# Patient Record
Sex: MALE | Race: WHITE | NOT HISPANIC OR LATINO | URBAN - METROPOLITAN AREA
[De-identification: names, ages, dates, MRNs, and addresses within clinical notes are randomized per-mention and may not be internally consistent; named-entity substitution may affect disease eponyms.]

---

## 2016-01-28 RX ORDER — AMLODIPINE BESYLATE 2.5 MG/1
1 TABLET ORAL
Qty: 0 | Refills: 0 | COMMUNITY
Start: 2016-01-28

## 2016-01-28 RX ORDER — METOPROLOL TARTRATE 50 MG
1 TABLET ORAL
Qty: 0 | Refills: 0 | COMMUNITY
Start: 2016-01-28

## 2016-01-28 RX ORDER — TAMSULOSIN HYDROCHLORIDE 0.4 MG/1
1 CAPSULE ORAL
Qty: 0 | Refills: 0 | COMMUNITY
Start: 2016-01-28

## 2017-03-01 ENCOUNTER — INPATIENT (INPATIENT)
Facility: HOSPITAL | Age: 82
LOS: 6 days | Discharge: HOME | DRG: 698 | End: 2017-03-08
Attending: HOSPITALIST | Admitting: HOSPITALIST
Payer: MEDICARE

## 2017-03-01 VITALS
TEMPERATURE: 97 F | DIASTOLIC BLOOD PRESSURE: 54 MMHG | SYSTOLIC BLOOD PRESSURE: 96 MMHG | OXYGEN SATURATION: 99 % | RESPIRATION RATE: 16 BRPM | WEIGHT: 214.95 LBS | HEART RATE: 88 BPM

## 2017-03-01 DIAGNOSIS — N47.1 PHIMOSIS: ICD-10-CM

## 2017-03-01 DIAGNOSIS — I21.4 NON-ST ELEVATION (NSTEMI) MYOCARDIAL INFARCTION: ICD-10-CM

## 2017-03-01 DIAGNOSIS — J34.2 DEVIATED NASAL SEPTUM: Chronic | ICD-10-CM

## 2017-03-01 DIAGNOSIS — L89.90 PRESSURE ULCER OF UNSPECIFIED SITE, UNSPECIFIED STAGE: ICD-10-CM

## 2017-03-01 DIAGNOSIS — K46.9 UNSPECIFIED ABDOMINAL HERNIA WITHOUT OBSTRUCTION OR GANGRENE: Chronic | ICD-10-CM

## 2017-03-01 DIAGNOSIS — Z93.3 COLOSTOMY STATUS: Chronic | ICD-10-CM

## 2017-03-01 DIAGNOSIS — Z98.89 OTHER SPECIFIED POSTPROCEDURAL STATES: Chronic | ICD-10-CM

## 2017-03-01 DIAGNOSIS — Z93.1 GASTROSTOMY STATUS: Chronic | ICD-10-CM

## 2017-03-01 DIAGNOSIS — I24.8 OTHER FORMS OF ACUTE ISCHEMIC HEART DISEASE: ICD-10-CM

## 2017-03-01 DIAGNOSIS — I95.9 HYPOTENSION, UNSPECIFIED: ICD-10-CM

## 2017-03-01 DIAGNOSIS — R41.82 ALTERED MENTAL STATUS, UNSPECIFIED: ICD-10-CM

## 2017-03-01 DIAGNOSIS — Z41.8 ENCOUNTER FOR OTHER PROCEDURES FOR PURPOSES OTHER THAN REMEDYING HEALTH STATE: ICD-10-CM

## 2017-03-01 DIAGNOSIS — E86.0 DEHYDRATION: ICD-10-CM

## 2017-03-01 DIAGNOSIS — N39.0 URINARY TRACT INFECTION, SITE NOT SPECIFIED: ICD-10-CM

## 2017-03-01 DIAGNOSIS — R60.1 GENERALIZED EDEMA: ICD-10-CM

## 2017-03-01 DIAGNOSIS — G93.40 ENCEPHALOPATHY, UNSPECIFIED: ICD-10-CM

## 2017-03-01 DIAGNOSIS — A41.9 SEPSIS, UNSPECIFIED ORGANISM: ICD-10-CM

## 2017-03-01 LAB
ANISOCYTOSIS BLD QL: SLIGHT — SIGNIFICANT CHANGE UP
APPEARANCE UR: CLEAR — SIGNIFICANT CHANGE UP
APTT BLD: 26.2 SEC — LOW (ref 27.5–37.4)
APTT BLD: 28.1 SEC — SIGNIFICANT CHANGE UP (ref 27.5–37.4)
BACTERIA # UR AUTO: ABNORMAL
BASOPHILS # BLD AUTO: 0 K/UL — SIGNIFICANT CHANGE UP (ref 0–0.2)
BASOPHILS NFR BLD AUTO: 0.1 % — SIGNIFICANT CHANGE UP (ref 0–2)
BILIRUB UR-MCNC: NEGATIVE — SIGNIFICANT CHANGE UP
CK MB CFR SERPL CALC: 2 NG/ML — SIGNIFICANT CHANGE UP (ref 0–6.7)
CK SERPL-CCNC: 256 U/L — HIGH (ref 30–200)
COLOR SPEC: YELLOW — SIGNIFICANT CHANGE UP
DIFF PNL FLD: ABNORMAL
EOSINOPHIL # BLD AUTO: 0.1 K/UL — SIGNIFICANT CHANGE UP (ref 0–0.5)
EOSINOPHIL # BLD AUTO: 0.2 K/UL — SIGNIFICANT CHANGE UP (ref 0–0.5)
EOSINOPHIL NFR BLD AUTO: 0.8 % — SIGNIFICANT CHANGE UP (ref 0–5)
EOSINOPHIL NFR BLD AUTO: 1 % — SIGNIFICANT CHANGE UP (ref 0–6)
EPI CELLS # UR: SIGNIFICANT CHANGE UP
GLUCOSE UR QL: NEGATIVE MG/DL — SIGNIFICANT CHANGE UP
HCT VFR BLD CALC: 26 % — LOW (ref 42–52)
HCT VFR BLD CALC: 30.5 % — LOW (ref 42–52)
HGB BLD-MCNC: 8.1 G/DL — LOW (ref 14–18)
HGB BLD-MCNC: 9.3 G/DL — LOW (ref 14–18)
HYPOCHROMIA BLD QL: SLIGHT — SIGNIFICANT CHANGE UP
INR BLD: 1.34 RATIO — HIGH (ref 0.88–1.16)
INR BLD: 1.38 RATIO — HIGH (ref 0.88–1.16)
KETONES UR-MCNC: NEGATIVE — SIGNIFICANT CHANGE UP
LACTATE BLDV-MCNC: 1.2 MMOL/L — SIGNIFICANT CHANGE UP (ref 0.5–1.6)
LACTATE BLDV-MCNC: 2.2 MMOL/L — HIGH (ref 0.7–2)
LEUKOCYTE ESTERASE UR-ACNC: ABNORMAL
LYMPHOCYTES # BLD AUTO: 1.1 K/UL — SIGNIFICANT CHANGE UP (ref 1–4.8)
LYMPHOCYTES # BLD AUTO: 11 % — LOW (ref 20–55)
LYMPHOCYTES # BLD AUTO: 2 K/UL — SIGNIFICANT CHANGE UP (ref 1–4.8)
LYMPHOCYTES # BLD AUTO: 8 % — LOW (ref 20–55)
MCHC RBC-ENTMCNC: 24.6 PG — LOW (ref 27–31)
MCHC RBC-ENTMCNC: 25.2 PG — LOW (ref 27–31)
MCHC RBC-ENTMCNC: 30.5 G/DL — LOW (ref 32–36)
MCHC RBC-ENTMCNC: 31.2 G/DL — LOW (ref 32–36)
MCV RBC AUTO: 80.7 FL — SIGNIFICANT CHANGE UP (ref 80–94)
MCV RBC AUTO: 81 FL — SIGNIFICANT CHANGE UP (ref 80–94)
MONOCYTES # BLD AUTO: 0.9 K/UL — HIGH (ref 0–0.8)
MONOCYTES # BLD AUTO: 1.6 K/UL — HIGH (ref 0–0.8)
MONOCYTES NFR BLD AUTO: 6.6 % — SIGNIFICANT CHANGE UP (ref 3–10)
MONOCYTES NFR BLD AUTO: 9 % — SIGNIFICANT CHANGE UP (ref 3–10)
MYELOCYTES NFR BLD: 1 % — HIGH (ref 0–0)
NEUTROPHILS # BLD AUTO: 11.8 K/UL — HIGH (ref 1.8–8)
NEUTROPHILS # BLD AUTO: 13.8 K/UL — HIGH (ref 1.8–8)
NEUTROPHILS NFR BLD AUTO: 73 % — SIGNIFICANT CHANGE UP (ref 37–73)
NEUTROPHILS NFR BLD AUTO: 83.4 % — HIGH (ref 37–73)
NEUTS BAND # BLD: 5 % — SIGNIFICANT CHANGE UP (ref 0–8)
NITRITE UR-MCNC: NEGATIVE — SIGNIFICANT CHANGE UP
NT-PROBNP SERPL-SCNC: 349 PG/ML — HIGH (ref 0–300)
PH UR: 5 — SIGNIFICANT CHANGE UP (ref 4.8–8)
PLAT MORPH BLD: NORMAL — SIGNIFICANT CHANGE UP
PLATELET # BLD AUTO: 358 K/UL — SIGNIFICANT CHANGE UP (ref 150–400)
PLATELET # BLD AUTO: 364 K/UL — SIGNIFICANT CHANGE UP (ref 150–400)
PROT UR-MCNC: 30 MG/DL
PROTHROM AB SERPL-ACNC: 14.8 SEC — HIGH (ref 10–13.1)
PROTHROM AB SERPL-ACNC: 15.2 SEC — HIGH (ref 10–13.1)
RBC # BLD: 3.21 M/UL — LOW (ref 4.6–6.2)
RBC # BLD: 3.78 M/UL — LOW (ref 4.6–6.2)
RBC # FLD: 16.9 % — HIGH (ref 11–15.6)
RBC # FLD: 17.2 % — HIGH (ref 11–15.6)
RBC BLD AUTO: ABNORMAL
RBC CASTS # UR COMP ASSIST: ABNORMAL /HPF (ref 0–4)
SP GR SPEC: 1.01 — SIGNIFICANT CHANGE UP (ref 1.01–1.02)
TROPONIN T SERPL-MCNC: 0.14 NG/ML — CRITICAL HIGH (ref 0–0.06)
UROBILINOGEN FLD QL: NEGATIVE MG/DL — SIGNIFICANT CHANGE UP
WBC # BLD: 14.1 K/UL — HIGH (ref 4.8–10.8)
WBC # BLD: 17.7 K/UL — HIGH (ref 4.8–10.8)
WBC # FLD AUTO: 14.1 K/UL — HIGH (ref 4.8–10.8)
WBC # FLD AUTO: 17.7 K/UL — HIGH (ref 4.8–10.8)
WBC UR QL: ABNORMAL

## 2017-03-01 PROCEDURE — 93010 ELECTROCARDIOGRAM REPORT: CPT

## 2017-03-01 PROCEDURE — 99284 EMERGENCY DEPT VISIT MOD MDM: CPT

## 2017-03-01 PROCEDURE — 99285 EMERGENCY DEPT VISIT HI MDM: CPT

## 2017-03-01 PROCEDURE — 71250 CT THORAX DX C-: CPT | Mod: 26

## 2017-03-01 PROCEDURE — 74176 CT ABD & PELVIS W/O CONTRAST: CPT | Mod: 26

## 2017-03-01 PROCEDURE — 71010: CPT | Mod: 26

## 2017-03-01 RX ORDER — ENOXAPARIN SODIUM 100 MG/ML
40 INJECTION SUBCUTANEOUS DAILY
Qty: 0 | Refills: 0 | Status: DISCONTINUED | OUTPATIENT
Start: 2017-03-01 | End: 2017-03-08

## 2017-03-01 RX ORDER — VANCOMYCIN HCL 1 G
1500 VIAL (EA) INTRAVENOUS ONCE
Qty: 0 | Refills: 0 | Status: COMPLETED | OUTPATIENT
Start: 2017-03-01 | End: 2017-03-01

## 2017-03-01 RX ORDER — SODIUM CHLORIDE 9 MG/ML
1000 INJECTION INTRAMUSCULAR; INTRAVENOUS; SUBCUTANEOUS ONCE
Qty: 0 | Refills: 0 | Status: COMPLETED | OUTPATIENT
Start: 2017-03-01 | End: 2017-03-01

## 2017-03-01 RX ORDER — ACETAMINOPHEN 500 MG
650 TABLET ORAL EVERY 6 HOURS
Qty: 0 | Refills: 0 | Status: DISCONTINUED | OUTPATIENT
Start: 2017-03-01 | End: 2017-03-08

## 2017-03-01 RX ORDER — ALBUTEROL 90 UG/1
3 AEROSOL, METERED ORAL
Qty: 0 | Refills: 0 | COMMUNITY

## 2017-03-01 RX ORDER — SACCHAROMYCES BOULARDII 250 MG
250 POWDER IN PACKET (EA) ORAL
Qty: 0 | Refills: 0 | Status: DISCONTINUED | OUTPATIENT
Start: 2017-03-01 | End: 2017-03-08

## 2017-03-01 RX ORDER — LACOSAMIDE 50 MG/1
150 TABLET ORAL
Qty: 0 | Refills: 0 | Status: COMPLETED | OUTPATIENT
Start: 2017-03-01 | End: 2017-03-08

## 2017-03-01 RX ORDER — VANCOMYCIN HCL 1 G
VIAL (EA) INTRAVENOUS
Qty: 0 | Refills: 0 | Status: DISCONTINUED | OUTPATIENT
Start: 2017-03-01 | End: 2017-03-01

## 2017-03-01 RX ORDER — PIPERACILLIN AND TAZOBACTAM 4; .5 G/20ML; G/20ML
3.38 INJECTION, POWDER, LYOPHILIZED, FOR SOLUTION INTRAVENOUS ONCE
Qty: 0 | Refills: 0 | Status: DISCONTINUED | OUTPATIENT
Start: 2017-03-01 | End: 2017-03-01

## 2017-03-01 RX ORDER — SODIUM CHLORIDE 0.65 %
1 AEROSOL, SPRAY (ML) NASAL DAILY
Qty: 0 | Refills: 0 | Status: DISCONTINUED | OUTPATIENT
Start: 2017-03-01 | End: 2017-03-08

## 2017-03-01 RX ORDER — ASPIRIN/CALCIUM CARB/MAGNESIUM 325 MG
81 TABLET ORAL
Qty: 0 | Refills: 0 | COMMUNITY

## 2017-03-01 RX ORDER — AMLODIPINE BESYLATE 2.5 MG/1
10 TABLET ORAL DAILY
Qty: 0 | Refills: 0 | Status: DISCONTINUED | OUTPATIENT
Start: 2017-03-01 | End: 2017-03-01

## 2017-03-01 RX ORDER — MEROPENEM 1 G/30ML
1000 INJECTION INTRAVENOUS ONCE
Qty: 0 | Refills: 0 | Status: COMPLETED | OUTPATIENT
Start: 2017-03-01 | End: 2017-03-01

## 2017-03-01 RX ORDER — FERROUS SULFATE 325(65) MG
300 TABLET ORAL DAILY
Qty: 0 | Refills: 0 | Status: DISCONTINUED | OUTPATIENT
Start: 2017-03-01 | End: 2017-03-08

## 2017-03-01 RX ORDER — ASPIRIN/CALCIUM CARB/MAGNESIUM 324 MG
81 TABLET ORAL DAILY
Qty: 0 | Refills: 0 | Status: DISCONTINUED | OUTPATIENT
Start: 2017-03-01 | End: 2017-03-02

## 2017-03-01 RX ORDER — FINASTERIDE 5 MG/1
5 TABLET, FILM COATED ORAL DAILY
Qty: 0 | Refills: 0 | Status: DISCONTINUED | OUTPATIENT
Start: 2017-03-01 | End: 2017-03-08

## 2017-03-01 RX ORDER — ASPIRIN/CALCIUM CARB/MAGNESIUM 325 MG
81 TABLET ORAL DAILY
Qty: 0 | Refills: 0 | Status: DISCONTINUED | OUTPATIENT
Start: 2017-03-01 | End: 2017-03-01

## 2017-03-01 RX ORDER — MEROPENEM 1 G/30ML
1000 INJECTION INTRAVENOUS EVERY 8 HOURS
Qty: 0 | Refills: 0 | Status: DISCONTINUED | OUTPATIENT
Start: 2017-03-02 | End: 2017-03-05

## 2017-03-01 RX ORDER — METOPROLOL TARTRATE 50 MG
25 TABLET ORAL
Qty: 0 | Refills: 0 | Status: DISCONTINUED | OUTPATIENT
Start: 2017-03-01 | End: 2017-03-01

## 2017-03-01 RX ORDER — VANCOMYCIN HCL 1 G
1000 VIAL (EA) INTRAVENOUS EVERY 12 HOURS
Qty: 0 | Refills: 0 | Status: DISCONTINUED | OUTPATIENT
Start: 2017-03-01 | End: 2017-03-03

## 2017-03-01 RX ORDER — SODIUM CHLORIDE 9 MG/ML
800 INJECTION INTRAMUSCULAR; INTRAVENOUS; SUBCUTANEOUS ONCE
Qty: 0 | Refills: 0 | Status: COMPLETED | OUTPATIENT
Start: 2017-03-01 | End: 2017-03-01

## 2017-03-01 RX ORDER — PIPERACILLIN AND TAZOBACTAM 4; .5 G/20ML; G/20ML
3.38 INJECTION, POWDER, LYOPHILIZED, FOR SOLUTION INTRAVENOUS EVERY 8 HOURS
Qty: 0 | Refills: 0 | Status: DISCONTINUED | OUTPATIENT
Start: 2017-03-01 | End: 2017-03-01

## 2017-03-01 RX ORDER — CEFTRIAXONE 500 MG/1
1 INJECTION, POWDER, FOR SOLUTION INTRAMUSCULAR; INTRAVENOUS ONCE
Qty: 0 | Refills: 0 | Status: COMPLETED | OUTPATIENT
Start: 2017-03-01 | End: 2017-03-01

## 2017-03-01 RX ORDER — MEROPENEM 1 G/30ML
INJECTION INTRAVENOUS
Qty: 0 | Refills: 0 | Status: DISCONTINUED | OUTPATIENT
Start: 2017-03-01 | End: 2017-03-05

## 2017-03-01 RX ORDER — SODIUM CHLORIDE 9 MG/ML
1000 INJECTION INTRAMUSCULAR; INTRAVENOUS; SUBCUTANEOUS
Qty: 0 | Refills: 0 | Status: DISCONTINUED | OUTPATIENT
Start: 2017-03-01 | End: 2017-03-03

## 2017-03-01 RX ADMIN — SODIUM CHLORIDE 3000 MILLILITER(S): 9 INJECTION INTRAMUSCULAR; INTRAVENOUS; SUBCUTANEOUS at 13:36

## 2017-03-01 RX ADMIN — SODIUM CHLORIDE 4800 MILLILITER(S): 9 INJECTION INTRAMUSCULAR; INTRAVENOUS; SUBCUTANEOUS at 17:31

## 2017-03-01 RX ADMIN — MEROPENEM 200 MILLIGRAM(S): 1 INJECTION INTRAVENOUS at 21:26

## 2017-03-01 RX ADMIN — CEFTRIAXONE 100 GRAM(S): 500 INJECTION, POWDER, FOR SOLUTION INTRAMUSCULAR; INTRAVENOUS at 13:39

## 2017-03-01 RX ADMIN — LACOSAMIDE 150 MILLIGRAM(S): 50 TABLET ORAL at 18:58

## 2017-03-01 RX ADMIN — Medication 300 MILLIGRAM(S): at 18:31

## 2017-03-01 RX ADMIN — SODIUM CHLORIDE 3000 MILLILITER(S): 9 INJECTION INTRAMUSCULAR; INTRAVENOUS; SUBCUTANEOUS at 15:55

## 2017-03-01 RX ADMIN — SODIUM CHLORIDE 100 MILLILITER(S): 9 INJECTION INTRAMUSCULAR; INTRAVENOUS; SUBCUTANEOUS at 17:31

## 2017-03-01 RX ADMIN — Medication 81 MILLIGRAM(S): at 18:57

## 2017-03-01 RX ADMIN — Medication 250 MILLIGRAM(S): at 18:57

## 2017-03-01 NOTE — H&P ADULT - PROBLEM SELECTOR PLAN 4
likely 2/2 to protein malnutrition  Nutrition consult IV NS @ 100 ml/hr   Monitor renal function s/p 2 bolus in ED   IV NS @ 100 ml/hr   Monitor renal function s/p 2 boluses in ED now on IV NS @ 100 ml/hr   BP meds held for now  Monitor renal function

## 2017-03-01 NOTE — ED ADULT NURSE NOTE - PSH
Abdominal hernia    Deviated septum    H/O hemorrhoidectomy    Status post cardiac surgery  stents x 2

## 2017-03-01 NOTE — ED ADULT NURSE REASSESSMENT NOTE - NS ED NURSE REASSESS COMMENT FT1
pt repositioned, assessment remains unchanged, baseline neuro  status, respirations even unlabored, will continue to monitor./

## 2017-03-01 NOTE — H&P ADULT - ASSESSMENT
85 year old male w/ PMH dementia of BIBA from [facility] for fever of 102.1 likely 2/2 to sepsis. 85 year old male w/ PMH dementia of BIBA from [facility] for fever of 102.1 likely 2/2 to severe sepsis.

## 2017-03-01 NOTE — H&P ADULT - PMH
BPH (benign prostatic hypertrophy)    CAD (coronary artery disease)    CHF (congestive heart failure)    Clostridium difficile diarrhea  in 2009  Dementia    Fall  Multiple Mechanical falls  HLD (hyperlipidemia)    HTN (hypertension)    Prostate cancer  Treated w/ radiation  Seizure  (last event >1yr ago)  Stroke  (~5yrs ago) BPH (benign prostatic hypertrophy)    CAD (coronary artery disease)    CHF (congestive heart failure)    Clostridium difficile diarrhea  in 2009  Dementia    Dysphagia    Fall  Multiple Mechanical falls  HLD (hyperlipidemia)    HTN (hypertension)    Prostate cancer  Treated w/ radiation  Seizure  (last event >1yr ago)  Stroke  (~5yrs ago)

## 2017-03-01 NOTE — ED PROVIDER NOTE - OBJECTIVE STATEMENT
85 year old male w/ PMH dementia, seizure, stroke, dysphagia s/p PEG, phimosis w/  jones & colostomy for BIBA from [facility] for fever of 102.1. Patient was sent to [facility] to rule out sepsis. Vitals at NH were 102.1 F, HR 98, RR 20, /70 & O2 sat 95% on RA. History obtained from EMS & nursing home records as patient is a poor historian and non-verbal.

## 2017-03-01 NOTE — CONSULT NOTE ADULT - ASSESSMENT
Problem/Plan - 1:  ·  Problem: Sepsis- unknown source , surveillance cx pending , abx as per primary team.  CT of chest & abdomen pending    Problem/Plan - 2:  ·  Problem: Elevated Tni ; ECG unremarkable normal LVEF 1/2016 : Likely demand ischemia >> NSTEMI (non-ST elevated myocardial infarction).    f/u repeat troponin consider repeat TTE if significant elevation. Hold UFH for now continue [id number] daily/statin    Problem/Plan - 3:  ·  Problem: UTI (urinary tract infection).  Plan: Meropenem 1g IV Q8H & Vancomycin 1 g IV Q12H  f/u urine culture.     Problem/Plan - 4:  ·  Problem: Dehydration.  Plan: s/p 2 boluses in ED now on IV NS @ 100 ml/hr   BP meds held for now  Monitor renal function.     DVT prophylaxis: Plan; VCD boots

## 2017-03-01 NOTE — ED ADULT NURSE NOTE - PMH
BPH (benign prostatic hypertrophy)    CAD (coronary artery disease)    CHF (congestive heart failure)    Clostridium difficile diarrhea  in 2009  Dementia    Fall  Multiple Mechanical falls  HLD (hyperlipidemia)    HTN (hypertension)    Prostate cancer  Treated w/ radiation  Seizure  (last event >1yr ago)  Stroke  (~5yrs ago)

## 2017-03-01 NOTE — H&P ADULT - PROBLEM SELECTOR PLAN 6
VCD boots Elevated troponin likely 2/2 demand ischema  Cardiology consult   f/u repeat troponin patient has baseline dementia   will contact NH in the AM for more history

## 2017-03-01 NOTE — H&P ADULT - HISTORY OF PRESENT ILLNESS
85 year old male w/ PMH of BIBA from [facility] for fever of 102.1. 85 year old male w/ PMH of BIBA from [facility] for fever of 102.1. P 85 year old male w/ PMH dementia of BIBA from [facility] for fever of 102.1.  Patient is a poor historian and non-verbal. History obtained from EMS & nursing home records. 85 year old male w/ PMH dementia, seizure & stroke BIBA from [facility] for fever of 102.1. Patient was sent to [facility] to rule out sepsis. History obtained from EMS & nursing home records as patient is a poor historian and non-verbal. 85 year old male w/ PMH dementia, seizure & stroke BIBA from [facility] for fever of 102.1. Patient was sent to [facility] to rule out sepsis. Vitals at NH were 102.1 F, HR 98, RR 20, /70 & O2 sat 95% on RA. History obtained from EMS & nursing home records as patient is a poor historian and non-verbal. 85 year old male w/ PMH dementia, seizure, stroke, dysphagia s/p PEG BIBA from [facility] for fever of 102.1. Patient was sent to [facility] to rule out sepsis. Vitals at NH were 102.1 F, HR 98, RR 20, /70 & O2 sat 95% on RA. History obtained from EMS & nursing home records as patient is a poor historian and non-verbal. 85 year old male w/ PMH dementia, seizure, stroke, dysphagia s/p PEG & phimosis s/p jones BIBA from [facility] for fever of 102.1. Patient was sent to [facility] to rule out sepsis. Vitals at NH were 102.1 F, HR 98, RR 20, /70 & O2 sat 95% on RA. History obtained from EMS & nursing home records as patient is a poor historian and non-verbal. 85 year old male w/ PMH dementia, seizure, stroke, dysphagia s/p PEG & phimosis w/  jones BIBA from [facility] for fever of 102.1. Patient was sent to [facility] to rule out sepsis. Vitals at NH were 102.1 F, HR 98, RR 20, /70 & O2 sat 95% on RA. History obtained from EMS & nursing home records as patient is a poor historian and non-verbal. 85 year old male w/ PMH dementia, seizure, stroke, dysphagia s/p PEG, phimosis w/  jones & colostomy for BIBA from [facility] for fever of 102.1. Patient was sent to [facility] to rule out sepsis. Vitals at NH were 102.1 F, HR 98, RR 20, /70 & O2 sat 95% on RA. History obtained from EMS & nursing home records as patient is a poor historian and non-verbal.

## 2017-03-01 NOTE — ED PROVIDER NOTE - CARE PLAN
Principal Discharge DX:	Altered mental status  Secondary Diagnosis:	Urinary tract infection Principal Discharge DX:	Altered mental status  Secondary Diagnosis:	Urinary tract infection  Secondary Diagnosis:	NSTEMI (non-ST elevated myocardial infarction)

## 2017-03-01 NOTE — H&P ADULT - PSH
Abdominal hernia    Deviated septum    H/O hemorrhoidectomy    Status post cardiac surgery  stents x 2 Abdominal hernia    Colostomy in place    Deviated septum    H/O hemorrhoidectomy    S/P percutaneous endoscopic gastrostomy (PEG) tube placement    Status post cardiac surgery  stents x 2

## 2017-03-01 NOTE — CONSULT NOTE ADULT - SUBJECTIVE AND OBJECTIVE BOX
Patient is a 85y old  Male who presents with a chief complaint of Fever   patient is poor historian detail of history abstracted from available chart records       HPI:  85 year old male w/ PMH dementia, seizure, stroke, dysphagia s/p PEG, phimosis w/  jones & colostomy for BIBA from [facility] for fever of 102.1. Patient was sent to [facility] to rule out sepsis. Vitals at NH were 102.1 F, HR 98, RR 20, /70 & O2 sat 95% on RA. History obtained from EMS & nursing home records as patient is a poor historian and non-verbal. IN ED patient denies cp initial TNI 0.14 12-lead ECG ST with ns st twa.      PAST MEDICAL & SURGICAL HISTORY:  Dysphagia  Fall: Multiple Mechanical falls  CAD (coronary artery disease)  Clostridium difficile diarrhea: in   BPH (benign prostatic hypertrophy)  Dementia  HLD (hyperlipidemia)  CHF (congestive heart failure)  Prostate cancer: Treated w/ radiation  Stroke: (~5yrs ago)  Seizure: (last event &gt;1yr ago)  HTN (hypertension)  Colostomy in place  S/P percutaneous endoscopic gastrostomy (PEG) tube placement  H/O hemorrhoidectomy  Deviated septum  Abdominal hernia  Status post cardiac surgery: stents x 2      PREVIOUS DIAGNOSTIC TESTING:      ECHO 2016  FINDINGS: Conclusions:  Technically limited study. The study was terminated  prematurely by patient request.  1. Aortic valve not well visualized; appears calcified with  decreased opening. Aortic gradients were not obtained  because the examination was stopped prematurely. Mild  aortic regurgitation.  2. Normal aortic root size. (Ao: 3.2 cm at the sinuses of  Valsalva). The ascending aorta measures4.0 cm.  3. From very limited views; grossly left ventricular  systolic function appears preserved.  4. Reversal of the E-A  waves of the mitral inflow pattern  is consistent with stage I diastolic LV dysfunction.  5. The right heart was not adequately visualized.  6. Trace pericardial effusion.      Allergies    clindamycin (Unknown)  Nuts (Hives; Rash)  PC Pen VK (Unknown)  strawberry (Short breath; Rash; Hives)  Xanax (Rash)    Intolerances    Ativan (Unknown)  Haldol (Dystonic RXN)  hydrALAZINE (Unknown)  Zyprexa (Unknown)      MEDICATIONS  (STANDING):  sodium chloride 0.9%. 1000milliLiter(s) IV Continuous <Continuous>  finasteride 5milliGRAM(s) Oral daily  lacosamide 150milliGRAM(s) Oral two times a day  ferrous    sulfate 60 mG/mL Liquid 300milliGRAM(s) Enteral Tube daily  sodium chloride 0.65% Nasal 1Spray(s) Both Nostrils daily  aspirin  chewable 81milliGRAM(s) Oral daily  vancomycin  IVPB  IV Intermittent   saccharomyces boulardii 250milliGRAM(s) Oral two times a day  meropenem IVPB  IV Intermittent   meropenem IVPB 1000milliGRAM(s) IV Intermittent once    MEDICATIONS  (PRN):  acetaminophen  Suppository 650milliGRAM(s) Rectal every 6 hours PRN For Temp greater than 38 C (100.4 F)      FAMILY HISTORY:  No pertinent family history in first degree relatives      SOCIAL HISTORY: Assisted Living  resident    CIGARETTES:N/A    ALCOHOL: N/A    Physical Exam:  Physical Exam: Physical Exam:  GENERAL: NAD, malnourished, non-verbal   HEAD:  Atraumatic, Normocephalic EYES: EOMI, PERRLA, conjunctiva and sclera clear, cataracts B/L ENMT: dry mucous membranes, poor dentition  NECK: Supple, No JVD, Normal thyroid NERVOUS SYSTEM:  Awake, Alert & Oriented x0, contracted upper & lower extremities CHEST/LUNG: Clear to percussion bilaterally; No rales, rhonchi, wheezing, or rubs HEART: Regular rate and rhythm; No murmurs, rubs, or gallops ABDOMEN: Soft, Nontender, Nondistended; Bowel sounds present, PEG tube & colostomy bag in place GENITOURINARY: phimosis w/ jones in place EXTREMITIES: 2+ pitting edema, contracture of upper & lower extremities, No clubbing or cyanosis LYMPH: No lymphadenopathy noted SKIN: 1x2cm ulcer on left knee, left plantar healing ulcer, 2x2 cm stage 2 decubitus ulcer	      Vital Signs Last 24 Hrs  T(C): 37.3, Max: 37.8 ( @ 13:21)  T(F): 99.1, Max: 100 ( @ 13:21)  HR: 88 (87 - 90)  BP: 124/66 (96/54 - 124/66)  BP(mean): --  RR: 16 (16 - 16)  SpO2: 97% (96% - 99%)    Daily     Daily     I&O's Detail            INTERPRETATION OF TELEMETRY:    ECG:    LABS:                        8.1    14.1  )-----------( 358      ( 01 Mar 2017 20:08 )             26.0     01 Mar 2017 15:40    144    |  107    |  35.0   ----------------------------<  110    3.6     |  23.0   |  0.38     Ca    8.9        01 Mar 2017 15:40  Phos  3.0       01 Mar 2017 15:40  Mg     2.2       01 Mar 2017 15:40    TPro  6.5    /  Alb  2.6    /  TBili  0.3    /  DBili  x      /  AST  26     /  ALT  21     /  AlkPhos  78     01 Mar 2017 15:40    CARDIAC MARKERS ( 01 Mar 2017 15:40 )  x     / 0.14 ng/mL / 256 U/L / x     / 2.0 ng/mL      PT/INR - ( 01 Mar 2017 20:08 )   PT: 15.2 sec;   INR: 1.38 ratio         PTT - ( 01 Mar 2017 20:08 )  PTT:28.1 sec  Urinalysis Basic - ( 01 Mar 2017 13:25 )    Color: Yellow / Appearance: Clear / S.015 / pH: x  Gluc: x / Ketone: Negative  / Bili: Negative / Urobili: Negative mg/dL   Blood: x / Protein: 30 mg/dL / Nitrite: Negative   Leuk Esterase: Moderate / RBC: 25-50 /HPF / WBC 11-25   Sq Epi: x / Non Sq Epi: Occasional / Bacteria: Occasional      I&O's Summary    BNPSerum Pro-Brain Natriuretic Peptide: 349 pg/mL ( @ 15:41)    RADIOLOGY & ADDITIONAL STUDIES:

## 2017-03-01 NOTE — H&P ADULT - NSHPREVIEWOFSYSTEMS_GEN_ALL_CORE
Unable to obtain as patient is non-verbal. Unable to obtain as patient is a poor historian and non-verbal.

## 2017-03-01 NOTE — H&P ADULT - PROBLEM SELECTOR PLAN 3
2/2 to immobility & contracts  Wound care consult  PT consult 2/2 to immobility & contractures   Wound care consult  PT consult Meropenem 1g IV Q8H & Vancomycin 1 g IV Q12H  f/u urine culture

## 2017-03-01 NOTE — ED ADULT NURSE NOTE - CHIEF COMPLAINT QUOTE
pt came to ed from [facility] for eval of fever. has jones. peg tube and colostomy. temp 102.1 prior to arrival; got tylenol at facility; afebrile here.

## 2017-03-01 NOTE — H&P ADULT - PROBLEM SELECTOR PLAN 7
IV NS @ 100 ml/hr   Monitor renal function 2/2 to immobility & contractures   Wound care consult  PT consult

## 2017-03-01 NOTE — ED ADULT TRIAGE NOTE - CHIEF COMPLAINT QUOTE
pt came to ed from [facility] for eval of fever. has jones. peg tube and colostomy. temp 102.1 prior to arrival; got tylenol at facility; afebrile here.

## 2017-03-01 NOTE — ED ADULT NURSE REASSESSMENT NOTE - NS ED NURSE REASSESS COMMENT FT1
pt baseline mental status remains unchanged. respirations even unlabored, b/l breathe sounds noted, pt repositioned and adjusted in bed. will continue to monitor.

## 2017-03-01 NOTE — H&P ADULT - PROBLEM SELECTOR PLAN 2
appears to be baseline   will contact NH in the AM patient has baseline dementia   will contact NH in the AM for more history Normal EKG w/ elevated troponin   Cardiology consult ([place])    f/u repeat troponin

## 2017-03-01 NOTE — H&P ADULT - PROBLEM SELECTOR PLAN 9
VCD boots Baugh is a potential source of infection vs colonization   Straight catheter PRN Baugh is a potential source of infection vs colonization Jones is a potential source of infection vs colonization. Will keep jones and consult urology and reconsider finesteride and tamulosin

## 2017-03-01 NOTE — H&P ADULT - NSHPPHYSICALEXAM_GEN_ALL_CORE
Physical Exam:   GENERAL: NAD, malnourished, non-verbal    HEAD:  Atraumatic, Normocephalic  EYES: EOMI, PERRLA, conjunctiva and sclera clear, cataracts B/L  ENMT: dry mucous membranes, poor dentition   NECK: Supple, No JVD, Normal thyroid  NERVOUS SYSTEM:  Awake, Alert & Oriented x0, contracted upper & lower extremities  CHEST/LUNG: Clear to percussion bilaterally; No rales, rhonchi, wheezing, or rubs  HEART: Regular rate and rhythm; No murmurs, rubs, or gallops  ABDOMEN: Soft, Nontender, Nondistended; Bowel sounds present, PEG tube & colostomy bag in place  EXTREMITIES: 2+ pitting edema, contracture of upper & lower extremities, No clubbing or cyanosis  LYMPH: No lymphadenopathy noted  SKIN: 1x2cm ulcer on left knee, left plantar healing ulcer, 2x2 cm stage 2 decubitus ulcer Physical Exam:   GENERAL: NAD, malnourished, non-verbal    HEAD:  Atraumatic, Normocephalic  EYES: EOMI, PERRLA, conjunctiva and sclera clear, cataracts B/L  ENMT: dry mucous membranes, poor dentition   NECK: Supple, No JVD, Normal thyroid  NERVOUS SYSTEM:  Awake, Alert & Oriented x0, contracted upper & lower extremities  CHEST/LUNG: Clear to percussion bilaterally; No rales, rhonchi, wheezing, or rubs  HEART: Regular rate and rhythm; No murmurs, rubs, or gallops  ABDOMEN: Soft, Nontender, Nondistended; Bowel sounds present, PEG tube & colostomy bag in place  GENITOURINARY: phimosis w/ jones in place  EXTREMITIES: 2+ pitting edema, contracture of upper & lower extremities, No clubbing or cyanosis  LYMPH: No lymphadenopathy noted  SKIN: 1x2cm ulcer on left knee, left plantar healing ulcer, 2x2 cm stage 2 decubitus ulcer

## 2017-03-01 NOTE — ED ADULT NURSE NOTE - OBJECTIVE STATEMENT
received pt in AMB triage, code sepsis called. Pt sent from [facility] for increased temp, pt nonverbal upon assessment, received tylenol PTA, afebrile here. Pt has 3x stg 3 sacrum pressure ulcers, 1x stage 3 left knee ulcer. Pt respirations even unlabored, pt has 4+ b/l  pitting edema to all 4 extremities.  Dr. Silva at bedside upon assessment and made aware. will continue to monitor respiratory status upon fluid resuscitation. received pt in AMB triage, code sepsis called. Pt sent from [facility] for 102. temp, pt nonverbal upon assessment, received tylenol PTA, afebrile here.pt has jones. peg tube and colostomy PTA. Pt has 3x stg 3 sacrum pressure ulcers, 1x stage 3 left knee ulcer. Pt respirations even unlabored, pt has 4+ b/l  pitting edema to all 4 extremities.  Dr. Silva at bedside upon assessment and made aware. will continue to monitor respiratory status upon fluid resuscitation.

## 2017-03-01 NOTE — H&P ADULT - PROBLEM SELECTOR PLAN 1
Admitted to Medicine-Resident service  Ambulate as tolerated  DASH/TLC diet  Multiple sources (PEG, Baugh, decubitus ulcers)   IV fluids Admitted to Medicine-Resident service  Ambulate as tolerated  DASH/TLC diet  Multiple sources (PEG, Baugh, decubitus ulcers)   IV antibiotics   IV fluids  CBC, CMP, PT/INR, Mg, Phos, Venous lactate, Admitted to Medicine-Resident service  Ambulate as tolerated  DASH/TLC diet  Multiple sources (PEG, Baugh, decubitus ulcers)   Meropenem 1g IV Q8H & Vancomycin 1 g IV Q12H  IV NS @ 100 ml/hr   CBC, CMP, PT/INR, Mg, Phos, Venous lactate, Blood cultures, Urine cultures Admitted to Medicine-Resident service under Dr. Fermin  Ambulate as tolerated  Jevity feeds via PEG  Severe sepsis w/ multiple sources (PEG, colostomy bag, Baugh, decubitus ulcers)   Meropenem 1g IV Q8H & Vancomycin 1 g IV Q12H  IV NS @ 100 ml/hr   CBC, CMP, PT/INR, Mg, Phos, Venous lactate, Blood cultures, Urine cultures Admitted to Medicine-Resident service under Dr. Fermin  Ambulate as tolerated  Jevity feeds via PEG  Severe sepsis w/ multiple sources (PEG, colostomy bag, Baugh, decubitus ulcers)   Meropenem 1g IV Q8H & Vancomycin 1 g IV Q12H  IV NS @ 100 ml/hr   CBC, CMP, PT/INR, Mg, Phos, Venous lactate, Blood cultures, Urine cultures  CT of chest & abdomen to look for potential source

## 2017-03-02 DIAGNOSIS — T83.511D INFECTION AND INFLAMMATORY REACTION DUE TO INDWELLING URETHRAL CATHETER, SUBSEQUENT ENCOUNTER: ICD-10-CM

## 2017-03-02 DIAGNOSIS — L89.150 PRESSURE ULCER OF SACRAL REGION, UNSTAGEABLE: ICD-10-CM

## 2017-03-02 LAB
ALBUMIN SERPL ELPH-MCNC: 2.4 G/DL — LOW (ref 3.3–5.2)
ALP SERPL-CCNC: 71 U/L — SIGNIFICANT CHANGE UP (ref 40–120)
ALT FLD-CCNC: 17 U/L — SIGNIFICANT CHANGE UP
ANION GAP SERPL CALC-SCNC: 11 MMOL/L — SIGNIFICANT CHANGE UP (ref 5–17)
AST SERPL-CCNC: 19 U/L — SIGNIFICANT CHANGE UP
BILIRUB SERPL-MCNC: 0.3 MG/DL — LOW (ref 0.4–2)
BUN SERPL-MCNC: 26 MG/DL — HIGH (ref 8–20)
CALCIUM SERPL-MCNC: 8.6 MG/DL — SIGNIFICANT CHANGE UP (ref 8.6–10.2)
CHLORIDE SERPL-SCNC: 107 MMOL/L — SIGNIFICANT CHANGE UP (ref 98–107)
CO2 SERPL-SCNC: 24 MMOL/L — SIGNIFICANT CHANGE UP (ref 22–29)
CREAT SERPL-MCNC: 0.31 MG/DL — LOW (ref 0.5–1.3)
GLUCOSE SERPL-MCNC: 88 MG/DL — SIGNIFICANT CHANGE UP (ref 70–115)
MAGNESIUM SERPL-MCNC: 2.2 MG/DL — SIGNIFICANT CHANGE UP (ref 1.8–2.5)
POTASSIUM SERPL-MCNC: 3.4 MMOL/L — LOW (ref 3.5–5.3)
POTASSIUM SERPL-SCNC: 3.4 MMOL/L — LOW (ref 3.5–5.3)
PROT SERPL-MCNC: 5.7 G/DL — LOW (ref 6.6–8.7)
SODIUM SERPL-SCNC: 142 MMOL/L — SIGNIFICANT CHANGE UP (ref 135–145)
TROPONIN T SERPL-MCNC: 0.13 NG/ML — CRITICAL HIGH (ref 0–0.06)
TROPONIN T SERPL-MCNC: 0.14 NG/ML — CRITICAL HIGH (ref 0–0.06)
TROPONIN T SERPL-MCNC: 0.14 NG/ML — CRITICAL HIGH (ref 0–0.06)

## 2017-03-02 PROCEDURE — 99233 SBSQ HOSP IP/OBS HIGH 50: CPT | Mod: GC

## 2017-03-02 PROCEDURE — 93010 ELECTROCARDIOGRAM REPORT: CPT

## 2017-03-02 RX ORDER — POTASSIUM CHLORIDE 20 MEQ
40 PACKET (EA) ORAL ONCE
Qty: 0 | Refills: 0 | Status: DISCONTINUED | OUTPATIENT
Start: 2017-03-02 | End: 2017-03-02

## 2017-03-02 RX ORDER — ASPIRIN/CALCIUM CARB/MAGNESIUM 324 MG
325 TABLET ORAL DAILY
Qty: 0 | Refills: 0 | Status: DISCONTINUED | OUTPATIENT
Start: 2017-03-02 | End: 2017-03-03

## 2017-03-02 RX ORDER — POTASSIUM CHLORIDE 20 MEQ
40 PACKET (EA) ORAL ONCE
Qty: 0 | Refills: 0 | Status: COMPLETED | OUTPATIENT
Start: 2017-03-02 | End: 2017-03-02

## 2017-03-02 RX ORDER — SIMVASTATIN 20 MG/1
20 TABLET, FILM COATED ORAL AT BEDTIME
Qty: 0 | Refills: 0 | Status: DISCONTINUED | OUTPATIENT
Start: 2017-03-02 | End: 2017-03-08

## 2017-03-02 RX ADMIN — Medication 250 MILLIGRAM(S): at 17:51

## 2017-03-02 RX ADMIN — LACOSAMIDE 150 MILLIGRAM(S): 50 TABLET ORAL at 05:52

## 2017-03-02 RX ADMIN — ENOXAPARIN SODIUM 40 MILLIGRAM(S): 100 INJECTION SUBCUTANEOUS at 22:20

## 2017-03-02 RX ADMIN — SIMVASTATIN 20 MILLIGRAM(S): 20 TABLET, FILM COATED ORAL at 22:20

## 2017-03-02 RX ADMIN — MEROPENEM 200 MILLIGRAM(S): 1 INJECTION INTRAVENOUS at 22:19

## 2017-03-02 RX ADMIN — Medication 40 MILLIEQUIVALENT(S): at 13:04

## 2017-03-02 RX ADMIN — FINASTERIDE 5 MILLIGRAM(S): 5 TABLET, FILM COATED ORAL at 13:11

## 2017-03-02 RX ADMIN — Medication 1 SPRAY(S): at 13:04

## 2017-03-02 RX ADMIN — Medication 300 MILLIGRAM(S): at 13:04

## 2017-03-02 RX ADMIN — Medication 250 MILLIGRAM(S): at 05:54

## 2017-03-02 RX ADMIN — Medication 325 MILLIGRAM(S): at 13:11

## 2017-03-02 RX ADMIN — LACOSAMIDE 150 MILLIGRAM(S): 50 TABLET ORAL at 17:00

## 2017-03-02 RX ADMIN — Medication 250 MILLIGRAM(S): at 17:00

## 2017-03-02 RX ADMIN — MEROPENEM 200 MILLIGRAM(S): 1 INJECTION INTRAVENOUS at 08:18

## 2017-03-02 RX ADMIN — MEROPENEM 200 MILLIGRAM(S): 1 INJECTION INTRAVENOUS at 16:58

## 2017-03-02 NOTE — CONSULT NOTE ADULT - SUBJECTIVE AND OBJECTIVE BOX
Events noted no acute changes overnight      TELE: SR , pvcs    CT scan of Chest    Impression:  There is subsegmental atelectasis at the lung bases.    There are foci of gas and edema within the soft tissues overlying the   sacrum. Additionally there is presacral fluid with associated foci of   gas. These findings may be post procedural, however infection of this   region is not excluded.    Complex cyst at the interpolar region of the right kidney. Nonemergent   ultrasound can be performed for further evaluation.      MEDICATIONS  (STANDING):  sodium chloride 0.9%. 1000milliLiter(s) IV Continuous <Continuous>  finasteride 5milliGRAM(s) Oral daily  lacosamide 150milliGRAM(s) Oral two times a day  ferrous    sulfate 60 mG/mL Liquid 300milliGRAM(s) Enteral Tube daily  sodium chloride 0.65% Nasal 1Spray(s) Both Nostrils daily  saccharomyces boulardii 250milliGRAM(s) Oral two times a day  meropenem IVPB  IV Intermittent   meropenem IVPB 1000milliGRAM(s) IV Intermittent every 8 hours  enoxaparin Injectable 40milliGRAM(s) SubCutaneous daily  vancomycin  IVPB 1000milliGRAM(s) IV Intermittent every 12 hours  potassium chloride   Powder 40milliEquivalent(s) Oral once  aspirin 325milliGRAM(s) Oral daily  simvastatin 20milliGRAM(s) Oral at bedtime    MEDICATIONS  (PRN):  acetaminophen  Suppository 650milliGRAM(s) Rectal every 6 hours PRN For Temp greater than 38 C (100.4 F)      Allergies    clindamycin (Unknown)  Nuts (Hives; Rash)  PC Pen VK (Unknown)  strawberry (Short breath; Rash; Hives)  Xanax (Rash)    Intolerances    Ativan (Unknown)  Haldol (Dystonic RXN)  hydrALAZINE (Unknown)  Zyprexa (Unknown)    PAST MEDICAL & SURGICAL HISTORY:  Dysphagia  Fall: Multiple Mechanical falls  CAD (coronary artery disease)  Clostridium difficile diarrhea: in   BPH (benign prostatic hypertrophy)  Dementia  HLD (hyperlipidemia)  CHF (congestive heart failure)  Prostate cancer: Treated w/ radiation  Stroke: (~5yrs ago)  Seizure: (last event &gt;1yr ago)  HTN (hypertension)  Colostomy in place  S/P percutaneous endoscopic gastrostomy (PEG) tube placement  H/O hemorrhoidectomy  Deviated septum  Abdominal hernia  Status post cardiac surgery: stents x 2      Vital Signs Last 24 Hrs  T(C): 37.1, Max: 37.8 ( @ 13:21)  T(F): 98.8, Max: 100 ( @ 13:21)  HR: 83 (82 - 90)  BP: 124/66 (96/54 - 156/62)  BP(mean): --  RR: 18 (16 - 18)  SpO2: 98% (96% - 100%)    Physical Exam:  Constitutional: NAD, AAOx3  Cardiovascular: +S1S2 RRR  Pulmonary: CTA b/l, unlabored  Abd: soft NTND +BS  Groins: C/D/I bilaterally; no bleeding, hematoma, edema  Extremities: no pedal edema, +distal pulses b/l  Neuro: non focal, MARION x4    LABS:                        8.1    14.1  )-----------( 358      ( 01 Mar 2017 20:08 )             26.0     02 Mar 2017 05:09    142    |  107    |  26.0   ----------------------------<  88     3.4     |  24.0   |  0.31     Ca    8.6        02 Mar 2017 05:09  Phos  3.0       01 Mar 2017 15:40  Mg     2.2       02 Mar 2017 05:09    TPro  5.7    /  Alb  2.4    /  TBili  0.3    /  DBili  x      /  AST  19     /  ALT  17     /  AlkPhos  71     02 Mar 2017 05:09    PT/INR - ( 01 Mar 2017 20:08 )   PT: 15.2 sec;   INR: 1.38 ratio         PTT - ( 01 Mar 2017 20:08 )  PTT:28.1 sec  Urinalysis Basic - ( 01 Mar 2017 13:25 )    Color: Yellow / Appearance: Clear / S.015 / pH: x  Gluc: x / Ketone: Negative  / Bili: Negative / Urobili: Negative mg/dL   Blood: x / Protein: 30 mg/dL / Nitrite: Negative   Leuk Esterase: Moderate / RBC: 25-50 /HPF / WBC 11-25   Sq Epi: x / Non Sq Epi: Occasional / Bacteria: Occasional

## 2017-03-02 NOTE — PROGRESS NOTE ADULT - PROBLEM SELECTOR PLAN 2
Normal EKG w/ elevated troponin   Cardiology consult ([place])    f/u repeat troponin Normal EKG w/ elevated troponin x3  Cardiology ([place]) suggest demand ischemia Normal EKG w/ elevated troponin x3  Cardiology ([place]) suggest demand ischemia - no further intervention at this time  will continue with tele for now  -increased aspirin and add statin

## 2017-03-02 NOTE — PROGRESS NOTE ADULT - PROBLEM SELECTOR PLAN 1
Admitted to Medicine-Resident service under Dr. Fermin  Ambulate as tolerated  Jevity feeds via PEG  Severe sepsis w/ multiple sources (PEG, colostomy bag, Baugh, decubitus ulcers)   Meropenem 1g IV Q8H & Vancomycin 1 g IV Q12H  IV NS @ 100 ml/hr   CBC, CMP, PT/INR, Mg, Phos, Venous lactate, Blood cultures, Urine cultures  CT of chest & abdomen to look for potential source Severe sepsis w/ multiple sources (PEG, colostomy bag, Baugh, decubitus ulcers)   Meropenem 1g IV Q8H & Vancomycin 1 g IV Q12H (day 2 of antibiotics)  IV NS @ 100 ml/hr   CBC, CMP, PT/INR, Mg, Phos, Venous lactate, Blood cultures, Urine cultures  CT of chest & abdomen to look for potential source Severe sepsis w/ multiple sources (Baugh, decubitus ulcers - more likely ) which is resolving slowly   Meropenem 1g IV Q8H & Vancomycin 1 g IV Q12H (day 2 of antibiotics), monitor vanco trough  IV NS @ 100 ml/hr   CBC, CMP, PT/INR, Mg, Phos, Venous lactate, Blood cultures, Urine cultures  CT of chest & abdomen to look for potential source noted   -more likely to be decubiti source  -surgery eval noted, wound care consult placed for now  -standing pain control

## 2017-03-02 NOTE — PROGRESS NOTE ADULT - PROBLEM SELECTOR PLAN 7
2/2 to immobility & contractures   Wound care consult  PT consult Jones is a potential source of infection vs colonization.   seen by urology - asked to consider changing jones   will touch base with urology again and change if allowed

## 2017-03-02 NOTE — CONSULT NOTE ADULT - SUBJECTIVE AND OBJECTIVE BOX
HPI:  85 year old male w/ PMH dementia, seizure, stroke, dysphagia s/p PEG, phimosis w/  jones & colostomy for BIBA from [facility] for fever of 102.1. Patient was sent to [facility] to rule out sepsis. Vitals at NH were 102.1 F, HR 98, RR 20, /70 & O2 sat 95% on RA. History obtained from EMS & nursing home records as patient is a poor historian and non-verbal. (01 Mar 2017 17:32)    Patient has chronic indwelling jones,  Jones draining well.  He appears comfortable.  Patient admitted with high fever and UTI.       PAST MEDICAL & SURGICAL HISTORY:  Dysphagia  Fall: Multiple Mechanical falls  CAD (coronary artery disease)  Clostridium difficile diarrhea: in   BPH (benign prostatic hypertrophy)  Dementia  HLD (hyperlipidemia)  CHF (congestive heart failure)  Prostate cancer: Treated w/ radiation  Stroke: (~5yrs ago)  Seizure: (last event &gt;1yr ago)  HTN (hypertension)  Colostomy in place  S/P percutaneous endoscopic gastrostomy (PEG) tube placement  H/O hemorrhoidectomy  Deviated septum  Abdominal hernia  Status post cardiac surgery: stents x 2      REVIEW OF SYSTEMS:    Reviewed H and P ROS       MEDICATIONS  (STANDING):  sodium chloride 0.9%. 1000milliLiter(s) IV Continuous <Continuous>  finasteride 5milliGRAM(s) Oral daily  lacosamide 150milliGRAM(s) Oral two times a day  ferrous    sulfate 60 mG/mL Liquid 300milliGRAM(s) Enteral Tube daily  sodium chloride 0.65% Nasal 1Spray(s) Both Nostrils daily  saccharomyces boulardii 250milliGRAM(s) Oral two times a day  meropenem IVPB  IV Intermittent   meropenem IVPB 1000milliGRAM(s) IV Intermittent every 8 hours  enoxaparin Injectable 40milliGRAM(s) SubCutaneous daily  vancomycin  IVPB 1000milliGRAM(s) IV Intermittent every 12 hours  aspirin 325milliGRAM(s) Oral daily  simvastatin 20milliGRAM(s) Oral at bedtime    MEDICATIONS  (PRN):  acetaminophen  Suppository 650milliGRAM(s) Rectal every 6 hours PRN For Temp greater than 38 C (100.4 F)      Allergies    clindamycin (Unknown)  Nuts (Hives; Rash)  PC Pen VK (Unknown)  strawberry (Short breath; Rash; Hives)  Xanax (Rash)    Intolerances    Ativan (Unknown)  Haldol (Dystonic RXN)  hydrALAZINE (Unknown)  Zyprexa (Unknown)      SOCIAL HISTORY:    FAMILY HISTORY:  No pertinent family history in first degree relatives      Vital Signs Last 24 Hrs  T(C): 36.8, Max: 37.8 ( @ 13:21)  T(F): 98.2, Max: 100 ( @ 13:21)  HR: 85 (82 - 90)  BP: 134/62 (120/58 - 156/62)  BP(mean): --  RR: 16 (16 - 18)  SpO2: 97% (96% - 100%)    PHYSICAL EXAM:        abd- soft, nt, nd, bs+, no masses    Clear yellow urine in the jones    Reduced paraphimosis      LABS:                        8.1    14.1  )-----------( 358      ( 01 Mar 2017 20:08 )             26.0     02 Mar 2017 05:09    142    |  107    |  26.0   ----------------------------<  88     3.4     |  24.0   |  0.31     Ca    8.6        02 Mar 2017 05:09  Phos  3.0       01 Mar 2017 15:40  Mg     2.2       02 Mar 2017 05:09    TPro  5.7    /  Alb  2.4    /  TBili  0.3    /  DBili  x      /  AST  19     /  ALT  17     /  AlkPhos  71     02 Mar 2017 05:09    PT/INR - ( 01 Mar 2017 20:08 )   PT: 15.2 sec;   INR: 1.38 ratio         PTT - ( 01 Mar 2017 20:08 )  PTT:28.1 sec  Urinalysis Basic - ( 01 Mar 2017 13:25 )    Color: Yellow / Appearance: Clear / S.015 / pH: x  Gluc: x / Ketone: Negative  / Bili: Negative / Urobili: Negative mg/dL   Blood: x / Protein: 30 mg/dL / Nitrite: Negative   Leuk Esterase: Moderate / RBC: 25-50 /HPF / WBC 11-25   Sq Epi: x / Non Sq Epi: Occasional / Bacteria: Occasional        RADIOLOGY & ADDITIONAL STUDIES:

## 2017-03-02 NOTE — CONSULT NOTE ADULT - PROBLEM SELECTOR RECOMMENDATION 9
-No acute surgical intervention  -Rec offloading, avoid pressure on 1 are for >2hrs  -Nutritional optimization  -DVT ppx  -Rec evaluation by wound care nurse  Seen and examined with Dr. Tavarez

## 2017-03-02 NOTE — PROGRESS NOTE ADULT - SUBJECTIVE AND OBJECTIVE BOX
INTERVAL HPI/OVERNIGHT EVENTS:  Patient seen and examined at bedside, No acute overnight events. Patient is non-verbal. Patient non-ambulating, feeds via PEG & voiding via jones  Cardiac monitor reviewed;    Allergies    clindamycin (Unknown)  Nuts (Hives; Rash)  PC Pen VK (Unknown)  strawberry (Short breath; Rash; Hives)  Xanax (Rash)    Intolerances    Ativan (Unknown)  Haldol (Dystonic RXN)  hydrALAZINE (Unknown)  Zyprexa (Unknown)      VS:   Vital Signs Last 24 Hrs  T(C): 37.1, Max: 37.8 (03-01 @ 13:21)  T(F): 98.8, Max: 100 (03-01 @ 13:21)  HR: 83 (82 - 90)  BP: 124/66 (96/54 - 156/62)  BP(mean): --  RR: 18 (16 - 18)  SpO2: 98% (96% - 100%)      Physical Exam:   GENERAL: NAD, malnourished, non-verbal    HEAD:  Atraumatic, Normocephalic  EYES: EOMI, PERRLA, conjunctiva and sclera clear, cataracts B/L  ENMT: dry mucous membranes, poor dentition   NECK: Supple, No JVD, Normal thyroid  NERVOUS SYSTEM:  Awake, Alert & Oriented x0, contracted upper & lower extremities  CHEST/LUNG: Clear to percussion bilaterally; No rales, rhonchi, wheezing, or rubs  HEART: Regular rate and rhythm; No murmurs, rubs, or gallops  ABDOMEN: Soft, Nontender, Nondistended; Bowel sounds present, PEG tube & colostomy bag in place  GENITOURINARY: phimosis w/ jones in place  EXTREMITIES: 2+ pitting edema, contracture of upper & lower extremities, No clubbing or cyanosis  LYMPH: No lymphadenopathy noted  SKIN: 1x2cm ulcer on left knee, left plantar healing ulcer, 2x2 cm stage 2 decubitus ulcer      Labs:                        8.1    14.1  )-----------( 358      ( 01 Mar 2017 20:08 )             26.0   02 Mar 2017 05:09    142    |  107    |  26.0   ----------------------------<  88     3.4     |  24.0   |  0.31     Ca    8.6        02 Mar 2017 05:09  Phos  3.0       01 Mar 2017 15:40  Mg     2.2       02 Mar 2017 05:09    TPro  5.7    /  Alb  2.4    /  TBili  0.3    /  DBili  x      /  AST  19     /  ALT  17     /  AlkPhos  71     02 Mar 2017 05:09      I & Os for current day (as of 03-02 @ 07:30)  =============================================  IN: 0 ml / OUT: 1800 ml / NET: -1800 ml      Radiology:      Medications:  MEDICATIONS  (STANDING):  sodium chloride 0.9%. 1000milliLiter(s) IV Continuous <Continuous>  finasteride 5milliGRAM(s) Oral daily  lacosamide 150milliGRAM(s) Oral two times a day  ferrous    sulfate 60 mG/mL Liquid 300milliGRAM(s) Enteral Tube daily  sodium chloride 0.65% Nasal 1Spray(s) Both Nostrils daily  aspirin  chewable 81milliGRAM(s) Oral daily  saccharomyces boulardii 250milliGRAM(s) Oral two times a day  meropenem IVPB  IV Intermittent   meropenem IVPB 1000milliGRAM(s) IV Intermittent every 8 hours  enoxaparin Injectable 40milliGRAM(s) SubCutaneous daily  vancomycin  IVPB 1000milliGRAM(s) IV Intermittent every 12 hours  potassium chloride   Powder 40milliEquivalent(s) Oral once    MEDICATIONS  (PRN):  acetaminophen  Suppository 650milliGRAM(s) Rectal every 6 hours PRN For Temp greater than 38 C (100.4 F) cc: sent from facility for fevers     INTERVAL HPI/OVERNIGHT EVENTS:  Patient seen and examined at bedside, No acute overnight events. Patient is non-verbal. Patient non-ambulating, feeds via PEG & voiding via jones  Cardiac monitor reviewed - no events tele     Allergies    clindamycin (Unknown)  Nuts (Hives; Rash)  PC Pen VK (Unknown)  strawberry (Short breath; Rash; Hives)  Xanax (Rash)    Intolerances    Ativan (Unknown)  Haldol (Dystonic RXN)  hydrALAZINE (Unknown)  Zyprexa (Unknown)      VS:   Vital Signs Last 24 Hrs  T(C): 37.1, Max: 37.8 (03-01 @ 13:21)  T(F): 98.8, Max: 100 (03-01 @ 13:21)  HR: 83 (82 - 90)  BP: 124/66 (96/54 - 156/62)  BP(mean): --  RR: 18 (16 - 18)  SpO2: 98% (96% - 100%)  c/s 445 - now 310      Physical Exam:   GENERAL: NAD, malnourished, non-verbal    HEAD:  Atraumatic, Normocephalic  EYES: EOMI, PERRLA, conjunctiva and sclera clear, cataracts B/L  ENMT: dry mucous membranes, poor dentition   NECK: Supple, No JVD, Normal thyroid  NERVOUS SYSTEM:  Awake, Alert & Oriented x0, contracted upper & lower extremities  CHEST/LUNG: Clear to percussion bilaterally; No rales, rhonchi, wheezing, or rubs  HEART: Regular rate and rhythm; No murmurs, rubs, or gallops  ABDOMEN: Soft, Nontender, Nondistended; Bowel sounds present, PEG tube & colostomy bag in place  GENITOURINARY: phimosis w/ jones in place  EXTREMITIES: 2+ pitting edema, contracture of upper & lower extremities, No clubbing or cyanosis  LYMPH: No lymphadenopathy noted  SKIN: 1x2cm ulcer on left knee, left plantar healing ulcer, 2x2 cm stage 2 decubitus ulcer (possible higher stage with skin flap over region)       Labs:                        8.1    14.1  )-----------( 358      ( 01 Mar 2017 20:08 )             26.0   02 Mar 2017 05:09    142    |  107    |  26.0   ----------------------------<  88     3.4     |  24.0   |  0.31     Ca    8.6        02 Mar 2017 05:09  Phos  3.0       01 Mar 2017 15:40  Mg     2.2       02 Mar 2017 05:09    TPro  5.7    /  Alb  2.4    /  TBili  0.3    /  DBili  x      /  AST  19     /  ALT  17     /  AlkPhos  71     02 Mar 2017 05:09      I & Os for current day (as of 03-02 @ 07:30)  =============================================  IN: 0 ml / OUT: 1800 ml / NET: -1800 ml      Radiology:      Medications:  MEDICATIONS  (STANDING):  sodium chloride 0.9%. 1000milliLiter(s) IV Continuous <Continuous>  finasteride 5milliGRAM(s) Oral daily  lacosamide 150milliGRAM(s) Oral two times a day  ferrous    sulfate 60 mG/mL Liquid 300milliGRAM(s) Enteral Tube daily  sodium chloride 0.65% Nasal 1Spray(s) Both Nostrils daily  aspirin  chewable 81milliGRAM(s) Oral daily  saccharomyces boulardii 250milliGRAM(s) Oral two times a day  meropenem IVPB  IV Intermittent   meropenem IVPB 1000milliGRAM(s) IV Intermittent every 8 hours  enoxaparin Injectable 40milliGRAM(s) SubCutaneous daily  vancomycin  IVPB 1000milliGRAM(s) IV Intermittent every 12 hours  potassium chloride   Powder 40milliEquivalent(s) Oral once    MEDICATIONS  (PRN):  acetaminophen  Suppository 650milliGRAM(s) Rectal every 6 hours PRN For Temp greater than 38 C (100.4 F)

## 2017-03-02 NOTE — CONSULT NOTE ADULT - ASSESSMENT
· Assessment		  Problem/Plan - 1:  ·  Problem: Sepsis- unknown source , surveillance cx pending , abx as per primary team.  CT of chest & abdomen -noted    Problem/Plan - 2:  ·  Problem: Elevated Tni x3  ; ECG unremarkable normal LVEF 1/2016 : Likely demand ischemia  Hold UFH for now continue [id number] daily/statin; no need to repeat TTE at this time, continue current managment    Problem/Plan - 3:  ·  Problem: UTI (urinary tract infection).  Plan: Meropenem 1g IV Q8H & Vancomycin 1 g IV Q12H  f/u urine culture.       DVT prophylaxis: Plan; VCD boots

## 2017-03-02 NOTE — PROGRESS NOTE ADULT - PROBLEM SELECTOR PLAN 5
likely 2/2 to dehydration & sepsis  s/p 2 boluses in ED now on IV NS @ 100 ml/hr  BP meds held for now patient has baseline dementia and now likely some ms changes due to infection   will monitor

## 2017-03-02 NOTE — PROGRESS NOTE ADULT - PROBLEM SELECTOR PLAN 4
s/p 2 boluses in ED now on IV NS @ 100 ml/hr   BP meds held for now  Monitor renal function s/p 2 boluses in ED now on IV NS @ 100 ml/hr   BP meds held for now  Monitor renal function  likely 2/2 sepsis, c/w hydration via peg tube

## 2017-03-02 NOTE — CONSULT NOTE ADULT - SUBJECTIVE AND OBJECTIVE BOX
HPI:  85 year old male w/ PMH dementia, seizure, stroke, dysphagia s/p PEG, phimosis w/  jones & colostomy for BIBA from [facility] for fever of 102.1. Patient was sent to [facility] to rule out sepsis. Vitals at NH were 102.1 F sat 95% on RA. History obtained from EMS & nursing home records as patient is a poor historian and non-verbal. (01 Mar 2017 17:32)      PAST MEDICAL & SURGICAL HISTORY:  Dysphagia  Fall: Multiple Mechanical falls  CAD (coronary artery disease)  Clostridium difficile diarrhea: in   BPH (benign prostatic hypertrophy)  Dementia  HLD (hyperlipidemia)  CHF (congestive heart failure)  Prostate cancer: Treated w/ radiation  Stroke: (~5yrs ago)  Seizure: (last event &gt;1yr ago)  HTN (hypertension)  Colostomy in place  S/P percutaneous endoscopic gastrostomy (PEG) tube placement  H/O hemorrhoidectomy  Deviated septum  Abdominal hernia  Status post cardiac surgery: stents x 2      REVIEW OF SYSTEMS unabel to assess due to severe dementia      General: Alert, nonverbal  Pulm: CTAB  CV: RRR  Abd: ostomy pink, +stool/gas, NT, ND  Back: scral DTI, open wound 1cm x2, surgical scar, no purulent fluid or erythema    MEDICATIONS  (STANDING):  sodium chloride 0.9%. 1000milliLiter(s) IV Continuous <Continuous>  finasteride 5milliGRAM(s) Oral daily  lacosamide 150milliGRAM(s) Oral two times a day  ferrous    sulfate 60 mG/mL Liquid 300milliGRAM(s) Enteral Tube daily  sodium chloride 0.65% Nasal 1Spray(s) Both Nostrils daily  saccharomyces boulardii 250milliGRAM(s) Oral two times a day  meropenem IVPB  IV Intermittent   meropenem IVPB 1000milliGRAM(s) IV Intermittent every 8 hours  enoxaparin Injectable 40milliGRAM(s) SubCutaneous daily  vancomycin  IVPB 1000milliGRAM(s) IV Intermittent every 12 hours  potassium chloride   Powder 40milliEquivalent(s) Oral once  aspirin 325milliGRAM(s) Oral daily  simvastatin 20milliGRAM(s) Oral at bedtime    MEDICATIONS  (PRN):  acetaminophen  Suppository 650milliGRAM(s) Rectal every 6 hours PRN For Temp greater than 38 C (100.4 F)      Allergies    clindamycin (Unknown)  Nuts (Hives; Rash)  PC Pen VK (Unknown)  strawberry (Short breath; Rash; Hives)  Xanax (Rash)    Intolerances    Ativan (Unknown)  Haldol (Dystonic RXN)  hydrALAZINE (Unknown)  Zyprexa (Unknown)      SOCIAL HISTORY:    FAMILY HISTORY:  No pertinent family history in first degree relatives      Vital Signs Last 24 Hrs  T(C): 37.1, Max: 37.8 ( @ 13:21)  T(F): 98.8, Max: 100 ( @ 13:21)  HR: 83 (82 - 90)  BP: 124/66 (96/54 - 156/62)  BP(mean): --  RR: 18 (16 - 18)  SpO2: 98% (96% - 100%)    ROS: negative except as stated above    LABS:                        8.1    14.1  )-----------( 358      ( 01 Mar 2017 20:08 )             26.0     02 Mar 2017 05:09    142    |  107    |  26.0   ----------------------------<  88     3.4     |  24.0   |  0.31     Ca    8.6        02 Mar 2017 05:09  Phos  3.0       01 Mar 2017 15:40  Mg     2.2       02 Mar 2017 05:09    TPro  5.7    /  Alb  2.4    /  TBili  0.3    /  DBili  x      /  AST  19     /  ALT  17     /  AlkPhos  71     02 Mar 2017 05:09    PT/INR - ( 01 Mar 2017 20:08 )   PT: 15.2 sec;   INR: 1.38 ratio         PTT - ( 01 Mar 2017 20:08 )  PTT:28.1 sec  Urinalysis Basic - ( 01 Mar 2017 13:25 )    Color: Yellow / Appearance: Clear / S.015 / pH: x  Gluc: x / Ketone: Negative  / Bili: Negative / Urobili: Negative mg/dL   Blood: x / Protein: 30 mg/dL / Nitrite: Negative   Leuk Esterase: Moderate / RBC: 25-50 /HPF / WBC 11-25   Sq Epi: x / Non Sq Epi: Occasional / Bacteria: Occasional        RADIOLOGY & ADDITIONAL STUDIES:

## 2017-03-02 NOTE — PROGRESS NOTE ADULT - ASSESSMENT
85 year old male w/ PMH dementia of BIBA from [facility] for fever of 102.1 likely 2/2 to severe sepsis. 85 year old male w/ PMH dementia, seizures, cva with dysphagia/peg, phimosis with jones and colostomy who was BIBA from [facility] for fever of 102.1 likely 2/2 to severe sepsis.

## 2017-03-02 NOTE — PROGRESS NOTE ADULT - PROBLEM SELECTOR PLAN 6
patient has baseline dementia   will contact NH in the AM for more history likely 2/2 to protein malnutrition  Nutrition consult  Jevity feeds via PEG

## 2017-03-03 DIAGNOSIS — R56.9 UNSPECIFIED CONVULSIONS: ICD-10-CM

## 2017-03-03 DIAGNOSIS — F02.80 DEMENTIA IN OTHER DISEASES CLASSIFIED ELSEWHERE, UNSPECIFIED SEVERITY, WITHOUT BEHAVIORAL DISTURBANCE, PSYCHOTIC DISTURBANCE, MOOD DISTURBANCE, AND ANXIETY: ICD-10-CM

## 2017-03-03 DIAGNOSIS — N47.2 PARAPHIMOSIS: ICD-10-CM

## 2017-03-03 DIAGNOSIS — Z51.5 ENCOUNTER FOR PALLIATIVE CARE: ICD-10-CM

## 2017-03-03 DIAGNOSIS — R13.19 OTHER DYSPHAGIA: ICD-10-CM

## 2017-03-03 DIAGNOSIS — D63.8 ANEMIA IN OTHER CHRONIC DISEASES CLASSIFIED ELSEWHERE: ICD-10-CM

## 2017-03-03 DIAGNOSIS — N39.0 URINARY TRACT INFECTION, SITE NOT SPECIFIED: ICD-10-CM

## 2017-03-03 LAB
-  AMIKACIN: SIGNIFICANT CHANGE UP
-  AZTREONAM: SIGNIFICANT CHANGE UP
-  CEFEPIME: SIGNIFICANT CHANGE UP
-  CEFTAZIDIME: SIGNIFICANT CHANGE UP
-  CIPROFLOXACIN: SIGNIFICANT CHANGE UP
-  GENTAMICIN: SIGNIFICANT CHANGE UP
-  IMIPENEM: SIGNIFICANT CHANGE UP
-  LEVOFLOXACIN: SIGNIFICANT CHANGE UP
-  MEROPENEM: SIGNIFICANT CHANGE UP
-  PIPERACILLIN/TAZOBACTAM: SIGNIFICANT CHANGE UP
-  TOBRAMYCIN: SIGNIFICANT CHANGE UP
ALBUMIN SERPL ELPH-MCNC: 2.4 G/DL — LOW (ref 3.3–5.2)
ALP SERPL-CCNC: 71 U/L — SIGNIFICANT CHANGE UP (ref 40–120)
ALT FLD-CCNC: 19 U/L — SIGNIFICANT CHANGE UP
ANION GAP SERPL CALC-SCNC: 11 MMOL/L — SIGNIFICANT CHANGE UP (ref 5–17)
AST SERPL-CCNC: 21 U/L — SIGNIFICANT CHANGE UP
BASOPHILS # BLD AUTO: 0 K/UL — SIGNIFICANT CHANGE UP (ref 0–0.2)
BASOPHILS NFR BLD AUTO: 0.3 % — SIGNIFICANT CHANGE UP (ref 0–2)
BILIRUB SERPL-MCNC: 0.2 MG/DL — LOW (ref 0.4–2)
BUN SERPL-MCNC: 23 MG/DL — HIGH (ref 8–20)
CALCIUM SERPL-MCNC: 8.4 MG/DL — LOW (ref 8.6–10.2)
CHLORIDE SERPL-SCNC: 103 MMOL/L — SIGNIFICANT CHANGE UP (ref 98–107)
CO2 SERPL-SCNC: 25 MMOL/L — SIGNIFICANT CHANGE UP (ref 22–29)
CREAT SERPL-MCNC: 0.3 MG/DL — LOW (ref 0.5–1.3)
EOSINOPHIL # BLD AUTO: 0.3 K/UL — SIGNIFICANT CHANGE UP (ref 0–0.5)
EOSINOPHIL NFR BLD AUTO: 4.4 % — SIGNIFICANT CHANGE UP (ref 0–5)
GLUCOSE SERPL-MCNC: 92 MG/DL — SIGNIFICANT CHANGE UP (ref 70–115)
HCT VFR BLD CALC: 26.1 % — LOW (ref 42–52)
HGB BLD-MCNC: 7.8 G/DL — LOW (ref 14–18)
LYMPHOCYTES # BLD AUTO: 0.8 K/UL — LOW (ref 1–4.8)
LYMPHOCYTES # BLD AUTO: 11.5 % — LOW (ref 20–55)
MAGNESIUM SERPL-MCNC: 2 MG/DL — SIGNIFICANT CHANGE UP (ref 1.8–2.5)
MCHC RBC-ENTMCNC: 24.5 PG — LOW (ref 27–31)
MCHC RBC-ENTMCNC: 29.9 G/DL — LOW (ref 32–36)
MCV RBC AUTO: 81.8 FL — SIGNIFICANT CHANGE UP (ref 80–94)
METHOD TYPE: SIGNIFICANT CHANGE UP
MONOCYTES # BLD AUTO: 0.6 K/UL — SIGNIFICANT CHANGE UP (ref 0–0.8)
MONOCYTES NFR BLD AUTO: 9.2 % — SIGNIFICANT CHANGE UP (ref 3–10)
NEUTROPHILS # BLD AUTO: 5.2 K/UL — SIGNIFICANT CHANGE UP (ref 1.8–8)
NEUTROPHILS NFR BLD AUTO: 73 % — SIGNIFICANT CHANGE UP (ref 37–73)
PHOSPHATE SERPL-MCNC: 2 MG/DL — LOW (ref 2.4–4.7)
PLATELET # BLD AUTO: 354 K/UL — SIGNIFICANT CHANGE UP (ref 150–400)
POTASSIUM SERPL-MCNC: 4 MMOL/L — SIGNIFICANT CHANGE UP (ref 3.5–5.3)
POTASSIUM SERPL-SCNC: 4 MMOL/L — SIGNIFICANT CHANGE UP (ref 3.5–5.3)
PROT SERPL-MCNC: 5.6 G/DL — LOW (ref 6.6–8.7)
RBC # BLD: 3.19 M/UL — LOW (ref 4.6–6.2)
RBC # FLD: 16.7 % — HIGH (ref 11–15.6)
SODIUM SERPL-SCNC: 139 MMOL/L — SIGNIFICANT CHANGE UP (ref 135–145)
VANCOMYCIN TROUGH SERPL-MCNC: 14.2 UG/ML — SIGNIFICANT CHANGE UP (ref 10–20)
WBC # BLD: 7 K/UL — SIGNIFICANT CHANGE UP (ref 4.8–10.8)
WBC # FLD AUTO: 7 K/UL — SIGNIFICANT CHANGE UP (ref 4.8–10.8)

## 2017-03-03 PROCEDURE — 99222 1ST HOSP IP/OBS MODERATE 55: CPT

## 2017-03-03 PROCEDURE — 99233 SBSQ HOSP IP/OBS HIGH 50: CPT | Mod: GC

## 2017-03-03 RX ORDER — ASPIRIN/CALCIUM CARB/MAGNESIUM 324 MG
81 TABLET ORAL DAILY
Qty: 0 | Refills: 0 | Status: DISCONTINUED | OUTPATIENT
Start: 2017-03-03 | End: 2017-03-08

## 2017-03-03 RX ORDER — ASPIRIN/CALCIUM CARB/MAGNESIUM 324 MG
81 TABLET ORAL DAILY
Qty: 0 | Refills: 0 | Status: DISCONTINUED | OUTPATIENT
Start: 2017-03-03 | End: 2017-03-03

## 2017-03-03 RX ORDER — TAMSULOSIN HYDROCHLORIDE 0.4 MG/1
0.4 CAPSULE ORAL AT BEDTIME
Qty: 0 | Refills: 0 | Status: DISCONTINUED | OUTPATIENT
Start: 2017-03-03 | End: 2017-03-08

## 2017-03-03 RX ADMIN — Medication 250 MILLIGRAM(S): at 17:19

## 2017-03-03 RX ADMIN — ENOXAPARIN SODIUM 40 MILLIGRAM(S): 100 INJECTION SUBCUTANEOUS at 22:31

## 2017-03-03 RX ADMIN — Medication 81 MILLIGRAM(S): at 14:34

## 2017-03-03 RX ADMIN — LACOSAMIDE 150 MILLIGRAM(S): 50 TABLET ORAL at 05:20

## 2017-03-03 RX ADMIN — FINASTERIDE 5 MILLIGRAM(S): 5 TABLET, FILM COATED ORAL at 12:37

## 2017-03-03 RX ADMIN — Medication 250 MILLIGRAM(S): at 05:20

## 2017-03-03 RX ADMIN — Medication 1 SPRAY(S): at 12:38

## 2017-03-03 RX ADMIN — LACOSAMIDE 150 MILLIGRAM(S): 50 TABLET ORAL at 17:19

## 2017-03-03 RX ADMIN — MEROPENEM 200 MILLIGRAM(S): 1 INJECTION INTRAVENOUS at 05:19

## 2017-03-03 RX ADMIN — MEROPENEM 200 MILLIGRAM(S): 1 INJECTION INTRAVENOUS at 14:34

## 2017-03-03 RX ADMIN — SIMVASTATIN 20 MILLIGRAM(S): 20 TABLET, FILM COATED ORAL at 22:31

## 2017-03-03 RX ADMIN — MEROPENEM 200 MILLIGRAM(S): 1 INJECTION INTRAVENOUS at 22:30

## 2017-03-03 RX ADMIN — TAMSULOSIN HYDROCHLORIDE 0.4 MILLIGRAM(S): 0.4 CAPSULE ORAL at 22:44

## 2017-03-03 RX ADMIN — Medication 300 MILLIGRAM(S): at 12:37

## 2017-03-03 RX ADMIN — Medication 250 MILLIGRAM(S): at 08:46

## 2017-03-03 NOTE — DIETITIAN INITIAL EVALUATION ADULT. - OTHER INFO
Pt is sleeping, per progress notes A&O x 0. Pt from NH, has PEG tube, feeding running same as NH order (Jevity 1.2 at NH). Noted with anasarca. Coccyx Stage IV, Lt Buttock Stage II wound. Previous admission RD note: January 2016 weight 167#.

## 2017-03-03 NOTE — PROGRESS NOTE ADULT - SUBJECTIVE AND OBJECTIVE BOX
cc: sent from facility for fevers     INTERVAL HPI/OVERNIGHT EVENTS:  Patient seen and examined at bedside, No acute overnight events. Patient is non-verbal. Patient non-ambulating, hydration & feeds via PEG & voiding via jones  Cardiac monitor reviewed - no events tele     Allergies    clindamycin (Unknown)  Nuts (Hives; Rash)  PC Pen VK (Unknown)  strawberry (Short breath; Rash; Hives)  Xanax (Rash)    Intolerances    Ativan (Unknown)  Haldol (Dystonic RXN)  hydrALAZINE (Unknown)  Zyprexa (Unknown)      Vital Signs Last 24 Hrs  T(C): 37, Max: 37.1 (03-02 @ 05:49)  T(F): 98.6, Max: 98.8 (03-02 @ 05:49)  HR: 74 (74 - 87)  BP: 125/68 (112/58 - 134/62)  BP(mean): --  ABP: --  ABP(mean): --  RR: 18 (16 - 18)  SpO2: 98% (97% - 98%)      Physical Exam:   GENERAL: NAD, malnourished, non-verbal    HEAD:  Atraumatic, Normocephalic  EYES: EOMI, PERRLA, conjunctiva and sclera clear, cataracts B/L  ENMT: dry mucous membranes, poor dentition   NECK: Supple, No JVD, Normal thyroid  NERVOUS SYSTEM:  Awake, Alert & Oriented x0, contracted upper & lower extremities  CHEST/LUNG: Clear to percussion bilaterally; No rales, rhonchi, wheezing, or rubs  HEART: Regular rate and rhythm; No murmurs, rubs, or gallops  ABDOMEN: Soft, Nontender, Nondistended; Bowel sounds present, PEG tube & colostomy bag in place  GENITOURINARY: phimosis w/ jones in place  EXTREMITIES: 2+ pitting edema, contracture of upper & lower extremities, No clubbing or cyanosis  LYMPH: No lymphadenopathy noted  SKIN: 1x2cm ulcer on left knee, left plantar healing ulcer, 2x2 cm stage 2 decubitus ulcer (possible higher stage with skin flap over region)       I&O's Summary  I & Os for 24h ending 02 Mar 2017 07:00  =============================================  IN: 0 ml / OUT: 1800 ml / NET: -1800 ml    I & Os for current day (as of 03 Mar 2017 05:44)  =============================================  IN: 2910 ml / OUT: 1800 ml / NET: 1110 ml    Labs: AM labs pending                          8.1    14.1  )-----------( 358      ( 01 Mar 2017 20:08 )             26.0     02 Mar 2017 05:09    142    |  107    |  26.0   ----------------------------<  88     3.4     |  24.0   |  0.31     Ca    8.6        02 Mar 2017 05:09  Phos  3.0       01 Mar 2017 15:40  Mg     2.2       02 Mar 2017 05:09    TPro  5.7    /  Alb  2.4    /  TBili  0.3    /  DBili  x      /  AST  19     /  ALT  17     /  AlkPhos  71     02 Mar 2017 05:09    Troponin: 0.14, 0.14, 0.13, 0.14  Venous lactate: 2.2 => 1.2 (3/1/17)  Urine culture: > 100,000 GNRs      Radiology:  Abdominal/Pelvis CT: There is subsegmental atelectasis at the lung bases.There are foci of gas and edema within the soft tissues overlying the   sacrum. Additionally there is presacral fluid with associated foci of gas. These findings may be post procedural, however infection of this region is not excluded.    MEDICATIONS  (STANDING):  sodium chloride 0.9%. 1000milliLiter(s) IV Continuous <Continuous>  finasteride 5milliGRAM(s) Oral daily  lacosamide 150milliGRAM(s) Oral two times a day  ferrous    sulfate 60 mG/mL Liquid 300milliGRAM(s) Enteral Tube daily  sodium chloride 0.65% Nasal 1Spray(s) Both Nostrils daily  saccharomyces boulardii 250milliGRAM(s) Oral two times a day  meropenem IVPB  IV Intermittent   meropenem IVPB 1000milliGRAM(s) IV Intermittent every 8 hours  enoxaparin Injectable 40milliGRAM(s) SubCutaneous daily  vancomycin  IVPB 1000milliGRAM(s) IV Intermittent every 12 hours  aspirin 325milliGRAM(s) Oral daily  simvastatin 20milliGRAM(s) Oral at bedtime    MEDICATIONS  (PRN):  acetaminophen  Suppository 650milliGRAM(s) Rectal every 6 hours PRN For Temp greater than 38 C (100.4 F) cc: sent from facility for fevers     INTERVAL HPI/OVERNIGHT EVENTS:  Patient seen and examined at bedside, No acute overnight events. Patient is verbal but AAOx0. Patient non-ambulating, hydration & feeds via PEG & voiding via jones  Cardiac monitor reviewed - no events tele     Allergies    clindamycin (Unknown)  Nuts (Hives; Rash)  PC Pen VK (Unknown)  strawberry (Short breath; Rash; Hives)  Xanax (Rash)    Intolerances    Ativan (Unknown)  Haldol (Dystonic RXN)  hydrALAZINE (Unknown)  Zyprexa (Unknown)      Vital Signs Last 24 Hrs  T(C): 37, Max: 37.1 (03-02 @ 05:49)  T(F): 98.6, Max: 98.8 (03-02 @ 05:49)  HR: 74 (74 - 87)  BP: 125/68 (112/58 - 134/62)  BP(mean): --  ABP: --  ABP(mean): --  RR: 18 (16 - 18)  SpO2: 98% (97% - 98%)      Physical Exam:   GENERAL: NAD, malnourished, verbal but AAOx0    HEAD:  Atraumatic, Normocephalic  EYES: EOMI, PERRLA, conjunctiva and sclera clear, cataracts B/L  ENMT: dry mucous membranes, poor dentition   NECK: Supple, No JVD, Normal thyroid  NERVOUS SYSTEM:  Awake, Alert & Oriented x0, contracted upper & lower extremities  CHEST/LUNG: Clear to percussion bilaterally; No rales, rhonchi, wheezing, or rubs  HEART: Regular rate and rhythm; No murmurs, rubs, or gallops  ABDOMEN: Soft, Nontender, Nondistended; Bowel sounds present, PEG tube & colostomy bag in place  GENITOURINARY: phimosis w/ jones in place  EXTREMITIES: 2+ pitting edema, contracture of upper & lower extremities, No clubbing or cyanosis  LYMPH: No lymphadenopathy noted  SKIN: 1x2cm ulcer on left knee, left plantar healing ulcer, 2x2 cm stage 2 decubitus ulcer (possible higher stage with skin flap over region)       I&O's Summary  I & Os for 24h ending 02 Mar 2017 07:00  =============================================  IN: 0 ml / OUT: 1800 ml / NET: -1800 ml    I & Os for current day (as of 03 Mar 2017 05:44)  =============================================  IN: 2910 ml / OUT: 1800 ml / NET: 1110 ml    Labs: AM labs pending                          8.1    14.1  )-----------( 358      ( 01 Mar 2017 20:08 )             26.0     02 Mar 2017 05:09    142    |  107    |  26.0   ----------------------------<  88     3.4     |  24.0   |  0.31     Ca    8.6        02 Mar 2017 05:09  Phos  3.0       01 Mar 2017 15:40  Mg     2.2       02 Mar 2017 05:09    TPro  5.7    /  Alb  2.4    /  TBili  0.3    /  DBili  x      /  AST  19     /  ALT  17     /  AlkPhos  71     02 Mar 2017 05:09    Troponin: 0.14, 0.14, 0.13, 0.14  Venous lactate: 2.2 => 1.2 (3/1/17)  Urine culture: > 100,000 GNRs      Radiology:  Abdominal/Pelvis CT: There is subsegmental atelectasis at the lung bases.There are foci of gas and edema within the soft tissues overlying the   sacrum. Additionally there is presacral fluid with associated foci of gas. These findings may be post procedural, however infection of this region is not excluded.    MEDICATIONS  (STANDING):  sodium chloride 0.9%. 1000milliLiter(s) IV Continuous <Continuous>  finasteride 5milliGRAM(s) Oral daily  lacosamide 150milliGRAM(s) Oral two times a day  ferrous    sulfate 60 mG/mL Liquid 300milliGRAM(s) Enteral Tube daily  sodium chloride 0.65% Nasal 1Spray(s) Both Nostrils daily  saccharomyces boulardii 250milliGRAM(s) Oral two times a day  meropenem IVPB  IV Intermittent   meropenem IVPB 1000milliGRAM(s) IV Intermittent every 8 hours  enoxaparin Injectable 40milliGRAM(s) SubCutaneous daily  vancomycin  IVPB 1000milliGRAM(s) IV Intermittent every 12 hours  aspirin 325milliGRAM(s) Oral daily  simvastatin 20milliGRAM(s) Oral at bedtime    MEDICATIONS  (PRN):  acetaminophen  Suppository 650milliGRAM(s) Rectal every 6 hours PRN For Temp greater than 38 C (100.4 F) cc: sent from facility for fevers     INTERVAL HPI/OVERNIGHT EVENTS:  Patient seen and examined at bedside, No acute overnight events. Patient is verbal but AAOx0. Patient non-ambulating, hydration & feeds via PEG & voiding via jones. Clinically improved since yesterday   Cardiac monitor reviewed - no events tele     Allergies    clindamycin (Unknown)  Nuts (Hives; Rash)  PC Pen VK (Unknown)  strawberry (Short breath; Rash; Hives)  Xanax (Rash)    Intolerances    Ativan (Unknown)  Haldol (Dystonic RXN)  hydrALAZINE (Unknown)  Zyprexa (Unknown)      Vital Signs Last 24 Hrs  T(C): 37, Max: 37.1 (03-02 @ 05:49)  T(F): 98.6, Max: 98.8 (03-02 @ 05:49)  HR: 74 (74 - 87)  BP: 125/68 (112/58 - 134/62)  BP(mean): --  ABP: --  ABP(mean): --  RR: 18 (16 - 18)  SpO2: 98% (97% - 98%)      Physical Exam:   GENERAL: NAD, malnourished, verbal but AAOx0    HEAD:  Atraumatic, Normocephalic  EYES: EOMI, PERRLA, conjunctiva and sclera clear, cataracts B/L  ENMT: dry mucous membranes, poor dentition   NERVOUS SYSTEM:  Awake, Alert & Oriented x0, contracted upper & lower extremities  CHEST/LUNG: Clear to percussion bilaterally; No rales, rhonchi, wheezing, or rubs  HEART: Regular rate and rhythm; No murmurs, rubs, or gallops  ABDOMEN: Soft, Nontender, Nondistended; Bowel sounds present, PEG tube & colostomy bag in place  GENITOURINARY: phimosis w/ jones in place (glans penis exposed, foreskin retracted)  EXTREMITIES: 2+ pitting edema, contracture of upper & lower extremities, No clubbing or cyanosis  LYMPH: No lymphadenopathy noted  SKIN: 1x2cm ulcer on left knee, left plantar healing ulcer, 2x2 cm stage 2 decubitus ulcer (possible higher stage with skin flap over region)       I&O's Summary  I & Os for 24h ending 02 Mar 2017 07:00  =============================================  IN: 0 ml / OUT: 1800 ml / NET: -1800 ml    I & Os for current day (as of 03 Mar 2017 05:44)  =============================================  IN: 2910 ml / OUT: 1800 ml / NET: 1110 ml    Labs: AM labs pending                          8.1    14.1  )-----------( 358      ( 01 Mar 2017 20:08 )             26.0     02 Mar 2017 05:09    142    |  107    |  26.0   ----------------------------<  88     3.4     |  24.0   |  0.31     Ca    8.6        02 Mar 2017 05:09  Phos  3.0       01 Mar 2017 15:40  Mg     2.2       02 Mar 2017 05:09    TPro  5.7    /  Alb  2.4    /  TBili  0.3    /  DBili  x      /  AST  19     /  ALT  17     /  AlkPhos  71     02 Mar 2017 05:09    Troponin: 0.14, 0.14, 0.13, 0.14  Venous lactate: 2.2 => 1.2 (3/1/17)  Urine culture: > 100,000 GNRs      Radiology:  Abdominal/Pelvis CT: There is subsegmental atelectasis at the lung bases.There are foci of gas and edema within the soft tissues overlying the   sacrum. Additionally there is presacral fluid with associated foci of gas. These findings may be post procedural, however infection of this region is not excluded.    MEDICATIONS  (STANDING):  sodium chloride 0.9%. 1000milliLiter(s) IV Continuous <Continuous>  finasteride 5milliGRAM(s) Oral daily  lacosamide 150milliGRAM(s) Oral two times a day  ferrous    sulfate 60 mG/mL Liquid 300milliGRAM(s) Enteral Tube daily  sodium chloride 0.65% Nasal 1Spray(s) Both Nostrils daily  saccharomyces boulardii 250milliGRAM(s) Oral two times a day  meropenem IVPB  IV Intermittent   meropenem IVPB 1000milliGRAM(s) IV Intermittent every 8 hours  enoxaparin Injectable 40milliGRAM(s) SubCutaneous daily  vancomycin  IVPB 1000milliGRAM(s) IV Intermittent every 12 hours  aspirin 325milliGRAM(s) Oral daily  simvastatin 20milliGRAM(s) Oral at bedtime    MEDICATIONS  (PRN):  acetaminophen  Suppository 650milliGRAM(s) Rectal every 6 hours PRN For Temp greater than 38 C (100.4 F)

## 2017-03-03 NOTE — PROGRESS NOTE ADULT - ASSESSMENT
85 year old male w/ PMH dementia, seizures, CVA w/ dysphagia/peg, phimosis with jones and colostomy who was BIBA from [facility] for fever of 102.1 admitted for severe sepsis. Patient is on Meropenem 1g IV Q8H & Vancomycin 1 g IV Q12H (day 3 of antibiotics)

## 2017-03-03 NOTE — CHART NOTE - NSCHARTNOTEFT_GEN_A_CORE
Midline Insertion     18GMidline cathether Midline Insertion    Nurse called medicine to inform us that patient has poor IVF access. 2 IVF access placed on knuckles and wrist have infiltrated.    A 18G Midline catheter was inserted in the right medial forearm under ultrasound guidance.  Aseptic measures were followed per protocol.  Insertion was successful on 1st attempt.  Good blood return and flush were noted. Patient tolerated procedure without adverse effect.   It is  now ok  use line for medications administration.    Line placement was done under supervision of certified physician ( Dr Corbett) Midline Insertion    Nurse called medicine to inform us that patient has poor IVF access. 2 IVF access placed on knuckles and wrist have infiltrated.    A 18G Midline catheter was inserted in the right medial forearm under ultrasound guidance.  Aseptic measures were followed per protocol.  Insertion was successful on 1st attempt.  Good blood return and flush were noted. Patient tolerated procedure without adverse effect.   It is  now ok  to use line for medications administration.    Line placement was done under supervision of  midline certified resident ( Dr Corbett)

## 2017-03-03 NOTE — PROGRESS NOTE ADULT - PROBLEM SELECTOR PLAN 5
patient has baseline dementia and now likely some mental status changes due to infection   will monitor likely 2/2 to protein malnutrition  Jevity feeds via PEG

## 2017-03-03 NOTE — PROGRESS NOTE ADULT - SUBJECTIVE AND OBJECTIVE BOX
INTERVAL HPI/OVERNIGHT EVENTS:  family at bedside, distressed over guardianship issues.  +colostomy function    MEDICATIONS  (STANDING):  finasteride 5milliGRAM(s) Oral daily  lacosamide 150milliGRAM(s) Oral two times a day  ferrous    sulfate 60 mG/mL Liquid 300milliGRAM(s) Enteral Tube daily  sodium chloride 0.65% Nasal 1Spray(s) Both Nostrils daily  saccharomyces boulardii 250milliGRAM(s) Oral two times a day  meropenem IVPB  IV Intermittent   meropenem IVPB 1000milliGRAM(s) IV Intermittent every 8 hours  enoxaparin Injectable 40milliGRAM(s) SubCutaneous daily  simvastatin 20milliGRAM(s) Oral at bedtime  tamsulosin 0.4milliGRAM(s) Oral at bedtime  aspirin  chewable 81milliGRAM(s) Oral daily    MEDICATIONS  (PRN):  acetaminophen  Suppository 650milliGRAM(s) Rectal every 6 hours PRN For Temp greater than 38 C (100.4 F)      Vital Signs Last 24 Hrs  T(C): 36.7, Max: 37 (03-03 @ 05:14)  T(F): 98, Max: 98.6 (03-03 @ 05:14)  HR: 78 (73 - 80)  BP: 136/61 (112/58 - 136/61)  BP(mean): --  RR: 20 (18 - 22)  SpO2: 96% (94% - 98%)    PHYSICAL EXAM:    Gen: sleeping comfortably  Abdomen: soft  Colostomy with + stool  urine clear in jones    : paraphimosis, reduced      I&O's Detail  I & Os for 24h ending 03 Mar 2017 07:00  =============================================  IN:    Jevity: 1630 ml    Free Water: 1000 ml    Solution: 500 ml    Solution: 300 ml    Total IN: 3430 ml  ---------------------------------------------  OUT:    Intermittent Catheterization - Urethral: 1500 ml    Colostomy: 300 ml    Total OUT: 1800 ml  ---------------------------------------------  Total NET: 1630 ml    I & Os for current day (as of 03 Mar 2017 19:37)  =============================================  IN:    Jevity: 1080 ml    Free Water: 500 ml    Solution: 250 ml    Solution: 100 ml    Total IN: 1930 ml  ---------------------------------------------  OUT:    Intermittent Catheterization - Urethral: 300 ml    Total OUT: 300 ml  ---------------------------------------------  Total NET: 1630 ml      LABS:                        7.8    7.0   )-----------( 354      ( 03 Mar 2017 06:34 )             26.1     03 Mar 2017 06:34    139    |  103    |  23.0   ----------------------------<  92     4.0     |  25.0   |  0.30     Ca    8.4        03 Mar 2017 06:34  Phos  2.0       03 Mar 2017 06:34  Mg     2.0       03 Mar 2017 06:34    TPro  5.6    /  Alb  2.4    /  TBili  0.2    /  DBili  x      /  AST  21     /  ALT  19     /  AlkPhos  71     03 Mar 2017 06:34    PT/INR - ( 01 Mar 2017 20:08 )   PT: 15.2 sec;   INR: 1.38 ratio         PTT - ( 01 Mar 2017 20:08 )  PTT:28.1 sec      RADIOLOGY & ADDITIONAL STUDIES:

## 2017-03-03 NOTE — CONSULT NOTE ADULT - PROBLEM SELECTOR RECOMMENDATION 4
-Met with legal guardian Van. Explained that all the medical interventions we can do are unlikely to provide any kind of therapeutic value. Also discussed that it may be appropriate to place a DNR/I order as those types of interventions again are unlikely to be of therapeutic value. He needs to assess the family situation and speak to the  regarding next steps. I did make him aware that the options are  1.To continue as we are, where likely he will be burdened with rehospitalization after rehospitalization, and each time he will be worse and worse, and he will not be able to get back to a point of functional recovery.   2. Decide that continuing to perform these interventions and bringing him back and forth to the hospital is providing more of a burden and no benefit, and we focus on his comfort only and he stays in his "home" [place] and has a natural death.     ongoing support. He did not make any definitive decisions today.

## 2017-03-03 NOTE — PROGRESS NOTE ADULT - PROBLEM SELECTOR PLAN 8
Lovenox 40 mg SQ daily normocytic. likely 2/2 aocd. drop in hgb noted, likely dilutional, will monitor h/h

## 2017-03-03 NOTE — CONSULT NOTE ADULT - PROBLEM SELECTOR RECOMMENDATION 9
-likely due to prior stroke, very poor overall prognosis, with PEG tube, and has not had any meaningful recovery despite being hospitalized at [facility] for 8 months.

## 2017-03-03 NOTE — CONSULT NOTE ADULT - ASSESSMENT
85M with advanced dementia as effect of CVA, PEG, multiple admissions, here with sepsis due to UTI (pseudomonas), bedbound, with unlikely ability for meaningful recovery.

## 2017-03-03 NOTE — PROGRESS NOTE ADULT - PROBLEM SELECTOR PLAN 3
Meropenem 1g IV Q8H & Vancomycin 1 g IV Q12H  f/u urine culture  -seen by urology and to c/w jones, will attempt for a change in jones   -florastor added s/p 2 boluses in ED now on IV NS @ 100 ml/hr   likely 2/2 sepsis, c/w hydration via peg tube  -hold bp meds Normal EKG w/ elevated troponin x4, likely more demand ischemia   Cardiology ([place]) suggest demand ischemia - no further intervention at this time  -c/w asa 81 mg and simvastatin 20 mg QHS.

## 2017-03-03 NOTE — PROGRESS NOTE ADULT - PROBLEM SELECTOR PLAN 2
Normal EKG w/ elevated troponin x4  Cardiology ([place]) suggest demand ischemia - no further intervention at this time  will continue with tele for now  -increased aspirin and add statin Normal EKG w/ elevated troponin x4, likely more deman ischemia   Cardiology ([place]) suggest demand ischemia - no further intervention at this time  -d/c tele monitor - no events on monitor   -c/w asa and statin - once tolerates, add a low dose beta blocker Local wound care and packing as per Surgery and would care

## 2017-03-03 NOTE — PROGRESS NOTE ADULT - PROBLEM SELECTOR PLAN 4
s/p 2 boluses in ED now on IV NS @ 100 ml/hr   BP meds held for now  Monitor renal function  likely 2/2 sepsis, c/w hydration via peg tube patient has baseline dementia and now likely some mental status changes due to infection - improving

## 2017-03-03 NOTE — CONSULT NOTE ADULT - SUBJECTIVE AND OBJECTIVE BOX
HPI: 85M with PMH as listed admitted 3/1 from [facility] with fever, sepsis,     PERTINENT PMH REVIEWED: Yes    PAST MEDICAL & SURGICAL HISTORY:  Dysphagia  Fall: Multiple Mechanical falls  CAD (coronary artery disease)  Clostridium difficile diarrhea: in 2009  BPH (benign prostatic hypertrophy)  Dementia  HLD (hyperlipidemia)  CHF (congestive heart failure)  Prostate cancer: Treated w/ radiation  Stroke: (~5yrs ago)  Seizure: (last event &gt;1yr ago)  HTN (hypertension)  Colostomy in place  S/P percutaneous endoscopic gastrostomy (PEG) tube placement  H/O hemorrhoidectomy  Deviated septum  Abdominal hernia  Status post cardiac surgery: stents x 2    SOCIAL HISTORY:                                     Admitted from:  home  SNF  [facility]     Surrogate/HCP/Guardian: Phone#:    FAMILY HISTORY:  No pertinent family history in first degree relatives      Baseline ADLs (prior to admission):  Independent/ Dependent      Allergies    clindamycin (Unknown)  Nuts (Hives; Rash)  PC Pen VK (Unknown)  strawberry (Short breath; Rash; Hives)  Xanax (Rash)    Intolerances    Ativan (Unknown)  Haldol (Dystonic RXN)  hydrALAZINE (Unknown)  Zyprexa (Unknown)      Present Symptoms:     Dyspnea: 0 1 2 3   Nausea/Vomiting: Yes No  Anxiety:  Yes No  Depression: Yes No  Fatigue: Yes No  Loss of appetite: Yes No    Pain:             Character-            Duration-            Effect-            Factors-            Frequency-            Location-            Severity-    Review of Systems: Reviewed                     Negative:                     Positive:  Unable to obtain due to poor mentation   All others negative    MEDICATIONS  (STANDING):  finasteride 5milliGRAM(s) Oral daily  lacosamide 150milliGRAM(s) Oral two times a day  ferrous    sulfate 60 mG/mL Liquid 300milliGRAM(s) Enteral Tube daily  sodium chloride 0.65% Nasal 1Spray(s) Both Nostrils daily  saccharomyces boulardii 250milliGRAM(s) Oral two times a day  meropenem IVPB  IV Intermittent   meropenem IVPB 1000milliGRAM(s) IV Intermittent every 8 hours  enoxaparin Injectable 40milliGRAM(s) SubCutaneous daily  simvastatin 20milliGRAM(s) Oral at bedtime  tamsulosin 0.4milliGRAM(s) Oral at bedtime  aspirin  chewable 81milliGRAM(s) Oral daily    MEDICATIONS  (PRN):  acetaminophen  Suppository 650milliGRAM(s) Rectal every 6 hours PRN For Temp greater than 38 C (100.4 F)    PHYSICAL EXAM:    Vital Signs Last 24 Hrs  T(C): 37, Max: 37 (03-03 @ 05:14)  T(F): 98.6, Max: 98.6 (03-03 @ 05:14)  HR: 73 (73 - 87)  BP: 128/70 (112/58 - 130/64)  BP(mean): --  RR: 21 (18 - 22)  SpO2: 98% (94% - 98%)    General: alert  oriented x ____ lethargic agitated                  cachexia  nonverbal  coma    Karnofsky:  %    HEENT: normal  dry mouth  ET tube/trach    Lungs: comfortable tachypnea/labored breathing  excessive secretions    CV: normal  tachycardia    GI: normal  distended  tender  no BS               PEG/NG/OG tube  constipation  last BM:     : normal  incontinent  oliguria/anuria  jones    MSK: normal  weakness  edema             ambulatory  bedbound/wheelchair bound    Skin: normal  pressure ulcers- Stage_____  no rash    LABS:                        7.8    7.0   )-----------( 354      ( 03 Mar 2017 06:34 )             26.1     03 Mar 2017 06:34    139    |  103    |  23.0   ----------------------------<  92     4.0     |  25.0   |  0.30     Ca    8.4        03 Mar 2017 06:34  Phos  2.0       03 Mar 2017 06:34  Mg     2.0       03 Mar 2017 06:34    TPro  5.6    /  Alb  2.4    /  TBili  0.2    /  DBili  x      /  AST  21     /  ALT  19     /  AlkPhos  71     03 Mar 2017 06:34    PT/INR - ( 01 Mar 2017 20:08 )   PT: 15.2 sec;   INR: 1.38 ratio         PTT - ( 01 Mar 2017 20:08 )  PTT:28.1 sec    I&O's Summary    I & Os for current day (as of 03 Mar 2017 14:35)  =============================================  IN: 3430 ml / OUT: 1800 ml / NET: 1630 ml      RADIOLOGY & ADDITIONAL STUDIES:    ADVANCE DIRECTIVES:   DNR YES NO  Completed on:                     MOLST  YES NO   Completed on:  Living Will  YES NO   Completed on: HPI: 85M with PMH as listed admitted 3/1 from [facility] with fever, sepsis. He had recent admission for 8 months at [facility]. While there, there was an issue, and the hospital applied for guardianship and patient now has a legal guardian Van Monroy. Patient has had numerous complications, mostly related to sepsis, decubitis, UTI...with extremely poor overall prognosis for any kind of meaningful outcome.     PERTINENT PMH REVIEWED: Yes    PAST MEDICAL & SURGICAL HISTORY:  Dysphagia  Fall: Multiple Mechanical falls  CAD (coronary artery disease)  Clostridium difficile diarrhea: in 2009  BPH (benign prostatic hypertrophy)  Dementia  HLD (hyperlipidemia)  CHF (congestive heart failure)  Prostate cancer: Treated w/ radiation  Stroke: (~5yrs ago)  Seizure: (last event &gt;1yr ago)  HTN (hypertension)  Colostomy in place  S/P percutaneous endoscopic gastrostomy (PEG) tube placement  H/O hemorrhoidectomy  Deviated septum  Abdominal hernia  Status post cardiac surgery: stents x 2    SOCIAL HISTORY:  nonsmoker                                   Admitted from:    [facility]    Surrogate - Legal Guardian - Van Monroy    FAMILY HISTORY:  No pertinent family history in first degree relatives    Allergies    clindamycin (Unknown)  Nuts (Hives; Rash)  PC Pen VK (Unknown)  strawberry (Short breath; Rash; Hives)  Xanax (Rash)    Intolerances    Ativan (Unknown)  Haldol (Dystonic RXN)  hydrALAZINE (Unknown)  Zyprexa (Unknown)    Present Symptoms:     Dyspnea: 0   Nausea/Vomiting: No  Anxiety:  No  Depression: No  Fatigue: No  Loss of appetite: No    Pain: none    Review of Systems: Reviewed                     Unable to obtain due to poor mentation     MEDICATIONS  (STANDING):  finasteride 5milliGRAM(s) Oral daily  lacosamide 150milliGRAM(s) Oral two times a day  ferrous    sulfate 60 mG/mL Liquid 300milliGRAM(s) Enteral Tube daily  sodium chloride 0.65% Nasal 1Spray(s) Both Nostrils daily  saccharomyces boulardii 250milliGRAM(s) Oral two times a day  meropenem IVPB  IV Intermittent   meropenem IVPB 1000milliGRAM(s) IV Intermittent every 8 hours  enoxaparin Injectable 40milliGRAM(s) SubCutaneous daily  simvastatin 20milliGRAM(s) Oral at bedtime  tamsulosin 0.4milliGRAM(s) Oral at bedtime  aspirin  chewable 81milliGRAM(s) Oral daily    MEDICATIONS  (PRN):  acetaminophen  Suppository 650milliGRAM(s) Rectal every 6 hours PRN For Temp greater than 38 C (100.4 F)    PHYSICAL EXAM:    Vital Signs Last 24 Hrs  T(C): 37, Max: 37 (03-03 @ 05:14)  T(F): 98.6, Max: 98.6 (03-03 @ 05:14)  HR: 73 (73 - 87)  BP: 128/70 (112/58 - 130/64)  BP(mean): --  RR: 21 (18 - 22)  SpO2: 98% (94% - 98%)    General: alert     Karnofsky:  10-20%    HEENT: normal     Lungs: comfortable     CV: normal      GI: normal PEG    : normal  incontinent  oliguria/anuria  jones    MSK: bedbound/wheelchair bound    Skin: no rash    LABS:                         7.8    7.0   )-----------( 354      ( 03 Mar 2017 06:34 )             26.1     03 Mar 2017 06:34    139    |  103    |  23.0   ----------------------------<  92     4.0     |  25.0   |  0.30     Ca    8.4        03 Mar 2017 06:34  Phos  2.0       03 Mar 2017 06:34  Mg     2.0       03 Mar 2017 06:34    TPro  5.6    /  Alb  2.4    /  TBili  0.2    /  DBili  x      /  AST  21     /  ALT  19     /  AlkPhos  71     03 Mar 2017 06:34    PT/INR - ( 01 Mar 2017 20:08 )   PT: 15.2 sec;   INR: 1.38 ratio       PTT - ( 01 Mar 2017 20:08 )  PTT:28.1 sec    I&O's Summary    I & Os for current day (as of 03 Mar 2017 14:35)  =============================================  IN: 3430 ml / OUT: 1800 ml / NET: 1630 ml    RADIOLOGY & ADDITIONAL STUDIES:    3/1: CT C/A/P: There is subsegmental atelectasis at the lung bases. There are foci of gas and edema within the soft tissues overlying the sacrum. Additionally there is presacral fluid with associated foci of gas. These findings may be post procedural, however infection of this region is not excluded. Complex cyst at the interpolar region of the right kidney. Nonemergent ultrasound can be performed for further evaluation.    ADVANCE DIRECTIVES:     Full Code

## 2017-03-03 NOTE — PROGRESS NOTE ADULT - PROBLEM SELECTOR PLAN 1
Severe sepsis w/ multiple sources (Baugh, decubitus ulcers - more likely ) which is resolving slowly   Meropenem 1g IV Q8H & Vancomycin 1 g IV Q12H (day 3 of antibiotics); monitor vanco trough  IV NS @ 100 ml/hr   Urine culture + for GNRs and blood cultures pending  CT of chest & abdomen to look for potential source noted   -more likely to be decubiti source  -surgery eval noted, wound care consult placed for now  -standing pain control Severe sepsis w/ multiple sources (Jones, decubitus ulcers - more likely ) which is resolving. BP improved, clinically appears better. Urine culture with Pseudomonas aeurginosa for now as source, await for blood cultures pending in lab   -d/c vanco, c/w meropenem (day 3) with florastor   -change jones per urology since acute infection - penile exam today does not demonstrate phimosis, however, the foreskin was retracted back slightly in the ER to expose the entire glans penis   -c/w jones for now, d/c fluids, c/w fluids via peg tube  -surgery eval noted, wound care consult placed for now - attempt for specialty bed and offloading

## 2017-03-03 NOTE — PROGRESS NOTE ADULT - PROBLEM SELECTOR PLAN 7
Jones is a potential source of infection vs colonization.   seen by urology - asked to consider changing jones   will touch base with urology again and change if allowed Lovenox 40 mg SQ daily Jones is a potential source of infection vs colonization.   Urology on board, will plan to change jones today  added flomax

## 2017-03-04 DIAGNOSIS — R33.9 RETENTION OF URINE, UNSPECIFIED: ICD-10-CM

## 2017-03-04 DIAGNOSIS — I20.9 ANGINA PECTORIS, UNSPECIFIED: ICD-10-CM

## 2017-03-04 LAB
-  AMIKACIN: SIGNIFICANT CHANGE UP
-  AMPICILLIN/SULBACTAM: SIGNIFICANT CHANGE UP
-  AMPICILLIN: SIGNIFICANT CHANGE UP
-  AZTREONAM: SIGNIFICANT CHANGE UP
-  CEFEPIME: SIGNIFICANT CHANGE UP
-  CEFOTAXIME: SIGNIFICANT CHANGE UP
-  CEFTAZIDIME: SIGNIFICANT CHANGE UP
-  CEFTRIAXONE: SIGNIFICANT CHANGE UP
-  CIPROFLOXACIN: SIGNIFICANT CHANGE UP
-  ERTAPENEM: SIGNIFICANT CHANGE UP
-  GENTAMICIN: SIGNIFICANT CHANGE UP
-  IMIPENEM: SIGNIFICANT CHANGE UP
-  LEVOFLOXACIN: SIGNIFICANT CHANGE UP
-  MEROPENEM: SIGNIFICANT CHANGE UP
-  PIPERACILLIN/TAZOBACTAM: SIGNIFICANT CHANGE UP
-  TOBRAMYCIN: SIGNIFICANT CHANGE UP
-  TRIMETHOPRIM/SULFAMETHOXAZOLE: SIGNIFICANT CHANGE UP
ALBUMIN SERPL ELPH-MCNC: 2.8 G/DL — LOW (ref 3.3–5.2)
ALP SERPL-CCNC: 78 U/L — SIGNIFICANT CHANGE UP (ref 40–120)
ALT FLD-CCNC: 20 U/L — SIGNIFICANT CHANGE UP
ANION GAP SERPL CALC-SCNC: 10 MMOL/L — SIGNIFICANT CHANGE UP (ref 5–17)
AST SERPL-CCNC: 21 U/L — SIGNIFICANT CHANGE UP
BASOPHILS # BLD AUTO: 0 K/UL — SIGNIFICANT CHANGE UP (ref 0–0.2)
BASOPHILS NFR BLD AUTO: 0.3 % — SIGNIFICANT CHANGE UP (ref 0–2)
BILIRUB SERPL-MCNC: 0.2 MG/DL — LOW (ref 0.4–2)
BUN SERPL-MCNC: 18 MG/DL — SIGNIFICANT CHANGE UP (ref 8–20)
CALCIUM SERPL-MCNC: 9.1 MG/DL — SIGNIFICANT CHANGE UP (ref 8.6–10.2)
CHLORIDE SERPL-SCNC: 106 MMOL/L — SIGNIFICANT CHANGE UP (ref 98–107)
CK SERPL-CCNC: 41 U/L — SIGNIFICANT CHANGE UP (ref 30–200)
CO2 SERPL-SCNC: 26 MMOL/L — SIGNIFICANT CHANGE UP (ref 22–29)
CREAT SERPL-MCNC: 0.32 MG/DL — LOW (ref 0.5–1.3)
CULTURE RESULTS: SIGNIFICANT CHANGE UP
EOSINOPHIL # BLD AUTO: 0.2 K/UL — SIGNIFICANT CHANGE UP (ref 0–0.5)
EOSINOPHIL NFR BLD AUTO: 3.8 % — SIGNIFICANT CHANGE UP (ref 0–5)
GLUCOSE SERPL-MCNC: 91 MG/DL — SIGNIFICANT CHANGE UP (ref 70–115)
HCT VFR BLD CALC: 28.4 % — LOW (ref 42–52)
HGB BLD-MCNC: 8.6 G/DL — LOW (ref 14–18)
LYMPHOCYTES # BLD AUTO: 1.1 K/UL — SIGNIFICANT CHANGE UP (ref 1–4.8)
LYMPHOCYTES # BLD AUTO: 19.1 % — LOW (ref 20–55)
MAGNESIUM SERPL-MCNC: 2.1 MG/DL — SIGNIFICANT CHANGE UP (ref 1.8–2.5)
MCHC RBC-ENTMCNC: 24.8 PG — LOW (ref 27–31)
MCHC RBC-ENTMCNC: 30.3 G/DL — LOW (ref 32–36)
MCV RBC AUTO: 81.8 FL — SIGNIFICANT CHANGE UP (ref 80–94)
METHOD TYPE: SIGNIFICANT CHANGE UP
MONOCYTES # BLD AUTO: 0.7 K/UL — SIGNIFICANT CHANGE UP (ref 0–0.8)
MONOCYTES NFR BLD AUTO: 11.4 % — HIGH (ref 3–10)
NEUTROPHILS # BLD AUTO: 3.7 K/UL — SIGNIFICANT CHANGE UP (ref 1.8–8)
NEUTROPHILS NFR BLD AUTO: 63.7 % — SIGNIFICANT CHANGE UP (ref 37–73)
ORGANISM # SPEC MICROSCOPIC CNT: SIGNIFICANT CHANGE UP
PHOSPHATE SERPL-MCNC: 2.6 MG/DL — SIGNIFICANT CHANGE UP (ref 2.4–4.7)
PLATELET # BLD AUTO: 378 K/UL — SIGNIFICANT CHANGE UP (ref 150–400)
POTASSIUM SERPL-MCNC: 4.2 MMOL/L — SIGNIFICANT CHANGE UP (ref 3.5–5.3)
POTASSIUM SERPL-SCNC: 4.2 MMOL/L — SIGNIFICANT CHANGE UP (ref 3.5–5.3)
PROT SERPL-MCNC: 6.2 G/DL — LOW (ref 6.6–8.7)
RBC # BLD: 3.47 M/UL — LOW (ref 4.6–6.2)
RBC # FLD: 16.8 % — HIGH (ref 11–15.6)
SODIUM SERPL-SCNC: 142 MMOL/L — SIGNIFICANT CHANGE UP (ref 135–145)
SPECIMEN SOURCE: SIGNIFICANT CHANGE UP
TROPONIN T SERPL-MCNC: 0.08 NG/ML — HIGH (ref 0–0.06)
WBC # BLD: 5.9 K/UL — SIGNIFICANT CHANGE UP (ref 4.8–10.8)
WBC # FLD AUTO: 5.9 K/UL — SIGNIFICANT CHANGE UP (ref 4.8–10.8)

## 2017-03-04 PROCEDURE — 99233 SBSQ HOSP IP/OBS HIGH 50: CPT | Mod: GC

## 2017-03-04 RX ADMIN — SIMVASTATIN 20 MILLIGRAM(S): 20 TABLET, FILM COATED ORAL at 22:29

## 2017-03-04 RX ADMIN — Medication 250 MILLIGRAM(S): at 17:27

## 2017-03-04 RX ADMIN — ENOXAPARIN SODIUM 40 MILLIGRAM(S): 100 INJECTION SUBCUTANEOUS at 13:06

## 2017-03-04 RX ADMIN — Medication 300 MILLIGRAM(S): at 13:07

## 2017-03-04 RX ADMIN — MEROPENEM 200 MILLIGRAM(S): 1 INJECTION INTRAVENOUS at 16:04

## 2017-03-04 RX ADMIN — LACOSAMIDE 150 MILLIGRAM(S): 50 TABLET ORAL at 06:24

## 2017-03-04 RX ADMIN — Medication 250 MILLIGRAM(S): at 06:10

## 2017-03-04 RX ADMIN — MEROPENEM 200 MILLIGRAM(S): 1 INJECTION INTRAVENOUS at 22:28

## 2017-03-04 RX ADMIN — LACOSAMIDE 150 MILLIGRAM(S): 50 TABLET ORAL at 18:44

## 2017-03-04 RX ADMIN — FINASTERIDE 5 MILLIGRAM(S): 5 TABLET, FILM COATED ORAL at 13:07

## 2017-03-04 RX ADMIN — TAMSULOSIN HYDROCHLORIDE 0.4 MILLIGRAM(S): 0.4 CAPSULE ORAL at 22:29

## 2017-03-04 RX ADMIN — MEROPENEM 200 MILLIGRAM(S): 1 INJECTION INTRAVENOUS at 06:10

## 2017-03-04 RX ADMIN — Medication 81 MILLIGRAM(S): at 13:06

## 2017-03-04 RX ADMIN — Medication 1 SPRAY(S): at 13:07

## 2017-03-04 NOTE — PROGRESS NOTE ADULT - PROBLEM SELECTOR PLAN 7
Jones is a potential source of infection vs colonization.   Urology on board,   s/p jones change jones today  c/w flomax Jones is a potential source of infection vs colonization.   Urology on board,   s/p jones change   c/w flomax

## 2017-03-04 NOTE — PROGRESS NOTE ADULT - SUBJECTIVE AND OBJECTIVE BOX
INTERVAL HPI/OVERNIGHT EVENTS:  doing well.  alert and talking today.  no pain, but not comfortable.  pseudomanas 2 strains, 1 resistant in the urine.    MEDICATIONS  (STANDING):  finasteride 5milliGRAM(s) Oral daily  lacosamide 150milliGRAM(s) Oral two times a day  ferrous    sulfate 60 mG/mL Liquid 300milliGRAM(s) Enteral Tube daily  sodium chloride 0.65% Nasal 1Spray(s) Both Nostrils daily  saccharomyces boulardii 250milliGRAM(s) Oral two times a day  meropenem IVPB  IV Intermittent   meropenem IVPB 1000milliGRAM(s) IV Intermittent every 8 hours  enoxaparin Injectable 40milliGRAM(s) SubCutaneous daily  simvastatin 20milliGRAM(s) Oral at bedtime  tamsulosin 0.4milliGRAM(s) Oral at bedtime  aspirin  chewable 81milliGRAM(s) Oral daily    MEDICATIONS  (PRN):  acetaminophen  Suppository 650milliGRAM(s) Rectal every 6 hours PRN For Temp greater than 38 C (100.4 F)      Vital Signs Last 24 Hrs  T(C): 36.7, Max: 36.9 (03-04 @ 08:00)  T(F): 98, Max: 98.4 (03-04 @ 08:00)  HR: 84 (72 - 84)  BP: 143/77 (118/60 - 143/77)  BP(mean): --  RR: 17 (16 - 19)  SpO2: 98% (95% - 98%)    PHYSICAL EXAM:    Gen: NAD  left lateral decub position.  right hand contracted.  answering questions  Baugh yellow urine  Penis with foreskin in place, 6 oclock position with small scab.        I&O's Detail  I & Os for 24h ending 04 Mar 2017 07:00  =============================================  IN:    Jevity: 1440 ml    Free Water: 1000 ml    Solution: 250 ml    Solution: 100 ml    Total IN: 2790 ml  ---------------------------------------------  OUT:    Intermittent Catheterization - Urethral: 1275 ml    Colostomy: 125 ml    Total OUT: 1400 ml  ---------------------------------------------  Total NET: 1390 ml    I & Os for current day (as of 04 Mar 2017 19:31)  =============================================  IN:    Total IN: 0 ml  ---------------------------------------------  OUT:    Indwelling Catheter - Urethral: 1200 ml    Colostomy: 150 ml    Total OUT: 1350 ml  ---------------------------------------------  Total NET: -1350 ml      LABS:                        8.6    5.9   )-----------( 378      ( 04 Mar 2017 06:54 )             28.4     04 Mar 2017 06:54    142    |  106    |  18.0   ----------------------------<  91     4.2     |  26.0   |  0.32     Ca    9.1        04 Mar 2017 06:54  Phos  2.6       04 Mar 2017 06:54  Mg     2.1       04 Mar 2017 06:54    TPro  6.2    /  Alb  2.8    /  TBili  0.2    /  DBili  x      /  AST  21     /  ALT  20     /  AlkPhos  78     04 Mar 2017 06:54          RADIOLOGY & ADDITIONAL STUDIES:

## 2017-03-04 NOTE — PROGRESS NOTE ADULT - PROBLEM SELECTOR PLAN 4
patient has baseline dementia and now likely some mental status changes due to infection - improving Normal EKG w/ elevated troponin x4, likely more demand ischemia   Cardiology ([place]) suggest demand ischemia - no further intervention at this time  -c/w asa 81 mg and simvastatin 20 mg QHS.

## 2017-03-04 NOTE — PROGRESS NOTE ADULT - SUBJECTIVE AND OBJECTIVE BOX
cc: sent from facility for fevers     INTERVAL HPI/OVERNIGHT EVENTS:  Patient seen and examined at bedside, No acute overnight events. Patient is verbal but AAOx0. Patient non-ambulating, hydration & feeds via PEG & voiding via jones. Clinically improved since yesterday.      Allergies    clindamycin (Unknown)  Nuts (Hives; Rash)  PC Pen VK (Unknown)  strawberry (Short breath; Rash; Hives)  Xanax (Rash)    Intolerances    Ativan (Unknown)  Haldol (Dystonic RXN)  hydrALAZINE (Unknown)  Zyprexa (Unknown)    Vital Signs Last 24 Hrs  T(C): 36.7, Max: 37 ( @ 12:04)  T(F): 98.1, Max: 98.6 ( @ 12:04)  BP: 118/60 (118/60 - 136/61)  RR: 18 (18 - 22)  SpO2: 96% (94% - 98%)     Physical Exam:   GENERAL: NAD, malnourished, verbal but AAOx0    HEAD:  Atraumatic, Normocephalic  EYES: EOMI, PERRLA, conjunctiva and sclera clear, cataracts B/L  ENMT: dry mucous membranes, poor dentition   NERVOUS SYSTEM:  Awake, Alert & Oriented x0, contracted upper & lower extremities  CHEST/LUNG: Clear to percussion bilaterally; No rales, rhonchi, wheezing, or rubs  HEART: Regular rate and rhythm; No murmurs, rubs, or gallops  ABDOMEN: Soft, Nontender, Nondistended; Bowel sounds present, PEG tube & colostomy bag in place  GENITOURINARY: phimosis w/ jones in place (glans penis exposed, foreskin retracted)  EXTREMITIES: 2+ pitting edema, contracture of upper & lower extremities, No clubbing or cyanosis  LYMPH: No lymphadenopathy noted  SKIN: 1x2cm ulcer on left knee, left plantar healing ulcer, 2x2 cm stage 2 decubitus ulcer (possible higher stage with skin flap over region)     I&O's Summary    I & Os for current day (as of 04 Mar 2017 07:09)  =============================================  IN: 2790 ml / OUT: 1400 ml / NET: 1390 ml    Daily Weight in k.1 (04 Mar 2017 06:51)    Labs:                                     7.8    7.0   )-----------( 354      ( 03 Mar 2017 06:34 )             26.1       03 Mar 2017 06:34    139    |  103    |  23.0   ----------------------------<  92     4.0     |  25.0   |  0.30     Ca    8.4        03 Mar 2017 06:34  Phos  2.0       03 Mar 2017 06:34  Mg     2.0       03 Mar 2017 06:34    TPro  5.6    /  Alb  2.4    /  TBili  0.2    /  DBili  x      /  AST  21     /  ALT  19     /  AlkPhos  71     03 Mar 2017 06:34    Troponin: 0.14, 0.14, 0.13, 0.14  Venous lactate: 2.2 => 1.2 (3/1/17)  Urine culture: > 100,000 Pseudomonas  Blood culture: negative x2    Radiology:  Abdominal/Pelvis CT: There is subsegmental atelectasis at the lung bases.There are foci of gas and edema within the soft tissues overlying the   sacrum. Additionally there is presacral fluid with associated foci of gas. These findings may be post procedural, however infection of this region is not excluded.    MEDICATIONS  (STANDING):  finasteride 5milliGRAM(s) Oral daily  lacosamide 150milliGRAM(s) Oral two times a day  ferrous    sulfate 60 mG/mL Liquid 300milliGRAM(s) Enteral Tube daily  sodium chloride 0.65% Nasal 1Spray(s) Both Nostrils daily  saccharomyces boulardii 250milliGRAM(s) Oral two times a day  meropenem IVPB  IV Intermittent   meropenem IVPB 1000milliGRAM(s) IV Intermittent every 8 hours  enoxaparin Injectable 40milliGRAM(s) SubCutaneous daily  simvastatin 20milliGRAM(s) Oral at bedtime  tamsulosin 0.4milliGRAM(s) Oral at bedtime  aspirin  chewable 81milliGRAM(s) Oral daily    MEDICATIONS  (PRN):  acetaminophen  Suppository 650milliGRAM(s) Rectal every 6 hours PRN For Temp greater than 38 C (100.4 F) cc: sent from facility for fevers     INTERVAL HPI/OVERNIGHT EVENTS:  Patient seen and examined at bedside, No acute overnight events. Patient is verbal but AAOx0. Patient non-ambulating, hydration & feeds via PEG & voiding via jones. Clinically improved since yesterday.      Allergies    clindamycin (Unknown)  Nuts (Hives; Rash)  PC Pen VK (Unknown)  strawberry (Short breath; Rash; Hives)  Xanax (Rash)    Intolerances    Ativan (Unknown)  Haldol (Dystonic RXN)  hydrALAZINE (Unknown)  Zyprexa (Unknown)    Vital Signs Last 24 Hrs  T(C): 36.7, Max: 37 ( @ 12:04)  T(F): 98.1, Max: 98.6 ( @ 12:04)  BP: 118/60 (118/60 - 136/61)  RR: 18 (18 - 22)  SpO2: 96% (94% - 98%)     Physical Exam:   GENERAL: NAD, malnourished, verbal but AAOx0    HEAD:  Atraumatic, Normocephalic  EYES: EOMI, PERRLA, conjunctiva and sclera clear, cataracts B/L  ENMT: dry mucous membranes, poor dentition   NERVOUS SYSTEM:  Awake, Alert & Oriented x0, contracted upper & lower extremities  CHEST/LUNG: Clear to percussion bilaterally; No rales, rhonchi, wheezing, or rubs  HEART: Regular rate and rhythm; No murmurs, rubs, or gallops  ABDOMEN: Soft, Nontender, Nondistended; Bowel sounds present, PEG tube & colostomy bag in place  GENITOURINARY: phimosis w/ jones in place (glans penis exposed, foreskin retracted)  EXTREMITIES: 2+ pitting edema, contracture of upper & lower extremities, No clubbing or cyanosis  LYMPH: No lymphadenopathy noted  SKIN: 1x2cm ulcer on left knee, unstageable left plantar healing ulcer, 2x2 cm stage 2 decubitus ulcer (possible higher stage with skin flap over region)     I&O's Summary    I & Os for current day (as of 04 Mar 2017 07:09)  =============================================  IN: 2790 ml / OUT: 1400 ml / NET: 1390 ml    Daily Weight in k.1 (04 Mar 2017 06:51)    Labs:                                     7.8    7.0   )-----------( 354      ( 03 Mar 2017 06:34 )             26.1       03 Mar 2017 06:34    139    |  103    |  23.0   ----------------------------<  92     4.0     |  25.0   |  0.30     Ca    8.4        03 Mar 2017 06:34  Phos  2.0       03 Mar 2017 06:34  Mg     2.0       03 Mar 2017 06:34    TPro  5.6    /  Alb  2.4    /  TBili  0.2    /  DBili  x      /  AST  21     /  ALT  19     /  AlkPhos  71     03 Mar 2017 06:34    Troponin: 0.14, 0.14, 0.13, 0.14  Venous lactate: 2.2 => 1.2 (3/1/17)  Urine culture: > 100,000 Pseudomonas  Blood culture: negative x2    Radiology:  Abdominal/Pelvis CT: There is subsegmental atelectasis at the lung bases.There are foci of gas and edema within the soft tissues overlying the   sacrum. Additionally there is presacral fluid with associated foci of gas. These findings may be post procedural, however infection of this region is not excluded.    MEDICATIONS  (STANDING):  finasteride 5milliGRAM(s) Oral daily  lacosamide 150milliGRAM(s) Oral two times a day  ferrous    sulfate 60 mG/mL Liquid 300milliGRAM(s) Enteral Tube daily  sodium chloride 0.65% Nasal 1Spray(s) Both Nostrils daily  saccharomyces boulardii 250milliGRAM(s) Oral two times a day  meropenem IVPB  IV Intermittent   meropenem IVPB 1000milliGRAM(s) IV Intermittent every 8 hours  enoxaparin Injectable 40milliGRAM(s) SubCutaneous daily  simvastatin 20milliGRAM(s) Oral at bedtime  tamsulosin 0.4milliGRAM(s) Oral at bedtime  aspirin  chewable 81milliGRAM(s) Oral daily    MEDICATIONS  (PRN):  acetaminophen  Suppository 650milliGRAM(s) Rectal every 6 hours PRN For Temp greater than 38 C (100.4 F)

## 2017-03-04 NOTE — PROGRESS NOTE ADULT - ASSESSMENT
85 year old male w/ PMH dementia, seizures, CVA w/ dysphagia/peg, phimosis with jones and colostomy who was BIBA from [facility] for fever of 102.1 admitted for severe sepsis. Patient is s/p 3 days of Vancomycin now on on Meropenem 1g IV Q8H (day 4) 85 year old male w/ PMH dementia, seizures, CVA w/ dysphagia/peg, phimosis with jones and colostomy who was BIBA from [facility] for fever of 102.1 admitted for severe sepsis. Patient is s/p 3 days of Vancomycin now on on Meropenem 1g IV Q8H (day 4).    Family is not the healthcare proxy and can not make any medical decision on patient's behalf. Health care proxy is Van Monroy [phone]

## 2017-03-04 NOTE — PROGRESS NOTE ADULT - PROBLEM SELECTOR PLAN 8
normocytic. likely 2/2 aocd. drop in hgb noted, likely dilutional, will monitor h/h normocytic likely 2/2 AOCD  improved  will monitor h/h

## 2017-03-04 NOTE — PROVIDER CONTACT NOTE (CRITICAL VALUE NOTIFICATION) - ACTION/TREATMENT ORDERED:
None at this time
Troponin improved from previous level 0.14- Will continue to monitor,
Per Dr. Daugherty will place pt on contact precautions.

## 2017-03-04 NOTE — PROGRESS NOTE ADULT - PROBLEM SELECTOR PLAN 3
s/p 2 boluses in ED now on IV NS @ 100 ml/hr   likely 2/2 sepsis, c/w hydration via peg tube  -hold bp meds resolved Local wound care and packing as per Surgery and would care

## 2017-03-04 NOTE — PROGRESS NOTE ADULT - PROBLEM SELECTOR PLAN 2
Normal EKG w/ elevated troponin x4, likely more demand ischemia   Cardiology ([place]) suggest demand ischemia - no further intervention at this time  -d/c tele monitor   -c/w asa and statin - once tolerates, add a low dose beta blocker Normal EKG w/ elevated troponin x4, likely more demand ischemia   Cardiology ([place]) suggest demand ischemia - no further intervention at this time  -d/c tele monitor   -c/w asa and statin patient has baseline dementia and now likely some mental status changes due to infection - improving

## 2017-03-04 NOTE — PROGRESS NOTE ADULT - PROBLEM SELECTOR PLAN 1
Severe sepsis w/ multiple sources (Jones, decubitus ulcers - more likely ) which is resolving. BP improved, clinically appears better. Urine culture with Pseudomonas aeurginosa for now as source, blood cultures negative x2  -d/c vanco, c/w meropenem (day 4) with florastor   -change jones per urology since acute infection - penile exam today does not demonstrate phimosis, however, the foreskin was retracted back slightly in the ER to expose the entire glans penis   -c/w jones for now, d/c fluids, c/w fluids via peg tube  -surgery eval noted, wound care consult placed for now - attempt for specialty bed and offloading Severe sepsis w/ multiple sources (Jones, decubitus ulcers - more likely ) which is resolving. BP improved, clinically appears better. Urine culture with Pseudomonas aeurginosa for now as source, blood cultures negative x2  -d/c vanco, c/w meropenem (day 4) with florastor   -change jones per urology since acute infection - penile exam today does not demonstrate phimosis, however, the foreskin was retracted back slightly in the ER to expose the entire glans penis   -c/w jones for now, c/w fluids via peg tube  -c/w specialty bed and offloading Severe sepsis w/ multiple sources (Jones, decubitus ulcers - more likely ) which is resolving. BP improved, clinically appears better. Urine culture with Pseudomonas aeruginosa for now as source, blood cultures negative x2  -d/c vanco, c/w meropenem (day 4) with florastor   -change jones per urology since acute infection - penile exam today does not demonstrate phimosis, however, the foreskin was retracted back slightly in the ER to expose the entire glans penis   -c/w jones for now, c/w fluids via peg tube  -c/w specialty bed and offloading

## 2017-03-04 NOTE — PROGRESS NOTE ADULT - PROBLEM SELECTOR PLAN 1
pseudomonas  on meropenem, but 1 strain is resistant,.  consider treatment with cipro (both strains sensitive).

## 2017-03-05 LAB
ANION GAP SERPL CALC-SCNC: 12 MMOL/L — SIGNIFICANT CHANGE UP (ref 5–17)
BASOPHILS # BLD AUTO: 0 K/UL — SIGNIFICANT CHANGE UP (ref 0–0.2)
BASOPHILS NFR BLD AUTO: 0.3 % — SIGNIFICANT CHANGE UP (ref 0–2)
BUN SERPL-MCNC: 13 MG/DL — SIGNIFICANT CHANGE UP (ref 8–20)
CALCIUM SERPL-MCNC: 9.1 MG/DL — SIGNIFICANT CHANGE UP (ref 8.6–10.2)
CHLORIDE SERPL-SCNC: 101 MMOL/L — SIGNIFICANT CHANGE UP (ref 98–107)
CO2 SERPL-SCNC: 23 MMOL/L — SIGNIFICANT CHANGE UP (ref 22–29)
CREAT SERPL-MCNC: 0.29 MG/DL — LOW (ref 0.5–1.3)
EOSINOPHIL # BLD AUTO: 0.2 K/UL — SIGNIFICANT CHANGE UP (ref 0–0.5)
EOSINOPHIL NFR BLD AUTO: 3.5 % — SIGNIFICANT CHANGE UP (ref 0–5)
GLUCOSE SERPL-MCNC: 103 MG/DL — SIGNIFICANT CHANGE UP (ref 70–115)
HCT VFR BLD CALC: 31.1 % — LOW (ref 42–52)
HGB BLD-MCNC: 9.5 G/DL — LOW (ref 14–18)
LYMPHOCYTES # BLD AUTO: 0.9 K/UL — LOW (ref 1–4.8)
LYMPHOCYTES # BLD AUTO: 14.5 % — LOW (ref 20–55)
MCHC RBC-ENTMCNC: 24.9 PG — LOW (ref 27–31)
MCHC RBC-ENTMCNC: 30.5 G/DL — LOW (ref 32–36)
MCV RBC AUTO: 81.4 FL — SIGNIFICANT CHANGE UP (ref 80–94)
MONOCYTES # BLD AUTO: 0.6 K/UL — SIGNIFICANT CHANGE UP (ref 0–0.8)
MONOCYTES NFR BLD AUTO: 10.2 % — HIGH (ref 3–10)
NEUTROPHILS # BLD AUTO: 4.3 K/UL — SIGNIFICANT CHANGE UP (ref 1.8–8)
NEUTROPHILS NFR BLD AUTO: 68.6 % — SIGNIFICANT CHANGE UP (ref 37–73)
PLATELET # BLD AUTO: 383 K/UL — SIGNIFICANT CHANGE UP (ref 150–400)
POTASSIUM SERPL-MCNC: 3.9 MMOL/L — SIGNIFICANT CHANGE UP (ref 3.5–5.3)
POTASSIUM SERPL-SCNC: 3.9 MMOL/L — SIGNIFICANT CHANGE UP (ref 3.5–5.3)
RBC # BLD: 3.82 M/UL — LOW (ref 4.6–6.2)
RBC # FLD: 16.7 % — HIGH (ref 11–15.6)
SODIUM SERPL-SCNC: 136 MMOL/L — SIGNIFICANT CHANGE UP (ref 135–145)
WBC # BLD: 6.3 K/UL — SIGNIFICANT CHANGE UP (ref 4.8–10.8)
WBC # FLD AUTO: 6.3 K/UL — SIGNIFICANT CHANGE UP (ref 4.8–10.8)

## 2017-03-05 PROCEDURE — 99233 SBSQ HOSP IP/OBS HIGH 50: CPT | Mod: GC

## 2017-03-05 RX ORDER — CIPROFLOXACIN LACTATE 400MG/40ML
500 VIAL (ML) INTRAVENOUS EVERY 12 HOURS
Qty: 0 | Refills: 0 | Status: DISCONTINUED | OUTPATIENT
Start: 2017-03-05 | End: 2017-03-05

## 2017-03-05 RX ORDER — CIPROFLOXACIN LACTATE 400MG/40ML
500 VIAL (ML) INTRAVENOUS EVERY 12 HOURS
Qty: 0 | Refills: 0 | Status: DISCONTINUED | OUTPATIENT
Start: 2017-03-05 | End: 2017-03-08

## 2017-03-05 RX ADMIN — Medication 81 MILLIGRAM(S): at 12:10

## 2017-03-05 RX ADMIN — LACOSAMIDE 150 MILLIGRAM(S): 50 TABLET ORAL at 06:19

## 2017-03-05 RX ADMIN — ENOXAPARIN SODIUM 40 MILLIGRAM(S): 100 INJECTION SUBCUTANEOUS at 22:49

## 2017-03-05 RX ADMIN — MEROPENEM 200 MILLIGRAM(S): 1 INJECTION INTRAVENOUS at 06:20

## 2017-03-05 RX ADMIN — SIMVASTATIN 20 MILLIGRAM(S): 20 TABLET, FILM COATED ORAL at 22:50

## 2017-03-05 RX ADMIN — TAMSULOSIN HYDROCHLORIDE 0.4 MILLIGRAM(S): 0.4 CAPSULE ORAL at 22:50

## 2017-03-05 RX ADMIN — Medication 250 MILLIGRAM(S): at 18:45

## 2017-03-05 RX ADMIN — Medication 250 MILLIGRAM(S): at 06:20

## 2017-03-05 RX ADMIN — LACOSAMIDE 150 MILLIGRAM(S): 50 TABLET ORAL at 18:45

## 2017-03-05 RX ADMIN — Medication 300 MILLIGRAM(S): at 12:10

## 2017-03-05 RX ADMIN — Medication 500 MILLIGRAM(S): at 15:05

## 2017-03-05 RX ADMIN — FINASTERIDE 5 MILLIGRAM(S): 5 TABLET, FILM COATED ORAL at 12:09

## 2017-03-05 NOTE — PROGRESS NOTE ADULT - SUBJECTIVE AND OBJECTIVE BOX
cc: sent from facility for fevers     INTERVAL HPI/OVERNIGHT EVENTS:  Patient seen and examined at bedside, No acute overnight events. Patient is verbal but AAOx0. Patient non-ambulating, hydration & feeds via PEG & voiding via jones. Clinically improved since yesterday.    Allergies    clindamycin (Unknown)  Nuts (Hives; Rash)  PC Pen VK (Unknown)  strawberry (Short breath; Rash; Hives)  Xanax (Rash)    Intolerances    Ativan (Unknown)  Haldol (Dystonic RXN)  hydrALAZINE (Unknown)  Zyprexa (Unknown)    Vital Signs Last 24 Hrs  T(F): 97.7, Max: 98.4 (03-04 @ 08:00)  HR: 79 (72 - 84)  BP: 130/66 (118/60 - 143/77)  RR: 20 (16 - 20)  SpO2: 96% (95% - 98%)    Physical Exam:   GENERAL: NAD, malnourished, verbal but AAOx0    HEAD:  Atraumatic, Normocephalic  EYES: EOMI, PERRLA, conjunctiva and sclera clear, cataracts B/L  ENMT: dry mucous membranes, poor dentition   NERVOUS SYSTEM:  Awake, Alert & Oriented x0, contracted upper & lower extremities  CHEST/LUNG: Clear to percussion bilaterally; No rales, rhonchi, wheezing, or rubs  HEART: Regular rate and rhythm; No murmurs, rubs, or gallops  ABDOMEN: Soft, Nontender, Nondistended; Bowel sounds present, PEG tube & colostomy bag in place  GENITOURINARY: phimosis w/ jones in place, foreskin with small scab  EXTREMITIES: 2+ pitting edema, contracture of upper & lower extremities, No clubbing or cyanosis  LYMPH: No lymphadenopathy noted  SKIN: 1x2cm ulcer on left knee, unstageable left plantar healing ulcer, 2x2 cm stage 2 decubitus ulcer (possible higher stage with skin flap over region)     I&O's Summary  I & Os for 24h ending 04 Mar 2017 07:00  =============================================  IN: 2790 ml / OUT: 1400 ml / NET: 1390 ml    I & Os for current day (as of 05 Mar 2017 05:04)  =============================================  IN: 610 ml / OUT: 2750 ml / NET: -2140 ml      Labs:                                   8.6    5.9   )-----------( 378      ( 04 Mar 2017 06:54 )             28.4     04 Mar 2017 06:54    142    |  106    |  18.0   ----------------------------<  91     4.2     |  26.0   |  0.32     Ca    9.1        04 Mar 2017 06:54  Phos  2.6       04 Mar 2017 06:54  Mg     2.1       04 Mar 2017 06:54    TPro  6.2    /  Alb  2.8    /  TBili  0.2    /  DBili  x      /  AST  21     /  ALT  20     /  AlkPhos  78     04 Mar 2017 06:54    Troponin: 0.14, 0.14, 0.13, 0.14  Venous lactate: 2.2 => 1.2 (3/1/17)  Urine culture: > 100,000 Pseudomonas (1 strain is carbapenem resistant)   Blood culture: negative x2    Radiology:  Abdominal/Pelvis CT: There is subsegmental atelectasis at the lung bases.There are foci of gas and edema within the soft tissues overlying the   sacrum. Additionally there is presacral fluid with associated foci of gas. These findings may be post procedural, however infection of this region is not excluded.    MEDICATIONS  (STANDING):  finasteride 5milliGRAM(s) Oral daily  lacosamide 150milliGRAM(s) Oral two times a day  ferrous    sulfate 60 mG/mL Liquid 300milliGRAM(s) Enteral Tube daily  sodium chloride 0.65% Nasal 1Spray(s) Both Nostrils daily  saccharomyces boulardii 250milliGRAM(s) Oral two times a day  meropenem IVPB  IV Intermittent   meropenem IVPB 1000milliGRAM(s) IV Intermittent every 8 hours  enoxaparin Injectable 40milliGRAM(s) SubCutaneous daily  simvastatin 20milliGRAM(s) Oral at bedtime  tamsulosin 0.4milliGRAM(s) Oral at bedtime  aspirin  chewable 81milliGRAM(s) Oral daily    MEDICATIONS  (PRN):  acetaminophen  Suppository 650milliGRAM(s) Rectal every 6 hours PRN For Temp greater than 38 C (100.4 F) cc: sent from facility for fevers     INTERVAL HPI/OVERNIGHT EVENTS:  Patient seen and examined at bedside, No acute overnight events. Patient is verbal but AAOx0. Patient non-ambulating, hydration & feeds via PEG & voiding via jones. Clinically improved since yesterday.    ROS: denies fever, chest pain and SOB    Allergies    clindamycin (Unknown)  Nuts (Hives; Rash)  PC Pen VK (Unknown)  strawberry (Short breath; Rash; Hives)  Xanax (Rash)    Intolerances    Ativan (Unknown)  Haldol (Dystonic RXN)  hydrALAZINE (Unknown)  Zyprexa (Unknown)    Vital Signs Last 24 Hrs  T(F): 97.7, Max: 98.4 (03-04 @ 08:00)  HR: 79 (72 - 84)  BP: 130/66 (118/60 - 143/77)  RR: 20 (16 - 20)  SpO2: 96% (95% - 98%)    Physical Exam:   GENERAL: NAD, malnourished, verbal but AAOx0    HEAD:  Atraumatic, Normocephalic  EYES: EOMI, PERRLA, conjunctiva and sclera clear, cataracts B/L  ENMT: dry mucous membranes, poor dentition   NERVOUS SYSTEM:  Awake, Alert & Oriented x0, contracted upper & lower extremities  CHEST/LUNG: Clear to percussion bilaterally; No rales, rhonchi, wheezing, or rubs  HEART: Regular rate and rhythm; No murmurs, rubs, or gallops  ABDOMEN: Soft, Nontender, Nondistended; Bowel sounds present, PEG tube & colostomy bag in place  GENITOURINARY: phimosis w/ jones in place, foreskin with small scab  EXTREMITIES: 2+ pitting edema, contracture of upper & lower extremities, No clubbing or cyanosis  LYMPH: No lymphadenopathy noted  SKIN: 1x2cm ulcer on left knee, unstageable left plantar healing ulcer, 2x2 cm stage 2 decubitus ulcer (possible higher stage with skin flap over region)     I&O's Summary  I & Os for 24h ending 04 Mar 2017 07:00  =============================================  IN: 2790 ml / OUT: 1400 ml / NET: 1390 ml    I & Os for current day (as of 05 Mar 2017 05:04)  =============================================  IN: 610 ml / OUT: 2750 ml / NET: -2140 ml      Labs:                                   8.6    5.9   )-----------( 378      ( 04 Mar 2017 06:54 )             28.4     04 Mar 2017 06:54    142    |  106    |  18.0   ----------------------------<  91     4.2     |  26.0   |  0.32     Ca    9.1        04 Mar 2017 06:54  Phos  2.6       04 Mar 2017 06:54  Mg     2.1       04 Mar 2017 06:54    TPro  6.2    /  Alb  2.8    /  TBili  0.2    /  DBili  x      /  AST  21     /  ALT  20     /  AlkPhos  78     04 Mar 2017 06:54    Troponin: 0.14, 0.14, 0.13, 0.14  Venous lactate: 2.2 => 1.2 (3/1/17)  Urine culture: > 100,000 Pseudomonas (1 strain is carbapenem resistant)   Blood culture: negative x2    Radiology:  Abdominal/Pelvis CT: There is subsegmental atelectasis at the lung bases.There are foci of gas and edema within the soft tissues overlying the   sacrum. Additionally there is presacral fluid with associated foci of gas. These findings may be post procedural, however infection of this region is not excluded.    MEDICATIONS  (STANDING):  finasteride 5milliGRAM(s) Oral daily  lacosamide 150milliGRAM(s) Oral two times a day  ferrous    sulfate 60 mG/mL Liquid 300milliGRAM(s) Enteral Tube daily  sodium chloride 0.65% Nasal 1Spray(s) Both Nostrils daily  saccharomyces boulardii 250milliGRAM(s) Oral two times a day  meropenem IVPB  IV Intermittent   meropenem IVPB 1000milliGRAM(s) IV Intermittent every 8 hours  enoxaparin Injectable 40milliGRAM(s) SubCutaneous daily  simvastatin 20milliGRAM(s) Oral at bedtime  tamsulosin 0.4milliGRAM(s) Oral at bedtime  aspirin  chewable 81milliGRAM(s) Oral daily    MEDICATIONS  (PRN):  acetaminophen  Suppository 650milliGRAM(s) Rectal every 6 hours PRN For Temp greater than 38 C (100.4 F)

## 2017-03-05 NOTE — PROGRESS NOTE ADULT - PROBLEM SELECTOR PLAN 4
patient has baseline dementia and now likely some mental status changes due to infection - stable Normal EKG w/ elevated troponin x4, likely more demand ischemia   Cardiology ([place]) suggest demand ischemia - no further intervention at this time  -c/w asa 81 mg and simvastatin 20 mg QHS.

## 2017-03-05 NOTE — PROGRESS NOTE ADULT - PROBLEM SELECTOR PLAN 1
sepsis w/ multiple sources ([place] more likely ) which is resolving.  afebrile for 72 hours, BP improved, clinically appears better.   Urine culture with Pseudomonas aeruginosa (1 strain resistant to carbapenem)  Blood cultures negative x2  meropenem (day 5) with florastor; may switch to cipro as per urology sepsis w/ multiple sources ([place] more likely ) which is resolving.  afebrile for 72 hours, BP improved, clinically appears better.   Urine culture with Pseudomonas aeruginosa (1 strain resistant to carbapenem)  Blood cultures negative x2  meropenem (day 5) with florastor; urology recs regarding switching to cipro noted

## 2017-03-05 NOTE — PROGRESS NOTE ADULT - PROBLEM SELECTOR PLAN 2
Normal EKG w/ elevated troponin x4, likely more demand ischemia   Cardiology ([place]) suggest demand ischemia - no further intervention at this time  -c/w asa and statin patient has baseline dementia and now likely some mental status changes due to infection - stable

## 2017-03-05 NOTE — PROGRESS NOTE ADULT - PROBLEM SELECTOR PLAN 2
currently resolved.  careful attention should be paid by nursing staff and techs to ensure that the glans is not exposed, indicating that the foreskin is retracted.

## 2017-03-05 NOTE — PROGRESS NOTE ADULT - ASSESSMENT
85 year old male w/ PMH dementia, seizures, CVA w/ dysphagia/peg, phimosis with jones and colostomy who was BIBA from [facility] for fever of 102.1 admitted for severe sepsis. Patient is s/p 3 days of Vancomycin now on on Meropenem 1g IV Q8H (day 5). May change antibiotics to ciprofloxacin as urine culture showing carbapenem resistant Pseudomona.     Family is not the healthcare proxy and can not make any medical decision on patient's behalf. Awaiting decision regarding code status & plan of care from Health care proxy Van Monroy ([phone])

## 2017-03-05 NOTE — CHART NOTE - NSCHARTNOTEFT_GEN_A_CORE
Called Health care proxy Van Monroy ([phone]) and left for call back.    Shira POWER Called Health care proxy Van Monroy ([phone]) and he stated that the medical staff can discuss the patient's progress and care plan. Patient is full code as of right now but a decision will be made at the end of the week.     Shira POWER

## 2017-03-06 LAB
ALBUMIN SERPL ELPH-MCNC: 2.8 G/DL — LOW (ref 3.3–5.2)
ALP SERPL-CCNC: 106 U/L — SIGNIFICANT CHANGE UP (ref 40–120)
ALT FLD-CCNC: 23 U/L — SIGNIFICANT CHANGE UP
ANION GAP SERPL CALC-SCNC: 13 MMOL/L — SIGNIFICANT CHANGE UP (ref 5–17)
ANISOCYTOSIS BLD QL: SLIGHT — SIGNIFICANT CHANGE UP
AST SERPL-CCNC: 27 U/L — SIGNIFICANT CHANGE UP
BILIRUB SERPL-MCNC: 0.3 MG/DL — LOW (ref 0.4–2)
BUN SERPL-MCNC: 15 MG/DL — SIGNIFICANT CHANGE UP (ref 8–20)
CALCIUM SERPL-MCNC: 9.4 MG/DL — SIGNIFICANT CHANGE UP (ref 8.6–10.2)
CHLORIDE SERPL-SCNC: 98 MMOL/L — SIGNIFICANT CHANGE UP (ref 98–107)
CO2 SERPL-SCNC: 25 MMOL/L — SIGNIFICANT CHANGE UP (ref 22–29)
CREAT SERPL-MCNC: 0.32 MG/DL — LOW (ref 0.5–1.3)
CULTURE RESULTS: SIGNIFICANT CHANGE UP
CULTURE RESULTS: SIGNIFICANT CHANGE UP
EOSINOPHIL # BLD AUTO: 0.4 K/UL — SIGNIFICANT CHANGE UP (ref 0–0.5)
EOSINOPHIL NFR BLD AUTO: 4 % — SIGNIFICANT CHANGE UP (ref 0–6)
GLUCOSE SERPL-MCNC: 96 MG/DL — SIGNIFICANT CHANGE UP (ref 70–115)
HCT VFR BLD CALC: 37.2 % — LOW (ref 42–52)
HGB BLD-MCNC: 11.5 G/DL — LOW (ref 14–18)
HYPOCHROMIA BLD QL: SLIGHT — SIGNIFICANT CHANGE UP
LYMPHOCYTES # BLD AUTO: 1.1 K/UL — SIGNIFICANT CHANGE UP (ref 1–4.8)
LYMPHOCYTES # BLD AUTO: 10 % — LOW (ref 20–55)
MAGNESIUM SERPL-MCNC: 2.1 MG/DL — SIGNIFICANT CHANGE UP (ref 1.8–2.5)
MCHC RBC-ENTMCNC: 24.6 PG — LOW (ref 27–31)
MCHC RBC-ENTMCNC: 30.9 G/DL — LOW (ref 32–36)
MCV RBC AUTO: 79.7 FL — LOW (ref 80–94)
MONOCYTES # BLD AUTO: 0.9 K/UL — HIGH (ref 0–0.8)
MONOCYTES NFR BLD AUTO: 9 % — SIGNIFICANT CHANGE UP (ref 3–10)
MYELOCYTES NFR BLD: 1 % — HIGH (ref 0–0)
NEUTROPHILS # BLD AUTO: 7.9 K/UL — SIGNIFICANT CHANGE UP (ref 1.8–8)
NEUTROPHILS NFR BLD AUTO: 72 % — SIGNIFICANT CHANGE UP (ref 37–73)
NEUTS BAND # BLD: 4 % — SIGNIFICANT CHANGE UP (ref 0–8)
OVALOCYTES BLD QL SMEAR: SLIGHT — SIGNIFICANT CHANGE UP
PHOSPHATE SERPL-MCNC: 2.9 MG/DL — SIGNIFICANT CHANGE UP (ref 2.4–4.7)
PLAT MORPH BLD: NORMAL — SIGNIFICANT CHANGE UP
PLATELET # BLD AUTO: 350 K/UL — SIGNIFICANT CHANGE UP (ref 150–400)
POTASSIUM SERPL-MCNC: 4.7 MMOL/L — SIGNIFICANT CHANGE UP (ref 3.5–5.3)
POTASSIUM SERPL-SCNC: 4.7 MMOL/L — SIGNIFICANT CHANGE UP (ref 3.5–5.3)
PROT SERPL-MCNC: 6.6 G/DL — SIGNIFICANT CHANGE UP (ref 6.6–8.7)
RBC # BLD: 4.67 M/UL — SIGNIFICANT CHANGE UP (ref 4.6–6.2)
RBC # FLD: 17.4 % — HIGH (ref 11–15.6)
RBC BLD AUTO: ABNORMAL
SODIUM SERPL-SCNC: 136 MMOL/L — SIGNIFICANT CHANGE UP (ref 135–145)
SPECIMEN SOURCE: SIGNIFICANT CHANGE UP
SPECIMEN SOURCE: SIGNIFICANT CHANGE UP
WBC # BLD: 10.5 K/UL — SIGNIFICANT CHANGE UP (ref 4.8–10.8)
WBC # FLD AUTO: 10.5 K/UL — SIGNIFICANT CHANGE UP (ref 4.8–10.8)

## 2017-03-06 PROCEDURE — 99233 SBSQ HOSP IP/OBS HIGH 50: CPT | Mod: GC

## 2017-03-06 PROCEDURE — 71010: CPT | Mod: 26

## 2017-03-06 RX ORDER — PANTOPRAZOLE SODIUM 20 MG/1
40 TABLET, DELAYED RELEASE ORAL
Qty: 0 | Refills: 0 | Status: DISCONTINUED | OUTPATIENT
Start: 2017-03-06 | End: 2017-03-08

## 2017-03-06 RX ADMIN — Medication 250 MILLIGRAM(S): at 06:53

## 2017-03-06 RX ADMIN — PANTOPRAZOLE SODIUM 40 MILLIGRAM(S): 20 TABLET, DELAYED RELEASE ORAL at 13:34

## 2017-03-06 RX ADMIN — Medication 500 MILLIGRAM(S): at 13:34

## 2017-03-06 RX ADMIN — LACOSAMIDE 150 MILLIGRAM(S): 50 TABLET ORAL at 06:53

## 2017-03-06 RX ADMIN — Medication 500 MILLIGRAM(S): at 03:46

## 2017-03-06 RX ADMIN — Medication 1 SPRAY(S): at 13:34

## 2017-03-06 RX ADMIN — Medication 81 MILLIGRAM(S): at 13:35

## 2017-03-06 RX ADMIN — FINASTERIDE 5 MILLIGRAM(S): 5 TABLET, FILM COATED ORAL at 13:36

## 2017-03-06 RX ADMIN — LACOSAMIDE 150 MILLIGRAM(S): 50 TABLET ORAL at 18:50

## 2017-03-06 RX ADMIN — Medication 250 MILLIGRAM(S): at 18:50

## 2017-03-06 RX ADMIN — Medication 300 MILLIGRAM(S): at 15:35

## 2017-03-06 NOTE — CONSULT NOTE ADULT - PROBLEM SELECTOR RECOMMENDATION 9
packing done at bed side.  surgery will follow with wound care and further recommendation  continue care as per primary team

## 2017-03-06 NOTE — CONSULT NOTE ADULT - CONSULT REQUESTED DATE/TIME
01-Mar-2017 20:46
02-Mar-2017
02-Mar-2017 10:21
03-Mar-2017 14:35
06-Mar-2017 23:20
02-Mar-2017 10:37

## 2017-03-06 NOTE — CONSULT NOTE ADULT - ATTENDING COMMENTS
Thank you for the opportunity to assist with the care of this patient.   [place] [facility] Consult Service [phone].
Pt is a non toxic reconsult.  Original surgeon and operative note is necessary.  Will be evaluated by primary team in AM
84 yo male w/ dementia and multiple medical problems admitted to [facility] for possible sepsis.  Consulted for sacral decubitus ulcer.  Afebrile.  HD normal.  WBC: 14.1.  On exam patient has a DTI at the sacrum and there is evidence of a prior skin flap.  It appears there is some breakdown of the flap.  Would recommend offloading pressure, local wound care, air mattress and optimize nutrition.  Re-consult prn.

## 2017-03-06 NOTE — PROGRESS NOTE ADULT - PROBLEM SELECTOR PLAN 4
patient has baseline dementia and now likely some mental status changes due to infection - stable Normal EKG w/ elevated troponin x4, likely more demand ischemia   Cardiology ([place]) suggest demand ischemia - no further intervention at this time  -c/w asa 81 mg and simvastatin 20 mg QHS

## 2017-03-06 NOTE — ADVANCED PRACTICE NURSE CONSULT - ASSESSMENT
Pt is 86 y/o, non verbal, unable to follow verbal command, poor historian. Pt admitted with multiple wounds, unknown etiology of the wounds, possible trauma related vs.  possible pressure related. Pt had colostomy at abdominal left lower quadrant, positive pasty stool, jones bedside bag. Pt is placed on [place] bed for pressure injury/wound management. +3 edema noted at pt's bilateral foot.   1. Dry eschar noted at pt's left plantar surface of the foot, area non boggy to touch, intact skin, no erythema, non tender to touch. Eschar did not presented in bony prominence, may not be a pressure injury, possible vascular related skin change?   2. Pt has wounds noted at coccyx and sacrum area, pt's lower back, sacrum and coccyx with old surgical scars, coccyx wound measured [id number], sacrum wound measured [id number]. Coccyx wound was communicated with sacrum wound. 1cm to 10cm undermining noted from  9 o'clcok to 1 o'clock areas of the wounds, areas boggy to touch, weeping sanguinous exudate, wounds palpable to bones, possible wound deterioration from the wound surgical site, or possible mixed pressure related skin breakdown, wounds did not presented as typical pressure injury, since the wound openings are very small, unable to pack to wound due to the dimension of the wound openings. Possible osteomyelitis due to the depth and palpable of the bones at the wound base. Surgical team consulted for the wound, possible old surgical skin flap site.   3. Left medial knee cap with possible unstageable pressure injury vs possible trauma related wound, pt is very contracted, bilateral knees are bine to each other, RN staff was using the pillow to separate the bilateral knees.  90% wound bed with yellow moistured slough.   Please see all the detailed wound assessment on flow sheet.

## 2017-03-06 NOTE — PROGRESS NOTE ADULT - ASSESSMENT
85 year old male w/ PMH dementia, seizures, CVA w/ dysphagia/peg, phimosis with jones and colostomy who was BIBA from [facility] for fever of 102.1 admitted for severe sepsis. Patient is s/p 3 days of Vancomycin & 5 days of Meropenem Q8H now on ciprofloxin 500 mg Q12H (day 2) as urine culture positive for  Pseudomona aeruginosa (1 strain resistant to carbapenem). Blood cultures negative x2.  Sepsis resolved and patient responded well to antibiotics therapy.     Family is not the healthcare proxy and can not make any medical decision on patient's behalf. Awaiting decision regarding code status & plan of care from Health care proxy Van Monroy ([phone]) 85 year old male w/ PMH dementia, seizures, CVA w/ dysphagia/peg, phimosis with jones and colostomy who was BIBA from [facility] for fever of 102.1 admitted for severe sepsis. Patient is s/p 3 days of Vancomycin & 5 days of Meropenem Q8H now on ciprofloxin 500 mg Q12H (day 2) as urine culture positive for  Pseudomona aeruginosa (1 strain resistant to carbapenem). Blood cultures negative x2.  Sepsis resolved and patient responded well to antibiotics therapy. Patient was noted to have labored breathing this morning. CXR ordered.     Family is not the healthcare proxy and can not make any medical decision on patient's behalf. Awaiting decision regarding code status & plan of care from Health care proxy Van Monroy ([phone])

## 2017-03-06 NOTE — PROGRESS NOTE ADULT - PROBLEM SELECTOR PLAN 2
Normal EKG w/ elevated troponin x4, likely more demand ischemia   Cardiology ([place]) suggest demand ischemia - no further intervention at this time  -c/w asa 81 mg and simvastatin 20 mg QHS patient has baseline dementia and now likely some mental status changes due to infection - stable

## 2017-03-06 NOTE — ADVANCED PRACTICE NURSE CONSULT - REASON FOR CONSULT
Received wound care referral for multiple wounds over pt's body (left plantar surface of the foot, sacrum, coccyx, left medial knee).

## 2017-03-06 NOTE — CONSULT NOTE ADULT - SUBJECTIVE AND OBJECTIVE BOX
85 year old male w/ PMH dementia, seizure, stroke, dysphagia s/p PEG, phimosis w/  jones & colostomy for BIBA from [facility] for fever of 102.1. Patient was sent to [facility] to rule out sepsis.   Pt was consulted with surgery on 2 days ago.  Pt is non verbal so was not able to provide any history.  as per the nurse and family member at bed side, he had a sacral decubitus ulcer which was debribed and a wound VAC was placed. a Skin flap was placed over the ulcer around october.    OE:  pt has spontaneous eye opening but was not able to answer any questions   Chest: CTAB  CVS: s1s2  Abd: PEG tube in place  functioning colostomy bag.  Local examination  erythema over the sacral region with 2 small opeaning with active serosanguinous  discharge.   cavity present underneath the skin

## 2017-03-06 NOTE — PROGRESS NOTE ADULT - PROBLEM SELECTOR PLAN 7
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

## 2017-03-06 NOTE — PROGRESS NOTE ADULT - SUBJECTIVE AND OBJECTIVE BOX
cc: sent from facility for fevers     INTERVAL HPI/OVERNIGHT EVENTS:  Patient seen and examined at bedside, No acute overnight events. Patient is verbal but AAOx0. Patient non-ambulating, hydration & feeds via PEG & voiding via jones. Clinically improved since yesterday.    ROS: unable to obtain as patient is a poor historian    Allergies    clindamycin (Unknown)  Nuts (Hives; Rash)  PC Pen VK (Unknown)  strawberry (Short breath; Rash; Hives)  Xanax (Rash)    Intolerances    Ativan (Unknown)  Haldol (Dystonic RXN)  hydrALAZINE (Unknown)  Zyprexa (Unknown)    Vital Signs Last 24 Hrs  T(F): 98.2, Max: 98.4 (03-05 @ 12:33)  HR: 90 (79 - 92)  BP: 124/80 (124/80 - 150/78)  RR: 19 (16 - 19)  SpO2: 97% (96% - 97%)    Physical Exam:   GENERAL: NAD, malnourished, verbal but AAOx0    HEAD:  Atraumatic, Normocephalic  EYES: EOMI, PERRLA, conjunctiva and sclera clear, cataracts B/L  ENMT: dry mucous membranes, poor dentition   NERVOUS SYSTEM:  Awake, Alert & Oriented x0, contracted upper & lower extremities  CHEST/LUNG: Clear to percussion bilaterally; No rales, rhonchi, wheezing, or rubs  HEART: Regular rate and rhythm; No murmurs, rubs, or gallops  ABDOMEN: Soft, Nontender, Nondistended; Bowel sounds present, PEG tube & colostomy bag in place  GENITOURINARY: foreskin correctly covering glans penis with jones in place  EXTREMITIES: 2+ pitting edema, contracture of upper & lower extremities, No clubbing or cyanosis  LYMPH: No lymphadenopathy noted  SKIN: 1x2cm ulcer on left knee, unstageable left plantar healing ulcer, 2x2 cm stage 2 decubitus ulcer (possible higher stage with skin flap over region)     I&O's Summary  I & Os for 24h ending 05 Mar 2017 07:00  =============================================  IN: 1560 ml / OUT: 2750 ml / NET: -1190 ml    I & Os for current day (as of 06 Mar 2017 05:09)  =============================================  IN: 1660 ml / OUT: 2350 ml / NET: -690 ml    Labs:                                     9.5    6.3   )-----------( 383      ( 05 Mar 2017 07:26 )             31.1       05 Mar 2017 07:26    136    |  101    |  13.0   ----------------------------<  103    3.9     |  23.0   |  0.29     Ca    9.1        05 Mar 2017 07:26  Phos  2.6       04 Mar 2017 06:54  Mg     2.1       04 Mar 2017 06:54    TPro  6.2    /  Alb  2.8    /  TBili  0.2    /  DBili  x      /  AST  21     /  ALT  20     /  AlkPhos  78     04 Mar 2017 06:54    Troponin: 0.14, 0.14, 0.13, 0.14  Venous lactate: 2.2 => 1.2 (3/1/17)  Urine culture: > 100,000 Pseudomonas (1 strain is carbapenem resistant)   Blood culture: negative x2    Radiology:  Abdominal/Pelvis CT: There is subsegmental atelectasis at the lung bases.There are foci of gas and edema within the soft tissues overlying the   sacrum. Additionally there is presacral fluid with associated foci of gas. These findings may be post procedural, however infection of this region is not excluded.    MEDICATIONS  (STANDING):  finasteride 5milliGRAM(s) Oral daily  lacosamide 150milliGRAM(s) Oral two times a day  ferrous    sulfate 60 mG/mL Liquid 300milliGRAM(s) Enteral Tube daily  sodium chloride 0.65% Nasal 1Spray(s) Both Nostrils daily  saccharomyces boulardii 250milliGRAM(s) Oral two times a day  enoxaparin Injectable 40milliGRAM(s) SubCutaneous daily  simvastatin 20milliGRAM(s) Oral at bedtime  tamsulosin 0.4milliGRAM(s) Oral at bedtime  aspirin  chewable 81milliGRAM(s) Oral daily  ciprofloxacin     Tablet 500milliGRAM(s) Oral every 12 hours    MEDICATIONS  (PRN):  acetaminophen  Suppository 650milliGRAM(s) Rectal every 6 hours PRN For Temp greater than 38 C (100.4 F) cc: sent from facility for fevers     INTERVAL HPI/OVERNIGHT EVENTS:  Patient seen and examined at bedside, No acute overnight events. Patient is verbal but AAOx0. Patient non-ambulating, hydration & feeds via PEG & voiding via jones. Patient is noted to to be have labored breathing.      ROS: unable to obtain as patient is a poor historian    Allergies    clindamycin (Unknown)  Nuts (Hives; Rash)  PC Pen VK (Unknown)  strawberry (Short breath; Rash; Hives)  Xanax (Rash)    Intolerances    Ativan (Unknown)  Haldol (Dystonic RXN)  hydrALAZINE (Unknown)  Zyprexa (Unknown)    Vital Signs Last 24 Hrs  T(F): 98.2, Max: 98.4 (03-05 @ 12:33)  HR: 90 (79 - 92)  BP: 124/80 (124/80 - 150/78)  RR: 19 (16 - 19)  SpO2: 97% (96% - 97%)    Physical Exam:   GENERAL: mildly labored breathing, malnourished, verbal but AAOx0    HEAD:  Atraumatic, Normocephalic  EYES: EOMI, PERRLA, conjunctiva and sclera clear, cataracts B/L  ENMT: dry mucous membranes, poor dentition   NERVOUS SYSTEM:  Awake, Alert & Oriented x0, contracted upper & lower extremities  CHEST/LUNG: Clear to percussion bilaterally; No rales, rhonchi, wheezing, or rubs  HEART: Regular rate and rhythm; No murmurs, rubs, or gallops  ABDOMEN: Soft, Nontender, Nondistended; Bowel sounds present, PEG tube & colostomy bag in place  GENITOURINARY: foreskin correctly covering glans penis with jones in place  EXTREMITIES: 2+ pitting edema, contracture of upper & lower extremities, No clubbing or cyanosis  LYMPH: No lymphadenopathy noted  SKIN: 1x2cm ulcer on left knee, unstageable left plantar healing ulcer, 2x2 cm stage 2 decubitus ulcer (possible higher stage with skin flap over region)     I&O's Summary  I & Os for 24h ending 05 Mar 2017 07:00  =============================================  IN: 1560 ml / OUT: 2750 ml / NET: -1190 ml    I & Os for current day (as of 06 Mar 2017 05:09)  =============================================  IN: 1660 ml / OUT: 2350 ml / NET: -690 ml    Labs:                                     9.5    6.3   )-----------( 383      ( 05 Mar 2017 07:26 )             31.1       05 Mar 2017 07:26    136    |  101    |  13.0   ----------------------------<  103    3.9     |  23.0   |  0.29     Ca    9.1        05 Mar 2017 07:26  Phos  2.6       04 Mar 2017 06:54  Mg     2.1       04 Mar 2017 06:54    TPro  6.2    /  Alb  2.8    /  TBili  0.2    /  DBili  x      /  AST  21     /  ALT  20     /  AlkPhos  78     04 Mar 2017 06:54    Troponin: 0.14, 0.14, 0.13, 0.14  Venous lactate: 2.2 => 1.2 (3/1/17)  Urine culture: > 100,000 Pseudomonas (1 strain is carbapenem resistant)   Blood culture: negative x2    Radiology:  Abdominal/Pelvis CT: There is subsegmental atelectasis at the lung bases.There are foci of gas and edema within the soft tissues overlying the   sacrum. Additionally there is presacral fluid with associated foci of gas. These findings may be post procedural, however infection of this region is not excluded.    MEDICATIONS  (STANDING):  finasteride 5milliGRAM(s) Oral daily  lacosamide 150milliGRAM(s) Oral two times a day  ferrous    sulfate 60 mG/mL Liquid 300milliGRAM(s) Enteral Tube daily  sodium chloride 0.65% Nasal 1Spray(s) Both Nostrils daily  saccharomyces boulardii 250milliGRAM(s) Oral two times a day  enoxaparin Injectable 40milliGRAM(s) SubCutaneous daily  simvastatin 20milliGRAM(s) Oral at bedtime  tamsulosin 0.4milliGRAM(s) Oral at bedtime  aspirin  chewable 81milliGRAM(s) Oral daily  ciprofloxacin     Tablet 500milliGRAM(s) Oral every 12 hours    MEDICATIONS  (PRN):  acetaminophen  Suppository 650milliGRAM(s) Rectal every 6 hours PRN For Temp greater than 38 C (100.4 F)

## 2017-03-07 LAB
ANION GAP SERPL CALC-SCNC: 12 MMOL/L — SIGNIFICANT CHANGE UP (ref 5–17)
BASOPHILS # BLD AUTO: 0 K/UL — SIGNIFICANT CHANGE UP (ref 0–0.2)
BASOPHILS NFR BLD AUTO: 0.2 % — SIGNIFICANT CHANGE UP (ref 0–2)
BUN SERPL-MCNC: 15 MG/DL — SIGNIFICANT CHANGE UP (ref 8–20)
CALCIUM SERPL-MCNC: 9.3 MG/DL — SIGNIFICANT CHANGE UP (ref 8.6–10.2)
CHLORIDE SERPL-SCNC: 100 MMOL/L — SIGNIFICANT CHANGE UP (ref 98–107)
CO2 SERPL-SCNC: 24 MMOL/L — SIGNIFICANT CHANGE UP (ref 22–29)
CREAT SERPL-MCNC: 0.34 MG/DL — LOW (ref 0.5–1.3)
EOSINOPHIL # BLD AUTO: 0.2 K/UL — SIGNIFICANT CHANGE UP (ref 0–0.5)
EOSINOPHIL NFR BLD AUTO: 2.1 % — SIGNIFICANT CHANGE UP (ref 0–5)
GLUCOSE SERPL-MCNC: 92 MG/DL — SIGNIFICANT CHANGE UP (ref 70–115)
HCT VFR BLD CALC: 30.4 % — LOW (ref 42–52)
HGB BLD-MCNC: 9.4 G/DL — LOW (ref 14–18)
LYMPHOCYTES # BLD AUTO: 1.3 K/UL — SIGNIFICANT CHANGE UP (ref 1–4.8)
LYMPHOCYTES # BLD AUTO: 15.1 % — LOW (ref 20–55)
MCHC RBC-ENTMCNC: 24.7 PG — LOW (ref 27–31)
MCHC RBC-ENTMCNC: 30.9 G/DL — LOW (ref 32–36)
MCV RBC AUTO: 79.8 FL — LOW (ref 80–94)
MONOCYTES # BLD AUTO: 0.8 K/UL — SIGNIFICANT CHANGE UP (ref 0–0.8)
MONOCYTES NFR BLD AUTO: 9.6 % — SIGNIFICANT CHANGE UP (ref 3–10)
NEUTROPHILS # BLD AUTO: 6.2 K/UL — SIGNIFICANT CHANGE UP (ref 1.8–8)
NEUTROPHILS NFR BLD AUTO: 71.2 % — SIGNIFICANT CHANGE UP (ref 37–73)
PLATELET # BLD AUTO: 426 K/UL — HIGH (ref 150–400)
POTASSIUM SERPL-MCNC: 4.1 MMOL/L — SIGNIFICANT CHANGE UP (ref 3.5–5.3)
POTASSIUM SERPL-SCNC: 4.1 MMOL/L — SIGNIFICANT CHANGE UP (ref 3.5–5.3)
RBC # BLD: 3.81 M/UL — LOW (ref 4.6–6.2)
RBC # FLD: 17.2 % — HIGH (ref 11–15.6)
SODIUM SERPL-SCNC: 136 MMOL/L — SIGNIFICANT CHANGE UP (ref 135–145)
WBC # BLD: 8.8 K/UL — SIGNIFICANT CHANGE UP (ref 4.8–10.8)
WBC # FLD AUTO: 8.8 K/UL — SIGNIFICANT CHANGE UP (ref 4.8–10.8)

## 2017-03-07 PROCEDURE — 99233 SBSQ HOSP IP/OBS HIGH 50: CPT | Mod: GC

## 2017-03-07 RX ORDER — CIPROFLOXACIN LACTATE 400MG/40ML
1 VIAL (ML) INTRAVENOUS
Qty: 0 | Refills: 0 | COMMUNITY
Start: 2017-03-07 | End: 2017-03-18

## 2017-03-07 RX ORDER — SACCHAROMYCES BOULARDII 250 MG
1 POWDER IN PACKET (EA) ORAL
Qty: 0 | Refills: 0 | COMMUNITY
Start: 2017-03-07

## 2017-03-07 RX ORDER — ASPIRIN/CALCIUM CARB/MAGNESIUM 324 MG
1 TABLET ORAL
Qty: 0 | Refills: 0 | COMMUNITY
Start: 2017-03-07

## 2017-03-07 RX ORDER — SIMVASTATIN 20 MG/1
1 TABLET, FILM COATED ORAL
Qty: 0 | Refills: 0 | COMMUNITY
Start: 2017-03-07

## 2017-03-07 RX ORDER — ASPIRIN/CALCIUM CARB/MAGNESIUM 325 MG
81 TABLET ORAL
Qty: 0 | Refills: 0 | COMMUNITY

## 2017-03-07 RX ORDER — LACTULOSE 10 G/15ML
10 SOLUTION ORAL
Qty: 0 | Refills: 0 | COMMUNITY

## 2017-03-07 RX ADMIN — FINASTERIDE 5 MILLIGRAM(S): 5 TABLET, FILM COATED ORAL at 12:23

## 2017-03-07 RX ADMIN — Medication 300 MILLIGRAM(S): at 12:23

## 2017-03-07 RX ADMIN — SIMVASTATIN 20 MILLIGRAM(S): 20 TABLET, FILM COATED ORAL at 00:02

## 2017-03-07 RX ADMIN — LACOSAMIDE 150 MILLIGRAM(S): 50 TABLET ORAL at 17:54

## 2017-03-07 RX ADMIN — PANTOPRAZOLE SODIUM 40 MILLIGRAM(S): 20 TABLET, DELAYED RELEASE ORAL at 06:19

## 2017-03-07 RX ADMIN — TAMSULOSIN HYDROCHLORIDE 0.4 MILLIGRAM(S): 0.4 CAPSULE ORAL at 23:51

## 2017-03-07 RX ADMIN — ENOXAPARIN SODIUM 40 MILLIGRAM(S): 100 INJECTION SUBCUTANEOUS at 23:51

## 2017-03-07 RX ADMIN — Medication 250 MILLIGRAM(S): at 17:54

## 2017-03-07 RX ADMIN — Medication 250 MILLIGRAM(S): at 06:19

## 2017-03-07 RX ADMIN — Medication 81 MILLIGRAM(S): at 12:23

## 2017-03-07 RX ADMIN — Medication 500 MILLIGRAM(S): at 23:51

## 2017-03-07 RX ADMIN — TAMSULOSIN HYDROCHLORIDE 0.4 MILLIGRAM(S): 0.4 CAPSULE ORAL at 00:02

## 2017-03-07 RX ADMIN — Medication 500 MILLIGRAM(S): at 12:23

## 2017-03-07 RX ADMIN — Medication 1 SPRAY(S): at 12:23

## 2017-03-07 RX ADMIN — ENOXAPARIN SODIUM 40 MILLIGRAM(S): 100 INJECTION SUBCUTANEOUS at 00:02

## 2017-03-07 RX ADMIN — SIMVASTATIN 20 MILLIGRAM(S): 20 TABLET, FILM COATED ORAL at 23:51

## 2017-03-07 RX ADMIN — Medication 500 MILLIGRAM(S): at 00:02

## 2017-03-07 RX ADMIN — LACOSAMIDE 150 MILLIGRAM(S): 50 TABLET ORAL at 06:19

## 2017-03-07 NOTE — PROGRESS NOTE ADULT - PROBLEM SELECTOR PLAN 7
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

## 2017-03-07 NOTE — DISCHARGE NOTE ADULT - PATIENT PORTAL LINK FT
“You can access the [facility] Patient Portal, offered by [facility], by registering with the following website: [email or web address]”

## 2017-03-07 NOTE — DISCHARGE NOTE ADULT - CARE PROVIDER_API CALL
Dr. Yuriy Carlos   [phone]  Phone: (   )    -  Fax: (   )    - Yuriy Carlos Dr.   [phone]  Phone: (   )    -  Fax: (   )    -

## 2017-03-07 NOTE — PROGRESS NOTE ADULT - ASSESSMENT
85 year old male w/ PMH dementia, seizures, CVA w/ dysphagia/peg, phimosis with jones and colostomy who was BIBA from [facility] for fever of 102.1 admitted for severe sepsis. Patient is s/p 3 days of Vancomycin & 5 days of Meropenem Q8H now on ciprofloxin 500 mg Q12H (day 3) as urine culture positive for  Pseudomona aeruginosa (1 strain resistant to carbapenem). Blood cultures negative x2.  Sepsis resolved and patient responded well to antibiotics therapy.      Family is not the healthcare proxy and can not make any medical decision on patient's behalf. Awaiting decision regarding code status & plan of care from Health care proxy Van Monroy ([phone])

## 2017-03-07 NOTE — DISCHARGE NOTE ADULT - CARE PLAN
Principal Discharge DX:	Sepsis  Goal:	resolved  Instructions for follow-up, activity and diet:	follow up with primary care physician  take PO ciprofloxin 500 mg  Secondary Diagnosis:	Anginal pain  Goal:	resolved  Instructions for follow-up, activity and diet:	follow up with primary care physician  take aspirin 71 mg & simvastatin 20 mg QHS  Secondary Diagnosis:	Dehydration  Goal:	resolved  Instructions for follow-up, activity and diet:	Hydration with free water 250 ml Q6H via PEG  follow up with primary care physician  Secondary Diagnosis:	Encephalopathy  Goal:	Back at baseline  Instructions for follow-up, activity and diet:	follow up with primary care physician  Secondary Diagnosis:	Anasarca  Goal:	stable  Instructions for follow-up, activity and diet:	continue w/ Jevity feeds via PEG  follow up with primary care physician  Secondary Diagnosis:	Seizure  Goal:	stable  Instructions for follow-up, activity and diet:	continue w/ vimpat 150 mg BID  follow up with primary care physician  Secondary Diagnosis:	Phimosis  Goal:	stable  Instructions for follow-up, activity and diet:	careful attention should be paid by nursing staff and techs to ensure that the glans is not exposed, indicating that the foreskin is retracted. Baugh should be changed monthly. Principal Discharge DX:	Sepsis  Goal:	resolved  Instructions for follow-up, activity and diet:	take oral ciprofloxin 500 mg for 11 more day  follow up with primary care physician  Secondary Diagnosis:	Anginal pain  Goal:	resolved  Instructions for follow-up, activity and diet:	follow up with primary care physician  take aspirin 71 mg & simvastatin 20 mg QHS  Secondary Diagnosis:	Dehydration  Goal:	resolved  Instructions for follow-up, activity and diet:	Hydration with free water 250 ml Q6H via PEG  follow up with primary care physician  Secondary Diagnosis:	Encephalopathy  Goal:	Back at baseline  Instructions for follow-up, activity and diet:	follow up with primary care physician  Secondary Diagnosis:	Anasarca  Goal:	stable  Instructions for follow-up, activity and diet:	continue w/ Jevity feeds via PEG  follow up with primary care physician  Secondary Diagnosis:	Seizure  Goal:	stable  Instructions for follow-up, activity and diet:	continue w/ vimpat 150 mg BID  follow up with primary care physician  Secondary Diagnosis:	Phimosis  Goal:	stable  Instructions for follow-up, activity and diet:	careful attention should be paid by nursing staff and techs to ensure that the glans is not exposed, indicating that the foreskin is retracted. Baugh should be changed monthly. Principal Discharge DX:	Sepsis  Goal:	resolved  Instructions for follow-up, activity and diet:	take oral ciprofloxin 500 mg for 11 more days (end on 3/18/17)  follow up with primary care physician  Secondary Diagnosis:	Anginal pain  Goal:	resolved  Instructions for follow-up, activity and diet:	follow up with primary care physician  take aspirin 71 mg & simvastatin 20 mg QHS  Secondary Diagnosis:	Dehydration  Goal:	resolved  Instructions for follow-up, activity and diet:	Hydration with free water 250 ml Q6H via PEG  follow up with primary care physician  Secondary Diagnosis:	Encephalopathy  Goal:	Back at baseline  Instructions for follow-up, activity and diet:	follow up with primary care physician  Secondary Diagnosis:	Anasarca  Goal:	stable  Instructions for follow-up, activity and diet:	continue w/ Jevity feeds via PEG  follow up with primary care physician  Secondary Diagnosis:	Seizure  Goal:	stable  Instructions for follow-up, activity and diet:	continue w/ vimpat 150 mg BID  follow up with primary care physician  Secondary Diagnosis:	Phimosis  Goal:	stable  Instructions for follow-up, activity and diet:	careful attention should be paid by nursing staff and techs to ensure that the glans is not exposed, indicating that the foreskin is retracted. Baugh should be changed monthly. Principal Discharge DX:	Sepsis  Goal:	resolved  Instructions for follow-up, activity and diet:	take oral ciprofloxin 500 mg for 10 more days (end on 3/18/17)  follow up with primary care physician  Secondary Diagnosis:	Anginal pain  Goal:	resolved  Instructions for follow-up, activity and diet:	follow up with primary care physician  take aspirin 71 mg & simvastatin 20 mg QHS  Secondary Diagnosis:	Dehydration  Goal:	resolved  Instructions for follow-up, activity and diet:	Hydration with free water 250 ml Q6H via PEG  follow up with primary care physician  Secondary Diagnosis:	Encephalopathy  Goal:	Back at baseline  Instructions for follow-up, activity and diet:	follow up with primary care physician  Secondary Diagnosis:	Anasarca  Goal:	stable  Instructions for follow-up, activity and diet:	continue w/ Jevity feeds via PEG  follow up with primary care physician  Secondary Diagnosis:	Seizure  Goal:	stable  Instructions for follow-up, activity and diet:	continue w/ vimpat 150 mg BID  follow up with primary care physician  Secondary Diagnosis:	Phimosis  Goal:	stable  Instructions for follow-up, activity and diet:	careful attention should be paid by nursing staff and techs to ensure that the glans is not exposed, indicating that the foreskin is retracted. Baugh should be changed monthly. Principal Discharge DX:	Sepsis  Goal:	sepsis 2/2 to indwelling Baugh catheter; resolved  Instructions for follow-up, activity and diet:	take oral ciprofloxin 500 mg for 10 more days (end on 3/18/17)  follow up with primary care physician  Baugh should be changed monthly.  Secondary Diagnosis:	Anginal pain  Goal:	resolved  Instructions for follow-up, activity and diet:	follow up with primary care physician  take aspirin 71 mg & simvastatin 20 mg QHS  Secondary Diagnosis:	Dehydration  Goal:	resolved  Instructions for follow-up, activity and diet:	Hydration with free water 250 ml Q6H via PEG  follow up with primary care physician  Secondary Diagnosis:	Encephalopathy  Goal:	Back at baseline  Instructions for follow-up, activity and diet:	follow up with primary care physician  Secondary Diagnosis:	Anasarca  Goal:	stable  Instructions for follow-up, activity and diet:	continue w/ Jevity feeds via PEG  follow up with primary care physician  Secondary Diagnosis:	Seizure  Goal:	stable  Instructions for follow-up, activity and diet:	continue w/ vimpat 150 mg BID  follow up with primary care physician  Secondary Diagnosis:	Phimosis  Goal:	stable  Instructions for follow-up, activity and diet:	careful attention should be paid by nursing staff and techs to ensure that the glans is not exposed, indicating that the foreskin is retracted. Baugh should be changed monthly.

## 2017-03-07 NOTE — DISCHARGE NOTE ADULT - NS AS ACTIVITY OBS
No Heavy lifting/straining/Do not make important decisions/Do not drive or operate machinery/Bathing allowed

## 2017-03-07 NOTE — DISCHARGE NOTE ADULT - MEDICATION SUMMARY - MEDICATIONS TO STOP TAKING
I will STOP taking the medications listed below when I get home from the hospital:    tamsulosin 0.4 mg oral capsule  -- 1 cap(s) by mouth once a day (at bedtime)    gabapentin 100 mg oral capsule  -- 1 cap(s) by mouth every 6 hours, As needed, agitation    melatonin 3 mg oral tablet  -- 1 tab(s) by mouth once a day    amLODIPine 10 mg oral tablet  -- 1 tab(s) by mouth once a day    furosemide 40 mg oral tablet  -- 1 tab(s) by mouth once a day    Depakote 500 mg oral delayed release tablet  --  by mouth 2 times a day    lactulose 10 g/15 mL oral syrup  -- 10 gram(s) by gastrostomy tube 2 times a day    amLODIPine 10 mg oral tablet  -- 1 tab(s) by gastrostomy tube once a day  -- crushed I will STOP taking the medications listed below when I get home from the hospital:    enalapril 20 mg oral tablet  -- 1 tab(s) by mouth 2 times a day    tamsulosin 0.4 mg oral capsule  -- 1 cap(s) by mouth once a day (at bedtime)    gabapentin 100 mg oral capsule  -- 1 cap(s) by mouth every 6 hours, As needed, agitation    melatonin 3 mg oral tablet  -- 1 tab(s) by mouth once a day    metoprolol tartrate 25 mg oral tablet  -- 1 tab(s) by mouth 2 times a day    amLODIPine 10 mg oral tablet  -- 1 tab(s) by mouth once a day    furosemide 40 mg oral tablet  -- 1 tab(s) by mouth once a day    Depakote 500 mg oral delayed release tablet  --  by mouth 2 times a day    lactulose 10 g/15 mL oral syrup  -- 10 gram(s) by gastrostomy tube 2 times a day    amLODIPine 10 mg oral tablet  -- 1 tab(s) by gastrostomy tube once a day  -- crushed    enalapril 20 mg oral tablet  -- 1 tab(s) by gastrostomy tube 2 times a day  -- crushed    metoprolol tartrate 25 mg oral tablet  -- 1 tab(s) by gastrostomy tube 2 times a day  -- crushed

## 2017-03-07 NOTE — DISCHARGE NOTE ADULT - MEDICATION SUMMARY - MEDICATIONS TO CHANGE
I will SWITCH the dose or number of times a day I take the medications listed below when I get home from the hospital:    enalapril 20 mg oral tablet  -- 1 tab(s) by mouth 2 times a day    metoprolol tartrate 25 mg oral tablet  -- 1 tab(s) by mouth 2 times a day    enalapril 20 mg oral tablet  -- 1 tab(s) by gastrostomy tube 2 times a day  -- crushed    metoprolol tartrate 25 mg oral tablet  -- 1 tab(s) by gastrostomy tube 2 times a day  -- crushed I will SWITCH the dose or number of times a day I take the medications listed below when I get home from the hospital:  None

## 2017-03-07 NOTE — DISCHARGE NOTE ADULT - MEDICATION SUMMARY - MEDICATIONS TO TAKE
I will START or STAY ON the medications listed below when I get home from the hospital:    finasteride 5 mg oral tablet  -- 1 tab(s) by gastrostomy tube once a day  -- crushed  -- Indication: For BPH    aspirin 81 mg oral tablet, chewable  -- 1 tab(s) by mouth once a day  -- Indication: For Stroke    enalapril 5 mg oral tablet  -- 1 tab(s) by gastrostomy tube once a day  -- Indication: For Hypertension    tamsulosin 0.4 mg oral capsule  -- 1 cap(s) by gastrostomy tube once a day (at bedtime)  -- crushed  -- Indication: For BPH    Vimpat 150 mg oral tablet  -- 1 tab(s) by gastrostomy tube 2 times a day  -- crushed  -- Indication: For Seizure    simvastatin 20 mg oral tablet  -- 1 tab(s) by mouth once a day (at bedtime)  -- Indication: For Hyperlipemia     metoprolol tartrate  -- 12.5 milligram(s) by gastrostomy tube 2 times a day  -- Indication: For Hypertension    albuterol 2.5 mg/3 mL (0.083%) inhalation solution  -- 3 milliliter(s) inhaled every 6 hours, As Needed  -- Indication: For Dysphagia    ferrous sulfate 300 mg/5 mL (60 mg elemental iron) oral liquid  -- 5 milliliter(s) by gastrostomy tube once a day  -- Indication: For Anemia, chronic disease    Saline Nasal Mist  -- 1 dose(s) in each nostril once a day  -- Indication: For Congestion     saccharomyces boulardii lyo 250 mg oral capsule  -- 1 cap(s) by mouth 2 times a day  -- Indication: For Probiotics    pantoprazole 40 mg oral granule, enteric coated  -- 1 each by gastrostomy tube once a day  -- Indication: For GERD    ciprofloxacin 500 mg oral tablet  -- 1 tab(s) by mouth every 12 hours  -- Indication: For Urinary tract infection associated with catheterization of urinary tract, unspecified indwelling urinary catheter type, subsequent encounter I will START or STAY ON the medications listed below when I get home from the hospital:    finasteride 5 mg oral tablet  -- 1 tab(s) by gastrostomy tube once a day  -- crushed  -- Indication: For BPH    aspirin 81 mg oral tablet, chewable  -- 1 tab(s) by mouth once a day  -- Indication: For Stroke    enalapril 5 mg oral tablet  -- 1 tab(s) by gastrostomy tube once a day  -- Indication: For Hypertension    tamsulosin 0.4 mg oral capsule  -- 1 cap(s) by gastrostomy tube once a day (at bedtime)  -- crushed  -- Indication: For BPH    Vimpat 150 mg oral tablet  -- 1 tab(s) by gastrostomy tube 2 times a day  -- crushed  -- Indication: For Seizure    simvastatin 20 mg oral tablet  -- 1 tab(s) by mouth once a day (at bedtime)  -- Indication: For Hyperlipemia     metoprolol tartrate  -- 12.5 milligram(s) by gastrostomy tube 2 times a day  -- Indication: For Hypertension    albuterol 2.5 mg/3 mL (0.083%) inhalation solution  -- 3 milliliter(s) inhaled every 6 hours, As Needed  -- Indication: For Dysphagia    ferrous sulfate 300 mg/5 mL (60 mg elemental iron) oral liquid  -- 5 milliliter(s) by gastrostomy tube once a day  -- Indication: For Anemia, chronic disease    Saline Nasal Mist  -- 1 dose(s) in each nostril once a day  -- Indication: For Congestion     saccharomyces boulardii lyo 250 mg oral capsule  -- 1 cap(s) by mouth 2 times a day  -- Indication: For Probiotics    pantoprazole 40 mg oral granule, enteric coated  -- 1 each by gastrostomy tube once a day  -- Indication: For GERD    ciprofloxacin 500 mg oral tablet  -- 1 tab(s) by mouth every 12 hours  -- (end on 3/18/17)  -- Indication: For Urinary tract infection associated with catheterization of urinary tract, unspecified indwelling urinary catheter type, subsequent encounter I will START or STAY ON the medications listed below when I get home from the hospital:    finasteride 5 mg oral tablet  -- 1 tab(s) by gastrostomy tube once a day  -- crushed  -- Indication: For BPH    aspirin 81 mg oral tablet, chewable  -- 1 tab(s) by gastrostomy tube once a day  -- Indication: For Prophyslactic measure    enalapril 5 mg oral tablet  -- 1 tab(s) by gastrostomy tube once a day  -- Indication: For Hypertension    tamsulosin 0.4 mg oral capsule  -- 1 cap(s) by gastrostomy tube once a day (at bedtime)  -- crushed  -- Indication: For BPH    Vimpat 150 mg oral tablet  -- 1 tab(s) by gastrostomy tube 2 times a day  -- crushed  -- Indication: For Seizure    simvastatin 20 mg oral tablet  -- 1 tab(s) by mouth once a day (at bedtime)  -- Indication: For Hyperlipemia     metoprolol tartrate  -- 12.5 milligram(s) by gastrostomy tube 2 times a day  -- Indication: For Hypertension    albuterol 2.5 mg/3 mL (0.083%) inhalation solution  -- 3 milliliter(s) inhaled every 6 hours, As Needed  -- Indication: For Dysphagia    ferrous sulfate 300 mg/5 mL (60 mg elemental iron) oral liquid  -- 5 milliliter(s) by gastrostomy tube once a day  -- Indication: For Anemia, chronic disease    Saline Nasal Mist  -- 1 dose(s) in each nostril once a day  -- Indication: For Congestion     saccharomyces boulardii lyo 250 mg oral capsule  -- 1 cap(s) by mouth 2 times a day  -- Indication: For Probiotics    pantoprazole 40 mg oral granule, enteric coated  -- 1 each by gastrostomy tube once a day  -- Indication: For GERD    ciprofloxacin 500 mg oral tablet  -- 1 tab(s) by mouth every 12 hours  -- (end on 3/18/17)  -- Indication: For Urinary tract infection associated with catheterization of urinary tract, unspecified indwelling urinary catheter type, subsequent encounter I will START or STAY ON the medications listed below when I get home from the hospital:    finasteride 5 mg oral tablet  -- 1 tab(s) by gastrostomy tube once a day  -- crushed  -- Indication: For BPH    aspirin 81 mg oral tablet, chewable  -- 1 tab(s) by gastrostomy tube once a day  -- Indication: For Prophyslactic measure    tamsulosin 0.4 mg oral capsule  -- 1 cap(s) by gastrostomy tube once a day (at bedtime)  -- crushed  -- Indication: For BPH    Vimpat 150 mg oral tablet  -- 1 tab(s) by gastrostomy tube 2 times a day  -- crushed  -- Indication: For Seizure    simvastatin 20 mg oral tablet  -- 1 tab(s) by mouth once a day (at bedtime)  -- Indication: For Hyperlipemia     albuterol 2.5 mg/3 mL (0.083%) inhalation solution  -- 3 milliliter(s) inhaled every 6 hours, As Needed  -- Indication: For Dysphagia    ferrous sulfate 300 mg/5 mL (60 mg elemental iron) oral liquid  -- 5 milliliter(s) by gastrostomy tube once a day  -- Indication: For Anemia, chronic disease    Saline Nasal Mist  -- 1 dose(s) in each nostril once a day  -- Indication: For Congestion     saccharomyces boulardii lyo 250 mg oral capsule  -- 1 cap(s) by mouth 2 times a day  -- Indication: For Probiotics    pantoprazole 40 mg oral granule, enteric coated  -- 1 each by gastrostomy tube once a day  -- Indication: For GERD    ciprofloxacin 500 mg oral tablet  -- 1 tab(s) by mouth every 12 hours  -- (end on 3/18/17)  -- Indication: For Urinary tract infection associated with catheterization of urinary tract, unspecified indwelling urinary catheter type, subsequent encounter

## 2017-03-07 NOTE — DISCHARGE NOTE ADULT - PLAN OF CARE
resolved Back at baseline stable careful attention should be paid by nursing staff and techs to ensure that the glans is not exposed, indicating that the foreskin is retracted. Baugh should be changed monthly. continue w/ vimpat 150 mg BID  follow up with primary care physician continue w/ Jevity feeds via PEG  follow up with primary care physician Hydration with free water 250 ml Q6H via PEG  follow up with primary care physician follow up with primary care physician follow up with primary care physician  take aspirin 71 mg & simvastatin 20 mg QHS follow up with primary care physician  take PO ciprofloxin 500 mg take oral ciprofloxin 500 mg for 11 more day  follow up with primary care physician take oral ciprofloxin 500 mg for 11 more days (end on 3/18/17)  follow up with primary care physician take oral ciprofloxin 500 mg for 10 more days (end on 3/18/17)  follow up with primary care physician sepsis 2/2 to indwelling Baugh catheter; resolved take oral ciprofloxin 500 mg for 10 more days (end on 3/18/17)  follow up with primary care physician  Baugh should be changed monthly.

## 2017-03-07 NOTE — PROGRESS NOTE ADULT - PROBLEM SELECTOR PLAN 2
Normal EKG w/ elevated troponin x4, likely more demand ischemia   Cardiology ([place]) suggest demand ischemia - no further intervention at this time  -c/w asa 81 mg and simvastatin 20 mg QHS

## 2017-03-07 NOTE — PROGRESS NOTE ADULT - PROBLEM SELECTOR PLAN 1
sepsis w/ multiple sources (Baugh more likely) which is resolved  Patient is afebrile, BP stable and appear clinically better.   Urine culture with Pseudomonas aeruginosa (1 strain resistant to carbapenem)  Blood cultures negative x2   s/p 3 days of Vancomycin & 5 days of Meropenem Q8H now on ciprofloxin 500 mg PEG Q12H (day 3) as urine culture positive for  Pseudomona aeruginosa (1 strain resistant to carbapenem).

## 2017-03-07 NOTE — PROGRESS NOTE ADULT - SUBJECTIVE AND OBJECTIVE BOX
cc: sent from facility for fevers     INTERVAL HPI/OVERNIGHT EVENTS:  Patient seen and examined at bedside, No acute overnight events. Patient is verbal but AAOx0. Patient non-ambulating, hydration & feeds via PEG & voiding via jones.     ROS: unable to obtain as patient is a poor historian    Allergies    clindamycin (Unknown)  Nuts (Hives; Rash)  PC Pen VK (Unknown)  strawberry (Short breath; Rash; Hives)  Xanax (Rash)    Intolerances    Ativan (Unknown)  Haldol (Dystonic RXN)  hydrALAZINE (Unknown)  Zyprexa (Unknown)    Vital Signs Last 24 Hrs  T(F): 99.1, Max: 99.1 (03-06 @ 23:53)  HR: 90 (84 - 92)  BP: 100/70 (100/70 - 116/60)  RR: 17 (16 - 20)  SpO2: 99% (96% - 99%)    Physical Exam:   GENERAL: NAD, malnourished, verbal but AAOx0    HEAD:  Atraumatic, Normocephalic  EYES: EOMI, PERRLA, conjunctiva and sclera clear, cataracts B/L  ENMT: dry mucous membranes, poor dentition   NERVOUS SYSTEM:  Awake, Alert & Oriented x0, contracted upper & lower extremities  CHEST/LUNG: Clear to percussion bilaterally; No rales, rhonchi, wheezing, or rubs  HEART: Regular rate and rhythm; No murmurs, rubs, or gallops  ABDOMEN: Soft, Nontender, Nondistended; Bowel sounds present, PEG tube & colostomy bag in place  GENITOURINARY: foreskin correctly covering glans penis with jones in place  EXTREMITIES: 2+ pitting edema, contracture of upper & lower extremities, No clubbing or cyanosis  LYMPH: No lymphadenopathy noted  SKIN: 1x2cm ulcer on left knee, unstageable left plantar healing ulcer, 2x2 cm stage 2 decubitus ulcer (possible higher stage with skin flap over region)     I&O's Summary  I & Os for 24h ending 06 Mar 2017 07:00  =============================================  IN: 2610 ml / OUT: 3000 ml / NET: -390 ml    I & Os for current day (as of 07 Mar 2017 05:15)  =============================================  IN: 1605 ml / OUT: 580 ml / NET: 1025 ml      Labs:                                     11.5   10.5  )-----------( 350      ( 06 Mar 2017 07:00 )             37.2     06 Mar 2017 07:00    136    |  98     |  15.0   ----------------------------<  96     4.7     |  25.0   |  0.32     Ca    9.4        06 Mar 2017 07:00  Phos  2.9       06 Mar 2017 07:00  Mg     2.1       06 Mar 2017 07:00    TPro  6.6    /  Alb  2.8    /  TBili  0.3    /  DBili  x      /  AST  27     /  ALT  23     /  AlkPhos  106    06 Mar 2017 07:00      Troponin: 0.14, 0.14, 0.13, 0.14  Venous lactate: 2.2 => 1.2 (3/1/17)  Urine culture: > 100,000 Pseudomonas (1 strain is carbapenem resistant)   Blood culture: negative x2    Radiology:  Abdominal/Pelvis CT: There is subsegmental atelectasis at the lung bases.There are foci of gas and edema within the soft tissues overlying the   sacrum. Additionally there is presacral fluid with associated foci of gas. These findings may be post procedural, however infection of this region is not excluded.    MEDICATIONS  (STANDING):  finasteride 5milliGRAM(s) Oral daily  lacosamide 150milliGRAM(s) Oral two times a day  ferrous    sulfate 60 mG/mL Liquid 300milliGRAM(s) Enteral Tube daily  sodium chloride 0.65% Nasal 1Spray(s) Both Nostrils daily  saccharomyces boulardii 250milliGRAM(s) Oral two times a day  enoxaparin Injectable 40milliGRAM(s) SubCutaneous daily  simvastatin 20milliGRAM(s) Oral at bedtime  tamsulosin 0.4milliGRAM(s) Oral at bedtime  aspirin  chewable 81milliGRAM(s) Oral daily  ciprofloxacin     Tablet 500milliGRAM(s) Oral every 12 hours  pantoprazole   Suspension 40milliGRAM(s) Oral before breakfast    MEDICATIONS  (PRN):  acetaminophen  Suppository 650milliGRAM(s) Rectal every 6 hours PRN For Temp greater than 38 C (100.4 F) cc: sent from facility for fevers     INTERVAL HPI/OVERNIGHT EVENTS:  Patient seen and examined at bedside, No acute overnight events. Patient is verbal but AAOx0. Patient non-ambulating, hydration & feeds via PEG & voiding via jones.     ROS: unable to obtain as patient is a poor historian    Allergies    clindamycin (Unknown)  Nuts (Hives; Rash)  PC Pen VK (Unknown)  strawberry (Short breath; Rash; Hives)  Xanax (Rash)    Intolerances    Ativan (Unknown)  Haldol (Dystonic RXN)  hydrALAZINE (Unknown)  Zyprexa (Unknown)    Vital Signs Last 24 Hrs  T(F): 98.9, Max: 99.1 (03-06 @ 23:53)  HR: 95 (84 - 95)  BP: 122/60 (100/70 - 122/60)  RR: 18 (16 - 20)  SpO2: 96% (96% - 99%)    Physical Exam:   GENERAL: NAD, malnourished, verbal but AAOx0    HEAD:  Atraumatic, Normocephalic  EYES: EOMI, PERRLA, conjunctiva and sclera clear, cataracts B/L  ENMT: dry mucous membranes, poor dentition   NERVOUS SYSTEM:  Awake, Alert & Oriented x0, contracted upper & lower extremities  CHEST/LUNG: Clear to percussion bilaterally; No rales, rhonchi, wheezing, or rubs  HEART: Regular rate and rhythm; No murmurs, rubs, or gallops  ABDOMEN: Soft, Nontender, Nondistended; Bowel sounds present, PEG tube & colostomy bag in place  GENITOURINARY: foreskin correctly covering glans penis with jones in place draining yellow urine  EXTREMITIES: 2+ pitting edema, contracture of upper & lower extremities, No clubbing or cyanosis  SKIN: 1x2cm ulcer on left knee, unstageable left plantar healing ulcer, 2x2 cm stage 2 decubitus ulcer (possible higher stage with skin flap over region)     I&O's Summary  I & Os for current day (as of 07 Mar 2017 05:15)  =============================================  IN: 1605 ml / OUT: 580 ml / NET: 1025 ml      Labs:                                   9.4    8.8   )-----------( 426      ( 07 Mar 2017 06:35 )             30.4     07 Mar 2017 06:35    136    |  100    |  15.0   ----------------------------<  92     4.1     |  24.0   |  0.34     Ca    9.3        07 Mar 2017 06:35  Phos  2.9       06 Mar 2017 07:00  Mg     2.1       06 Mar 2017 07:00    TPro  6.6    /  Alb  2.8    /  TBili  0.3    /  DBili  x      /  AST  27     /  ALT  23     /  AlkPhos  106    06 Mar 2017 07:00      Troponin: 0.14, 0.14, 0.13, 0.14  Venous lactate: 2.2 => 1.2 (3/1/17)  Urine culture: > 100,000 Pseudomonas (1 strain is carbapenem resistant)   Blood culture: negative x2    Radiology:  Abdominal/Pelvis CT: There is subsegmental atelectasis at the lung bases.There are foci of gas and edema within the soft tissues overlying the   sacrum. Additionally there is presacral fluid with associated foci of gas. These findings may be post procedural, however infection of this region is not excluded.    MEDICATIONS  (STANDING):  finasteride 5milliGRAM(s) Oral daily  lacosamide 150milliGRAM(s) Oral two times a day  ferrous    sulfate 60 mG/mL Liquid 300milliGRAM(s) Enteral Tube daily  sodium chloride 0.65% Nasal 1Spray(s) Both Nostrils daily  saccharomyces boulardii 250milliGRAM(s) Oral two times a day  enoxaparin Injectable 40milliGRAM(s) SubCutaneous daily  simvastatin 20milliGRAM(s) Oral at bedtime  tamsulosin 0.4milliGRAM(s) Oral at bedtime  aspirin  chewable 81milliGRAM(s) Oral daily  ciprofloxacin     Tablet 500milliGRAM(s) Oral every 12 hours  pantoprazole   Suspension 40milliGRAM(s) Oral before breakfast    MEDICATIONS  (PRN):  acetaminophen  Suppository 650milliGRAM(s) Rectal every 6 hours PRN For Temp greater than 38 C (100.4 F)

## 2017-03-07 NOTE — DISCHARGE NOTE ADULT - HOSPITAL COURSE
85 year old male w/ PMH dementia, seizures, CVA w/ dysphagia/peg, phimosis with jones and colostomy who was BIBA from [facility] for fever of 102.1 admitted for severe sepsis. Patient is s/p 3 days of Vancomycin & 5 days of Meropenem Q8H now on ciprofloxin 500 mg Q12H (day 3) as urine culture positive for  Pseudomona aeruginosa (1 strain resistant to carbapenem). Blood cultures negative x2.  Sepsis resolved and patient responded well to antibiotics therapy. 85 year old male w/ PMH dementia, seizures, CVA w/ dysphagia/peg, phimosis with jones and colostomy who was BIBA from [facility] for fever of 102.1 admitted for severe sepsis. Patient is s/p 3 days of Vancomycin & 5 days of Meropenem Q8H now on ciprofloxin 500 mg Q12H (day 3) as urine culture positive for  Pseudomona aeruginosa (1 strain resistant to carbapenem). Blood cultures negative x2.  Sepsis resolved and patient responded well to antibiotics therapy.  Patient will remain on oral antibiotics for 11 more days (end on 3/18/17). Patient is medically optimized for discharge and will follow up with primary care physician. 85 year old male w/ PMH dementia, seizures, CVA w/ dysphagia/peg, phimosis with jones and colostomy who was BIBA from [facility] for fever of 102.1 admitted for severe sepsis. Patient is s/p 3 days of Vancomycin & 5 days of Meropenem Q8H now on ciprofloxin 500 mg Q12H (day 3) as urine culture positive for  Pseudomona aeruginosa (1 strain resistant to carbapenem). Blood cultures negative x2.  Sepsis resolved and patient responded well to antibiotics therapy. Surgery & wound care were consulted and they provided care for the decubitus ulcers. Patient will remain on oral antibiotics for 10 more days (end on 3/18/17). Patient is medically optimized for discharge and will follow up with primary care physician. 85 year old male w/ PMH dementia, seizures, CVA w/ dysphagia/peg, phimosis with Baugh and colostomy who was BIBA from [facility] for fever of 102.1 admitted for severe sepsis. Patient is s/p 3 days of Vancomycin & 5 days of Meropenem Q8H now on ciprofloxacin 500 mg Q12H (day 3) as urine culture positive for  Pseudomonas aeruginosa (1 strain resistant to carbapenem). Blood cultures negative x2.  Sepsis resolved and patient responded well to antibiotics therapy. Surgery & wound care were consulted and they provided care for the decubitus ulcers. Patient will remain on oral antibiotics for 10 more days (end on 3/18/17). Patient is medically optimized for discharge and will follow up with primary care physician.     Discharge time - 40 minutes 85 year old male w/ PMH dementia, seizures, CVA w/ dysphagia/peg, phimosis with Baugh and colostomy who was BIBA from [facility] for fever of 102.1 admitted for sepsis 2/2 to indwelling Baugh catheter. Troponins were elevated likely due to demand ischemia. Patient is s/p 3 days of Vancomycin & 5 days of Meropenem Q8H now on ciprofloxacin 500 mg Q12H (day 3) as urine culture positive for Pseudomonas aeruginosa (1 strain resistant to carbapenem). Blood cultures negative x2.  Sepsis resolved and patient responded well to antibiotics therapy. Surgery & wound care were consulted and they provided care for the decubitus ulcers. Patient will remain on oral antibiotics for 10 more days (end on 3/18/17). Patient is medically optimized for discharge and will follow up with primary care physician.     Discharge time - 40 minutes

## 2017-03-07 NOTE — DISCHARGE NOTE ADULT - PROVIDER TOKENS
FREE:[LAST:[Breanna],PHONE:[(   )    -],FAX:[(   )    -],ADDRESS:[Dr. Yuriy Carlos   [phone]]] FREE:[LAST:[Breanna],FIRST:[Yuriy],PHONE:[(   )    -],FAX:[(   )    -],ADDRESS:[Dr. Yuriy Carlos   [phone]]]

## 2017-03-08 VITALS
HEART RATE: 76 BPM | DIASTOLIC BLOOD PRESSURE: 58 MMHG | RESPIRATION RATE: 17 BRPM | SYSTOLIC BLOOD PRESSURE: 134 MMHG | OXYGEN SATURATION: 97 %

## 2017-03-08 DIAGNOSIS — L89.90 PRESSURE ULCER OF UNSPECIFIED SITE, UNSPECIFIED STAGE: ICD-10-CM

## 2017-03-08 DIAGNOSIS — R79.89 OTHER SPECIFIED ABNORMAL FINDINGS OF BLOOD CHEMISTRY: ICD-10-CM

## 2017-03-08 PROCEDURE — 99239 HOSP IP/OBS DSCHRG MGMT >30: CPT

## 2017-03-08 RX ORDER — ASPIRIN/CALCIUM CARB/MAGNESIUM 324 MG
81 TABLET ORAL DAILY
Qty: 0 | Refills: 0 | Status: CANCELLED | OUTPATIENT
Start: 2018-01-31 | End: 2017-03-08

## 2017-03-08 RX ORDER — SIMVASTATIN 20 MG/1
20 TABLET, FILM COATED ORAL AT BEDTIME
Qty: 0 | Refills: 0 | Status: DISCONTINUED | OUTPATIENT
Start: 2017-03-08 | End: 2017-03-08

## 2017-03-08 RX ORDER — FINASTERIDE 5 MG/1
5 TABLET, FILM COATED ORAL DAILY
Qty: 0 | Refills: 0 | Status: DISCONTINUED | OUTPATIENT
Start: 2017-03-08 | End: 2017-03-08

## 2017-03-08 RX ORDER — TAMSULOSIN HYDROCHLORIDE 0.4 MG/1
0.4 CAPSULE ORAL AT BEDTIME
Qty: 0 | Refills: 0 | Status: CANCELLED | OUTPATIENT
Start: 2018-01-31 | End: 2017-03-08

## 2017-03-08 RX ADMIN — FINASTERIDE 5 MILLIGRAM(S): 5 TABLET, FILM COATED ORAL at 13:57

## 2017-03-08 RX ADMIN — Medication 250 MILLIGRAM(S): at 06:47

## 2017-03-08 RX ADMIN — Medication 1 SPRAY(S): at 13:57

## 2017-03-08 RX ADMIN — Medication 300 MILLIGRAM(S): at 13:57

## 2017-03-08 RX ADMIN — Medication 500 MILLIGRAM(S): at 13:57

## 2017-03-08 RX ADMIN — LACOSAMIDE 150 MILLIGRAM(S): 50 TABLET ORAL at 06:46

## 2017-03-08 RX ADMIN — PANTOPRAZOLE SODIUM 40 MILLIGRAM(S): 20 TABLET, DELAYED RELEASE ORAL at 06:47

## 2017-03-08 NOTE — PROGRESS NOTE ADULT - PROBLEM SELECTOR PROBLEM 9
Phimosis
Need for prophylactic measure

## 2017-03-08 NOTE — PROGRESS NOTE ADULT - PROBLEM SELECTOR PLAN 1
sepsis w/ multiple sources (Baugh more likely) which is resolved  Patient is afebrile, BP stable and appear clinically better.   Urine culture with Pseudomonas aeruginosa (1 strain resistant to carbapenem)  Blood cultures negative x2   s/p 3 days of Vancomycin & 5 days of Meropenem Q8H now on ciprofloxin 500 mg PEG Q12H (day 4) as urine culture positive for  Pseudomona aeruginosa (1 strain resistant to carbapenem).

## 2017-03-08 NOTE — CHART NOTE - NSCHARTNOTEFT_GEN_A_CORE
Pt seen and examined at bedside with Dr Gamble.  Pt in NAD.  Packing removed from sacral wound.  Skin clean, without active signs of infection.  No further packing is necessary.  No surgical intervention at this time.  Will sign off.  Recommend mepilex to area daily to prevent further breakdown, as well as Clinitron bed for offloading.   Thank you

## 2017-03-08 NOTE — PROGRESS NOTE ADULT - SUBJECTIVE AND OBJECTIVE BOX
cc: sent from facility for fevers     INTERVAL HPI/OVERNIGHT EVENTS:  Patient seen and examined at bedside, No acute overnight events. Patient is verbal but AAOx0. Patient non-ambulating, hydration & feeds via PEG & voiding via jones.     ROS: unable to obtain as patient is a poor historian    Allergies    clindamycin (Unknown)  Nuts (Hives; Rash)  PC Pen VK (Unknown)  strawberry (Short breath; Rash; Hives)  Xanax (Rash)    Intolerances    Ativan (Unknown)  Haldol (Dystonic RXN)  hydrALAZINE (Unknown)  Zyprexa (Unknown)    Vital Signs Last 24 Hrs  T(F): 98.1, Max: 98.9 (03-07 @ 06:14)  HR: 77 (77 - 95)  BP: 100/60 (100/60 - 130/68)  RR: 17 (17 - 18)  SpO2: 98% (96% - 98%)    Physical Exam:   GENERAL: NAD, malnourished, verbal but AAOx0    HEAD:  Atraumatic, Normocephalic  EYES: EOMI, PERRLA, conjunctiva and sclera clear, cataracts B/L  ENMT: dry mucous membranes, poor dentition   NERVOUS SYSTEM:  Awake, Alert & Oriented x0, contracted upper & lower extremities  CHEST/LUNG: Clear to percussion bilaterally; No rales, rhonchi, wheezing, or rubs  HEART: Regular rate and rhythm; No murmurs, rubs, or gallops  ABDOMEN: Soft, Nontender, Nondistended; Bowel sounds present, PEG tube & colostomy bag in place  GENITOURINARY: foreskin correctly covering glans penis with jones in place draining yellow urine  EXTREMITIES: 2+ pitting edema, contracture of upper & lower extremities, No clubbing or cyanosis  SKIN: 1x2cm ulcer on left knee, unstageable left plantar healing ulcer, 2x2 cm stage 2 decubitus ulcer (possible higher stage with skin flap over region)     I & Os for current day (as of 08 Mar 2017 05:32)  =============================================  IN: 1580 ml / OUT: 850 ml / NET: 730 ml    Labs:                                   9.4    8.8   )-----------( 426      ( 07 Mar 2017 06:35 )             30.4     07 Mar 2017 06:35    136    |  100    |  15.0   ----------------------------<  92     4.1     |  24.0   |  0.34     Ca    9.3        07 Mar 2017 06:35  Phos  2.9       06 Mar 2017 07:00  Mg     2.1       06 Mar 2017 07:00    TPro  6.6    /  Alb  2.8    /  TBili  0.3    /  DBili  x      /  AST  27     /  ALT  23     /  AlkPhos  106    06 Mar 2017 07:00      Troponin: 0.14, 0.14, 0.13, 0.14  Venous lactate: 2.2 => 1.2 (3/1/17)  Urine culture: > 100,000 Pseudomonas (1 strain is carbapenem resistant)   Blood culture: negative x2    Radiology:  Abdominal/Pelvis CT: There is subsegmental atelectasis at the lung bases.There are foci of gas and edema within the soft tissues overlying the   sacrum. Additionally there is presacral fluid with associated foci of gas. These findings may be post procedural, however infection of this region is not excluded.    MEDICATIONS  (STANDING):  finasteride 5milliGRAM(s) Oral daily  lacosamide 150milliGRAM(s) Oral two times a day  ferrous    sulfate 60 mG/mL Liquid 300milliGRAM(s) Enteral Tube daily  sodium chloride 0.65% Nasal 1Spray(s) Both Nostrils daily  saccharomyces boulardii 250milliGRAM(s) Oral two times a day  enoxaparin Injectable 40milliGRAM(s) SubCutaneous daily  simvastatin 20milliGRAM(s) Oral at bedtime  tamsulosin 0.4milliGRAM(s) Oral at bedtime  aspirin  chewable 81milliGRAM(s) Oral daily  ciprofloxacin     Tablet 500milliGRAM(s) Oral every 12 hours  pantoprazole   Suspension 40milliGRAM(s) Oral before breakfast    MEDICATIONS  (PRN):  acetaminophen  Suppository 650milliGRAM(s) Rectal every 6 hours PRN For Temp greater than 38 C (100.4 F) cc: sent from facility for fevers     INTERVAL HPI/OVERNIGHT EVENTS:  Patient seen and examined at bedside, No acute overnight events. Patient is verbal (minimal) and AAOx0. Patient non-ambulating, hydration & feeds via PEG & voiding via jones.     ROS: unable to obtain as patient is a poor historian    Allergies    clindamycin (Unknown)  Nuts (Hives; Rash)  PC Pen VK (Unknown)  strawberry (Short breath; Rash; Hives)  Xanax (Rash)    Intolerances    Ativan (Unknown)  Haldol (Dystonic RXN)  hydrALAZINE (Unknown)  Zyprexa (Unknown)    Vital Signs Last 24 Hrs  T(F): 98.6, Max: 98.6 (03-08 @ 06:38)  HR: 88 (77 - 88)  BP: 142/78 (100/60 - 142/78)  RR: 16 (16 - 18)  SpO2: 96% (96% - 98%)    Physical Exam:   GENERAL: NAD, malnourished, verbal but AAOx0    HEAD:  Atraumatic, Normocephalic  EYES: EOMI, PERRLA, conjunctiva and sclera clear, cataracts B/L  ENMT: dry mucous membranes, poor dentition   NERVOUS SYSTEM:  Awake, Alert & Oriented x0, contracted upper & lower extremities  CHEST/LUNG: Clear to percussion bilaterally; No rales, rhonchi, wheezing, or rubs  HEART: Regular rate and rhythm; No murmurs, rubs, or gallops  ABDOMEN: Soft, Nontender, Nondistended; Bowel sounds present, PEG tube & colostomy bag in place  GENITOURINARY: foreskin correctly covering glans penis with jones in place draining yellow urine  EXTREMITIES: 2+ pitting edema, contracture of upper & lower extremities, No clubbing or cyanosis  SKIN: 1x2cm ulcer on left knee, unstageable left plantar healing ulcer, 2x2 cm stage 2 decubitus ulcer (possible higher stage with skin flap over region)     I & Os for current day (as of 08 Mar 2017 05:32)  =============================================  IN: 1580 ml / OUT: 850 ml / NET: 730 ml    Labs:                                   9.4    8.8   )-----------( 426      ( 07 Mar 2017 06:35 )             30.4     07 Mar 2017 06:35    136    |  100    |  15.0   ----------------------------<  92     4.1     |  24.0   |  0.34     Ca    9.3        07 Mar 2017 06:35  Phos  2.9       06 Mar 2017 07:00  Mg     2.1       06 Mar 2017 07:00    TPro  6.6    /  Alb  2.8    /  TBili  0.3    /  DBili  x      /  AST  27     /  ALT  23     /  AlkPhos  106    06 Mar 2017 07:00      Troponin: 0.14, 0.14, 0.13, 0.14  Venous lactate: 2.2 => 1.2 (3/1/17)  Urine culture: > 100,000 Pseudomonas (1 strain is carbapenem resistant)   Blood culture: negative x2    Radiology:  Abdominal/Pelvis CT: There is subsegmental atelectasis at the lung bases.There are foci of gas and edema within the soft tissues overlying the   sacrum. Additionally there is presacral fluid with associated foci of gas. These findings may be post procedural, however infection of this region is not excluded.    MEDICATIONS  (STANDING):  finasteride 5milliGRAM(s) Oral daily  lacosamide 150milliGRAM(s) Oral two times a day  ferrous    sulfate 60 mG/mL Liquid 300milliGRAM(s) Enteral Tube daily  sodium chloride 0.65% Nasal 1Spray(s) Both Nostrils daily  saccharomyces boulardii 250milliGRAM(s) Oral two times a day  enoxaparin Injectable 40milliGRAM(s) SubCutaneous daily  simvastatin 20milliGRAM(s) Oral at bedtime  tamsulosin 0.4milliGRAM(s) Oral at bedtime  aspirin  chewable 81milliGRAM(s) Oral daily  ciprofloxacin     Tablet 500milliGRAM(s) Oral every 12 hours  pantoprazole   Suspension 40milliGRAM(s) Oral before breakfast    MEDICATIONS  (PRN):  acetaminophen  Suppository 650milliGRAM(s) Rectal every 6 hours PRN For Temp greater than 38 C (100.4 F) cc: sent from facility for fevers     INTERVAL HPI/OVERNIGHT EVENTS:  Patient seen and examined at bedside, No acute overnight events. Patient is verbal (minimal) and AAOx0. Patient non-ambulating, hydration & feeds via PEG & voiding via jones.     ROS: unable to obtain as patient is a poor historian    Allergies    clindamycin (Unknown)  Nuts (Hives; Rash)  PC Pen VK (Unknown)  strawberry (Short breath; Rash; Hives)  Xanax (Rash)    Intolerances    Ativan (Unknown)  Haldol (Dystonic RXN)  hydrALAZINE (Unknown)  Zyprexa (Unknown)    Vital Signs Last 24 Hrs  T(F): 98.6, Max: 98.6 (03-08 @ 06:38)  HR: 88 (77 - 88)  BP: 142/78 (100/60 - 142/78)  RR: 16 (16 - 18)  SpO2: 96% (96% - 98%)    Physical Exam:   GENERAL: NAD, malnourished, verbal but AAOx0    HEAD:  Atraumatic, Normocephalic  EYES: EOMI, PERRLA, conjunctiva and sclera clear, cataracts B/L  ENMT: dry mucous membranes, poor dentition   NERVOUS SYSTEM:  Awake, Alert & Oriented x0, contracted upper & lower extremities  CHEST/LUNG: Clear to percussion bilaterally; No rales, rhonchi, wheezing, or rubs  HEART: Regular rate and rhythm; No murmurs, rubs, or gallops  ABDOMEN: Soft, Nontender, Nondistended; Bowel sounds present, PEG tube & colostomy bag in place  GENITOURINARY: foreskin correctly covering glans penis with jones in place draining yellow urine  EXTREMITIES: 2+ pitting edema, contracture of upper & lower extremities, No clubbing or cyanosis  SKIN: 1x2cm ulcer on left knee, unstageable left plantar healing ulcer, 2x2 cm stage 2 decubitus ulcer (possible higher stage with skin flap over region)     I & Os for current day (as of 08 Mar 2017 05:32)  =============================================  IN: 1580 ml / OUT: 850 ml / NET: 730 ml    Labs:                                   9.4    8.8   )-----------( 426      ( 07 Mar 2017 06:35 )             30.4     07 Mar 2017 06:35    136    |  100    |  15.0   ----------------------------<  92     4.1     |  24.0   |  0.34     Ca    9.3        07 Mar 2017 06:35  Phos  2.9       06 Mar 2017 07:00  Mg     2.1       06 Mar 2017 07:00    TPro  6.6    /  Alb  2.8    /  TBili  0.3    /  DBili  x      /  AST  27     /  ALT  23     /  AlkPhos  106    06 Mar 2017 07:00      Troponin: 0.14, 0.14, 0.13, 0.14  Venous lactate: 2.2 => 1.2 (3/1/17)  Urine culture: > 100,000 Pseudomonas (1 strain is carbapenem resistant)   Blood culture: negative x2    Radiology:  Abdominal/Pelvis CT: There is subsegmental atelectasis at the lung bases.There are foci of gas and edema within the soft tissues overlying the   sacrum. Additionally there is presacral fluid with associated foci of gas. These findings may be post procedural, however infection of this region is not excluded.    MEDICATIONS  (STANDING):  lacosamide 150milliGRAM(s) Oral two times a day  ferrous    sulfate 60 mG/mL Liquid 300milliGRAM(s) Enteral Tube daily  sodium chloride 0.65% Nasal 1Spray(s) Both Nostrils daily  saccharomyces boulardii 250milliGRAM(s) Oral two times a day  enoxaparin Injectable 40milliGRAM(s) SubCutaneous daily  aspirin  chewable 81milliGRAM(s) Oral daily  ciprofloxacin     Tablet 500milliGRAM(s) Oral every 12 hours  pantoprazole   Suspension 40milliGRAM(s) Oral before breakfast  finasteride 5milliGRAM(s) Oral daily  simvastatin 20milliGRAM(s) Oral at bedtime    MEDICATIONS  (PRN):  acetaminophen  Suppository 650milliGRAM(s) Rectal every 6 hours PRN For Temp greater than 38 C (100.4 F)

## 2017-03-08 NOTE — PROGRESS NOTE ADULT - ATTENDING COMMENTS
May discharge back to Nursing Home
Aspiration precautions  Chest X-Ray reviewd with radiology.  Surgery follow up - discussed with wound care RN Rickie concern about deeper sacral decubitus
Change antibiotics to Ciprofloxacin 500 twice daily - complete 10 days course.  Anticipate discharge in next 24-48 hours
Stable.  Discharge back to Nursing Home.  Still awaiting call back from HCP  - called twice yesterday
note addended as needed. Plan to examine patient with Legal/financial guardian Van Mayer and to see patient with palliative in a group to go over long term goals of care for this patient. Plan d/w him, Rosi POWER/team and  on board  Patient is bedbound with no complete functionality that assists in daily activities of movement.   off loading bed ordered
note addended where needed. Patient has an overall poor prognosis. Patient has a legal and financial guardian - Van Mayer [phone] regarding advanced directives. He is new to the case, will come and see the patient with me on 3/3. I have explained the chronicity of his medical illnesses and overall poor prognosis. At this time, I will get palliative to also see this patient with me and we will try to come to a plan of care together to assist the legal guarding with the best choice of advanced directives for him at this time. Plan of care d/w nurse and KM
Non-verbal, staring, increased tone, unable to communicate needs.  No bacteremia.  CAUTI (pre-existing Baugh catheter prior to admission for chronic urinary retention present at admission)  Focus gas may be post-surgical - no clinical evidence or necrotizing fasciitis or gas gangrene.    Need to readdress advance directive with HCP, as functional quadriplegia with complications, non-communicative, complete dependence questionable quality of life - Full code for now

## 2017-03-08 NOTE — PROGRESS NOTE ADULT - PROBLEM SELECTOR PLAN 3
resolved  Free water 250 ml Q6H via PEG Surgery and wound care consults on board Local wound care and packing as per Surgery and would care

## 2017-03-08 NOTE — PROGRESS NOTE ADULT - PROBLEM SELECTOR PLAN 9
Jones is a potential source of infection vs colonization. Will keep jones and consult urology and reconsider finesteride and tamulosin
Lovenox 40 mg SQ daily

## 2017-03-08 NOTE — PROGRESS NOTE ADULT - PROVIDER SPECIALTY LIST ADULT
Family Medicine
Urology
Family Medicine

## 2017-03-08 NOTE — PROGRESS NOTE ADULT - PROBLEM SELECTOR PLAN 2
Normal EKG w/ elevated troponin x4, likely more demand ischemia   Cardiology (Perkins) suggest demand ischemia - no further intervention at this time  -c/w asa 81 mg and simvastatin 20 mg QHS

## 2017-03-08 NOTE — PROGRESS NOTE ADULT - PROBLEM SELECTOR PROBLEM 1
Sepsis
Urinary retention
Urinary tract infection, site unspecified
Urinary tract infection, site unspecified

## 2017-03-08 NOTE — PROGRESS NOTE ADULT - ASSESSMENT
85 year old male w/ PMH dementia, seizures, CVA w/ dysphagia/peg, phimosis with jones and colostomy who was BIBA from [facility] for fever of 102.1 admitted for severe sepsis. Patient is s/p 3 days of Vancomycin & 5 days of Meropenem Q8H now on ciprofloxin 500 mg Q12H (day 4) as urine culture positive for  Pseudomona aeruginosa (1 strain resistant to carbapenem). Blood cultures negative x2.  Sepsis resolved and patient responded well to antibiotics therapy. Discharge pending placement.     Family is not the healthcare proxy and can not make any medical decision on patient's behalf. Awaiting decision regarding code status & plan of care from Health care proxy Van Monroy ([phone])

## 2017-03-11 RX ORDER — METOPROLOL TARTRATE 50 MG
12.5 TABLET ORAL
Qty: 0 | Refills: 0 | COMMUNITY

## 2017-03-15 PROCEDURE — 82553 CREATINE MB FRACTION: CPT

## 2017-03-15 PROCEDURE — 82550 ASSAY OF CK (CPK): CPT

## 2017-03-15 PROCEDURE — 80053 COMPREHEN METABOLIC PANEL: CPT

## 2017-03-15 PROCEDURE — 83880 ASSAY OF NATRIURETIC PEPTIDE: CPT

## 2017-03-15 PROCEDURE — 87040 BLOOD CULTURE FOR BACTERIA: CPT

## 2017-03-15 PROCEDURE — 97163 PT EVAL HIGH COMPLEX 45 MIN: CPT

## 2017-03-15 PROCEDURE — 36415 COLL VENOUS BLD VENIPUNCTURE: CPT

## 2017-03-15 PROCEDURE — 83735 ASSAY OF MAGNESIUM: CPT

## 2017-03-15 PROCEDURE — 80048 BASIC METABOLIC PNL TOTAL CA: CPT

## 2017-03-15 PROCEDURE — 71045 X-RAY EXAM CHEST 1 VIEW: CPT

## 2017-03-15 PROCEDURE — 87086 URINE CULTURE/COLONY COUNT: CPT

## 2017-03-15 PROCEDURE — 96374 THER/PROPH/DIAG INJ IV PUSH: CPT

## 2017-03-15 PROCEDURE — 80202 ASSAY OF VANCOMYCIN: CPT

## 2017-03-15 PROCEDURE — 99285 EMERGENCY DEPT VISIT HI MDM: CPT | Mod: 25

## 2017-03-15 PROCEDURE — 85027 COMPLETE CBC AUTOMATED: CPT

## 2017-03-15 PROCEDURE — 84484 ASSAY OF TROPONIN QUANT: CPT

## 2017-03-15 PROCEDURE — 71250 CT THORAX DX C-: CPT

## 2017-03-15 PROCEDURE — 83605 ASSAY OF LACTIC ACID: CPT

## 2017-03-15 PROCEDURE — 85610 PROTHROMBIN TIME: CPT

## 2017-03-15 PROCEDURE — 87186 SC STD MICRODIL/AGAR DIL: CPT

## 2017-03-15 PROCEDURE — 74176 CT ABD & PELVIS W/O CONTRAST: CPT

## 2017-03-15 PROCEDURE — 85730 THROMBOPLASTIN TIME PARTIAL: CPT

## 2017-03-15 PROCEDURE — 81001 URINALYSIS AUTO W/SCOPE: CPT

## 2017-03-15 PROCEDURE — 83690 ASSAY OF LIPASE: CPT

## 2017-03-15 PROCEDURE — 93005 ELECTROCARDIOGRAM TRACING: CPT

## 2017-03-15 PROCEDURE — 84100 ASSAY OF PHOSPHORUS: CPT

## 2018-02-27 RX ORDER — FENTANYL CITRATE 50 UG/ML
1 INJECTION INTRAVENOUS
Qty: 0 | Refills: 0 | COMMUNITY
Start: 2018-02-27

## 2018-03-01 ENCOUNTER — INPATIENT (INPATIENT)
Facility: HOSPITAL | Age: 83
LOS: 11 days | Discharge: HOME | DRG: 870 | End: 2018-03-13
Attending: HOSPITALIST | Admitting: INTERNAL MEDICINE
Payer: MEDICARE

## 2018-03-01 VITALS
WEIGHT: 184.09 LBS | RESPIRATION RATE: 24 BRPM | HEIGHT: 70 IN | OXYGEN SATURATION: 97 % | SYSTOLIC BLOOD PRESSURE: 131 MMHG | DIASTOLIC BLOOD PRESSURE: 68 MMHG | HEART RATE: 150 BPM

## 2018-03-01 DIAGNOSIS — Z93.1 GASTROSTOMY STATUS: Chronic | ICD-10-CM

## 2018-03-01 DIAGNOSIS — Z98.89 OTHER SPECIFIED POSTPROCEDURAL STATES: Chronic | ICD-10-CM

## 2018-03-01 DIAGNOSIS — K46.9 UNSPECIFIED ABDOMINAL HERNIA WITHOUT OBSTRUCTION OR GANGRENE: Chronic | ICD-10-CM

## 2018-03-01 DIAGNOSIS — G40.901 EPILEPSY, UNSPECIFIED, NOT INTRACTABLE, WITH STATUS EPILEPTICUS: ICD-10-CM

## 2018-03-01 DIAGNOSIS — A41.9 SEPSIS, UNSPECIFIED ORGANISM: ICD-10-CM

## 2018-03-01 DIAGNOSIS — C61 MALIGNANT NEOPLASM OF PROSTATE: ICD-10-CM

## 2018-03-01 DIAGNOSIS — J90 PLEURAL EFFUSION, NOT ELSEWHERE CLASSIFIED: ICD-10-CM

## 2018-03-01 DIAGNOSIS — J34.2 DEVIATED NASAL SEPTUM: Chronic | ICD-10-CM

## 2018-03-01 DIAGNOSIS — J96.21 ACUTE AND CHRONIC RESPIRATORY FAILURE WITH HYPOXIA: ICD-10-CM

## 2018-03-01 DIAGNOSIS — Z93.3 COLOSTOMY STATUS: Chronic | ICD-10-CM

## 2018-03-01 DIAGNOSIS — G93.40 ENCEPHALOPATHY, UNSPECIFIED: ICD-10-CM

## 2018-03-01 LAB
ALBUMIN SERPL ELPH-MCNC: 3.7 G/DL — SIGNIFICANT CHANGE UP (ref 3.3–5.2)
ALP SERPL-CCNC: 129 U/L — HIGH (ref 40–120)
ALT FLD-CCNC: 15 U/L — SIGNIFICANT CHANGE UP
ANION GAP SERPL CALC-SCNC: 13 MMOL/L — SIGNIFICANT CHANGE UP (ref 5–17)
APPEARANCE UR: CLEAR — SIGNIFICANT CHANGE UP
APTT BLD: 32.6 SEC — SIGNIFICANT CHANGE UP (ref 27.5–37.4)
AST SERPL-CCNC: 21 U/L — SIGNIFICANT CHANGE UP
BACTERIA # UR AUTO: ABNORMAL
BILIRUB SERPL-MCNC: 0.4 MG/DL — SIGNIFICANT CHANGE UP (ref 0.4–2)
BILIRUB UR-MCNC: NEGATIVE — SIGNIFICANT CHANGE UP
BUN SERPL-MCNC: 29 MG/DL — HIGH (ref 8–20)
CALCIUM SERPL-MCNC: 9.7 MG/DL — SIGNIFICANT CHANGE UP (ref 8.6–10.2)
CHLORIDE SERPL-SCNC: 95 MMOL/L — LOW (ref 98–107)
CO2 SERPL-SCNC: 30 MMOL/L — HIGH (ref 22–29)
COLOR SPEC: YELLOW — SIGNIFICANT CHANGE UP
CREAT SERPL-MCNC: 0.54 MG/DL — SIGNIFICANT CHANGE UP (ref 0.5–1.3)
DIFF PNL FLD: ABNORMAL
EPI CELLS # UR: SIGNIFICANT CHANGE UP
GLUCOSE SERPL-MCNC: 124 MG/DL — HIGH (ref 70–115)
GLUCOSE UR QL: NEGATIVE MG/DL — SIGNIFICANT CHANGE UP
HCT VFR BLD CALC: 37.9 % — LOW (ref 42–52)
HGB BLD-MCNC: 11.1 G/DL — LOW (ref 14–18)
INR BLD: 1.12 RATIO — SIGNIFICANT CHANGE UP (ref 0.88–1.16)
KETONES UR-MCNC: NEGATIVE — SIGNIFICANT CHANGE UP
LACTATE BLDV-MCNC: 2 MMOL/L — SIGNIFICANT CHANGE UP (ref 0.5–2)
LEUKOCYTE ESTERASE UR-ACNC: ABNORMAL
LIDOCAIN IGE QN: 16 U/L — LOW (ref 22–51)
MCHC RBC-ENTMCNC: 24.6 PG — LOW (ref 27–31)
MCHC RBC-ENTMCNC: 29.3 G/DL — LOW (ref 32–36)
MCV RBC AUTO: 84 FL — SIGNIFICANT CHANGE UP (ref 80–94)
NITRITE UR-MCNC: NEGATIVE — SIGNIFICANT CHANGE UP
PH UR: 6 — SIGNIFICANT CHANGE UP (ref 5–8)
PLATELET # BLD AUTO: 467 K/UL — HIGH (ref 150–400)
POTASSIUM SERPL-MCNC: 5 MMOL/L — SIGNIFICANT CHANGE UP (ref 3.5–5.3)
POTASSIUM SERPL-SCNC: 5 MMOL/L — SIGNIFICANT CHANGE UP (ref 3.5–5.3)
PROT SERPL-MCNC: 8 G/DL — SIGNIFICANT CHANGE UP (ref 6.6–8.7)
PROT UR-MCNC: 100 MG/DL
PROTHROM AB SERPL-ACNC: 12.4 SEC — SIGNIFICANT CHANGE UP (ref 9.8–12.7)
RAPID RVP RESULT: SIGNIFICANT CHANGE UP
RBC # BLD: 4.51 M/UL — LOW (ref 4.6–6.2)
RBC # FLD: 19.4 % — HIGH (ref 11–15.6)
RBC CASTS # UR COMP ASSIST: ABNORMAL /HPF (ref 0–4)
SODIUM SERPL-SCNC: 138 MMOL/L — SIGNIFICANT CHANGE UP (ref 135–145)
SP GR SPEC: 1.02 — SIGNIFICANT CHANGE UP (ref 1.01–1.02)
TROPONIN T SERPL-MCNC: 0.07 NG/ML — HIGH (ref 0–0.06)
UROBILINOGEN FLD QL: NEGATIVE MG/DL — SIGNIFICANT CHANGE UP
WBC # BLD: 19.5 K/UL — HIGH (ref 4.8–10.8)
WBC # FLD AUTO: 19.5 K/UL — HIGH (ref 4.8–10.8)
WBC UR QL: >50

## 2018-03-01 PROCEDURE — 93010 ELECTROCARDIOGRAM REPORT: CPT

## 2018-03-01 PROCEDURE — 99291 CRITICAL CARE FIRST HOUR: CPT

## 2018-03-01 PROCEDURE — 71045 X-RAY EXAM CHEST 1 VIEW: CPT | Mod: 26

## 2018-03-01 PROCEDURE — 95819 EEG AWAKE AND ASLEEP: CPT | Mod: 26

## 2018-03-01 PROCEDURE — 99222 1ST HOSP IP/OBS MODERATE 55: CPT

## 2018-03-01 PROCEDURE — 70450 CT HEAD/BRAIN W/O DYE: CPT | Mod: 26

## 2018-03-01 RX ORDER — COLLAGENASE CLOSTRIDIUM HIST. 250 UNIT/G
1 OINTMENT (GRAM) TOPICAL
Qty: 0 | Refills: 0 | COMMUNITY

## 2018-03-01 RX ORDER — AZTREONAM 2 G
1000 VIAL (EA) INJECTION EVERY 12 HOURS
Qty: 0 | Refills: 0 | Status: DISCONTINUED | OUTPATIENT
Start: 2018-03-01 | End: 2018-03-03

## 2018-03-01 RX ORDER — ENOXAPARIN SODIUM 100 MG/ML
40 INJECTION SUBCUTANEOUS DAILY
Qty: 0 | Refills: 0 | Status: DISCONTINUED | OUTPATIENT
Start: 2018-03-01 | End: 2018-03-13

## 2018-03-01 RX ORDER — VANCOMYCIN HCL 1 G
1000 VIAL (EA) INTRAVENOUS EVERY 12 HOURS
Qty: 0 | Refills: 0 | Status: DISCONTINUED | OUTPATIENT
Start: 2018-03-01 | End: 2018-03-03

## 2018-03-01 RX ORDER — LEVETIRACETAM 250 MG/1
1 TABLET, FILM COATED ORAL
Qty: 0 | Refills: 0 | COMMUNITY

## 2018-03-01 RX ORDER — PHENOBARBITAL 60 MG
60 TABLET ORAL
Qty: 0 | Refills: 0 | Status: DISCONTINUED | OUTPATIENT
Start: 2018-03-01 | End: 2018-03-08

## 2018-03-01 RX ORDER — PHENOBARBITAL 60 MG
1600 TABLET ORAL ONCE
Qty: 0 | Refills: 0 | Status: DISCONTINUED | OUTPATIENT
Start: 2018-03-01 | End: 2018-03-01

## 2018-03-01 RX ORDER — LACOSAMIDE 50 MG/1
1 TABLET ORAL
Qty: 0 | Refills: 0 | COMMUNITY

## 2018-03-01 RX ORDER — SODIUM CHLORIDE 9 MG/ML
2000 INJECTION, SOLUTION INTRAVENOUS ONCE
Qty: 0 | Refills: 0 | Status: COMPLETED | OUTPATIENT
Start: 2018-03-01 | End: 2018-03-01

## 2018-03-01 RX ORDER — FINASTERIDE 5 MG/1
1 TABLET, FILM COATED ORAL
Qty: 0 | Refills: 0 | COMMUNITY

## 2018-03-01 RX ORDER — MIDAZOLAM HYDROCHLORIDE 1 MG/ML
4 INJECTION, SOLUTION INTRAMUSCULAR; INTRAVENOUS ONCE
Qty: 0 | Refills: 0 | Status: DISCONTINUED | OUTPATIENT
Start: 2018-03-01 | End: 2018-03-01

## 2018-03-01 RX ORDER — AZTREONAM 2 G
VIAL (EA) INJECTION
Qty: 0 | Refills: 0 | Status: DISCONTINUED | OUTPATIENT
Start: 2018-03-01 | End: 2018-03-03

## 2018-03-01 RX ORDER — FOSPHENYTOIN 50 MG/ML
100 INJECTION INTRAMUSCULAR; INTRAVENOUS EVERY 8 HOURS
Qty: 0 | Refills: 0 | Status: DISCONTINUED | OUTPATIENT
Start: 2018-03-01 | End: 2018-03-05

## 2018-03-01 RX ORDER — AZTREONAM 2 G
1000 VIAL (EA) INJECTION ONCE
Qty: 0 | Refills: 0 | Status: COMPLETED | OUTPATIENT
Start: 2018-03-01 | End: 2018-03-01

## 2018-03-01 RX ORDER — FOSPHENYTOIN 50 MG/ML
1500 INJECTION INTRAMUSCULAR; INTRAVENOUS ONCE
Qty: 0 | Refills: 0 | Status: COMPLETED | OUTPATIENT
Start: 2018-03-01 | End: 2018-03-01

## 2018-03-01 RX ORDER — COLLAGENASE CLOSTRIDIUM HIST. 250 UNIT/G
1 OINTMENT (GRAM) TOPICAL DAILY
Qty: 0 | Refills: 0 | Status: DISCONTINUED | OUTPATIENT
Start: 2018-03-01 | End: 2018-03-13

## 2018-03-01 RX ORDER — LEVETIRACETAM 250 MG/1
500 TABLET, FILM COATED ORAL EVERY 12 HOURS
Qty: 0 | Refills: 0 | Status: DISCONTINUED | OUTPATIENT
Start: 2018-03-01 | End: 2018-03-02

## 2018-03-01 RX ORDER — ASPIRIN/CALCIUM CARB/MAGNESIUM 324 MG
81 TABLET ORAL DAILY
Qty: 0 | Refills: 0 | Status: DISCONTINUED | OUTPATIENT
Start: 2018-03-01 | End: 2018-03-13

## 2018-03-01 RX ORDER — HYDROMORPHONE HYDROCHLORIDE 2 MG/ML
0.5 INJECTION INTRAMUSCULAR; INTRAVENOUS; SUBCUTANEOUS ONCE
Qty: 0 | Refills: 0 | Status: DISCONTINUED | OUTPATIENT
Start: 2018-03-01 | End: 2018-03-01

## 2018-03-01 RX ORDER — CHLORHEXIDINE GLUCONATE 213 G/1000ML
15 SOLUTION TOPICAL
Qty: 0 | Refills: 0 | Status: DISCONTINUED | OUTPATIENT
Start: 2018-03-01 | End: 2018-03-13

## 2018-03-01 RX ORDER — ALBUTEROL 90 UG/1
3 AEROSOL, METERED ORAL
Qty: 0 | Refills: 0 | COMMUNITY

## 2018-03-01 RX ORDER — DIGOXIN 250 MCG
0.25 TABLET ORAL DAILY
Qty: 0 | Refills: 0 | Status: DISCONTINUED | OUTPATIENT
Start: 2018-03-01 | End: 2018-03-13

## 2018-03-01 RX ORDER — PANTOPRAZOLE SODIUM 20 MG/1
1 TABLET, DELAYED RELEASE ORAL
Qty: 0 | Refills: 0 | COMMUNITY

## 2018-03-01 RX ORDER — ACETYLCYSTEINE 200 MG/ML
3 VIAL (ML) MISCELLANEOUS
Qty: 0 | Refills: 0 | COMMUNITY

## 2018-03-01 RX ORDER — PANTOPRAZOLE SODIUM 20 MG/1
40 TABLET, DELAYED RELEASE ORAL DAILY
Qty: 0 | Refills: 0 | Status: DISCONTINUED | OUTPATIENT
Start: 2018-03-01 | End: 2018-03-05

## 2018-03-01 RX ORDER — ACETAMINOPHEN 500 MG
1000 TABLET ORAL ONCE
Qty: 0 | Refills: 0 | Status: COMPLETED | OUTPATIENT
Start: 2018-03-01 | End: 2018-03-01

## 2018-03-01 RX ORDER — VANCOMYCIN HCL 1 G
1000 VIAL (EA) INTRAVENOUS ONCE
Qty: 0 | Refills: 0 | Status: COMPLETED | OUTPATIENT
Start: 2018-03-01 | End: 2018-03-01

## 2018-03-01 RX ORDER — FERROUS SULFATE 325(65) MG
5 TABLET ORAL
Qty: 0 | Refills: 0 | COMMUNITY

## 2018-03-01 RX ADMIN — SODIUM CHLORIDE 8000 MILLILITER(S): 9 INJECTION, SOLUTION INTRAVENOUS at 11:01

## 2018-03-01 RX ADMIN — MIDAZOLAM HYDROCHLORIDE 4 MILLIGRAM(S): 1 INJECTION, SOLUTION INTRAMUSCULAR; INTRAVENOUS at 06:30

## 2018-03-01 RX ADMIN — Medication 50 MILLIGRAM(S): at 08:40

## 2018-03-01 RX ADMIN — FOSPHENYTOIN 104 MILLIGRAM(S) PE: 50 INJECTION INTRAMUSCULAR; INTRAVENOUS at 13:26

## 2018-03-01 RX ADMIN — Medication 50 MILLIGRAM(S): at 17:39

## 2018-03-01 RX ADMIN — HYDROMORPHONE HYDROCHLORIDE 0.5 MILLIGRAM(S): 2 INJECTION INTRAMUSCULAR; INTRAVENOUS; SUBCUTANEOUS at 19:30

## 2018-03-01 RX ADMIN — Medication 250 MILLIGRAM(S): at 22:02

## 2018-03-01 RX ADMIN — Medication 81 MILLIGRAM(S): at 11:08

## 2018-03-01 RX ADMIN — FOSPHENYTOIN 104 MILLIGRAM(S) PE: 50 INJECTION INTRAMUSCULAR; INTRAVENOUS at 22:02

## 2018-03-01 RX ADMIN — Medication 449.24 MILLIGRAM(S): at 07:19

## 2018-03-01 RX ADMIN — HYDROMORPHONE HYDROCHLORIDE 0.5 MILLIGRAM(S): 2 INJECTION INTRAMUSCULAR; INTRAVENOUS; SUBCUTANEOUS at 19:45

## 2018-03-01 RX ADMIN — Medication 1 APPLICATION(S): at 12:29

## 2018-03-01 RX ADMIN — LEVETIRACETAM 420 MILLIGRAM(S): 250 TABLET, FILM COATED ORAL at 17:41

## 2018-03-01 RX ADMIN — Medication 250 MILLIGRAM(S): at 09:21

## 2018-03-01 RX ADMIN — ENOXAPARIN SODIUM 40 MILLIGRAM(S): 100 INJECTION SUBCUTANEOUS at 11:08

## 2018-03-01 RX ADMIN — MIDAZOLAM HYDROCHLORIDE 4 MILLIGRAM(S): 1 INJECTION, SOLUTION INTRAMUSCULAR; INTRAVENOUS at 06:45

## 2018-03-01 RX ADMIN — PANTOPRAZOLE SODIUM 40 MILLIGRAM(S): 20 TABLET, DELAYED RELEASE ORAL at 11:08

## 2018-03-01 RX ADMIN — CHLORHEXIDINE GLUCONATE 15 MILLILITER(S): 213 SOLUTION TOPICAL at 17:40

## 2018-03-01 RX ADMIN — Medication 60 MILLIGRAM(S): at 17:45

## 2018-03-01 RX ADMIN — Medication 400 MILLIGRAM(S): at 08:00

## 2018-03-01 RX ADMIN — FOSPHENYTOIN 160 MILLIGRAM(S) PE: 50 INJECTION INTRAMUSCULAR; INTRAVENOUS at 06:57

## 2018-03-01 NOTE — ED PROVIDER NOTE - OBJECTIVE STATEMENT
85 y/o M pt with a pmhx of CHF, Dementia, prostate CA, seizures, ventilator dependent, tracheostomy, gastrostomy, colostomy, CAD and GERD BIBA to the ED c/o seizures that onset 0500 this AM. Pt lives at [facility]. EMS was activated by staff at nursing home. Given 500mg of Keppra at the nursing home. EMS notes 100.7F axillary temperature. Unable to obaitn full Hx and due to pt Allergic to haldol, ativan, penicillin, hydralazine, ascorbic calcium, trazadone, xanax.

## 2018-03-01 NOTE — ED ADULT TRIAGE NOTE - CHIEF COMPLAINT QUOTE
BIBA for seizure. As per EMS pt with chronic Vent. Pt has beem seizing since 5 am. tinged blood sputum noted around mouth. Code team at bedside.

## 2018-03-01 NOTE — ED PROVIDER NOTE - CARE PLAN
Principal Discharge DX:	Status epilepticus  Secondary Diagnosis:	Pneumonia  Secondary Diagnosis:	Hypotension

## 2018-03-01 NOTE — CONSULT NOTE ADULT - SUBJECTIVE AND OBJECTIVE BOX
[facility]                                     Neurology at [place]                                 Ally Sawyer, & Karan                                  [address]                                        [place], [place], [address]                                             [phone]    HISTORY:    The patient is a 86y Male with h/o dementia and seizures, contracted in bed who had multiple seizures.  He is currently not seizing and is on continuous EEG mionitoring.  Neuro eval is called    PAST MEDICAL & SURGICAL HISTORY:  Dysphagia  Fall: Multiple Mechanical falls  CAD (coronary artery disease)  Clostridium difficile diarrhea: in 2009  BPH (benign prostatic hypertrophy)  Dementia  HLD (hyperlipidemia)  CHF (congestive heart failure)  Prostate cancer: Treated w/ radiation  Stroke: (~5yrs ago)  Seizure:   HTN (hypertension)  Colostomy in place  S/P percutaneous endoscopic gastrostomy (PEG) tube placement  H/O hemorrhoidectomy  Deviated septum  Abdominal hernia  Status post cardiac surgery: stents x 2      MEDICATIONS  (STANDING):  aspirin  chewable 81 milliGRAM(s) Oral daily  aztreonam  IVPB 1000 milliGRAM(s) IV Intermittent every 12 hours  aztreonam  IVPB      chlorhexidine 0.12% Liquid 15 milliLiter(s) Swish and Spit two times a day  collagenase Ointment 1 Application(s) Topical daily  digoxin     Tablet 0.25 milliGRAM(s) Oral daily  enoxaparin Injectable 40 milliGRAM(s) SubCutaneous daily  fosphenytoin IVPB 100 milliGRAM(s) PE IV Intermittent every 8 hours  levETIRAcetam  IVPB 500 milliGRAM(s) IV Intermittent every 12 hours  pantoprazole  Injectable 40 milliGRAM(s) IV Push daily  PHENobarbital Elixir 60 milliGRAM(s) Oral two times a day  vancomycin  IVPB 1000 milliGRAM(s) IV Intermittent every 12 hours    MEDICATIONS  (PRN):      Allergies    clindamycin (Unknown)  Nuts (Hives; Rash)  PC Pen VK (Unknown)  strawberry (Short breath; Rash; Hives)  Xanax (Rash)    Intolerances    Ativan (Unknown)  Haldol (Dystonic RXN)  hydrALAZINE (Unknown)  Zyprexa (Unknown)      SOCIAL HISTORY:  no tob, etoh, drugs    FAMILY HISTORY:  No pertinent family history in first degree relatives      ROS:  Unable to obtain    Exam:  Vital Signs Last 24 Hrs  T(C): 36.8 (01 Mar 2018 16:00), Max: 38 (01 Mar 2018 08:00)  T(F): 98.2 (01 Mar 2018 16:00), Max: 100.4 (01 Mar 2018 08:00)  HR: 58 (01 Mar 2018 16:08) (52 - 150)  BP: 118/64 (01 Mar 2018 16:00) (75/55 - 134/92)  BP(mean): 86 (01 Mar 2018 16:00) (67 - 92)  RR: 10 (01 Mar 2018 16:00) (10 - 24)  SpO2: 100% (01 Mar 2018 16:08) (97% - 100%)  General: unresponsive    Mental status: The patient is unreswponsive    Cranial nerves: . Pupils react Symmetrically to light. no blink to threat, no obvious facial asym,      Motor: There is contracted 4 limbs, no movement to noxious stimuli.    Sensation: no grimace to noxious stimuli    Reflexes: unable to obtain.    Cerebellar: unable to assess    LABS:                         11.1   19.5  )-----------( 467      ( 01 Mar 2018 06:47 )             37.9       03-01    138  |  95<L>  |  29.0<H>  ----------------------------<  124<H>  5.0   |  30.0<H>  |  0.54    Ca    9.7      01 Mar 2018 06:47    TPro  8.0  /  Alb  3.7  /  TBili  0.4  /  DBili  x   /  AST  21  /  ALT  15  /  AlkPhos  129<H>  03-01      PT/INR - ( 01 Mar 2018 06:47 )   PT: 12.4 sec;   INR: 1.12 ratio         PTT - ( 01 Mar 2018 06:47 )  PTT:32.6 sec    RADIOLOGY & ADDITIONAL STUDIES:  CT head: no acute CVA, mass or blood,(+) atrophy and SVID

## 2018-03-01 NOTE — CONSULT NOTE ADULT - SUBJECTIVE AND OBJECTIVE BOX
HPI:  87 y/o M w/ a PMHx of CHF, dementia, prostate cancer, seizures, CAD, GERD, anemia, tracheostomy, gastrostomy, colostomy, and ventilator dependent presents to ED from nursing home due to seizure activity that began at 05:00. Patient was given 500 mg of Keppra through PEG before leaving nursing home. On arrival to ED, patient still exhibited seizure activity. Patient was given 2 doses of Versed totaling 8 mg, and 1 dose of phenobarbital 1600 mg IV. Post CT scan and phenobarbital patient became hypotensive. Currently responding well to IV fluids. Patient is known from prior admission to have a legal guardian. His law office was contacted and message was left, stated he will get back to us today. Name--Van Monroy [phone]. (01 Mar 2018 09:10)  Pt noted to have longstanding and reportedly recurrent paraphimosis.     PAST MEDICAL & SURGICAL HISTORY:  Dysphagia  Fall: Multiple Mechanical falls  CAD (coronary artery disease)  Clostridium difficile diarrhea: in   BPH (benign prostatic hypertrophy)  Dementia  HLD (hyperlipidemia)  CHF (congestive heart failure)  Prostate cancer: Treated w/ radiation  Stroke: (~5yrs ago)  Seizure: (last event &gt;1yr ago)  HTN (hypertension)  Colostomy in place  S/P percutaneous endoscopic gastrostomy (PEG) tube placement  H/O hemorrhoidectomy  Deviated septum  Abdominal hernia  Status post cardiac surgery: stents x 2      REVIEW OF SYSTEMS:    Constitutional: Obtunded / nonverbal  Eyes: No eye pain, visual disturbances, or discharge  ENMT:  No difficulty hearing, tinnitus, vertigo; No sinus or throat pain  Respiratory: has tracheostomy   Cardiovascular: No chest pain, palpitations, shortness of breath, dizziness or leg swelling  Gastrointestinal: has colostomy  Genitourinary: paraphimosis, indwelling Baugh  Rectal: No pain, hemorrhoids or incontinence  Neurological: No headaches, memory loss, loss of strength, numbness or tremors  Skin: No itching, burning, rashes or lesions   Lymph Nodes: No enlarged glands  Musculoskeletal: No joint pain or swelling; No muscle, back or extremity pain  Psychiatric: No depression, anxiety, mood swings or difficulty sleeping  Heme/Lymph: No easy bruising or bleeding gums  Allergy and Immunologic: No hives or eczema    MEDICATIONS  (STANDING):  aspirin  chewable 81 milliGRAM(s) Oral daily  aztreonam  IVPB 1000 milliGRAM(s) IV Intermittent every 12 hours  aztreonam  IVPB      chlorhexidine 0.12% Liquid 15 milliLiter(s) Swish and Spit two times a day  collagenase Ointment 1 Application(s) Topical daily  digoxin     Tablet 0.25 milliGRAM(s) Oral daily  enoxaparin Injectable 40 milliGRAM(s) SubCutaneous daily  fosphenytoin IVPB 100 milliGRAM(s) PE IV Intermittent every 8 hours  levETIRAcetam  IVPB 500 milliGRAM(s) IV Intermittent every 12 hours  pantoprazole  Injectable 40 milliGRAM(s) IV Push daily  PHENobarbital Elixir 60 milliGRAM(s) Oral two times a day  vancomycin  IVPB 1000 milliGRAM(s) IV Intermittent every 12 hours    MEDICATIONS  (PRN):      Allergies    clindamycin (Unknown)  Nuts (Hives; Rash)  PC Pen VK (Unknown)  strawberry (Short breath; Rash; Hives)  Xanax (Rash)    Intolerances    Ativan (Unknown)  Haldol (Dystonic RXN)  hydrALAZINE (Unknown)  Zyprexa (Unknown)      SOCIAL HISTORY:    FAMILY HISTORY:  No pertinent family history in first degree relatives      Vital Signs Last 24 Hrs  T(C): 36.8 (01 Mar 2018 16:00), Max: 38 (01 Mar 2018 08:00)  T(F): 98.2 (01 Mar 2018 16:00), Max: 100.4 (01 Mar 2018 08:00)  HR: 64 (01 Mar 2018 18:00) (52 - 150)  BP: 137/68 (01 Mar 2018 18:00) (75/55 - 137/68)  BP(mean): 97 (01 Mar 2018 18:00) (67 - 97)  RR: 16 (01 Mar 2018 18:00) (10 - 24)  SpO2: 100% (01 Mar 2018 18:00) (97% - 100%)    PHYSICAL EXAM:    General: nonverbal , obtunded patient with multiple contractures  Head: abnormal   Respiratory: No wheezes, rales or rhonchi  Cardiovascular: Regular rate and rhythm. S1 and S2 Normal; No murmurs, gallops or rubs  Gastrointestinal: Soft non-tender non-distended; Normal bowel sounds; No hepatosplenomegaly  Genitourinary: No costovertebral angle tenderness.  Urinary bladder is clinically not distended. Baugh intact, longstanding paraphimosis noted  Extremities: contracted  Vascular: Peripheral pulses palpable 2+ bilaterally  Neurological: ontunded   Skin: Warm and dry. No acute rash  Musculoskeletal:  multiple contractures  Psychiatric: nonverbal       LABS:                        11.1   19.5  )-----------( 467      ( 01 Mar 2018 06:47 )             37.9     03    138  |  95<L>  |  29.0<H>  ----------------------------<  124<H>  5.0   |  30.0<H>  |  0.54    Ca    9.7      01 Mar 2018 06:47    TPro  8.0  /  Alb  3.7  /  TBili  0.4  /  DBili  x   /  AST  21  /  ALT  15  /  AlkPhos  129<H>  03-    PT/INR - ( 01 Mar 2018 06:47 )   PT: 12.4 sec;   INR: 1.12 ratio         PTT - ( 01 Mar 2018 06:47 )  PTT:32.6 sec  Urinalysis Basic - ( 01 Mar 2018 12:27 )    Color: Yellow / Appearance: Clear / S.020 / pH: x  Gluc: x / Ketone: Negative  / Bili: Negative / Urobili: Negative mg/dL   Blood: x / Protein: 100 mg/dL / Nitrite: Negative   Leuk Esterase: Moderate / RBC: 6-10 /HPF / WBC >50   Sq Epi: x / Non Sq Epi: Occasional / Bacteria: Moderate        RADIOLOGY & ADDITIONAL STUDIES:

## 2018-03-01 NOTE — H&P ADULT - HISTORY OF PRESENT ILLNESS
85 y/o M w/ a PMHx of CHF, dementia, prostate cancer, seizures, CAD, GERD, anemia, tracheostomy, gastrostomy, colostomy, and ventilator dependent presents to ED from nursing home due to seizure activity that began at 05:00. Patient was given 500 mg of Keppra through PEG before leaving nursing home. On arrival to ED, patient still exhibited seizure activity. Patient was given 2 doses of Versed totaling 8 mg, and 1 dose of phenobarbital 1600 mg IV. Post CT scan and phenobarbital patient became hypotensive. Currently responding well to IV fluids. Patient is known from prior admission to have a legal guardian. His law office was contacted and message was left, stated he will get back to us today. Name--Van Monroy [phone].

## 2018-03-01 NOTE — H&P ADULT - PROBLEM SELECTOR PLAN 3
- Continue current vent settings  - Monitor O2 sat  - Pulmonary toileting  - Elevate the head of the bed at 30 degrees - Continue current vent settings  - Monitor O2 sat  - Pulmonary toileting  - Elevate head of bed to 30 degrees - Continue full vent support at this time  - As per family, patient was tolerating trach collar yesterday  - Monitor O2 sat  - Pulmonary toileting  - Elevate head of bed to 30 degrees  - VAP prophylaxis

## 2018-03-01 NOTE — ED PROVIDER NOTE - CRITICAL CARE PROVIDED
documentation/additional history taking/direct patient care (not related to procedure)/consultation with other physicians/interpretation of diagnostic studies

## 2018-03-01 NOTE — CONSULT NOTE ADULT - ASSESSMENT
Paraphimosis (which reportedly has occurred before) was reduced. Cleansed with antiseptic solution. Bacitracin ointment to be applied.

## 2018-03-01 NOTE — ED ADULT NURSE NOTE - OBJECTIVE STATEMENT
BIBA from NH, patient seizing- focal seizures noted, patient given Keppra at NH, no HX of Seizures, patient has trach vent assisted, peg tube and colostomy non verbal BIBA from NH, patient seizing- focal seizures noted, patient given Keppra at NH, no HX of Seizures, patient has trach vent assisted, peg tube and colostomy non verbal. Pt with 100mcg of Fentanyl patch on and removed.

## 2018-03-01 NOTE — EEG REPORT - NS EEG TEXT BOX
[facility]  Report of Routine EEG with Video    [facility]: [address], [address] [place] [address], Phone: [phone]  [facility]: [address], [place] [address], Phone: [phone]  Office: [address], [place] [address], [place] [address], Phone: [phone]    Patient Name: Miko Aldrich    Age: 86 y  : 1932  Patient ID: -, MRN #: -, Location: -  Referring Physician: Delma Moreira    EEG #: [id number]  Study Date: 3/1/2018		    Technical Information:					  On Instrument: -  Placement and Labeling of Electrodes:  The EEG was performed utilizing 20 channels referential EEG connections (coronal over temporal over parasagittal montage) using all standard 10-20 electrode placements with EKG.  Recording was at a sampling rate of 256 samples per second per channel.  Time synchronized digital video recording was done simultaneously with EEG recording.  A low light infrared camera was used for low light recording.  Marty and seizure detection algorithms were utilized.    History:  Sent from nursing home for seizures.   Seizing in ER for 2 hours before Versed and Phenobarb kicked in      Medication	  Cerebyx	  Keppra	    Interpretation:    18-24A  Starting: 3/1/2018    Daily EEG Visual Analysis  FINDINGS:  The background  consisted of right hemisphere theta in the 5-7 HZ range with some admixed alpha/beta. Voltage was suppressed over the  left hemisphere with theta/deleta activity.    Background Slowing:  Generalized slowing: mild- moderate  Focal slowing: Left hemisphere slowing    Sleep Background:  Normal sleep not recorded.    Other Paroxysmal Activity:  None     Interictal Epileptiform Activity:   No epileptiform discharges were present.    Ictal Epileptiform Activity or Events:  Frequent brief seizures characterized by bilateral suppression and high frequency beta activity followed by bilateral theta sloing lastin approximately 30 seconds. Clinically patient has tonic contraction of right shoulder.    EEG Impression:  Abnormal EEG due to the presence of :  1.	Mild – moderate generalized slowing  2.	Left hemisphere slowing and voltage suppression  3.	Frequent brief tonic  seizures characterized by right shoulder tonic adduction.    Clinical Correlation:    Consistent with a mild diffuse encephalopathy,  left hemisphere dysfunction and brief left frontal partial seizures.        Teto Thomas MD  Attending Epileptologist and ,  [facility]

## 2018-03-01 NOTE — H&P ADULT - PROBLEM SELECTOR PLAN 1
- Constant EEG monitoring  - Continue to treat with Keppra  - Consult neurology - 24 hr EEG monitoring  - Continue to treat with Keppra  - Consult neurology - 24 hr EEG monitoring  - Continue to treat with Keppra  - Neurology consulted, Dr. Canales evaluated patient

## 2018-03-01 NOTE — H&P ADULT - ASSESSMENT
85 y/o M from nursing home w/ a chronic trach, PEG, and colostomy presented w/ status epilepticus and acute on chronic raspatory failure.

## 2018-03-01 NOTE — ED ADULT NURSE NOTE - PMH
BPH (benign prostatic hypertrophy)    CAD (coronary artery disease)    CHF (congestive heart failure)    Clostridium difficile diarrhea  in 2009  Dementia    Dysphagia    Fall  Multiple Mechanical falls  HLD (hyperlipidemia)    HTN (hypertension)    Prostate cancer  Treated w/ radiation  Seizure  (last event >1yr ago)  Stroke  (~5yrs ago)

## 2018-03-01 NOTE — H&P ADULT - ATTENDING COMMENTS
Pt admitted by [facility] PA, I have seen and evaluated this patient independently and agree with the above findings except as follows: 86 year old debilitated male with CHF, dementia, prostate ca, CAD, known seizure disorder trach, PEG, colostomy, pleurex drain, chronic jones sent from nursing facility for intractable seizure. Admit to MICU Status epilepticus- 24h vEEG has been initiated, neurology eval, pt received Phenorbarb loading, monitor levels and continue all 3 AEDs, possible factors causing SE include low drug levels, competing metabolism, sepsis/fever, unclear what mental status was at baseline, continue vent support from chronic respiratory failure, jones to be evaluated and changed by urology, pancultures with broad abx coverage, wound care consult for large unstageable sacral/back ulcer, frequent repositioning, HOB 30 degrees, holding tube feeds at present with peg to suction and re-evaluate. Will trend CXR to look for reaccumulation of pleural effusion and drain pleurex as needed. Overall prognosis severely limited in this elderly male with multiple complex and chronic medical issues. Pt has a legal guardian, his wife and daughter came to bedside today , will enlist social work and palliative care to help elucidate stake holders and address advance directives.

## 2018-03-01 NOTE — ED ADULT NURSE REASSESSMENT NOTE - NS ED NURSE REASSESS COMMENT FT1
JIM Delgado from ICU at bedside. Pt hypotensive PA aware. new order for Normosol bolus 1000ml.
patient in A-fib MDA Versaid given for seizure activity at 0635
patient remains on Vent, MD dr garcia remains at bedside
patient started on phenobarbital, VSS
post medication patient remains in seizure activity phenobarbital ordered from RX
Patient received at 0750; nonverbal at this time. S/P seizure. Denies SOB, dizziness. Pt from [facility]. chronic Trach vent , colostomy bag, Baugh cath and chest tube in place. VSS. Respirations unlabored. Report received at bedside. Cardiac monitor in place. NSR. Continue to monitor patient and maintain safety.

## 2018-03-01 NOTE — CONSULT NOTE ADULT - ASSESSMENT
The patient is a 86y Male with multiple seizures, now stabilized    continue keppra, dilantin and phenobarbital  continue EEG monitoring    will follow with you    Sim Canales MD PhD   [id number]

## 2018-03-01 NOTE — ED PROVIDER NOTE - PROGRESS NOTE DETAILS
pt signed out to me with status epipelticus who recently received versed and phenobarb and eventually stopped seizing. pt also had a low grade fever and pneumonia on xray and was started on vancomycin and aztreonam. pt's ct scan did not show a bleed and he was hypotensive and started on fluids and wilbe admitted to the icu

## 2018-03-01 NOTE — ED PROVIDER NOTE - PSH
Abdominal hernia    Colostomy in place    Deviated septum    H/O hemorrhoidectomy    S/P percutaneous endoscopic gastrostomy (PEG) tube placement    Status post cardiac surgery  stents x 2

## 2018-03-01 NOTE — H&P ADULT - GENITOURINARY COMMENTS
Jones present on admission, unclear of when last changed. Patient w/ recent phimosis, now with retracted edematous foreskin compressing area of urethral opening. Bloody urethral discharge around jones

## 2018-03-01 NOTE — H&P ADULT - PROBLEM SELECTOR PLAN 5
- Continue abx - Continue broad spectrum abx  - Follow-up cultures, will narrow abx treatment dependent on culture results - Likely urinary source secondary to chronic indwelling jones with paraphimosis  - Continue broad spectrum abx  - Follow-up cultures, will narrow abx treatment dependent on culture results  - Urology called, Dr. Alcala will see patient

## 2018-03-02 DIAGNOSIS — F01.50 VASCULAR DEMENTIA WITHOUT BEHAVIORAL DISTURBANCE: ICD-10-CM

## 2018-03-02 DIAGNOSIS — R78.81 BACTEREMIA: ICD-10-CM

## 2018-03-02 DIAGNOSIS — Z51.5 ENCOUNTER FOR PALLIATIVE CARE: ICD-10-CM

## 2018-03-02 LAB
ANION GAP SERPL CALC-SCNC: 13 MMOL/L — SIGNIFICANT CHANGE UP (ref 5–17)
BUN SERPL-MCNC: 25 MG/DL — HIGH (ref 8–20)
CALCIUM SERPL-MCNC: 8.9 MG/DL — SIGNIFICANT CHANGE UP (ref 8.6–10.2)
CHLORIDE SERPL-SCNC: 97 MMOL/L — LOW (ref 98–107)
CO2 SERPL-SCNC: 29 MMOL/L — SIGNIFICANT CHANGE UP (ref 22–29)
CREAT SERPL-MCNC: 0.58 MG/DL — SIGNIFICANT CHANGE UP (ref 0.5–1.3)
CULTURE RESULTS: SIGNIFICANT CHANGE UP
GLUCOSE SERPL-MCNC: 106 MG/DL — SIGNIFICANT CHANGE UP (ref 70–115)
HCT VFR BLD CALC: 28.1 % — LOW (ref 42–52)
HGB BLD-MCNC: 8.2 G/DL — LOW (ref 14–18)
MAGNESIUM SERPL-MCNC: 2.4 MG/DL — SIGNIFICANT CHANGE UP (ref 1.6–2.6)
MCHC RBC-ENTMCNC: 24.3 PG — LOW (ref 27–31)
MCHC RBC-ENTMCNC: 29.2 G/DL — LOW (ref 32–36)
MCV RBC AUTO: 83.4 FL — SIGNIFICANT CHANGE UP (ref 80–94)
METHOD TYPE: SIGNIFICANT CHANGE UP
MRSA SPEC QL CULT: SIGNIFICANT CHANGE UP
PHENOBARB SERPL-MCNC: 27 UG/ML — SIGNIFICANT CHANGE UP (ref 15–40)
PHENYTOIN FREE SERPL-MCNC: 14.2 UG/ML — SIGNIFICANT CHANGE UP (ref 10–20)
PHOSPHATE SERPL-MCNC: 3.2 MG/DL — SIGNIFICANT CHANGE UP (ref 2.4–4.7)
PLATELET # BLD AUTO: 288 K/UL — SIGNIFICANT CHANGE UP (ref 150–400)
POTASSIUM SERPL-MCNC: 3.6 MMOL/L — SIGNIFICANT CHANGE UP (ref 3.5–5.3)
POTASSIUM SERPL-SCNC: 3.6 MMOL/L — SIGNIFICANT CHANGE UP (ref 3.5–5.3)
RBC # BLD: 3.37 M/UL — LOW (ref 4.6–6.2)
RBC # FLD: 19.3 % — HIGH (ref 11–15.6)
SODIUM SERPL-SCNC: 139 MMOL/L — SIGNIFICANT CHANGE UP (ref 135–145)
SPECIMEN SOURCE: SIGNIFICANT CHANGE UP
WBC # BLD: 8.3 K/UL — SIGNIFICANT CHANGE UP (ref 4.8–10.8)
WBC # FLD AUTO: 8.3 K/UL — SIGNIFICANT CHANGE UP (ref 4.8–10.8)

## 2018-03-02 PROCEDURE — 95951: CPT | Mod: 26

## 2018-03-02 PROCEDURE — 99291 CRITICAL CARE FIRST HOUR: CPT

## 2018-03-02 PROCEDURE — 99223 1ST HOSP IP/OBS HIGH 75: CPT

## 2018-03-02 RX ORDER — FUROSEMIDE 40 MG
20 TABLET ORAL ONCE
Qty: 0 | Refills: 0 | Status: COMPLETED | OUTPATIENT
Start: 2018-03-02 | End: 2018-03-02

## 2018-03-02 RX ORDER — LEVETIRACETAM 250 MG/1
1000 TABLET, FILM COATED ORAL EVERY 12 HOURS
Qty: 0 | Refills: 0 | Status: DISCONTINUED | OUTPATIENT
Start: 2018-03-02 | End: 2018-03-03

## 2018-03-02 RX ORDER — PHENOBARBITAL 60 MG
200 TABLET ORAL ONCE
Qty: 0 | Refills: 0 | Status: DISCONTINUED | OUTPATIENT
Start: 2018-03-02 | End: 2018-03-02

## 2018-03-02 RX ORDER — FUROSEMIDE 40 MG
1 TABLET ORAL
Qty: 0 | Refills: 0 | COMMUNITY

## 2018-03-02 RX ORDER — FOSPHENYTOIN 50 MG/ML
200 INJECTION INTRAMUSCULAR; INTRAVENOUS ONCE
Qty: 0 | Refills: 0 | Status: COMPLETED | OUTPATIENT
Start: 2018-03-02 | End: 2018-03-02

## 2018-03-02 RX ORDER — METOPROLOL TARTRATE 50 MG
1 TABLET ORAL
Qty: 0 | Refills: 0 | COMMUNITY

## 2018-03-02 RX ORDER — BACITRACIN ZINC 500 UNIT/G
1 OINTMENT IN PACKET (EA) TOPICAL EVERY 8 HOURS
Qty: 0 | Refills: 0 | Status: DISCONTINUED | OUTPATIENT
Start: 2018-03-02 | End: 2018-03-13

## 2018-03-02 RX ORDER — POTASSIUM CHLORIDE 20 MEQ
15 PACKET (EA) ORAL
Qty: 0 | Refills: 0 | COMMUNITY

## 2018-03-02 RX ADMIN — FOSPHENYTOIN 104 MILLIGRAM(S) PE: 50 INJECTION INTRAMUSCULAR; INTRAVENOUS at 14:07

## 2018-03-02 RX ADMIN — FOSPHENYTOIN 104 MILLIGRAM(S) PE: 50 INJECTION INTRAMUSCULAR; INTRAVENOUS at 07:32

## 2018-03-02 RX ADMIN — PANTOPRAZOLE SODIUM 40 MILLIGRAM(S): 20 TABLET, DELAYED RELEASE ORAL at 11:51

## 2018-03-02 RX ADMIN — ENOXAPARIN SODIUM 40 MILLIGRAM(S): 100 INJECTION SUBCUTANEOUS at 11:51

## 2018-03-02 RX ADMIN — CHLORHEXIDINE GLUCONATE 15 MILLILITER(S): 213 SOLUTION TOPICAL at 05:26

## 2018-03-02 RX ADMIN — CHLORHEXIDINE GLUCONATE 15 MILLILITER(S): 213 SOLUTION TOPICAL at 17:06

## 2018-03-02 RX ADMIN — Medication 81 MILLIGRAM(S): at 11:51

## 2018-03-02 RX ADMIN — FOSPHENYTOIN 108 MILLIGRAM(S) PE: 50 INJECTION INTRAMUSCULAR; INTRAVENOUS at 03:12

## 2018-03-02 RX ADMIN — Medication 20 MILLIGRAM(S): at 18:13

## 2018-03-02 RX ADMIN — Medication 60 MILLIGRAM(S): at 06:24

## 2018-03-02 RX ADMIN — LEVETIRACETAM 400 MILLIGRAM(S): 250 TABLET, FILM COATED ORAL at 06:05

## 2018-03-02 RX ADMIN — Medication 250 MILLIGRAM(S): at 21:46

## 2018-03-02 RX ADMIN — Medication 1 APPLICATION(S): at 06:05

## 2018-03-02 RX ADMIN — Medication 1 APPLICATION(S): at 11:51

## 2018-03-02 RX ADMIN — Medication 0.25 MILLIGRAM(S): at 06:05

## 2018-03-02 RX ADMIN — Medication 50 MILLIGRAM(S): at 17:06

## 2018-03-02 RX ADMIN — LEVETIRACETAM 400 MILLIGRAM(S): 250 TABLET, FILM COATED ORAL at 17:06

## 2018-03-02 RX ADMIN — Medication 50 MILLIGRAM(S): at 06:05

## 2018-03-02 RX ADMIN — Medication 1 APPLICATION(S): at 14:07

## 2018-03-02 RX ADMIN — Medication 250 MILLIGRAM(S): at 10:01

## 2018-03-02 RX ADMIN — Medication 406.16 MILLIGRAM(S): at 06:05

## 2018-03-02 RX ADMIN — FOSPHENYTOIN 104 MILLIGRAM(S) PE: 50 INJECTION INTRAMUSCULAR; INTRAVENOUS at 21:46

## 2018-03-02 RX ADMIN — Medication 20 MILLIGRAM(S): at 03:50

## 2018-03-02 RX ADMIN — Medication 60 MILLIGRAM(S): at 17:06

## 2018-03-02 RX ADMIN — Medication 1 APPLICATION(S): at 21:46

## 2018-03-02 NOTE — PROGRESS NOTE ADULT - SUBJECTIVE AND OBJECTIVE BOX
Procedure    #6 Shiley Tach with leak despite balloon inflation.  Changed to XLT #7.  Leak gone.  ? tracheomalacia?

## 2018-03-02 NOTE — CONSULT NOTE ADULT - SUBJECTIVE AND OBJECTIVE BOX
HPI: 86M with PMH as listed admitted 3/1 with seizure activity admitted to MICU.     Patient and his legal guardian well known to me from prior admission here at [facility] in 2017. Since then patient has had 5 admissions to [facility], most recent admission in December, necessitating a tracheostomy.      PERTINENT PMH REVIEWED: Yes     PAST MEDICAL & SURGICAL HISTORY:  Dysphagia  Fall: Multiple Mechanical falls  CAD (coronary artery disease)  Clostridium difficile diarrhea: in   BPH (benign prostatic hypertrophy)  Dementia  HLD (hyperlipidemia)  CHF (congestive heart failure)  Prostate cancer: Treated w/ radiation  Stroke: (~5yrs ago)  Seizure: (last event &gt;1yr ago)  HTN (hypertension)  Colostomy in place  S/P percutaneous endoscopic gastrostomy (PEG) tube placement  H/O hemorrhoidectomy  Deviated septum  Abdominal hernia  Status post cardiac surgery: stents x 2  Tracheostomy - Dec 2017    SOCIAL HISTORY:  from [facility] - prior to December Admission at [facility] was at [facility] from                                     Admitted from:  [facility]      Legal Guardian - Van Monroy     FAMILY HISTORY:  No pertinent family history in first degree relatives    Baseline ADLs (prior to admission):  Dependent      Allergies    clindamycin (Unknown)  Nuts (Hives; Rash)  PC Pen VK (Unknown)  strawberry (Short breath; Rash; Hives)  Xanax (Rash)    Intolerances    Ativan (Unknown)  Haldol (Dystonic RXN)  hydrALAZINE (Unknown)  Zyprexa (Unknown)    Present Symptoms:     Dyspnea: vented   Nausea/Vomiting: Yes No  Anxiety:  Yes No  Depression: Yes No  Fatigue: Yes No  Loss of appetite: Yes No    Pain:             Character-            Duration-            Effect-            Factors-            Frequency-            Location-            Severity-    Review of Systems: Reviewed                     Negative:                     Positive:  Unable to obtain due to poor mentation   All others negative    MEDICATIONS  (STANDING):  aspirin  chewable 81 milliGRAM(s) Oral daily  aztreonam  IVPB 1000 milliGRAM(s) IV Intermittent every 12 hours  aztreonam  IVPB      BACItracin   Ointment 1 Application(s) Topical every 8 hours  chlorhexidine 0.12% Liquid 15 milliLiter(s) Swish and Spit two times a day  collagenase Ointment 1 Application(s) Topical daily  digoxin     Tablet 0.25 milliGRAM(s) Oral daily  enoxaparin Injectable 40 milliGRAM(s) SubCutaneous daily  fosphenytoin IVPB 100 milliGRAM(s) PE IV Intermittent every 8 hours  levETIRAcetam  IVPB 1000 milliGRAM(s) IV Intermittent every 12 hours  pantoprazole  Injectable 40 milliGRAM(s) IV Push daily  PHENobarbital Elixir 60 milliGRAM(s) Oral two times a day  vancomycin  IVPB 1000 milliGRAM(s) IV Intermittent every 12 hours    PHYSICAL EXAM:    Vital Signs Last 24 Hrs  T(C): 37.7 (02 Mar 2018 12:00), Max: 37.7 (02 Mar 2018 12:00)  T(F): 99.8 (02 Mar 2018 12:00), Max: 99.8 (02 Mar 2018 12:00)  HR: 65 (02 Mar 2018 13:26) (56 - 79)  BP: 104/55 (02 Mar 2018 13:00) (96/50 - 144/72)  BP(mean): 76 (02 Mar 2018 13:00) (69 - 101)  RR: 18 (02 Mar 2018 13:00) (10 - 30)  SpO2: 98% (02 Mar 2018 13:26) (95% - 100%)    General: alert  oriented x ____ lethargic agitated                  cachexia  nonverbal  coma    Karnofsky:  %    HEENT: normal  dry mouth  ET tube/trach    Lungs: comfortable tachypnea/labored breathing  excessive secretions    CV: normal  tachycardia    GI: normal  distended  tender  no BS               PEG/NG/OG tube  constipation  last BM:     : normal  incontinent  oliguria/anuria  jones    MSK: normal  weakness  edema             ambulatory  bedbound/wheelchair bound    Skin: normal  pressure ulcers- Stage_____  no rash    LABS:                      8.2    8.3   )-----------( 288      ( 02 Mar 2018 05:52 )             28.1     03-02    139  |  97<L>  |  25.0<H>  ----------------------------<  106  3.6   |  29.0  |  0.58    Ca    8.9      02 Mar 2018 05:52  Phos  3.2     03-02  Mg     2.4     03-02    TPro  8.0  /  Alb  3.7  /  TBili  0.4  /  DBili  x   /  AST  21  /  ALT  15  /  AlkPhos  129<H>  03-01    PT/INR - ( 01 Mar 2018 06:47 )   PT: 12.4 sec;   INR: 1.12 ratio       PTT - ( 01 Mar 2018 06:47 )  PTT:32.6 sec  Urinalysis Basic - ( 01 Mar 2018 12:27 )    Color: Yellow / Appearance: Clear / S.020 / pH: x  Gluc: x / Ketone: Negative  / Bili: Negative / Urobili: Negative mg/dL   Blood: x / Protein: 100 mg/dL / Nitrite: Negative   Leuk Esterase: Moderate / RBC: 6-10 /HPF / WBC >50   Sq Epi: x / Non Sq Epi: Occasional / Bacteria: Moderate    I&O's Summary    01 Mar 2018 07:  -  02 Mar 2018 07:00  --------------------------------------------------------  IN: 3100 mL / OUT: 1485 mL / NET: 1615 mL    02 Mar 2018 07:  -  02 Mar 2018 15:09  --------------------------------------------------------  IN: 310 mL / OUT: 535 mL / NET: -225 mL    RADIOLOGY & ADDITIONAL STUDIES:    ADVANCE DIRECTIVES: Full Code - MOLST from [facility] done by legal guardian at [facility] HPI: 86M with PMH as listed admitted 3/1 with seizure activity admitted to MICU.     Patient and his legal guardian well known to me from prior admission here at [place] in 2017. Since then patient has had 5 admissions to [facility], most recent admission in December, necessitating a tracheostomy.      PERTINENT PMH REVIEWED: Yes     PAST MEDICAL & SURGICAL HISTORY:  Dysphagia  Fall: Multiple Mechanical falls  CAD (coronary artery disease)  Clostridium difficile diarrhea: in   BPH (benign prostatic hypertrophy)  Dementia  HLD (hyperlipidemia)  CHF (congestive heart failure)  Prostate cancer: Treated w/ radiation  Stroke: (~5yrs ago)  Seizure: (last event &gt;1yr ago)  HTN (hypertension)  Colostomy in place  S/P percutaneous endoscopic gastrostomy (PEG) tube placement  H/O hemorrhoidectomy  Deviated septum  Abdominal hernia  Status post cardiac surgery: stents x 2  Tracheostomy - Dec 2017    SOCIAL HISTORY:  from [facility] - prior to December Admission at [facility] was at [facility] from                                     Admitted from:  [facility]      Legal Guardian - Van Monroy     FAMILY HISTORY:  No pertinent family history in first degree relatives    Baseline ADLs (prior to admission):  Dependent      Allergies    clindamycin (Unknown)  Nuts (Hives; Rash)  PC Pen VK (Unknown)  strawberry (Short breath; Rash; Hives)  Xanax (Rash)    Intolerances    Ativan (Unknown)  Haldol (Dystonic RXN)  hydrALAZINE (Unknown)  Zyprexa (Unknown)    Present Symptoms:     Dyspnea: vented   Nausea/Vomiting: No  Anxiety:  unable   Depression: unable   Fatigue: unable   Loss of appetite: unable     Pain: none             Character-            Duration-            Effect-            Factors-            Frequency-            Location-            Severity-    Review of Systems: Reviewed                    Unable to obtain due to poor mentation   All others negative    MEDICATIONS  (STANDING):  aspirin  chewable 81 milliGRAM(s) Oral daily  aztreonam  IVPB 1000 milliGRAM(s) IV Intermittent every 12 hours  aztreonam  IVPB      BACItracin   Ointment 1 Application(s) Topical every 8 hours  chlorhexidine 0.12% Liquid 15 milliLiter(s) Swish and Spit two times a day  collagenase Ointment 1 Application(s) Topical daily  digoxin     Tablet 0.25 milliGRAM(s) Oral daily  enoxaparin Injectable 40 milliGRAM(s) SubCutaneous daily  fosphenytoin IVPB 100 milliGRAM(s) PE IV Intermittent every 8 hours  levETIRAcetam  IVPB 1000 milliGRAM(s) IV Intermittent every 12 hours  pantoprazole  Injectable 40 milliGRAM(s) IV Push daily  PHENobarbital Elixir 60 milliGRAM(s) Oral two times a day  vancomycin  IVPB 1000 milliGRAM(s) IV Intermittent every 12 hours    PHYSICAL EXAM:    Vital Signs Last 24 Hrs  T(C): 37.7 (02 Mar 2018 12:00), Max: 37.7 (02 Mar 2018 12:00)  T(F): 99.8 (02 Mar 2018 12:00), Max: 99.8 (02 Mar 2018 12:00)  HR: 65 (02 Mar 2018 13:26) (56 - 79)  BP: 104/55 (02 Mar 2018 13:00) (96/50 - 144/72)  BP(mean): 76 (02 Mar 2018 13:00) (69 - 101)  RR: 18 (02 Mar 2018 13:00) (10 - 30)  SpO2: 98% (02 Mar 2018 13:26) (95% - 100%)    General: nonverbal      Karnofsky:  20%    HEENT: trach    Lungs: comfortable     CV: normal      GI: PEG     : incontinent    MSK: bedbound     Skin: no rash    LABS:                      8.2    8.3   )-----------( 288      ( 02 Mar 2018 05:52 )             28.1     03-02    139  |  97<L>  |  25.0<H>  ----------------------------<  106  3.6   |  29.0  |  0.58    Ca    8.9      02 Mar 2018 05:52  Phos  3.2     03-02  Mg     2.4     03-02    TPro  8.0  /  Alb  3.7  /  TBili  0.4  /  DBili  x   /  AST  21  /  ALT  15  /  AlkPhos  129<H>  03-01    PT/INR - ( 01 Mar 2018 06:47 )   PT: 12.4 sec;   INR: 1.12 ratio       PTT - ( 01 Mar 2018 06:47 )  PTT:32.6 sec  Urinalysis Basic - ( 01 Mar 2018 12:27 )    Color: Yellow / Appearance: Clear / S.020 / pH: x  Gluc: x / Ketone: Negative  / Bili: Negative / Urobili: Negative mg/dL   Blood: x / Protein: 100 mg/dL / Nitrite: Negative   Leuk Esterase: Moderate / RBC: 6-10 /HPF / WBC >50   Sq Epi: x / Non Sq Epi: Occasional / Bacteria: Moderate    I&O's Summary    01 Mar 2018 07:  -  02 Mar 2018 07:00  --------------------------------------------------------  IN: 3100 mL / OUT: 1485 mL / NET: 1615 mL    02 Mar 2018 07:  -  02 Mar 2018 15:09  --------------------------------------------------------  IN: 310 mL / OUT: 535 mL / NET: -225 mL    RADIOLOGY & ADDITIONAL STUDIES:    ADVANCE DIRECTIVES: Full Code - [facility] from [facility] done by legal guardian at [facility] HPI: 86M with PMH as listed admitted 3/1 with seizure activity admitted to MICU.     Patient and his legal guardian well known to me from prior admission here at [place] in 2017. Since then patient has had 5 admissions to [facility], most recent admission in December, necessitating a tracheostomy.      PERTINENT PMH REVIEWED: Yes     PAST MEDICAL & SURGICAL HISTORY:  Dysphagia  Fall: Multiple Mechanical falls  CAD (coronary artery disease)  Clostridium difficile diarrhea: in   BPH (benign prostatic hypertrophy)  Dementia  HLD (hyperlipidemia)  CHF (congestive heart failure)  Prostate cancer: Treated w/ radiation  Stroke: (~5yrs ago)  Seizure: (last event &gt;1yr ago)  HTN (hypertension)  Colostomy in place  S/P percutaneous endoscopic gastrostomy (PEG) tube placement  H/O hemorrhoidectomy  Deviated septum  Abdominal hernia  Status post cardiac surgery: stents x 2  Tracheostomy - Dec 2017    SOCIAL HISTORY:  from [facility] - prior to December Admission at [facility] was at [facility] from                                     Admitted from:  [facility]      Legal Guardian - Van Monroy     FAMILY HISTORY:  No pertinent family history in first degree relatives    Baseline ADLs (prior to admission):  Dependent      Allergies    clindamycin (Unknown)  Nuts (Hives; Rash)  PC Pen VK (Unknown)  strawberry (Short breath; Rash; Hives)  Xanax (Rash)    Intolerances    Ativan (Unknown)  Haldol (Dystonic RXN)  hydrALAZINE (Unknown)  Zyprexa (Unknown)    Present Symptoms:     Dyspnea: vented   Nausea/Vomiting: No  Anxiety:  unable   Depression: unable   Fatigue: unable   Loss of appetite: unable     Pain: none             Character-            Duration-            Effect-            Factors-            Frequency-            Location-            Severity-    Review of Systems: Reviewed                    Unable to obtain due to poor mentation   All others negative    MEDICATIONS  (STANDING):  aspirin  chewable 81 milliGRAM(s) Oral daily  aztreonam  IVPB 1000 milliGRAM(s) IV Intermittent every 12 hours  aztreonam  IVPB      BACItracin   Ointment 1 Application(s) Topical every 8 hours  chlorhexidine 0.12% Liquid 15 milliLiter(s) Swish and Spit two times a day  collagenase Ointment 1 Application(s) Topical daily  digoxin     Tablet 0.25 milliGRAM(s) Oral daily  enoxaparin Injectable 40 milliGRAM(s) SubCutaneous daily  fosphenytoin IVPB 100 milliGRAM(s) PE IV Intermittent every 8 hours  levETIRAcetam  IVPB 1000 milliGRAM(s) IV Intermittent every 12 hours  pantoprazole  Injectable 40 milliGRAM(s) IV Push daily  PHENobarbital Elixir 60 milliGRAM(s) Oral two times a day  vancomycin  IVPB 1000 milliGRAM(s) IV Intermittent every 12 hours    PHYSICAL EXAM:    Vital Signs Last 24 Hrs  T(C): 37.7 (02 Mar 2018 12:00), Max: 37.7 (02 Mar 2018 12:00)  T(F): 99.8 (02 Mar 2018 12:00), Max: 99.8 (02 Mar 2018 12:00)  HR: 65 (02 Mar 2018 13:26) (56 - 79)  BP: 104/55 (02 Mar 2018 13:00) (96/50 - 144/72)  BP(mean): 76 (02 Mar 2018 13:00) (69 - 101)  RR: 18 (02 Mar 2018 13:00) (10 - 30)  SpO2: 98% (02 Mar 2018 13:26) (95% - 100%)    General: nonverbal      Karnofsky:  20%    HEENT: trach    Lungs: comfortable     CV: normal      GI: PEG     : incontinent    MSK: bedbound     Skin: unstageable 15 x 8 cm sacral ulcer. stage 3 neck ulcer 1 x 3 cm    LABS:                      8.2    8.3   )-----------( 288      ( 02 Mar 2018 05:52 )             28.1     03-02    139  |  97<L>  |  25.0<H>  ----------------------------<  106  3.6   |  29.0  |  0.58    Ca    8.9      02 Mar 2018 05:52  Phos  3.2     03-02  Mg     2.4     03-02    TPro  8.0  /  Alb  3.7  /  TBili  0.4  /  DBili  x   /  AST  21  /  ALT  15  /  AlkPhos  129<H>  03-01    PT/INR - ( 01 Mar 2018 06:47 )   PT: 12.4 sec;   INR: 1.12 ratio       PTT - ( 01 Mar 2018 06:47 )  PTT:32.6 sec  Urinalysis Basic - ( 01 Mar 2018 12:27 )    Color: Yellow / Appearance: Clear / S.020 / pH: x  Gluc: x / Ketone: Negative  / Bili: Negative / Urobili: Negative mg/dL   Blood: x / Protein: 100 mg/dL / Nitrite: Negative   Leuk Esterase: Moderate / RBC: 6-10 /HPF / WBC >50   Sq Epi: x / Non Sq Epi: Occasional / Bacteria: Moderate    I&O's Summary    01 Mar 2018 07:  -  02 Mar 2018 07:00  --------------------------------------------------------  IN: 3100 mL / OUT: 1485 mL / NET: 1615 mL    02 Mar 2018 07:  -  02 Mar 2018 15:09  --------------------------------------------------------  IN: 310 mL / OUT: 535 mL / NET: -225 mL    RADIOLOGY & ADDITIONAL STUDIES:    ADVANCE DIRECTIVES: Full Code - [facility] from [facility] done by legal guardian at [facility]

## 2018-03-02 NOTE — PROGRESS NOTE ADULT - ASSESSMENT
87 y/o M from nursing home w/ a chronic trach, PEG, and colostomy presented w/ status epilepticus and acute on chronic raspatory failure. 87 y/o M from nursing home w/ a chronic trach, PEG, and colostomy presented w/ status epilepticus and sepsis.

## 2018-03-02 NOTE — PROGRESS NOTE ADULT - SUBJECTIVE AND OBJECTIVE BOX
Patient is a 86y old  Male who presents with a chief complaint of Seizures (01 Mar 2018 09:10)      BRIEF HOSPITAL COURSE: ***    Events last 24 hours: ***    PAST MEDICAL & SURGICAL HISTORY:  Dysphagia  Fall: Multiple Mechanical falls  CAD (coronary artery disease)  Clostridium difficile diarrhea: in   BPH (benign prostatic hypertrophy)  Dementia  HLD (hyperlipidemia)  CHF (congestive heart failure)  Prostate cancer: Treated w/ radiation  Stroke: (~5yrs ago)  Seizure: (last event &gt;1yr ago)  HTN (hypertension)  Colostomy in place  S/P percutaneous endoscopic gastrostomy (PEG) tube placement  H/O hemorrhoidectomy  Deviated septum  Abdominal hernia  Status post cardiac surgery: stents x 2      Review of Systems:  CONSTITUTIONAL: No fever, chills, or fatigue  EYES: No eye pain, visual disturbances, or discharge  ENMT:  No difficulty hearing, tinnitus, vertigo; No sinus or throat pain  NECK: No pain or stiffness  RESPIRATORY: No cough, wheezing, chills or hemoptysis; No shortness of breath  CARDIOVASCULAR: No chest pain, palpitations, dizziness, or leg swelling  GASTROINTESTINAL: No abdominal or epigastric pain. No nausea, vomiting, or hematemesis; No diarrhea or constipation. No melena or hematochezia.  GENITOURINARY: No dysuria, frequency, hematuria, or incontinence  NEUROLOGICAL: No headaches, memory loss, loss of strength, numbness, or tremors  SKIN: No itching, burning, rashes, or lesions   MUSCULOSKELETAL: No joint pain or swelling; No muscle, back, or extremity pain  PSYCHIATRIC: No depression, anxiety, mood swings, or difficulty sleeping      Medications:  aztreonam  IVPB 1000 milliGRAM(s) IV Intermittent every 12 hours  aztreonam  IVPB      vancomycin  IVPB 1000 milliGRAM(s) IV Intermittent every 12 hours    digoxin     Tablet 0.25 milliGRAM(s) Oral daily      fosphenytoin IVPB 100 milliGRAM(s) PE IV Intermittent every 8 hours  levETIRAcetam  IVPB 1000 milliGRAM(s) IV Intermittent every 12 hours  PHENobarbital Elixir 60 milliGRAM(s) Oral two times a day      aspirin  chewable 81 milliGRAM(s) Oral daily  enoxaparin Injectable 40 milliGRAM(s) SubCutaneous daily    pantoprazole  Injectable 40 milliGRAM(s) IV Push daily            BACItracin   Ointment 1 Application(s) Topical every 8 hours  chlorhexidine 0.12% Liquid 15 milliLiter(s) Swish and Spit two times a day  collagenase Ointment 1 Application(s) Topical daily        Mode: AC/ CMV (Assist Control/ Continuous Mandatory Ventilation)  RR (machine): 16  TV (machine): 500  FiO2: 30  PEEP: 5  MAP: 10  PIP: 32      ICU Vital Signs Last 24 Hrs  T(C): 37.6 (02 Mar 2018 07:00), Max: 37.6 (02 Mar 2018 07:00)  T(F): 99.7 (02 Mar 2018 07:00), Max: 99.7 (02 Mar 2018 07:00)  HR: 71 (02 Mar 2018 09:06) (52 - 79)  BP: 117/59 (02 Mar 2018 08:00) (87/49 - 137/68)  BP(mean): 83 (02 Mar 2018 08:00) (67 - 97)  ABP: --  ABP(mean): --  RR: 16 (02 Mar 2018 08:00) (10 - 22)  SpO2: 100% (02 Mar 2018 09:06) (95% - 100%)          I&O's Detail    01 Mar 2018 07:01  -  02 Mar 2018 07:00  --------------------------------------------------------  IN:    0.9% NaCl: 2000 mL    Solution: 100 mL    Solution: 200 mL    Solution: 500 mL    Solution: 100 mL    Solution: 200 mL  Total IN: 3100 mL    OUT:    Indwelling Catheter - Urethral: 335 mL    PEG (Percutaneous Endoscopic Gastrostomy) Tube: 300 mL    Voided: 850 mL  Total OUT: 1485 mL    Total NET: 1615 mL      02 Mar 2018 07:01  -  02 Mar 2018 09:27  --------------------------------------------------------  IN:  Total IN: 0 mL    OUT:    Indwelling Catheter - Urethral: 135 mL  Total OUT: 135 mL    Total NET: -135 mL            LABS:                        8.2    8.3   )-----------( 288      ( 02 Mar 2018 05:52 )             28.1     03-02    139  |  97<L>  |  25.0<H>  ----------------------------<  106  3.6   |  29.0  |  0.58    Ca    8.9      02 Mar 2018 05:52  Phos  3.2     -  Mg     2.4     -    TPro  8.0  /  Alb  3.7  /  TBili  0.4  /  DBili  x   /  AST  21  /  ALT  15  /  AlkPhos  129<H>  03      CARDIAC MARKERS ( 01 Mar 2018 06:47 )  x     / 0.07 ng/mL / x     / x     / x          CAPILLARY BLOOD GLUCOSE        PT/INR - ( 01 Mar 2018 06:47 )   PT: 12.4 sec;   INR: 1.12 ratio         PTT - ( 01 Mar 2018 06:47 )  PTT:32.6 sec  Urinalysis Basic - ( 01 Mar 2018 12:27 )    Color: Yellow / Appearance: Clear / S.020 / pH: x  Gluc: x / Ketone: Negative  / Bili: Negative / Urobili: Negative mg/dL   Blood: x / Protein: 100 mg/dL / Nitrite: Negative   Leuk Esterase: Moderate / RBC: 6-10 /HPF / WBC >50   Sq Epi: x / Non Sq Epi: Occasional / Bacteria: Moderate      CULTURES:  Rapid RVP Result: NotDetec ( @ 13:48)  Culture Results:   Culture grew 3 or more types of organisms which indicate  collection contamination; consider recollection only if clinically  indicated.  .  Notified Enrike [facility]endon RN ([facility] and read back. 2018 09:10 pauline ( @ 12:27)  Culture Results:   Growth in anaerobic bottle: Gram Positive Cocci in Clusters  Anaerobic Bottle: 18.36 Hours to positivity  Aerobic Bottle: No growth to date  ***Blood Panel PCR results on this specimen are available  approximately 3 hours after the Gram stain result.***  Gram stain, PCR, and/or culture results may not always  correspond due to difference in methodologies.  ************************************************************  This PCR assay was performed using [employer].  The following targets are tested for: Enterococcus,  vancomycin resistant enterococci, Listeria monocytogenes,  coagulase negative staphylococci, S. aureus,  methicillin resistant S. aureus, Streptococcus agalactiae  (Group B), S. pneumoniae, S. pyogenes (Group A),  Acinetobacter baumannii, Enterobacter cloacae, E. coli,  Klebsiella oxytoca, K. pneumoniae, Proteus sp.,  Serratia marcescens, Haemophilus influenzae,  Neisseria meningitidis, Pseudomonas aeruginosa, Candida  albicans, C. glabrata, C krusei, C parapsilosis,  C. tropicalis and the KPC resistance gene.  .  "Due to technical problems, Proteus sp. will Not be reported as part of  the BCID panel until further notice"  .  TYPE: (C=Critical, N=Notification, A=Abnormal) C  TESTS:  _ GS  DATE/TIME CALLED: _ 2018 08:39:29  CALLED TO: Amanda Rendon RN  READ BACK (2 Patient Identifiers)(Y/N): _ Y  READ BACK VALUES (Y/N): _ Y  CALLED BY: Amanda Barrera ( @ 07:22)      Physical Examination:    General: No acute distress.      HEENT: Pupils equal, reactive to light.  Symmetric.    PULM: Clear to auscultation bilaterally, no significant sputum production    CVS: Regular rate and rhythm, no murmurs, rubs, or gallops    ABD: Soft, nondistended, nontender, normoactive bowel sounds, no masses    EXT: No edema, nontender    SKIN: Warm and well perfused, no rashes noted.    NEURO: Alert, oriented, interactive, nonfocal    RADIOLOGY: ***    CRITICAL CARE TIME SPENT: *** Patient is a 86y old  Male who presents with a chief complaint of Seizures (01 Mar 2018 09:10)      BRIEF HOSPITAL COURSE: 87 y/o M w/ a PMHx of CHF, dementia, prostate cancer, seizures, CAD, GERD, anemia, tracheostomy, gastrostomy, colostomy, and ventilator dependent presents to ED from nursing home due to seizure activity that began at 05:00. Patient was given 500 mg of Keppra through PEG before leaving nursing home. On arrival to ED, patient still exhibited seizure activity. Patient was given 2 doses of Versed totaling 8 mg, and 1 dose of phenobarbital 1600 mg IV.     Events last 24 hours:     PAST MEDICAL & SURGICAL HISTORY:  Dysphagia  Fall: Multiple Mechanical falls  CAD (coronary artery disease)  Clostridium difficile diarrhea: in   BPH (benign prostatic hypertrophy)  Dementia  HLD (hyperlipidemia)  CHF (congestive heart failure)  Prostate cancer: Treated w/ radiation  Stroke: (~5yrs ago)  Seizure: (last event &gt;1yr ago)  HTN (hypertension)  Colostomy in place  S/P percutaneous endoscopic gastrostomy (PEG) tube placement  H/O hemorrhoidectomy  Deviated septum  Abdominal hernia  Status post cardiac surgery: stents x 2      Review of Systems:  Cannot be obtained pt has trach and is encephalopathic       Medications:  aztreonam  IVPB 1000 milliGRAM(s) IV Intermittent every 12 hours  aztreonam  IVPB      vancomycin  IVPB 1000 milliGRAM(s) IV Intermittent every 12 hours    digoxin     Tablet 0.25 milliGRAM(s) Oral daily      fosphenytoin IVPB 100 milliGRAM(s) PE IV Intermittent every 8 hours  levETIRAcetam  IVPB 1000 milliGRAM(s) IV Intermittent every 12 hours  PHENobarbital Elixir 60 milliGRAM(s) Oral two times a day      aspirin  chewable 81 milliGRAM(s) Oral daily  enoxaparin Injectable 40 milliGRAM(s) SubCutaneous daily    pantoprazole  Injectable 40 milliGRAM(s) IV Push daily            BACItracin   Ointment 1 Application(s) Topical every 8 hours  chlorhexidine 0.12% Liquid 15 milliLiter(s) Swish and Spit two times a day  collagenase Ointment 1 Application(s) Topical daily        Mode: AC/ CMV (Assist Control/ Continuous Mandatory Ventilation)  RR (machine): 16  TV (machine): 500  FiO2: 30  PEEP: 5  MAP: 10  PIP: 32      ICU Vital Signs Last 24 Hrs  T(C): 37.6 (02 Mar 2018 07:00), Max: 37.6 (02 Mar 2018 07:00)  T(F): 99.7 (02 Mar 2018 07:00), Max: 99.7 (02 Mar 2018 07:00)  HR: 71 (02 Mar 2018 09:06) (52 - 79)  BP: 117/59 (02 Mar 2018 08:00) (87/49 - 137/68)  BP(mean): 83 (02 Mar 2018 08:) (67 - 97)  ABP: --  ABP(mean): --  RR: 16 (02 Mar 2018 08:00) (10 - 22)  SpO2: 100% (02 Mar 2018 09:06) (95% - 100%)          I&O's Detail    01 Mar 2018 07:01  -  02 Mar 2018 07:00  --------------------------------------------------------  IN:    0.9% NaCl: 2000 mL    Solution: 100 mL    Solution: 200 mL    Solution: 500 mL    Solution: 100 mL    Solution: 200 mL  Total IN: 3100 mL    OUT:    Indwelling Catheter - Urethral: 335 mL    PEG (Percutaneous Endoscopic Gastrostomy) Tube: 300 mL    Voided: 850 mL  Total OUT: 1485 mL    Total NET: 1615 mL      02 Mar 2018 07:01  -  02 Mar 2018 09:27  --------------------------------------------------------  IN:  Total IN: 0 mL    OUT:    Indwelling Catheter - Urethral: 135 mL  Total OUT: 135 mL    Total NET: -135 mL            LABS:                        8.2    8.3   )-----------( 288      ( 02 Mar 2018 05:52 )             28.1     03-02    139  |  97<L>  |  25.0<H>  ----------------------------<  106  3.6   |  29.0  |  0.58    Ca    8.9      02 Mar 2018 05:52  Phos  3.2     03-  Mg     2.4     -    TPro  8.0  /  Alb  3.7  /  TBili  0.4  /  DBili  x   /  AST  21  /  ALT  15  /  AlkPhos  129<H>  03      CARDIAC MARKERS ( 01 Mar 2018 06:47 )  x     / 0.07 ng/mL / x     / x     / x          CAPILLARY BLOOD GLUCOSE        PT/INR - ( 01 Mar 2018 06:47 )   PT: 12.4 sec;   INR: 1.12 ratio         PTT - ( 01 Mar 2018 06:47 )  PTT:32.6 sec  Urinalysis Basic - ( 01 Mar 2018 12:27 )    Color: Yellow / Appearance: Clear / S.020 / pH: x  Gluc: x / Ketone: Negative  / Bili: Negative / Urobili: Negative mg/dL   Blood: x / Protein: 100 mg/dL / Nitrite: Negative   Leuk Esterase: Moderate / RBC: 6-10 /HPF / WBC >50   Sq Epi: x / Non Sq Epi: Occasional / Bacteria: Moderate      CULTURES:  Rapid RVP Result: NotDetec ( @ 13:48)  Culture Results:   Culture grew 3 or more types of organisms which indicate  collection contamination; consider recollection only if clinically  indicated.  .  Notified Enrike Rendon RN ([facility]) and read back. 2018 09:10 pauline ( @ 12:27)  Culture Results:   Growth in anaerobic bottle: Gram Positive Cocci in Clusters  Anaerobic Bottle: 18.36 Hours to positivity  Aerobic Bottle: No growth to date  ***Blood Panel PCR results on this specimen are available  approximately 3 hours after the Gram stain result.***  Gram stain, PCR, and/or culture results may not always  correspond due to difference in methodologies.  ************************************************************  This PCR assay was performed using [employer].  The following targets are tested for: Enterococcus,  vancomycin resistant enterococci, Listeria monocytogenes,  coagulase negative staphylococci, S. aureus,  methicillin resistant S. aureus, Streptococcus agalactiae  (Group B), S. pneumoniae, S. pyogenes (Group A),  Acinetobacter baumannii, Enterobacter cloacae, E. coli,  Klebsiella oxytoca, K. pneumoniae, Proteus sp.,  Serratia marcescens, Haemophilus influenzae,  Neisseria meningitidis, Pseudomonas aeruginosa, Candida  albicans, C. glabrata, C krusei, C parapsilosis,  C. tropicalis and the KPC resistance gene.  .  "Due to technical problems, Proteus sp. will Not be reported as part of  the BCID panel until further notice"  .  TYPE: (C=Critical, N=Notification, A=Abnormal) C  TESTS:  _ GS  DATE/TIME CALLED: _ 2018 08:39:29  CALLED TO: Amanda Rendon RN  READ BACK (2 Patient Identifiers)(Y/N): _ Y  READ BACK VALUES (Y/N): _ Y  CALLED BY: Amanda Barrera ( @ 07:22)      Physical Examination:    General: No acute distress.      HEENT: Pupils equal, reactive to light.  Symmetric.    PULM: Coarse to auscultation bilaterally, moderate sputum production    CVS: Regular rate and rhythm, no murmurs, rubs, or gallops    ABD: Soft, nondistended, nontender, normoactive bowel sounds, no masses    EXT: contracted    SKIN: Warm and well perfused, 9x13cm unstaged sacral ulcer    : partial parphimosis with edema of the glans    NEURO:     RADIOLOGY:     < from: CT Head No Cont (18 @ 07:38) >  COMPARISON: 2/15/2016.     FINDINGS:       There is prominence of the ventricles and cortical sulci. Midline   structures are intact. There are patchy hypodensities in periventricular   distribution consistent with chronic small vessel ischemic changes. There   is no CT evidence of acute territorial infarction.  There is no   hemorrhage, contusion or extraaxial collection. There is no acute   hydrocephalus. The visualized posterior fossa structures are intact.   There are scattered intracranial arterial calcification.  The visualized   paranasal sinuses are clear. Small focal left frontal soft tissue   swelling/hematoma.    IMPRESSION:       Cerebral atrophy and chronic small vessel ischemic changes.    No parenchymal contusion, hemorrhage or extra-axial collection.    No CT evidence of an acute territorial infarction.                JASMINA PRASAD M.D., ATTENDING RADIOLOGIST  This document has been electronically signed. Mar  1 2018  8:13AM        < end of copied text >    CRITICAL CARE TIME SPENT: 60 Patient is a 86y old  Male who presents with a chief complaint of Seizures (01 Mar 2018 09:10)      BRIEF HOSPITAL COURSE: 85 y/o M w/ a PMHx of CHF, dementia, prostate cancer, seizures, CAD, GERD, anemia, tracheostomy, gastrostomy, colostomy, and ventilator dependent presents to ED from nursing home due to seizure activity that began at 05:00. Patient was given 500 mg of Keppra through PEG before leaving nursing home. On arrival to ED, patient still exhibited seizure activity. Patient was given 2 doses of Versed totaling 8 mg, and 1 dose of phenobarbital 1600 mg IV. Pt was admitted to MICU fro status epilepticus and started on continuous vEEG monitoring.    Events last 24 hours: trach changed overnight due to air leak, loss of tidal volume    PAST MEDICAL & SURGICAL HISTORY:  Dysphagia  Fall: Multiple Mechanical falls  CAD (coronary artery disease)  Clostridium difficile diarrhea: in   BPH (benign prostatic hypertrophy)  Dementia  HLD (hyperlipidemia)  CHF (congestive heart failure)  Prostate cancer: Treated w/ radiation  Stroke: (~5yrs ago)  Seizure: (last event &gt;1yr ago)  HTN (hypertension)  Colostomy in place  S/P percutaneous endoscopic gastrostomy (PEG) tube placement  H/O hemorrhoidectomy  Deviated septum  Abdominal hernia  Status post cardiac surgery: stents x 2      Review of Systems:  Cannot be obtained pt has trach and is encephalopathic       Medications:  aztreonam  IVPB 1000 milliGRAM(s) IV Intermittent every 12 hours  aztreonam  IVPB      vancomycin  IVPB 1000 milliGRAM(s) IV Intermittent every 12 hours    digoxin     Tablet 0.25 milliGRAM(s) Oral daily      fosphenytoin IVPB 100 milliGRAM(s) PE IV Intermittent every 8 hours  levETIRAcetam  IVPB 1000 milliGRAM(s) IV Intermittent every 12 hours  PHENobarbital Elixir 60 milliGRAM(s) Oral two times a day      aspirin  chewable 81 milliGRAM(s) Oral daily  enoxaparin Injectable 40 milliGRAM(s) SubCutaneous daily    pantoprazole  Injectable 40 milliGRAM(s) IV Push daily            BACItracin   Ointment 1 Application(s) Topical every 8 hours  chlorhexidine 0.12% Liquid 15 milliLiter(s) Swish and Spit two times a day  collagenase Ointment 1 Application(s) Topical daily        Mode: AC/ CMV (Assist Control/ Continuous Mandatory Ventilation)  RR (machine): 16  TV (machine): 500  FiO2: 30  PEEP: 5  MAP: 10  PIP: 32      ICU Vital Signs Last 24 Hrs  T(C): 37.6 (02 Mar 2018 07:00), Max: 37.6 (02 Mar 2018 07:00)  T(F): 99.7 (02 Mar 2018 07:00), Max: 99.7 (02 Mar 2018 07:00)  HR: 71 (02 Mar 2018 09:06) (52 - 79)  BP: 117/59 (02 Mar 2018 08:00) (87/49 - 137/68)  BP(mean): 83 (02 Mar 2018 08:00) (67 - 97)  ABP: --  ABP(mean): --  RR: 16 (02 Mar 2018 08:00) (10 - 22)  SpO2: 100% (02 Mar 2018 09:06) (95% - 100%)          I&O's Detail    01 Mar 2018 07:01  -  02 Mar 2018 07:00  --------------------------------------------------------  IN:    0.9% NaCl: 2000 mL    Solution: 100 mL    Solution: 200 mL    Solution: 500 mL    Solution: 100 mL    Solution: 200 mL  Total IN: 3100 mL    OUT:    Indwelling Catheter - Urethral: 335 mL    PEG (Percutaneous Endoscopic Gastrostomy) Tube: 300 mL    Voided: 850 mL  Total OUT: 1485 mL    Total NET: 1615 mL      02 Mar 2018 07:01  -  02 Mar 2018 09:27  --------------------------------------------------------  IN:  Total IN: 0 mL    OUT:    Indwelling Catheter - Urethral: 135 mL  Total OUT: 135 mL    Total NET: -135 mL            LABS:                        8.2    8.3   )-----------( 288      ( 02 Mar 2018 05:52 )             28.1     03-02    139  |  97<L>  |  25.0<H>  ----------------------------<  106  3.6   |  29.0  |  0.58    Ca    8.9      02 Mar 2018 05:52  Phos  3.2     03-  Mg     2.4     -    TPro  8.0  /  Alb  3.7  /  TBili  0.4  /  DBili  x   /  AST  21  /  ALT  15  /  AlkPhos  129<H>  03      CARDIAC MARKERS ( 01 Mar 2018 06:47 )  x     / 0.07 ng/mL / x     / x     / x          CAPILLARY BLOOD GLUCOSE        PT/INR - ( 01 Mar 2018 06:47 )   PT: 12.4 sec;   INR: 1.12 ratio         PTT - ( 01 Mar 2018 06:47 )  PTT:32.6 sec  Urinalysis Basic - ( 01 Mar 2018 12:27 )    Color: Yellow / Appearance: Clear / S.020 / pH: x  Gluc: x / Ketone: Negative  / Bili: Negative / Urobili: Negative mg/dL   Blood: x / Protein: 100 mg/dL / Nitrite: Negative   Leuk Esterase: Moderate / RBC: 6-10 /HPF / WBC >50   Sq Epi: x / Non Sq Epi: Occasional / Bacteria: Moderate      CULTURES:  Rapid RVP Result: NotDetec ( @ 13:48)  Culture Results:   Culture grew 3 or more types of organisms which indicate  collection contamination; consider recollection only if clinically  indicated.  .  Notified Enrike Rendon RN ([facility]) and read back. 2018 09:10 skennely ( @ 12:27)  Culture Results:   Growth in anaerobic bottle: Gram Positive Cocci in Clusters  Anaerobic Bottle: 18.36 Hours to positivity  Aerobic Bottle: No growth to date  ***Blood Panel PCR results on this specimen are available  approximately 3 hours after the Gram stain result.***  Gram stain, PCR, and/or culture results may not always  correspond due to difference in methodologies.  ************************************************************  This PCR assay was performed using [employer].  The following targets are tested for: Enterococcus,  vancomycin resistant enterococci, Listeria monocytogenes,  coagulase negative staphylococci, S. aureus,  methicillin resistant S. aureus, Streptococcus agalactiae  (Group B), S. pneumoniae, S. pyogenes (Group A),  Acinetobacter baumannii, Enterobacter cloacae, E. coli,  Klebsiella oxytoca, K. pneumoniae, Proteus sp.,  Serratia marcescens, Haemophilus influenzae,  Neisseria meningitidis, Pseudomonas aeruginosa, Candida  albicans, C. glabrata, C krusei, C parapsilosis,  C. tropicalis and the KPC resistance gene.  .  "Due to technical problems, Proteus sp. will Not be reported as part of  the BCID panel until further notice"  .  TYPE: (C=Critical, N=Notification, A=Abnormal) C  TESTS:  _ GS  DATE/TIME CALLED: _ 2018 08:39:29  CALLED TO: Amanda Rendon RN  READ BACK (2 Patient Identifiers)(Y/N): _ Y  READ BACK VALUES (Y/N): _ Y  CALLED BY: Amanda Barrera ( @ 07:22)      Physical Examination:    General: No acute distress.      HEENT: Pupils equal, reactive to light.  Symmetric. trach in place    PULM: Coarse to auscultation bilaterally, moderate thick tan sputum production from around trach insertion site    CVS: Regular rate and rhythm, no murmurs, rubs, or gallops    ABD: Soft, nondistended, nontender, normoactive bowel sounds, no masses    EXT: contracted    SKIN: Warm and well perfused, 9x13cm unstaged sacral ulcer    : partial parphimosis with edema of the glans    NEURO:     RADIOLOGY:     < from: CT Head No Cont (18 @ 07:38) >  COMPARISON: 2/15/2016.     FINDINGS:       There is prominence of the ventricles and cortical sulci. Midline   structures are intact. There are patchy hypodensities in periventricular   distribution consistent with chronic small vessel ischemic changes. There   is no CT evidence of acute territorial infarction.  There is no   hemorrhage, contusion or extraaxial collection. There is no acute   hydrocephalus. The visualized posterior fossa structures are intact.   There are scattered intracranial arterial calcification.  The visualized   paranasal sinuses are clear. Small focal left frontal soft tissue   swelling/hematoma.    IMPRESSION:       Cerebral atrophy and chronic small vessel ischemic changes.    No parenchymal contusion, hemorrhage or extra-axial collection.    No CT evidence of an acute territorial infarction.                JASMINA PRASAD M.D., ATTENDING RADIOLOGIST  This document has been electronically signed. Mar  1 2018  8:13AM        < end of copied text >  18-24A  Starting: 3/1/2018    Daily EEG Visual Analysis  FINDINGS:  The background  consisted of right hemisphere theta in the 5-7 HZ range with some admixed alpha/beta. Voltage was suppressed over the  left hemisphere with theta/deleta activity.    Background Slowing:  Generalized slowing: mild- moderate  Focal slowing: Left hemisphere slowing    Sleep Background:  Normal sleep not recorded.    Other Paroxysmal Activity:  None     Interictal Epileptiform Activity:   No epileptiform discharges were present.    Ictal Epileptiform Activity or Events:  Frequent brief seizures characterized by bilateral suppression and high frequency beta activity followed by bilateral theta sloing lastin approximately 30 seconds. Clinically patient has tonic contraction of right shoulder.    EEG Classification / Summary:  Abnormal EEG due to the presence of :  1.	Mild – moderate generalized slowing  2.	Left hemisphere slowing and voltage suppression  3.	Frequent brief tonic seizures characterized by right shoulder tonic adduction. Some improvement in frequency by AM of 3/2    Clinical Impression:    Consistent with a mild diffuse encephalopathy,  left hemisphere dysfunction and brief left frontal partial seizures.        Teto Thomas MD  Attending Epileptologist and ,  [facility]        Electronic Signatures:  Teto Thomas)  (Signed 02-Mar-2018 06:30)  	Authored: EEG REPORT      Last Updated: 02-Mar-2018 06:30 by Teto Thomas)  CRITICAL CARE TIME SPENT: 60

## 2018-03-02 NOTE — PROGRESS NOTE ADULT - PROBLEM SELECTOR PLAN 1
- 24 hr EEG monitoring  - Continue to treat with Keppra,   - Neurology consulted, Dr. Canales evaluated patient today blood cultures reported   pt has been on vanco  f/u trough

## 2018-03-02 NOTE — CONSULT NOTE ADULT - ASSESSMENT
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

## 2018-03-02 NOTE — PROGRESS NOTE ADULT - ASSESSMENT
The patient is a 86y Male with improving seizures, not in status.  continue current meds and eeg monitoring    will follow with you    Sim Canales MD PhD   [phone]

## 2018-03-02 NOTE — EEG REPORT - NS EEG TEXT BOX
[facility]  Comprehensive Epilepsy Center  Report of Continuous Video EEG    [facility]: [address] , [address], [place] [address], Ph#: [phone]  [facility]: [id number] [place] [id number], Ph#: [phone]  Office: [address] [facility], [facility] [address], [place] [address] Ph#: [phone]    Patient Name: Miko Aldrich    Age: 86 y, : 1932  Patient ID: -, MRN #: -, Myrick: -  Referring Physician: Delma Moreira  EEG #: [id number]    Study Date: 3/1/2018    Study Information:    EEG Recording Technique:  The patient underwent continuous Video-EEG monitoring, using Telemetry System hardware on the XLTek Digital System. EEG and video data were stored on a computer hard drive with important events saved in digital archive files. The material was reviewed by a physician (electroencephalographer / epileptologist) on a daily basis. Marty and seizure detection algorithms were utilized and reviewed. An EEG Technician attended to the patient, and was available throughout daytime work hours.  The epilepsy center neurologist was available in person or on call 24-hours per day.    EEG Placement and Labeling of Electrodes:  The EEG was performed utilizing 20 channel referential EEG connections (coronal over temporal over parasagittal montage) using all standard 10-20 electrode placements with EKG, with additional electrodes placed in the inferior temporal region using the modified 10-10 montage electrode placements for elective admissions, or if deemed necessary. Recording was at a sampling rate of 256 samples per second per channel. Time synchronized digital video recording was done simultaneously with EEG recording. A low light infrared camera was used for low light recording.     History:  Sent from nursing home for seizures.   Seizing in ER for 2 hours before Versed and Phenobarb kicked in      Medication	  Cerebyx	  Keppra	    Interpretation:    A  Starting: 3/1/2018    Daily EEG Visual Analysis  FINDINGS:  The background  consisted of right hemisphere theta in the 5-7 HZ range with some admixed alpha/beta. Voltage was suppressed over the  left hemisphere with theta/deleta activity.    Background Slowing:  Generalized slowing: mild- moderate  Focal slowing: Left hemisphere slowing    Sleep Background:  Normal sleep not recorded.    Other Paroxysmal Activity:  None     Interictal Epileptiform Activity:   No epileptiform discharges were present.    Ictal Epileptiform Activity or Events:  Frequent brief seizures characterized by bilateral suppression and high frequency beta activity followed by bilateral theta sloing lastin approximately 30 seconds. Clinically patient has tonic contraction of right shoulder.    EEG Classification / Summary:  Abnormal EEG due to the presence of :  1.	Mild – moderate generalized slowing  2.	Left hemisphere slowing and voltage suppression  3.	Frequent brief tonic seizures characterized by right shoulder tonic adduction. Some improvement in frequency by AM of 3/2    Clinical Impression:    Consistent with a mild diffuse encephalopathy,  left hemisphere dysfunction and brief left frontal partial seizures.        Teto Thomas MD  Attending Epileptologist and ,  [facility] [place]

## 2018-03-02 NOTE — PROGRESS NOTE ADULT - ATTENDING COMMENTS
Discussed with Van Monroy Esq. the patient's legal guardian who has power over financial and personal dealings including health care decision making. At present, he defers advance directive decision making as directed by the  he would require a letter from 2 physicians preferably one from the hospital and one from the nursing facility stating that he would not benefit from advanced cardiac life support or other life sustaining measures Discussed with Van Monroy Esq. the patient's legal guardian who has power over financial and personal dealings including health care decision making. At present, he defers advance directive decision making as directed by the  he would require a letter from 2 physicians preferably one from the hospital and one from the nursing facility stating that he would not benefit from advanced cardiac life support or other life sustaining measures. Will involve Palliative, Ethics and social work to assist in these matters. Family did come to bedside yesterday.

## 2018-03-02 NOTE — CONSULT NOTE ADULT - PROBLEM SELECTOR RECOMMENDATION 9
Has chronic urinary retention. Paraphimosis reduced. Baugh should be changed monthly. Foreskin must be maintained covering glans
-trach, vented, poor overall prognosis.

## 2018-03-02 NOTE — AIRWAY PLACEMENT NOTE ADULT - AIRWAY COMMENTS:
Pt originally had 6.0 Shiley trach. At 2145, pt noted to be losing volumes on vent and making a gurgling sound around trach site. Trach changed to 7 XLT by JIM Mejia with no complications.

## 2018-03-02 NOTE — CONSULT NOTE ADULT - ATTENDING COMMENTS
Thank you for the opportunity to assist with the care of this patient.   [facility] Consult Service [phone].

## 2018-03-02 NOTE — CONSULT NOTE ADULT - PROBLEM SELECTOR RECOMMENDATION 3
-end stage, patient non verbal, with PEG, with trach, patient has no meaningful interactions with the world. guarded prognosis. further aggressive interventions are unlikely to change prognosis.

## 2018-03-02 NOTE — PROGRESS NOTE ADULT - SUBJECTIVE AND OBJECTIVE BOX
INTERVAL HPI/OVERNIGHT EVENTS:  Resting comfortably.  Baugh draining well.      MEDICATIONS  (STANDING):  aspirin  chewable 81 milliGRAM(s) Oral daily  aztreonam  IVPB 1000 milliGRAM(s) IV Intermittent every 12 hours  aztreonam  IVPB      BACItracin   Ointment 1 Application(s) Topical every 8 hours  chlorhexidine 0.12% Liquid 15 milliLiter(s) Swish and Spit two times a day  collagenase Ointment 1 Application(s) Topical daily  digoxin     Tablet 0.25 milliGRAM(s) Oral daily  enoxaparin Injectable 40 milliGRAM(s) SubCutaneous daily  fosphenytoin IVPB 100 milliGRAM(s) PE IV Intermittent every 8 hours  furosemide   Injectable 20 milliGRAM(s) IV Push once  levETIRAcetam  IVPB 1000 milliGRAM(s) IV Intermittent every 12 hours  pantoprazole  Injectable 40 milliGRAM(s) IV Push daily  PHENobarbital Elixir 60 milliGRAM(s) Oral two times a day  vancomycin  IVPB 1000 milliGRAM(s) IV Intermittent every 12 hours    MEDICATIONS  (PRN):      Allergies    clindamycin (Unknown)  Nuts (Hives; Rash)  PC Pen VK (Unknown)  strawberry (Short breath; Rash; Hives)  Xanax (Rash)    Intolerances    Ativan (Unknown)  Haldol (Dystonic RXN)  hydrALAZINE (Unknown)  Zyprexa (Unknown)      Vital Signs Last 24 Hrs  T(C): 37.6 (02 Mar 2018 16:00), Max: 37.7 (02 Mar 2018 12:00)  T(F): 99.7 (02 Mar 2018 16:00), Max: 99.8 (02 Mar 2018 12:00)  HR: 62 (02 Mar 2018 17:00) (61 - 79)  BP: 101/55 (02 Mar 2018 17:00) (84/45 - 144/72)  BP(mean): 74 (02 Mar 2018 16:00) (59 - 101)  RR: 16 (02 Mar 2018 17:00) (12 - 30)  SpO2: 100% (02 Mar 2018 17:00) (95% - 100%)     ON PE:  General: alert and awake  Abdomen: soft, nt, nd, bs+  : Small oozing laceration of right dorsal foreskin    Foreskin in proper position after manipulation    LABS:                        8.2    8.3   )-----------( 288      ( 02 Mar 2018 05:52 )             28.1     03-    139  |  97<L>  |  25.0<H>  ----------------------------<  106  3.6   |  29.0  |  0.58    Ca    8.9      02 Mar 2018 05:52  Phos  3.2       Mg     2.4         TPro  8.0  /  Alb  3.7  /  TBili  0.4  /  DBili  x   /  AST  21  /  ALT  15  /  AlkPhos  129<H>      PT/INR - ( 01 Mar 2018 06:47 )   PT: 12.4 sec;   INR: 1.12 ratio         PTT - ( 01 Mar 2018 06:47 )  PTT:32.6 sec  Urinalysis Basic - ( 01 Mar 2018 12:27 )    Color: Yellow / Appearance: Clear / S.020 / pH: x  Gluc: x / Ketone: Negative  / Bili: Negative / Urobili: Negative mg/dL   Blood: x / Protein: 100 mg/dL / Nitrite: Negative   Leuk Esterase: Moderate / RBC: 6-10 /HPF / WBC >50   Sq Epi: x / Non Sq Epi: Occasional / Bacteria: Moderate        RADIOLOGY & ADDITIONAL TESTS:

## 2018-03-02 NOTE — PROGRESS NOTE ADULT - SUBJECTIVE AND OBJECTIVE BOX
Patient is a 86y old  Male who presents with a chief complaint of Seizures (01 Mar 2018 09:10)    PAST MEDICAL & SURGICAL HISTORY:  Dysphagia  Fall: Multiple Mechanical falls  CAD (coronary artery disease)  Clostridium difficile diarrhea: in   BPH (benign prostatic hypertrophy)  Dementia  HLD (hyperlipidemia)  CHF (congestive heart failure)  Prostate cancer: Treated w/ radiation  Stroke: (~5yrs ago)  Seizure: (last event &gt;1yr ago)  HTN (hypertension)  Colostomy in place  S/P percutaneous endoscopic gastrostomy (PEG) tube placement  H/O hemorrhoidectomy  Deviated septum  Abdominal hernia  Status post cardiac surgery: stents x 2    KING MCKEON   86y    Male    BRIEF HOSPITAL COURSE:    85 y/o M w/ a PMHx of CHF, dementia, prostate cancer, seizures, CAD, GERD, anemia, tracheostomy, gastrostomy, colostomy, and ventilator dependent presents to ED from nursing home due to seizure activity that began at 05:00. Patient was given 500 mg of Keppra through PEG before leaving nursing home. On arrival to ED, patient still exhibited seizure activity. Patient was given 2 doses of Versed totaling 8 mg, and 1 dose of phenobarbital 1600 mg IV. Post CT scan and phenobarbital patient became hypotensive. Currently responding well to IV fluids. Patient is known from prior admission to have a legal guardian. His law office was contacted and message was left, stated he will get back to us today. Name--Van Monroy [phone].          Review of Systems:         [facility]                  ICU Vital Signs Last 24 Hrs  T(C): 37.4 (02 Mar 2018 04:00), Max: 38 (01 Mar 2018 08:00)  T(F): 99.4 (02 Mar 2018 04:00), Max: 100.4 (01 Mar 2018 08:00)  HR: 79 (02 Mar 2018 03:52) (52 - 150)  BP: 100/56 (02 Mar 2018 03:00) (75/55 - 137/68)  BP(mean): 72 (02 Mar 2018 03:00) (67 - 97)  ABP: --  ABP(mean): --  RR: 15 (02 Mar 2018 03:00) (10 - 24)  SpO2: 96% (02 Mar 2018 03:52) (95% - 100%)    Physical Examination:    General:  unresponsive    HEENT: Trach     PULM:   L pleurex    CVS:  s1 s2     ABD:  colostomy viable   peg sacral decubiti.      EXT:  + edema   contracted    SKIN: warm    Neuro: unresponsive to all stimuli    Active tonic movments L arm  r shoulder earlier       Mode: AC/ CMV (Assist Control/ Continuous Mandatory Ventilation)  RR (machine): 16  TV (machine): 500  FiO2: 30  PEEP: 5  MAP: 9  PIP: 21    Mode: AC/ CMV (Assist Control/ Continuous Mandatory Ventilation), RR (machine): 16, TV (machine): 500, FiO2: 30, PEEP: 5, MAP: 9, PIP: 21  LABS:                        11.1   19.5  )-----------( 467      ( 01 Mar 2018 06:47 )             37.9         138  |  95<L>  |  29.0<H>  ----------------------------<  124<H>  5.0   |  30.0<H>  |  0.54    Ca    9.7      01 Mar 2018 06:47    TPro  8.0  /  Alb  3.7  /  TBili  0.4  /  DBili  x   /  AST  21  /  ALT  15  /  AlkPhos  129<H>  03      CARDIAC MARKERS ( 01 Mar 2018 06:47 )  x     / 0.07 ng/mL / x     / x     / x          CAPILLARY BLOOD GLUCOSE    PT/INR - ( 01 Mar 2018 06:47 )   PT: 12.4 sec;   INR: 1.12 ratio         PTT - ( 01 Mar 2018 06:47 )  PTT:32.6 sec  Urinalysis Basic - ( 01 Mar 2018 12:27 )    Color: Yellow / Appearance: Clear / S.020 / pH: x  Gluc: x / Ketone: Negative  / Bili: Negative / Urobili: Negative mg/dL   Blood: x / Protein: 100 mg/dL / Nitrite: Negative   Leuk Esterase: Moderate / RBC: 6-10 /HPF / WBC >50   Sq Epi: x / Non Sq Epi: Occasional / Bacteria: Moderate      CULTURES:  Rapid RVP Result: Qing ( @ 13:48)      Medications:  aztreonam  IVPB 1000 milliGRAM(s) IV Intermittent every 12 hours  aztreonam  IVPB      vancomycin  IVPB 1000 milliGRAM(s) IV Intermittent every 12 hours  digoxin     Tablet 0.25 milliGRAM(s) Oral daily  fosphenytoin IVPB 100 milliGRAM(s) PE IV Intermittent every 8 hours  levETIRAcetam  IVPB 500 milliGRAM(s) IV Intermittent every 12 hours  PHENobarbital Elixir 60 milliGRAM(s) Oral two times a day  aspirin  chewable 81 milliGRAM(s) Oral daily  enoxaparin Injectable 40 milliGRAM(s) SubCutaneous daily  pantoprazole  Injectable 40 milliGRAM(s) IV Push daily  chlorhexidine 0.12% Liquid 15 milliLiter(s) Swish and Spit two times a day  collagenase Ointment 1 Application(s) Topical daily       @ 07:01  -  03- @ 04:06  --------------------------------------------------------  IN: 2800 mL / OUT: 1250 mL / NET: 1550 mL      RADIOLOGY/IMAGING/ECHO    < from: CT Head No Cont (18 @ 07:38) >    IMPRESSION:       Cerebral atrophy and chronic small vessel ischemic changes.    No parenchymal contusion, hemorrhage or extra-axial collection.    No CT evidence of an acute territorial infarction.    < from: Xray Chest 1 View AP/PA. (18 @ 07:27) >  IMPRESSION:   Shallow inspiration    Tracheostomy tube and left thorax catheter in place.    Stomach distended with gas      < end of copied text >    Assessment/Plan:    85 y/o M w/ a PMHx of CHF, dementia, prostate cancer, seizures, CAD, GERD, anemia, tracheostomy, gastrostomy, colostomy, and ventilator dependent presents to ED from nursing home due to seizure activity.    Despite multiple AED's he is still having  SE on going SZ.    According to daughter, he was off sz meds in SNF.  Med rec here done by pharmacist and does not include AED's .      Discussed last PM with Dr Thomas.  Extra Fosphenytoin given.    Will also increase Keppra.  Levels checked.  Correct phenytoin level 16.6.   Still seizing, extra phenobarb load too.      UTI on ABX  paraphimosis reduced.      Patient has sig WMD on imaging in past.  Poor fx status FTT.  PX poor.  Legal guardian to be contacted today for direction of aggressiveness in care.     Minutes of Critical Care time: 37  (Reviewing data, imaging, discussing with multidisciplinary team, non inclusive of procedures, discussing goals of care with patient/family)

## 2018-03-02 NOTE — PROGRESS NOTE ADULT - PROBLEM SELECTOR PLAN 3
- Pulmonary toilet  - Elevate head of bed to 30 degrees  - VAP prophylaxis unclear what baseline is   continue to monitor   likely multifactorial due to seizure, sepsis, dementia

## 2018-03-02 NOTE — PROGRESS NOTE ADULT - SUBJECTIVE AND OBJECTIVE BOX
[facility]                                     Ne[facility] [place]                                 [facility]                                  [address]                                        [place], [place], [address]                                             [phone]      Vital signs:  T(C): 37.7 (03-02-18 @ 12:00), Max: 37.7 (03-02-18 @ 12:00)  HR: 68 (03-02-18 @ 13:00) (55 - 79)  BP: 104/55 (03-02-18 @ 13:00) (96/50 - 144/72)  RR: 18 (03-02-18 @ 13:00) (10 - 30)  SpO2: 97% (03-02-18 @ 13:00) (95% - 100%)  Wt(kg): --    Exam:    No new complaints.  unresponsive  Pupils react.  Face symmetric   minimal movement to stimulation x 4 ext.      video EEG- slowing, low voltage left hemisphere, brief tonic seizures o/n in right shoulder

## 2018-03-02 NOTE — CONSULT NOTE ADULT - PROBLEM SELECTOR RECOMMENDATION 4
- patient with end stage dementia, bedbound, feeding tube, tracheostomy, complete dependence on machines to be sustained. complete dependence on others for all ADLs. Patient with no meaningful interactions with the outside world. He unfortunately has a grave prognosis and will not have any kind of recovery from his conditions. He will unlikely have any restoration in function given his condition particularly as he has not had any improvements for the last two years. He has actually continued to worsen over that time period, with repeated hospitalizations, and each time leaving the hospital in a worse condition then when he arrived in terms of functional and mental capabilities. I believe that repeated hospitalizations and continued aggressive care on this unfortunate gentlemen is only a burden and not providing any kind of medically therapeutic value.     Patient's health care agent is his Legal Guardian Van Monroy who is by law ordered to make medical decisions on behalf of this gentlemen taking into considerations what is in patient's best interest. According to documentation, legal guardian is deferring decisions to the  and requesting letters from 2 physicians documenting that advanced cardiac life support will not be beneficial to patient. We have contacted legal to assist in this process, I believe he is bound by the courts to make medical decisions and I am unsure why he is deferring at this time. - patient with end stage dementia, bedbound, feeding tube, tracheostomy, complete dependence on machines to be sustained. complete dependence on others for all ADLs. Patient with no meaningful interactions with the outside world. He unfortunately has a grave prognosis and will not have any kind of recovery from his conditions. He will unlikely have any restoration in function given his condition particularly as he has not had any improvements for the last two years. He has actually continued to worsen over that time period, with repeated hospitalizations, and each time leaving the hospital in a worse condition then when he arrived in terms of functional and mental capabilities; as well as medically. I believe that repeated hospitalizations and continued aggressive care on this unfortunate gentlemen is only a burden to him without providing any kind of medically therapeutic value.     Patient's health care agent is his Legal Guardian Van Monroy who is by law ordered to make medical decisions on behalf of this gentlemen taking into considerations what is in patient's best interest. According to documentation, legal guardian is deferring decisions to the  and requesting letters from 2 physicians documenting that advanced cardiac life support will not be beneficial to patient. We have contacted legal to assist in this process, I believe he is bound by the courts to make medical decisions and I am unsure why he is deferring at this time. - patient with end stage dementia, bedbound, feeding tube, tracheostomy, complete dependence on machines to be sustained. complete dependence on others for all ADLs. Patient with no meaningful interactions with the outside world. He unfortunately has a grave prognosis and will not have any kind of recovery from his conditions. He will unlikely have any restoration in function given his condition particularly as he has not had any improvements for the last two years. He has actually continued to worsen over that time period, with repeated hospitalizations, and each time leaving the hospital in a worse condition then when he arrived in terms of functional and mental capabilities; as well as medically. It is my medical opinion that repeated hospitalizations and continued aggressive care on this unfortunate gentlemen is only a burden to him without providing any kind of medically therapeutic value.     Patient's health care agent is his Legal Guardian Van Monroy who is by law ordered to make medical decisions on behalf of this gentlemen taking into considerations what is in patient's best interest. According to documentation, legal guardian is deferring decisions to the  and requesting letters from 2 physicians documenting that advanced cardiac life support will not be beneficial to patient. We have contacted legal to assist in this process, I believe he is bound by the courts to make medical decisions and I am unsure why he is deferring at this time. - patient with end stage dementia, bedbound, feeding tube, tracheostomy, complete dependence on machines to be sustained. complete dependence on others for all ADLs. Patient with no meaningful interactions with the outside world. He unfortunately has a grave prognosis and will not have any kind of recovery from his conditions. He will unlikely have any restoration in function given his condition particularly as he has not had any improvements for the last two years. He has actually continued to worsen over that time period, with repeated hospitalizations, and each time leaving the hospital in a worse condition then when he arrived in terms of functional and mental capabilities; as well as medically. It is my medical opinion that repeated hospitalizations and continued aggressive care on this unfortunate gentlemen is only a burden to him without providing any kind of medically therapeutic value.     Patient's health care agent is his Legal Guardian Van Monroy who is by law ordered to make medical decisions on behalf of this gentlemen taking into considerations what is in patient's best interest. According to documentation, legal guardian is deferring decisions to the  and requesting letters from 2 physicians documenting that advanced cardiac life support will not be beneficial to patient. We have contacted legal to assist in this process.

## 2018-03-03 DIAGNOSIS — J15.212 PNEUMONIA DUE TO METHICILLIN RESISTANT STAPHYLOCOCCUS AUREUS: ICD-10-CM

## 2018-03-03 LAB
-  AMPICILLIN/SULBACTAM: SIGNIFICANT CHANGE UP
-  CEFAZOLIN: SIGNIFICANT CHANGE UP
-  CIPROFLOXACIN: SIGNIFICANT CHANGE UP
-  CLINDAMYCIN: SIGNIFICANT CHANGE UP
-  DAPTOMYCIN: SIGNIFICANT CHANGE UP
-  ERYTHROMYCIN: SIGNIFICANT CHANGE UP
-  GENTAMICIN: SIGNIFICANT CHANGE UP
-  LEVOFLOXACIN: SIGNIFICANT CHANGE UP
-  LINEZOLID: SIGNIFICANT CHANGE UP
-  MOXIFLOXACIN(AEROBIC): SIGNIFICANT CHANGE UP
-  OXACILLIN: SIGNIFICANT CHANGE UP
-  PENICILLIN: SIGNIFICANT CHANGE UP
-  RIFAMPIN: SIGNIFICANT CHANGE UP
-  TETRACYCLINE: SIGNIFICANT CHANGE UP
-  TRIMETHOPRIM/SULFAMETHOXAZOLE: SIGNIFICANT CHANGE UP
-  VANCOMYCIN: SIGNIFICANT CHANGE UP
ANION GAP SERPL CALC-SCNC: 11 MMOL/L — SIGNIFICANT CHANGE UP (ref 5–17)
BUN SERPL-MCNC: 21 MG/DL — HIGH (ref 8–20)
CALCIUM SERPL-MCNC: 9.1 MG/DL — SIGNIFICANT CHANGE UP (ref 8.6–10.2)
CHLORIDE SERPL-SCNC: 98 MMOL/L — SIGNIFICANT CHANGE UP (ref 98–107)
CO2 SERPL-SCNC: 31 MMOL/L — HIGH (ref 22–29)
CREAT SERPL-MCNC: 0.59 MG/DL — SIGNIFICANT CHANGE UP (ref 0.5–1.3)
GLUCOSE SERPL-MCNC: 80 MG/DL — SIGNIFICANT CHANGE UP (ref 70–115)
GRAM STN FLD: SIGNIFICANT CHANGE UP
HCT VFR BLD CALC: 34.2 % — LOW (ref 42–52)
HGB BLD-MCNC: 10 G/DL — LOW (ref 14–18)
MAGNESIUM SERPL-MCNC: 2.5 MG/DL — SIGNIFICANT CHANGE UP (ref 1.6–2.6)
MCHC RBC-ENTMCNC: 25 PG — LOW (ref 27–31)
MCHC RBC-ENTMCNC: 29.2 G/DL — LOW (ref 32–36)
MCV RBC AUTO: 85.5 FL — SIGNIFICANT CHANGE UP (ref 80–94)
METHOD TYPE: SIGNIFICANT CHANGE UP
PHOSPHATE SERPL-MCNC: 3.1 MG/DL — SIGNIFICANT CHANGE UP (ref 2.4–4.7)
PLATELET # BLD AUTO: 261 K/UL — SIGNIFICANT CHANGE UP (ref 150–400)
POTASSIUM SERPL-MCNC: 3.4 MMOL/L — LOW (ref 3.5–5.3)
POTASSIUM SERPL-SCNC: 3.4 MMOL/L — LOW (ref 3.5–5.3)
RBC # BLD: 4 M/UL — LOW (ref 4.6–6.2)
RBC # FLD: 19.4 % — HIGH (ref 11–15.6)
SODIUM SERPL-SCNC: 140 MMOL/L — SIGNIFICANT CHANGE UP (ref 135–145)
SPECIMEN SOURCE: SIGNIFICANT CHANGE UP
VANCOMYCIN FLD-MCNC: 24.7 UG/ML — SIGNIFICANT CHANGE UP
WBC # BLD: 6.2 K/UL — SIGNIFICANT CHANGE UP (ref 4.8–10.8)
WBC # FLD AUTO: 6.2 K/UL — SIGNIFICANT CHANGE UP (ref 4.8–10.8)

## 2018-03-03 PROCEDURE — 71250 CT THORAX DX C-: CPT | Mod: 26

## 2018-03-03 PROCEDURE — 99233 SBSQ HOSP IP/OBS HIGH 50: CPT

## 2018-03-03 PROCEDURE — 99223 1ST HOSP IP/OBS HIGH 75: CPT

## 2018-03-03 PROCEDURE — 74176 CT ABD & PELVIS W/O CONTRAST: CPT | Mod: 26

## 2018-03-03 PROCEDURE — 99222 1ST HOSP IP/OBS MODERATE 55: CPT

## 2018-03-03 PROCEDURE — 93306 TTE W/DOPPLER COMPLETE: CPT | Mod: 26

## 2018-03-03 PROCEDURE — 95951: CPT | Mod: 26

## 2018-03-03 RX ORDER — VANCOMYCIN HCL 1 G
750 VIAL (EA) INTRAVENOUS EVERY 12 HOURS
Qty: 0 | Refills: 0 | Status: DISCONTINUED | OUTPATIENT
Start: 2018-03-03 | End: 2018-03-13

## 2018-03-03 RX ORDER — POTASSIUM CHLORIDE 20 MEQ
40 PACKET (EA) ORAL ONCE
Qty: 0 | Refills: 0 | Status: COMPLETED | OUTPATIENT
Start: 2018-03-03 | End: 2018-03-03

## 2018-03-03 RX ORDER — LEVETIRACETAM 250 MG/1
1000 TABLET, FILM COATED ORAL EVERY 12 HOURS
Qty: 0 | Refills: 0 | Status: DISCONTINUED | OUTPATIENT
Start: 2018-03-03 | End: 2018-03-13

## 2018-03-03 RX ADMIN — Medication 40 MILLIEQUIVALENT(S): at 10:28

## 2018-03-03 RX ADMIN — FOSPHENYTOIN 104 MILLIGRAM(S) PE: 50 INJECTION INTRAMUSCULAR; INTRAVENOUS at 05:56

## 2018-03-03 RX ADMIN — LEVETIRACETAM 1000 MILLIGRAM(S): 250 TABLET, FILM COATED ORAL at 18:00

## 2018-03-03 RX ADMIN — Medication 60 MILLIGRAM(S): at 18:01

## 2018-03-03 RX ADMIN — Medication 60 MILLIGRAM(S): at 05:55

## 2018-03-03 RX ADMIN — FOSPHENYTOIN 104 MILLIGRAM(S) PE: 50 INJECTION INTRAMUSCULAR; INTRAVENOUS at 13:04

## 2018-03-03 RX ADMIN — CHLORHEXIDINE GLUCONATE 15 MILLILITER(S): 213 SOLUTION TOPICAL at 18:01

## 2018-03-03 RX ADMIN — Medication 50 MILLIGRAM(S): at 05:56

## 2018-03-03 RX ADMIN — LEVETIRACETAM 400 MILLIGRAM(S): 250 TABLET, FILM COATED ORAL at 05:56

## 2018-03-03 RX ADMIN — Medication 1 APPLICATION(S): at 05:49

## 2018-03-03 RX ADMIN — Medication 81 MILLIGRAM(S): at 11:51

## 2018-03-03 RX ADMIN — Medication 150 MILLIGRAM(S): at 22:14

## 2018-03-03 RX ADMIN — Medication 1 APPLICATION(S): at 11:52

## 2018-03-03 RX ADMIN — CHLORHEXIDINE GLUCONATE 15 MILLILITER(S): 213 SOLUTION TOPICAL at 05:49

## 2018-03-03 RX ADMIN — FOSPHENYTOIN 104 MILLIGRAM(S) PE: 50 INJECTION INTRAMUSCULAR; INTRAVENOUS at 22:14

## 2018-03-03 RX ADMIN — ENOXAPARIN SODIUM 40 MILLIGRAM(S): 100 INJECTION SUBCUTANEOUS at 11:51

## 2018-03-03 RX ADMIN — Medication 1 APPLICATION(S): at 13:00

## 2018-03-03 RX ADMIN — Medication 1 APPLICATION(S): at 22:15

## 2018-03-03 RX ADMIN — PANTOPRAZOLE SODIUM 40 MILLIGRAM(S): 20 TABLET, DELAYED RELEASE ORAL at 13:00

## 2018-03-03 NOTE — PROGRESS NOTE ADULT - ASSESSMENT
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    Presentation complicated by MRSA bacteremia.     MRSA bacteremia: bld cx 1/2 bottles positive on admission. suspect sacral ulcer as most likely source, hx of osteomyelitis in that area in the past. wound care consult pending. ID consult appreciated. on vanco. daily repeat blood cx until neg x24hrs. Echo done @ bedside, per tech poor quality, official results pending. ?KATIA needed but given very poor prognosis will need to consult with legal guardian (who is NOT the daughter + wife) to see how aggressive intervention should be, even if vegetations are present pt would be extremely poor candidate for valvular replacement and is not likely to get clearance for said procedure    Status Epilepticus: sec to med non compliance while in NH who was unaware of pts diagnosis of seizure and did not administer meds. sp keppra while in NH after event and transferred to ER where he was given versed 4mg x2, phenobarb 1600mg IV, and was transferred to ICU for 24hr video EEG, read as mild diffuse encephalopathy with Lt hemisphere dysfunction and resolving partial seizures. CTH w/o signs acute infarct. neuro following, will cont phenobarb + keppra + fosphenytoin as triple AED therapy. considered stable from neuro perspective     Chronic hypoxic Resp Failure: sp trach which was present on admit. vent dependent @ baseline. VAP precautions with HOB elevation. RVP neg. ?LLL PNA @ this time although not being treated for it directly. will consider intiation of directed abx therapy for VAP if pt becomes febrile or has inc resp secretions, etc. pulm consult appreciated, vent settings per them, currently pap 21, Ve 7 L , 30%    Chronic Jones Catheter: present on admission. pt with paraphimosis + hx of prostate ca. hx of resistant UTI in the past as well. UC <50k CFU. urology following. no plans to remove jones catheter, pt with significant difficulty having spontaneous voids.     Pleural effusion: chronic, pleurx drain in place. may be used PRN.     Chronic PEG: npo with tube feeds. nutrition consult appreciated, rec inc of jevity 1.5 to 60ml/hr with prostat 30ml daily.

## 2018-03-03 NOTE — PROGRESS NOTE ADULT - ASSESSMENT
The patient is a 86y Male with seizures, now well controlled on keppra, and phenobarbital.    Would continue phenobarbital and keppra.    There is no further neurologic workup suggested at this time.  We will be available for reconsultation as needed.    Thank you.   Sim Canales MD, PhD  [phone]

## 2018-03-03 NOTE — PROGRESS NOTE ADULT - SUBJECTIVE AND OBJECTIVE BOX
[facility] [place] POLI SANDOVAL MD FACP   FINA RAMOS MD  Diplomates American Board of Internal Medicine and Infecctious Diseases  [id number]  [id number] KIRAN [id number]      HPI:  85 y/o M w/ a PMHx of CHF, dementia, prostate cancer, seizures, CAD, GERD, anemia, tracheostomy, gastrostomy, colostomy, and ventilator dependent presents to ED from nursing home due to seizure activity that began at 05:00. Patient was given 500 mg of Keppra through PEG before leaving nursing home. On arrival to ED, patient still exhibited seizure activity. Patient was given 2 doses of Versed totaling 8 mg, and 1 dose of phenobarbital 1600 mg IV. Post CT scan and phenobarbital patient became hypotensive.    pt with BLOOD CX  WITH MRSA  ASKED TO EVALUATE FROM ID STANDPOINT     PAST MEDICAL & SURGICAL HISTORY:  Dysphagia  Fall: Multiple Mechanical falls  CAD (coronary artery disease)  Clostridium difficile diarrhea: in 2009  BPH (benign prostatic hypertrophy)  Dementia  HLD (hyperlipidemia)  CHF (congestive heart failure)  Prostate cancer: Treated w/ radiation  Stroke: (~5yrs ago)  Seizure: (last event &gt;1yr ago)  HTN (hypertension)  Colostomy in place  S/P percutaneous endoscopic gastrostomy (PEG) tube placement  H/O hemorrhoidectomy  Deviated septum  Abdominal hernia  Status post cardiac surgery: stents x 2      ANTIBIOTICS  vancomycin  IVPB 750 milliGRAM(s) IV Intermittent every 12 hours      Allergies    clindamycin (Unknown)  Nuts (Hives; Rash)  PC Pen VK (Unknown)  strawberry (Short breath; Rash; Hives)  Xanax (Rash)    Intolerances    Ativan (Unknown)  Haldol (Dystonic RXN)  hydrALAZINE (Unknown)  Zyprexa (Unknown)      SOCIAL HISTORY:    FAMILY HISTORY:  No pertinent family history in first degree relatives      Vital Signs Last 24 Hrs  T(C): 37 (03 Mar 2018 16:00), Max: 37.4 (02 Mar 2018 19:00)  T(F): 98.6 (03 Mar 2018 16:00), Max: 99.4 (02 Mar 2018 19:00)  HR: 67 (03 Mar 2018 16:01) (53 - 70)  BP: 116/56 (03 Mar 2018 15:00) (89/50 - 141/66)  BP(mean): 80 (03 Mar 2018 15:00) (66 - 95)  RR: 14 (03 Mar 2018 15:00) (11 - 22)  SpO2: 100% (03 Mar 2018 16:01) (94% - 100%)  Drug Dosing Weight  Height (cm): 177.8 (01 Mar 2018 06:47)  Weight (kg): 83.5 (01 Mar 2018 06:47)  BMI (kg/m2): 26.4 (01 Mar 2018 06:47)  BSA (m2): 2.02 (01 Mar 2018 06:47)      REVIEW OF SYSTEMS:    CONSTITUTIONAL:  As per HPI.    HEENT:  EYES CLSOED    CARDIOVASCULAR: UNABEL TO ASSESS  RESPIRATORY:  No cough, shortness of breath, PND or orthopnea.    GASTROINTESTINAL:  No nausea, vomiting or diarrhea.    GENITOURINARY:  No dysuria, frequency or urgency.    MUSCULOSKELETAL:  As per HPI.    SKIN:  No change in skin, hair or nails.    NEUROLOGIC:  UNRESPONSIVE                  PHYSICAL EXAMINATION:    GENERAL: The patient is ON VENT UNRESPONSIVE    VITAL SIGNS: T(C): 37 (03-03-18 @ 16:00), Max: 37.4 (03-02-18 @ 19:00)  HR: 67 (03-03-18 @ 16:01) (53 - 70)  BP: 116/56 (03-03-18 @ 15:00) (89/50 - 141/66)  RR: 14 (03-03-18 @ 15:00) (11 - 22)  SpO2: 100% (03-03-18 @ 16:01) (94% - 100%)  Wt(kg): --    HEENT: Head is normocephalic and atraumatic.  ANICTERIC  NECK: Supple. No carotid bruits.  No lymphadenopathy or thyromegaly.    LUNGS: COARSE BREATH SOUNDS    HEART: Regular rate and rhythm without murmur.    ABDOMEN: Soft, nontender, and nondistended.  Positive bowel sounds.  No hepatosplenomegaly was noted. NO REBOUND NO GUARDING    EXTREMITIES: NO EDEMA NO ERYTHEMA    NEUROLOGIC: NON RESPONSIVE      SKIN: No ulceration or induration present. NO RASH        BLOOD CULTURES  Culture Results:   10,000 - 49,000 CFU/mL Gram Negative Rods Identification and  susceptibility to follow.  Culture in progress (03-02 @ 11:55)  Culture Results:   No growth at 48 hours (03-01 @ 12:30)  Culture Results:   Culture grew 3 or more types of organisms which indicate  collection contamination; consider recollection only if clinically  indicated.  .  Notified Enrike Rendon RN ([facility]) and read back. 03/02/2018 09:10 skhilario (03-01 @ 12:27)  Culture Results:   Growth in anaerobic bottle: Methicillin resistant Staphylococcus aureus  Anaerobic Bottle: 18.36 Hours to positivity  Aerobic Bottle: No growth to date  ***Blood Panel PCR results on this specimen are available  approximately 3 hours after the Gramstain result.***  Gram stain, PCR, and/or culture results may not always  correspond due to difference in methodologies.  ************************************************************  This PCR assay was performed using [employer].  The following targets are tested for: Enterococcus,  vancomycin resistant enterococci, Listeria monocytogenes,  coagulase negative staphylococci, S. aureus,  methicillin resistant S. aureus, Streptococcus agalactiae  (Group B), S. pneumoniae, S. pyogenes (Group A),  Acinetobacter baumannii, Enterobacter cloacae, E. coli,  Klebsiella oxytoca, K. pneumoniae, Proteus sp.,  Serratia marcescens, Haemophilus influenzae,  Neisseria meningitidis, Pseudomonas aeruginosa, Candida  albicans, C. glabrata, C krusei, C parapsilosis,  C. tropicalis and the KPC resistance gene.  .  "Due to technical problems, Proteus sp. will Not be reported as part of  the BCID panel until further notice"  .  TYPE: (C=Critical, N=Notification, A=Abnormal) C  TESTS:  _ GS  DATE/TIME CALLED: _ 03/02/2018 08:39:29  CALLED TO: _ Enrike Rendon RN  READ BACK (2 Patient Identifiers)(Y/N): _ Y  READ BACK VALUES (Y/N): _ Y  CALLED BY: Amanda Barrera  .  TYPE: (C=Critical, N=Notification, A=Abnormal) C  TESTS:  _ MRSA  DATE/TIME CALLED: _ 03/02/2018 10:00:00  CALLED TO: Amanda Rendon PA  READ BACK (2 Patient Identifiers)(Y/N): _ Y  READ BACK VALUES (Y/N): _ Y  CALLED BY: Amanda Barrera    Sent copy to IC and logistics. (03-01 @ 07:22)       URINE CX          LABS:                        10.0   6.2   )-----------( 261      ( 03 Mar 2018 07:02 )             34.2     03-03    140  |  98  |  21.0<H>  ----------------------------<  80  3.4<L>   |  31.0<H>  |  0.59    Ca    9.1      03 Mar 2018 07:02  Phos  3.1     03-03  Mg     2.5     03-03            RADIOLOGY & ADDITIONAL STUDIES:      ASSESSMENT/PLAN  85 y/o M w/ a PMHx of CHF, dementia, prostate cancer, seizures, CAD, GERD, anemia, tracheostomy, gastrostomy, colostomy, and ventilator dependent presents to ED from nursing home due to seizure activity that began at 05:00. Patient was given 500 mg of Keppra through PEG before leaving nursing home. On arrival to ED, patient still exhibited seizure activity. Patient was given 2 doses of Versed totaling 8 mg, and 1 dose of phenobarbital 1600 mg IV. Post CT scan and phenobarbital patient became hypotensive.    pt with BLOOD CX  WITH MRSA  ECHO Pending  PT WITH OVERALL GUARDED PROGNOSIS  WILL NEED TO DISCUSS HOW AGGRESSIVE EVAL IN TERMS OF KATIA  MAY DEFER KATIA     WILL CONTINUE IV VANCO AND WILL FOLLOW UP WITH FURTHER RECOMMENDATIONS                    FINA DEXTER MD NPP INFECTIOUS DISEASES AND INTERNAL MEDICINE OF [place] POLI SANDOVAL MD FACP   FINA RAMOS MD  Diplomates American Board of Internal Medicine and Infecctious Diseases  [phone]  [phone] KIRAN [id number]  	                                                        ID CONSULTATION    HPI:  85 y/o M w/ a PMHx of CHF, dementia, prostate cancer, seizures, CAD, GERD, anemia, tracheostomy, gastrostomy, colostomy, and ventilator dependent presents to ED from nursing home due to seizure activity that began at 05:00. Patient was given 500 mg of Keppra through PEG before leaving nursing home. On arrival to ED, patient still exhibited seizure activity. Patient was given 2 doses of Versed totaling 8 mg, and 1 dose of phenobarbital 1600 mg IV. Post CT scan and phenobarbital patient became hypotensive.    pt with BLOOD CX  WITH MRSA  ASKED TO EVALUATE FROM ID STANDPOINT     PAST MEDICAL & SURGICAL HISTORY:  Dysphagia  Fall: Multiple Mechanical falls  CAD (coronary artery disease)  Clostridium difficile diarrhea: in 2009  BPH (benign prostatic hypertrophy)  Dementia  HLD (hyperlipidemia)  CHF (congestive heart failure)  Prostate cancer: Treated w/ radiation  Stroke: (~5yrs ago)  Seizure: (last event &gt;1yr ago)  HTN (hypertension)  Colostomy in place  S/P percutaneous endoscopic gastrostomy (PEG) tube placement  H/O hemorrhoidectomy  Deviated septum  Abdominal hernia  Status post cardiac surgery: stents x 2      ANTIBIOTICS  vancomycin  IVPB 750 milliGRAM(s) IV Intermittent every 12 hours      Allergies    clindamycin (Unknown)  Nuts (Hives; Rash)  PC Pen VK (Unknown)  strawberry (Short breath; Rash; Hives)  Xanax (Rash)    Intolerances    Ativan (Unknown)  Haldol (Dystonic RXN)  hydrALAZINE (Unknown)  Zyprexa (Unknown)      SOCIAL HISTORY:    FAMILY HISTORY:  No pertinent family history in first degree relatives      Vital Signs Last 24 Hrs  T(C): 37 (03 Mar 2018 16:00), Max: 37.4 (02 Mar 2018 19:00)  T(F): 98.6 (03 Mar 2018 16:00), Max: 99.4 (02 Mar 2018 19:00)  HR: 67 (03 Mar 2018 16:01) (53 - 70)  BP: 116/56 (03 Mar 2018 15:00) (89/50 - 141/66)  BP(mean): 80 (03 Mar 2018 15:00) (66 - 95)  RR: 14 (03 Mar 2018 15:00) (11 - 22)  SpO2: 100% (03 Mar 2018 16:01) (94% - 100%)  Drug Dosing Weight  Height (cm): 177.8 (01 Mar 2018 06:47)  Weight (kg): 83.5 (01 Mar 2018 06:47)  BMI (kg/m2): 26.4 (01 Mar 2018 06:47)  BSA (m2): 2.02 (01 Mar 2018 06:47)      REVIEW OF SYSTEMS:    CONSTITUTIONAL:  As per HPI.    HEENT:  EYES CLSOED    CARDIOVASCULAR: UNABEL TO ASSESS  RESPIRATORY:  No cough, shortness of breath, PND or orthopnea.    GASTROINTESTINAL:  No nausea, vomiting or diarrhea.    GENITOURINARY:  No dysuria, frequency or urgency.    MUSCULOSKELETAL:  As per HPI.    SKIN:  No change in skin, hair or nails.    NEUROLOGIC:  UNRESPONSIVE                  PHYSICAL EXAMINATION:    GENERAL: The patient is ON VENT UNRESPONSIVE    VITAL SIGNS: T(C): 37 (03-03-18 @ 16:00), Max: 37.4 (03-02-18 @ 19:00)  HR: 67 (03-03-18 @ 16:01) (53 - 70)  BP: 116/56 (03-03-18 @ 15:00) (89/50 - 141/66)  RR: 14 (03-03-18 @ 15:00) (11 - 22)  SpO2: 100% (03-03-18 @ 16:01) (94% - 100%)  Wt(kg): --    HEENT: Head is normocephalic and atraumatic.  ANICTERIC  NECK: Supple. No carotid bruits.  No lymphadenopathy or thyromegaly.    LUNGS: COARSE BREATH SOUNDS    HEART: Regular rate and rhythm without murmur.    ABDOMEN: Soft, nontender, and nondistended.  Positive bowel sounds.  No hepatosplenomegaly was noted. NO REBOUND NO GUARDING    EXTREMITIES: NO EDEMA NO ERYTHEMA    NEUROLOGIC: NON RESPONSIVE      SKIN: No ulceration or induration present. NO RASH        BLOOD CULTURES  Culture Results:   10,000 - 49,000 CFU/mL Gram Negative Rods Identification and  susceptibility to follow.  Culture in progress (03-02 @ 11:55)  Culture Results:   No growth at 48 hours (03-01 @ 12:30)  Culture Results:   Culture grew 3 or more types of organisms which indicate  collection contamination; consider recollection only if clinically  indicated.  .  Notified Enrike Rendon RN ([facility]) and read back. 03/02/2018 09:10 skennely (03-01 @ 12:27)  Culture Results:   Growth in anaerobic bottle: Methicillin resistant Staphylococcus aureus  Anaerobic Bottle: 18.36 Hours to positivity  Aerobic Bottle: No growth to date  ***Blood Panel PCR results on this specimen are available  approximately 3 hours after the Gramstain result.***  Gram stain, PCR, and/or culture results may not always  correspond due to difference in methodologies.  ************************************************************  This PCR assay was performed using [employer].  The following targets are tested for: Enterococcus,  vancomycin resistant enterococci, Listeria monocytogenes,  coagulase negative staphylococci, S. aureus,  methicillin resistant S. aureus, Streptococcus agalactiae  (Group B), S. pneumoniae, S. pyogenes (Group A),  Acinetobacter baumannii, Enterobacter cloacae, E. coli,  Klebsiella oxytoca, K. pneumoniae, Proteus sp.,  Serratia marcescens, Haemophilus influenzae,  Neisseria meningitidis, Pseudomonas aeruginosa, Candida  albicans, C. glabrata, C krusei, C parapsilosis,  C. tropicalis and the KPC resistance gene.  .  "Due to technical problems, Proteus sp. will Not be reported as part of  the BCID panel until further notice"  .  TYPE: (C=Critical, N=Notification, A=Abnormal) C  TESTS:  _ GS  DATE/TIME CALLED: _ 03/02/2018 08:39:29  CALLED TO: _ Enrike Rendon RN  READ BACK (2 Patient Identifiers)(Y/N): _ Y  READ BACK VALUES (Y/N): _ Y  CALLED BY: Amanda Barrera  .  TYPE: (C=Critical, N=Notification, A=Abnormal) C  TESTS:  _ MRSA  DATE/TIME CALLED: _ 03/02/2018 10:00:00  CALLED TO: Amanda FERNANDEZ  READ BACK (2 Patient Identifiers)(Y/N): _ Y  READ BACK VALUES (Y/N): _ Y  CALLED BY: Amanda Barrera    Sent copy to IC and logistics. (03-01 @ 07:22)       URINE CX          LABS:                        10.0   6.2   )-----------( 261      ( 03 Mar 2018 07:02 )             34.2     03-03    140  |  98  |  21.0<H>  ----------------------------<  80  3.4<L>   |  31.0<H>  |  0.59    Ca    9.1      03 Mar 2018 07:02  Phos  3.1     03-03  Mg     2.5     03-03            RADIOLOGY & ADDITIONAL STUDIES:      ASSESSMENT/PLAN  85 y/o M w/ a PMHx of CHF, dementia, prostate cancer, seizures, CAD, GERD, anemia, tracheostomy, gastrostomy, colostomy, and ventilator dependent presents to ED from nursing home due to seizure activity that began at 05:00. Patient was given 500 mg of Keppra through PEG before leaving nursing home. On arrival to ED, patient still exhibited seizure activity. Patient was given 2 doses of Versed totaling 8 mg, and 1 dose of phenobarbital 1600 mg IV. Post CT scan and phenobarbital patient became hypotensive.    pt with BLOOD CX  WITH MRSA  ECHO Pending  PT WITH OVERALL GUARDED PROGNOSIS  WILL NEED TO DISCUSS HOW AGGRESSIVE EVAL IN TERMS OF KATIA  MAY DEFER KATIA     WILL CONTINUE IV VANCO AND WILL FOLLOW UP WITH FURTHER RECOMMENDATIONS                    FINA DEXTER MD

## 2018-03-03 NOTE — PROGRESS NOTE ADULT - SUBJECTIVE AND OBJECTIVE BOX
*HOSPITALIST ICU DOWNGRADE ACCEPTANCE NOTE*     CHIEF COMPLAINT: mrsa bactermia    Patient seen and examined at the bedside. No acute overnight events. Pt fully unarousable, unable to assess ROS including the following: fever/chills, headache, lightheadedness, dizziness, chest pain, palpitations, shortness of breath, cough, abd pain, nausea/vomiting/diarrhea, muscle pain.      =========================================================================================  T(C): 37 (18 @ 16:00), Max: 37.4 (18 @ 19:00)  HR: 67 (18 @ 16:01) (53 - 70)  BP: 116/56 (18 @ 15:00) (89/50 - 141/66)  RR: 14 (18 @ 15:00) (11 - 22)  SpO2: 100% (18 @ 16:01) (94% - 100%)    PHYSICAL EXAM.    GEN - appears age appropriate. well nourished.   HEENT - NCAT  RESP - CTA BL, no wheeze/stridor/rhonchi/crackles. on supplemental O2 (mech vent via trach)   CARDIO - NS1S2, RRR. No murmurs/rubs/gallops.  ABD - Soft/Non tender/Non distended. Normal BS x4 quadrants. PEG.    - jones in place, clear yellow urine in collection bag  Ext - No KEVIN.  MSK - could not assess  Neuro - pt unarousable, could not assess, not waking with even aggressive sternal rub  Psych - could not assess  Skin - c/d/i. no rashes/lesions    Mode: AC/ CMV (Assist Control/ Continuous Mandatory Ventilation)  RR (machine): 16  TV (machine): 500  FiO2: 30  PEEP: 5  MAP: 8  PIP: 20    I&O's Summary    02 Mar 2018 07:01  -  03 Mar 2018 07:00  --------------------------------------------------------  IN: 1110 mL / OUT: 1245 mL / NET: -135 mL    03 Mar 2018 07:  -  03 Mar 2018 16:47  --------------------------------------------------------  IN: 410 mL / OUT: 400 mL / NET: 10 mL      Daily     Daily Weight in k.4 (03 Mar 2018 12:21)    =========================================================================================  LABS.    0303    140  |  98  |  21.0<H>  ----------------------------<  80  3.4<L>   |  31.0<H>  |  0.59    Ca    9.1      03 Mar 2018 07:02  Phos  3.1     03-03  Mg     2.5     03-03                            10.0   6.2   )-----------( 261      ( 03 Mar 2018 07:02 )             34.2       =========================================================================================    MEDICATIONS  (STANDING):  aspirin  chewable 81 milliGRAM(s) Oral daily  BACItracin   Ointment 1 Application(s) Topical every 8 hours  chlorhexidine 0.12% Liquid 15 milliLiter(s) Swish and Spit two times a day  collagenase Ointment 1 Application(s) Topical daily  digoxin     Tablet 0.25 milliGRAM(s) Oral daily  enoxaparin Injectable 40 milliGRAM(s) SubCutaneous daily  fosphenytoin IVPB 100 milliGRAM(s) PE IV Intermittent every 8 hours  levETIRAcetam  Solution 1000 milliGRAM(s) Oral every 12 hours  pantoprazole  Injectable 40 milliGRAM(s) IV Push daily  PHENobarbital Elixir 60 milliGRAM(s) Oral two times a day  vancomycin  IVPB 750 milliGRAM(s) IV Intermittent every 12 hours    MEDICATIONS  (PRN):

## 2018-03-03 NOTE — DIETITIAN INITIAL EVALUATION ADULT. - PROBLEM SELECTOR PLAN 1
- 24 hr EEG monitoring  - Continue to treat with Keppra  - Neurology consulted, Dr. Canales evaluated patient

## 2018-03-03 NOTE — EEG REPORT - NS EEG TEXT BOX
[facility]  Comprehensive Epilepsy Center  Report of Continuous Video EEG    [facility]: [address] , [address], [place] [address], Ph#: [phone]  [facility]: [id number] [place] [address], Ph#: [phone]  Office: [address] [facility], [place] [address] Ph#: [phone]    Patient Name: Miko Aldrich    Age: 86 y, : 1932  Patient ID: -, MRN #: -, Myrick: -  Referring Physician: Delma Moreira  EEG #: [id number]    Study Date: 3/1/2018    Study Information:    EEG Recording Technique:  The patient underwent continuous Video-EEG monitoring, using Telemetry System hardware on the [employer] Digital System. EEG and video data were stored on a computer hard drive with important events saved in digital archive files. The material was reviewed by a physician (electroencephalographer / epileptologist) on a daily basis. Marty and seizure detection algorithms were utilized and reviewed. An EEG Technician attended to the patient, and was available throughout daytime work hours.  The epilepsy center neurologist was available in person or on call 24-hours per day.    EEG Placement and Labeling of Electrodes:  The EEG was performed utilizing 20 channel referential EEG connections (coronal over temporal over parasagittal montage) using all standard 10-20 electrode placements with EKG, with additional electrodes placed in the inferior temporal region using the modified 10-10 montage electrode placements for elective admissions, or if deemed necessary. Recording was at a sampling rate of 256 samples per second per channel. Time synchronized digital video recording was done simultaneously with EEG recording. A low light infrared camera was used for low light recording.     History:  Sent from nursing home for seizures.   Seizing in ER for 2 hours before Versed and Phenobarb kicked in      Medication	  Cerebyx	  Keppra	    Interpretation:      Starting: 3/1/2018    Daily EEG Visual Analysis  FINDINGS:  The background  consisted of right hemisphere theta in the 5-7 HZ range with some admixed alpha/beta. Voltage was suppressed over the left hemisphere with theta/delta activity.    Background Slowing:  Generalized slowing: mild- moderate  Focal slowing: Left hemisphere slowing    Sleep Background- 1-2 second periods of generalized BG suppression    Other Paroxysmal Activity:  None     Interictal Epileptiform Activity:   High amplitude left centrotemporal epileptiform spike discharges in sleep    Ictal Epileptiform Activity or Events:  Marked reduction in left hemispheric tonic seizures seen in past 24hrs (see prior report)    EEG Classification / Summary:  Abnormal EEG due to the presence of :  1.	Mild – moderate generalized slowing  2.	Left hemisphere slowing and voltage suppression  3.	Marked reduction in brief tonic seizures in past 18 hours  4.	Brief periods of background suppression in sleep consistent with possible apnea      Clinical Impression:    Consistent with a mild diffuse encephalopathy, left hemisphere dysfunction and resolving partial seizures.        Teto Thomas MD  Attending Epileptologist and ,  [facility]

## 2018-03-03 NOTE — PROGRESS NOTE ADULT - PROBLEM SELECTOR PLAN 5
left pleurex cath drain every other day
no noted effusion on CXR from yesterday  may repeat in a few days or if signs of hypoxemia

## 2018-03-03 NOTE — PROGRESS NOTE ADULT - SUBJECTIVE AND OBJECTIVE BOX
INTERVAL HPI/OVERNIGHT EVENTS:  Resting.  Baugh draining slightly cloudy urine.          MEDICATIONS  (STANDING):  aspirin  chewable 81 milliGRAM(s) Oral daily  aztreonam  IVPB 1000 milliGRAM(s) IV Intermittent every 12 hours  aztreonam  IVPB      BACItracin   Ointment 1 Application(s) Topical every 8 hours  chlorhexidine 0.12% Liquid 15 milliLiter(s) Swish and Spit two times a day  collagenase Ointment 1 Application(s) Topical daily  digoxin     Tablet 0.25 milliGRAM(s) Oral daily  enoxaparin Injectable 40 milliGRAM(s) SubCutaneous daily  fosphenytoin IVPB 100 milliGRAM(s) PE IV Intermittent every 8 hours  levETIRAcetam  Solution 1000 milliGRAM(s) Oral every 12 hours  pantoprazole  Injectable 40 milliGRAM(s) IV Push daily  PHENobarbital Elixir 60 milliGRAM(s) Oral two times a day  vancomycin  IVPB 750 milliGRAM(s) IV Intermittent every 12 hours    MEDICATIONS  (PRN):      Allergies    clindamycin (Unknown)  Nuts (Hives; Rash)  PC Pen VK (Unknown)  strawberry (Short breath; Rash; Hives)  Xanax (Rash)    Intolerances    Ativan (Unknown)  Haldol (Dystonic RXN)  hydrALAZINE (Unknown)  Zyprexa (Unknown)      Vital Signs Last 24 Hrs  T(C): 36.7 (03 Mar 2018 08:00), Max: 37.6 (02 Mar 2018 16:00)  T(F): 98.1 (03 Mar 2018 08:00), Max: 99.7 (02 Mar 2018 16:00)  HR: 63 (03 Mar 2018 11:00) (53 - 66)  BP: 98/62 (03 Mar 2018 11:00) (84/45 - 141/66)  BP(mean): 76 (03 Mar 2018 11:00) (59 - 95)  RR: 16 (03 Mar 2018 11:00) (11 - 22)  SpO2: 100% (03 Mar 2018 11:00) (94% - 100%)     ON PE:  General: alert and awake  Abdomen: soft, nt, nd, bs+    - Small foreskin laceration is no longer bleeding.  Foreskin swelling is decreasing.         LABS:                        10.0   6.2   )-----------( 261      ( 03 Mar 2018 07:02 )             34.2     03-03    140  |  98  |  21.0<H>  ----------------------------<  80  3.4<L>   |  31.0<H>  |  0.59    Ca    9.1      03 Mar 2018 07:02  Phos  3.1     03-03  Mg     2.5     03-03            RADIOLOGY & ADDITIONAL TESTS:

## 2018-03-03 NOTE — CONSULT NOTE ADULT - ASSESSMENT
Chronic vent failure, LLL atelectasis/PNA, small eff , has pleurex  dementia, seizures ,CHF  MRSA bacteremia, ID called by ICU  seizures improving, not status per Neurology  continue full vent, pap 21, Ve 7 L , 30%  abx per ID  nutritional  support  prognosis dismal  palliative/ hospice Chronic vent failure, LLL atelectasis/PNA, small eff , has pleurex  dementia, seizures ,CHF  MRSA bacteremia, ID called by ICU, ? pna LLL  per nursing pleurex site intact without purulence  seizures improving, not status per Neurology  continue full vent, pap 21, Ve 7 L , 30%  abx per ID  nutritional  support  prognosis dismal  palliative/ hospice

## 2018-03-03 NOTE — DIETITIAN INITIAL EVALUATION ADULT. - PROBLEM SELECTOR PLAN 3
- Continue full vent support at this time  - As per family, patient was tolerating trach collar yesterday  - Monitor O2 sat  - Pulmonary toileting  - Elevate head of bed to 30 degrees  - VAP prophylaxis

## 2018-03-03 NOTE — DIETITIAN INITIAL EVALUATION ADULT. - NS AS NUTRI INTERV ENTERAL NUTRITION
Volume/Increase Jevity 1.5 to 60ml/hr to provide total volume of 1200ml/daily + prostat 30ml 1x daily

## 2018-03-03 NOTE — PROGRESS NOTE ADULT - SUBJECTIVE AND OBJECTIVE BOX
[facility]                                     Neurology at [place]                                 Ally Sawyer & Karan                                  [address]                                        [place], [place], [address]                                             [phone]      Vital signs:  T(C): 36.9 (03-03-18 @ 12:00), Max: 37.4 (03-02-18 @ 19:00)  HR: 67 (03-03-18 @ 16:01) (53 - 70)  BP: 116/56 (03-03-18 @ 15:00) (89/50 - 141/66)  RR: 14 (03-03-18 @ 15:00) (11 - 22)  SpO2: 100% (03-03-18 @ 16:01) (94% - 100%)  Wt(kg): --    Exam:    No new complaints.  awake   unresponsive  perrl  face sym  contracted x 4 ext    video EEG no seizures, (+) diffuse slowing

## 2018-03-03 NOTE — PROGRESS NOTE ADULT - PROBLEM SELECTOR PLAN 3
unclear what baseline is   continue to monitor   likely multifactorial due to seizure, sepsis, dementia

## 2018-03-03 NOTE — CONSULT NOTE ADULT - SUBJECTIVE AND OBJECTIVE BOX
PULMONARY CONSULT NOTE      [id number]    Patient is a 86y old  Male who presents with a chief complaint of Seizures (01 Mar 2018 09:10)  85 y/o M w/ a PMHx of CHF, dementia, prostate cancer, seizures, CAD, GERD, anemia, tracheostomy, gastrostomy, colostomy, and ventilator dependent presents to ED from nursing home due to seizure activity that began at 05:00. Patient was given 500 mg of Keppra through PEG before leaving nursing home. On arrival to ED, patient still exhibited seizure activity. Patient was given 2 doses of Versed totaling 8 mg, and 1 dose of phenobarbital 1600 mg IV. Post CT scan and phenobarbital patient became hypotensive. Currently responding well to IV fluids. Patient is known from prior admission to have a legal guardian. His law office was contacted and message was left, stated he will get back to us today. Name--Van Monroy [phone].  HISTORY OF PRESENT ILLNESS:  no further seizure  stable for transfer to     MEDICATIONS  (STANDING):  aspirin  chewable 81 milliGRAM(s) Oral daily  BACItracin   Ointment 1 Application(s) Topical every 8 hours  chlorhexidine 0.12% Liquid 15 milliLiter(s) Swish and Spit two times a day  collagenase Ointment 1 Application(s) Topical daily  digoxin     Tablet 0.25 milliGRAM(s) Oral daily  enoxaparin Injectable 40 milliGRAM(s) SubCutaneous daily  fosphenytoin IVPB 100 milliGRAM(s) PE IV Intermittent every 8 hours  levETIRAcetam  Solution 1000 milliGRAM(s) Oral every 12 hours  pantoprazole  Injectable 40 milliGRAM(s) IV Push daily  PHENobarbital Elixir 60 milliGRAM(s) Oral two times a day  vancomycin  IVPB 750 milliGRAM(s) IV Intermittent every 12 hours      MEDICATIONS  (PRN):      Allergies    clindamycin (Unknown)  Nuts (Hives; Rash)  PC Pen VK (Unknown)  strawberry (Short breath; Rash; Hives)  Xanax (Rash)    Intolerances    Ativan (Unknown)  Haldol (Dystonic RXN)  hydrALAZINE (Unknown)  Zyprexa (Unknown)      PAST MEDICAL & SURGICAL HISTORY:  Dysphagia  Fall: Multiple Mechanical falls  CAD (coronary artery disease)  Clostridium difficile diarrhea: in 2009  BPH (benign prostatic hypertrophy)  Dementia  HLD (hyperlipidemia)  CHF (congestive heart failure)  Prostate cancer: Treated w/ radiation  Stroke: (~5yrs ago)  Seizure: (last event &gt;1yr ago)  HTN (hypertension)  Colostomy in place  S/P percutaneous endoscopic gastrostomy (PEG) tube placement  H/O hemorrhoidectomy  Deviated septum  Abdominal hernia  Status post cardiac surgery: stents x 2      FAMILY HISTORY:  No pertinent family history in first degree relatives      SOCIAL HISTORY  Smoking History:     REVIEW OF SYSTEMS:    N/A unresponsive on vent    Vital Signs Last 24 Hrs  T(C): 36.9 (03 Mar 2018 12:00), Max: 37.4 (02 Mar 2018 19:00)  T(F): 98.4 (03 Mar 2018 12:00), Max: 99.4 (02 Mar 2018 19:00)  HR: 67 (03 Mar 2018 16:01) (53 - 70)  BP: 116/56 (03 Mar 2018 15:00) (89/50 - 141/66)  BP(mean): 80 (03 Mar 2018 15:00) (66 - 95)  RR: 14 (03 Mar 2018 15:00) (11 - 22)  SpO2: 100% (03 Mar 2018 16:01) (94% - 100%)    PHYSICAL EXAMINATION:    GENERAL: The patient is contracted _in no apparent distress.     HEENT: Head is normocephalic and atraumatic. Extraocular muscles are intact. Mucous membranes are moist.     NECK: Supple.     LUNGS: moderate air entry bilat  without wheezing, rales, or rhonchi. Respirations unlabored    HEART: Regular rate and rhythm without murmur.    ABDOMEN: Soft, nontender, and nondistended.  No hepatosplenomegaly is noted.    EXTREMITIES: With 2 plus  edema.    NEUROLOGIC:contraction      LABS:                        10.0   6.2   )-----------( 261      ( 03 Mar 2018 07:02 )             34.2     03-03    140  |  98  |  21.0<H>  ----------------------------<  80  3.4<L>   |  31.0<H>  |  0.59    Ca    9.1      03 Mar 2018 07:02  Phos  3.1     03-03  Mg     2.5     03-03                          MICROBIOLOGY:    RADIOLOGY & ADDITIONAL STUDIES:  CXR and CT scan reviewed

## 2018-03-03 NOTE — DIETITIAN INITIAL EVALUATION ADULT. - PROBLEM SELECTOR PLAN 5
- Likely urinary source secondary to chronic indwelling jones with paraphimosis  - Continue broad spectrum abx  - Follow-up cultures, will narrow abx treatment dependent on culture results  - Urology called, Dr. Alcala will see patient

## 2018-03-03 NOTE — PROGRESS NOTE ADULT - SUBJECTIVE AND OBJECTIVE BOX
Patient is a 86y old  Male who presents with a chief complaint of Seizures (01 Mar 2018 09:10)      BRIEF HOSPITAL COURSE: ***    Events last 24 hours: ***    PAST MEDICAL & SURGICAL HISTORY:  Dysphagia  Fall: Multiple Mechanical falls  CAD (coronary artery disease)  Clostridium difficile diarrhea: in 2009  BPH (benign prostatic hypertrophy)  Dementia  HLD (hyperlipidemia)  CHF (congestive heart failure)  Prostate cancer: Treated w/ radiation  Stroke: (~5yrs ago)  Seizure: (last event &gt;1yr ago)  HTN (hypertension)  Colostomy in place  S/P percutaneous endoscopic gastrostomy (PEG) tube placement  H/O hemorrhoidectomy  Deviated septum  Abdominal hernia  Status post cardiac surgery: stents x 2      Review of Systems:  CONSTITUTIONAL: No fever, chills, or fatigue  EYES: No eye pain, visual disturbances, or discharge  ENMT:  No difficulty hearing, tinnitus, vertigo; No sinus or throat pain  NECK: No pain or stiffness  RESPIRATORY: No cough, wheezing, chills or hemoptysis; No shortness of breath  CARDIOVASCULAR: No chest pain, palpitations, dizziness, or leg swelling  GASTROINTESTINAL: No abdominal or epigastric pain. No nausea, vomiting, or hematemesis; No diarrhea or constipation. No melena or hematochezia.  GENITOURINARY: No dysuria, frequency, hematuria, or incontinence  NEUROLOGICAL: No headaches, memory loss, loss of strength, numbness, or tremors  SKIN: No itching, burning, rashes, or lesions   MUSCULOSKELETAL: No joint pain or swelling; No muscle, back, or extremity pain  PSYCHIATRIC: No depression, anxiety, mood swings, or difficulty sleeping      Medications:  vancomycin  IVPB 750 milliGRAM(s) IV Intermittent every 12 hours    digoxin     Tablet 0.25 milliGRAM(s) Oral daily      fosphenytoin IVPB 100 milliGRAM(s) PE IV Intermittent every 8 hours  levETIRAcetam  Solution 1000 milliGRAM(s) Oral every 12 hours  PHENobarbital Elixir 60 milliGRAM(s) Oral two times a day      aspirin  chewable 81 milliGRAM(s) Oral daily  enoxaparin Injectable 40 milliGRAM(s) SubCutaneous daily    pantoprazole  Injectable 40 milliGRAM(s) IV Push daily            BACItracin   Ointment 1 Application(s) Topical every 8 hours  chlorhexidine 0.12% Liquid 15 milliLiter(s) Swish and Spit two times a day  collagenase Ointment 1 Application(s) Topical daily        Mode: AC/ CMV (Assist Control/ Continuous Mandatory Ventilation)  RR (machine): 16  TV (machine): 500  FiO2: 30  PEEP: 5  MAP: 8  PIP: 20      ICU Vital Signs Last 24 Hrs  T(C): 37 (03 Mar 2018 16:00), Max: 37.2 (03 Mar 2018 04:08)  T(F): 98.6 (03 Mar 2018 16:00), Max: 98.9 (03 Mar 2018 04:08)  HR: 57 (03 Mar 2018 19:00) (53 - 70)  BP: 120/57 (03 Mar 2018 19:00) (95/52 - 141/66)  BP(mean): 82 (03 Mar 2018 19:00) (71 - 95)  ABP: --  ABP(mean): --  RR: 16 (03 Mar 2018 19:00) (11 - 22)  SpO2: 96% (03 Mar 2018 19:00) (87% - 100%)          I&O's Detail    02 Mar 2018 07:01  -  03 Mar 2018 07:00  --------------------------------------------------------  IN:    Enteral Tube Flush: 60 mL    Jevity: 100 mL    Solution: 500 mL    Solution: 150 mL    Solution: 100 mL    Solution: 200 mL  Total IN: 1110 mL    OUT:    Indwelling Catheter - Urethral: 1245 mL  Total OUT: 1245 mL    Total NET: -135 mL      03 Mar 2018 07:01  -  03 Mar 2018 19:38  --------------------------------------------------------  IN:    Enteral Tube Flush: 180 mL    Jevity: 300 mL    Solution: 50 mL  Total IN: 530 mL    OUT:    Drain: 75 mL    Indwelling Catheter - Urethral: 490 mL  Total OUT: 565 mL    Total NET: -35 mL            LABS:                        10.0   6.2   )-----------( 261      ( 03 Mar 2018 07:02 )             34.2     03-03    140  |  98  |  21.0<H>  ----------------------------<  80  3.4<L>   |  31.0<H>  |  0.59    Ca    9.1      03 Mar 2018 07:02  Phos  3.1     03-03  Mg     2.5     03-03            CAPILLARY BLOOD GLUCOSE            CULTURES:  Culture Results:   10,000 - 49,000 CFU/mL Gram Negative Rods Identification and  susceptibility to follow.  Culture in progress (03-02-18 @ 11:55)  Rapid RVP Result: NotDetec (03-01-18 @ 13:48)  Culture Results:   No growth at 48 hours (03-01-18 @ 12:30)  Culture Results:   Culture grew 3 or more types of organisms which indicate  collection contamination; consider recollection only if clinically  indicated.  .  Notified Enrike Rendon RN ([facility]) and read back. 03/02/2018 09:10 pauline (03-01-18 @ 12:27)  Culture Results:   Growth in anaerobic bottle: Methicillin resistant Staphylococcus aureus  Anaerobic Bottle: 18.36 Hours to positivity  Aerobic Bottle: No growth to date  ***Blood Panel PCR results on this specimen are available  approximately 3 hours after the Gramstain result.***  Gram stain, PCR, and/or culture results may not always  correspond due to difference in methodologies.  ************************************************************  This PCR assay was performed using [employer].  The following targets are tested for: Enterococcus,  vancomycin resistant enterococci, Listeria monocytogenes,  coagulase negative staphylococci, S. aureus,  methicillin resistant S. aureus, Streptococcus agalactiae  (Group B), S. pneumoniae, S. pyogenes (Group A),  Acinetobacter baumannii, Enterobacter cloacae, E. coli,  Klebsiella oxytoca, K. pneumoniae, Proteus sp.,  Serratia marcescens, Haemophilus influenzae,  Neisseria meningitidis, Pseudomonas aeruginosa, Candida  albicans, C. glabrata, C krusei, C parapsilosis,  C. tropicalis and the KPC resistance gene.  .  "Due to technical problems, Proteus sp. will Not be reported as part of  the BCID panel until further notice"  .  TYPE: (C=Critical, N=Notification, A=Abnormal) C  TESTS:  _ GS  DATE/TIME CALLED: _ 03/02/2018 08:39:29  CALLED TO: Amanda Rendon RN  READ BACK (2 Patient Identifiers)(Y/N): _ Y  READ BACK VALUES (Y/N): _ Y  CALLED BY: Amanda Barrera  .  TYPE: (C=Critical, N=Notification, A=Abnormal) C  TESTS:  _ MRSA  DATE/TIME CALLED: _ 03/02/2018 10:00:00  CALLED TO: Amanda Rendon PA  READ BACK (2 Patient Identifiers)(Y/N): _ Y  READ BACK VALUES (Y/N): _ Y  CALLED BY: Aamnda Barrera    Sent copy to IC and logistics. (03-01-18 @ 07:22)      Physical Examination:    General: No acute distress.  Alert, oriented, interactive, nonfocal    HEENT: Pupils equal, reactive to light.  Symmetric.    PULM: Clear to auscultation bilaterally, no significant sputum production    CVS: Regular rate and rhythm, no murmurs, rubs, or gallops    ABD: Soft, nondistended, nontender, normoactive bowel sounds, no masses    EXT: No edema, nontender    SKIN: Warm and well perfused, no rashes noted.    RADIOLOGY: ***    CRITICAL CARE TIME SPENT: *** Patient is a 86y old  Male who presents with a chief complaint of Seizures (01 Mar 2018 09:10)    Events last 24 hours: stable this am    PAST MEDICAL & SURGICAL HISTORY:  Dysphagia  Fall: Multiple Mechanical falls  CAD (coronary artery disease)  Clostridium difficile diarrhea: in 2009  BPH (benign prostatic hypertrophy)  Dementia  HLD (hyperlipidemia)  CHF (congestive heart failure)  Prostate cancer: Treated w/ radiation  Stroke: (~5yrs ago)  Seizure: (last event &gt;1yr ago)  HTN (hypertension)  Colostomy in place  S/P percutaneous endoscopic gastrostomy (PEG) tube placement  H/O hemorrhoidectomy  Deviated septum  Abdominal hernia  Status post cardiac surgery: stents x 2      Review of Systems:  unable to obtain      Medications:  vancomycin  IVPB 750 milliGRAM(s) IV Intermittent every 12 hours    digoxin     Tablet 0.25 milliGRAM(s) Oral daily      fosphenytoin IVPB 100 milliGRAM(s) PE IV Intermittent every 8 hours  levETIRAcetam  Solution 1000 milliGRAM(s) Oral every 12 hours  PHENobarbital Elixir 60 milliGRAM(s) Oral two times a day      aspirin  chewable 81 milliGRAM(s) Oral daily  enoxaparin Injectable 40 milliGRAM(s) SubCutaneous daily    pantoprazole  Injectable 40 milliGRAM(s) IV Push daily            BACItracin   Ointment 1 Application(s) Topical every 8 hours  chlorhexidine 0.12% Liquid 15 milliLiter(s) Swish and Spit two times a day  collagenase Ointment 1 Application(s) Topical daily        Mode: AC/ CMV (Assist Control/ Continuous Mandatory Ventilation)  RR (machine): 16  TV (machine): 500  FiO2: 30  PEEP: 5  MAP: 8  PIP: 20      ICU Vital Signs Last 24 Hrs  T(C): 37 (03 Mar 2018 16:00), Max: 37.2 (03 Mar 2018 04:08)  T(F): 98.6 (03 Mar 2018 16:00), Max: 98.9 (03 Mar 2018 04:08)  HR: 57 (03 Mar 2018 19:00) (53 - 70)  BP: 120/57 (03 Mar 2018 19:00) (95/52 - 141/66)  BP(mean): 82 (03 Mar 2018 19:00) (71 - 95)  ABP: --  ABP(mean): --  RR: 16 (03 Mar 2018 19:00) (11 - 22)  SpO2: 96% (03 Mar 2018 19:00) (87% - 100%)          I&O's Detail    02 Mar 2018 07:01  -  03 Mar 2018 07:00  --------------------------------------------------------  IN:    Enteral Tube Flush: 60 mL    Jevity: 100 mL    Solution: 500 mL    Solution: 150 mL    Solution: 100 mL    Solution: 200 mL  Total IN: 1110 mL    OUT:    Indwelling Catheter - Urethral: 1245 mL  Total OUT: 1245 mL    Total NET: -135 mL      03 Mar 2018 07:01  -  03 Mar 2018 19:38  --------------------------------------------------------  IN:    Enteral Tube Flush: 180 mL    Jevity: 300 mL    Solution: 50 mL  Total IN: 530 mL    OUT:    Drain: 75 mL    Indwelling Catheter - Urethral: 490 mL  Total OUT: 565 mL    Total NET: -35 mL            LABS:                        10.0   6.2   )-----------( 261      ( 03 Mar 2018 07:02 )             34.2     03-03    140  |  98  |  21.0<H>  ----------------------------<  80  3.4<L>   |  31.0<H>  |  0.59    Ca    9.1      03 Mar 2018 07:02  Phos  3.1     03-03  Mg     2.5     03-03            CAPILLARY BLOOD GLUCOSE            CULTURES:  Culture Results:   10,000 - 49,000 CFU/mL Gram Negative Rods Identification and  susceptibility to follow.  Culture in progress (03-02-18 @ 11:55)  Rapid RVP Result: NotDetec (03-01-18 @ 13:48)  Culture Results:   No growth at 48 hours (03-01-18 @ 12:30)  Culture Results:   Culture grew 3 or more types of organisms which indicate  collection contamination; consider recollection only if clinically  indicated.  .  Notified Enrike Rendon RN ([facility]) and read back. 03/02/2018 09:10 skennely (03-01-18 @ 12:27)  Culture Results:   Growth in anaerobic bottle: Methicillin resistant Staphylococcus aureus  Anaerobic Bottle: 18.36 Hours to positivity  Aerobic Bottle: No growth to date  ***Blood Panel PCR results on this specimen are available  approximately 3 hours after the Gramstain result.***  Gram stain, PCR, and/or culture results may not always  correspond due to difference in methodologies.  ************************************************************  This PCR assay was performed using [employer].  The following targets are tested for: Enterococcus,  vancomycin resistant enterococci, Listeria monocytogenes,  coagulase negative staphylococci, S. aureus,  methicillin resistant S. aureus, Streptococcus agalactiae  (Group B), S. pneumoniae, S. pyogenes (Group A),  Acinetobacter baumannii, Enterobacter cloacae, E. coli,  Klebsiella oxytoca, K. pneumoniae, Proteus sp.,  Serratia marcescens, Haemophilus influenzae,  Neisseria meningitidis, Pseudomonas aeruginosa, Candida  albicans, C. glabrata, C krusei, C parapsilosis,  C. tropicalis and the KPC resistance gene.  .  "Due to technical problems, Proteus sp. will Not be reported as part of  the BCID panel until further notice"  .  TYPE: (C=Critical, N=Notification, A=Abnormal) C  TESTS:  _ GS  DATE/TIME CALLED: _ 03/02/2018 08:39:29  CALLED TO: Amanda Rendon RN  READ BACK (2 Patient Identifiers)(Y/N): _ Y  READ BACK VALUES (Y/N): _ Y  CALLED BY: Amanda Barrera  .  TYPE: (C=Critical, N=Notification, A=Abnormal) C  TESTS:  _ MRSA  DATE/TIME CALLED: _ 03/02/2018 10:00:00  CALLED TO: Amanda FERNANDEZ  READ BACK (2 Patient Identifiers)(Y/N): _ Y  READ BACK VALUES (Y/N): _ Y  CALLED BY: Amanda Barrera    Sent copy to  and logistics. (03-01-18 @ 07:22)      Physical Examination:    General: No acute distress.      HEENT: Pupils equal, reactive to light.  Symmetric.    PULM: diminished    CVS: Regular rate and rhythm, no murmurs, rubs, or gallops    ABD: Soft, nondistended, nontender, normoactive bowel sounds, no masses    EXT: 3+ edema, nontender    SKIN: Warm and well perfused, no rashes noted.

## 2018-03-03 NOTE — PROVIDER CONTACT NOTE (OTHER) - ASSESSMENT
Upon sacral wound dressing change, wound packing was noted. Packing is embedded inside wound and RN is unable to remove.

## 2018-03-04 LAB
-  AMIKACIN: SIGNIFICANT CHANGE UP
-  AZTREONAM: SIGNIFICANT CHANGE UP
-  CEFEPIME: SIGNIFICANT CHANGE UP
-  CEFTAZIDIME: SIGNIFICANT CHANGE UP
-  CIPROFLOXACIN: SIGNIFICANT CHANGE UP
-  GENTAMICIN: SIGNIFICANT CHANGE UP
-  IMIPENEM: SIGNIFICANT CHANGE UP
-  LEVOFLOXACIN: SIGNIFICANT CHANGE UP
-  MEROPENEM: SIGNIFICANT CHANGE UP
-  PIPERACILLIN/TAZOBACTAM: SIGNIFICANT CHANGE UP
-  TOBRAMYCIN: SIGNIFICANT CHANGE UP
ANION GAP SERPL CALC-SCNC: 11 MMOL/L — SIGNIFICANT CHANGE UP (ref 5–17)
BASOPHILS # BLD AUTO: 0 K/UL — SIGNIFICANT CHANGE UP (ref 0–0.2)
BASOPHILS NFR BLD AUTO: 0.2 % — SIGNIFICANT CHANGE UP (ref 0–2)
BUN SERPL-MCNC: 17 MG/DL — SIGNIFICANT CHANGE UP (ref 8–20)
CALCIUM SERPL-MCNC: 8.5 MG/DL — LOW (ref 8.6–10.2)
CHLORIDE SERPL-SCNC: 100 MMOL/L — SIGNIFICANT CHANGE UP (ref 98–107)
CO2 SERPL-SCNC: 31 MMOL/L — HIGH (ref 22–29)
CREAT SERPL-MCNC: 0.51 MG/DL — SIGNIFICANT CHANGE UP (ref 0.5–1.3)
CULTURE RESULTS: SIGNIFICANT CHANGE UP
EOSINOPHIL # BLD AUTO: 0.3 K/UL — SIGNIFICANT CHANGE UP (ref 0–0.5)
EOSINOPHIL NFR BLD AUTO: 5.8 % — HIGH (ref 0–5)
GLUCOSE SERPL-MCNC: 129 MG/DL — HIGH (ref 70–115)
HCT VFR BLD CALC: 27.9 % — LOW (ref 42–52)
HGB BLD-MCNC: 8.3 G/DL — LOW (ref 14–18)
LYMPHOCYTES # BLD AUTO: 1 K/UL — SIGNIFICANT CHANGE UP (ref 1–4.8)
LYMPHOCYTES # BLD AUTO: 21.1 % — SIGNIFICANT CHANGE UP (ref 20–55)
MAGNESIUM SERPL-MCNC: 2.3 MG/DL — SIGNIFICANT CHANGE UP (ref 1.6–2.6)
MCHC RBC-ENTMCNC: 24.8 PG — LOW (ref 27–31)
MCHC RBC-ENTMCNC: 29.7 G/DL — LOW (ref 32–36)
MCV RBC AUTO: 83.3 FL — SIGNIFICANT CHANGE UP (ref 80–94)
METHOD TYPE: SIGNIFICANT CHANGE UP
MONOCYTES # BLD AUTO: 0.6 K/UL — SIGNIFICANT CHANGE UP (ref 0–0.8)
MONOCYTES NFR BLD AUTO: 12.9 % — HIGH (ref 3–10)
NEUTROPHILS # BLD AUTO: 2.8 K/UL — SIGNIFICANT CHANGE UP (ref 1.8–8)
NEUTROPHILS NFR BLD AUTO: 59.6 % — SIGNIFICANT CHANGE UP (ref 37–73)
ORGANISM # SPEC MICROSCOPIC CNT: SIGNIFICANT CHANGE UP
ORGANISM # SPEC MICROSCOPIC CNT: SIGNIFICANT CHANGE UP
PHOSPHATE SERPL-MCNC: 2.6 MG/DL — SIGNIFICANT CHANGE UP (ref 2.4–4.7)
PLATELET # BLD AUTO: 229 K/UL — SIGNIFICANT CHANGE UP (ref 150–400)
POTASSIUM SERPL-MCNC: 3.3 MMOL/L — LOW (ref 3.5–5.3)
POTASSIUM SERPL-SCNC: 3.3 MMOL/L — LOW (ref 3.5–5.3)
PROCALCITONIN SERPL-MCNC: 0.15 NG/ML — HIGH (ref 0–0.04)
RBC # BLD: 3.35 M/UL — LOW (ref 4.6–6.2)
RBC # FLD: 19 % — HIGH (ref 11–15.6)
SODIUM SERPL-SCNC: 142 MMOL/L — SIGNIFICANT CHANGE UP (ref 135–145)
SPECIMEN SOURCE: SIGNIFICANT CHANGE UP
WBC # BLD: 4.6 K/UL — LOW (ref 4.8–10.8)
WBC # FLD AUTO: 4.6 K/UL — LOW (ref 4.8–10.8)

## 2018-03-04 PROCEDURE — 71045 X-RAY EXAM CHEST 1 VIEW: CPT | Mod: 26

## 2018-03-04 PROCEDURE — 99233 SBSQ HOSP IP/OBS HIGH 50: CPT

## 2018-03-04 RX ORDER — POTASSIUM CHLORIDE 20 MEQ
40 PACKET (EA) ORAL ONCE
Qty: 0 | Refills: 0 | Status: COMPLETED | OUTPATIENT
Start: 2018-03-04 | End: 2018-03-04

## 2018-03-04 RX ORDER — POTASSIUM CHLORIDE 20 MEQ
40 PACKET (EA) ORAL ONCE
Qty: 0 | Refills: 0 | Status: DISCONTINUED | OUTPATIENT
Start: 2018-03-04 | End: 2018-03-04

## 2018-03-04 RX ADMIN — FOSPHENYTOIN 104 MILLIGRAM(S) PE: 50 INJECTION INTRAMUSCULAR; INTRAVENOUS at 21:49

## 2018-03-04 RX ADMIN — FOSPHENYTOIN 104 MILLIGRAM(S) PE: 50 INJECTION INTRAMUSCULAR; INTRAVENOUS at 13:04

## 2018-03-04 RX ADMIN — LEVETIRACETAM 1000 MILLIGRAM(S): 250 TABLET, FILM COATED ORAL at 17:54

## 2018-03-04 RX ADMIN — Medication 1 APPLICATION(S): at 21:38

## 2018-03-04 RX ADMIN — LEVETIRACETAM 1000 MILLIGRAM(S): 250 TABLET, FILM COATED ORAL at 06:11

## 2018-03-04 RX ADMIN — Medication 1 APPLICATION(S): at 11:54

## 2018-03-04 RX ADMIN — ENOXAPARIN SODIUM 40 MILLIGRAM(S): 100 INJECTION SUBCUTANEOUS at 11:54

## 2018-03-04 RX ADMIN — Medication 40 MILLIEQUIVALENT(S): at 21:38

## 2018-03-04 RX ADMIN — Medication 60 MILLIGRAM(S): at 05:40

## 2018-03-04 RX ADMIN — CHLORHEXIDINE GLUCONATE 15 MILLILITER(S): 213 SOLUTION TOPICAL at 17:54

## 2018-03-04 RX ADMIN — Medication 81 MILLIGRAM(S): at 11:54

## 2018-03-04 RX ADMIN — Medication 150 MILLIGRAM(S): at 21:37

## 2018-03-04 RX ADMIN — Medication 1 APPLICATION(S): at 05:15

## 2018-03-04 RX ADMIN — FOSPHENYTOIN 104 MILLIGRAM(S) PE: 50 INJECTION INTRAMUSCULAR; INTRAVENOUS at 05:39

## 2018-03-04 RX ADMIN — Medication 1 APPLICATION(S): at 12:43

## 2018-03-04 RX ADMIN — Medication 60 MILLIGRAM(S): at 17:54

## 2018-03-04 RX ADMIN — Medication 150 MILLIGRAM(S): at 09:32

## 2018-03-04 RX ADMIN — CHLORHEXIDINE GLUCONATE 15 MILLILITER(S): 213 SOLUTION TOPICAL at 05:15

## 2018-03-04 RX ADMIN — PANTOPRAZOLE SODIUM 40 MILLIGRAM(S): 20 TABLET, DELAYED RELEASE ORAL at 11:54

## 2018-03-04 NOTE — PROGRESS NOTE ADULT - SUBJECTIVE AND OBJECTIVE BOX
INTERVAL HPI/OVERNIGHT EVENTS: Resting comfortably.  Baugh draining.    MEDICATIONS  (STANDING):  aspirin  chewable 81 milliGRAM(s) Oral daily  BACItracin   Ointment 1 Application(s) Topical every 8 hours  chlorhexidine 0.12% Liquid 15 milliLiter(s) Swish and Spit two times a day  collagenase Ointment 1 Application(s) Topical daily  digoxin     Tablet 0.25 milliGRAM(s) Oral daily  enoxaparin Injectable 40 milliGRAM(s) SubCutaneous daily  fosphenytoin IVPB 100 milliGRAM(s) PE IV Intermittent every 8 hours  levETIRAcetam  Solution 1000 milliGRAM(s) Oral every 12 hours  pantoprazole  Injectable 40 milliGRAM(s) IV Push daily  PHENobarbital Elixir 60 milliGRAM(s) Oral two times a day  vancomycin  IVPB 750 milliGRAM(s) IV Intermittent every 12 hours    MEDICATIONS  (PRN):      Allergies    clindamycin (Unknown)  Nuts (Hives; Rash)  PC Pen VK (Unknown)  strawberry (Short breath; Rash; Hives)  Xanax (Rash)    Intolerances    Ativan (Unknown)  Haldol (Dystonic RXN)  hydrALAZINE (Unknown)  Zyprexa (Unknown)      Vital Signs Last 24 Hrs  T(C): 36.9 (04 Mar 2018 15:53), Max: 37.3 (04 Mar 2018 00:00)  T(F): 98.5 (04 Mar 2018 15:53), Max: 99.2 (04 Mar 2018 00:00)  HR: 58 (04 Mar 2018 21:09) (52 - 62)  BP: 132/65 (04 Mar 2018 15:53) (99/54 - 154/70)  BP(mean): 87 (04 Mar 2018 13:00) (74 - 100)  RR: 18 (04 Mar 2018 15:53) (10 - 18)  SpO2: 100% (04 Mar 2018 21:09) (95% - 100%)     ON PE:  abd- soft, nt, nd, bs+    Decreased penoscrotal edema    LABS:                        8.3    4.6   )-----------( 229      ( 04 Mar 2018 06:34 )             27.9     03-04    142  |  100  |  17.0  ----------------------------<  129<H>  3.3<L>   |  31.0<H>  |  0.51    Ca    8.5<L>      04 Mar 2018 06:34  Phos  2.6     03-04  Mg     2.3     03-04            RADIOLOGY & ADDITIONAL TESTS:

## 2018-03-04 NOTE — PROGRESS NOTE ADULT - ASSESSMENT
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    Presentation complicated by MRSA bacteremia.     MRSA bacteremia: bld cx 1/2 bottles positive on admission. suspect sacral ulcer as most likely source, hx of osteomyelitis in that area in the past. wound care consult pending. ID consult appreciated. on vanco. daily repeat blood cx until neg x48hrs, last resulted set fro 3/1, 1 bottle. Echo done @ bedside, per tech poor quality, no comments on vegetations made on final report. ?KATIA needed but given very poor prognosis will need to consult with legal guardian (who is NOT the daughter + wife) to see how aggressive intervention should be, even if vegetations are present pt would be extremely poor candidate for valvular replacement and is not likely to get clearance for said procedure    Sacral Decub Ulcer: known hx. CT Chest with incidental finding of abn enhancement in that area concerning for osteomyelitis. already on vanco iv for bacteremia. cont abx. fu with ID. check ESR + MRI for further investigation.     Chronic hypoxic Resp Failure: sp trach which was present on admit. vent dependent @ baseline. VAP precautions with HOB elevation. RVP neg. sp CT Chest concerning for RUL PNA, clinically not present despite sputum cx with many GNR noted, pending finalized report. fu ID. pulm consult appreciated, vent settings per them.    RUL Infiltrate: noted on CT. procal mildly elevated. pt w/o leukocytosis + excessive secretions, unclear if he truly has VAP. repeat procal in 2d. fu final sputum cx. ID fu. if febrile or signs of persistent or worsening infection will add zosyn to regimen for added coverage    Hypokalemia: mild, asymptomatic. KCL 40meq via PEG. repeat in AM    Status Epilepticus: sec to med non compliance while in NH who was unaware of pts diagnosis of seizure and did not administer meds. sp keppra while in NH after event and transferred to ER where he was given versed 4mg x2, phenobarb 1600mg IV, and was transferred to ICU for 24hr video EEG, read as mild diffuse encephalopathy with Lt hemisphere dysfunction and resolving partial seizures. CTH w/o signs acute infarct. neuro following, will cont phenobarb + keppra + fosphenytoin as triple AED therapy. 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 pt with paraphimosis + hx of prostate ca. hx of resistant UTI in the past as well. UC <50k CFU, pseudomonas, no indication to tx @ this time. urology following. no plans to remove jones catheter, pt with significant difficulty having spontaneous voids.     Pleural effusion: chronic, pleurx drain in place. may be used PRN.     Chronic PEG: npo with tube feeds. nutrition consult appreciated, rec inc of jevity 1.5 to 60ml/hr with prostat 30ml daily.

## 2018-03-04 NOTE — PROGRESS NOTE ADULT - SUBJECTIVE AND OBJECTIVE BOX
CHIEF COMPLAINT: mrsa bactermia    Patient seen and examined at the bedside. No acute overnight events. Pt fully unarousable, unable to assess ROS including the following: fever/chills, headache, lightheadedness, dizziness, chest pain, palpitations, shortness of breath, cough, abd pain, nausea/vomiting/diarrhea, muscle pain.      =========================================================================================  PHYSICAL EXAM.    GEN - appears age appropriate. well nourished.   HEENT - NCAT  RESP - CTA BL, no wheeze/stridor/rhonchi/crackles. on supplemental O2 (mech vent via trach)   CARDIO - NS1S2, RRR. No murmurs/rubs/gallops.  ABD - Soft/Non tender/Non distended. Normal BS x4 quadrants. PEG.    - jones in place, clear yellow urine in collection bag  Ext - No KEVIN.  MSK - could not assess  Neuro - pt unarousable, could not assess, not waking with even aggressive sternal rub  Psych - could not assess  Skin - c/d/i. no rashes/lesions      VITAL SIGNS.    Vital Signs Last 24 Hrs  T(C): 36.9 (04 Mar 2018 15:53), Max: 37.3 (04 Mar 2018 00:00)  T(F): 98.5 (04 Mar 2018 15:53), Max: 99.2 (04 Mar 2018 00:00)  HR: 62 (04 Mar 2018 15:53) (52 - 62)  BP: 132/65 (04 Mar 2018 15:53) (99/54 - 154/70)  BP(mean): 87 (04 Mar 2018 13:00) (74 - 100)  RR: 18 (04 Mar 2018 15:53) (10 - 18)  SpO2: 100% (04 Mar 2018 15:53) (91% - 100%)    =================================================    LABS.                          8.3    4.6   )-----------( 229      ( 04 Mar 2018 06:34 )             27.9     03-04    142  |  100  |  17.0  ----------------------------<  129<H>  3.3<L>   |  31.0<H>  |  0.51    Ca    8.5<L>      04 Mar 2018 06:34  Phos  2.6     03-04  Mg     2.3     03-04          I&O's Summary    03 Mar 2018 07:01  -  04 Mar 2018 07:00  --------------------------------------------------------  IN: 1120 mL / OUT: 1270 mL / NET: -150 mL    04 Mar 2018 07:01  -  04 Mar 2018 19:04  --------------------------------------------------------  IN: 680 mL / OUT: 195 mL / NET: 485 mL        Culture - Sputum (collected 03 Mar 2018 19:23)  Source: .Sputum Sputum  Gram Stain (03 Mar 2018 21:43):    Few White blood cells    No organisms seen  Preliminary Report (04 Mar 2018 14:22):    Numerous Gram Negative Rods Identification and susceptibility to follow.    No Routine respiratory bonnie present    Culture in progress    Culture - Urine (collected 02 Mar 2018 11:55)  Source: .Urine Catheterized  Final Report (04 Mar 2018 11:05):    10,000 - 49,000 CFU/mL Pseudomonas aeruginosa (Carbapenem Resistant)    .    TYPE: (C=Critical, N=Notification, A=Abnormal) C    TESTS:  _ MDRO    DATE/TIME CALLED: _ 03/04/2018 11:02:27    CALLED TO: Amanda WOODWARD RN    READ BACK (2 Patient Identifiers)(Y/N): _ Y    READ BACK VALUES (Y/N): _ Y    CALLED BY: _ ANNA    .    FAX TO [facility]  Organism: Pseudomonas aeruginosa (Carbapenem Resistant) (04 Mar 2018 11:05)  Organism: Pseudomonas aeruginosa (Carbapenem Resistant) (04 Mar 2018 11:05)        ================================================    IMAGING.      ================================================    MEDICATIONS  (STANDING):  aspirin  chewable 81 milliGRAM(s) Oral daily  BACItracin   Ointment 1 Application(s) Topical every 8 hours  chlorhexidine 0.12% Liquid 15 milliLiter(s) Swish and Spit two times a day  collagenase Ointment 1 Application(s) Topical daily  digoxin     Tablet 0.25 milliGRAM(s) Oral daily  enoxaparin Injectable 40 milliGRAM(s) SubCutaneous daily  fosphenytoin IVPB 100 milliGRAM(s) PE IV Intermittent every 8 hours  levETIRAcetam  Solution 1000 milliGRAM(s) Oral every 12 hours  pantoprazole  Injectable 40 milliGRAM(s) IV Push daily  PHENobarbital Elixir 60 milliGRAM(s) Oral two times a day  vancomycin  IVPB 750 milliGRAM(s) IV Intermittent every 12 hours    MEDICATIONS  (PRN):

## 2018-03-05 DIAGNOSIS — J15.1 PNEUMONIA DUE TO PSEUDOMONAS: ICD-10-CM

## 2018-03-05 DIAGNOSIS — A41.02 SEPSIS DUE TO METHICILLIN RESISTANT STAPHYLOCOCCUS AUREUS: ICD-10-CM

## 2018-03-05 LAB
-  AMIKACIN: SIGNIFICANT CHANGE UP
-  AZTREONAM: SIGNIFICANT CHANGE UP
-  CEFEPIME: SIGNIFICANT CHANGE UP
-  CEFTAZIDIME: SIGNIFICANT CHANGE UP
-  CIPROFLOXACIN: SIGNIFICANT CHANGE UP
-  GENTAMICIN: SIGNIFICANT CHANGE UP
-  IMIPENEM: SIGNIFICANT CHANGE UP
-  LEVOFLOXACIN: SIGNIFICANT CHANGE UP
-  MEROPENEM: SIGNIFICANT CHANGE UP
-  PIPERACILLIN/TAZOBACTAM: SIGNIFICANT CHANGE UP
-  TOBRAMYCIN: SIGNIFICANT CHANGE UP
ANION GAP SERPL CALC-SCNC: 11 MMOL/L — SIGNIFICANT CHANGE UP (ref 5–17)
BASOPHILS # BLD AUTO: 0 K/UL — SIGNIFICANT CHANGE UP (ref 0–0.2)
BASOPHILS NFR BLD AUTO: 0.4 % — SIGNIFICANT CHANGE UP (ref 0–2)
BUN SERPL-MCNC: 15 MG/DL — SIGNIFICANT CHANGE UP (ref 8–20)
CALCIUM SERPL-MCNC: 8.6 MG/DL — SIGNIFICANT CHANGE UP (ref 8.6–10.2)
CHLORIDE SERPL-SCNC: 102 MMOL/L — SIGNIFICANT CHANGE UP (ref 98–107)
CO2 SERPL-SCNC: 30 MMOL/L — HIGH (ref 22–29)
CREAT SERPL-MCNC: 0.42 MG/DL — LOW (ref 0.5–1.3)
CULTURE RESULTS: SIGNIFICANT CHANGE UP
EOSINOPHIL # BLD AUTO: 0.3 K/UL — SIGNIFICANT CHANGE UP (ref 0–0.5)
EOSINOPHIL NFR BLD AUTO: 6.8 % — HIGH (ref 0–5)
ERYTHROCYTE [SEDIMENTATION RATE] IN BLOOD: 60 MM/HR — HIGH (ref 0–20)
GLUCOSE SERPL-MCNC: 129 MG/DL — HIGH (ref 70–115)
HCT VFR BLD CALC: 29.4 % — LOW (ref 42–52)
HGB BLD-MCNC: 8.7 G/DL — LOW (ref 14–18)
LYMPHOCYTES # BLD AUTO: 0.7 K/UL — LOW (ref 1–4.8)
LYMPHOCYTES # BLD AUTO: 14.3 % — LOW (ref 20–55)
MCHC RBC-ENTMCNC: 24.9 PG — LOW (ref 27–31)
MCHC RBC-ENTMCNC: 29.6 G/DL — LOW (ref 32–36)
MCV RBC AUTO: 84.2 FL — SIGNIFICANT CHANGE UP (ref 80–94)
METHOD TYPE: SIGNIFICANT CHANGE UP
MONOCYTES # BLD AUTO: 0.5 K/UL — SIGNIFICANT CHANGE UP (ref 0–0.8)
MONOCYTES NFR BLD AUTO: 10.5 % — HIGH (ref 3–10)
NEUTROPHILS # BLD AUTO: 3.3 K/UL — SIGNIFICANT CHANGE UP (ref 1.8–8)
NEUTROPHILS NFR BLD AUTO: 67.8 % — SIGNIFICANT CHANGE UP (ref 37–73)
ORGANISM # SPEC MICROSCOPIC CNT: SIGNIFICANT CHANGE UP
ORGANISM # SPEC MICROSCOPIC CNT: SIGNIFICANT CHANGE UP
PLATELET # BLD AUTO: 247 K/UL — SIGNIFICANT CHANGE UP (ref 150–400)
POTASSIUM SERPL-MCNC: 4.1 MMOL/L — SIGNIFICANT CHANGE UP (ref 3.5–5.3)
POTASSIUM SERPL-SCNC: 4.1 MMOL/L — SIGNIFICANT CHANGE UP (ref 3.5–5.3)
RBC # BLD: 3.49 M/UL — LOW (ref 4.6–6.2)
RBC # FLD: 18.8 % — HIGH (ref 11–15.6)
SODIUM SERPL-SCNC: 143 MMOL/L — SIGNIFICANT CHANGE UP (ref 135–145)
SPECIMEN SOURCE: SIGNIFICANT CHANGE UP
VANCOMYCIN TROUGH SERPL-MCNC: 17.5 UG/ML — SIGNIFICANT CHANGE UP (ref 10–20)
WBC # BLD: 4.8 K/UL — SIGNIFICANT CHANGE UP (ref 4.8–10.8)
WBC # FLD AUTO: 4.8 K/UL — SIGNIFICANT CHANGE UP (ref 4.8–10.8)

## 2018-03-05 PROCEDURE — 99233 SBSQ HOSP IP/OBS HIGH 50: CPT

## 2018-03-05 RX ORDER — LACTOBACILLUS ACIDOPHILUS 100MM CELL
1 CAPSULE ORAL EVERY 12 HOURS
Qty: 0 | Refills: 0 | Status: DISCONTINUED | OUTPATIENT
Start: 2018-03-05 | End: 2018-03-13

## 2018-03-05 RX ORDER — CEFEPIME 1 G/1
2000 INJECTION, POWDER, FOR SOLUTION INTRAMUSCULAR; INTRAVENOUS EVERY 8 HOURS
Qty: 0 | Refills: 0 | Status: COMPLETED | OUTPATIENT
Start: 2018-03-05 | End: 2018-03-11

## 2018-03-05 RX ORDER — PANTOPRAZOLE SODIUM 20 MG/1
40 TABLET, DELAYED RELEASE ORAL DAILY
Qty: 0 | Refills: 0 | Status: DISCONTINUED | OUTPATIENT
Start: 2018-03-05 | End: 2018-03-13

## 2018-03-05 RX ADMIN — CHLORHEXIDINE GLUCONATE 15 MILLILITER(S): 213 SOLUTION TOPICAL at 17:16

## 2018-03-05 RX ADMIN — Medication 81 MILLIGRAM(S): at 17:16

## 2018-03-05 RX ADMIN — CEFEPIME 100 MILLIGRAM(S): 1 INJECTION, POWDER, FOR SOLUTION INTRAMUSCULAR; INTRAVENOUS at 13:46

## 2018-03-05 RX ADMIN — LEVETIRACETAM 1000 MILLIGRAM(S): 250 TABLET, FILM COATED ORAL at 06:18

## 2018-03-05 RX ADMIN — LEVETIRACETAM 1000 MILLIGRAM(S): 250 TABLET, FILM COATED ORAL at 17:17

## 2018-03-05 RX ADMIN — Medication 100 MILLIGRAM(S): at 17:16

## 2018-03-05 RX ADMIN — Medication 1 TABLET(S): at 17:16

## 2018-03-05 RX ADMIN — Medication 1 APPLICATION(S): at 13:46

## 2018-03-05 RX ADMIN — Medication 1 APPLICATION(S): at 06:17

## 2018-03-05 RX ADMIN — CEFEPIME 100 MILLIGRAM(S): 1 INJECTION, POWDER, FOR SOLUTION INTRAMUSCULAR; INTRAVENOUS at 22:00

## 2018-03-05 RX ADMIN — Medication 0.25 MILLIGRAM(S): at 06:17

## 2018-03-05 RX ADMIN — Medication 100 MILLIGRAM(S): at 22:07

## 2018-03-05 RX ADMIN — Medication 60 MILLIGRAM(S): at 06:18

## 2018-03-05 RX ADMIN — Medication 1 APPLICATION(S): at 22:30

## 2018-03-05 RX ADMIN — Medication 150 MILLIGRAM(S): at 13:47

## 2018-03-05 RX ADMIN — ENOXAPARIN SODIUM 40 MILLIGRAM(S): 100 INJECTION SUBCUTANEOUS at 13:46

## 2018-03-05 RX ADMIN — CEFEPIME 100 MILLIGRAM(S): 1 INJECTION, POWDER, FOR SOLUTION INTRAMUSCULAR; INTRAVENOUS at 12:00

## 2018-03-05 RX ADMIN — FOSPHENYTOIN 104 MILLIGRAM(S) PE: 50 INJECTION INTRAMUSCULAR; INTRAVENOUS at 07:15

## 2018-03-05 RX ADMIN — Medication 150 MILLIGRAM(S): at 21:05

## 2018-03-05 RX ADMIN — Medication 1 APPLICATION(S): at 17:17

## 2018-03-05 RX ADMIN — PANTOPRAZOLE SODIUM 40 MILLIGRAM(S): 20 TABLET, DELAYED RELEASE ORAL at 13:46

## 2018-03-05 RX ADMIN — CHLORHEXIDINE GLUCONATE 15 MILLILITER(S): 213 SOLUTION TOPICAL at 06:18

## 2018-03-05 NOTE — PROGRESS NOTE ADULT - ASSESSMENT
85 y/o M with dementia, prostate ca, seizures, CAD, GERD anemia, chronic tracheostomy with vent dependence + PEG. Here with MRSA septicemia

## 2018-03-05 NOTE — PROGRESS NOTE ADULT - PROBLEM SELECTOR PLAN 1
blood cultures with MRSA  Repeat blood culture no growth 3/3/18  ? Source  TTE done  Pending palliative care discussion for KATIA  Continue Vancomycin

## 2018-03-05 NOTE — PROGRESS NOTE ADULT - ASSESSMENT
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    Presentation complicated by MRSA bacteremia.     MRSA bacteremia: bld cx 1/2 bottles positive on admission. suspect sacral ulcer as most likely source, hx of osteomyelitis in that area in the past. wound care consult pending. ID consult appreciated. on vanco. daily repeat blood cx until neg x48hrs, last resulted set fro 3/1, 1 bottle. Echo done @ bedside, per tech poor quality, no comments on vegetations made on final report. ?KATIA needed but given very poor prognosis will need to consult with legal guardian (who is NOT the daughter + wife) to see how aggressive intervention should be, even if vegetations are present pt would be extremely poor candidate for valvular replacement and is not likely to get clearance for said procedure    Sacral Decub Ulcer: known hx. CT Chest with incidental finding of abn enhancement in that area concerning for osteomyelitis. elevated ESR raising suspicion as well. already on vanco iv for bacteremia. cont abx. fu with ID. pending MRI for further investigation.     Chronic hypoxic Resp Failure: sp trach which was present on admit. vent dependent @ baseline. VAP precautions with HOB elevation. RVP neg. sp CT Chest concerning for RUL PNA, clinically not present despite sputum cx with many GNR noted, pending finalized report. fu ID. pulm consult appreciated, vent settings per them.    RUL Infiltrate: noted on CT as mentioned above. procal mildly elevated. pt w/o leukocytosis + excessive secretions, unclear if he truly has VAP. repeat procal in AM. fu final sputum cx. ID fu. if febrile or signs of persistent or worsening infection will add zosyn to regimen for added coverage    Hypokalemia: mild, asymptomatic. KCL 40meq via PEG yesterday, resolved    Status Epilepticus: sec to med non compliance while in NH who was unaware of pts diagnosis of seizure and did not administer meds. sp keppra while in NH after event and transferred to ER where he was given versed 4mg x2, phenobarb 1600mg IV, and was transferred to ICU for 24hr video EEG, read as mild diffuse encephalopathy with Lt hemisphere dysfunction and resolving partial seizures. CTH w/o signs acute infarct. neuro following, will cont phenobarb + keppra for dual AED therapy. phenytoin level pending. 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 pt with paraphimosis + hx of prostate ca. hx of resistant UTI in the past as well. UC <50k CFU, pseudomonas, no indication to tx @ this time. urology following. no plans to remove jones catheter, pt with significant difficulty having spontaneous voids.     Pleural effusion: chronic, pleurx drain in place. may be used PRN. no need to drain thus far during this admission.    Chronic PEG: npo with tube feeds. nutrition consult appreciated, rec inc of jevity 1.5 to 60ml/hr with prostat 30ml daily. 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    Presentation complicated by MRSA bacteremia.     MRSA bacteremia: bld cx 1/2 bottles positive on admission. suspect sacral ulcer as most likely source, hx of osteomyelitis in that area in the past. wound care consult pending. ID consult appreciated. on vanco. daily repeat blood cx until neg x48hrs, last resulted set fro 3/1, 1 bottle. Echo done @ bedside, per tech poor quality, no comments on vegetations made on final report. ?KATIA needed but given very poor prognosis will need to consult with legal guardian (who is NOT the daughter + wife) to see how aggressive intervention should be, even if vegetations are present pt would be extremely poor candidate for valvular replacement and is not likely to get clearance for said procedure    Sacral Decub Ulcer: known hx. CT Chest with incidental finding of abn enhancement in that area concerning for osteomyelitis. elevated ESR raising suspicion as well. already on vanco iv for bacteremia. cont abx. fu with ID. pending MRI for further investigation.     Chronic hypoxic Resp Failure: sp trach which was present on admit. vent dependent @ baseline. VAP precautions with HOB elevation. RVP neg. sp CT Chest concerning for RUL PNA, clinically not present but pt is noted to have inc in secretions and thickness, sputum cx with many GNR noted, pending finalized report. fu ID. pulm consult appreciated, vent settings per them. start zosyn 3/5 for coverage of pseudomonas which is most likely organism present .     RUL Infiltrate: noted on CT as mentioned above. procal mildly elevated. pt w/o leukocytosis + but now with excessive secretions, may be VAP. repeat procal in AM. fu final sputum cx. ID fu. abx as above    Hypokalemia: mild, asymptomatic. KCL 40meq via PEG yesterday, resolved    Status Epilepticus: sec to med non compliance while in NH who was unaware of pts diagnosis of seizure and did not administer meds. sp keppra while in NH after event and transferred to ER where he was given versed 4mg x2, phenobarb 1600mg IV, and was transferred to ICU for 24hr video EEG, read as mild diffuse encephalopathy with Lt hemisphere dysfunction and resolving partial seizures. CTH w/o signs acute infarct. neuro following, will cont phenobarb + keppra for dual AED therapy. phenytoin level pending. 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 pt with paraphimosis + hx of prostate ca. hx of resistant UTI in the past as well. UC <50k CFU, pseudomonas, no indication to tx @ this time. urology following. no plans to remove jones catheter, pt with significant difficulty having spontaneous voids.     Pleural effusion: chronic, pleurx drain in place. may be used PRN. no need to drain thus far during this admission.    Chronic PEG: npo with tube feeds. nutrition consult appreciated, rec inc of jevity 1.5 to 60ml/hr with prostat 30ml daily.

## 2018-03-05 NOTE — PROGRESS NOTE ADULT - SUBJECTIVE AND OBJECTIVE BOX
CHIEF COMPLAINT: mrsa bactermia    Patient seen and examined at the bedside. No acute overnight events. Pt fully unarousable, unable to assess ROS including the following: fever/chills, headache, lightheadedness, dizziness, chest pain, palpitations, shortness of breath, cough, abd pain, nausea/vomiting/diarrhea, muscle pain.      =========================================================================================  PHYSICAL EXAM.    GEN - appears age appropriate. well nourished.   HEENT - NCAT  RESP - CTA BL, no wheeze/stridor/rhonchi/crackles. on supplemental O2 (mech vent via trach)   CARDIO - NS1S2, RRR. No murmurs/rubs/gallops.  ABD - Soft/Non tender/Non distended. Normal BS x4 quadrants. PEG.    - jones in place, clear yellow urine in collection bag  Ext - No KEVIN.  MSK - could not assess  Neuro - pt unarousable, could not assess, not waking with even aggressive sternal rub  Psych - could not assess  Skin - c/d/i. no rashes/lesions      VITAL SIGNS.    Vital Signs Last 24 Hrs  T(C): 36.9 (04 Mar 2018 15:53), Max: 37.2 (04 Mar 2018 12:00)  T(F): 98.5 (04 Mar 2018 15:53), Max: 99 (04 Mar 2018 12:00)  HR: 63 (05 Mar 2018 09:33) (52 - 63)  BP: 132/65 (04 Mar 2018 15:53) (123/60 - 154/70)  BP(mean): 87 (04 Mar 2018 13:00) (87 - 100)  RR: 18 (04 Mar 2018 15:53) (11 - 18)  SpO2: 98% (05 Mar 2018 09:33) (98% - 100%)    =================================================    LABS.                          8.7    4.8   )-----------( 247      ( 05 Mar 2018 08:17 )             29.4     03-05    143  |  102  |  15.0  ----------------------------<  129<H>  4.1   |  30.0<H>  |  0.42<L>    Ca    8.6      05 Mar 2018 08:17  Phos  2.6     03-04  Mg     2.3     03-04          I&O's Summary    04 Mar 2018 07:01  -  05 Mar 2018 07:00  --------------------------------------------------------  IN: 680 mL / OUT: 855 mL / NET: -175 mL        Culture - Sputum (collected 03 Mar 2018 19:23)  Source: .Sputum Sputum  Gram Stain (03 Mar 2018 21:43):    Few White blood cells    No organisms seen  Preliminary Report (04 Mar 2018 14:22):    Numerous Gram Negative Rods Identification and susceptibility to follow.    No Routine respiratory bonnie present    Culture in progress    Culture - Urine (collected 02 Mar 2018 11:55)  Source: .Urine Catheterized  Final Report (04 Mar 2018 11:05):    10,000 - 49,000 CFU/mL Pseudomonas aeruginosa (Carbapenem Resistant)    .    TYPE: (C=Critical, N=Notification, A=Abnormal) C    TESTS:  _ MDRO    DATE/TIME CALLED: _ 03/04/2018 11:02:27    CALLED TO: Amanda WOODWARD RN    READ BACK (2 Patient Identifiers)(Y/N): _ Y    READ BACK VALUES (Y/N): _ Y    CALLED BY: Amanda CASTILLO    .    FAX TO [facility]  Organism: Pseudomonas aeruginosa (Carbapenem Resistant) (04 Mar 2018 11:05)  Organism: Pseudomonas aeruginosa (Carbapenem Resistant) (04 Mar 2018 11:05)    ================================================    MEDICATIONS  (STANDING):  aspirin  chewable 81 milliGRAM(s) Oral daily  BACItracin   Ointment 1 Application(s) Topical every 8 hours  chlorhexidine 0.12% Liquid 15 milliLiter(s) Swish and Spit two times a day  collagenase Ointment 1 Application(s) Topical daily  digoxin     Tablet 0.25 milliGRAM(s) Oral daily  enoxaparin Injectable 40 milliGRAM(s) SubCutaneous daily  fosphenytoin IVPB 100 milliGRAM(s) PE IV Intermittent every 8 hours  levETIRAcetam  Solution 1000 milliGRAM(s) Oral every 12 hours  pantoprazole  Injectable 40 milliGRAM(s) IV Push daily  PHENobarbital Elixir 60 milliGRAM(s) Oral two times a day  vancomycin  IVPB 750 milliGRAM(s) IV Intermittent every 12 hours    MEDICATIONS  (PRN): CHIEF COMPLAINT: mrsa bactermia    Patient seen and examined at the bedside. No acute overnight events. Unable to assess ROS as pt non verbal, including the following: fever/chills, headache, lightheadedness, dizziness, chest pain, palpitations, shortness of breath, cough, abd pain, nausea/vomiting/diarrhea, muscle pain.      =========================================================================================  PHYSICAL EXAM.    GEN - appears age appropriate. well nourished.   HEENT - NCAT.   RESP - Ccourse breath sounds sec to supplemental O2 (mech vent via trach)   CARDIO - NS1S2, RRR. No murmurs/rubs/gallops.  ABD - Soft/Non tender/Non distended. Normal BS x4 quadrants. PEG. colostomy in place.    - jones in place, clear yellow urine in collection bag  Ext - No KEVIN.  MSK - could not assess  Neuro - awake. not following commands. occ moving fingers  Psych - could not assess  Skin - c/d/i. no rashes/lesions      VITAL SIGNS.    Vital Signs Last 24 Hrs  T(C): 36.9 (04 Mar 2018 15:53), Max: 37.2 (04 Mar 2018 12:00)  T(F): 98.5 (04 Mar 2018 15:53), Max: 99 (04 Mar 2018 12:00)  HR: 63 (05 Mar 2018 09:33) (52 - 63)  BP: 132/65 (04 Mar 2018 15:53) (123/60 - 154/70)  BP(mean): 87 (04 Mar 2018 13:00) (87 - 100)  RR: 18 (04 Mar 2018 15:53) (11 - 18)  SpO2: 98% (05 Mar 2018 09:33) (98% - 100%)    =================================================    LABS.                          8.7    4.8   )-----------( 247      ( 05 Mar 2018 08:17 )             29.4     03-05    143  |  102  |  15.0  ----------------------------<  129<H>  4.1   |  30.0<H>  |  0.42<L>    Ca    8.6      05 Mar 2018 08:17  Phos  2.6     03-04  Mg     2.3     03-04          I&O's Summary    04 Mar 2018 07:01  -  05 Mar 2018 07:00  --------------------------------------------------------  IN: 680 mL / OUT: 855 mL / NET: -175 mL        Culture - Sputum (collected 03 Mar 2018 19:23)  Source: .Sputum Sputum  Gram Stain (03 Mar 2018 21:43):    Few White blood cells    No organisms seen  Preliminary Report (04 Mar 2018 14:22):    Numerous Gram Negative Rods Identification and susceptibility to follow.    No Routine respiratory bonnie present    Culture in progress    Culture - Urine (collected 02 Mar 2018 11:55)  Source: .Urine Catheterized  Final Report (04 Mar 2018 11:05):    10,000 - 49,000 CFU/mL Pseudomonas aeruginosa (Carbapenem Resistant)    .    TYPE: (C=Critical, N=Notification, A=Abnormal) C    TESTS:  _ MDRO    DATE/TIME CALLED: _ 03/04/2018 11:02:27    CALLED TO: Amanda WOODWARD RN    READ BACK (2 Patient Identifiers)(Y/N): _ Y    READ BACK VALUES (Y/N): _ Y    CALLED BY: Amanda CASTILLO    .    FAX TO [facility]  Organism: Pseudomonas aeruginosa (Carbapenem Resistant) (04 Mar 2018 11:05)  Organism: Pseudomonas aeruginosa (Carbapenem Resistant) (04 Mar 2018 11:05)    ================================================    MEDICATIONS  (STANDING):  aspirin  chewable 81 milliGRAM(s) Oral daily  BACItracin   Ointment 1 Application(s) Topical every 8 hours  chlorhexidine 0.12% Liquid 15 milliLiter(s) Swish and Spit two times a day  collagenase Ointment 1 Application(s) Topical daily  digoxin     Tablet 0.25 milliGRAM(s) Oral daily  enoxaparin Injectable 40 milliGRAM(s) SubCutaneous daily  fosphenytoin IVPB 100 milliGRAM(s) PE IV Intermittent every 8 hours  levETIRAcetam  Solution 1000 milliGRAM(s) Oral every 12 hours  pantoprazole  Injectable 40 milliGRAM(s) IV Push daily  PHENobarbital Elixir 60 milliGRAM(s) Oral two times a day  vancomycin  IVPB 750 milliGRAM(s) IV Intermittent every 12 hours    MEDICATIONS  (PRN):

## 2018-03-05 NOTE — PROGRESS NOTE ADULT - SUBJECTIVE AND OBJECTIVE BOX
[facility]  INFECTIOUS DISEASES AND INTERNAL MEDICINE at [place]  =======================================================  Renzo Mac MD  Diplomates American Board of Internal Medicine and Infectious Diseases  =======================================================    [place] [id number]    Follow up: MRSA Bacteremia  Blood cultures 3/3/18 no growth     no meaningful communication       Allergies:  Ativan (Unknown)  clindamycin (Unknown)  Haldol (Dystonic RXN)  hydrALAZINE (Unknown)  Nuts (Hives; Rash)  PC Pen VK (Unknown)  strawberry (Short breath; Rash; Hives)  Xanax (Rash)  Zyprexa (Unknown)      Antibiotics:  cefepime  IVPB 2000 milliGRAM(s) IV Intermittent every 8 hours  vancomycin  IVPB 750 milliGRAM(s) IV Intermittent every 12 hours        REVIEW OF SYSTEMS:  unable to obtain, no meaningful communication      Physical Exam:  Vital Signs Last 24 Hrs  T(C): 36.9 (04 Mar 2018 15:53), Max: 36.9 (04 Mar 2018 15:53)  T(F): 98.5 (04 Mar 2018 15:53), Max: 98.5 (04 Mar 2018 15:53)  HR: 63 (05 Mar 2018 11:58) (58 - 63)  BP: 132/65 (04 Mar 2018 15:53) (132/65 - 132/65)  RR: 18 (04 Mar 2018 15:53) (18 - 18)  SpO2: 100% (05 Mar 2018 11:58) (98% - 100%)      GEN: unresponsive  HEENT: normocephalic and atraumatic  NECK: Supple  LUNGS: Coarse BS B/L  HEART: Regular rate and rhythm   ABDOMEN: Soft, nontender, and nondistended.  Positive bowel sounds.  + PEG  : + Baugh  EXTREMITIES: Without any edema.  MSK: no joint swelling  NEUROLOGIC: unresponsive  PSYCHIATRIC: unresponsive  SKIN: No rash      Labs:  03-05    143  |  102  |  15.0  ----------------------------<  129<H>  4.1   |  30.0<H>  |  0.42<L>    Ca    8.6      05 Mar 2018 08:17  Phos  2.6     03-04  Mg     2.3     03-04                 8.7    4.8   )-----------( 247      ( 05 Mar 2018 08:17 )             29.4       RECENT CULTURES:  03-03 @ 19:23 .Sputum Sputum Pseudomonas aeruginosa (Carbapenem Resistant)    Numerous Pseudomonas aeruginosa (Carbapenem Resistant)  No Routine respiratory bonnie present  Few White blood cells  No organisms seen      03-03 @ 11:10 .Blood Blood-Peripheral     No growth at 48 hours      03-02 @ 11:55 .Urine Catheterized Pseudomonas aeruginosa (Carbapenem Resistant)    10,000 - 49,000 CFU/mL Pseudomonas aeruginosa (Carbapenem Resistant)      03-01 @ 13:48    RVP  NotDetec      03-01 @ 12:30 .Blood Blood     No growth at 48 hours      03-01 @ 12:27 .Urine Catheterized     Culture grew 3 or more types of organisms which indicate  collection contamination; consider recollection only if clinically  indicated.      03-01 @ 07:22 .Blood Blood Methicillin resistant Staphylococcus aureus  Blood Culture PCR    Growth in anaerobic bottle: Methicillin resistant Staphylococcus aureus  Anaerobic Bottle: 18.36 Hours to positivity  Aerobic Bottle: No growth to date  ***Blood Panel PCR results on this specimen are available  approximately 3 hours after the Gramstain result.***  Gram stain, PCR, and/or culture results may not always  correspond due to difference in methodologies.  ************************************************************  This PCR assay was performed using [employer].  The following targets are tested for: Enterococcus,  vancomycin resistant enterococci, Listeria monocytogenes,  coagulase negative staphylococci, S. aureus,  methicillin resistant S. aureus, Streptococcus agalactiae  (Group B), S. pneumoniae, S. pyogenes (Group A),  Acinetobacter baumannii, Enterobacter cloacae, E. coli,  Klebsiella oxytoca, K. pneumoniae, Proteus sp.,  Serratia marcescens, Haemophilus influenzae,  Neisseria meningitidis, Pseudomonas aeruginosa, Candida  albicans, C. glabrata, C krusei, C parapsilosis,  C. tropicalis and the KPC resistance gene.  "Due to technical problems, Proteus sp. will Not be reported as part of  the BCID panel until further notice"          TTE Echo Complete w/Doppler (03.03.18 @ 15:07)  Summary:   1. Technically limited study. Limited information is available.   2. Hyperdynamic global left ventricular systolic function.   3. Left ventricular ejection fraction, by visual estimation, is >75%.   4. Thickening of the anterior and posterior mitral valve leaflets.   5. Sclerotic aortic valve with decreased opening.   6. RV is seen on limited views. It is enlarged. RV function appears   preserved.   7. Trivial pericardial effusion.

## 2018-03-06 LAB
ANION GAP SERPL CALC-SCNC: 13 MMOL/L — SIGNIFICANT CHANGE UP (ref 5–17)
BASOPHILS # BLD AUTO: 0 K/UL — SIGNIFICANT CHANGE UP (ref 0–0.2)
BASOPHILS NFR BLD AUTO: 0.4 % — SIGNIFICANT CHANGE UP (ref 0–2)
BUN SERPL-MCNC: 14 MG/DL — SIGNIFICANT CHANGE UP (ref 8–20)
CALCIUM SERPL-MCNC: 9 MG/DL — SIGNIFICANT CHANGE UP (ref 8.6–10.2)
CHLORIDE SERPL-SCNC: 103 MMOL/L — SIGNIFICANT CHANGE UP (ref 98–107)
CO2 SERPL-SCNC: 28 MMOL/L — SIGNIFICANT CHANGE UP (ref 22–29)
CREAT SERPL-MCNC: 0.45 MG/DL — LOW (ref 0.5–1.3)
CULTURE RESULTS: SIGNIFICANT CHANGE UP
EOSINOPHIL # BLD AUTO: 0.3 K/UL — SIGNIFICANT CHANGE UP (ref 0–0.5)
EOSINOPHIL NFR BLD AUTO: 6.4 % — HIGH (ref 0–5)
GLUCOSE SERPL-MCNC: 102 MG/DL — SIGNIFICANT CHANGE UP (ref 70–115)
HCT VFR BLD CALC: 29.8 % — LOW (ref 42–52)
HGB BLD-MCNC: 8.9 G/DL — LOW (ref 14–18)
LYMPHOCYTES # BLD AUTO: 1.1 K/UL — SIGNIFICANT CHANGE UP (ref 1–4.8)
LYMPHOCYTES # BLD AUTO: 20.3 % — SIGNIFICANT CHANGE UP (ref 20–55)
MCHC RBC-ENTMCNC: 24.9 PG — LOW (ref 27–31)
MCHC RBC-ENTMCNC: 29.9 G/DL — LOW (ref 32–36)
MCV RBC AUTO: 83.2 FL — SIGNIFICANT CHANGE UP (ref 80–94)
MONOCYTES # BLD AUTO: 0.6 K/UL — SIGNIFICANT CHANGE UP (ref 0–0.8)
MONOCYTES NFR BLD AUTO: 10.5 % — HIGH (ref 3–10)
NEUTROPHILS # BLD AUTO: 3.3 K/UL — SIGNIFICANT CHANGE UP (ref 1.8–8)
NEUTROPHILS NFR BLD AUTO: 62.2 % — SIGNIFICANT CHANGE UP (ref 37–73)
ORGANISM # SPEC MICROSCOPIC CNT: SIGNIFICANT CHANGE UP
PHENYTOIN FREE SERPL-MCNC: 1.7 MG/L — SIGNIFICANT CHANGE UP (ref 1–2)
PLATELET # BLD AUTO: 271 K/UL — SIGNIFICANT CHANGE UP (ref 150–400)
POTASSIUM SERPL-MCNC: 3.8 MMOL/L — SIGNIFICANT CHANGE UP (ref 3.5–5.3)
POTASSIUM SERPL-SCNC: 3.8 MMOL/L — SIGNIFICANT CHANGE UP (ref 3.5–5.3)
RBC # BLD: 3.58 M/UL — LOW (ref 4.6–6.2)
RBC # FLD: 18.9 % — HIGH (ref 11–15.6)
SODIUM SERPL-SCNC: 144 MMOL/L — SIGNIFICANT CHANGE UP (ref 135–145)
SPECIMEN SOURCE: SIGNIFICANT CHANGE UP
WBC # BLD: 5.3 K/UL — SIGNIFICANT CHANGE UP (ref 4.8–10.8)
WBC # FLD AUTO: 5.3 K/UL — SIGNIFICANT CHANGE UP (ref 4.8–10.8)

## 2018-03-06 PROCEDURE — 99232 SBSQ HOSP IP/OBS MODERATE 35: CPT

## 2018-03-06 PROCEDURE — 99233 SBSQ HOSP IP/OBS HIGH 50: CPT

## 2018-03-06 RX ADMIN — Medication 1 APPLICATION(S): at 12:38

## 2018-03-06 RX ADMIN — LEVETIRACETAM 1000 MILLIGRAM(S): 250 TABLET, FILM COATED ORAL at 17:12

## 2018-03-06 RX ADMIN — Medication 1 TABLET(S): at 06:53

## 2018-03-06 RX ADMIN — Medication 1 APPLICATION(S): at 22:26

## 2018-03-06 RX ADMIN — CHLORHEXIDINE GLUCONATE 15 MILLILITER(S): 213 SOLUTION TOPICAL at 17:13

## 2018-03-06 RX ADMIN — CHLORHEXIDINE GLUCONATE 15 MILLILITER(S): 213 SOLUTION TOPICAL at 06:54

## 2018-03-06 RX ADMIN — CEFEPIME 100 MILLIGRAM(S): 1 INJECTION, POWDER, FOR SOLUTION INTRAMUSCULAR; INTRAVENOUS at 14:13

## 2018-03-06 RX ADMIN — Medication 100 MILLIGRAM(S): at 06:53

## 2018-03-06 RX ADMIN — CEFEPIME 100 MILLIGRAM(S): 1 INJECTION, POWDER, FOR SOLUTION INTRAMUSCULAR; INTRAVENOUS at 06:57

## 2018-03-06 RX ADMIN — Medication 1 APPLICATION(S): at 12:39

## 2018-03-06 RX ADMIN — Medication 81 MILLIGRAM(S): at 12:39

## 2018-03-06 RX ADMIN — Medication 100 MILLIGRAM(S): at 22:53

## 2018-03-06 RX ADMIN — Medication 150 MILLIGRAM(S): at 12:38

## 2018-03-06 RX ADMIN — LEVETIRACETAM 1000 MILLIGRAM(S): 250 TABLET, FILM COATED ORAL at 06:54

## 2018-03-06 RX ADMIN — PANTOPRAZOLE SODIUM 40 MILLIGRAM(S): 20 TABLET, DELAYED RELEASE ORAL at 12:38

## 2018-03-06 RX ADMIN — CEFEPIME 100 MILLIGRAM(S): 1 INJECTION, POWDER, FOR SOLUTION INTRAMUSCULAR; INTRAVENOUS at 22:26

## 2018-03-06 RX ADMIN — Medication 0.25 MILLIGRAM(S): at 06:53

## 2018-03-06 RX ADMIN — Medication 150 MILLIGRAM(S): at 22:26

## 2018-03-06 RX ADMIN — Medication 1 TABLET(S): at 17:13

## 2018-03-06 RX ADMIN — ENOXAPARIN SODIUM 40 MILLIGRAM(S): 100 INJECTION SUBCUTANEOUS at 12:37

## 2018-03-06 RX ADMIN — Medication 1 APPLICATION(S): at 07:00

## 2018-03-06 RX ADMIN — Medication 100 MILLIGRAM(S): at 12:38

## 2018-03-06 NOTE — PROGRESS NOTE ADULT - ASSESSMENT
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    Presentation complicated by MRSA bacteremia + development of VAP sec to pseudomonas.     MRSA bacteremia: bld cx 1/2 bottles positive on admission. suspect sacral ulcer as most likely source, hx of osteomyelitis in that area in the past. wound care consult pending. ID consult appreciated. on vanco. last set of blood cx neg x48hrs from 3/3. Echo done @ bedside, per tech poor quality, no comments on vegetations made on final report. ?KATIA needed but given very poor prognosis will need to consult with legal guardian (who is NOT the daughter + wife) to see how aggressive intervention should be, even if vegetations are present pt would be extremely poor candidate for valvular replacement and is not likely to get clearance for said procedure    Sacral Decub Ulcer: known hx. CT Chest with incidental finding of abn enhancement in that area concerning for osteomyelitis. elevated ESR raising suspicion as well. already on vanco iv for bacteremia. cont abx. fu with ID. pending MRI for further investigation.     Chronic hypoxic Resp Failure: sp trach which was present on admit. vent dependent @ baseline. VAP precautions with HOB elevation. RVP neg. sp CT Chest concerning for RUL PNA, clinically not present but pt is noted to have inc in secretions and thickness, sputum cx with carbapenem resistant pseduomonas. fu ID. pulm consult appreciated, vent settings per them. cefepime started 3/5, contine until 3/11 per ID. thus far improving clinically (secretions becoming less thick + copious per nursing)    VAP: noted on CT as mentioned above. ID fu. abx as above    Status Epilepticus: sec to med non compliance while in NH who was unaware of pts diagnosis of seizure and did not administer meds. sp keppra while in NH after event and transferred to ER where he was given versed 4mg x2, phenobarb 1600mg IV, and was transferred to ICU for 24hr video EEG, read as mild diffuse encephalopathy with Lt hemisphere dysfunction and resolving partial seizures. CTH w/o signs acute infarct. neuro following, will cont phenobarb + keppra for dual AED therapy.considered stable from neuro perspective     Chronic Jones Catheter: present on admission. pt with paraphimosis + hx of prostate ca. hx of resistant UTI in the past as well. UC <50k CFU, pseudomonas, no indication to tx @ this time but pt on abx anyway for PNA that would cover urine. urology following. no plans to remove jones catheter, pt with significant difficulty having spontaneous voids.     Pleural effusion: chronic, pleurx drain in place. may be used PRN. no need to drain thus far during this admission.    Chronic PEG: npo with tube feeds. nutrition consult appreciated, rec inc of jevity 1.5 to 60ml/hr with prostat 30ml daily.

## 2018-03-06 NOTE — PROGRESS NOTE ADULT - SUBJECTIVE AND OBJECTIVE BOX
CHIEF COMPLAINT: mrsa bactermia    Patient seen and examined at the bedside. No acute overnight events. Unable to assess ROS as pt non verbal, including the following: fever/chills, headache, lightheadedness, dizziness, chest pain, palpitations, shortness of breath, cough, abd pain, nausea/vomiting/diarrhea, muscle pain.      =========================================================================================  PHYSICAL EXAM.    GEN - appears age appropriate. well nourished.   HEENT - NCAT.   RESP - Ccourse breath sounds sec to supplemental O2 (mech vent via trach)   CARDIO - NS1S2, RRR. No murmurs/rubs/gallops.  ABD - Soft/Non tender/Non distended. Normal BS x4 quadrants. PEG. colostomy in place.    - jones in place, clear yellow urine in collection bag  Ext - No KEVIN.  MSK - could not assess  Neuro - awake. not following commands. occ moving fingers  Psych - could not assess  Skin - c/d/i. no rashes/lesions      VITAL SIGNS.    Vital Signs Last 24 Hrs  T(C): 36.5 (06 Mar 2018 23:44), Max: 37.6 (06 Mar 2018 08:32)  T(F): 97.7 (06 Mar 2018 23:44), Max: 99.6 (06 Mar 2018 08:32)  HR: 65 (06 Mar 2018 23:44) (62 - 73)  BP: 159/70 (06 Mar 2018 23:44) (146/64 - 175/82)  BP(mean): --  RR: 20 (06 Mar 2018 23:44) (18 - 20)  SpO2: 99% (06 Mar 2018 23:44) (98% - 99%)    =================================================    LABS.                          8.9    5.3   )-----------( 271      ( 06 Mar 2018 09:04 )             29.8     03-06    144  |  103  |  14.0  ----------------------------<  102  3.8   |  28.0  |  0.45<L>    Ca    9.0      06 Mar 2018 09:04          I&O's Summary    05 Mar 2018 07:01  -  06 Mar 2018 07:00  --------------------------------------------------------  IN: 400 mL / OUT: 1000 mL / NET: -600 mL          ================================================    IMAGING.      ================================================    MEDICATIONS  (STANDING):  aspirin  chewable 81 milliGRAM(s) Oral daily  BACItracin   Ointment 1 Application(s) Topical every 8 hours  cefepime  IVPB 2000 milliGRAM(s) IV Intermittent every 8 hours  chlorhexidine 0.12% Liquid 15 milliLiter(s) Swish and Spit two times a day  collagenase Ointment 1 Application(s) Topical daily  digoxin     Tablet 0.25 milliGRAM(s) Oral daily  enoxaparin Injectable 40 milliGRAM(s) SubCutaneous daily  lactobacillus acidophilus 1 Tablet(s) Oral every 12 hours  levETIRAcetam  Solution 1000 milliGRAM(s) Oral every 12 hours  pantoprazole   Suspension 40 milliGRAM(s) Oral daily  PHENobarbital Elixir 60 milliGRAM(s) Oral two times a day  phenytoin   Suspension 100 milliGRAM(s) Oral every 8 hours  vancomycin  IVPB 750 milliGRAM(s) IV Intermittent every 12 hours    MEDICATIONS  (PRN):

## 2018-03-07 LAB
CULTURE RESULTS: SIGNIFICANT CHANGE UP
SPECIMEN SOURCE: SIGNIFICANT CHANGE UP

## 2018-03-07 PROCEDURE — 99232 SBSQ HOSP IP/OBS MODERATE 35: CPT

## 2018-03-07 PROCEDURE — 99223 1ST HOSP IP/OBS HIGH 75: CPT

## 2018-03-07 PROCEDURE — 99233 SBSQ HOSP IP/OBS HIGH 50: CPT

## 2018-03-07 RX ADMIN — Medication 100 MILLIGRAM(S): at 22:54

## 2018-03-07 RX ADMIN — CEFEPIME 100 MILLIGRAM(S): 1 INJECTION, POWDER, FOR SOLUTION INTRAMUSCULAR; INTRAVENOUS at 22:54

## 2018-03-07 RX ADMIN — Medication 1 TABLET(S): at 06:23

## 2018-03-07 RX ADMIN — Medication 150 MILLIGRAM(S): at 12:06

## 2018-03-07 RX ADMIN — CEFEPIME 100 MILLIGRAM(S): 1 INJECTION, POWDER, FOR SOLUTION INTRAMUSCULAR; INTRAVENOUS at 06:23

## 2018-03-07 RX ADMIN — PANTOPRAZOLE SODIUM 40 MILLIGRAM(S): 20 TABLET, DELAYED RELEASE ORAL at 12:17

## 2018-03-07 RX ADMIN — Medication 81 MILLIGRAM(S): at 12:17

## 2018-03-07 RX ADMIN — CEFEPIME 100 MILLIGRAM(S): 1 INJECTION, POWDER, FOR SOLUTION INTRAMUSCULAR; INTRAVENOUS at 14:47

## 2018-03-07 RX ADMIN — CHLORHEXIDINE GLUCONATE 15 MILLILITER(S): 213 SOLUTION TOPICAL at 17:57

## 2018-03-07 RX ADMIN — LEVETIRACETAM 1000 MILLIGRAM(S): 250 TABLET, FILM COATED ORAL at 17:57

## 2018-03-07 RX ADMIN — Medication 0.25 MILLIGRAM(S): at 06:23

## 2018-03-07 RX ADMIN — Medication 1 APPLICATION(S): at 14:47

## 2018-03-07 RX ADMIN — Medication 1 APPLICATION(S): at 22:54

## 2018-03-07 RX ADMIN — Medication 1 TABLET(S): at 17:56

## 2018-03-07 RX ADMIN — Medication 150 MILLIGRAM(S): at 22:54

## 2018-03-07 RX ADMIN — Medication 1 APPLICATION(S): at 06:23

## 2018-03-07 RX ADMIN — Medication 100 MILLIGRAM(S): at 06:23

## 2018-03-07 RX ADMIN — CHLORHEXIDINE GLUCONATE 15 MILLILITER(S): 213 SOLUTION TOPICAL at 06:23

## 2018-03-07 RX ADMIN — Medication 100 MILLIGRAM(S): at 14:47

## 2018-03-07 RX ADMIN — Medication 1 APPLICATION(S): at 12:17

## 2018-03-07 RX ADMIN — ENOXAPARIN SODIUM 40 MILLIGRAM(S): 100 INJECTION SUBCUTANEOUS at 12:17

## 2018-03-07 RX ADMIN — LEVETIRACETAM 1000 MILLIGRAM(S): 250 TABLET, FILM COATED ORAL at 06:23

## 2018-03-07 NOTE — CONSULT NOTE ADULT - PROBLEM SELECTOR PROBLEM 3
Pneumonia due to Pseudomonas aeruginosa
Pneumonia of left lower lobe due to methicillin-resistant Staphylococcus aureus (MRSA)
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

## 2018-03-07 NOTE — PROGRESS NOTE ADULT - SUBJECTIVE AND OBJECTIVE BOX
[facility]  INFECTIOUS DISEASES AND INTERNAL MEDICINE at [facility]  =======================================================  Renzo Mac MD  Diplomates American Board of Internal Medicine and Infectious Diseases  =======================================================    [place] [id number]    Follow up: MRSA Bacteremia  Blood cultures 3/3/18 no growth     no meaningful communication       Allergies:  Ativan (Unknown)  clindamycin (Unknown)  Haldol (Dystonic RXN)  hydrALAZINE (Unknown)  Nuts (Hives; Rash)  PC Pen VK (Unknown)  strawberry (Short breath; Rash; Hives)  Xanax (Rash)  Zyprexa (Unknown)      Antibiotics:  cefepime  IVPB 2000 milliGRAM(s) IV Intermittent every 8 hours  vancomycin  IVPB 750 milliGRAM(s) IV Intermittent every 12 hours       REVIEW OF SYSTEMS:  unable to obtain, no meaningful communication      Physical Exam:  Vital Signs Last 24 Hrs  T(C): 37.6 (07 Mar 2018 08:43), Max: 37.6 (07 Mar 2018 08:43)  T(F): 99.7 (07 Mar 2018 08:43), Max: 99.7 (07 Mar 2018 08:43)  HR: 69 (07 Mar 2018 08:43) (62 - 69)  BP: 160/74 (07 Mar 2018 08:43) (146/64 - 160/74)  RR: 18 (07 Mar 2018 08:43) (18 - 20)  SpO2: 97% (07 Mar 2018 12:24) (97% - 99%)      GEN: unresponsive  HEENT: normocephalic and atraumatic  NECK: Supple  LUNGS: Coarse BS B/L  HEART: Regular rate and rhythm   ABDOMEN: Soft, nontender, and nondistended.  Positive bowel sounds.  + PEG  : + Baugh  EXTREMITIES: Without any edema.  MSK: no joint swelling  NEUROLOGIC: unresponsive  PSYCHIATRIC: unresponsive  SKIN: No rash      Labs:  03-06    144  |  103  |  14.0  ----------------------------<  102  3.8   |  28.0  |  0.45<L>    Ca    9.0      06 Mar 2018 09:04               8.9    5.3   )-----------( 271      ( 06 Mar 2018 09:04 )             29.8     RECENT CULTURES:  03-05 @ 08:17 .Blood Blood-Peripheral     No growth at 48 hours      03-03 @ 19:23 .Sputum Sputum Pseudomonas aeruginosa (Carbapenem Resistant)    Numerous Pseudomonas aeruginosa (Carbapenem Resistant)  No Routine respiratory bonnie present  Few White blood cells  No organisms seen      03-03 @ 11:10 .Blood Blood-Peripheral     No growth at 48 hours      03-02 @ 11:55 .Urine Catheterized Pseudomonas aeruginosa (Carbapenem Resistant)    10,000 - 49,000 CFU/mL Pseudomonas aeruginosa (Carbapenem Resistant)      03-01 @ 13:48    RVP  NotDetec      03-01 @ 12:30 .Blood Blood     No growth at 5 days.      03-01 @ 12:27 .Urine Catheterized     Culture grew 3 or more types of organisms which indicate  collection contamination; consider recollection only if clinically  indicated.      03-01 @ 07:22 .Blood Blood Methicillin resistant Staphylococcus aureus  Blood Culture PCR    Growth in anaerobic bottle: Methicillin resistant Staphylococcus aureus  Anaerobic Bottle: 18.36 Hours to positivity  Aerobic Bottle: No growth at 5 days.  ***Blood Panel PCR results on this specimen are available  approximately 3 hours after the Gram stain result.***  Gram stain, PCR, and/or culture results may not always  correspond due to difference in methodologies.  ************************************************************  This PCR assay was performed using [employer].  The following targets are tested for: Enterococcus,  vancomycin resistant enterococci, Listeria monocytogenes,  coagulase negative staphylococci, S. aureus,  methicillin resistant S. aureus, Streptococcus agalactiae  (Group B), S. pneumoniae, S. pyogenes (Group A),  Acinetobacter baumannii, Enterobacter cloacae, E. coli,  Klebsiella oxytoca, K. pneumoniae, Proteus sp.,  Serratia marcescens, Haemophilus influenzae,  Neisseria meningitidis, Pseudomonas aeruginosa, Candida  albicans, C. glabrata, C krusei, Cparapsilosis,  C. tropicalis and the KPC resistance gene.  "Due to technical problems, Proteus sp. will Not be reported as part of  the BCID panel until further notice"          TTE Echo Complete w/Doppler (03.03.18 @ 15:07)  Summary:   1. Technically limited study. Limited information is available.   2. Hyperdynamic global left ventricular systolic function.   3. Left ventricular ejection fraction, by visual estimation, is >75%.   4. Thickening of the anterior and posterior mitral valve leaflets.   5. Sclerotic aortic valve with decreased opening.   6. RV is seen on limited views. It is enlarged. RV function appears   preserved.   7. Trivial pericardial effusion.

## 2018-03-07 NOTE — CONSULT NOTE ADULT - PROBLEM SELECTOR PROBLEM 1
Prostate cancer
Acute on chronic respiratory failure with hypoxia
MRSA (methicillin resistant Staphylococcus aureus) septicemia
Acute on chronic respiratory failure with hypoxia

## 2018-03-07 NOTE — CONSULT NOTE ADULT - SUBJECTIVE AND OBJECTIVE BOX
[facility]                                                        Office: [address] [place] [address]                                                       Telephone: [phone]. Fax:[phone]    Patient is a 86y old  Male who presents with a chief complaint of Seizures (01 Mar 2018 09:10)      HPI: 87 y/o gentleman with hx of dementia, prostate cancer, CAD s/p PCI, respiratory failure s/p tracheostomy- vent dependent, PEG, CVA, , colostomy who initially presented on 3/1/18 with status epilepticus and MRSA septicemia.   Also noted with Pseudomonas pneumonia.  EEG suggestive of encephalopathy.  We have been asked to evaluate for KATIA to assess for endocarditis.  Last set of blood cultures have been negative thus far.  Patient is non-responsive.      PAST MEDICAL & SURGICAL HISTORY:  Dysphagia  Fall: Multiple Mechanical falls  CAD (coronary artery disease)  Clostridium difficile diarrhea: in 2009  BPH (benign prostatic hypertrophy)  Dementia  HLD (hyperlipidemia)  CHF (congestive heart failure)  Prostate cancer: Treated w/ radiation  Stroke: (~5yrs ago)  Seizure: (last event &gt;1yr ago)  HTN (hypertension)  Colostomy in place  S/P percutaneous endoscopic gastrostomy (PEG) tube placement  H/O hemorrhoidectomy  Deviated septum  Abdominal hernia  Status post cardiac surgery: stents x 2      Allergies  clindamycin (Unknown)  Nuts (Hives; Rash)  PC Pen VK (Unknown)  strawberry (Short breath; Rash; Hives)  Xanax (Rash)    Intolerances    Ativan (Unknown)  Haldol (Dystonic RXN)  hydrALAZINE (Unknown)  Zyprexa (Unknown)      MEDICATIONS  (STANDING):  aspirin  chewable 81 milliGRAM(s) Oral daily  BACItracin   Ointment 1 Application(s) Topical every 8 hours  cefepime  IVPB 2000 milliGRAM(s) IV Intermittent every 8 hours  chlorhexidine 0.12% Liquid 15 milliLiter(s) Swish and Spit two times a day  collagenase Ointment 1 Application(s) Topical daily  digoxin     Tablet 0.25 milliGRAM(s) Oral daily  enoxaparin Injectable 40 milliGRAM(s) SubCutaneous daily  lactobacillus acidophilus 1 Tablet(s) Oral every 12 hours  levETIRAcetam  Solution 1000 milliGRAM(s) Oral every 12 hours  pantoprazole   Suspension 40 milliGRAM(s) Oral daily  PHENobarbital Elixir 60 milliGRAM(s) Oral two times a day  phenytoin   Suspension 100 milliGRAM(s) Oral every 8 hours  vancomycin  IVPB 750 milliGRAM(s) IV Intermittent every 12 hours    MEDICATIONS  (PRN):      FAMILY HISTORY:  No pertinent family history in first degree relatives      SOCIAL HISTORY: Denies tobacco/etoh/illicit drug use    PREVIOUS DIAGNOSTIC TESTING:      ECHO FINDINGS: 3/3/18   Summary:   1. Technically limited study. Limited information is available.   2. Hyperdynamic global left ventricular systolic function.   3. Left ventricular ejection fraction, by visual estimation, is >75%.   4. Thickening of the anterior and posterior mitral valve leaflets.   5. Sclerotic aortic valve with decreased opening.   6. RV is seen on limited views. It is enlarged. RV function appears   preserved.   7. Trivial pericardial effusion.    REVIEW OF SYSTEMS: UNABLE TO OBTAIN, UNRESPONSIVE  CONSTITUTIONAL: [-]fever   [-] weight loss   [-] fatigue  EYES: [-]  eye pain   [-] visual disturbances      [-]  discharge  ENMT:  [-]  difficulty hearing,   [-]  tinnitus   [-] vertigo    [-]  sinus or throat pain  NECK: [-]  pain or stiffness  RESPIRATORY: [-]  cough 	[-] wheezing 	[-]  hemoptysis 		[-]   Shortness of Breath  CARDIOVASCULAR: [-]  chest pain	[-] palpitations		[-]  passing out 		[-] dizziness 	[-]  leg swelling  		[-]  PND 	[-] orthopnea  GASTROINTESTINAL: [-]  abdominal pain		[-]nausea	[-] vomiting	[-]  hematemesis 	[-]  diarrhea  	[-] constipation 		[-]  melena 	[-] hematochezia.  GENITOURINARY: [-] dysuria	[-] frequency	[-] hematuria	[-]  incontinence  NEUROLOGICAL: [-]  headaches		[-] memory loss 	[-]  loss of strength  			[-]  numbness/tingling 	[-]  tremors  SKIN: [-]  itching 	[-] rashes 	[-]  lesions   LYMPH Nodes: [-] enlarged glands  ENDOCRINE: [-] heat or cold intolerance 	[-]   hair loss  MUSCULOSKELETAL: [-] joint pain  [-] joint swelling	[-]  muscle, back, or extremity pain  PSYCHIATRIC: [-]  depression	[-] anxiety	[-] mood swings		[-]  difficulty sleeping   HEME: [-]  easy bruising 	[-]  bleeding   ALLERY AND IMMUNOLOGIC: [-]  hives or eczema	      Vital Signs Last 24 Hrs  T(C): 37.6 (07 Mar 2018 16:51), Max: 37.6 (07 Mar 2018 08:43)  T(F): 99.6 (07 Mar 2018 16:51), Max: 99.7 (07 Mar 2018 08:43)  HR: 68 (07 Mar 2018 17:06) (62 - 69)  BP: 136/67 (07 Mar 2018 16:51) (136/67 - 160/74)  RR: 18 (07 Mar 2018 16:51) (18 - 20)  SpO2: 99% (07 Mar 2018 17:06) (97% - 100%)  Daily   I&O's Detail    06 Mar 2018 07:01  -  07 Mar 2018 07:00  --------------------------------------------------------  IN:  Total IN: 0 mL    OUT:    Indwelling Catheter - Urethral: 200 mL  Total OUT: 200 mL    Total NET: -200 mL      07 Mar 2018 07:01  -  07 Mar 2018 20:34  --------------------------------------------------------  IN:  Total IN: 0 mL    OUT:    Indwelling Catheter - Urethral: 350 mL  Total OUT: 350 mL    Total NET: -350 mL    Mode: AC/ CMV (Assist Control/ Continuous Mandatory Ventilation), RR (machine): 16, TV (machine): 500, FiO2: 30, PEEP: 5, MAP: 8, PIP: 20    PHYSICAL EXAM:  Appearance: unresponsive	  HEENT:  + trach; no JVD  Lymphatic: No cervical lymphadenopathy  Cardiovascular: Normal S1 S2, No JVD, No cardiac murmurs, No carotid bruits, No peripheral edema  Respiratory: Lungs clear - anterolaterally  Psychiatry:unresponsive  Gastrointestinal:  Soft, Non-tender, + BS, no bruits	  Skin: No rashes, No ecchymoses, No cyanosis, Dry  Neurologic: unresponsive  Extremities: No clubbing, cyanosis or edema  Vascular: Peripheral pulses palpable 2+ bilaterally, warm    INTERPRETATION OF TELEMETRY:     ECG (tracing reviewed by me):3/1/18  SB 54 bpm, 1st degree AV block  possible anterior infarct (age indeterminate)      LABS:                        8.9    5.3   )-----------( 271      ( 06 Mar 2018 09:04 )             29.8     03-06    144  |  103  |  14.0  ----------------------------<  102  3.8   |  28.0  |  0.45<L>    Ca    9.0      06 Mar 2018 09:04    BNP Serum Pro-Brain Natriuretic Peptide (03.01.17 @ 15:41)    Serum Pro-Brain Natriuretic Peptide: 349: NT-proBNP Interpretive comments:  Acute Congestive Heart Failure is unlikely if NT-proBNP is less than 300  pg/mL, for any age.  Consider Acute Congestive Heart Failure if:  AGE               NT-proBNP Result  ___               ________________  < 50year           > 450 pg/mL  50 - 75 years     > 900 pg/mL  > 75 years         > 1800 pg/ml  All results require clinical correlation. Consider obtaining a baseline or  "dry" NT-proBNP level when the patient is stabilized, so that subsequent  levels can be related to that. Patients with recurrent CHF may have  elevated  NT-proBNP levels. Acute failure episodes generally produce levels at  least 25  % greater than base line levels. The above values are derived from a large  multi-center international study, "NITIN Bautista, et al, European Heart  Journal,  2006; 27:330-337. pg/mL    Culture - Blood in AM (03.05.18 @ 08:17)    Specimen Source: .Blood Blood-Peripheral    Culture Results:   No growth at 48 hours    Culture - Blood in AM (03.05.18 @ 08:17)    Specimen Source: .Blood Blood-Peripheral    Culture Results:   No growth at 48 hours    Culture - Sputum . (03.03.18 @ 19:23)    -  Gentamicin: S <=4    -  Imipenem: R >8    -  Levofloxacin: S <=2    -  Meropenem: R >8    -  Piperacillin/Tazobactam: S <=16    -  Tobramycin: S <=4    Gram Stain:   Few White blood cells  No organisms seen    -  Aztreonam: R >16    -  Amikacin: S <=16    -  Cefepime: S <=4    -  Ceftazidime: S 4    -  Ciprofloxacin: S <=1    Specimen Source: .Sputum Sputum    Culture Results:   Numerous Pseudomonas aeruginosa (Carbapenem Resistant)  No Routine respiratory bonnie present  .  TYPE: (C=Critical, N=Notification, A=Abnormal) C  TESTS:  _ MDRO: Carbapenem Resistant  DATE/TIME CALLED: _ 03/05/2018 13:40  CALLED TO: _ 6TWR: Elvin Jay RN  READ BACK (2 Patient Identifiers)(Y/N): _ Y  READ BACK VALUES (Y/N): _ Y  CALLED BY: Amanda carpenter  .  Copy to Infection Control, 03/05/2018 13:40    Organism Identification: Pseudomonas aeruginosa (Carbapenem Resistant)    Organism: Pseudomonas aeruginosa (Carbapenem Resistant)    Method Type: ERNESTINE    Culture - Sputum . (03.03.18 @ 19:23)    -  Gentamicin: S <=4    -  Imipenem: R >8    -  Levofloxacin: S <=2    -  Meropenem: R >8    -  Piperacillin/Tazobactam: S <=16    -  Tobramycin: S <=4    Gram Stain:   Few White blood cells  No organisms seen    -  Aztreonam: R >16    -  Amikacin: S <=16    -  Cefepime: S <=4    -  Ceftazidime: S 4    -  Ciprofloxacin: S <=1    Specimen Source: .Sputum Sputum    Culture Results:   Numerous Pseudomonas aeruginosa (Carbapenem Resistant)  No Routine respiratory bonnie present  .  TYPE: (C=Critical, N=Notification, A=Abnormal) C  TESTS:  _ MDRO: Carbapenem Resistant  DATE/TIME CALLED: _ 03/05/2018 13:40  CALLED TO: _ 6TWR: Elvin Jay RN  READ BACK (2 Patient Identifiers)(Y/N): _ Y  READ BACK VALUES (Y/N): _ Y  CALLED BY: Amanda carpenter  .  Copy to Infection Control, 03/05/2018 13:40    Organism Identification: Pseudomonas aeruginosa (Carbapenem Resistant)    Organism: Pseudomonas aeruginosa (Carbapenem Resistant)    Method Type: ERNESTINE    Culture - Blood (03.03.18 @ 11:10)    Specimen Source: .Blood Blood-Peripheral    Culture Results:   No growth at 48 hours    Culture - Blood (03.03.18 @ 11:10)    Specimen Source: .Blood Blood-Peripheral    Culture Results:   No growth at 48 hours    Culture - Urine (03.02.18 @ 11:55)    -  Piperacillin/Tazobactam: S <=16    -  Tobramycin: S <=4    -  Imipenem: R >8    -  Meropenem: R >8    -  Levofloxacin: S <=2    -  Ciprofloxacin: S <=1    -  Gentamicin: S <=4    -  Ceftazidime: S 8    -  Amikacin: S <=16    -  Aztreonam: R >16    -  Cefepime: S 8    Specimen Source: .Urine Catheterized    Culture Results:   10,000 - 49,000 CFU/mL Pseudomonas aeruginosa (Carbapenem Resistant)  .  TYPE: (C=Critical, N=Notification, A=Abnormal) C  TESTS:  _ MDRO  DATE/TIME CALLED: _ 03/04/2018 11:02:27  CALLED TO: Amanda WOODWARD RN  READ BACK (2 Patient Identifiers)(Y/N): _ Y  READ BACK VALUES (Y/N): _ Y  CALLED BY: Amanda [employer]  .  FAX TO [facility]    Organism Identification: Pseudomonas aeruginosa (Carbapenem Resistant)    Organism: Pseudomonas aeruginosa (Carbapenem Resistant)    Method Type: ERNESTINE    Culture - Urine (03.02.18 @ 11:55)    -  Imipenem: R >8    -  Meropenem: R >8    -  Piperacillin/Tazobactam: S <=16    -  Tobramycin: S <=4    -  Levofloxacin: S <=2    -  Ciprofloxacin: S <=1    -  Gentamicin: S <=4    -  Amikacin: S <=16    -  Aztreonam: R >16    -  Cefepime: S 8    -  Ceftazidime: S 8    Specimen Source: .Urine Catheterized    Culture Results:   10,000 - 49,000 CFU/mL Pseudomonas aeruginosa (Carbapenem Resistant)  .  TYPE: (C=Critical, N=Notification, A=Abnormal) C  TESTS:  _ MDRO  DATE/TIME CALLED: _ 03/04/2018 11:02:27  CALLED TO: Amanda WOODWARD RN  READ BACK (2 Patient Identifiers)(Y/N): _ Y  READ BACK VALUES (Y/N): _ Y  CALLED BY: _ [employer]  .  FAX TO [facility]    Organism Identification: Pseudomonas aeruginosa (Carbapenem Resistant)    Organism: Pseudomonas aeruginosa (Carbapenem Resistant)    Method Type: ERNESTINE    Culture - Blood (03.01.18 @ 12:30)    Specimen Source: .Blood Blood    Culture Results:   No growth at 5 days.    Culture - Blood (03.01.18 @ 07:22)    -  Trimethoprim/Sulfamethoxazole: S <=0.5/9.5    -  Vancomycin: S 2    -  Methicillin resistant Staphylococcus aureus (MRSA): Detec    -  Ciprofloxacin: R >2    -  Erythromycin: R >4    -  Gentamicin: S <=4    -  Levofloxacin: R >4    -  Linezolid: S 2    -  Moxifloxacin(Aerobic): R >4    -  Oxacillin: R 2    -  Penicillin: R >8    -  RIF- Rifampin: S <=1    -  Tetra/Doxy: S <=4    -  Cefazolin: R <=4    -  Ampicillin/Sulbactam: R <=8/4    -  Clindamycin: S <=0.5    -  Daptomycin: S <=0.5    Specimen Source: .Blood Blood    Organism: Blood Culture PCR    Organism: Methicillin resistant Staphylococcus aureus    Culture Results:   Growth in anaerobic bottle: Methicillin resistant Staphylococcus aureus  Anaerobic Bottle: 18.36 Hours to positivity  Aerobic Bottle: No growth at 5 days.  ***Blood Panel PCR results on this specimen are available  approximately 3 hours after the Gram stain result.***  Gram stain, PCR, and/or culture results may not always  correspond due to difference in methodologies.  ************************************************************  This PCR assay was performed using [employer].  The following targets are tested for: Enterococcus,  vancomycin resistant enterococci, Listeria monocytogenes,  coagulase negative staphylococci, S. aureus,  methicillin resistant S. aureus, Streptococcus agalactiae  (Group B), S. pneumoniae, S. pyogenes (Group A),  Acinetobacter baumannii, Enterobacter cloacae, E. coli,  Klebsiella oxytoca, K. pneumoniae, Proteus sp.,  Serratia marcescens, Haemophilus influenzae,  Neisseria meningitidis, Pseudomonas aeruginosa, Candida  albicans, C. glabrata, C krusei, Cparapsilosis,  C. tropicalis and the KPC resistance gene.  .  "Due to technical problems, Proteus sp. will Not be reported as part of  the BCID panel until further notice"  .  TYPE: (C=Critical, N=Notification, A=Abnormal) C  TESTS:  _ GS  DATE/TIME CALLED: _ 03/02/2018 08:39:29  CALLED TO: Amanda Rendon RN  READ BACK (2 Patient Identifiers)(Y/N): _ Y  READ BACK VALUES (Y/N): _ Y  CALLED BY: Amanda Barrera  .  TYPE: (C=Critical, N=Notification, A=Abnormal) C  TESTS:  _ MRSA  DATE/TIME CALLED: _ 03/02/2018 10:00:00  CALLED TO: Amanda Rendon PA  READ BACK (2 Patient Identifiers)(Y/N): _ Y  READ BACK VALUES (Y/N): _ Y  CALLED BY: Amanda Barrera    Sent copy to [facility].    Organism Identification: Methicillin resistant Staphylococcus aureus  Blood Culture PCR    Method Type: PCR    Method Type: ERNESTINE      RADIOLOGY & ADDITIONAL STUDIES:  CXR (image reviewed by me): 3/4/18  IMPRESSION: Limited study. No gross consolidation is seen.

## 2018-03-07 NOTE — PROGRESS NOTE ADULT - SUBJECTIVE AND OBJECTIVE BOX
CHIEF COMPLAINT: mrsa bactermia    Patient seen and examined at the bedside. No acute overnight events. Unable to assess ROS as pt non verbal, including the following: fever/chills, headache, lightheadedness, dizziness, chest pain, palpitations, shortness of breath, cough, abd pain, nausea/vomiting/diarrhea, muscle pain.      =========================================================================================  PHYSICAL EXAM.    GEN - appears age appropriate. well nourished.   HEENT - NCAT.   RESP - Ccourse breath sounds sec to supplemental O2 (mech vent via trach)   CARDIO - NS1S2, RRR. No murmurs/rubs/gallops.  ABD - Soft/Non tender/Non distended. Normal BS x4 quadrants. PEG. colostomy in place.    - jones in place, clear yellow urine in collection bag  Ext - No KEVIN.  MSK - could not assess  Neuro - awake. not following commands. occ moving fingers  Psych - could not assess  Skin - c/d/i. no rashes/lesions        VITAL SIGNS.    Vital Signs Last 24 Hrs  T(C): 37.6 (07 Mar 2018 08:43), Max: 37.6 (07 Mar 2018 08:43)  T(F): 99.7 (07 Mar 2018 08:43), Max: 99.7 (07 Mar 2018 08:43)  HR: 69 (07 Mar 2018 08:43) (62 - 70)  BP: 160/74 (07 Mar 2018 08:43) (146/64 - 160/74)  BP(mean): --  RR: 18 (07 Mar 2018 08:43) (18 - 20)  SpO2: 98% (07 Mar 2018 08:43) (97% - 99%)    =================================================    LABS.                          8.9    5.3   )-----------( 271      ( 06 Mar 2018 09:04 )             29.8     03-06    144  |  103  |  14.0  ----------------------------<  102  3.8   |  28.0  |  0.45<L>    Ca    9.0      06 Mar 2018 09:04          I&O's Summary    06 Mar 2018 07:01  -  07 Mar 2018 07:00  --------------------------------------------------------  IN: 0 mL / OUT: 200 mL / NET: -200 mL        Culture - Blood (collected 05 Mar 2018 08:17)  Source: .Blood Blood-Peripheral  Preliminary Report (07 Mar 2018 09:02):    No growth at 48 hours    Culture - Blood (collected 05 Mar 2018 08:17)  Source: .Blood Blood-Peripheral  Preliminary Report (07 Mar 2018 09:02):    No growth at 48 hours        ================================================    IMAGING.      ================================================    MEDICATIONS  (STANDING):  aspirin  chewable 81 milliGRAM(s) Oral daily  BACItracin   Ointment 1 Application(s) Topical every 8 hours  cefepime  IVPB 2000 milliGRAM(s) IV Intermittent every 8 hours  chlorhexidine 0.12% Liquid 15 milliLiter(s) Swish and Spit two times a day  collagenase Ointment 1 Application(s) Topical daily  digoxin     Tablet 0.25 milliGRAM(s) Oral daily  enoxaparin Injectable 40 milliGRAM(s) SubCutaneous daily  lactobacillus acidophilus 1 Tablet(s) Oral every 12 hours  levETIRAcetam  Solution 1000 milliGRAM(s) Oral every 12 hours  pantoprazole   Suspension 40 milliGRAM(s) Oral daily  PHENobarbital Elixir 60 milliGRAM(s) Oral two times a day  phenytoin   Suspension 100 milliGRAM(s) Oral every 8 hours  vancomycin  IVPB 750 milliGRAM(s) IV Intermittent every 12 hours    MEDICATIONS  (PRN):

## 2018-03-07 NOTE — PROGRESS NOTE ADULT - ASSESSMENT
87 y/o M with dementia, prostate ca, seizures, CAD, GERD anemia, chronic tracheostomy with vent dependence + PEG. Here with MRSA septicemia

## 2018-03-07 NOTE — CONSULT NOTE ADULT - ASSESSMENT
87 y/o gentleman with hx of dementia, prostate cancer, CAD s/p PCI, respiratory failure s/p tracheostomy- vent dependent, PEG, CVA, , colostomy who initially presented on 3/1/18 with status epilepticus and MRSA septicemia.   Also noted with Pseudomonas pneumonia.  EEG suggestive of encephalopathy.  We have been asked to evaluate for KATIA to assess for endocarditis.  Last set of blood cultures have been negative thus far.  Patient is non-responsive.    TTE performed did not show any significant valvular abnormalities.    Given his overall clinical picture, I do not think a KATIA would be of incremental benefit in this gentleman.   Overall poor prognosis.  Even if he  had valve incompetence, would not be a surgical candidate.  Recommend medical management, especially as last set of cultures have been negative.  Follow blood cultures.  If he has persistent bacteremia, please repeat TTE and re-consult.     Thank you for allowing me to participate in care of your patient.   Please call as needed.  D/W Dr. Vargas and family at bedside.

## 2018-03-07 NOTE — CONSULT NOTE ADULT - PROBLEM SELECTOR PROBLEM 2
Sepsis, due to unspecified organism
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
Status epilepticus

## 2018-03-07 NOTE — PROGRESS NOTE ADULT - ASSESSMENT
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    Presentation complicated by MRSA bacteremia + development of VAP sec to pseudomonas.     MRSA bacteremia: bld cx 1/2 bottles positive on admission. suspect sacral ulcer as most likely source, hx of osteomyelitis in that area in the past. wound care consult pending. ID consult appreciated. on vanco. last set of blood cx neg x48hrs from 3/3. Echo done @ bedside, per tech poor quality, no comments on vegetations made on final report. ?KATIA needed but given very poor prognosis will need to consult with legal guardian (who is NOT the daughter + wife) to see how aggressive intervention should be, even if vegetations are present pt would be extremely poor candidate for valvular replacement and is not likely to get clearance for said procedure    Sacral Decub Ulcer: known hx. CT Chest with incidental finding of abn enhancement in that area concerning for osteomyelitis. elevated ESR raising suspicion as well. already on vanco iv for bacteremia. cont abx. fu with ID. pending MRI for further investigation. repeat esr to trend improvement in am    Chronic hypoxic Resp Failure: sp trach which was present on admit. vent dependent @ baseline. VAP precautions with HOB elevation. RVP neg. sp CT Chest concerning for RUL PNA, clinically not present but pt is noted to have inc in secretions and thickness, sputum cx with carbapenem resistant pseduomonas. fu ID. pulm consult appreciated, vent settings per them. cefepime started 3/5, continue until 3/11 per ID. thus far improving clinically (secretions becoming less thick + copious per nursing)    VAP: noted on CT as mentioned above. ID fu. abx as above    Elevated BP without hx of HTN: incidental finding over last few days. cannot assess for presence of symptoms however pt has been awake without signs of htn encephalopathy. will watch, ancitipate treatment only if SBP remains persisting >160    Status Epilepticus: sec to med non compliance while in NH who was unaware of pts diagnosis of seizure and did not administer meds. sp keppra while in NH after event and transferred to ER where he was given versed 4mg x2, phenobarb 1600mg IV, and was transferred to ICU for 24hr video EEG, read as mild diffuse encephalopathy with Lt hemisphere dysfunction and resolving partial seizures. CTH w/o signs acute infarct. neuro following, will cont phenytoin + keppra for dual AED therapy. no phenobarb elixir present in house, no seizure activity noted without it thus far. 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 pt with paraphimosis + hx of prostate ca. hx of resistant UTI in the past as well. UC <50k CFU, pseudomonas, no indication to tx @ this time but pt on abx anyway for PNA that would cover urine. urology following. no plans to remove jones catheter, pt with significant difficulty having spontaneous voids.     Pleural effusion: chronic, pleurx drain in place. may be used PRN. no need to drain thus far during this admission.    Chronic PEG: npo with tube feeds. nutrition consult appreciated, rec inc of jevity 1.5 to 60ml/hr with prostat 30ml daily. 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    Presentation complicated by MRSA bacteremia + development of VAP sec to pseudomonas.     MRSA bacteremia: bld cx 1/2 bottles positive on admission. suspect sacral ulcer as most likely source, hx of osteomyelitis in that area in the past. wound care consult pending. ID consult appreciated. on vanco. last set of blood cx neg x48hrs from 3/3. Echo done @ bedside, per tech poor quality, no comments on vegetations made on final report. ?KATIA needed but given very poor prognosis will need to consult with legal guardian (who is NOT the daughter + wife) to see how aggressive intervention should be, even if vegetations are present pt would be extremely poor candidate for valvular replacement and is not likely to get clearance for said procedure. will place cardio consult for formal eval. will need to find contact for legal guardian to discuss plan moving forward.     Sacral Decub Ulcer: known hx. CT Chest with incidental finding of abn enhancement in that area concerning for osteomyelitis. elevated ESR raising suspicion as well. already on vanco iv for bacteremia. cont abx. fu with ID. pending MRI for further investigation. repeat esr to trend improvement in am    Chronic hypoxic Resp Failure: sp trach which was present on admit. vent dependent @ baseline. VAP precautions with HOB elevation. RVP neg. sp CT Chest concerning for RUL PNA, clinically not present but pt is noted to have inc in secretions and thickness, sputum cx with carbapenem resistant pseduomonas. fu ID. pulm consult appreciated, vent settings per them. cefepime started 3/5, continue until 3/11 per ID. thus far improving clinically (secretions becoming less thick + copious per nursing)    VAP: noted on CT as mentioned above. ID fu. abx as above    Elevated BP without hx of HTN: incidental finding over last few days. cannot assess for presence of symptoms however pt has been awake without signs of htn encephalopathy. will watch, ancitipate treatment only if SBP remains persisting >160    Status Epilepticus: sec to med non compliance while in NH who was unaware of pts diagnosis of seizure and did not administer meds. sp keppra while in NH after event and transferred to ER where he was given versed 4mg x2, phenobarb 1600mg IV, and was transferred to ICU for 24hr video EEG, read as mild diffuse encephalopathy with Lt hemisphere dysfunction and resolving partial seizures. CTH w/o signs acute infarct. neuro following, will cont phenytoin + keppra for dual AED therapy. no phenobarb elixir present in house, no seizure activity noted without it thus far. 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 pt with paraphimosis + hx of prostate ca. hx of resistant UTI in the past as well. UC <50k CFU, pseudomonas, no indication to tx @ this time but pt on abx anyway for PNA that would cover urine. urology following. no plans to remove jones catheter, pt with significant difficulty having spontaneous voids.     Pleural effusion: chronic, pleurx drain in place. may be used PRN. no need to drain thus far during this admission.    Chronic PEG: npo with tube feeds. nutrition consult appreciated, rec inc of jevity 1.5 to 60ml/hr with prostat 30ml daily.

## 2018-03-08 LAB
ANION GAP SERPL CALC-SCNC: 9 MMOL/L — SIGNIFICANT CHANGE UP (ref 5–17)
BASOPHILS # BLD AUTO: 0 K/UL — SIGNIFICANT CHANGE UP (ref 0–0.2)
BASOPHILS NFR BLD AUTO: 0.5 % — SIGNIFICANT CHANGE UP (ref 0–2)
BUN SERPL-MCNC: 15 MG/DL — SIGNIFICANT CHANGE UP (ref 8–20)
CALCIUM SERPL-MCNC: 9.1 MG/DL — SIGNIFICANT CHANGE UP (ref 8.6–10.2)
CHLORIDE SERPL-SCNC: 104 MMOL/L — SIGNIFICANT CHANGE UP (ref 98–107)
CO2 SERPL-SCNC: 28 MMOL/L — SIGNIFICANT CHANGE UP (ref 22–29)
CREAT SERPL-MCNC: 0.36 MG/DL — LOW (ref 0.5–1.3)
CULTURE RESULTS: SIGNIFICANT CHANGE UP
CULTURE RESULTS: SIGNIFICANT CHANGE UP
EOSINOPHIL # BLD AUTO: 0.4 K/UL — SIGNIFICANT CHANGE UP (ref 0–0.5)
EOSINOPHIL NFR BLD AUTO: 6.1 % — HIGH (ref 0–5)
ERYTHROCYTE [SEDIMENTATION RATE] IN BLOOD: 60 MM/HR — HIGH (ref 0–20)
GLUCOSE SERPL-MCNC: 108 MG/DL — SIGNIFICANT CHANGE UP (ref 70–115)
HCT VFR BLD CALC: 29.8 % — LOW (ref 42–52)
HGB BLD-MCNC: 9.1 G/DL — LOW (ref 14–18)
LYMPHOCYTES # BLD AUTO: 1.1 K/UL — SIGNIFICANT CHANGE UP (ref 1–4.8)
LYMPHOCYTES # BLD AUTO: 19.2 % — LOW (ref 20–55)
MAGNESIUM SERPL-MCNC: 2.2 MG/DL — SIGNIFICANT CHANGE UP (ref 1.6–2.6)
MCHC RBC-ENTMCNC: 25.3 PG — LOW (ref 27–31)
MCHC RBC-ENTMCNC: 30.5 G/DL — LOW (ref 32–36)
MCV RBC AUTO: 82.8 FL — SIGNIFICANT CHANGE UP (ref 80–94)
MONOCYTES # BLD AUTO: 0.6 K/UL — SIGNIFICANT CHANGE UP (ref 0–0.8)
MONOCYTES NFR BLD AUTO: 9.7 % — SIGNIFICANT CHANGE UP (ref 3–10)
NEUTROPHILS # BLD AUTO: 3.7 K/UL — SIGNIFICANT CHANGE UP (ref 1.8–8)
NEUTROPHILS NFR BLD AUTO: 63.6 % — SIGNIFICANT CHANGE UP (ref 37–73)
PHOSPHATE SERPL-MCNC: 3.3 MG/DL — SIGNIFICANT CHANGE UP (ref 2.4–4.7)
PLATELET # BLD AUTO: 188 K/UL — SIGNIFICANT CHANGE UP (ref 150–400)
POTASSIUM SERPL-MCNC: 4.2 MMOL/L — SIGNIFICANT CHANGE UP (ref 3.5–5.3)
POTASSIUM SERPL-SCNC: 4.2 MMOL/L — SIGNIFICANT CHANGE UP (ref 3.5–5.3)
RBC # BLD: 3.6 M/UL — LOW (ref 4.6–6.2)
RBC # FLD: 19.2 % — HIGH (ref 11–15.6)
SODIUM SERPL-SCNC: 141 MMOL/L — SIGNIFICANT CHANGE UP (ref 135–145)
SPECIMEN SOURCE: SIGNIFICANT CHANGE UP
SPECIMEN SOURCE: SIGNIFICANT CHANGE UP
VANCOMYCIN TROUGH SERPL-MCNC: 17.1 UG/ML — SIGNIFICANT CHANGE UP (ref 10–20)
WBC # BLD: 5.8 K/UL — SIGNIFICANT CHANGE UP (ref 4.8–10.8)
WBC # FLD AUTO: 5.8 K/UL — SIGNIFICANT CHANGE UP (ref 4.8–10.8)

## 2018-03-08 PROCEDURE — 99232 SBSQ HOSP IP/OBS MODERATE 35: CPT

## 2018-03-08 PROCEDURE — 99233 SBSQ HOSP IP/OBS HIGH 50: CPT

## 2018-03-08 RX ORDER — LISINOPRIL 2.5 MG/1
5 TABLET ORAL DAILY
Qty: 0 | Refills: 0 | Status: DISCONTINUED | OUTPATIENT
Start: 2018-03-08 | End: 2018-03-10

## 2018-03-08 RX ADMIN — ENOXAPARIN SODIUM 40 MILLIGRAM(S): 100 INJECTION SUBCUTANEOUS at 11:50

## 2018-03-08 RX ADMIN — LISINOPRIL 5 MILLIGRAM(S): 2.5 TABLET ORAL at 12:31

## 2018-03-08 RX ADMIN — Medication 1 APPLICATION(S): at 13:47

## 2018-03-08 RX ADMIN — LEVETIRACETAM 1000 MILLIGRAM(S): 250 TABLET, FILM COATED ORAL at 05:34

## 2018-03-08 RX ADMIN — Medication 1 APPLICATION(S): at 05:34

## 2018-03-08 RX ADMIN — CHLORHEXIDINE GLUCONATE 15 MILLILITER(S): 213 SOLUTION TOPICAL at 18:08

## 2018-03-08 RX ADMIN — Medication 100 MILLIGRAM(S): at 13:47

## 2018-03-08 RX ADMIN — Medication 150 MILLIGRAM(S): at 23:10

## 2018-03-08 RX ADMIN — CEFEPIME 100 MILLIGRAM(S): 1 INJECTION, POWDER, FOR SOLUTION INTRAMUSCULAR; INTRAVENOUS at 13:47

## 2018-03-08 RX ADMIN — Medication 1 APPLICATION(S): at 11:50

## 2018-03-08 RX ADMIN — Medication 1 APPLICATION(S): at 23:10

## 2018-03-08 RX ADMIN — LEVETIRACETAM 1000 MILLIGRAM(S): 250 TABLET, FILM COATED ORAL at 18:08

## 2018-03-08 RX ADMIN — CEFEPIME 100 MILLIGRAM(S): 1 INJECTION, POWDER, FOR SOLUTION INTRAMUSCULAR; INTRAVENOUS at 23:10

## 2018-03-08 RX ADMIN — Medication 0.25 MILLIGRAM(S): at 05:34

## 2018-03-08 RX ADMIN — Medication 100 MILLIGRAM(S): at 23:10

## 2018-03-08 RX ADMIN — Medication 81 MILLIGRAM(S): at 11:50

## 2018-03-08 RX ADMIN — CEFEPIME 100 MILLIGRAM(S): 1 INJECTION, POWDER, FOR SOLUTION INTRAMUSCULAR; INTRAVENOUS at 05:34

## 2018-03-08 RX ADMIN — Medication 150 MILLIGRAM(S): at 10:36

## 2018-03-08 RX ADMIN — Medication 100 MILLIGRAM(S): at 05:34

## 2018-03-08 RX ADMIN — Medication 1 TABLET(S): at 18:08

## 2018-03-08 RX ADMIN — CHLORHEXIDINE GLUCONATE 15 MILLILITER(S): 213 SOLUTION TOPICAL at 05:34

## 2018-03-08 RX ADMIN — Medication 60 MILLIGRAM(S): at 18:09

## 2018-03-08 RX ADMIN — PANTOPRAZOLE SODIUM 40 MILLIGRAM(S): 20 TABLET, DELAYED RELEASE ORAL at 11:50

## 2018-03-08 RX ADMIN — Medication 1 TABLET(S): at 05:34

## 2018-03-08 NOTE — PROGRESS NOTE ADULT - SUBJECTIVE AND OBJECTIVE BOX
[facility]  INFECTIOUS DISEASES AND INTERNAL MEDICINE at [place]  =======================================================  Renzo Mac MD  Diplomates American Board of Internal Medicine and Infectious Diseases  =======================================================    [place] [id number]    Follow up: MRSA Bacteremia  Blood cultures 3/3/18 no growth     no meaningful communication       Allergies:  Ativan (Unknown)  clindamycin (Unknown)  Haldol (Dystonic RXN)  hydrALAZINE (Unknown)  Nuts (Hives; Rash)  PC Pen VK (Unknown)  strawberry (Short breath; Rash; Hives)  Xanax (Rash)  Zyprexa (Unknown)      Antibiotics:  cefepime  IVPB 2000 milliGRAM(s) IV Intermittent every 8 hours  vancomycin  IVPB 750 milliGRAM(s) IV Intermittent every 12 hours       REVIEW OF SYSTEMS:  unable to obtain, no meaningful communication      Physical Exam:  Vital Signs Last 24 Hrs  T(C): 37 (08 Mar 2018 08:29), Max: 37.6 (07 Mar 2018 16:51)  T(F): 98.6 (08 Mar 2018 08:29), Max: 99.6 (07 Mar 2018 16:51)  HR: 63 (08 Mar 2018 12:34) (56 - 68)  BP: 170/77 (08 Mar 2018 08:29) (136/67 - 170/77)  RR: 20 (08 Mar 2018 08:29) (18 - 20)  SpO2: 99% (08 Mar 2018 12:34) (99% - 100%)      GEN: unresponsive  HEENT: normocephalic and atraumatic  NECK: Supple  LUNGS: Coarse BS B/L  HEART: Regular rate and rhythm   ABDOMEN: Soft, nontender, and nondistended.  Positive bowel sounds.  + PEG  : + Baugh  EXTREMITIES: Without any edema.  MSK: no joint swelling  NEUROLOGIC: unresponsive  PSYCHIATRIC: unresponsive  SKIN: No rash      Labs:  03-08    141  |  104  |  15.0  ----------------------------<  108  4.2   |  28.0  |  0.36<L>    Ca    9.1      08 Mar 2018 09:02  Phos  3.3     03-08  Mg     2.2     03-08                 9.1    5.8   )-----------( 188      ( 08 Mar 2018 09:02 )             29.8         RECENT CULTURES:  03-06 @ 09:04 .Blood Blood-Peripheral     No growth at 48 hours      03-05 @ 08:17 .Blood Blood-Peripheral     No growth at 48 hours      03-03 @ 19:23 .Sputum Sputum Pseudomonas aeruginosa (Carbapenem Resistant)    Numerous Pseudomonas aeruginosa (Carbapenem Resistant)  No Routine respiratory bonnie present  Few White blood cells  No organisms seen      03-03 @ 11:10 .Blood Blood-Peripheral     No growth at 5 days.      03-02 @ 11:55 .Urine Catheterized Pseudomonas aeruginosa (Carbapenem Resistant)    10,000 - 49,000 CFU/mL Pseudomonas aeruginosa (Carbapenem Resistant)      03-01 @ 13:48    RVP  NotDetec      03-01 @ 12:30 .Blood Blood     No growth at 5 days.      03-01 @ 12:27 .Urine Catheterized     Culture grew 3 or more types of organisms which indicate  collection contamination; consider recollection only if clinically  indicated.      03-01 @ 07:22 .Blood Blood Methicillin resistant Staphylococcus aureus  Blood Culture PCR    Growth in anaerobic bottle: Methicillin resistant Staphylococcus aureus  Anaerobic Bottle: 18.36 Hours to positivity  Aerobic Bottle: No growth at 5 days.  ***Blood Panel PCR results on this specimen are available  approximately 3 hours after the Gram stain result.***  Gram stain, PCR, and/or culture results may not always  correspond due to difference in methodologies.  ************************************************************  This PCR assay was performed using [employer].  The following targets are tested for: Enterococcus,  vancomycin resistant enterococci, Listeria monocytogenes,  coagulase negative staphylococci, S. aureus,  methicillin resistant S. aureus, Streptococcus agalactiae  (Group B), S. pneumoniae, S. pyogenes (Group A),  Acinetobacter baumannii, Enterobacter cloacae, E. coli,  Klebsiella oxytoca, K. pneumoniae, Proteus sp.,  Serratia marcescens, Haemophilus influenzae,  Neisseria meningitidis, Pseudomonas aeruginosa, Candida  albicans, C. glabrata, C krusei, Cparapsilosis,  C. tropicalis and the KPC resistance gene.  "Due to technical problems, Proteus sp. will Not be reported as part of  the BCID panel until further notice"          TTE Echo Complete w/Doppler (03.03.18 @ 15:07)  Summary:   1. Technically limited study. Limited information is available.   2. Hyperdynamic global left ventricular systolic function.   3. Left ventricular ejection fraction, by visual estimation, is >75%.   4. Thickening of the anterior and posterior mitral valve leaflets.   5. Sclerotic aortic valve with decreased opening.   6. RV is seen on limited views. It is enlarged. RV function appears   preserved.   7. Trivial pericardial effusion.

## 2018-03-08 NOTE — PROGRESS NOTE ADULT - ASSESSMENT
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    Presentation complicated by MRSA bacteremia + development of VAP sec to pseudomonas.     MRSA bacteremia: bld cx 1/2 bottles positive on admission. suspect sacral ulcer as most likely source, hx of osteomyelitis in that area in the past. wound care consult pending. ID consult appreciated. on vanco. bld cx from 3/3, 3/5, 3/6 ALL neg x at least 48hrs. Echo done @ bedside, per tech poor quality, no comments on vegetations made on final report. typically KATIA is routine part of work up and management of GP bacteremia with concerns for vegetations however even if vegetations are present pt would be extremely poor candidate for valvular replacement. cardio consult appreciated, i agree wholeheartedly with their sentiment that pt risk of procedure greatly outweighs the possible benefits from undergoing the KATIA. at this time will not pursue. this will be discussed with legal guardian. will continue med management, given absence of fevers and seemingly improving overall clinical condition based on vs and labs, it is highly unlikely anyway that pt has endocarditis as consequence of bacteremia. will discuss with ID length of vanco needed.     Sacral Decub Ulcer: known hx. CT Chest with incidental finding of abn enhancement in that area concerning for osteomyelitis. elevated ESR raising suspicion as well. already on vanco iv for bacteremia. cont abx. fu with ID. was considering MRI for further investigation, however per ID would be of little utility regarding overall treatment plan as pt remains a poor sx candidate. can consider reaching out to plastic [facility] to see if they would perform debridement. will discuss with legal guardian first once his contact information is found. repeat esr to trend improvement in interim pending    Chronic hypoxic Resp Failure: sp trach which was present on admit. vent dependent @ baseline. VAP precautions with HOB elevation. RVP neg. sp CT Chest concerning for RUL PNA, clinically not present but pt is noted to have inc in secretions and thickness, sputum cx with carbapenem resistant pseduomonas. fu ID. pulm consult appreciated, vent settings per them. cefepime started 3/5, continue until 3/11 per ID. thus far improving clinically (secretions becoming less thick + copious per nursing)    VAP: noted on CT as mentioned above. ID fu. abx as above    Elevated BP without hx of HTN: incidental finding over last few days. cannot assess for presence of symptoms however pt has been awake without signs of htn encephalopathy. has been persistently having elevated episodes. does not appear to be in pain or distressed which would be alternative cause of HTN. will start lisinopril 5mg via PEG, confirmed with pharmacy that it could be administered this way.     Status Epilepticus: sec to med non compliance while in NH who was unaware of pts diagnosis of seizure and did not administer meds. sp keppra while in NH after event and transferred to ER where he was given versed 4mg x2, phenobarb 1600mg IV, and was transferred to ICU for 24hr video EEG, read as mild diffuse encephalopathy with Lt hemisphere dysfunction and resolving partial seizures. CTH w/o signs acute infarct. neuro following, will cont phenytoin + keppra for dual AED therapy. no phenobarb elixir present in house, no seizure activity noted without it thus far. 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 pt with paraphimosis + hx of prostate ca. hx of resistant UTI in the past as well. UC <50k CFU, pseudomonas, no indication to tx @ this time but pt on abx anyway for PNA that would cover urine. urology following. no plans to remove jones catheter, pt with significant difficulty having spontaneous voids.     Pleural effusion: chronic, pleurx drain in place. may be used PRN. no need to drain thus far during this admission.    Chronic PEG: npo with tube feeds. nutrition consult appreciated, rec inc of jevity 1.5 to 60ml/hr with prostat 30ml daily.

## 2018-03-08 NOTE — PROGRESS NOTE ADULT - SUBJECTIVE AND OBJECTIVE BOX
CHIEF COMPLAINT: mrsa bactermia    Patient seen and examined at the bedside. No acute overnight events. Unable to assess ROS as pt non verbal, including the following: fever/chills, headache, lightheadedness, dizziness, chest pain, palpitations, shortness of breath, cough, abd pain, nausea/vomiting/diarrhea, muscle pain.      =========================================================================================  PHYSICAL EXAM.    GEN - appears age appropriate. well nourished.   HEENT - NCAT.   RESP - Ccourse breath sounds sec to supplemental O2 (mech vent via trach)   CARDIO - NS1S2, RRR. No murmurs/rubs/gallops.  ABD - Soft/Non tender/Non distended. Normal BS x4 quadrants. PEG. colostomy in place.    - jones in place, clear yellow urine in collection bag  Ext - No KEVIN.  MSK - could not assess  Neuro - awake. not following commands. occ moving fingers  Psych - could not assess  Skin - c/d/i. no rashes/lesions      VITAL SIGNS.    Vital Signs Last 24 Hrs  T(C): 37 (08 Mar 2018 08:29), Max: 37.6 (07 Mar 2018 16:51)  T(F): 98.6 (08 Mar 2018 08:29), Max: 99.6 (07 Mar 2018 16:51)  HR: 64 (08 Mar 2018 08:41) (56 - 68)  BP: 170/77 (08 Mar 2018 08:29) (136/67 - 170/77)  BP(mean): --  RR: 20 (08 Mar 2018 08:29) (18 - 20)  SpO2: 100% (08 Mar 2018 08:41) (97% - 100%)    =================================================    LABS.                          9.1    5.8   )-----------( 188      ( 08 Mar 2018 09:02 )             29.8     03-08    141  |  104  |  15.0  ----------------------------<  108  4.2   |  28.0  |  0.36<L>    Ca    9.1      08 Mar 2018 09:02  Phos  3.3     03-08  Mg     2.2     03-08          I&O's Summary    07 Mar 2018 07:01  -  08 Mar 2018 07:00  --------------------------------------------------------  IN: 0 mL / OUT: 1420 mL / NET: -1420 mL    08 Mar 2018 07:01  -  08 Mar 2018 11:41  --------------------------------------------------------  IN: 390 mL / OUT: 0 mL / NET: 390 mL        Culture - Blood (collected 06 Mar 2018 09:04)  Source: .Blood Blood-Peripheral  Preliminary Report (08 Mar 2018 11:01):    No growth at 48 hours    Culture - Blood (collected 06 Mar 2018 09:04)  Source: .Blood Blood-Peripheral  Preliminary Report (08 Mar 2018 11:01):    No growth at 48 hours        ================================================    IMAGING.      ================================================    MEDICATIONS  (STANDING):  aspirin  chewable 81 milliGRAM(s) Oral daily  BACItracin   Ointment 1 Application(s) Topical every 8 hours  cefepime  IVPB 2000 milliGRAM(s) IV Intermittent every 8 hours  chlorhexidine 0.12% Liquid 15 milliLiter(s) Swish and Spit two times a day  collagenase Ointment 1 Application(s) Topical daily  digoxin     Tablet 0.25 milliGRAM(s) Oral daily  enoxaparin Injectable 40 milliGRAM(s) SubCutaneous daily  lactobacillus acidophilus 1 Tablet(s) Oral every 12 hours  levETIRAcetam  Solution 1000 milliGRAM(s) Oral every 12 hours  pantoprazole   Suspension 40 milliGRAM(s) Oral daily  PHENobarbital Elixir 60 milliGRAM(s) Oral two times a day  phenytoin   Suspension 100 milliGRAM(s) Oral every 8 hours  vancomycin  IVPB 750 milliGRAM(s) IV Intermittent every 12 hours    MEDICATIONS  (PRN):

## 2018-03-08 NOTE — PROGRESS NOTE ADULT - PROBLEM SELECTOR PLAN 1
blood cultures with MRSA  Repeat blood culture no growth 3/3/18  ? Source  TTE done  Pending palliative care discussion for KATIA  Continue Vancomycin  If no KATIA will need 6 weeks of treatment from 3/3/18

## 2018-03-09 PROCEDURE — 99233 SBSQ HOSP IP/OBS HIGH 50: CPT

## 2018-03-09 PROCEDURE — 99232 SBSQ HOSP IP/OBS MODERATE 35: CPT

## 2018-03-09 RX ORDER — PHENOBARBITAL 60 MG
60 TABLET ORAL
Qty: 0 | Refills: 0 | Status: DISCONTINUED | OUTPATIENT
Start: 2018-03-09 | End: 2018-03-13

## 2018-03-09 RX ADMIN — CHLORHEXIDINE GLUCONATE 15 MILLILITER(S): 213 SOLUTION TOPICAL at 05:32

## 2018-03-09 RX ADMIN — ENOXAPARIN SODIUM 40 MILLIGRAM(S): 100 INJECTION SUBCUTANEOUS at 12:56

## 2018-03-09 RX ADMIN — CEFEPIME 100 MILLIGRAM(S): 1 INJECTION, POWDER, FOR SOLUTION INTRAMUSCULAR; INTRAVENOUS at 21:58

## 2018-03-09 RX ADMIN — CHLORHEXIDINE GLUCONATE 15 MILLILITER(S): 213 SOLUTION TOPICAL at 17:37

## 2018-03-09 RX ADMIN — CEFEPIME 100 MILLIGRAM(S): 1 INJECTION, POWDER, FOR SOLUTION INTRAMUSCULAR; INTRAVENOUS at 14:37

## 2018-03-09 RX ADMIN — LISINOPRIL 5 MILLIGRAM(S): 2.5 TABLET ORAL at 05:32

## 2018-03-09 RX ADMIN — LEVETIRACETAM 1000 MILLIGRAM(S): 250 TABLET, FILM COATED ORAL at 17:37

## 2018-03-09 RX ADMIN — Medication 100 MILLIGRAM(S): at 12:56

## 2018-03-09 RX ADMIN — PANTOPRAZOLE SODIUM 40 MILLIGRAM(S): 20 TABLET, DELAYED RELEASE ORAL at 12:56

## 2018-03-09 RX ADMIN — Medication 1 TABLET(S): at 17:37

## 2018-03-09 RX ADMIN — LEVETIRACETAM 1000 MILLIGRAM(S): 250 TABLET, FILM COATED ORAL at 05:32

## 2018-03-09 RX ADMIN — Medication 100 MILLIGRAM(S): at 21:58

## 2018-03-09 RX ADMIN — Medication 150 MILLIGRAM(S): at 12:57

## 2018-03-09 RX ADMIN — Medication 0.25 MILLIGRAM(S): at 05:32

## 2018-03-09 RX ADMIN — Medication 81 MILLIGRAM(S): at 12:56

## 2018-03-09 RX ADMIN — Medication 1 APPLICATION(S): at 12:56

## 2018-03-09 RX ADMIN — Medication 1 APPLICATION(S): at 12:57

## 2018-03-09 RX ADMIN — Medication 150 MILLIGRAM(S): at 21:58

## 2018-03-09 RX ADMIN — Medication 1 APPLICATION(S): at 21:58

## 2018-03-09 RX ADMIN — Medication 60 MILLIGRAM(S): at 17:38

## 2018-03-09 RX ADMIN — Medication 1 APPLICATION(S): at 05:32

## 2018-03-09 RX ADMIN — Medication 1 TABLET(S): at 05:32

## 2018-03-09 RX ADMIN — CEFEPIME 100 MILLIGRAM(S): 1 INJECTION, POWDER, FOR SOLUTION INTRAMUSCULAR; INTRAVENOUS at 05:31

## 2018-03-09 RX ADMIN — Medication 100 MILLIGRAM(S): at 05:32

## 2018-03-09 NOTE — PROGRESS NOTE ADULT - ASSESSMENT
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    Presentation complicated by MRSA bacteremia + development of VAP sec to pseudomonas.     MRSA bacteremia: bld cx 1/2 bottles positive on admission. suspect sacral ulcer as most likely source, hx of osteomyelitis in that area in the past. wound care consult pending. ID consult appreciated. on vanco. bld cx from 3/3, 3/5, 3/6 ALL neg x at least 48hrs. Echo done @ bedside, per tech poor quality, no comments on vegetations made on final report. typically KATIA is routine part of work up and management of GP bacteremia with concerns for vegetations however even if vegetations are present pt would be extremely poor candidate for valvular replacement. cardio consult appreciated, i agree wholeheartedly with their sentiment that pt risk of procedure greatly outweighs the possible benefits from undergoing the KATIA. at this time will not pursue. discussed with legal guardian, planning conservative medical care for now, he agrees with deferring KATIA. will continue med management, given absence of fevers and seemingly improving overall clinical condition based on vs and labs, it is highly unlikely anyway that pt has endocarditis as consequence of bacteremia. will need 6wks of iv abx    Sacral Decub Ulcer: known hx. CT Chest with incidental finding of abn enhancement in that area concerning for osteomyelitis. elevated ESR raising suspicion as well, repeat stable. on vanco iv for bacteremia. cont abx. fu with ID. was considering MRI for further investigation, however per ID would be of little utility regarding overall treatment plan as pt remains a poor sx candidate. case discussed with legal guardian, pt is not to pursue sx as it is unlikely to make any signficant improvement to his overall quality of life and morbidity/mortality.    Chronic hypoxic Resp Failure: sp trach which was present on admit. vent dependent @ baseline. VAP precautions with HOB elevation. RVP neg. sp CT Chest concerning for RUL PNA, clinically not present but pt is noted to have inc in secretions and thickness, sputum cx with carbapenem resistant pseduomonas. fu ID. pulm consult appreciated, vent settings per them. cefepime started 3/5, continue until 3/11 per ID. thus far improving clinically (secretions becoming less thick + copious per nursing)    VAP: noted on CT as mentioned above. ID fu. abx as above    Elevated BP without hx of HTN: incidental finding over last few days. cannot assess for presence of symptoms however pt has been awake without signs of htn encephalopathy. has been persistently having elevated episodes. does not appear to be in pain or distressed which would be alternative cause of HTN. improved but still not optimal after starting lisinopril 5mg via PEG, titrate up to 10mg daily. fu for improvement.     Status Epilepticus: sec to med non compliance while in NH who was unaware of pts diagnosis of seizure and did not administer meds. sp keppra while in NH after event and transferred to ER where he was given versed 4mg x2, phenobarb 1600mg IV, and was transferred to ICU for 24hr video EEG, read as mild diffuse encephalopathy with Lt hemisphere dysfunction and resolving partial seizures. CTH w/o signs acute infarct. neuro following, will cont phenytoin + keppra for dual AED therapy. was abkle to get phenobarb elixir, restarted. no seizure activity noted without it thus far. 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 pt with paraphimosis + hx of prostate ca. hx of resistant UTI in the past as well. UC <50k CFU, pseudomonas, no indication to tx @ this time but pt on abx anyway for PNA that would cover urine. urology following. no plans to remove jones catheter, pt with significant difficulty having spontaneous voids.     Pleural effusion: chronic, pleurx drain in place. may be used PRN. no need to drain thus far during this admission.    Chronic PEG: npo with tube feeds. nutrition consult appreciated, rec inc of jevity 1.5 to 60ml/hr with prostat 30ml daily.

## 2018-03-09 NOTE — PROGRESS NOTE ADULT - SUBJECTIVE AND OBJECTIVE BOX
PULMONARY PROGRESS NOTE      KING MCKEON  MRN-[id number]    Patient is a 86y old  Male who presents with a chief complaint of Seizures (01 Mar 2018 09:10)      INTERVAL HPI/OVERNIGHT EVENTS:    Patient is comfortable on vent  Minimally responsive    MEDICATIONS  (STANDING):  aspirin  chewable 81 milliGRAM(s) Oral daily  BACItracin   Ointment 1 Application(s) Topical every 8 hours  cefepime  IVPB 2000 milliGRAM(s) IV Intermittent every 8 hours  chlorhexidine 0.12% Liquid 15 milliLiter(s) Swish and Spit two times a day  collagenase Ointment 1 Application(s) Topical daily  digoxin     Tablet 0.25 milliGRAM(s) Oral daily  enoxaparin Injectable 40 milliGRAM(s) SubCutaneous daily  lactobacillus acidophilus 1 Tablet(s) Oral every 12 hours  levETIRAcetam  Solution 1000 milliGRAM(s) Oral every 12 hours  lisinopril 5 milliGRAM(s) Oral daily  pantoprazole   Suspension 40 milliGRAM(s) Oral daily  phenytoin   Suspension 100 milliGRAM(s) Oral every 8 hours  vancomycin  IVPB 750 milliGRAM(s) IV Intermittent every 12 hours      MEDICATIONS  (PRN):      Allergies    clindamycin (Unknown)  Nuts (Hives; Rash)  PC Pen VK (Unknown)  strawberry (Short breath; Rash; Hives)  Xanax (Rash)    Intolerances    Ativan (Unknown)  Haldol (Dystonic RXN)  hydrALAZINE (Unknown)  Zyprexa (Unknown)      PAST MEDICAL & SURGICAL HISTORY:  Dysphagia  Fall: Multiple Mechanical falls  CAD (coronary artery disease)  Clostridium difficile diarrhea: in 2009  BPH (benign prostatic hypertrophy)  Dementia  HLD (hyperlipidemia)  CHF (congestive heart failure)  Prostate cancer: Treated w/ radiation  Stroke: (~5yrs ago)  Seizure: (last event &gt;1yr ago)  HTN (hypertension)  Colostomy in place  S/P percutaneous endoscopic gastrostomy (PEG) tube placement  H/O hemorrhoidectomy  Deviated septum  Abdominal hernia  Status post cardiac surgery: stents x 2        REVIEW OF SYSTEMS: Unable to obtain    Vital Signs Last 24 Hrs  T(C): 37.2 (09 Mar 2018 08:11), Max: 37.2 (09 Mar 2018 08:11)  T(F): 99 (09 Mar 2018 08:11), Max: 99 (09 Mar 2018 08:11)  HR: 62 (09 Mar 2018 12:41) (62 - 85)  BP: 162/76 (09 Mar 2018 08:11) (147/77 - 162/76)  BP(mean): --  RR: 18 (09 Mar 2018 08:11) (18 - 18)  SpO2: 99% (09 Mar 2018 12:41) (96% - 100%)    PHYSICAL EXAMINATION:    GENERAL: The patient is s/p trach on vent no apparent distress.     HEENT: Head is normocephalic and atraumatic.    NECK: + trach.    LUNGS: Mild b/l rhonchi; respirations unlabored    HEART: Regular rate and rhythm without murmur.    ABDOMEN: Soft, nontender, and nondistended.  + PEG    EXTREMITIES: Without edema.    LABS:                        9.1    5.8   )-----------( 188      ( 08 Mar 2018 09:02 )             29.8     03-08    141  |  104  |  15.0  ----------------------------<  108  4.2   |  28.0  |  0.36<L>    Ca    9.1      08 Mar 2018 09:02  Phos  3.3     03-08  Mg     2.2     03-08        MICROBIOLOGY:    Rapid Respiratory Viral Panel (03.01.18 @ 13:48)    Rapid RVP Result: [facility] The Film[employer] RVP Rapid uses polymerase chain reaction (PCR) and melt  curve analysis to screen for adenovirus; coronavirus HKU1, NL63, 229E,  OC43; human metapneumovirus (hMPV); human enterovirus/rhinovirus  (Entero/RV); influenza A; influenza A/H1;influenza A/H3; influenza  A/H1-2009; influenza B; parainfluenza viruses 1, 2, 3, 4; respiratory  syncytial virus; Bordetella pertussis; Mycoplasma pneumoniae; and  Chlamydophila pneumoniae.        Culture - Blood in AM (03.06.18 @ 09:04)    Specimen Source: .Blood Blood-Peripheral    Culture Results:   No growth at 48 hours    RADIOLOGY & ADDITIONAL STUDIES:     EXAM:  XR CHEST AP OR PA 1V                          PROCEDURE DATE:  03/04/2018          INTERPRETATION:  Chest radiograph (one view)     CPT 78020    CLINICAL INFORMATION:  Patient is unable to communicate.  Short of   breath.     TECHNIQUE:  Single frontal view of the chest was obtained.    FINDINGS:  Prior study dated 3/1/2018 was available for review.    The lungs demonstrate a left-sided thoracic catheter in place. No gross   consolidation is seen. There is limited visualization of the left lower   lung field. Tracheostomy tube is noted in place. The heart is difficult   to evaluate.  The patient's mandible obscures portions of the left apex.      IMPRESSION: Limited study. No gross consolidation is seen.             RONY FRAGOSO M.D., ATTENDING RADIOLOGIST  This document has been electronically signed. Mar  4 2018 12:15PM          ECHO:    Summary:   1. Technically limited study. Limited information is available.   2. Hyperdynamic global left ventricular systolic function.   3. Left ventricular ejection fraction, by visual estimation, is >75%.   4. Thickening of the anterior and posterior mitral valve leaflets.   5. Sclerotic aortic valve with decreased opening.   6. RV is seen on limited views. It is enlarged. RV function appears   preserved.   7. Trivial pericardial effusion.    MD Val Electronically signed on 3/3/2018 at 7:05:03 PM

## 2018-03-09 NOTE — PROGRESS NOTE ADULT - SUBJECTIVE AND OBJECTIVE BOX
CHIEF COMPLAINT: mrsa bactermia    Patient seen and examined at the bedside. No acute overnight events. Unable to assess ROS as pt non verbal, including the following: fever/chills, headache, lightheadedness, dizziness, chest pain, palpitations, shortness of breath, cough, abd pain, nausea/vomiting/diarrhea, muscle pain.      =========================================================================================  PHYSICAL EXAM.    GEN - appears age appropriate. well nourished.   HEENT - NCAT.   RESP - Ccourse breath sounds sec to supplemental O2 (mech vent via trach)   CARDIO - NS1S2, RRR. No murmurs/rubs/gallops.  ABD - Soft/Non tender/Non distended. Normal BS x4 quadrants. PEG. colostomy in place.    - jones in place, clear yellow urine in collection bag  Ext - No KEVIN.  MSK - could not assess  Neuro - awake. not following commands. occ moving fingers  Psych - could not assess  Skin - c/d/i. no rashes/lesions    LABS.                          9.1    5.8   )-----------( 188      ( 08 Mar 2018 09:02 )             29.8     03-08    141  |  104  |  15.0  ----------------------------<  108  4.2   |  28.0  |  0.36<L>    Ca    9.1      08 Mar 2018 09:02  Phos  3.3     03-08  Mg     2.2     03-08          I&O's Summary    07 Mar 2018 07:01  -  08 Mar 2018 07:00  --------------------------------------------------------  IN: 0 mL / OUT: 1420 mL / NET: -1420 mL    08 Mar 2018 07:01  -  08 Mar 2018 11:41  --------------------------------------------------------  IN: 390 mL / OUT: 0 mL / NET: 390 mL        Culture - Blood (collected 06 Mar 2018 09:04)  Source: .Blood Blood-Peripheral  Preliminary Report (08 Mar 2018 11:01):    No growth at 48 hours    Culture - Blood (collected 06 Mar 2018 09:04)  Source: .Blood Blood-Peripheral  Preliminary Report (08 Mar 2018 11:01):    No growth at 48 hours      ================================================    MEDICATIONS  (STANDING):  aspirin  chewable 81 milliGRAM(s) Oral daily  BACItracin   Ointment 1 Application(s) Topical every 8 hours  cefepime  IVPB 2000 milliGRAM(s) IV Intermittent every 8 hours  chlorhexidine 0.12% Liquid 15 milliLiter(s) Swish and Spit two times a day  collagenase Ointment 1 Application(s) Topical daily  digoxin     Tablet 0.25 milliGRAM(s) Oral daily  enoxaparin Injectable 40 milliGRAM(s) SubCutaneous daily  lactobacillus acidophilus 1 Tablet(s) Oral every 12 hours  levETIRAcetam  Solution 1000 milliGRAM(s) Oral every 12 hours  pantoprazole   Suspension 40 milliGRAM(s) Oral daily  PHENobarbital Elixir 60 milliGRAM(s) Oral two times a day  phenytoin   Suspension 100 milliGRAM(s) Oral every 8 hours  vancomycin  IVPB 750 milliGRAM(s) IV Intermittent every 12 hours    MEDICATIONS  (PRN):

## 2018-03-09 NOTE — PROGRESS NOTE ADULT - ASSESSMENT
Chronic vent failure  LLL atelectasis/PNA, small eff , has pleurex  Dementia  Seizures  CHF  MRSA bacteremia    Plan:    Abx per ID  Intermittent pleurex drainage  Seizures improving, therapy per neurology  Vent support  Optimize cardiac function  On Lovenox for DVT prophylaxis  PPI for GI prophylaxis  Nutritional  support  Prognosis dismal  Palliative/ hospice

## 2018-03-10 LAB
CULTURE RESULTS: SIGNIFICANT CHANGE UP
CULTURE RESULTS: SIGNIFICANT CHANGE UP
SPECIMEN SOURCE: SIGNIFICANT CHANGE UP
SPECIMEN SOURCE: SIGNIFICANT CHANGE UP
VANCOMYCIN TROUGH SERPL-MCNC: 15.5 UG/ML — SIGNIFICANT CHANGE UP (ref 10–20)

## 2018-03-10 PROCEDURE — 99233 SBSQ HOSP IP/OBS HIGH 50: CPT

## 2018-03-10 RX ORDER — LISINOPRIL 2.5 MG/1
5 TABLET ORAL ONCE
Qty: 0 | Refills: 0 | Status: COMPLETED | OUTPATIENT
Start: 2018-03-10 | End: 2018-03-10

## 2018-03-10 RX ORDER — LISINOPRIL 2.5 MG/1
10 TABLET ORAL DAILY
Qty: 0 | Refills: 0 | Status: DISCONTINUED | OUTPATIENT
Start: 2018-03-11 | End: 2018-03-13

## 2018-03-10 RX ADMIN — CEFEPIME 100 MILLIGRAM(S): 1 INJECTION, POWDER, FOR SOLUTION INTRAMUSCULAR; INTRAVENOUS at 06:10

## 2018-03-10 RX ADMIN — Medication 1 APPLICATION(S): at 22:38

## 2018-03-10 RX ADMIN — LEVETIRACETAM 1000 MILLIGRAM(S): 250 TABLET, FILM COATED ORAL at 17:36

## 2018-03-10 RX ADMIN — LISINOPRIL 5 MILLIGRAM(S): 2.5 TABLET ORAL at 06:11

## 2018-03-10 RX ADMIN — Medication 1 TABLET(S): at 06:11

## 2018-03-10 RX ADMIN — PANTOPRAZOLE SODIUM 40 MILLIGRAM(S): 20 TABLET, DELAYED RELEASE ORAL at 10:00

## 2018-03-10 RX ADMIN — Medication 100 MILLIGRAM(S): at 12:56

## 2018-03-10 RX ADMIN — Medication 81 MILLIGRAM(S): at 12:57

## 2018-03-10 RX ADMIN — CEFEPIME 100 MILLIGRAM(S): 1 INJECTION, POWDER, FOR SOLUTION INTRAMUSCULAR; INTRAVENOUS at 15:05

## 2018-03-10 RX ADMIN — Medication 60 MILLIGRAM(S): at 06:11

## 2018-03-10 RX ADMIN — CHLORHEXIDINE GLUCONATE 15 MILLILITER(S): 213 SOLUTION TOPICAL at 06:11

## 2018-03-10 RX ADMIN — LEVETIRACETAM 1000 MILLIGRAM(S): 250 TABLET, FILM COATED ORAL at 06:10

## 2018-03-10 RX ADMIN — ENOXAPARIN SODIUM 40 MILLIGRAM(S): 100 INJECTION SUBCUTANEOUS at 10:00

## 2018-03-10 RX ADMIN — Medication 150 MILLIGRAM(S): at 22:45

## 2018-03-10 RX ADMIN — Medication 60 MILLIGRAM(S): at 17:36

## 2018-03-10 RX ADMIN — Medication 1 APPLICATION(S): at 12:56

## 2018-03-10 RX ADMIN — Medication 100 MILLIGRAM(S): at 06:10

## 2018-03-10 RX ADMIN — Medication 150 MILLIGRAM(S): at 11:55

## 2018-03-10 RX ADMIN — Medication 1 APPLICATION(S): at 06:10

## 2018-03-10 RX ADMIN — Medication 100 MILLIGRAM(S): at 22:38

## 2018-03-10 RX ADMIN — Medication 1 APPLICATION(S): at 10:00

## 2018-03-10 RX ADMIN — LISINOPRIL 5 MILLIGRAM(S): 2.5 TABLET ORAL at 10:00

## 2018-03-10 RX ADMIN — Medication 0.25 MILLIGRAM(S): at 06:11

## 2018-03-10 RX ADMIN — Medication 1 TABLET(S): at 17:36

## 2018-03-10 RX ADMIN — CEFEPIME 100 MILLIGRAM(S): 1 INJECTION, POWDER, FOR SOLUTION INTRAMUSCULAR; INTRAVENOUS at 22:45

## 2018-03-10 RX ADMIN — CHLORHEXIDINE GLUCONATE 15 MILLILITER(S): 213 SOLUTION TOPICAL at 17:36

## 2018-03-10 NOTE — PROGRESS NOTE ADULT - SUBJECTIVE AND OBJECTIVE BOX
CHIEF COMPLAINT: mrsa bactermia    Patient seen and examined at the bedside. No acute overnight events. Unable to assess ROS as pt non verbal, including the following: fever/chills, headache, lightheadedness, dizziness, chest pain, palpitations, shortness of breath, cough, abd pain, nausea/vomiting/diarrhea, muscle pain.      =========================================================================================  PHYSICAL EXAM.    GEN - appears age appropriate. well nourished.   HEENT - NCAT.   RESP - Ccourse breath sounds sec to supplemental O2 (mech vent via trach)   CARDIO - NS1S2, RRR. No murmurs/rubs/gallops.  ABD - Soft/Non tender/Non distended. Normal BS x4 quadrants. PEG. colostomy in place.    - jones in place, clear yellow urine in collection bag  Ext - No KEVIN.  MSK - could not assess  Neuro - awake. not following commands. occ moving fingers  Psych - could not assess  Skin - c/d/i. no rashes/lesions      VITAL SIGNS.    Vital Signs Last 24 Hrs  T(C): 37.2 (10 Mar 2018 16:16), Max: 37.3 (10 Mar 2018 08:27)  T(F): 98.9 (10 Mar 2018 16:16), Max: 99.1 (10 Mar 2018 08:27)  HR: 68 (10 Mar 2018 16:36) (64 - 70)  BP: 157/76 (10 Mar 2018 16:16) (151/78 - 183/82)  BP(mean): --  RR: 23 (10 Mar 2018 16:16) (18 - 23)  SpO2: 100% (10 Mar 2018 16:36) (99% - 100%)    =================================================    LABS.                I&O's Summary    09 Mar 2018 07:01  -  10 Mar 2018 07:00  --------------------------------------------------------  IN: 0 mL / OUT: 1260 mL / NET: -1260 mL    10 Mar 2018 06:01  -  10 Mar 2018 20:02  --------------------------------------------------------  IN: 1120 mL / OUT: 0 mL / NET: 1120 mL          ================================================    IMAGING.      ================================================    MEDICATIONS  (STANDING):  aspirin  chewable 81 milliGRAM(s) Oral daily  BACItracin   Ointment 1 Application(s) Topical every 8 hours  cefepime  IVPB 2000 milliGRAM(s) IV Intermittent every 8 hours  chlorhexidine 0.12% Liquid 15 milliLiter(s) Swish and Spit two times a day  collagenase Ointment 1 Application(s) Topical daily  digoxin     Tablet 0.25 milliGRAM(s) Oral daily  enoxaparin Injectable 40 milliGRAM(s) SubCutaneous daily  lactobacillus acidophilus 1 Tablet(s) Oral every 12 hours  levETIRAcetam  Solution 1000 milliGRAM(s) Oral every 12 hours  pantoprazole   Suspension 40 milliGRAM(s) Oral daily  PHENobarbital Elixir 60 milliGRAM(s) Oral two times a day  phenytoin   Suspension 100 milliGRAM(s) Oral every 8 hours  vancomycin  IVPB 750 milliGRAM(s) IV Intermittent every 12 hours    MEDICATIONS  (PRN):

## 2018-03-10 NOTE — PROGRESS NOTE ADULT - PROBLEM SELECTOR PLAN 1
blood cultures with MRSA  Repeat blood culture no growth 3/3/18  ? Source  TTE done  Not a candidate for KATIA as per Cardiology  Continue Vancomycin  D/W Dr Brooksplan Since no KATIA will need 6 weeks of treatment from 3/3/18 with end date 4/14/18  PICC to be placed Monday

## 2018-03-10 NOTE — PROGRESS NOTE ADULT - SUBJECTIVE AND OBJECTIVE BOX
[facility]  INFECTIOUS DISEASES AND INTERNAL MEDICINE at [place]  =======================================================  Renzo Mac MD  Diplomates American Board of Internal Medicine and Infectious Diseases  =======================================================    KING MCKEON [id number]    Follow up: MRSA Bacteremia  Blood cultures 3/3/18 no growth     no meaningful communication       Allergies:  Ativan (Unknown)  clindamycin (Unknown)  Haldol (Dystonic RXN)  hydrALAZINE (Unknown)  Nuts (Hives; Rash)  PC Pen VK (Unknown)  strawberry (Short breath; Rash; Hives)  Xanax (Rash)  Zyprexa (Unknown)      Antibiotics:  cefepime  IVPB 2000 milliGRAM(s) IV Intermittent every 8 hours  vancomycin  IVPB 750 milliGRAM(s) IV Intermittent every 12 hours       REVIEW OF SYSTEMS:  unable to obtain, no meaningful communication      Physical Exam:  Vital Signs Last 24 Hrs  T(C): 37.3 (10 Mar 2018 08:27), Max: 37.3 (10 Mar 2018 08:27)  T(F): 99.1 (10 Mar 2018 08:27), Max: 99.1 (10 Mar 2018 08:27)  HR: 68 (10 Mar 2018 12:59) (62 - 68)  BP: 183/82 (10 Mar 2018 08:27) (149/75 - 183/82)  RR: 20 (10 Mar 2018 08:27) (18 - 21)  SpO2: 100% (10 Mar 2018 12:59) (99% - 100%)      GEN: unresponsive  HEENT: normocephalic and atraumatic  NECK: Supple  LUNGS: Coarse BS B/L  HEART: Regular rate and rhythm   ABDOMEN: Soft, nontender, and nondistended.  Positive bowel sounds.  + PEG  : + Baugh  EXTREMITIES: Without any edema.  MSK: no joint swelling  NEUROLOGIC: unresponsive  PSYCHIATRIC: unresponsive  SKIN: No rash      Labs:  RECENT CULTURES:  03-06 @ 09:04 .Blood Blood-Peripheral     No growth at 48 hours      03-05 @ 08:17 .Blood Blood-Peripheral     No growth at 5 days.      03-03 @ 19:23 .Sputum Sputum Pseudomonas aeruginosa (Carbapenem Resistant)    Numerous Pseudomonas aeruginosa (Carbapenem Resistant)  No Routine respiratory bonnie present  Few White blood cells  No organisms seen      03-03 @ 11:10 .Blood Blood-Peripheral     No growth at 5 days.      03-02 @ 11:55 .Urine Catheterized Pseudomonas aeruginosa (Carbapenem Resistant)    10,000 - 49,000 CFU/mL Pseudomonas aeruginosa (Carbapenem Resistant)      03-01 @ 13:48    RVP  NotDetec      03-01 @ 12:30 .Blood Blood     No growth at 5 days.      03-01 @ 12:27 .Urine Catheterized     Culture grew 3 or more types of organisms which indicate  collection contamination; consider recollection only if clinically  indicated.      03-01 @ 07:22 .Blood Blood Methicillin resistant Staphylococcus aureus  Blood Culture PCR    Growth in anaerobic bottle: Methicillin resistant Staphylococcus aureus  Anaerobic Bottle: 18.36 Hours to positivity  Aerobic Bottle: No growth at 5 days.  ***Blood Panel PCR results on this specimen are available  approximately 3 hours after the Gram stain result.***  Gram stain, PCR, and/or culture results may not always  correspond due to difference in methodologies.  ************************************************************  This PCR assay was performed using [email or web address].  The following targets are tested for: Enterococcus,  vancomycin resistant enterococci, Listeria monocytogenes,  coagulase negative staphylococci, S. aureus,  methicillin resistant S. aureus, Streptococcus agalactiae  (Group B), S. pneumoniae, S. pyogenes (Group A),  Acinetobacter baumannii, Enterobacter cloacae, E. coli,  Klebsiella oxytoca, K. pneumoniae, Proteus sp.,  Serratia marcescens, Haemophilus influenzae,  Neisseria meningitidis, Pseudomonas aeruginosa, Candida  albicans, C. glabrata, C krusei, Cparapsilosis,  C. tropicalis and the KPC resistance gene.  "Due to technical problems, Proteus sp. will Not be reported as part of  the BCID panel until further notice"          TTE Echo Complete w/Doppler (03.03.18 @ 15:07)  Summary:   1. Technically limited study. Limited information is available.   2. Hyperdynamic global left ventricular systolic function.   3. Left ventricular ejection fraction, by visual estimation, is >75%.   4. Thickening of the anterior and posterior mitral valve leaflets.   5. Sclerotic aortic valve with decreased opening.   6. RV is seen on limited views. It is enlarged. RV function appears   preserved.   7. Trivial pericardial effusion.

## 2018-03-10 NOTE — PROGRESS NOTE ADULT - NSHPATTENDINGPLANDISCUSS_GEN_ALL_CORE
the patient.  All imaging and results of lab/other studies reviewed by me. All questions answered to the satisfaction of the patient. At this time they agree with the current plan of therapy.
Dr Vargas

## 2018-03-10 NOTE — PROGRESS NOTE ADULT - PROBLEM SELECTOR PLAN 4
Came in with seizures  avoid antibiotics pre-disposing to seizures
sputum culture  trach care, continue vent settings as used in SNF  Pulmonary toilet  pulm consult  Elevate head of bed to 30 degrees  VAP prophylaxis
sputum culture  trach care, continue vent settings as used in SNF  Pulmonary toilet  Elevate head of bed to 30 degrees  VAP prophylaxis

## 2018-03-10 NOTE — CHART NOTE - NSCHARTNOTEFT_GEN_A_CORE
Source: Patient [ ]  Family [ ]   other [ X]RN    Current Diet: NPO status     [ ] nausea  [ ] vomiting [ ] diarrhea [ ] constipation  [ ]chewing problems [ X] swallowing issues  [ ] other:   Source for intake [ ] Patient [ X] family [ ] chart [ ] staff [ ] other    Enteral /Parenteral Nutrition: Receiving enteral feeds of Jevity 1.5 @ 60ml/hr X 20hrs (Formulary to provide 1200ml, 1800kcal, 77g pro if 100% infused)       Pertinent Medications: MEDICATIONS  (STANDING):  aspirin  chewable 81 milliGRAM(s) Oral daily  BACItracin   Ointment 1 Application(s) Topical every 8 hours  cefepime  IVPB 2000 milliGRAM(s) IV Intermittent every 8 hours  chlorhexidine 0.12% Liquid 15 milliLiter(s) Swish and Spit two times a day  collagenase Ointment 1 Application(s) Topical daily  digoxin     Tablet 0.25 milliGRAM(s) Oral daily  enoxaparin Injectable 40 milliGRAM(s) SubCutaneous daily  lactobacillus acidophilus 1 Tablet(s) Oral every 12 hours  levETIRAcetam  Solution 1000 milliGRAM(s) Oral every 12 hours  pantoprazole   Suspension 40 milliGRAM(s) Oral daily  PHENobarbital Elixir 60 milliGRAM(s) Oral two times a day  phenytoin   Suspension 100 milliGRAM(s) Oral every 8 hours  vancomycin  IVPB 750 milliGRAM(s) IV Intermittent every 12 hours      Labs: (3/8)- Low H/H 9.1/29.8, low crea 0.36    Nutrition focused physical exam conducted - found signs of malnutrition [ ]absent [ ]present    Subcutaneous fat loss: [ ] Orbital fat pads region, [ ]Buccal fat region, [ ]Triceps region,  [ ]Ribs region    Muscle wasting: [ ]Temples region, [ ]Clavicle region, [ ]Shoulder region, [ ]Scapula region, [ ]Interosseous region,  [ ]thigh region, [ ]Calf region    Estimated Needs:   [ X] no change since previous assessment  [ ] recalculated:     Current Nutrition Diagnosis: 1) Difficulty swallowing related to neurological disorders as evidenced by NPO status, peg placement, and TF regimen.                                            2) Increased nutrient needs related to skin integrity as evidenced by unstaged pressure ulcer noted to sacrum.                                               Recommendations: Recommend 1 pkt Jesse BID via peg (To provide 160kcal and 28g amino acids if 100% infused) to promote wound healing.     Monitoring and Evaluation:   [ X] Tolerance to enteral feeding [X] Weights  [X] Follow up per protocol [X] Labs:

## 2018-03-10 NOTE — PROGRESS NOTE ADULT - ASSESSMENT
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    Presentation complicated by MRSA bacteremia + development of VAP sec to pseudomonas.     MRSA bacteremia: bld cx 1/2 bottles positive on admission. suspect sacral ulcer as most likely source, hx of osteomyelitis in that area in the past. ID consult appreciated. on vanco. bld cx from 3/3, 3/5, 3/6 ALL neg x at least 48hrs. Echo done @ bedside, per tech poor quality, no comments on vegetations made on final report. typically KATIA is routine part of work up and management of GP bacteremia with concerns for vegetations however even if vegetations are present pt would be extremely poor candidate for valvular replacement. cardio consult appreciated, i agree wholeheartedly with their sentiment that pt risk of procedure greatly outweighs the possible benefits from undergoing the KATIA. at this time will not pursue. discussed with legal guardian, planning conservative medical care for now, he agrees with deferring KATIA. will continue med management, given absence of fevers and seemingly improving overall clinical condition based on vs and labs, it is highly unlikely anyway that pt has endocarditis as consequence of bacteremia. will need 6wks of iv abx, per ID last day 4/14, may place picc on 3/12 and dc back to rehab afterwards    Sacral Decub Ulcer: known hx. CT Chest with incidental finding of abn enhancement in that area concerning for osteomyelitis. elevated ESR raising suspicion as well, repeat stable. on vanco iv for bacteremia. cont abx. was considering MRI for further investigation, however per ID would be of little utility regarding overall treatment plan as pt remains a poor sx candidate. case discussed with legal guardian, pt is not to pursue sx as it is unlikely to make any significant improvement to his overall quality of life and morbidity/mortality.    Chronic hypoxic Resp Failure: sp trach which was present on admit. vent dependent @ baseline. VAP precautions with HOB elevation. RVP neg. sp CT Chest concerning for RUL PNA, clinically not present but pt is noted to have inc in secretions and thickness, sputum cx with carbapenem resistant pseduomonas. fu ID. pulm consult appreciated, vent settings per them. cefepime started 3/5, continue until 3/11 per ID. thus far improving clinically    VAP: noted on CT as mentioned above. ID fu. abx as above    Elevated BP without hx of HTN: incidental finding over last few days. cannot assess for presence of symptoms however pt has been awake without signs of htn encephalopathy. has been persistently having elevated episodes. does not appear to be in pain or distressed which would be alternative cause of HTN. improved but still not optimal after starting lisinopril 5mg via PEG, titrate up to 10mg daily. fu for improvement.     Status Epilepticus: sec to med non compliance while in NH who was unaware of pts diagnosis of seizure and did not administer meds. sp keppra while in NH after event and transferred to ER where he was given versed 4mg x2, phenobarb 1600mg IV, and was transferred to ICU for 24hr video EEG, read as mild diffuse encephalopathy with Lt hemisphere dysfunction and resolving partial seizures. CTH w/o signs acute infarct. neuro following, will cont phenytoin + keppra for dual AED therapy. was abkle to get phenobarb elixir, restarted. no seizure activity noted without it thus far. 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 pt with paraphimosis + hx of prostate ca. hx of resistant UTI in the past as well. UC <50k CFU, pseudomonas, no indication to tx @ this time but pt on abx anyway for PNA that would cover urine. urology following. no plans to remove jones catheter, pt with significant difficulty having spontaneous voids.     Pleural effusion: chronic, pleurx drain in place. may be used PRN. no need to drain thus far during this admission.    Chronic PEG: npo with tube feeds. nutrition consult appreciated, rec inc of jevity 1.5 to 60ml/hr with prostat 30ml daily.

## 2018-03-11 LAB
ANION GAP SERPL CALC-SCNC: 8 MMOL/L — SIGNIFICANT CHANGE UP (ref 5–17)
BASOPHILS # BLD AUTO: 0 K/UL — SIGNIFICANT CHANGE UP (ref 0–0.2)
BASOPHILS NFR BLD AUTO: 0.3 % — SIGNIFICANT CHANGE UP (ref 0–2)
BUN SERPL-MCNC: 16 MG/DL — SIGNIFICANT CHANGE UP (ref 8–20)
CALCIUM SERPL-MCNC: 9.1 MG/DL — SIGNIFICANT CHANGE UP (ref 8.6–10.2)
CHLORIDE SERPL-SCNC: 99 MMOL/L — SIGNIFICANT CHANGE UP (ref 98–107)
CO2 SERPL-SCNC: 31 MMOL/L — HIGH (ref 22–29)
CREAT SERPL-MCNC: 0.39 MG/DL — LOW (ref 0.5–1.3)
CULTURE RESULTS: SIGNIFICANT CHANGE UP
CULTURE RESULTS: SIGNIFICANT CHANGE UP
EOSINOPHIL # BLD AUTO: 0.3 K/UL — SIGNIFICANT CHANGE UP (ref 0–0.5)
EOSINOPHIL NFR BLD AUTO: 4 % — SIGNIFICANT CHANGE UP (ref 0–5)
GLUCOSE BLDC GLUCOMTR-MCNC: 108 MG/DL — HIGH (ref 70–99)
GLUCOSE SERPL-MCNC: 89 MG/DL — SIGNIFICANT CHANGE UP (ref 70–115)
HCT VFR BLD CALC: 31.3 % — LOW (ref 42–52)
HGB BLD-MCNC: 9.2 G/DL — LOW (ref 14–18)
LYMPHOCYTES # BLD AUTO: 1.2 K/UL — SIGNIFICANT CHANGE UP (ref 1–4.8)
LYMPHOCYTES # BLD AUTO: 16.1 % — LOW (ref 20–55)
MAGNESIUM SERPL-MCNC: 2.1 MG/DL — SIGNIFICANT CHANGE UP (ref 1.6–2.6)
MCHC RBC-ENTMCNC: 24.8 PG — LOW (ref 27–31)
MCHC RBC-ENTMCNC: 29.4 G/DL — LOW (ref 32–36)
MCV RBC AUTO: 84.4 FL — SIGNIFICANT CHANGE UP (ref 80–94)
MONOCYTES # BLD AUTO: 0.6 K/UL — SIGNIFICANT CHANGE UP (ref 0–0.8)
MONOCYTES NFR BLD AUTO: 8.9 % — SIGNIFICANT CHANGE UP (ref 3–10)
NEUTROPHILS # BLD AUTO: 5.1 K/UL — SIGNIFICANT CHANGE UP (ref 1.8–8)
NEUTROPHILS NFR BLD AUTO: 70.1 % — SIGNIFICANT CHANGE UP (ref 37–73)
PHOSPHATE SERPL-MCNC: 2.8 MG/DL — SIGNIFICANT CHANGE UP (ref 2.4–4.7)
PLATELET # BLD AUTO: 273 K/UL — SIGNIFICANT CHANGE UP (ref 150–400)
POTASSIUM SERPL-MCNC: 4.4 MMOL/L — SIGNIFICANT CHANGE UP (ref 3.5–5.3)
POTASSIUM SERPL-SCNC: 4.4 MMOL/L — SIGNIFICANT CHANGE UP (ref 3.5–5.3)
RBC # BLD: 3.71 M/UL — LOW (ref 4.6–6.2)
RBC # FLD: 19.3 % — HIGH (ref 11–15.6)
SODIUM SERPL-SCNC: 138 MMOL/L — SIGNIFICANT CHANGE UP (ref 135–145)
SPECIMEN SOURCE: SIGNIFICANT CHANGE UP
SPECIMEN SOURCE: SIGNIFICANT CHANGE UP
WBC # BLD: 7.2 K/UL — SIGNIFICANT CHANGE UP (ref 4.8–10.8)
WBC # FLD AUTO: 7.2 K/UL — SIGNIFICANT CHANGE UP (ref 4.8–10.8)

## 2018-03-11 PROCEDURE — 99233 SBSQ HOSP IP/OBS HIGH 50: CPT

## 2018-03-11 RX ORDER — NYSTATIN CREAM 100000 [USP'U]/G
1 CREAM TOPICAL
Qty: 0 | Refills: 0 | Status: DISCONTINUED | OUTPATIENT
Start: 2018-03-11 | End: 2018-03-13

## 2018-03-11 RX ADMIN — Medication 1 APPLICATION(S): at 05:36

## 2018-03-11 RX ADMIN — Medication 60 MILLIGRAM(S): at 05:35

## 2018-03-11 RX ADMIN — Medication 81 MILLIGRAM(S): at 14:14

## 2018-03-11 RX ADMIN — PANTOPRAZOLE SODIUM 40 MILLIGRAM(S): 20 TABLET, DELAYED RELEASE ORAL at 14:14

## 2018-03-11 RX ADMIN — CEFEPIME 100 MILLIGRAM(S): 1 INJECTION, POWDER, FOR SOLUTION INTRAMUSCULAR; INTRAVENOUS at 05:34

## 2018-03-11 RX ADMIN — NYSTATIN CREAM 1 APPLICATION(S): 100000 CREAM TOPICAL at 19:29

## 2018-03-11 RX ADMIN — LEVETIRACETAM 1000 MILLIGRAM(S): 250 TABLET, FILM COATED ORAL at 05:36

## 2018-03-11 RX ADMIN — LEVETIRACETAM 1000 MILLIGRAM(S): 250 TABLET, FILM COATED ORAL at 19:29

## 2018-03-11 RX ADMIN — Medication 100 MILLIGRAM(S): at 14:14

## 2018-03-11 RX ADMIN — Medication 0.25 MILLIGRAM(S): at 05:37

## 2018-03-11 RX ADMIN — Medication 60 MILLIGRAM(S): at 19:28

## 2018-03-11 RX ADMIN — Medication 1 TABLET(S): at 19:29

## 2018-03-11 RX ADMIN — CHLORHEXIDINE GLUCONATE 15 MILLILITER(S): 213 SOLUTION TOPICAL at 19:29

## 2018-03-11 RX ADMIN — ENOXAPARIN SODIUM 40 MILLIGRAM(S): 100 INJECTION SUBCUTANEOUS at 14:13

## 2018-03-11 RX ADMIN — Medication 1 APPLICATION(S): at 14:14

## 2018-03-11 RX ADMIN — Medication 1 TABLET(S): at 05:37

## 2018-03-11 RX ADMIN — Medication 100 MILLIGRAM(S): at 05:36

## 2018-03-11 RX ADMIN — LISINOPRIL 10 MILLIGRAM(S): 2.5 TABLET ORAL at 05:37

## 2018-03-11 RX ADMIN — CHLORHEXIDINE GLUCONATE 15 MILLILITER(S): 213 SOLUTION TOPICAL at 05:36

## 2018-03-11 NOTE — CHART NOTE - NSCHARTNOTEFT_GEN_A_CORE
Called to [id number] obtain IV access on patient Miko Aldrich, a patient of Dr Vargas. RN Bhavna stated that she and another RN had tried unsuccessfully  to place peripheral line.  Unsuccessful attempts x2 were made by me to place peripheral line, with aid of Sonogram machine. Pt is on a vent, with only limited access to right arm, which partially contracted. Pt's family member also expressed reservation about additional attempts to place IV line due to her concern form Pt. RN to make Dr Vargas aware.

## 2018-03-11 NOTE — PROGRESS NOTE ADULT - ASSESSMENT
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    Presentation complicated by MRSA bacteremia + development of VAP sec to pseudomonas.     MRSA bacteremia: bld cx 1/2 bottles positive on admission. suspect sacral ulcer as most likely source, hx of osteomyelitis in that area in the past. ID consult appreciated. on vanco. bld cx from 3/3, 3/5, 3/6 ALL neg x at least 48hrs. Echo done @ bedside, per tech poor quality, no comments on vegetations made on final report. typically KATIA is routine part of work up and management of GP bacteremia with concerns for vegetations however even if vegetations are present pt would be extremely poor candidate for valvular replacement. cardio consult appreciated, i agree wholeheartedly with their sentiment that pt risk of procedure greatly outweighs the possible benefits from undergoing the KATIA. at this time will not pursue. discussed with legal guardian, planning conservative medical care for now, he agrees with deferring KATIA. will continue med management, given absence of fevers and seemingly improving overall clinical condition based on vs and labs, it is highly unlikely anyway that pt has endocarditis as consequence of bacteremia. will need 6wks of iv abx, per ID last day 4/14, may place picc on 3/12 and dc back to rehab afterwards    Sacral Decub Ulcer: known hx. CT Chest with incidental finding of abn enhancement in that area concerning for osteomyelitis. elevated ESR raising suspicion as well, repeat stable. on vanco iv for bacteremia. cont abx. was considering MRI for further investigation, however per ID would be of little utility regarding overall treatment plan as pt remains a poor sx candidate. case discussed with legal guardian, pt is not to pursue sx as it is unlikely to make any significant improvement to his overall quality of life and morbidity/mortality.    Chronic hypoxic Resp Failure: sp trach which was present on admit. vent dependent @ baseline. VAP precautions with HOB elevation. RVP neg. sp CT Chest concerning for RUL PNA, clinically not present but pt is noted to have inc in secretions and thickness, sputum cx with carbapenem resistant pseduomonas. fu ID. pulm consult appreciated, vent settings per them. cefepime started 3/5, continue until 3/11 per ID. thus far improving clinically    VAP: noted on CT as mentioned above. ID fu. abx as above    Elevated BP without hx of HTN: incidental finding over last few days. cannot assess for presence of symptoms however pt has been awake without signs of htn encephalopathy. has been persistently having elevated episodes. does not appear to be in pain or distressed which would be alternative cause of HTN. started lisinopril 10mg this admission, bp improved but still not optimal. will cont to watch and titrate futher as needed.     Status Epilepticus: sec to med non compliance while in NH who was unaware of pts diagnosis of seizure and did not administer meds. sp keppra while in NH after event and transferred to ER where he was given versed 4mg x2, phenobarb 1600mg IV, and was transferred to ICU for 24hr video EEG, read as mild diffuse encephalopathy with Lt hemisphere dysfunction and resolving partial seizures. CTH w/o signs acute infarct. neuro following, will cont phenytoin + keppra for dual AED therapy. was abkle to get phenobarb elixir, restarted. no seizure activity noted without it thus far. 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 pt with paraphimosis + hx of prostate ca. hx of resistant UTI in the past as well. UC <50k CFU, pseudomonas, no indication to tx @ this time but pt on abx anyway for PNA that would cover urine. urology following. no plans to remove jones catheter, pt with significant difficulty having spontaneous voids.     Pleural effusion: chronic, pleurx drain in place. may be used PRN. no need to drain thus far during this admission.    Chronic PEG: npo with tube feeds. nutrition consult appreciated, rec inc of jevity 1.5 to 60ml/hr with prostat 30ml daily. 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    Presentation complicated by MRSA bacteremia + development of VAP sec to pseudomonas.     MRSA bacteremia: bld cx 1/2 bottles positive on admission. suspect sacral ulcer as most likely source, hx of osteomyelitis in that area in the past. ID consult appreciated. on vanco. bld cx from 3/3, 3/5, 3/6 ALL neg x at least 48hrs. Echo done @ bedside, per tech poor quality, no comments on vegetations made on final report. typically KATIA is routine part of work up and management of GP bacteremia with concerns for vegetations however even if vegetations are present pt would be extremely poor candidate for valvular replacement. cardio consult appreciated, i agree wholeheartedly with their sentiment that pt risk of procedure greatly outweighs the possible benefits from undergoing the KATIA. at this time will not pursue. discussed with legal guardian, planning conservative medical care for now, he agrees with deferring KATIA. will continue med management, given absence of fevers and seemingly improving overall clinical condition based on vs and labs, it is highly unlikely anyway that pt has endocarditis as consequence of bacteremia. will need 6wks of iv abx, per ID last day 4/14, may place picc on 3/12 and dc back to rehab afterwards    Sacral Decub Ulcer: known hx. CT Chest with incidental finding of abn enhancement in that area concerning for osteomyelitis. elevated ESR raising suspicion as well, repeat stable. on vanco iv for bacteremia. cont abx. was considering MRI for further investigation, however per ID would be of little utility regarding overall treatment plan as pt remains a poor sx candidate. case discussed with legal guardian, pt is not to pursue sx as it is unlikely to make any significant improvement to his overall quality of life and morbidity/mortality.    Candidal Rash: nystatin rash. nystatin powder to the area start 3/11.    Chronic hypoxic Resp Failure: sp trach which was present on admit. vent dependent @ baseline. VAP precautions with HOB elevation. RVP neg. sp CT Chest concerning for RUL PNA, clinically not present but pt is noted to have inc in secretions and thickness, sputum cx with carbapenem resistant pseduomonas. fu ID. pulm consult appreciated, vent settings per them. cefepime started 3/5, continue until 3/11 per ID. thus far improving clinically    VAP: noted on CT as mentioned above. ID fu. abx as above    Elevated BP without hx of HTN: incidental finding over last few days. cannot assess for presence of symptoms however pt has been awake without signs of htn encephalopathy. has been persistently having elevated episodes. does not appear to be in pain or distressed which would be alternative cause of HTN. started lisinopril 10mg this admission, bp improved but still not optimal. will cont to watch and titrate futher as needed.     Status Epilepticus: sec to med non compliance while in NH who was unaware of pts diagnosis of seizure and did not administer meds. sp keppra while in NH after event and transferred to ER where he was given versed 4mg x2, phenobarb 1600mg IV, and was transferred to ICU for 24hr video EEG, read as mild diffuse encephalopathy with Lt hemisphere dysfunction and resolving partial seizures. CTH w/o signs acute infarct. neuro following, will cont phenytoin + keppra for dual AED therapy. was abkle to get phenobarb elixir, restarted. no seizure activity noted without it thus far. 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 pt with paraphimosis + hx of prostate ca. hx of resistant UTI in the past as well. UC <50k CFU, pseudomonas, no indication to tx @ this time but pt on abx anyway for PNA that would cover urine. urology following. no plans to remove jones catheter, pt with significant difficulty having spontaneous voids.     Pleural effusion: chronic, pleurx drain in place. may be used PRN. no need to drain thus far during this admission.    Chronic PEG: npo with tube feeds. nutrition consult appreciated, rec inc of jevity 1.5 to 60ml/hr with prostat 30ml daily.

## 2018-03-11 NOTE — PROGRESS NOTE ADULT - SUBJECTIVE AND OBJECTIVE BOX
CHIEF COMPLAINT: mrsa bactermia    Patient seen and examined at the bedside. No acute overnight events. Unable to assess ROS as pt non verbal, including the following: fever/chills, headache, lightheadedness, dizziness, chest pain, palpitations, shortness of breath, cough, abd pain, nausea/vomiting/diarrhea, muscle pain.      =========================================================================================  PHYSICAL EXAM.    GEN - appears age appropriate. well nourished.   HEENT - NCAT.   RESP - Ccourse breath sounds sec to supplemental O2 (mech vent via trach)   CARDIO - NS1S2, RRR. No murmurs/rubs/gallops.  ABD - Soft/Non tender/Non distended. Normal BS x4 quadrants. PEG. colostomy in place.    - jones in place, clear yellow urine in collection bag  Ext - No KEVIN.  MSK - could not assess  Neuro - awake. not following commands. occ moving fingers  Psych - could not assess  Skin - c/d/i. no rashes/lesions      VITAL SIGNS.    Vital Signs Last 24 Hrs  T(C): 37.8 (11 Mar 2018 10:00), Max: 37.8 (11 Mar 2018 10:00)  T(F): 100.1 (11 Mar 2018 10:00), Max: 100.1 (11 Mar 2018 10:00)  HR: 74 (11 Mar 2018 10:00) (68 - 85)  BP: 168/80 (11 Mar 2018 10:00) (157/76 - 183/91)  BP(mean): --  RR: 18 (11 Mar 2018 08:27) (18 - 23)  SpO2: 100% (11 Mar 2018 08:45) (98% - 100%)    =================================================    LABS.                          9.2    7.2   )-----------( 273      ( 11 Mar 2018 09:05 )             31.3     03-11    138  |  99  |  16.0  ----------------------------<  89  4.4   |  31.0<H>  |  0.39<L>    Ca    9.1      11 Mar 2018 09:05  Phos  2.8     03-11  Mg     2.1     03-11          I&O's Summary    10 Mar 2018 06:01  -  11 Mar 2018 07:00  --------------------------------------------------------  IN: 1120 mL / OUT: 500 mL / NET: 620 mL          ================================================    IMAGING.      ================================================    MEDICATIONS  (STANDING):  aspirin  chewable 81 milliGRAM(s) Oral daily  BACItracin   Ointment 1 Application(s) Topical every 8 hours  cefepime  IVPB 2000 milliGRAM(s) IV Intermittent every 8 hours  chlorhexidine 0.12% Liquid 15 milliLiter(s) Swish and Spit two times a day  collagenase Ointment 1 Application(s) Topical daily  digoxin     Tablet 0.25 milliGRAM(s) Oral daily  enoxaparin Injectable 40 milliGRAM(s) SubCutaneous daily  lactobacillus acidophilus 1 Tablet(s) Oral every 12 hours  levETIRAcetam  Solution 1000 milliGRAM(s) Oral every 12 hours  lisinopril 10 milliGRAM(s) Oral daily  pantoprazole   Suspension 40 milliGRAM(s) Oral daily  PHENobarbital Elixir 60 milliGRAM(s) Oral two times a day  phenytoin   Suspension 100 milliGRAM(s) Oral every 8 hours  vancomycin  IVPB 750 milliGRAM(s) IV Intermittent every 12 hours    MEDICATIONS  (PRN): CHIEF COMPLAINT: mrsa bactermia    Patient seen and examined at the bedside. No acute overnight events. Unable to assess ROS as pt non verbal, including the following: fever/chills, headache, lightheadedness, dizziness, chest pain, palpitations, shortness of breath, cough, abd pain, nausea/vomiting/diarrhea, muscle pain.      =========================================================================================  PHYSICAL EXAM.    GEN - appears age appropriate. well nourished.   HEENT - NCAT.   RESP - Ccourse breath sounds sec to supplemental O2 (mech vent via trach)   CARDIO - NS1S2, RRR. No murmurs/rubs/gallops.  ABD - Soft/Non tender/Non distended. Normal BS x4 quadrants. PEG. colostomy in place.    - jones in place, clear yellow urine in collection bag  Ext - No KEVIN.  MSK - could not assess  Neuro - awake. not following commands. occ moving fingers  Psych - could not assess  Skin - fungal rash noted in the groin region. also with small rectangular area of erythema on RUE near the wrist, appears partially desquamating, ? fungal as well      VITAL SIGNS.    Vital Signs Last 24 Hrs  T(C): 37.8 (11 Mar 2018 10:00), Max: 37.8 (11 Mar 2018 10:00)  T(F): 100.1 (11 Mar 2018 10:00), Max: 100.1 (11 Mar 2018 10:00)  HR: 74 (11 Mar 2018 10:00) (68 - 85)  BP: 168/80 (11 Mar 2018 10:00) (157/76 - 183/91)  BP(mean): --  RR: 18 (11 Mar 2018 08:27) (18 - 23)  SpO2: 100% (11 Mar 2018 08:45) (98% - 100%)    =================================================    LABS.                          9.2    7.2   )-----------( 273      ( 11 Mar 2018 09:05 )             31.3     03-11    138  |  99  |  16.0  ----------------------------<  89  4.4   |  31.0<H>  |  0.39<L>    Ca    9.1      11 Mar 2018 09:05  Phos  2.8     03-11  Mg     2.1     03-11          I&O's Summary    10 Mar 2018 06:01  -  11 Mar 2018 07:00  --------------------------------------------------------  IN: 1120 mL / OUT: 500 mL / NET: 620 mL          ================================================    IMAGING.      ================================================    MEDICATIONS  (STANDING):  aspirin  chewable 81 milliGRAM(s) Oral daily  BACItracin   Ointment 1 Application(s) Topical every 8 hours  cefepime  IVPB 2000 milliGRAM(s) IV Intermittent every 8 hours  chlorhexidine 0.12% Liquid 15 milliLiter(s) Swish and Spit two times a day  collagenase Ointment 1 Application(s) Topical daily  digoxin     Tablet 0.25 milliGRAM(s) Oral daily  enoxaparin Injectable 40 milliGRAM(s) SubCutaneous daily  lactobacillus acidophilus 1 Tablet(s) Oral every 12 hours  levETIRAcetam  Solution 1000 milliGRAM(s) Oral every 12 hours  lisinopril 10 milliGRAM(s) Oral daily  pantoprazole   Suspension 40 milliGRAM(s) Oral daily  PHENobarbital Elixir 60 milliGRAM(s) Oral two times a day  phenytoin   Suspension 100 milliGRAM(s) Oral every 8 hours  vancomycin  IVPB 750 milliGRAM(s) IV Intermittent every 12 hours    MEDICATIONS  (PRN):

## 2018-03-12 DIAGNOSIS — L89.154 PRESSURE ULCER OF SACRAL REGION, STAGE 4: ICD-10-CM

## 2018-03-12 LAB
APTT BLD: 30.5 SEC — SIGNIFICANT CHANGE UP (ref 27.5–37.4)
HCT VFR BLD CALC: 31.3 % — LOW (ref 42–52)
HGB BLD-MCNC: 9.3 G/DL — LOW (ref 14–18)
INR BLD: 1.18 RATIO — HIGH (ref 0.88–1.16)
PROTHROM AB SERPL-ACNC: 13 SEC — HIGH (ref 9.8–12.7)

## 2018-03-12 PROCEDURE — 71045 X-RAY EXAM CHEST 1 VIEW: CPT | Mod: 26

## 2018-03-12 PROCEDURE — 99233 SBSQ HOSP IP/OBS HIGH 50: CPT

## 2018-03-12 PROCEDURE — 93971 EXTREMITY STUDY: CPT | Mod: 26,LT

## 2018-03-12 RX ADMIN — Medication 1 TABLET(S): at 06:36

## 2018-03-12 RX ADMIN — NYSTATIN CREAM 1 APPLICATION(S): 100000 CREAM TOPICAL at 06:36

## 2018-03-12 RX ADMIN — NYSTATIN CREAM 1 APPLICATION(S): 100000 CREAM TOPICAL at 18:43

## 2018-03-12 RX ADMIN — PANTOPRAZOLE SODIUM 40 MILLIGRAM(S): 20 TABLET, DELAYED RELEASE ORAL at 12:41

## 2018-03-12 RX ADMIN — LEVETIRACETAM 1000 MILLIGRAM(S): 250 TABLET, FILM COATED ORAL at 18:46

## 2018-03-12 RX ADMIN — LEVETIRACETAM 1000 MILLIGRAM(S): 250 TABLET, FILM COATED ORAL at 06:36

## 2018-03-12 RX ADMIN — Medication 1 APPLICATION(S): at 22:16

## 2018-03-12 RX ADMIN — Medication 1 APPLICATION(S): at 12:44

## 2018-03-12 RX ADMIN — ENOXAPARIN SODIUM 40 MILLIGRAM(S): 100 INJECTION SUBCUTANEOUS at 12:41

## 2018-03-12 RX ADMIN — CHLORHEXIDINE GLUCONATE 15 MILLILITER(S): 213 SOLUTION TOPICAL at 18:47

## 2018-03-12 RX ADMIN — Medication 0.25 MILLIGRAM(S): at 06:36

## 2018-03-12 RX ADMIN — Medication 60 MILLIGRAM(S): at 18:47

## 2018-03-12 RX ADMIN — Medication 1 APPLICATION(S): at 00:00

## 2018-03-12 RX ADMIN — Medication 60 MILLIGRAM(S): at 06:36

## 2018-03-12 RX ADMIN — CHLORHEXIDINE GLUCONATE 15 MILLILITER(S): 213 SOLUTION TOPICAL at 06:36

## 2018-03-12 RX ADMIN — Medication 100 MILLIGRAM(S): at 12:45

## 2018-03-12 RX ADMIN — LISINOPRIL 10 MILLIGRAM(S): 2.5 TABLET ORAL at 06:37

## 2018-03-12 RX ADMIN — Medication 150 MILLIGRAM(S): at 22:16

## 2018-03-12 RX ADMIN — Medication 100 MILLIGRAM(S): at 00:00

## 2018-03-12 RX ADMIN — Medication 1 APPLICATION(S): at 06:36

## 2018-03-12 RX ADMIN — Medication 100 MILLIGRAM(S): at 06:36

## 2018-03-12 RX ADMIN — Medication 100 MILLIGRAM(S): at 22:16

## 2018-03-12 RX ADMIN — Medication 81 MILLIGRAM(S): at 12:41

## 2018-03-12 RX ADMIN — Medication 1 APPLICATION(S): at 12:45

## 2018-03-12 NOTE — PROCEDURE NOTE - NSPROCDETAILS_GEN_ALL_CORE
ultrasound guidance/ultrasound assessment/sterile technique, catheter placed/location identified, draped/prepped, sterile technique used/sterile dressing applied/supine position

## 2018-03-12 NOTE — PROCEDURE NOTE - PROCEDURE
<<-----Click on this checkbox to enter Procedure PICC central line placement  03/12/2018    Active  [id number]

## 2018-03-12 NOTE — PROGRESS NOTE ADULT - PROVIDER SPECIALTY LIST ADULT
Critical Care
Hospitalist
Infectious Disease
Neurology
Pulmonology
Pulmonology
Urology
Urology
Critical Care
Urology
Neurology
Hospitalist
Infectious Disease

## 2018-03-12 NOTE — CHART NOTE - NSCHARTNOTEFT_GEN_A_CORE
Attempted to contact family regarding PICC placement. Need to discuss placements risks benefits and obtain consent   called daughter deborah twice and emergency contact Van twice   no answer   if someone from primary team has a way to obtain consent for picc placement please contact surgery team to arrange placement

## 2018-03-12 NOTE — PROGRESS NOTE ADULT - PROBLEM SELECTOR PLAN 2
Sputum culture with pseudomonas  Resistant to multiple antibiotics  Will Continue Cefepime till 3/11/18
known hx. CT Chest with incidental finding of abn enhancement in that area concerning for osteomyelitis. elevated ESR raising suspicion as well, repeat stable. on vanco iv for bacteremia. cont abx. was considering MRI for further investigation, however per ID would be of little utility regarding overall treatment plan as pt remains a poor sx candidate. case discussed with legal guardian, pt is not to pursue sx as it is unlikely to make any significant improvement to his overall quality of life and morbidity/mortality.
keppra, phenytoin, phenobarb  neurology following
Sputum culture with pseudomonas  Resistant to multiple antibiotics  Will Continue Cefepime till 3/11/18
24 hr EEG monitoring  Continue to treat with Keppra, phenytoin, phenobarb  neurology following

## 2018-03-12 NOTE — PROGRESS NOTE ADULT - SUBJECTIVE AND OBJECTIVE BOX
PULMONARY PROGRESS NOTE      [id number]    Patient is a 86y old  Male who presents with a chief complaint of Seizures (01 Mar 2018 09:10)      INTERVAL HPI/OVERNIGHT EVENTS: on vent via trach; not communicative    MEDICATIONS  (STANDING):  aspirin  chewable 81 milliGRAM(s) Oral daily  BACItracin   Ointment 1 Application(s) Topical every 8 hours  chlorhexidine 0.12% Liquid 15 milliLiter(s) Swish and Spit two times a day  collagenase Ointment 1 Application(s) Topical daily  digoxin     Tablet 0.25 milliGRAM(s) Oral daily  enoxaparin Injectable 40 milliGRAM(s) SubCutaneous daily  lactobacillus acidophilus 1 Tablet(s) Oral every 12 hours  levETIRAcetam  Solution 1000 milliGRAM(s) Oral every 12 hours  lisinopril 10 milliGRAM(s) Oral daily  nystatin Powder 1 Application(s) Topical two times a day  pantoprazole   Suspension 40 milliGRAM(s) Oral daily  PHENobarbital Elixir 60 milliGRAM(s) Oral two times a day  phenytoin   Suspension 100 milliGRAM(s) Oral every 8 hours  vancomycin  IVPB 750 milliGRAM(s) IV Intermittent every 12 hours      MEDICATIONS  (PRN):      Allergies    clindamycin (Unknown)  Nuts (Hives; Rash)  PC Pen VK (Unknown)  strawberry (Short breath; Rash; Hives)  Xanax (Rash)    Intolerances    Ativan (Unknown)  Haldol (Dystonic RXN)  hydrALAZINE (Unknown)  Zyprexa (Unknown)      PAST MEDICAL & SURGICAL HISTORY:  Dysphagia  Fall: Multiple Mechanical falls  CAD (coronary artery disease)  Clostridium difficile diarrhea: in 2009  BPH (benign prostatic hypertrophy)  Dementia  HLD (hyperlipidemia)  CHF (congestive heart failure)  Prostate cancer: Treated w/ radiation  Stroke: (~5yrs ago)  Seizure: (last event &gt;1yr ago)  HTN (hypertension)  Colostomy in place  S/P percutaneous endoscopic gastrostomy (PEG) tube placement  H/O hemorrhoidectomy  Deviated septum  Abdominal hernia  Status post cardiac surgery: stents x 2             REVIEW OF SYSTEMS:    unable to get ROS    Vital Signs Last 24 Hrs  T(C): 37.4 (12 Mar 2018 08:12), Max: 37.4 (12 Mar 2018 08:12)  T(F): 99.4 (12 Mar 2018 08:12), Max: 99.4 (12 Mar 2018 08:12)  HR: 71 (12 Mar 2018 08:12) (67 - 81)  BP: 159/75 (12 Mar 2018 08:12) (123/64 - 159/75)  BP(mean): --  RR: 18 (12 Mar 2018 08:12) (18 - 22)  SpO2: 100% (12 Mar 2018 08:12) (97% - 100%)    PHYSICAL EXAMINATION:    GENERAL: The patient is  in no apparent distress.     HEENT: Head is normocephalic and atraumatic. Extraocular muscles are intact. Mucous membranes are moist.    NECK: trach    LUNGS: Clear to auscultation without wheezing, rales or rhonchi; respirations unlabored    HEART: Regular rate and rhythm      ABDOMEN: Soft, nontender, and nondistended.      EXTREMITIES: Without any cyanosis, clubbing, rash, lesions or edema.              RADIOLOGY & ADDITIONAL STUDIES:  < from: Xray Chest 1 View AP/PA. (03.04.18 @ 01:37) >   EXAM:  XR CHEST AP OR PA 1V                          PROCEDURE DATE:  03/04/2018          INTERPRETATION:  Chest radiograph (one view)     CPT 97407    CLINICAL INFORMATION:  Patient is unable to communicate.  Short of   breath.     TECHNIQUE:  Single frontal view of the chest was obtained.    FINDINGS:  Prior study dated 3/1/2018 was available for review.    The lungs demonstrate a left-sided thoracic catheter in place. No gross   consolidation is seen. There is limited visualization of the left lower   lung field. Tracheostomy tube is noted in place. The heart is difficult   to evaluate.  The patient's mandible obscures portions of the left apex.      IMPRESSION: Limited study. No gross consolidation is seen.                       RONY FRAGOSO M.D., ATTENDING RADIOLOGIST    < end of copied text >

## 2018-03-12 NOTE — PROGRESS NOTE ADULT - SUBJECTIVE AND OBJECTIVE BOX
CHIEF COMPLAINT: mrsa bactermia     is a very ill gentleman, and he is vent dependent and is awaiting a PICC line for sacral OM. I spoke with is hcp Van Landrum today who gave consent to place a PICC line and would like to consider a more palliative care approach for . However, he will need a letter from two physicians outlining his guarded prognosis. He can be discharged back to his facility [facility] with picc line.       Summary:   REVIEW OF SYSTEMS      General:	    Skin/Breast:  	  Ophthalmologic:  	  ENMT:	    Respiratory and Thorax:  	  Cardiovascular:	    Gastrointestinal:	    Genitourinary:	    Musculoskeletal:	    Neurological:	    Psychiatric:	    Hematology/Lymphatics:	    Endocrine:	    Allergic/Immunologic:	  T(C): 37.8 (11 Mar 2018 10:00), Max: 37.8 (11 Mar 2018 10:00)  T(F): 100.1 (11 Mar 2018 10:00), Max: 100.1 (11 Mar 2018 10:00)  HR: 74 (11 Mar 2018 10:00) (68 - 85)  BP: 168/80 (11 Mar 2018 10:00) (157/76 - 183/91)  BP(mean): --  RR: 18 (11 Mar 2018 08:27) (18 - 23)  SpO2: 100% (11 Mar 2018 08:45) (98% - 100%)  GEN - appears age appropriate. well nourished.   HEENT - NCAT.   RESP - Ccourse breath sounds sec to supplemental O2 (mech vent via trach)   CARDIO - NS1S2, RRR. No murmurs/rubs/gallops.  ABD - Soft/Non tender/Non distended. Normal BS x4 quadrants. PEG. colostomy in place.    - jones in place, clear yellow urine in collection bag  Ext - No KEVIN.  MSK - could not assess  Neuro - awake. not following commands. occ moving fingers  Psych - could not assess  Skin - fungal rash noted in the groin region. also with small rectangular area of erythema on RUE near the wrist, appears partially desquamating, ? fungal as well                        9.3    x     )-----------( x        ( 12 Mar 2018 09:02 )             31.3     03-11    138  |  99  |  16.0  ----------------------------<  89  4.4   |  31.0<H>  |  0.39<L>    Ca    9.1      11 Mar 2018 09:05  Phos  2.8     03-11  Mg     2.1     03-11    PT/INR - ( 12 Mar 2018 09:02 )   PT: 13.0 sec;   INR: 1.18 ratio       PTT - ( 12 Mar 2018 09:02 )  PTT:30.5 sec    Radiology:     MEDICATIONS  (STANDING):  aspirin  chewable 81 milliGRAM(s) Oral daily  BACItracin   Ointment 1 Application(s) Topical every 8 hours  chlorhexidine 0.12% Liquid 15 milliLiter(s) Swish and Spit two times a day  collagenase Ointment 1 Application(s) Topical daily  digoxin     Tablet 0.25 milliGRAM(s) Oral daily  enoxaparin Injectable 40 milliGRAM(s) SubCutaneous daily  lactobacillus acidophilus 1 Tablet(s) Oral every 12 hours  levETIRAcetam  Solution 1000 milliGRAM(s) Oral every 12 hours  lisinopril 10 milliGRAM(s) Oral daily  nystatin Powder 1 Application(s) Topical two times a day  pantoprazole   Suspension 40 milliGRAM(s) Oral daily  PHENobarbital Elixir 60 milliGRAM(s) Oral two times a day  phenytoin   Suspension 100 milliGRAM(s) Oral every 8 hours  vancomycin  IVPB 750 milliGRAM(s) IV Intermittent every 12 hours    MEDICATIONS  (PRN):

## 2018-03-12 NOTE — PROGRESS NOTE ADULT - ASSESSMENT
Chronic vent failure  Atelectasis  Dementia  Seizures  CHF  MRSA bacteremia    Plan:    Abx per ID  Intermittent pleurex drainage  Seizures improving, therapy per neurology  Vent support  Optimize cardiac function  On Lovenox for DVT prophylaxis  PPI for GI prophylaxis  Nutritional  support  Prognosis dismal  Palliative/ hospice

## 2018-03-12 NOTE — PROGRESS NOTE ADULT - ASSESSMENT
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    Presentation complicated by MRSA bacteremia + development of VAP sec to pseudomonas.     Candidal Rash: nystatin rash. nystatin powder to the area start 3/11.    Chronic hypoxic Resp Failure: sp trach which was present on admit. vent dependent @ baseline. VAP precautions with HOB elevation. RVP neg. sp CT Chest concerning for RUL PNA, clinically not present but pt is noted to have inc in secretions and thickness, sputum cx with carbapenem resistant pseduomonas. fu ID. pulm consult appreciated, vent settings per them. cefepime started 3/5, continue until 3/11 per ID. thus far improving clinically    VAP: noted on CT as mentioned above. ID fu. abx as above    Elevated BP without hx of HTN: incidental finding over last few days. cannot assess for presence of symptoms however pt has been awake without signs of htn encephalopathy. has been persistently having elevated episodes. does not appear to be in pain or distressed which would be alternative cause of HTN. started lisinopril 10mg this admission, bp improved but still not optimal. will cont to watch and titrate futher as needed.     Status Epilepticus: sec to med non compliance while in NH who was unaware of pts diagnosis of seizure and did not administer meds. sp keppra while in NH after event and transferred to ER where he was given versed 4mg x2, phenobarb 1600mg IV, and was transferred to ICU for 24hr video EEG, read as mild diffuse encephalopathy with Lt hemisphere dysfunction and resolving partial seizures. CTH w/o signs acute infarct. neuro following, will cont phenytoin + keppra for dual AED therapy. was abkle to get phenobarb elixir, restarted. no seizure activity noted without it thus far. 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 pt with paraphimosis + hx of prostate ca. hx of resistant UTI in the past as well. UC <50k CFU, pseudomonas, no indication to tx @ this time but pt on abx anyway for PNA that would cover urine. urology following. no plans to remove jones catheter, pt with significant difficulty having spontaneous voids.     Pleural effusion: chronic, pleurx drain in place. may be used PRN. no need to drain thus far during this admission.    Chronic PEG: npo with tube feeds. nutrition consult appreciated, rec inc of jevity 1.5 to 60ml/hr with prostat 30ml daily.

## 2018-03-12 NOTE — PROGRESS NOTE ADULT - PROBLEM SELECTOR PLAN 1
-MRSA bacteremia: bld cx 1/2 bottles positive on admission. suspect sacral ulcer as most likely source, hx of osteomyelitis in that area in the past. ID consult appreciated. on vanco. bld cx from 3/3, 3/5, 3/6 ALL neg x at least 48hrs. Echo done @ bedside, per tech poor quality, no comments on vegetations made on final report. typically KATIA is routine part of work up and management of GP bacteremia with concerns for vegetations however even if vegetations are present pt would be extremely poor candidate for valvular replacement. cardio consult appreciated, i agree wholeheartedly with their sentiment that pt risk of procedure greatly outweighs the possible benefits from undergoing the KATIA. at this time will not pursue. discussed with legal guardian, planning conservative medical care for now, he agrees with deferring KATIA. will continue med management, given absence of fevers and seemingly improving overall clinical condition based on vs and labs, it is highly unlikely anyway that pt has endocarditis as consequence of bacteremia. will need 6wks of iv abx, per ID last day 4/14, may place picc today

## 2018-03-13 ENCOUNTER — TRANSCRIPTION ENCOUNTER (OUTPATIENT)
Age: 83
End: 2018-03-13

## 2018-03-13 VITALS — WEIGHT: 185.85 LBS

## 2018-03-13 LAB
ANION GAP SERPL CALC-SCNC: 9 MMOL/L — SIGNIFICANT CHANGE UP (ref 5–17)
BUN SERPL-MCNC: 23 MG/DL — HIGH (ref 8–20)
CALCIUM SERPL-MCNC: 8.9 MG/DL — SIGNIFICANT CHANGE UP (ref 8.6–10.2)
CHLORIDE SERPL-SCNC: 101 MMOL/L — SIGNIFICANT CHANGE UP (ref 98–107)
CO2 SERPL-SCNC: 31 MMOL/L — HIGH (ref 22–29)
CREAT SERPL-MCNC: 0.4 MG/DL — LOW (ref 0.5–1.3)
GLUCOSE SERPL-MCNC: 111 MG/DL — SIGNIFICANT CHANGE UP (ref 70–115)
HCT VFR BLD CALC: 28.5 % — LOW (ref 42–52)
HGB BLD-MCNC: 8.4 G/DL — LOW (ref 14–18)
MAGNESIUM SERPL-MCNC: 2.2 MG/DL — SIGNIFICANT CHANGE UP (ref 1.6–2.6)
MCHC RBC-ENTMCNC: 25.4 PG — LOW (ref 27–31)
MCHC RBC-ENTMCNC: 29.5 G/DL — LOW (ref 32–36)
MCV RBC AUTO: 86.1 FL — SIGNIFICANT CHANGE UP (ref 80–94)
PHOSPHATE SERPL-MCNC: 2.7 MG/DL — SIGNIFICANT CHANGE UP (ref 2.4–4.7)
PLATELET # BLD AUTO: 253 K/UL — SIGNIFICANT CHANGE UP (ref 150–400)
POTASSIUM SERPL-MCNC: 4.7 MMOL/L — SIGNIFICANT CHANGE UP (ref 3.5–5.3)
POTASSIUM SERPL-SCNC: 4.7 MMOL/L — SIGNIFICANT CHANGE UP (ref 3.5–5.3)
RBC # BLD: 3.31 M/UL — LOW (ref 4.6–6.2)
RBC # FLD: 19.4 % — HIGH (ref 11–15.6)
SODIUM SERPL-SCNC: 141 MMOL/L — SIGNIFICANT CHANGE UP (ref 135–145)
WBC # BLD: 6.2 K/UL — SIGNIFICANT CHANGE UP (ref 4.8–10.8)
WBC # FLD AUTO: 6.2 K/UL — SIGNIFICANT CHANGE UP (ref 4.8–10.8)

## 2018-03-13 PROCEDURE — 93971 EXTREMITY STUDY: CPT

## 2018-03-13 PROCEDURE — 87086 URINE CULTURE/COLONY COUNT: CPT

## 2018-03-13 PROCEDURE — 87040 BLOOD CULTURE FOR BACTERIA: CPT

## 2018-03-13 PROCEDURE — 96374 THER/PROPH/DIAG INJ IV PUSH: CPT

## 2018-03-13 PROCEDURE — 80186 ASSAY OF PHENYTOIN FREE: CPT

## 2018-03-13 PROCEDURE — 85730 THROMBOPLASTIN TIME PARTIAL: CPT

## 2018-03-13 PROCEDURE — 71045 X-RAY EXAM CHEST 1 VIEW: CPT

## 2018-03-13 PROCEDURE — 80048 BASIC METABOLIC PNL TOTAL CA: CPT

## 2018-03-13 PROCEDURE — 74176 CT ABD & PELVIS W/O CONTRAST: CPT

## 2018-03-13 PROCEDURE — 93005 ELECTROCARDIOGRAM TRACING: CPT

## 2018-03-13 PROCEDURE — 80185 ASSAY OF PHENYTOIN TOTAL: CPT

## 2018-03-13 PROCEDURE — 94002 VENT MGMT INPAT INIT DAY: CPT

## 2018-03-13 PROCEDURE — 87633 RESP VIRUS 12-25 TARGETS: CPT

## 2018-03-13 PROCEDURE — 93306 TTE W/DOPPLER COMPLETE: CPT

## 2018-03-13 PROCEDURE — 83690 ASSAY OF LIPASE: CPT

## 2018-03-13 PROCEDURE — 71250 CT THORAX DX C-: CPT

## 2018-03-13 PROCEDURE — 70450 CT HEAD/BRAIN W/O DYE: CPT

## 2018-03-13 PROCEDURE — 87798 DETECT AGENT NOS DNA AMP: CPT

## 2018-03-13 PROCEDURE — 84145 PROCALCITONIN (PCT): CPT

## 2018-03-13 PROCEDURE — 82962 GLUCOSE BLOOD TEST: CPT

## 2018-03-13 PROCEDURE — 84484 ASSAY OF TROPONIN QUANT: CPT

## 2018-03-13 PROCEDURE — 85018 HEMOGLOBIN: CPT

## 2018-03-13 PROCEDURE — 85610 PROTHROMBIN TIME: CPT

## 2018-03-13 PROCEDURE — 99291 CRITICAL CARE FIRST HOUR: CPT | Mod: 25

## 2018-03-13 PROCEDURE — 85027 COMPLETE CBC AUTOMATED: CPT

## 2018-03-13 PROCEDURE — 95951: CPT

## 2018-03-13 PROCEDURE — 87150 DNA/RNA AMPLIFIED PROBE: CPT

## 2018-03-13 PROCEDURE — 83605 ASSAY OF LACTIC ACID: CPT

## 2018-03-13 PROCEDURE — 83735 ASSAY OF MAGNESIUM: CPT

## 2018-03-13 PROCEDURE — 36415 COLL VENOUS BLD VENIPUNCTURE: CPT

## 2018-03-13 PROCEDURE — 85652 RBC SED RATE AUTOMATED: CPT

## 2018-03-13 PROCEDURE — 81001 URINALYSIS AUTO W/SCOPE: CPT

## 2018-03-13 PROCEDURE — 94799 UNLISTED PULMONARY SVC/PX: CPT

## 2018-03-13 PROCEDURE — 96375 TX/PRO/DX INJ NEW DRUG ADDON: CPT

## 2018-03-13 PROCEDURE — 94760 N-INVAS EAR/PLS OXIMETRY 1: CPT

## 2018-03-13 PROCEDURE — 87486 CHLMYD PNEUM DNA AMP PROBE: CPT

## 2018-03-13 PROCEDURE — 80202 ASSAY OF VANCOMYCIN: CPT

## 2018-03-13 PROCEDURE — 80184 ASSAY OF PHENOBARBITAL: CPT

## 2018-03-13 PROCEDURE — 87070 CULTURE OTHR SPECIMN AEROBIC: CPT

## 2018-03-13 PROCEDURE — 87581 M.PNEUMON DNA AMP PROBE: CPT

## 2018-03-13 PROCEDURE — 80053 COMPREHEN METABOLIC PANEL: CPT

## 2018-03-13 PROCEDURE — 99233 SBSQ HOSP IP/OBS HIGH 50: CPT

## 2018-03-13 PROCEDURE — 94003 VENT MGMT INPAT SUBQ DAY: CPT

## 2018-03-13 PROCEDURE — 99238 HOSP IP/OBS DSCHRG MGMT 30/<: CPT

## 2018-03-13 PROCEDURE — 87186 SC STD MICRODIL/AGAR DIL: CPT

## 2018-03-13 PROCEDURE — 84100 ASSAY OF PHOSPHORUS: CPT

## 2018-03-13 RX ORDER — ENOXAPARIN SODIUM 100 MG/ML
0 INJECTION SUBCUTANEOUS
Qty: 0 | Refills: 0 | COMMUNITY

## 2018-03-13 RX ORDER — NYSTATIN CREAM 100000 [USP'U]/G
1 CREAM TOPICAL
Qty: 0 | Refills: 0 | COMMUNITY
Start: 2018-03-13

## 2018-03-13 RX ORDER — FUROSEMIDE 40 MG
1 TABLET ORAL
Qty: 0 | Refills: 0 | COMMUNITY

## 2018-03-13 RX ORDER — METOPROLOL TARTRATE 50 MG
1 TABLET ORAL
Qty: 30 | Refills: 0 | OUTPATIENT
Start: 2018-03-13 | End: 2018-04-11

## 2018-03-13 RX ORDER — PHENOBARBITAL 60 MG
1 TABLET ORAL
Qty: 0 | Refills: 0 | COMMUNITY
Start: 2018-03-13

## 2018-03-13 RX ORDER — VANCOMYCIN HCL 1 G
750 VIAL (EA) INTRAVENOUS
Qty: 0 | Refills: 0 | COMMUNITY
Start: 2018-03-13 | End: 2018-04-14

## 2018-03-13 RX ORDER — METOPROLOL TARTRATE 50 MG
1 TABLET ORAL
Qty: 0 | Refills: 0 | COMMUNITY

## 2018-03-13 RX ORDER — LISINOPRIL 2.5 MG/1
1 TABLET ORAL
Qty: 0 | Refills: 0 | COMMUNITY
Start: 2018-03-13

## 2018-03-13 RX ORDER — PANTOPRAZOLE SODIUM 20 MG/1
40 TABLET, DELAYED RELEASE ORAL
Qty: 0 | Refills: 0 | COMMUNITY
Start: 2018-03-13

## 2018-03-13 RX ORDER — LEVETIRACETAM 250 MG/1
10 TABLET, FILM COATED ORAL
Qty: 0 | Refills: 0 | COMMUNITY
Start: 2018-03-13

## 2018-03-13 RX ORDER — BACITRACIN ZINC 500 UNIT/G
1 OINTMENT IN PACKET (EA) TOPICAL
Qty: 0 | Refills: 0 | COMMUNITY
Start: 2018-03-13

## 2018-03-13 RX ORDER — PHENOBARBITAL 60 MG
15 TABLET ORAL
Qty: 0 | Refills: 0 | COMMUNITY
Start: 2018-03-13

## 2018-03-13 RX ADMIN — NYSTATIN CREAM 1 APPLICATION(S): 100000 CREAM TOPICAL at 05:08

## 2018-03-13 RX ADMIN — ENOXAPARIN SODIUM 40 MILLIGRAM(S): 100 INJECTION SUBCUTANEOUS at 11:40

## 2018-03-13 RX ADMIN — Medication 100 MILLIGRAM(S): at 05:08

## 2018-03-13 RX ADMIN — LEVETIRACETAM 1000 MILLIGRAM(S): 250 TABLET, FILM COATED ORAL at 05:08

## 2018-03-13 RX ADMIN — LISINOPRIL 10 MILLIGRAM(S): 2.5 TABLET ORAL at 05:08

## 2018-03-13 RX ADMIN — PANTOPRAZOLE SODIUM 40 MILLIGRAM(S): 20 TABLET, DELAYED RELEASE ORAL at 11:41

## 2018-03-13 RX ADMIN — Medication 150 MILLIGRAM(S): at 11:42

## 2018-03-13 RX ADMIN — Medication 0.25 MILLIGRAM(S): at 05:08

## 2018-03-13 RX ADMIN — Medication 1 APPLICATION(S): at 11:40

## 2018-03-13 RX ADMIN — CHLORHEXIDINE GLUCONATE 15 MILLILITER(S): 213 SOLUTION TOPICAL at 05:08

## 2018-03-13 RX ADMIN — Medication 1 APPLICATION(S): at 05:08

## 2018-03-13 RX ADMIN — Medication 60 MILLIGRAM(S): at 05:07

## 2018-03-13 NOTE — DISCHARGE NOTE ADULT - PATIENT PORTAL LINK FT
You can access the [facility] Patient Portal, offered by [facility], by registering with the following website: [email or web address]

## 2018-03-13 NOTE — DISCHARGE NOTE ADULT - MEDICATION SUMMARY - MEDICATIONS TO TAKE
I will START or STAY ON the medications listed below when I get home from the hospital:    budesonide 0.5 mg/2 mL inhalation suspension  -- 2 milliliter(s) inhaled 2 times a day  -- Indication: For Acute on chronic respiratory failure with hypoxia    aspirin 81 mg oral tablet, chewable  -- 1 tab(s) by gastrostomy tube once a day  -- Indication: For cardiac prophylaxis    fentaNYL 100 mcg/hr transdermal film, extended release  -- 1 patch by transdermal patch every 72 hours  -- Indication: For Pain    acetaminophen 650 mg oral tablet  -- 650 milligram(s) by mouth every 6 hours, As Needed  -- Indication: For Pain    lisinopril 10 mg oral tablet  -- 1 tab(s) by mouth once a day  -- Indication: For Htn    Milk of Magnesia  -- 30 milliliter(s) by mouth prn, As Needed  -- Indication: For constipation    phenytoin 25 mg/mL oral suspension  -- 4 milliliter(s) by mouth every 8 hours  -- Indication: For Seizures    digoxin 250 mcg (0.25 mg) oral tablet  -- 1 tab(s) by mouth once a day  -- Indication: For Heart failure    Lovenox 40 mg/0.4 mL injectable solution  -- Indication: For dvt prophylaxis    enoxaparin 40 mg/0.4 mL injectable solution  -- 40 milligram(s) injectable once a day  -- Indication: For dvt prohylaxis    levETIRAcetam 100 mg/mL oral solution  -- 10 milliliter(s) by mouth every 12 hours  -- Indication: For Status epilepticus    PHENobarbital 20 mg/5 mL oral elixir  -- 15 milliliter(s) by mouth 2 times a day  -- Indication: For Status epilepticus    Peridex 0.12% mucous membrane liquid  -- 15 milliliter(s) mucous membrane 2 times a day  -- Indication: For Sacral decubitus ulcer, stage IV    DuoNeb 0.5 mg-2.5 mg/3 mL inhalation solution  -- 3 milliliter(s) inhaled every 4 hours, As Needed  -- Indication: For Acute on chronic respiratory failure with hypoxia    DuoNeb 0.5 mg-2.5 mg/3 mL inhalation solution  -- 1 application inhaled every 6 hours  -- Indication: For Acute on chronic respiratory failure with hypoxia    Hydrocortisone 1% In Absorbase topical ointment  -- Apply on skin to affected area 2 times a day  -- Indication: For Skin care    Santyl 250 units/g topical ointment  -- Apply on skin to affected area once a day  -- Indication: For Skin care    bacitracin 500 units/g topical ointment  -- 1 application on skin every 8 hours  -- Indication: For Skin care    nystatin 100,000 units/g topical powder  -- 1 application on skin 2 times a day  -- Indication: For Skin care    Lasix 20 mg oral tablet  -- 1 tab(s) by mouth once a day  -- Indication: For Heart failure    vancomycin 750 mg intravenous injection  -- 750 milligram(s) intravenous every 12 hours  -- Indication: For Endocarditis    famotidine 10 mg oral tablet  -- 1 tab(s) by mouth twice  -- Indication: For gi prophylaxis    ferrous sulfate 300 mg/5 mL (60 mg elemental iron) oral liquid  -- 5 milliliter(s) by gastrostomy tube every 8 hours  -- Indication: For iron supplementation    Fleet Enema 7 g-19 g rectal enema  -- 1 application rectally prn, As Needed  -- Indication: For constipation    bisacodyl 10 mg rectal suppository  -- 1 suppository(ies) rectally prn, As Needed  -- Indication: For Encephalopathy, unspecified    potassium chloride 20 mEq oral powder for reconstitution  -- 1 each by mouth once a day  -- Indication: For Supplementation    Saline Nasal Mist  -- 1 dose(s) in each nostril once a day  -- Indication: For Supplementation    pantoprazole 40 mg oral granule, delayed release  -- 40 milligram(s) by mouth once a day  -- Indication: For gi prophylaxis I will START or STAY ON the medications listed below when I get home from the hospital:    budesonide 0.5 mg/2 mL inhalation suspension  -- 2 milliliter(s) inhaled 2 times a day  -- Indication: For Acute on chronic respiratory failure with hypoxia    aspirin 81 mg oral tablet, chewable  -- 1 tab(s) by gastrostomy tube once a day  -- Indication: For cardiac prophylaxis    fentaNYL 100 mcg/hr transdermal film, extended release  -- 1 patch by transdermal patch every 72 hours  -- Indication: For Pain    acetaminophen 650 mg oral tablet  -- 650 milligram(s) by mouth every 6 hours, As Needed  -- Indication: For Pain    lisinopril 10 mg oral tablet  -- 1 tab(s) by mouth once a day  -- Indication: For Htn    Milk of Magnesia  -- 30 milliliter(s) by mouth prn, As Needed  -- Indication: For constipation    digoxin 250 mcg (0.25 mg) oral tablet  -- 1 tab(s) by mouth once a day  -- Indication: For Heart failure    phenytoin 25 mg/mL oral suspension  -- 4 milliliter(s) by mouth every 8 hours  -- Indication: For Seizures    enoxaparin 40 mg/0.4 mL injectable solution  -- 40 milligram(s) injectable once a day  -- Indication: For dvt prohylaxis    levETIRAcetam 100 mg/mL oral solution  -- 10 milliliter(s) by mouth every 12 hours  -- Indication: For Status epilepticus    PHENobarbital 20 mg/5 mL oral elixir  -- 15 milliliter(s) by mouth 2 times a day  -- Indication: For Status epilepticus    Peridex 0.12% mucous membrane liquid  -- 15 milliliter(s) mucous membrane 2 times a day  -- Indication: For Sacral decubitus ulcer, stage IV    DuoNeb 0.5 mg-2.5 mg/3 mL inhalation solution  -- 3 milliliter(s) inhaled every 4 hours, As Needed  -- Indication: For Acute on chronic respiratory failure with hypoxia    DuoNeb 0.5 mg-2.5 mg/3 mL inhalation solution  -- 1 application inhaled every 6 hours  -- Indication: For Acute on chronic respiratory failure with hypoxia    Hydrocortisone 1% In Absorbase topical ointment  -- Apply on skin to affected area 2 times a day  -- Indication: For Skin care    Santyl 250 units/g topical ointment  -- Apply on skin to affected area once a day  -- Indication: For Skin care    bacitracin 500 units/g topical ointment  -- 1 application on skin every 8 hours  -- Indication: For Skin care    nystatin 100,000 units/g topical powder  -- 1 application on skin 2 times a day  -- Indication: For Skin care    vancomycin 750 mg intravenous injection  -- 750 milligram(s) intravenous every 12 hours  -- Indication: For Endocarditis    famotidine 10 mg oral tablet  -- 1 tab(s) by mouth twice  -- Indication: For gi prophylaxis    ferrous sulfate 300 mg/5 mL (60 mg elemental iron) oral liquid  -- 5 milliliter(s) by gastrostomy tube every 8 hours  -- Indication: For iron supplementation    Fleet Enema 7 g-19 g rectal enema  -- 1 application rectally prn, As Needed  -- Indication: For constipation    bisacodyl 10 mg rectal suppository  -- 1 suppository(ies) rectally prn, As Needed  -- Indication: For Encephalopathy, unspecified    potassium chloride 20 mEq oral powder for reconstitution  -- 1 each by mouth once a day  -- Indication: For Supplementation    Saline Nasal Mist  -- 1 dose(s) in each nostril once a day  -- Indication: For Supplementation    pantoprazole 40 mg oral granule, delayed release  -- 40 milligram(s) by mouth once a day  -- Indication: For gi prophylaxis

## 2018-03-13 NOTE — DISCHARGE NOTE ADULT - MEDICATION SUMMARY - MEDICATIONS TO STOP TAKING
I will STOP taking the medications listed below when I get home from the hospital:    Lasix 40 mg oral tablet  -- 1 tab(s) by mouth once a day I will STOP taking the medications listed below when I get home from the hospital:    Lasix 40 mg oral tablet  -- 1 tab(s) by mouth once a day    Lasix 20 mg oral tablet  -- 1 tab(s) by mouth once a day    metoprolol tartrate 50 mg oral tablet  -- 1 tab(s) by mouth 2 times a day

## 2018-03-13 NOTE — DISCHARGE NOTE ADULT - CARE PROVIDER_API CALL
Edgardo Mac), Infectious Disease; Internal Medicine  [address] [place] [address]  Phone: [phone]  Fax: [phone]

## 2018-03-13 NOTE — DISCHARGE NOTE ADULT - MEDICATION SUMMARY - MEDICATIONS TO CHANGE
I will SWITCH the dose or number of times a day I take the medications listed below when I get home from the hospital:  None I will SWITCH the dose or number of times a day I take the medications listed below when I get home from the hospital:    Keppra 500 mg oral tablet  -- 1 tab(s) by mouth once a day

## 2018-03-13 NOTE — DISCHARGE NOTE ADULT - CARE PLAN
Principal Discharge DX:	Status epilepticus  Goal:	Please take all meds and f/u with PMD  Assessment and plan of treatment:	please take all meds and f/u with PMD  Secondary Diagnosis:	Sacral decubitus ulcer, stage IV

## 2018-03-13 NOTE — DISCHARGE NOTE ADULT - HOSPITAL COURSE
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 His hospital course was complicated by MRSA bacteremia + development of VAP sec to pseudomonas.     MRSA bacteremia: bld cx 1/2 bottles positive on admission. suspect sacral ulcer as most likely source, hx of osteomyelitis in that area in the past. ID consult appreciated. on vanco. bld cx from 3/3, 3/5, 3/6 ALL neg x at least 48hrs. Echo done @ bedside, per tech poor quality, no comments on vegetations made on final report. typically KATIA is routine part of work up and management of GP bacteremia with concerns for vegetations however even if vegetations are present pt would be extremely poor candidate for valvular replacement. cardio consult appreciated, i agree wholeheartedly with their sentiment that pt risk of procedure greatly outweighs the possible benefits from undergoing the KATIA. at this time will not pursue. discussed with legal guardian, planning conservative medical care for now, he agrees with deferring KATIA. will continue med management, given absence of fevers and seemingly improving overall clinical condition based on vs and labs, it is highly unlikely anyway that pt has endocarditis as consequence of bacteremia. will need 6wks of iv abx, per ID last day 4/14, may place picc on 3/12 and dc back to rehab afterwards    Sacral Decub Ulcer: known hx. CT Chest with incidental finding of abn enhancement in that area concerning for osteomyelitis. elevated ESR raising suspicion as well, repeat stable. on vanco iv for bacteremia. cont abx. was considering MRI for further investigation, however per ID would be of little utility regarding overall treatment plan as pt remains a poor sx candidate. case discussed with legal guardian, pt is not to pursue sx as it is unlikely to make any significant improvement to his overall quality of life and morbidity/mortality.    Candidal Rash: nystatin rash. nystatin powder to the area start 3/11.    Chronic hypoxic Resp Failure: sp trach which was present on admit. vent dependent @ baseline. VAP precautions with HOB elevation. RVP neg. sp CT Chest concerning for RUL PNA, clinically not present but pt is noted to have inc in secretions and thickness, sputum cx with carbapenem resistant pseduomonas. fu ID. pulm consult appreciated, vent settings per them. cefepime started 3/5, continue until 3/11 per ID. thus far improving clinically    VAP: noted on CT as mentioned above. ID fu. abx as above    Elevated BP without hx of HTN: incidental finding over last few days. cannot assess for presence of symptoms however pt has been awake without signs of htn encephalopathy. has been persistently having elevated episodes. does not appear to be in pain or distressed which would be alternative cause of HTN. started lisinopril 10mg this admission, bp improved but still not optimal. will cont to watch and titrate futher as needed.     Status Epilepticus: sec to med non compliance while in NH who was unaware of pts diagnosis of seizure and did not administer meds. sp keppra while in NH after event and transferred to ER where he was given versed 4mg x2, phenobarb 1600mg IV, and was transferred to ICU for 24hr video EEG, read as mild diffuse encephalopathy with Lt hemisphere dysfunction and resolving partial seizures. CTH w/o signs acute infarct. neuro following, will cont phenytoin + keppra for dual AED therapy. was abkle to get phenobarb elixir, restarted. no seizure activity noted without it thus far. 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 pt with paraphimosis + hx of prostate ca. hx of resistant UTI in the past as well. UC <50k CFU, pseudomonas, no indication to tx @ this time but pt on abx anyway for PNA that would cover urine. urology following. no plans to remove jones catheter, pt with significant difficulty having spontaneous voids.     Pleural effusion: chronic, pleurx drain in place. may be used PRN. no need to drain thus far during this admission.    Chronic PEG: npo with tube feeds. nutrition consult appreciated, rec inc of jevity 1.5 to 60ml/hr with prostat 30ml daily.     His overall clinical prognosis is guarded and I discussed with his health care proxy Van Landrum. He is at risk for further infections. He has received a picc line and needs vancomycin until 04/14 as per ID. He can be discharged back to his SNF today.  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 His hospital course was complicated by MRSA bacteremia + development of VAP sec to pseudomonas. He had MRSA bacteremia and cardiology was consulted for evaluation of endocarditis. However, given his poor prognosis cardiology recommended medical management as a KATIA demonstrating valvular vegetations would not result in him getting valvular surgery, as he's a very poor candidate for surgery. He was treated with IV vancomycin and cefepime, received a picc line and will now finish vancomycin at his SNF until 04/14/18. I would recommend hydrating with 250cc free water boluses at least 4 times a day.     His overall clinical prognosis is guarded and I discussed with his health care proxy Van Landrum. He is at risk for further infections. He has received a picc line and needs vancomycin until 04/14 as per ID. He can be discharged back to his SNF today. His HCP is pleased with his care here.

## 2018-04-10 ENCOUNTER — APPOINTMENT (OUTPATIENT)
Dept: INTERNAL MEDICINE | Facility: CLINIC | Age: 83
End: 2018-04-10
Payer: MEDICARE

## 2018-04-10 PROCEDURE — 99214 OFFICE O/P EST MOD 30 MIN: CPT

## 2018-04-28 ENCOUNTER — INPATIENT (INPATIENT)
Facility: HOSPITAL | Age: 83
LOS: 5 days | Discharge: NURSING, REHAB OR LONG-TERM CARE | DRG: 291 | End: 2018-05-04
Attending: HOSPITALIST | Admitting: HOSPITALIST
Payer: MEDICARE

## 2018-04-28 VITALS — OXYGEN SATURATION: 100 %

## 2018-04-28 DIAGNOSIS — J34.2 DEVIATED NASAL SEPTUM: Chronic | ICD-10-CM

## 2018-04-28 DIAGNOSIS — Z93.3 COLOSTOMY STATUS: Chronic | ICD-10-CM

## 2018-04-28 DIAGNOSIS — K46.9 UNSPECIFIED ABDOMINAL HERNIA WITHOUT OBSTRUCTION OR GANGRENE: Chronic | ICD-10-CM

## 2018-04-28 DIAGNOSIS — Z98.89 OTHER SPECIFIED POSTPROCEDURAL STATES: Chronic | ICD-10-CM

## 2018-04-28 DIAGNOSIS — Z93.1 GASTROSTOMY STATUS: Chronic | ICD-10-CM

## 2018-04-28 LAB
APPEARANCE UR: CLEAR — SIGNIFICANT CHANGE UP
BACTERIA # UR AUTO: ABNORMAL
BILIRUB UR-MCNC: NEGATIVE — SIGNIFICANT CHANGE UP
COLOR SPEC: SIGNIFICANT CHANGE UP
DIFF PNL FLD: ABNORMAL
EPI CELLS # UR: SIGNIFICANT CHANGE UP
GLUCOSE UR QL: NEGATIVE MG/DL — SIGNIFICANT CHANGE UP
KETONES UR-MCNC: NEGATIVE — SIGNIFICANT CHANGE UP
LEUKOCYTE ESTERASE UR-ACNC: ABNORMAL
NITRITE UR-MCNC: NEGATIVE — SIGNIFICANT CHANGE UP
PH UR: 6.5 — SIGNIFICANT CHANGE UP (ref 5–8)
PROT UR-MCNC: 15 MG/DL
RBC CASTS # UR COMP ASSIST: ABNORMAL /HPF (ref 0–4)
SP GR SPEC: 1 — LOW (ref 1.01–1.02)
UROBILINOGEN FLD QL: NEGATIVE MG/DL — SIGNIFICANT CHANGE UP
WBC UR QL: SIGNIFICANT CHANGE UP

## 2018-04-28 PROCEDURE — 93010 ELECTROCARDIOGRAM REPORT: CPT

## 2018-04-28 PROCEDURE — 99285 EMERGENCY DEPT VISIT HI MDM: CPT

## 2018-04-28 PROCEDURE — 71045 X-RAY EXAM CHEST 1 VIEW: CPT | Mod: 26

## 2018-04-28 NOTE — ED ADULT TRIAGE NOTE - CHIEF COMPLAINT QUOTE
Pt comes from [facility] for "high blood pressure all day", pt nonverbal at baseline, pt on vent, excessive drool coming from mouth, MD Silva called to bedside

## 2018-04-28 NOTE — ED ADULT NURSE NOTE - BED ASSIGNMENT RECEIVED
Pt informed me she cannot wear her post op/cast shoe at work, which is needed for fracture treatment.  This is in the clinic note.  If this is not allowed, she is to stay off work.    No other change in treatment made.   11:40

## 2018-04-28 NOTE — ED PROVIDER NOTE - MUSCULOSKELETAL, MLM
Spine appears normal, range of motion is not limited, no muscle or joint tenderness. Pt is james all 4 extremities

## 2018-04-28 NOTE — ED ADULT NURSE NOTE - OBJECTIVE STATEMENT
Patient awake, nonverbal. Daughter at bedside stated patient was in [facility], discharged yesterday, was treated for PNA, UTI & sepsis. Was sent to [facility] for rehab, was to be sent back to [facility] due to HTN. Patient awake, nonverbal. Negative obvious distress. Daughter at bedside stated patient was in [facility], discharged yesterday, was treated for PNA, UTI & sepsis. Was sent to [facility] for rehab, was to be sent back to [facility] due to HTN but came to [facility]. Patient has left sided facial droop, Hx of CVA, trach, peg tube, colostomy bag & jones cath in place. Respirations even & unlabored. Cardiac monitor in place, NSR. Patient has reported pressure ulcer on sacrum, will eval & document.

## 2018-04-28 NOTE — ED PROVIDER NOTE - OBJECTIVE STATEMENT
HPI LIMITED - PT IS NONVERBAL   85 y/o M with past hx of CAD, CHF, and HTN presents to the ED c/o HTN. Pt is from formerly [facility] nursing home and he is nonverbal at baseline. Pt cannot provide any medical hx - nonverbal. Pt has chronic tracheostomy and Baugh in place.

## 2018-04-28 NOTE — ED PROVIDER NOTE - RESPIRATORY, MLM
Rhonchi in b/l lung fields. Tracheostomy in place Rhonchi in b/l lung fields. Tracheostomy tube in place

## 2018-04-29 DIAGNOSIS — I21.4 NON-ST ELEVATION (NSTEMI) MYOCARDIAL INFARCTION: ICD-10-CM

## 2018-04-29 DIAGNOSIS — J96.10 CHRONIC RESPIRATORY FAILURE, UNSPECIFIED WHETHER WITH HYPOXIA OR HYPERCAPNIA: ICD-10-CM

## 2018-04-29 DIAGNOSIS — I16.9 HYPERTENSIVE CRISIS, UNSPECIFIED: ICD-10-CM

## 2018-04-29 DIAGNOSIS — F03.90 UNSPECIFIED DEMENTIA WITHOUT BEHAVIORAL DISTURBANCE: ICD-10-CM

## 2018-04-29 DIAGNOSIS — I50.9 HEART FAILURE, UNSPECIFIED: ICD-10-CM

## 2018-04-29 DIAGNOSIS — I50.33 ACUTE ON CHRONIC DIASTOLIC (CONGESTIVE) HEART FAILURE: ICD-10-CM

## 2018-04-29 DIAGNOSIS — I25.10 ATHEROSCLEROTIC HEART DISEASE OF NATIVE CORONARY ARTERY WITHOUT ANGINA PECTORIS: ICD-10-CM

## 2018-04-29 LAB
ALBUMIN SERPL ELPH-MCNC: 3 G/DL — LOW (ref 3.3–5.2)
ALBUMIN SERPL ELPH-MCNC: 3.3 G/DL — SIGNIFICANT CHANGE UP (ref 3.3–5.2)
ALP SERPL-CCNC: 127 U/L — HIGH (ref 40–120)
ALT FLD-CCNC: 22 U/L — SIGNIFICANT CHANGE UP
ANION GAP SERPL CALC-SCNC: 12 MMOL/L — SIGNIFICANT CHANGE UP (ref 5–17)
ANION GAP SERPL CALC-SCNC: 12 MMOL/L — SIGNIFICANT CHANGE UP (ref 5–17)
ANISOCYTOSIS BLD QL: SLIGHT — SIGNIFICANT CHANGE UP
APTT BLD: 28 SEC — SIGNIFICANT CHANGE UP (ref 27.5–37.4)
AST SERPL-CCNC: 39 U/L — SIGNIFICANT CHANGE UP
BASOPHILS # BLD AUTO: 0 K/UL — SIGNIFICANT CHANGE UP (ref 0–0.2)
BASOPHILS NFR BLD AUTO: 0.6 % — SIGNIFICANT CHANGE UP (ref 0–2)
BILIRUB SERPL-MCNC: 0.3 MG/DL — LOW (ref 0.4–2)
BLD GP AB SCN SERPL QL: SIGNIFICANT CHANGE UP
BUN SERPL-MCNC: 34 MG/DL — HIGH (ref 8–20)
BUN SERPL-MCNC: 34 MG/DL — HIGH (ref 8–20)
CALCIUM SERPL-MCNC: 9.3 MG/DL — SIGNIFICANT CHANGE UP (ref 8.6–10.2)
CALCIUM SERPL-MCNC: 9.4 MG/DL — SIGNIFICANT CHANGE UP (ref 8.6–10.2)
CHLORIDE SERPL-SCNC: 107 MMOL/L — SIGNIFICANT CHANGE UP (ref 98–107)
CHLORIDE SERPL-SCNC: 107 MMOL/L — SIGNIFICANT CHANGE UP (ref 98–107)
CK SERPL-CCNC: 61 U/L — SIGNIFICANT CHANGE UP (ref 30–200)
CO2 SERPL-SCNC: 26 MMOL/L — SIGNIFICANT CHANGE UP (ref 22–29)
CO2 SERPL-SCNC: 27 MMOL/L — SIGNIFICANT CHANGE UP (ref 22–29)
CREAT SERPL-MCNC: 0.4 MG/DL — LOW (ref 0.5–1.3)
CREAT SERPL-MCNC: 0.48 MG/DL — LOW (ref 0.5–1.3)
DACRYOCYTES BLD QL SMEAR: SLIGHT — SIGNIFICANT CHANGE UP
EOSINOPHIL # BLD AUTO: 0.6 K/UL — HIGH (ref 0–0.5)
EOSINOPHIL NFR BLD AUTO: 7.7 % — HIGH (ref 0–5)
GLUCOSE SERPL-MCNC: 84 MG/DL — SIGNIFICANT CHANGE UP (ref 70–115)
GLUCOSE SERPL-MCNC: 89 MG/DL — SIGNIFICANT CHANGE UP (ref 70–115)
HCT VFR BLD CALC: 33.5 % — LOW (ref 42–52)
HGB BLD-MCNC: 10 G/DL — LOW (ref 14–18)
INR BLD: 1.12 RATIO — SIGNIFICANT CHANGE UP (ref 0.88–1.16)
LIDOCAIN IGE QN: 12 U/L — LOW (ref 22–51)
LYMPHOCYTES # BLD AUTO: 0.9 K/UL — LOW (ref 1–4.8)
LYMPHOCYTES # BLD AUTO: 12.5 % — LOW (ref 20–55)
MACROCYTES BLD QL: SLIGHT — SIGNIFICANT CHANGE UP
MAGNESIUM SERPL-MCNC: 2.1 MG/DL — SIGNIFICANT CHANGE UP (ref 1.6–2.6)
MAGNESIUM SERPL-MCNC: 2.3 MG/DL — SIGNIFICANT CHANGE UP (ref 1.6–2.6)
MCHC RBC-ENTMCNC: 25.8 PG — LOW (ref 27–31)
MCHC RBC-ENTMCNC: 29.9 G/DL — LOW (ref 32–36)
MCV RBC AUTO: 86.6 FL — SIGNIFICANT CHANGE UP (ref 80–94)
MICROCYTES BLD QL: SLIGHT — SIGNIFICANT CHANGE UP
MONOCYTES # BLD AUTO: 0.6 K/UL — SIGNIFICANT CHANGE UP (ref 0–0.8)
MONOCYTES NFR BLD AUTO: 9 % — SIGNIFICANT CHANGE UP (ref 3–10)
NEUTROPHILS # BLD AUTO: 4.9 K/UL — SIGNIFICANT CHANGE UP (ref 1.8–8)
NEUTROPHILS NFR BLD AUTO: 68.8 % — SIGNIFICANT CHANGE UP (ref 37–73)
NT-PROBNP SERPL-SCNC: 1272 PG/ML — HIGH (ref 0–300)
OVALOCYTES BLD QL SMEAR: SLIGHT — SIGNIFICANT CHANGE UP
PHENOBARB SERPL-MCNC: 11.9 UG/ML — LOW (ref 15–40)
PHENYTOIN FREE SERPL-MCNC: <2.9 UG/ML — LOW (ref 10–20)
PHOSPHATE SERPL-MCNC: 3.4 MG/DL — SIGNIFICANT CHANGE UP (ref 2.4–4.7)
PHOSPHATE SERPL-MCNC: 4.1 MG/DL — SIGNIFICANT CHANGE UP (ref 2.4–4.7)
PLAT MORPH BLD: NORMAL — SIGNIFICANT CHANGE UP
PLATELET # BLD AUTO: 331 K/UL — SIGNIFICANT CHANGE UP (ref 150–400)
POIKILOCYTOSIS BLD QL AUTO: SLIGHT — SIGNIFICANT CHANGE UP
POLYCHROMASIA BLD QL SMEAR: SLIGHT — SIGNIFICANT CHANGE UP
POTASSIUM SERPL-MCNC: 3.8 MMOL/L — SIGNIFICANT CHANGE UP (ref 3.5–5.3)
POTASSIUM SERPL-MCNC: 4.4 MMOL/L — SIGNIFICANT CHANGE UP (ref 3.5–5.3)
POTASSIUM SERPL-SCNC: 3.8 MMOL/L — SIGNIFICANT CHANGE UP (ref 3.5–5.3)
POTASSIUM SERPL-SCNC: 4.4 MMOL/L — SIGNIFICANT CHANGE UP (ref 3.5–5.3)
PROT SERPL-MCNC: 6.5 G/DL — LOW (ref 6.6–8.7)
PROTHROM AB SERPL-ACNC: 12.3 SEC — SIGNIFICANT CHANGE UP (ref 9.8–12.7)
RBC # BLD: 3.87 M/UL — LOW (ref 4.6–6.2)
RBC # FLD: 20.5 % — HIGH (ref 11–15.6)
RBC BLD AUTO: ABNORMAL
SODIUM SERPL-SCNC: 145 MMOL/L — SIGNIFICANT CHANGE UP (ref 135–145)
SODIUM SERPL-SCNC: 146 MMOL/L — HIGH (ref 135–145)
TROPONIN T SERPL-MCNC: 0.1 NG/ML — HIGH (ref 0–0.06)
TROPONIN T SERPL-MCNC: 0.11 NG/ML — HIGH (ref 0–0.06)
TROPONIN T SERPL-MCNC: 0.13 NG/ML — HIGH (ref 0–0.06)
TSH SERPL-MCNC: 0.56 UIU/ML — SIGNIFICANT CHANGE UP (ref 0.27–4.2)
TYPE + AB SCN PNL BLD: SIGNIFICANT CHANGE UP
WBC # BLD: 7.1 K/UL — SIGNIFICANT CHANGE UP (ref 4.8–10.8)
WBC # FLD AUTO: 7.1 K/UL — SIGNIFICANT CHANGE UP (ref 4.8–10.8)

## 2018-04-29 PROCEDURE — 93010 ELECTROCARDIOGRAM REPORT: CPT

## 2018-04-29 PROCEDURE — 70450 CT HEAD/BRAIN W/O DYE: CPT | Mod: 26

## 2018-04-29 PROCEDURE — 99223 1ST HOSP IP/OBS HIGH 75: CPT

## 2018-04-29 RX ORDER — LABETALOL HCL 100 MG
100 TABLET ORAL THREE TIMES A DAY
Qty: 0 | Refills: 0 | Status: DISCONTINUED | OUTPATIENT
Start: 2018-04-29 | End: 2018-05-04

## 2018-04-29 RX ORDER — BUDESONIDE, MICRONIZED 100 %
0.5 POWDER (GRAM) MISCELLANEOUS
Qty: 0 | Refills: 0 | Status: DISCONTINUED | OUTPATIENT
Start: 2018-04-29 | End: 2018-05-04

## 2018-04-29 RX ORDER — ENOXAPARIN SODIUM 100 MG/ML
40 INJECTION SUBCUTANEOUS DAILY
Qty: 0 | Refills: 0 | Status: DISCONTINUED | OUTPATIENT
Start: 2018-04-29 | End: 2018-05-04

## 2018-04-29 RX ORDER — FUROSEMIDE 40 MG
40 TABLET ORAL ONCE
Qty: 0 | Refills: 0 | Status: COMPLETED | OUTPATIENT
Start: 2018-04-29 | End: 2018-04-29

## 2018-04-29 RX ORDER — ASPIRIN/CALCIUM CARB/MAGNESIUM 324 MG
81 TABLET ORAL DAILY
Qty: 0 | Refills: 0 | Status: DISCONTINUED | OUTPATIENT
Start: 2018-04-29 | End: 2018-04-30

## 2018-04-29 RX ORDER — NITROGLYCERIN 6.5 MG
1 CAPSULE, EXTENDED RELEASE ORAL ONCE
Qty: 0 | Refills: 0 | Status: COMPLETED | OUTPATIENT
Start: 2018-04-29 | End: 2018-04-29

## 2018-04-29 RX ORDER — IPRATROPIUM/ALBUTEROL SULFATE 18-103MCG
3 AEROSOL WITH ADAPTER (GRAM) INHALATION EVERY 6 HOURS
Qty: 0 | Refills: 0 | Status: DISCONTINUED | OUTPATIENT
Start: 2018-04-29 | End: 2018-05-04

## 2018-04-29 RX ORDER — COLLAGENASE CLOSTRIDIUM HIST. 250 UNIT/G
1 OINTMENT (GRAM) TOPICAL
Qty: 0 | Refills: 0 | Status: DISCONTINUED | OUTPATIENT
Start: 2018-04-29 | End: 2018-05-01

## 2018-04-29 RX ORDER — FUROSEMIDE 40 MG
60 TABLET ORAL EVERY 12 HOURS
Qty: 0 | Refills: 0 | Status: DISCONTINUED | OUTPATIENT
Start: 2018-04-29 | End: 2018-05-01

## 2018-04-29 RX ORDER — BACITRACIN ZINC 500 UNIT/G
1 OINTMENT IN PACKET (EA) TOPICAL EVERY 8 HOURS
Qty: 0 | Refills: 0 | Status: DISCONTINUED | OUTPATIENT
Start: 2018-04-29 | End: 2018-05-04

## 2018-04-29 RX ORDER — ACETAMINOPHEN 500 MG
650 TABLET ORAL EVERY 6 HOURS
Qty: 0 | Refills: 0 | Status: DISCONTINUED | OUTPATIENT
Start: 2018-04-29 | End: 2018-05-04

## 2018-04-29 RX ORDER — FERROUS SULFATE 325(65) MG
300 TABLET ORAL
Qty: 0 | Refills: 0 | Status: DISCONTINUED | OUTPATIENT
Start: 2018-04-29 | End: 2018-05-04

## 2018-04-29 RX ORDER — FAMOTIDINE 10 MG/ML
20 INJECTION INTRAVENOUS DAILY
Qty: 0 | Refills: 0 | Status: DISCONTINUED | OUTPATIENT
Start: 2018-04-29 | End: 2018-04-29

## 2018-04-29 RX ORDER — CHLORHEXIDINE GLUCONATE 213 G/1000ML
15 SOLUTION TOPICAL
Qty: 0 | Refills: 0 | Status: DISCONTINUED | OUTPATIENT
Start: 2018-04-29 | End: 2018-05-04

## 2018-04-29 RX ORDER — NYSTATIN CREAM 100000 [USP'U]/G
1 CREAM TOPICAL
Qty: 0 | Refills: 0 | Status: DISCONTINUED | OUTPATIENT
Start: 2018-04-29 | End: 2018-05-04

## 2018-04-29 RX ORDER — DIGOXIN 250 MCG
0.25 TABLET ORAL DAILY
Qty: 0 | Refills: 0 | Status: DISCONTINUED | OUTPATIENT
Start: 2018-04-29 | End: 2018-05-02

## 2018-04-29 RX ORDER — ACETYLCYSTEINE 200 MG/ML
3 VIAL (ML) MISCELLANEOUS
Qty: 0 | Refills: 0 | Status: DISCONTINUED | OUTPATIENT
Start: 2018-04-29 | End: 2018-04-29

## 2018-04-29 RX ORDER — ERYTHROPOIETIN 10000 [IU]/ML
20000 INJECTION, SOLUTION INTRAVENOUS; SUBCUTANEOUS
Qty: 0 | Refills: 0 | Status: DISCONTINUED | OUTPATIENT
Start: 2018-04-29 | End: 2018-05-04

## 2018-04-29 RX ORDER — PHENOBARBITAL 60 MG
60 TABLET ORAL EVERY 12 HOURS
Qty: 0 | Refills: 0 | Status: DISCONTINUED | OUTPATIENT
Start: 2018-04-29 | End: 2018-05-04

## 2018-04-29 RX ORDER — PANTOPRAZOLE SODIUM 20 MG/1
40 TABLET, DELAYED RELEASE ORAL
Qty: 0 | Refills: 0 | Status: DISCONTINUED | OUTPATIENT
Start: 2018-04-29 | End: 2018-05-04

## 2018-04-29 RX ORDER — TAMSULOSIN HYDROCHLORIDE 0.4 MG/1
0.4 CAPSULE ORAL DAILY
Qty: 0 | Refills: 0 | Status: DISCONTINUED | OUTPATIENT
Start: 2018-04-29 | End: 2018-04-29

## 2018-04-29 RX ORDER — LEVETIRACETAM 250 MG/1
1000 TABLET, FILM COATED ORAL EVERY 12 HOURS
Qty: 0 | Refills: 0 | Status: DISCONTINUED | OUTPATIENT
Start: 2018-04-29 | End: 2018-05-04

## 2018-04-29 RX ORDER — TIOTROPIUM BROMIDE 18 UG/1
1 CAPSULE ORAL; RESPIRATORY (INHALATION) DAILY
Qty: 0 | Refills: 0 | Status: DISCONTINUED | OUTPATIENT
Start: 2018-04-29 | End: 2018-04-30

## 2018-04-29 RX ORDER — CHLORHEXIDINE GLUCONATE 213 G/1000ML
15 SOLUTION TOPICAL
Qty: 0 | Refills: 0 | Status: DISCONTINUED | OUTPATIENT
Start: 2018-04-29 | End: 2018-04-29

## 2018-04-29 RX ORDER — NIFEDIPINE 30 MG
30 TABLET, EXTENDED RELEASE 24 HR ORAL THREE TIMES A DAY
Qty: 0 | Refills: 0 | Status: DISCONTINUED | OUTPATIENT
Start: 2018-04-29 | End: 2018-05-04

## 2018-04-29 RX ORDER — ALBUTEROL 90 UG/1
1 AEROSOL, METERED ORAL EVERY 4 HOURS
Qty: 0 | Refills: 0 | Status: DISCONTINUED | OUTPATIENT
Start: 2018-04-29 | End: 2018-04-30

## 2018-04-29 RX ORDER — NIFEDIPINE 30 MG
90 TABLET, EXTENDED RELEASE 24 HR ORAL DAILY
Qty: 0 | Refills: 0 | Status: DISCONTINUED | OUTPATIENT
Start: 2018-04-29 | End: 2018-04-29

## 2018-04-29 RX ORDER — LEVETIRACETAM 250 MG/1
1000 TABLET, FILM COATED ORAL EVERY 12 HOURS
Qty: 0 | Refills: 0 | Status: DISCONTINUED | OUTPATIENT
Start: 2018-04-29 | End: 2018-04-29

## 2018-04-29 RX ORDER — ASPIRIN/CALCIUM CARB/MAGNESIUM 324 MG
81 TABLET ORAL ONCE
Qty: 0 | Refills: 0 | Status: COMPLETED | OUTPATIENT
Start: 2018-04-29 | End: 2018-04-29

## 2018-04-29 RX ORDER — ACETYLCYSTEINE 200 MG/ML
1.5 VIAL (ML) MISCELLANEOUS
Qty: 0 | Refills: 0 | Status: DISCONTINUED | OUTPATIENT
Start: 2018-04-29 | End: 2018-04-29

## 2018-04-29 RX ORDER — DOXAZOSIN MESYLATE 4 MG
2 TABLET ORAL DAILY
Qty: 0 | Refills: 0 | Status: DISCONTINUED | OUTPATIENT
Start: 2018-04-29 | End: 2018-04-29

## 2018-04-29 RX ADMIN — Medication 40 MILLIGRAM(S): at 08:10

## 2018-04-29 RX ADMIN — CHLORHEXIDINE GLUCONATE 15 MILLILITER(S): 213 SOLUTION TOPICAL at 17:23

## 2018-04-29 RX ADMIN — Medication 1 APPLICATION(S): at 17:23

## 2018-04-29 RX ADMIN — Medication 100 MILLIGRAM(S): at 14:28

## 2018-04-29 RX ADMIN — Medication 3 MILLILITER(S): at 15:32

## 2018-04-29 RX ADMIN — Medication 1 APPLICATION(S): at 14:25

## 2018-04-29 RX ADMIN — Medication 30 MILLIGRAM(S): at 14:29

## 2018-04-29 RX ADMIN — Medication 60 MILLIGRAM(S): at 17:26

## 2018-04-29 RX ADMIN — Medication 81 MILLIGRAM(S): at 08:10

## 2018-04-29 RX ADMIN — LEVETIRACETAM 1000 MILLIGRAM(S): 250 TABLET, FILM COATED ORAL at 17:27

## 2018-04-29 RX ADMIN — Medication 2 MILLIGRAM(S): at 14:24

## 2018-04-29 RX ADMIN — Medication 0.25 MILLIGRAM(S): at 14:24

## 2018-04-29 RX ADMIN — Medication 30 MILLIGRAM(S): at 22:00

## 2018-04-29 RX ADMIN — Medication 1 APPLICATION(S): at 22:00

## 2018-04-29 RX ADMIN — NYSTATIN CREAM 1 APPLICATION(S): 100000 CREAM TOPICAL at 17:27

## 2018-04-29 RX ADMIN — Medication 60 MILLIGRAM(S): at 17:20

## 2018-04-29 RX ADMIN — Medication 300 MILLIGRAM(S): at 18:00

## 2018-04-29 RX ADMIN — Medication 200 MILLIGRAM(S): at 22:00

## 2018-04-29 RX ADMIN — Medication 3 MILLILITER(S): at 20:43

## 2018-04-29 RX ADMIN — Medication 0.5 MILLIGRAM(S): at 20:43

## 2018-04-29 RX ADMIN — Medication 100 MILLIGRAM(S): at 22:00

## 2018-04-29 RX ADMIN — ENOXAPARIN SODIUM 40 MILLIGRAM(S): 100 INJECTION SUBCUTANEOUS at 14:24

## 2018-04-29 RX ADMIN — Medication 1 INCH(S): at 08:10

## 2018-04-29 RX ADMIN — Medication 1 INCH(S): at 20:07

## 2018-04-29 NOTE — PATIENT PROFILE ADULT. - VISION (WITH CORRECTIVE LENSES IF THE PATIENT USUALLY WEARS THEM):
Partially impaired: cannot see medication labels or newsprint, but can see obstacles in path, and the surrounding layout; can count fingers at arm's length Partially impaired: cannot see medication labels or newsprint, but can see obstacles in path, and the surrounding layout; can count fingers at arm's length/ELLE- pt is non verbal, trached

## 2018-04-29 NOTE — PATIENT PROFILE ADULT. - FALL HARM RISK CONCLUSION
INTERVAL HPI/OVERNIGHT EVENTS:    CC: systolic CHF, diabetes mellitus, paroxysmal a fib, CKD    Chart and course reviewed. Denies shortness of breath, awaiting cardiac cath.    Vital Signs Last 24 Hrs  T(C): 37.1, Max: 37.1 (04-03 @ 12:13)  T(F): 98.7, Max: 98.7 (04-03 @ 12:13)  HR: 88 (78 - 91)  BP: 125/58 (98/556 - 125/58)  BP(mean): --  RR: 16 (16 - 24)  SpO2: 98% (93% - 100%)    PHYSICAL EXAM:    GENERAL: Not in distress, sitting in chair.  CHEST/LUNG: b/l air entry, decreased in bases  HEART: Regular   ABDOMEN: Soft, BS+  EXTREMITIES: 2+ edema      MEDICATIONS  (STANDING):  dextrose 50% Injectable 12.5Gram(s) IV Push once  dextrose 50% Injectable 25Gram(s) IV Push once  dextrose 50% Injectable 25Gram(s) IV Push once  aspirin  chewable 81milliGRAM(s) Oral daily  atorvastatin 20milliGRAM(s) Oral at bedtime  insulin lispro (HumaLOG) corrective regimen sliding scale  SubCutaneous Before meals and at bedtime  ALBUTerol/ipratropium for Nebulization 3milliLiter(s) Nebulizer every 6 hours  lidocaine   Patch 1Patch Transdermal daily  pantoprazole    Tablet 40milliGRAM(s) Oral before breakfast  famotidine    Tablet 20milliGRAM(s) Oral at bedtime  docusate sodium 100milliGRAM(s) Oral three times a day  senna 2Tablet(s) Oral at bedtime  carvedilol 6.25milliGRAM(s) Oral every 12 hours  dextrose 5%. 1000milliLiter(s) IV Continuous <Continuous>  guaiFENesin    Syrup 100milliGRAM(s) Oral every 4 hours  insulin glargine Injectable (LANTUS) 13Unit(s) SubCutaneous at bedtime  insulin lispro Injectable (HumaLOG) 8Unit(s) SubCutaneous three times a day before meals  epoetin conrado Injectable 47277Bfnl(s) SubCutaneous <User Schedule>  sodium biphosphate Rectal Enema 1Enema Rectal once  hydrALAZINE 25milliGRAM(s) Oral every 8 hours  isosorbide   mononitrate ER Tablet (IMDUR) 30milliGRAM(s) Oral daily  acetylcysteine  Oral Solution 1200milliGRAM(s) Oral two times a day  furosemide   Injectable 80milliGRAM(s) IV Push two times a day  dextrose 5% + sodium chloride 0.9%. 1000milliLiter(s) IV Continuous <Continuous>  metolazone 2.5milliGRAM(s) Oral daily    MEDICATIONS  (PRN):  dextrose Gel 1Dose(s) Oral once PRN Blood Glucose LESS THAN 70 milliGRAM(s)/deciLiter  glucagon  Injectable 1milliGRAM(s) IntraMuscular once PRN Glucose <70 milliGRAM(s)/deciLiter  ALBUTerol    0.083% 2.5milliGRAM(s) Nebulizer every 3 hours PRN Shortness of Breath and/or Wheezing  acetaminophen   Tablet. 650milliGRAM(s) Oral every 6 hours PRN Moderate Pain (4 - 6)  aluminum hydroxide/magnesium hydroxide/simethicone Suspension 30milliLiter(s) Oral every 6 hours PRN Dyspepsia  metoprolol Injectable 5milliGRAM(s) IV Push every 15 minutes PRN for HR greater than 120 sustained a-fib  oxyCODONE  5 mG/acetaminophen 325 mG 2Tablet(s) Oral every 6 hours PRN Severe Pain (7 - 10)      Allergies    IV Contrast (Unknown)    Intolerances          LABS:                          9.3    8.0   )-----------( 179      ( 02 Apr 2017 06:42 )             29.2     03 Apr 2017 07:25    138    |  90     |  76.0   ----------------------------<  355    4.8     |  34.0   |  2.95     Ca    8.8        03 Apr 2017 07:25  Phos  4.3       02 Apr 2017 06:42  Mg     2.0       02 Apr 2017 06:42      PT/INR - ( 03 Apr 2017 07:25 )   PT: 15.3 sec;   INR: 1.38 ratio         PTT - ( 03 Apr 2017 07:25 )  PTT:32.0 sec      RADIOLOGY & ADDITIONAL TESTS: Fall with Harm Risk

## 2018-04-29 NOTE — H&P ADULT - ASSESSMENT
Pt unable to provide history, obtained from EMR /SNF charts. Pt is  85 y/o male NH resident w/PMHx of CHF, dementia, prostate Ca, seizures, CAD, GERD anemia, bed bound, nonverbal, chronic tracheostomy with vent dependence, + PEG, chronic pleural effusion, chronic Baugh's, recent MRSA bacteremia in 3/18 completed antibiotics course, was sent from NH for accelerated HTN. History very limited as pt nonverbal.   In ED Pt noted to have BNP elevated, vascular congestion on CXR and admitted with acute on chronic CHF and HTN urgency.       Plan:    Acute on chronic diastolic HF:  - Cardiology eval noted and appreciated.   - Need telemetry bed likely MICU given vent dependent, strict I/Os, daily weight, fluid restriction.  - IV Lasix, labetalol per cardiology. Resume home Dig and Procardia.    HTN Urgency:  - BP better now.   - C/w Labetalol, Procardia  - Cardiology following.    Chronic respiratory failure vent dependent:  - C/w MV, vent bundles, trach care. Monique  - MICU consulted.    >H/o CAD- Resume home meds.  >H/o Seizure meds- Resume home AED.   >H/o GERD- Resume PPI  >H/o Anemia- Resume procrit and iron supplements.   >DVT ppx- Lovenox.

## 2018-04-29 NOTE — ED ADULT NURSE REASSESSMENT NOTE - NS ED NURSE REASSESS COMMENT FT1
Patient recd this morning, sleeping, VSS-patient remains on ventilator to trach, jones in place-200cc clear urine, will continue to monitor for safety and comfort.

## 2018-04-29 NOTE — CONSULT NOTE ADULT - SUBJECTIVE AND OBJECTIVE BOX
[facility]                                                        Office: [address] [address]                                                       Telephone: [phone]. Fax:[phone]    Patient is a 86y old  Male who presents with a chief complaint of hypertensive urgency    HPI: 85 y/o gentleman with hx of dementia, prostate cancer, CAD s/p PCI, respiratory failure s/p tracheostomy- vent dependent, PEG, CVA, , colostomy sent in from SNF for hypertensive urgency.  BNP elevated, vascular congestion on CXR. ECG with PVCs, poor R wave progression - no ischemic changes.  In ED given lasix 40 mg IV x 1 and ntp.       PAST MEDICAL & SURGICAL HISTORY:  Dysphagia  Fall: Multiple Mechanical falls  CAD (coronary artery disease)  Clostridium difficile diarrhea: in   BPH (benign prostatic hypertrophy)  Dementia  HLD (hyperlipidemia)  CHF (congestive heart failure)  Prostate cancer: Treated w/ radiation  Stroke: (~5yrs ago)  Seizure: (last event &gt;1yr ago)  HTN (hypertension)  Colostomy in place  S/P percutaneous endoscopic gastrostomy (PEG) tube placement  H/O hemorrhoidectomy  Deviated septum  Abdominal hernia  Status post cardiac surgery: stents x 2      Allergies    clindamycin (Unknown)  Nuts (Hives; Rash)  PC Pen VK (Unknown)  strawberry (Short breath; Rash; Hives)  Xanax (Rash)    Intolerances    Ativan (Unknown)  Haldol (Dystonic RXN)  hydrALAZINE (Unknown)  Zyprexa (Unknown)    Home Medications:  acetaminophen 650 mg oral tablet: 650 milligram(s) orally every 6 hours, As Needed (02 Mar 2018 09:03)  aspirin 81 mg oral tablet, chewable: 1 tab(s) by gastrostomy tube once a day (02 Mar 2018 09:03)  bacitracin 500 units/g topical ointment: 1 application topically every 8 hours (13 Mar 2018 12:36)  bisacodyl 10 mg rectal suppository: 1 suppository(ies) rectal prn, As Needed (02 Mar 2018 09:03)  budesonide 0.5 mg/2 mL inhalation suspension: 2 milliliter(s) inhaled 2 times a day (02 Mar 2018 09:03)  digoxin 250 mcg (0.25 mg) oral tablet: 1 tab(s) orally once a day (02 Mar 2018 09:03)  DuoNeb 0.5 mg-2.5 mg/3 mL inhalation solution: 3 milliliter(s) inhaled every 4 hours, As Needed (02 Mar 2018 09:04)  DuoNeb 0.5 mg-2.5 mg/3 mL inhalation solution: 1 application inhaled every 6 hours (02 Mar 2018 09:04)  enoxaparin 40 mg/0.4 mL injectable solution: 40 milligram(s) injectable once a day (02 Mar 2018 09:04)  famotidine 10 mg oral tablet: 1 tab(s) orally twice (02 Mar 2018 09:03)  fentaNYL 100 mcg/hr transdermal film, extended release: 1 patch transdermal every 72 hours (02 Mar 2018 09:03)  ferrous sulfate 300 mg/5 mL (60 mg elemental iron) oral liquid: 5 milliliter(s) by gastrostomy tube every 8 hours (02 Mar 2018 09:03)  Fleet Enema 7 g-19 g rectal enema: 1 application rectal prn, As Needed (02 Mar 2018 09:04)  Hydrocortisone 1% In Absorbase topical ointment: Apply topically to affected area 2 times a day (02 Mar 2018 09:03)  levETIRAcetam 100 mg/mL oral solution: 10 milliliter(s) orally every 12 hours (13 Mar 2018 12:36)  lisinopril 10 mg oral tablet: 1 tab(s) orally once a day (13 Mar 2018 12:36)  Milk of Magnesia: 30 milliliter(s) orally prn, As Needed (02 Mar 2018 09:04)  nystatin 100,000 units/g topical powder: 1 application topically 2 times a day (13 Mar 2018 12:36)  pantoprazole 40 mg oral granule, delayed release: 40 milligram(s) orally once a day (13 Mar 2018 12:36)  Peridex 0.12% mucous membrane liquid: 15 milliliter(s) mucous membrane 2 times a day (02 Mar 2018 09:04)  PHENobarbital 20 mg/5 mL oral elixir: 15 milliliter(s) orally 2 times a day (13 Mar 2018 12:36)  phenytoin 25 mg/mL oral suspension: 4 milliliter(s) orally every 8 hours (13 Mar 2018 12:36)  potassium chloride 20 mEq oral powder for reconstitution: 1 each orally once a day (02 Mar 2018 09:03)  Saline Nasal Mist: 1 dose(s) in each nostril once a day (02 Mar 2018 09:03)  Santyl 250 units/g topical ointment: Apply topically to affected area once a day (02 Mar 2018 09:03)  vancomycin 750 mg intravenous injection: 750 milligram(s) intravenous every 12 hours (13 Mar 2018 12:36)      MEDICATIONS  (STANDING):    MEDICATIONS  (PRN):      FAMILY HISTORY:  No pertinent family history in first degree relatives      SOCIAL HISTORY: Denies tobacco/etoh/illicit drug use    PREVIOUS DIAGNOSTIC TESTING:      ECHO FINDINGS: 3/3/18  ummary:   1. Technically limited study. Limited information is available.   2. Hyperdynamic global left ventricular systolic function.   3. Left ventricular ejection fraction, by visual estimation, is >75%.   4. Thickening of the anterior and posterior mitral valve leaflets.   5. Sclerotic aortic valve with decreased opening.   6. RV is seen on limited views. It is enlarged. RV function appears   preserved.   7. Trivial pericardial effusion.        REVIEW OF SYSTEMS: UNABLE TO OBTAIN  - PATIENT IS NONVERBAL  CONSTITUTIONAL: [-]fever   [-] weight loss   [-] fatigue  EYES: [-]  eye pain   [-] visual disturbances      [-]  discharge  ENMT:  [-]  difficulty hearing,   [-]  tinnitus   [-] vertigo    [-]  sinus or throat pain  NECK: [-]  pain or stiffness  RESPIRATORY: [-]  cough 	[-] wheezing 	[-]  hemoptysis 		[-]   Shortness of Breath  CARDIOVASCULAR: [-]  chest pain	[-] palpitations		[-]  passing out 		[-] dizziness 	[-]  leg swelling  		[-]  PND 	[-] orthopnea  GASTROINTESTINAL: [-]  abdominal pain		[-]nausea	[-] vomiting	[-]  hematemesis 	[-]  diarrhea  	[-] constipation 		[-]  melena 	[-] hematochezia.  GENITOURINARY: [-] dysuria	[-] frequency	[-] hematuria	[-]  incontinence  NEUROLOGICAL: [-]  headaches		[-] memory loss 	[-]  loss of strength  			[-]  numbness/tingling 	[-]  tremors  SKIN: [-]  itching 	[-] rashes 	[-]  lesions   LYMPH Nodes: [-] enlarged glands  ENDOCRINE: [-] heat or cold intolerance 	[-]   hair loss  MUSCULOSKELETAL: [-] joint pain  [-] joint swelling	[-]  muscle, back, or extremity pain  PSYCHIATRIC: [-]  depression	[-] anxiety	[-] mood swings		[-]  difficulty sleeping   HEME: [-]  easy bruising 	[-]  bleeding   ALLERY AND IMMUNOLOGIC: [-]  hives or eczema	      Vital Signs Last 24 Hrs  T(C): 37 (2018 07:44), Max: 37 (2018 21:48)  T(F): 98.6 (2018 07:44), Max: 98.6 (2018 21:48)  HR: 65 (2018 08:15) (65 - 72)  BP: 150/68 (2018 07:44) (150/68 - 172/97)  BP(mean): --  RR: 16 (2018 07:44) (16 - 26)  SpO2: 98% (2018 08:15) (95% - 100%)  Daily Height in cm: 175.26 (2018 21:48)    Daily   I&O's Detail    2018 07:01  -  2018 08:30  --------------------------------------------------------  IN:  Total IN: 0 mL    OUT:    Voided: 200 mL  Total OUT: 200 mL    Total NET: -200 mL        Mode: AC/ CMV (Assist Control/ Continuous Mandatory Ventilation), RR (machine): 16, TV (machine): 500, FiO2: 40, PEEP: 5, MAP: 10, PIP: 20    PHYSICAL EXAM:  Appearance: unresponsive	  HEENT:  + trach; no JVD  Lymphatic: No cervical lymphadenopathy  Cardiovascular: Normal S1 S2, No JVD, No cardiac murmurs, No carotid bruits, 2+ bilateral LE edema  Respiratory: Lungs clear - anterolaterally  Psychiatry:unresponsive  Gastrointestinal:  Soft, Non-tender, + BS, no bruits	  Skin: No rashes, No ecchymoses, No cyanosis, Dry  Neurologic: unresponsive  Extremities: No clubbing, cyanosis or edema  Vascular: Peripheral pulses palpable 2+ bilaterally, warm      INTERPRETATION OF TELEMETRY:SR    ECG (tracing reviewed by me): SR 67 bpm, PVC, poor R wave progression    LABS:                        10.0   7.1   )-----------( 331      ( 2018 01:30 )             33.5         145  |  107  |  34.0<H>  ----------------------------<  84  4.4   |  26.0  |  0.40<L>    Ca    9.3      2018 01:30  Phos  3.4       Mg     2.1         TPro  6.5<L>  /  Alb  3.0<L>  /  TBili  0.3<L>  /  DBili  x   /  AST  39  /  ALT  22  /  AlkPhos  127<H>      CARDIAC MARKERS ( 2018 05:57 )  x     / 0.11 ng/mL / x     / x     / x      CARDIAC MARKERS ( 2018 01:30 )  x     / 0.10 ng/mL / 61 U/L / x     / x          PT/INR - ( 2018 03:12 )   PT: 12.3 sec;   INR: 1.12 ratio    PTT - ( 2018 03:12 )  PTT:28.0 sec  Urinalysis Basic - ( 2018 23:40 )  Color: Pale Yellow / Appearance: Clear / S.005 / pH: x  Gluc: x / Ketone: Negative  / Bili: Negative / Urobili: Negative mg/dL   Blood: x / Protein: 15 mg/dL / Nitrite: Negative   Leuk Esterase: Trace / RBC: 6-10 /HPF / WBC 3-5   Sq Epi: x / Non Sq Epi: Occasional / Bacteria: Occasional      BNPSerum Pro-Brain Natriuretic Peptide: 1272 pg/mL ( @ 05:02)    RADIOLOGY & ADDITIONAL STUDIES:  CXR (image reviewed by me): poor respiratory effort, vascular congestion    CT head - No intracranial hemorrhage or mass effect. Bilateral tympanomastoid   effusions; correlate with clinical examination for otomastoiditis.    If there are new or persistent symptoms, consider further evaluation with   brain MRI if clinically warranted and if there are no contraindications.
Pt unable to provide history, obtained from EMR /SNF charts. Pt is 85 y/o male NH resident w/PMHx of CHF, dementia, prostate Ca, seizures, CAD, GERD anemia, bed bound, nonverbal, chronic tracheostomy with vent dependence, + PEG, chronic pleural effusion, chronic Baugh's, recent MRSA bacteremia in 3 completed antibiotics course, was sent from NH for accelerated HTN. History very limited as pt nonverbal.  In ED Pt noted to have BNP elevated, vascular congestion on CXR and admitted with acute on chronic CHF and HTN urgency. Asked to evaluate pt with chronic respiratory failure on chronic vent with HTN urgency.  From prior admissions pt is known to have a legal guardian Van Espinoza [phone], a message was left with his law office's service.   Pt will be admitted to ICU for chronic resp failure/vent management as well as mgt of HTN urgency.  REASON FOR CONSULT: resp failure and HTN Urgency    CONSULT REQUESTED BY: Gosia    Patient is a 86y old  Male who presents with a chief complaint of HTN urgency, CHF (2018 10:33)      BRIEF HOSPITAL COURSE: as above    Events last 24 hours: HTN crisis treated with IV lasix for CHF as well as NTG paste, will monitor electrolytes and cardiac during active diuresis/cardiology following.    PAST MEDICAL & SURGICAL HISTORY:  Dysphagia  Fall: Multiple Mechanical falls  CAD (coronary artery disease)  Clostridium difficile diarrhea: in   BPH (benign prostatic hypertrophy)  Dementia  HLD (hyperlipidemia)  CHF (congestive heart failure)  Prostate cancer: Treated w/ radiation  Stroke: (~5yrs ago)  Seizure: (last event &gt;1yr ago)  HTN (hypertension)  Colostomy in place  S/P percutaneous endoscopic gastrostomy (PEG) tube placement  H/O hemorrhoidectomy  Deviated septum  Abdominal hernia  Status post cardiac surgery: stents x 2    Allergies    clindamycin (Unknown)  Nuts (Hives; Rash)  PC Pen VK (Unknown)  strawberry (Short breath; Rash; Hives)  Xanax (Rash)    Intolerances    Ativan (Unknown)  Haldol (Dystonic RXN)  hydrALAZINE (Unknown)  Zyprexa (Unknown)    FAMILY HISTORY:  No pertinent family history in first degree relatives      Review of Systems:  Pt unable to answer question in ROS due to AMS      Medications:    digoxin     Tablet 0.25 milliGRAM(s) Oral daily  doxazosin 2 milliGRAM(s) Oral daily  furosemide   Injectable 60 milliGRAM(s) IV Push every 12 hours  labetalol 100 milliGRAM(s) Oral three times a day  NIFEdipine IR 30 milliGRAM(s) Oral three times a day    acetylcysteine 20% Inhalation 1.5 milliLiter(s) Inhalation four times a day  ALBUTerol    90 MICROgram(s) HFA Inhaler 1 Puff(s) Inhalation every 4 hours  ALBUTerol/ipratropium for Nebulization 3 milliLiter(s) Nebulizer every 6 hours  buDESOnide   0.5 milliGRAM(s) Respule 0.5 milliGRAM(s) Inhalation two times a day  tiotropium 18 MICROgram(s) Capsule 1 Capsule(s) Inhalation daily    acetaminophen   Tablet. 650 milliGRAM(s) Oral every 6 hours PRN  levETIRAcetam  Solution 1000 milliGRAM(s) Oral every 12 hours  PHENobarbital Elixir 60 milliGRAM(s) Oral every 12 hours  phenytoin   Suspension 100 milliGRAM(s) Oral every 8 hours      aspirin  chewable 81 milliGRAM(s) Oral daily  enoxaparin Injectable 40 milliGRAM(s) SubCutaneous daily    bisacodyl Suppository 10 milliGRAM(s) Rectal daily PRN  pantoprazole   Suspension 40 milliGRAM(s) Oral before breakfast        ferrous    sulfate Liquid 300 milliGRAM(s) Oral three times a day with meals    epoetin giorgio Injectable 17380 Unit(s) SubCutaneous <User Schedule>    BACItracin   Ointment 1 Application(s) Topical every 8 hours  chlorhexidine 0.12% Liquid 15 milliLiter(s) Swish and Spit two times a day  collagenase Ointment 1 Application(s) Topical two times a day  nystatin Powder 1 Application(s) Topical two times a day        Mode: AC/ CMV (Assist Control/ Continuous Mandatory Ventilation)  RR (machine): 16  TV (machine): 400  FiO2: 40  PEEP: 5  MAP: 9  PIP: 18      ICU Vital Signs Last 24 Hrs  T(C): 36.2 (2018 12:10), Max: 37.2 (2018 11:24)  T(F): 97.2 (2018 12:10), Max: 99 (2018 11:24)  HR: 61 (2018 15:35) (61 - 75)  BP: 139/69 (2018 15:00) (139/69 - 175/82)  BP(mean): 109 (2018 14:00) (109 - 118)  ABP: --  ABP(mean): --  RR: 18 (2018 15:00) (16 - 35)  SpO2: 99% (2018 15:35) (95% - 100%)    Vital Signs Last 24 Hrs  T(C): 36.2 (2018 12:10), Max: 37.2 (2018 11:24)  T(F): 97.2 (2018 12:10), Max: 99 (2018 11:24)  HR: 61 (2018 15:35) (61 - 75)  BP: 139/69 (2018 15:00) (139/69 - 175/82)  BP(mean): 109 (2018 14:00) (109 - 118)  RR: 18 (2018 15:00) (16 - 35)  SpO2: 99% (2018 15:35) (95% - 100%)        I&O's Detail    2018 07:01  -  2018 15:41  --------------------------------------------------------  IN:  Total IN: 0 mL    OUT:    Indwelling Catheter - Urethral: 1160 mL    Voided: 200 mL  Total OUT: 1360 mL    Total NET: -1360 mL            LABS:                        10.0   7.1   )-----------( 331      ( 2018 01:30 )             33.5     04    145  |  107  |  34.0<H>  ----------------------------<  84  4.4   |  26.0  |  0.40<L>    Ca    9.3      2018 01:30  Phos  3.4       Mg     2.1         TPro  6.5<L>  /  Alb  3.0<L>  /  TBili  0.3<L>  /  DBili  x   /  AST  39  /  ALT  22  /  AlkPhos  127<H>  04-29      CARDIAC MARKERS ( 2018 12:40 )  x     / 0.13 ng/mL / x     / x     / x      CARDIAC MARKERS ( 2018 05:57 )  x     / 0.11 ng/mL / x     / x     / x      CARDIAC MARKERS ( 2018 01:30 )  x     / 0.10 ng/mL / 61 U/L / x     / x          CAPILLARY BLOOD GLUCOSE        PT/INR - ( 2018 03:12 )   PT: 12.3 sec;   INR: 1.12 ratio         PTT - ( 2018 03:12 )  PTT:28.0 sec  Urinalysis Basic - ( 2018 23:40 )    Color: Pale Yellow / Appearance: Clear / S.005 / pH: x  Gluc: x / Ketone: Negative  / Bili: Negative / Urobili: Negative mg/dL   Blood: x / Protein: 15 mg/dL / Nitrite: Negative   Leuk Esterase: Trace / RBC: 6-10 /HPF / WBC 3-5   Sq Epi: x / Non Sq Epi: Occasional / Bacteria: Occasional      CULTURES:      Physical Examination:    General: Opens eyes, smiles, not following commands    HEENT: Pupils equal, reactive to light.  Symmetric, trach tube in place 7.0 XLT shiley cuffed    PULM: diminished at bases with mild rales bilaterally, no significant sputum production, left sided pleurex catheter present, site c/d/i    CVS: Regular rate and rhythm, no murmurs, rubs, or gallops    ABD: Soft, nondistended, nontender, normoactive bowel sounds, no masses, PEG tube in place    EXT: No edema, nontender    SKIN: Warm and well perfused, no rashes noted.    RADIOLOGY: images reviewed    CRITICAL CARE TIME SPENT: 50 minutes reviewing records, examining pt, treating pt, reviewing images, calling legal guardian.

## 2018-04-29 NOTE — CONSULT NOTE ADULT - ASSESSMENT
87 y/o gentleman with hx of dementia, prostate cancer, CAD s/p PCI, respiratory failure s/p tracheostomy- vent dependent, PEG, CVA, , colostomy sent in from SNF for hypertensive urgency.  BNP elevated, vascular congestion on CXR. ECG with PVCs, poor R wave progression - no ischemic changes.  In ED given lasix 40 mg IV x 1 and ntp.     HFpEF  hypertensive urgency  CAD - stable    - increase diuresis - lasix 60 mg IV q12  - Monitor on telemetry.  Strict i/o and daily standing weights.  Keep K > 4, Mg > 2.  Monitor renal function with ongoing diuresis.  - start labetalol 100 mg TID  - elevated troponin due to CHF, CK normal - No ongoing ACS  - may repeat TTE  - resume all cardiac meds    Thank you for allowing me to participate in care of your patient.   Will follow  D/W Dr. Mc
86 YOM with end stage dementia, chronic respiratory failure/vent dependent, HTN crisis with CHF being actively diuresed, r/o NSTEMI

## 2018-04-29 NOTE — CONSULT NOTE ADULT - ATTENDING COMMENTS
I saw this patient independently and agree with the above; he has a very high dose of dilantin at the NH so we modulated it; his dilantin level came back very low, so I went up (but not to his "home dose" - would continue to monitor.  Added free water, as the diuresis will drive up his already hypernatremic state.  Will get another set of electrolytes; discussed with Dr Nelson; if cardiac monitor is stable with aggressive diuresis, will be able to send to  (vent unit) without cardiac monitor tomorrow.

## 2018-04-29 NOTE — H&P ADULT - HISTORY OF PRESENT ILLNESS
Pt unable to provide history, obtained from EMR /SNF charts. Pt is  85 y/o male NH resident w/PMHx of CHF, dementia, prostate Ca, seizures, CAD, GERD anemia, bed bound, nonverbal, chronic tracheostomy with vent dependence, + PEG, chronic pleural effusion, chronic Baugh's, recent MRSA bacteremia in 3/18 completed antibiotics course, was sent from NH for accelerated HTN. History very limited as pt nonverbal.   In ED Pt noted to have BNP elevated, vascular congestion on CXR and admitted with acute on chronic CHF and HTN urgency.

## 2018-04-29 NOTE — CONSULT NOTE ADULT - PROBLEM SELECTOR PROBLEM 2
Coronary artery disease involving native coronary artery of native heart without angina pectoris
Hypertensive crisis

## 2018-04-29 NOTE — H&P ADULT - NSHPPHYSICALEXAM_GEN_ALL_CORE
PHYSICAL EXAM:    Vital Signs Last 24 Hrs  T(C): 37 (29 Apr 2018 07:44), Max: 37 (28 Apr 2018 21:48)  T(F): 98.6 (29 Apr 2018 07:44), Max: 98.6 (28 Apr 2018 21:48)  HR: 65 (29 Apr 2018 08:15) (65 - 72)  BP: 150/68 (29 Apr 2018 07:44) (150/68 - 172/97)  BP(mean): --  RR: 16 (29 Apr 2018 07:44) (16 - 26)  SpO2: 98% (29 Apr 2018 08:15) (95% - 100%)    GENERAL: Pt lying comfortably, NAD. Opens eyes, non-verbal chronically ill looking.   ENMT: PERRL,   NECK: + trach  CHEST/LUNG: Clear to auscultation bilaterally; No wheezing.  HEART: S1S2+, Regular rate and rhythm; No murmurs.  ABDOMEN: Soft, Nontender, Nondistended; Bowel sounds present. + PEG, + left colostomy   MUSCULOSKELETAL: Limited ROM  SKIN: Reportedly skin ulcer.  NEURO: Nonverbal, does not follow commands, unable to complete full neuro exam.

## 2018-04-29 NOTE — H&P ADULT - NSHPLABSRESULTS_GEN_ALL_CORE
LABS:                        10.0   7.1   )-----------( 331      ( 2018 01:30 )             33.5         145  |  107  |  34.0<H>  ----------------------------<  84  4.4   |  26.0  |  0.40<L>    Ca    9.3      2018 01:30  Phos  3.4       Mg     2.1         TPro  6.5<L>  /  Alb  3.0<L>  /  TBili  0.3<L>  /  DBili  x   /  AST  39  /  ALT  22  /  AlkPhos  127<H>      PT/INR - ( 2018 03:12 )   PT: 12.3 sec;   INR: 1.12 ratio         PTT - ( 2018 03:12 )  PTT:28.0 sec    LIVER FUNCTIONS - ( 2018 01:30 )  Alb: 3.0 g/dL / Pro: 6.5 g/dL / ALK PHOS: 127 U/L / ALT: 22 U/L / AST: 39 U/L / GGT: x           Urinalysis Basic - ( 2018 23:40 )    Color: Pale Yellow / Appearance: Clear / S.005 / pH: x  Gluc: x / Ketone: Negative  / Bili: Negative / Urobili: Negative mg/dL   Blood: x / Protein: 15 mg/dL / Nitrite: Negative   Leuk Esterase: Trace / RBC: 6-10 /HPF / WBC 3-5   Sq Epi: x / Non Sq Epi: Occasional / Bacteria: Occasional        CARDIAC MARKERS ( 2018 05:57 )  x     / 0.11 ng/mL / x     / x     / x      CARDIAC MARKERS ( 2018 01:30 )  x     / 0.10 ng/mL / 61 U/L / x     / x

## 2018-04-30 DIAGNOSIS — G40.909 EPILEPSY, UNSPECIFIED, NOT INTRACTABLE, WITHOUT STATUS EPILEPTICUS: ICD-10-CM

## 2018-04-30 DIAGNOSIS — Z29.9 ENCOUNTER FOR PROPHYLACTIC MEASURES, UNSPECIFIED: ICD-10-CM

## 2018-04-30 DIAGNOSIS — E87.0 HYPEROSMOLALITY AND HYPERNATREMIA: ICD-10-CM

## 2018-04-30 DIAGNOSIS — I16.0 HYPERTENSIVE URGENCY: ICD-10-CM

## 2018-04-30 LAB
ANION GAP SERPL CALC-SCNC: 14 MMOL/L — SIGNIFICANT CHANGE UP (ref 5–17)
BUN SERPL-MCNC: 35 MG/DL — HIGH (ref 8–20)
CALCIUM SERPL-MCNC: 9.7 MG/DL — SIGNIFICANT CHANGE UP (ref 8.6–10.2)
CHLORIDE SERPL-SCNC: 108 MMOL/L — HIGH (ref 98–107)
CO2 SERPL-SCNC: 26 MMOL/L — SIGNIFICANT CHANGE UP (ref 22–29)
CREAT SERPL-MCNC: 0.52 MG/DL — SIGNIFICANT CHANGE UP (ref 0.5–1.3)
CULTURE RESULTS: NO GROWTH — SIGNIFICANT CHANGE UP
GLUCOSE BLDC GLUCOMTR-MCNC: 71 MG/DL — SIGNIFICANT CHANGE UP (ref 70–99)
GLUCOSE SERPL-MCNC: 74 MG/DL — SIGNIFICANT CHANGE UP (ref 70–115)
HCT VFR BLD CALC: 33.1 % — LOW (ref 42–52)
HGB BLD-MCNC: 9.9 G/DL — LOW (ref 14–18)
MCHC RBC-ENTMCNC: 26.3 PG — LOW (ref 27–31)
MCHC RBC-ENTMCNC: 29.9 G/DL — LOW (ref 32–36)
MCV RBC AUTO: 87.8 FL — SIGNIFICANT CHANGE UP (ref 80–94)
PHENYTOIN FREE SERPL-MCNC: <2.9 UG/ML — LOW (ref 10–20)
PLATELET # BLD AUTO: 271 K/UL — SIGNIFICANT CHANGE UP (ref 150–400)
POTASSIUM SERPL-MCNC: 3.8 MMOL/L — SIGNIFICANT CHANGE UP (ref 3.5–5.3)
POTASSIUM SERPL-SCNC: 3.8 MMOL/L — SIGNIFICANT CHANGE UP (ref 3.5–5.3)
RBC # BLD: 3.77 M/UL — LOW (ref 4.6–6.2)
RBC # FLD: 20.5 % — HIGH (ref 11–15.6)
SODIUM SERPL-SCNC: 148 MMOL/L — HIGH (ref 135–145)
SPECIMEN SOURCE: SIGNIFICANT CHANGE UP
TROPONIN T SERPL-MCNC: 0.11 NG/ML — HIGH (ref 0–0.06)
WBC # BLD: 7.3 K/UL — SIGNIFICANT CHANGE UP (ref 4.8–10.8)
WBC # FLD AUTO: 7.3 K/UL — SIGNIFICANT CHANGE UP (ref 4.8–10.8)

## 2018-04-30 PROCEDURE — 99233 SBSQ HOSP IP/OBS HIGH 50: CPT | Mod: GC

## 2018-04-30 PROCEDURE — 99233 SBSQ HOSP IP/OBS HIGH 50: CPT

## 2018-04-30 RX ORDER — POTASSIUM CHLORIDE 20 MEQ
20 PACKET (EA) ORAL ONCE
Qty: 0 | Refills: 0 | Status: DISCONTINUED | OUTPATIENT
Start: 2018-04-30 | End: 2018-04-30

## 2018-04-30 RX ORDER — POTASSIUM CHLORIDE 20 MEQ
20 PACKET (EA) ORAL ONCE
Qty: 0 | Refills: 0 | Status: COMPLETED | OUTPATIENT
Start: 2018-04-30 | End: 2018-04-30

## 2018-04-30 RX ORDER — METOPROLOL TARTRATE 50 MG
1 TABLET ORAL
Qty: 0 | Refills: 0 | COMMUNITY

## 2018-04-30 RX ORDER — ASPIRIN/CALCIUM CARB/MAGNESIUM 324 MG
81 TABLET ORAL DAILY
Qty: 0 | Refills: 0 | Status: DISCONTINUED | OUTPATIENT
Start: 2018-04-30 | End: 2018-05-04

## 2018-04-30 RX ADMIN — Medication 3 MILLILITER(S): at 21:02

## 2018-04-30 RX ADMIN — Medication 60 MILLIGRAM(S): at 06:13

## 2018-04-30 RX ADMIN — Medication 0.25 MILLIGRAM(S): at 06:14

## 2018-04-30 RX ADMIN — Medication 20 MILLIEQUIVALENT(S): at 12:14

## 2018-04-30 RX ADMIN — Medication 30 MILLIGRAM(S): at 14:18

## 2018-04-30 RX ADMIN — Medication 1 APPLICATION(S): at 06:14

## 2018-04-30 RX ADMIN — Medication 60 MILLIGRAM(S): at 18:23

## 2018-04-30 RX ADMIN — Medication 100 MILLIGRAM(S): at 14:18

## 2018-04-30 RX ADMIN — Medication 3 MILLILITER(S): at 03:07

## 2018-04-30 RX ADMIN — Medication 30 MILLIGRAM(S): at 06:15

## 2018-04-30 RX ADMIN — Medication 30 MILLIGRAM(S): at 21:57

## 2018-04-30 RX ADMIN — Medication 200 MILLIGRAM(S): at 06:13

## 2018-04-30 RX ADMIN — ENOXAPARIN SODIUM 40 MILLIGRAM(S): 100 INJECTION SUBCUTANEOUS at 12:14

## 2018-04-30 RX ADMIN — LEVETIRACETAM 1000 MILLIGRAM(S): 250 TABLET, FILM COATED ORAL at 06:14

## 2018-04-30 RX ADMIN — Medication 200 MILLIGRAM(S): at 21:57

## 2018-04-30 RX ADMIN — Medication 81 MILLIGRAM(S): at 12:14

## 2018-04-30 RX ADMIN — Medication 1 APPLICATION(S): at 18:23

## 2018-04-30 RX ADMIN — Medication 200 MILLIGRAM(S): at 14:19

## 2018-04-30 RX ADMIN — NYSTATIN CREAM 1 APPLICATION(S): 100000 CREAM TOPICAL at 06:15

## 2018-04-30 RX ADMIN — NYSTATIN CREAM 1 APPLICATION(S): 100000 CREAM TOPICAL at 18:23

## 2018-04-30 RX ADMIN — CHLORHEXIDINE GLUCONATE 15 MILLILITER(S): 213 SOLUTION TOPICAL at 18:22

## 2018-04-30 RX ADMIN — Medication 1 APPLICATION(S): at 21:57

## 2018-04-30 RX ADMIN — Medication 100 MILLIGRAM(S): at 06:14

## 2018-04-30 RX ADMIN — Medication 1 APPLICATION(S): at 06:21

## 2018-04-30 RX ADMIN — Medication 1 APPLICATION(S): at 14:18

## 2018-04-30 RX ADMIN — Medication 0.5 MILLIGRAM(S): at 08:12

## 2018-04-30 RX ADMIN — CHLORHEXIDINE GLUCONATE 15 MILLILITER(S): 213 SOLUTION TOPICAL at 06:14

## 2018-04-30 RX ADMIN — Medication 3 MILLILITER(S): at 08:12

## 2018-04-30 RX ADMIN — PANTOPRAZOLE SODIUM 40 MILLIGRAM(S): 20 TABLET, DELAYED RELEASE ORAL at 06:14

## 2018-04-30 RX ADMIN — Medication 3 MILLILITER(S): at 15:07

## 2018-04-30 RX ADMIN — Medication 100 MILLIGRAM(S): at 21:57

## 2018-04-30 RX ADMIN — Medication 0.5 MILLIGRAM(S): at 21:02

## 2018-04-30 RX ADMIN — Medication 60 MILLIGRAM(S): at 18:22

## 2018-04-30 RX ADMIN — LEVETIRACETAM 1000 MILLIGRAM(S): 250 TABLET, FILM COATED ORAL at 18:22

## 2018-04-30 NOTE — PROGRESS NOTE ADULT - SUBJECTIVE AND OBJECTIVE BOX
[facility]  ----------------------------------------  The patient was seen and evaluated for CHF.  The patient is in no acute distress.  Noncommunicative on examination. On ventilator support.    Vital Signs Last 24 Hrs  T(C): 37.4 (2018 12:00), Max: 37.4 (2018 12:00)  T(F): 99.3 (2018 12:00), Max: 99.3 (2018 12:00)  HR: 72 (2018 12:00) (60 - 75)  BP: 151/74 (:00) (116/58 - 170/81)  BP(mean): 104 (2018 12:00) (82 - 116)  RR: 37 (:00) (18 - 37)  SpO2: 99% (:00) (98% - 100%)    CAPILLARY BLOOD GLUCOSE  POCT Blood Glucose.: 71 mg/dL (2018 09:36)    PHYSICAL EXAMINATION:  ----------------------------------------  General appearance: No acute distress, Awake  HEENT: Normocephalic, Atraumatic, Conjunctiva clear  Neck: Supple, No JVD, Tracheostomy in place  Lungs: Breath sound equal bilaterally, No wheezes, Mild rales  Cardiovascular: S1S2, Regular rhythm  Abdomen: Soft, Nondistended, No guarding, Positive bowel sounds, Gastrostomy in place  Extremities: No clubbing, No cyanosis, Lower extremity edema  Neuro: No tremors, Contracted lower extremities  Psychiatric:     LABORATORY STUDIES:  ----------------------------------------             9.9    7.3   )-----------( 271      ( 2018 06:53 )             33.1     04-30    148<H>  |  108<H>  |  35.0<H>  ----------------------------<  74  3.8   |  26.0  |  0.52    Ca    9.7      2018 06:53  Phos  4.1       Mg     2.3         TPro  x   /  Alb  3.3  /  TBili  x   /  DBili  x   /  AST  x   /  ALT  x   /  AlkPhos  x       LIVER FUNCTIONS - ( 2018 17:19 )  Alb: 3.3 g/dL / Pro: x     / ALK PHOS: x     / ALT: x     / AST: x     / GGT: x           PT/INR - ( 2018 03:12 )   PT: 12.3 sec;   INR: 1.12 ratio    PTT - ( 2018 03:12 )  PTT:28.0 sec    CARDIAC MARKERS ( 2018 06:53 )  x     / 0.11 ng/mL / x     / x     / x      CARDIAC MARKERS ( 2018 12:40 )  x     / 0.13 ng/mL / x     / x     / x      CARDIAC MARKERS ( 2018 05:57 )  x     / 0.11 ng/mL / x     / x     / x      CARDIAC MARKERS ( 2018 01:30 )  x     / 0.10 ng/mL / 61 U/L / x     / x        Urinalysis Basic - ( 2018 23:40 )  Color: Pale Yellow / Appearance: Clear / S.005 / pH: x  Gluc: x / Ketone: Negative  / Bili: Negative / Urobili: Negative mg/dL   Blood: x / Protein: 15 mg/dL / Nitrite: Negative   Leuk Esterase: Trace / RBC: 6-10 /HPF / WBC 3-5   Sq Epi: x / Non Sq Epi: Occasional / Bacteria: Occasional    Culture - Urine (collected 2018 23:40)  Source: .Urine Catheterized  Final Report (2018 09:07):    No growth    MEDICATIONS  (STANDING):  ALBUTerol/ipratropium for Nebulization 3 milliLiter(s) Nebulizer every 6 hours  aspirin  chewable 81 milliGRAM(s) Oral daily  BACItracin   Ointment 1 Application(s) Topical every 8 hours  buDESOnide   0.5 milliGRAM(s) Respule 0.5 milliGRAM(s) Inhalation two times a day  chlorhexidine 0.12% Liquid 15 milliLiter(s) Swish and Spit two times a day  collagenase Ointment 1 Application(s) Topical two times a day  digoxin     Tablet 0.25 milliGRAM(s) Oral daily  enoxaparin Injectable 40 milliGRAM(s) SubCutaneous daily  epoetin giorgio Injectable 77166 Unit(s) SubCutaneous <User Schedule>  ferrous    sulfate Liquid 300 milliGRAM(s) Oral three times a day with meals  furosemide   Injectable 60 milliGRAM(s) IV Push every 12 hours  labetalol 100 milliGRAM(s) Oral three times a day  levETIRAcetam  Solution 1000 milliGRAM(s) Oral every 12 hours  NIFEdipine IR 30 milliGRAM(s) Oral three times a day  nystatin Powder 1 Application(s) Topical two times a day  pantoprazole   Suspension 40 milliGRAM(s) Oral before breakfast  PHENobarbital Elixir 60 milliGRAM(s) Oral every 12 hours  phenytoin   Suspension 200 milliGRAM(s) Oral every 8 hours    MEDICATIONS  (PRN):  acetaminophen   Tablet. 650 milliGRAM(s) Oral every 6 hours PRN Mild Pain (1 - 3)  bisacodyl Suppository 10 milliGRAM(s) Rectal daily PRN Constipation      ASSESSMENT / PLAN:  ----------------------------------------  Acute on chronic diastolic heart failure - Cardiology consultation noted. Improved with furosemide. Monitor urine output, chronic jones catheter present on admission.    Hypertensive urgency - Blood pressure improved. On labetalol and nifedipine.    Chronic respiratory failure - On ventilator support. Gastrostomy tube feeds.    Seizure - On phenobarbital and phenytoin.    Sacral decubitus - Present on admission. On collagenase. Wound care and positioning as per routine.

## 2018-04-30 NOTE — PROGRESS NOTE ADULT - SUBJECTIVE AND OBJECTIVE BOX
[facility]                                                        Office: [address] [address]                                                       Telephone: [phone]. Fax:[phone]      CARDIOLOGY PROGRESS NOTE   ([facility])    Subjective: Patient seen and examined at bedside.  Chart reviewed. No arrhythmias overnight.      CURRENT MEDICATIONS  digoxin     Tablet 0.25 milliGRAM(s) Oral daily  furosemide   Injectable 60 milliGRAM(s) IV Push every 12 hours  labetalol 100 milliGRAM(s) Oral three times a day  NIFEdipine IR 30 milliGRAM(s) Oral three times a day  ALBUTerol/ipratropium for Nebulization 3 milliLiter(s) Nebulizer every 6 hours  buDESOnide   0.5 milliGRAM(s) Respule 0.5 milliGRAM(s) Inhalation two times a day  acetaminophen   Tablet. 650 milliGRAM(s) Oral every 6 hours PRN  levETIRAcetam  Solution 1000 milliGRAM(s) Oral every 12 hours  PHENobarbital Elixir 60 milliGRAM(s) Oral every 12 hours  phenytoin   Suspension 200 milliGRAM(s) Oral every 8 hours  bisacodyl Suppository 10 milliGRAM(s) Rectal daily PRN  pantoprazole   Suspension 40 milliGRAM(s) Oral before breakfast  aspirin  chewable 81 milliGRAM(s) Oral daily  BACItracin   Ointment 1 Application(s) Topical every 8 hours  chlorhexidine 0.12% Liquid 15 milliLiter(s) Swish and Spit two times a day  collagenase Ointment 1 Application(s) Topical two times a day  enoxaparin Injectable 40 milliGRAM(s) SubCutaneous daily  epoetin giorgio Injectable 37460 Unit(s) SubCutaneous <User Schedule>  ferrous    sulfate Liquid 300 milliGRAM(s) Oral three times a day with meals  nystatin Powder 1 Application(s) Topical two times a day  potassium chloride   Powder 20 milliEquivalent(s) Oral once  	  TELEMETRY:   Vitals:  T(C): 37.2 (18 @ 07:58), Max: 37.2 (18 @ 11:24)  HR: 72 (18 @ 10:00) (60 - 75)  BP: 152/72 (18 @ 10:00) (116/58 - 175/82)  RR: 37 (18 @ 10:00) (16 - 37)  SpO2: 98% (18 @ 10:00) (98% - 100%)  Wt(kg): --  I&O's Summary    2018 07:01  -  2018 07:00  --------------------------------------------------------  IN: 550 mL / OUT: 2275 mL / NET: -1725 mL    PHYSICAL EXAM:  Appearance: NAD,on vent, non-verbal  HEENT:   Normal oral mucosa, PERRL, EOMI	  Lymphatic: No lymphadenopathy  Cardiovascular: Normal S1 S2, No JVD, No murmurs, No edema  Respiratory: Lungs clear to auscultation anterolaterally	  Psychiatry: unresponsive  Gastrointestinal:  Soft, G tube  Skin: No rashes, No ecchymoses, No cyanosis  Neurologic:   Extremities: Normal range of motion, No clubbing, cyanosis or edema  Vascular: Peripheral pulses palpable 2+ bilaterally, warm      DIAGNOSTIC TESTIN.9    7.3   )-----------( 271      ( 2018 06:53 )             33.1         148<H>  |  108<H>  |  35.0<H>  ----------------------------<  74  3.8   |  26.0  |  0.52    Ca    9.7      2018 06:53  Phos  4.1       Mg     2.3         TPro  x   /  Alb  3.3  /  TBili  x   /  DBili  x   /  AST  x   /  ALT  x   /  AlkPhos  x

## 2018-04-30 NOTE — PROGRESS NOTE ADULT - PROBLEM SELECTOR PLAN 6
Home Dilantin dose decreased to 200 mg q8 on this admission.  He appears to be tolerating this well, with no seizure activity noted.  Would continue to monitor Dilantin levels.

## 2018-04-30 NOTE — PROGRESS NOTE ADULT - ATTENDING COMMENTS
Pt seen and examined with PGY 3 on rounds this morning, agree with above assessment and plan, edits made where necessary. 86 year old male with vent dependence, dementia, CHF a/w HTN urgency/ decompensated CHF-= diuresed well, free water via PEG, can also add Zaroxolyn for naturesis. No cardiac events on monitor. Pt is stable to transfer out of ICU to chronic vent floor. Pt has legal guardian who I have contacted in the past to try to re address GOC, would continue discussions. Pt is chronically ill and would not benefit from further critical care or CPR should condition decline. Care endorsed to Dr. Garcia.

## 2018-04-30 NOTE — PROGRESS NOTE ADULT - SUBJECTIVE AND OBJECTIVE BOX
Patient is a 86y old  Male who presents with a chief complaint of CHF exacerbation (2018 16:46)      BRIEF HOSPITAL COURSE:  Patient is a 87 y/o male NH resident w/PMHx of CHF, dementia, prostate Ca, seizures, CAD, GERD anemia, bed bound, nonverbal, chronic tracheostomy with vent dependence, + PEG, chronic pleural effusion, chronic Baugh's, recent MRSA bacteremia in 3/18 completed antibiotics course, was sent from NH for accelerated HTN. History very limited as pt nonverbal.  In ED Pt noted to have BNP elevated, vascular congestion on CXR and admitted with acute on chronic CHF and HTN urgency.     Events last 24 hours:   He has remained stable overnight, with no arrhythmias on cardiac monitor.  He is diuresing well, with a net of -1725 cc.        Review of Systems:  unable to obtain, patient non-verbal      Medications:    digoxin     Tablet 0.25 milliGRAM(s) Oral daily  furosemide   Injectable 60 milliGRAM(s) IV Push every 12 hours  labetalol 100 milliGRAM(s) Oral three times a day  NIFEdipine IR 30 milliGRAM(s) Oral three times a day    ALBUTerol/ipratropium for Nebulization 3 milliLiter(s) Nebulizer every 6 hours  buDESOnide   0.5 milliGRAM(s) Respule 0.5 milliGRAM(s) Inhalation two times a day    acetaminophen   Tablet. 650 milliGRAM(s) Oral every 6 hours PRN  levETIRAcetam  Solution 1000 milliGRAM(s) Oral every 12 hours  PHENobarbital Elixir 60 milliGRAM(s) Oral every 12 hours  phenytoin   Suspension 200 milliGRAM(s) Oral every 8 hours      aspirin  chewable 81 milliGRAM(s) Oral daily  enoxaparin Injectable 40 milliGRAM(s) SubCutaneous daily    bisacodyl Suppository 10 milliGRAM(s) Rectal daily PRN  pantoprazole   Suspension 40 milliGRAM(s) Oral before breakfast        ferrous    sulfate Liquid 300 milliGRAM(s) Oral three times a day with meals  potassium chloride   Solution 20 milliEquivalent(s) Oral once    epoetin giorgio Injectable 88564 Unit(s) SubCutaneous <User Schedule>    BACItracin   Ointment 1 Application(s) Topical every 8 hours  chlorhexidine 0.12% Liquid 15 milliLiter(s) Swish and Spit two times a day  collagenase Ointment 1 Application(s) Topical two times a day  nystatin Powder 1 Application(s) Topical two times a day        Mode: AC/ CMV (Assist Control/ Continuous Mandatory Ventilation)  RR (machine): 16  TV (machine): 400  FiO2: 30  PEEP: 5  MAP: 10  PIP: 22      ICU Vital Signs Last 24 Hrs  T(C): 37.2 (2018 07:58), Max: 37.2 (2018 11:24)  T(F): 99 (2018 07:58), Max: 99 (2018 11:24)  HR: 71 (2018 08:41) (60 - 75)  BP: 143/66 (2018 08:00) (116/58 - 175/82)  BP(mean): 95 (2018 08:00) (82 - 118)  RR: 37 (2018 08:00) (16 - 37)  SpO2: 98% (2018 08:41) (98% - 100%)          I&O's Detail    2018 07:01  -  2018 07:00  --------------------------------------------------------  IN:    Enteral Tube Flush: 550 mL  Total IN: 550 mL    OUT:    Indwelling Catheter - Urethral: 2075 mL    Voided: 200 mL  Total OUT: 2275 mL    Total NET: -1725 mL            LABS:                        9.9    7.3   )-----------( 271      ( 2018 06:53 )             33.1     04-30    148<H>  |  108<H>  |  35.0<H>  ----------------------------<  74  3.8   |  26.0  |  0.52    Ca    9.7      2018 06:53  Phos  4.1     29  Mg     2.3         TPro  x   /  Alb  3.3  /  TBili  x   /  DBili  x   /  AST  x   /  ALT  x   /  AlkPhos  x   29      CARDIAC MARKERS ( 2018 06:53 )  x     / 0.11 ng/mL / x     / x     / x      CARDIAC MARKERS ( 2018 12:40 )  x     / 0.13 ng/mL / x     / x     / x      CARDIAC MARKERS ( 2018 05:57 )  x     / 0.11 ng/mL / x     / x     / x      CARDIAC MARKERS ( 2018 01:30 )  x     / 0.10 ng/mL / 61 U/L / x     / x          CAPILLARY BLOOD GLUCOSE        PT/INR - ( 2018 03:12 )   PT: 12.3 sec;   INR: 1.12 ratio         PTT - ( 2018 03:12 )  PTT:28.0 sec  Urinalysis Basic - ( 2018 23:40 )    Color: Pale Yellow / Appearance: Clear / S.005 / pH: x  Gluc: x / Ketone: Negative  / Bili: Negative / Urobili: Negative mg/dL   Blood: x / Protein: 15 mg/dL / Nitrite: Negative   Leuk Esterase: Trace / RBC: 6-10 /HPF / WBC 3-5   Sq Epi: x / Non Sq Epi: Occasional / Bacteria: Occasional      CULTURES:  Culture Results:   No growth ( @ 23:40)      Physical Examination:    General: elderly male lying curled in bed  HEENT: Pupils equal, reactive to light.  Symmetric.  PULM: breath sounds equal bilaterally, trach/venty, no significant sputum production  NECK: Supple, no lymphadenopathy, trachea midline  CVS: Regular rate and rhythm, no murmurs, rubs, or gallops  ABD: Soft, nondistended, nontender, normoactive bowel sounds, colostomy, peg tube in place  EXT: No edema, nontender  SKIN: Warm and well perfused  NEURO: awake, eyes track, non-verbal, extremities x 4 with increased tone, nonfocal        RADIOLOGY:    EXAM:  XR CHEST AP OR PA 1V                        PROCEDURE DATE:  2018    FINDINGS:  DEXA slightly obscured by overlying chin.  LINES/TUBES: Tracheostomy tube, unchanged. Unchanged left pleural   catheter. Previously seen left-sided PICC line is no longer visualized.  LUNGS/PLEURA: Small bilateral pleural effusions and perihilar opacities,   probably pulmonary edema. No gross pneumothorax.  MEDIASTINUM: Heart size cannot adequately be assessed on this projection.  OTHER: None.  IMPRESSION:   Probable pulmonary edema.      CRITICAL CARE TIME SPENT: *** Patient is a 86y old  Male who presents with a chief complaint of CHF exacerbation (2018 16:46)      BRIEF HOSPITAL COURSE:  Patient is a 87 y/o male NH resident w/PMHx of CHF, dementia, prostate Ca, seizures, CAD, GERD anemia, bed bound, nonverbal, chronic tracheostomy with vent dependence, + PEG, chronic pleural effusion, chronic Baugh's, recent MRSA bacteremia in 3/18 completed antibiotics course, was sent from NH for accelerated HTN. History very limited as pt nonverbal.  In ED Pt noted to have BNP elevated, vascular congestion on CXR and admitted with acute on chronic CHF and HTN urgency.     Events last 24 hours:   He has remained stable overnight, with no arrhythmias on cardiac monitor.  He is diuresing well, with a net of -1725 cc.        Review of Systems:  unable to obtain, patient non-verbal      Medications:    digoxin     Tablet 0.25 milliGRAM(s) Oral daily  furosemide   Injectable 60 milliGRAM(s) IV Push every 12 hours  labetalol 100 milliGRAM(s) Oral three times a day  NIFEdipine IR 30 milliGRAM(s) Oral three times a day    ALBUTerol/ipratropium for Nebulization 3 milliLiter(s) Nebulizer every 6 hours  buDESOnide   0.5 milliGRAM(s) Respule 0.5 milliGRAM(s) Inhalation two times a day    acetaminophen   Tablet. 650 milliGRAM(s) Oral every 6 hours PRN  levETIRAcetam  Solution 1000 milliGRAM(s) Oral every 12 hours  PHENobarbital Elixir 60 milliGRAM(s) Oral every 12 hours  phenytoin   Suspension 200 milliGRAM(s) Oral every 8 hours      aspirin  chewable 81 milliGRAM(s) Oral daily  enoxaparin Injectable 40 milliGRAM(s) SubCutaneous daily    bisacodyl Suppository 10 milliGRAM(s) Rectal daily PRN  pantoprazole   Suspension 40 milliGRAM(s) Oral before breakfast        ferrous    sulfate Liquid 300 milliGRAM(s) Oral three times a day with meals  potassium chloride   Solution 20 milliEquivalent(s) Oral once    epoetin giorgio Injectable 33740 Unit(s) SubCutaneous <User Schedule>    BACItracin   Ointment 1 Application(s) Topical every 8 hours  chlorhexidine 0.12% Liquid 15 milliLiter(s) Swish and Spit two times a day  collagenase Ointment 1 Application(s) Topical two times a day  nystatin Powder 1 Application(s) Topical two times a day        Mode: AC/ CMV (Assist Control/ Continuous Mandatory Ventilation)  RR (machine): 16  TV (machine): 400  FiO2: 30  PEEP: 5  MAP: 10  PIP: 22      ICU Vital Signs Last 24 Hrs  T(C): 37.2 (2018 07:58), Max: 37.2 (2018 11:24)  T(F): 99 (2018 07:58), Max: 99 (2018 11:24)  HR: 71 (2018 08:41) (60 - 75)  BP: 143/66 (2018 08:00) (116/58 - 175/82)  BP(mean): 95 (2018 08:00) (82 - 118)  RR: 37 (2018 08:00) (16 - 37)  SpO2: 98% (2018 08:41) (98% - 100%)          I&O's Detail    2018 07:01  -  2018 07:00  --------------------------------------------------------  IN:    Enteral Tube Flush: 550 mL  Total IN: 550 mL    OUT:    Indwelling Catheter - Urethral: 2075 mL    Voided: 200 mL  Total OUT: 2275 mL    Total NET: -1725 mL            LABS:                        9.9    7.3   )-----------( 271      ( 2018 06:53 )             33.1     04-30    148<H>  |  108<H>  |  35.0<H>  ----------------------------<  74  3.8   |  26.0  |  0.52    Ca    9.7      2018 06:53  Phos  4.1     29  Mg     2.3         TPro  x   /  Alb  3.3  /  TBili  x   /  DBili  x   /  AST  x   /  ALT  x   /  AlkPhos  x   29      CARDIAC MARKERS ( 2018 06:53 )  x     / 0.11 ng/mL / x     / x     / x      CARDIAC MARKERS ( 2018 12:40 )  x     / 0.13 ng/mL / x     / x     / x      CARDIAC MARKERS ( 2018 05:57 )  x     / 0.11 ng/mL / x     / x     / x      CARDIAC MARKERS ( 2018 01:30 )  x     / 0.10 ng/mL / 61 U/L / x     / x          CAPILLARY BLOOD GLUCOSE        PT/INR - ( 2018 03:12 )   PT: 12.3 sec;   INR: 1.12 ratio         PTT - ( 2018 03:12 )  PTT:28.0 sec  Urinalysis Basic - ( 2018 23:40 )    Color: Pale Yellow / Appearance: Clear / S.005 / pH: x  Gluc: x / Ketone: Negative  / Bili: Negative / Urobili: Negative mg/dL   Blood: x / Protein: 15 mg/dL / Nitrite: Negative   Leuk Esterase: Trace / RBC: 6-10 /HPF / WBC 3-5   Sq Epi: x / Non Sq Epi: Occasional / Bacteria: Occasional      CULTURES:  Culture Results:   No growth ( @ 23:40)      Physical Examination:    General: elderly male lying curled in bed  HEENT: Pupils equal, reactive to light.  Symmetric.  PULM: breath sounds equal bilaterally, trach/venty, no significant sputum production  NECK: Supple, no lymphadenopathy, trachea midline  CVS: Regular rate and rhythm, no murmurs, rubs, or gallops  ABD: Soft, nondistended, nontender, normoactive bowel sounds, colostomy, peg tube in place  EXT: No edema, nontender  SKIN: Warm and well perfused  NEURO: awake, eyes track, non-verbal, extremities x 4 with increased tone, nonfocal        RADIOLOGY:    EXAM:  XR CHEST AP OR PA 1V                        PROCEDURE DATE:  2018    FINDINGS:  DEXA slightly obscured by overlying chin.  LINES/TUBES: Tracheostomy tube, unchanged. Unchanged left pleural   catheter. Previously seen left-sided PICC line is no longer visualized.  LUNGS/PLEURA: Small bilateral pleural effusions and perihilar opacities,   probably pulmonary edema. No gross pneumothorax.  MEDIASTINUM: Heart size cannot adequately be assessed on this projection.  OTHER: None.  IMPRESSION:   Probable pulmonary edema.      CRITICAL CARE TIME SPENT:

## 2018-05-01 LAB
ANION GAP SERPL CALC-SCNC: 15 MMOL/L — SIGNIFICANT CHANGE UP (ref 5–17)
ANISOCYTOSIS BLD QL: SIGNIFICANT CHANGE UP
APPEARANCE UR: CLEAR — SIGNIFICANT CHANGE UP
BACTERIA # UR AUTO: ABNORMAL
BASOPHILS NFR BLD AUTO: 2 % — SIGNIFICANT CHANGE UP (ref 0–2)
BILIRUB UR-MCNC: NEGATIVE — SIGNIFICANT CHANGE UP
BUN SERPL-MCNC: 35 MG/DL — HIGH (ref 8–20)
CALCIUM SERPL-MCNC: 8.8 MG/DL — SIGNIFICANT CHANGE UP (ref 8.6–10.2)
CHLORIDE SERPL-SCNC: 106 MMOL/L — SIGNIFICANT CHANGE UP (ref 98–107)
CO2 SERPL-SCNC: 26 MMOL/L — SIGNIFICANT CHANGE UP (ref 22–29)
COLOR SPEC: YELLOW — SIGNIFICANT CHANGE UP
CREAT SERPL-MCNC: 0.58 MG/DL — SIGNIFICANT CHANGE UP (ref 0.5–1.3)
DACRYOCYTES BLD QL SMEAR: SLIGHT — SIGNIFICANT CHANGE UP
DIFF PNL FLD: ABNORMAL
EOSINOPHIL # BLD AUTO: 0.2 K/UL — SIGNIFICANT CHANGE UP (ref 0–0.5)
EOSINOPHIL NFR BLD AUTO: 3 % — SIGNIFICANT CHANGE UP (ref 0–6)
EPI CELLS # UR: SIGNIFICANT CHANGE UP
GAS PNL BLDA: SIGNIFICANT CHANGE UP
GLUCOSE SERPL-MCNC: 115 MG/DL — SIGNIFICANT CHANGE UP (ref 70–115)
GLUCOSE UR QL: NEGATIVE MG/DL — SIGNIFICANT CHANGE UP
HCT VFR BLD CALC: 34 % — LOW (ref 42–52)
HGB BLD-MCNC: 10 G/DL — LOW (ref 14–18)
HYALINE CASTS # UR AUTO: ABNORMAL /LPF
HYPOCHROMIA BLD QL: SLIGHT — SIGNIFICANT CHANGE UP
KETONES UR-MCNC: NEGATIVE — SIGNIFICANT CHANGE UP
LEUKOCYTE ESTERASE UR-ACNC: ABNORMAL
LYMPHOCYTES # BLD AUTO: 1.2 K/UL — SIGNIFICANT CHANGE UP (ref 1–4.8)
LYMPHOCYTES # BLD AUTO: 10 % — LOW (ref 20–55)
MAGNESIUM SERPL-MCNC: 2.3 MG/DL — SIGNIFICANT CHANGE UP (ref 1.6–2.6)
MCHC RBC-ENTMCNC: 25.9 PG — LOW (ref 27–31)
MCHC RBC-ENTMCNC: 29.4 G/DL — LOW (ref 32–36)
MCV RBC AUTO: 88.1 FL — SIGNIFICANT CHANGE UP (ref 80–94)
MONOCYTES # BLD AUTO: 0.3 K/UL — SIGNIFICANT CHANGE UP (ref 0–0.8)
MONOCYTES NFR BLD AUTO: 3 % — SIGNIFICANT CHANGE UP (ref 3–10)
NEUTROPHILS # BLD AUTO: 8.8 K/UL — HIGH (ref 1.8–8)
NEUTROPHILS NFR BLD AUTO: 78 % — HIGH (ref 37–73)
NEUTS BAND # BLD: 3 % — SIGNIFICANT CHANGE UP (ref 0–8)
NITRITE UR-MCNC: NEGATIVE — SIGNIFICANT CHANGE UP
OVALOCYTES BLD QL SMEAR: SLIGHT — SIGNIFICANT CHANGE UP
PH UR: 5 — SIGNIFICANT CHANGE UP (ref 5–8)
PHOSPHATE SERPL-MCNC: 3.6 MG/DL — SIGNIFICANT CHANGE UP (ref 2.4–4.7)
PLAT MORPH BLD: NORMAL — SIGNIFICANT CHANGE UP
PLATELET # BLD AUTO: 290 K/UL — SIGNIFICANT CHANGE UP (ref 150–400)
POIKILOCYTOSIS BLD QL AUTO: SLIGHT — SIGNIFICANT CHANGE UP
POLYCHROMASIA BLD QL SMEAR: SLIGHT — SIGNIFICANT CHANGE UP
POTASSIUM SERPL-MCNC: 3.8 MMOL/L — SIGNIFICANT CHANGE UP (ref 3.5–5.3)
POTASSIUM SERPL-SCNC: 3.8 MMOL/L — SIGNIFICANT CHANGE UP (ref 3.5–5.3)
PROT UR-MCNC: 15 MG/DL
RBC # BLD: 3.86 M/UL — LOW (ref 4.6–6.2)
RBC # FLD: 20.1 % — HIGH (ref 11–15.6)
RBC BLD AUTO: ABNORMAL
RBC CASTS # UR COMP ASSIST: ABNORMAL /HPF (ref 0–4)
SODIUM SERPL-SCNC: 147 MMOL/L — HIGH (ref 135–145)
SP GR SPEC: 1.01 — SIGNIFICANT CHANGE UP (ref 1.01–1.02)
UROBILINOGEN FLD QL: NEGATIVE MG/DL — SIGNIFICANT CHANGE UP
VARIANT LYMPHS # BLD: 1 % — SIGNIFICANT CHANGE UP (ref 0–6)
WBC # BLD: 10.6 K/UL — SIGNIFICANT CHANGE UP (ref 4.8–10.8)
WBC # FLD AUTO: 10.6 K/UL — SIGNIFICANT CHANGE UP (ref 4.8–10.8)
WBC UR QL: ABNORMAL

## 2018-05-01 PROCEDURE — 99233 SBSQ HOSP IP/OBS HIGH 50: CPT

## 2018-05-01 RX ORDER — FUROSEMIDE 40 MG
40 TABLET ORAL DAILY
Qty: 0 | Refills: 0 | Status: DISCONTINUED | OUTPATIENT
Start: 2018-05-01 | End: 2018-05-02

## 2018-05-01 RX ADMIN — Medication 30 MILLIGRAM(S): at 21:03

## 2018-05-01 RX ADMIN — Medication 0.25 MILLIGRAM(S): at 05:12

## 2018-05-01 RX ADMIN — Medication 100 MILLIGRAM(S): at 21:03

## 2018-05-01 RX ADMIN — Medication 0.5 MILLIGRAM(S): at 19:54

## 2018-05-01 RX ADMIN — Medication 100 MILLIGRAM(S): at 05:12

## 2018-05-01 RX ADMIN — Medication 30 MILLIGRAM(S): at 14:28

## 2018-05-01 RX ADMIN — Medication 200 MILLIGRAM(S): at 21:03

## 2018-05-01 RX ADMIN — Medication 3 MILLILITER(S): at 19:54

## 2018-05-01 RX ADMIN — Medication 30 MILLIGRAM(S): at 05:12

## 2018-05-01 RX ADMIN — Medication 1 APPLICATION(S): at 14:28

## 2018-05-01 RX ADMIN — Medication 40 MILLIGRAM(S): at 14:28

## 2018-05-01 RX ADMIN — ENOXAPARIN SODIUM 40 MILLIGRAM(S): 100 INJECTION SUBCUTANEOUS at 12:25

## 2018-05-01 RX ADMIN — Medication 1 APPLICATION(S): at 05:11

## 2018-05-01 RX ADMIN — Medication 3 MILLILITER(S): at 03:17

## 2018-05-01 RX ADMIN — Medication 3 MILLILITER(S): at 15:15

## 2018-05-01 RX ADMIN — CHLORHEXIDINE GLUCONATE 15 MILLILITER(S): 213 SOLUTION TOPICAL at 17:26

## 2018-05-01 RX ADMIN — LEVETIRACETAM 1000 MILLIGRAM(S): 250 TABLET, FILM COATED ORAL at 05:12

## 2018-05-01 RX ADMIN — Medication 60 MILLIGRAM(S): at 05:12

## 2018-05-01 RX ADMIN — Medication 60 MILLIGRAM(S): at 05:16

## 2018-05-01 RX ADMIN — NYSTATIN CREAM 1 APPLICATION(S): 100000 CREAM TOPICAL at 17:27

## 2018-05-01 RX ADMIN — Medication 300 MILLIGRAM(S): at 12:26

## 2018-05-01 RX ADMIN — Medication 200 MILLIGRAM(S): at 05:12

## 2018-05-01 RX ADMIN — CHLORHEXIDINE GLUCONATE 15 MILLILITER(S): 213 SOLUTION TOPICAL at 05:11

## 2018-05-01 RX ADMIN — Medication 300 MILLIGRAM(S): at 17:27

## 2018-05-01 RX ADMIN — Medication 60 MILLIGRAM(S): at 17:30

## 2018-05-01 RX ADMIN — Medication 0.5 MILLIGRAM(S): at 08:21

## 2018-05-01 RX ADMIN — Medication 300 MILLIGRAM(S): at 09:14

## 2018-05-01 RX ADMIN — Medication 200 MILLIGRAM(S): at 14:27

## 2018-05-01 RX ADMIN — LEVETIRACETAM 1000 MILLIGRAM(S): 250 TABLET, FILM COATED ORAL at 17:26

## 2018-05-01 RX ADMIN — PANTOPRAZOLE SODIUM 40 MILLIGRAM(S): 20 TABLET, DELAYED RELEASE ORAL at 05:17

## 2018-05-01 RX ADMIN — Medication 81 MILLIGRAM(S): at 12:25

## 2018-05-01 RX ADMIN — Medication 100 MILLIGRAM(S): at 14:28

## 2018-05-01 RX ADMIN — NYSTATIN CREAM 1 APPLICATION(S): 100000 CREAM TOPICAL at 05:14

## 2018-05-01 RX ADMIN — Medication 1 APPLICATION(S): at 05:14

## 2018-05-01 RX ADMIN — Medication 3 MILLILITER(S): at 08:21

## 2018-05-01 RX ADMIN — Medication 1 APPLICATION(S): at 21:03

## 2018-05-01 NOTE — PROGRESS NOTE ADULT - SUBJECTIVE AND OBJECTIVE BOX
[facility]                                                        Office: [address] [address]                                                       Telephone: [phone]. Fax:[phone]      CARDIOLOGY PROGRESS NOTE   ([facility])    Subjective:   CURRENT MEDICATIONS  digoxin     Tablet 0.25 milliGRAM(s) Oral daily  furosemide   Injectable 60 milliGRAM(s) IV Push every 12 hours  labetalol 100 milliGRAM(s) Oral three times a day  NIFEdipine IR 30 milliGRAM(s) Oral three times a day      ALBUTerol/ipratropium for Nebulization 3 milliLiter(s) Nebulizer every 6 hours  buDESOnide   0.5 milliGRAM(s) Respule 0.5 milliGRAM(s) Inhalation two times a day    acetaminophen   Tablet. 650 milliGRAM(s) Oral every 6 hours PRN  levETIRAcetam  Solution 1000 milliGRAM(s) Oral every 12 hours  PHENobarbital Elixir 60 milliGRAM(s) Oral every 12 hours  phenytoin   Suspension 200 milliGRAM(s) Oral every 8 hours    bisacodyl Suppository 10 milliGRAM(s) Rectal daily PRN  pantoprazole   Suspension 40 milliGRAM(s) Oral before breakfast      aspirin  chewable 81 milliGRAM(s) Oral daily  BACItracin   Ointment 1 Application(s) Topical every 8 hours  chlorhexidine 0.12% Liquid 15 milliLiter(s) Swish and Spit two times a day  collagenase Ointment 1 Application(s) Topical two times a day  enoxaparin Injectable 40 milliGRAM(s) SubCutaneous daily  epoetin giorgio Injectable 98308 Unit(s) SubCutaneous <User Schedule>  ferrous    sulfate Liquid 300 milliGRAM(s) Oral three times a day with meals  nystatin Powder 1 Application(s) Topical two times a day    	  TELEMETRY:   Vitals:  T(C): 37.8 (05-01-18 @ 07:30), Max: 38 (05-01-18 @ 03:00)  HR: 72 (05-01-18 @ 09:44) (63 - 79)  BP: 120/54 (05-01-18 @ 09:33) (110/53 - 192/83)  RR: 30 (05-01-18 @ 09:33) (18 - 41)  SpO2: 98% (05-01-18 @ 09:44) (93% - 100%)  Wt(kg): --  I&O's Summary    30 Apr 2018 07:01  -  01 May 2018 07:00  --------------------------------------------------------  IN: 1360 mL / OUT: 2675 mL / NET: -1315 mL    01 May 2018 07:01  -  01 May 2018 10:51  --------------------------------------------------------  IN: 200 mL / OUT: 325 mL / NET: -125 mL        Daily T(C): 37.8 (05-01-18 @ 07:30), Max: 38 (05-01-18 @ 03:00)  HR: 72 (05-01-18 @ 09:44) (63 - 79)  BP: 120/54 (05-01-18 @ 09:33) (110/53 - 192/83)  RR: 30 (05-01-18 @ 09:33) (18 - 41)  SpO2: 98% (05-01-18 @ 09:44) (93% - 100%)  Wt(kg): --    Daily     PHYSICAL EXAM:  Appearance: Normal	  HEENT:   Normal oral mucosa, PERRL, EOMI	  Lymphatic: No lymphadenopathy  Cardiovascular: Normal S1 S2, No JVD, No murmurs, No edema  Respiratory: Lungs clear to auscultation	  Psychiatry: A & O x 3, Mood & affect appropriate  Gastrointestinal:  Soft, Non-tender, + BS	  Skin: No rashes, No ecchymoses, No cyanosis  Neurologic: Non-focal  Extremities: Normal range of motion, No clubbing, cyanosis or edema  Vascular: Peripheral pulses palpable 2+ bilaterally      ECG (tracing reviewed by me):  	    DIAGNOSTIC TESTING:  Echocardiogram (images reviewed by me):  Catheterization:  Stress Test:    CXR (image reviewed by me):  OTHER: 	    LABS:	 	  CARDIAC MARKERS:                                  10.0   10.6  )-----------( 290      ( 01 May 2018 06:16 )             34.0     05-01    147<H>  |  106  |  35.0<H>  ----------------------------<  115  3.8   |  26.0  |  0.58    Ca    8.8      01 May 2018 06:16  Phos  3.6     05-01  Mg     2.3     05-01    TPro  x   /  Alb  3.3  /  TBili  x   /  DBili  x   /  AST  x   /  ALT  x   /  AlkPhos  x   04-29    BNP:   Lipid Profile:   HgA1c:   TSH: [facility]                                                        Office: [address] [address]                                                       Telephone: [phone]. Fax:[phone]      CARDIOLOGY PROGRESS NOTE   ([facility])    Subjective: Patient seen and examined at bedside.  Chart reviewed- no events.  Telemetry with one episode of bradycardia - AV block.      CURRENT MEDICATIONS  digoxin     Tablet 0.25 milliGRAM(s) Oral daily  furosemide   Injectable 60 milliGRAM(s) IV Push every 12 hours  labetalol 100 milliGRAM(s) Oral three times a day  NIFEdipine IR 30 milliGRAM(s) Oral three times a day  ALBUTerol/ipratropium for Nebulization 3 milliLiter(s) Nebulizer every 6 hours  buDESOnide   0.5 milliGRAM(s) Respule 0.5 milliGRAM(s) Inhalation two times a day  acetaminophen   Tablet. 650 milliGRAM(s) Oral every 6 hours PRN  levETIRAcetam  Solution 1000 milliGRAM(s) Oral every 12 hours  PHENobarbital Elixir 60 milliGRAM(s) Oral every 12 hours  phenytoin   Suspension 200 milliGRAM(s) Oral every 8 hours  bisacodyl Suppository 10 milliGRAM(s) Rectal daily PRN  pantoprazole   Suspension 40 milliGRAM(s) Oral before breakfast  aspirin  chewable 81 milliGRAM(s) Oral daily  BACItracin   Ointment 1 Application(s) Topical every 8 hours  chlorhexidine 0.12% Liquid 15 milliLiter(s) Swish and Spit two times a day  collagenase Ointment 1 Application(s) Topical two times a day  enoxaparin Injectable 40 milliGRAM(s) SubCutaneous daily  epoetin giorgio Injectable 87725 Unit(s) SubCutaneous <User Schedule>  ferrous    sulfate Liquid 300 milliGRAM(s) Oral three times a day with meals  nystatin Powder 1 Application(s) Topical two times a day  	  TELEMETRY:   Vitals:  T(C): 37.8 (05-01-18 @ 07:30), Max: 38 (05-01-18 @ 03:00)  HR: 72 (05-01-18 @ 09:44) (63 - 79)  BP: 120/54 (05-01-18 @ 09:33) (110/53 - 192/83)  RR: 30 (05-01-18 @ 09:33) (18 - 41)  SpO2: 98% (05-01-18 @ 09:44) (93% - 100%)  Wt(kg): --  I&O's Summary    30 Apr 2018 07:01  -  01 May 2018 07:00  --------------------------------------------------------  IN: 1360 mL / OUT: 2675 mL / NET: -1315 mL    01 May 2018 07:01  -  01 May 2018 10:51  --------------------------------------------------------  IN: 200 mL / OUT: 325 mL / NET: -125 mL      PHYSICAL EXAM:  Appearance: NAD,on vent, non-verbal  HEENT:   Normal oral mucosa, PERRL, EOMI	  Lymphatic: No lymphadenopathy  Cardiovascular: Normal S1 S2, No JVD, No murmurs, No edema  Respiratory: Lungs clear to auscultation anterolaterally	  Psychiatry: unresponsive  Gastrointestinal:  Soft, G tube  Skin: No rashes, No ecchymoses, No cyanosis  Neurologic:   Extremities: Normal range of motion, No clubbing, cyanosis or edema  Vascular: Peripheral pulses palpable 2+ bilaterally, warm      DIAGNOSTIC TESTING:                        10.0   10.6  )-----------( 290      ( 01 May 2018 06:16 )             34.0     05-01    147<H>  |  106  |  35.0<H>  ----------------------------<  115  3.8   |  26.0  |  0.58    Ca    8.8      01 May 2018 06:16  Phos  3.6     05-01  Mg     2.3     05-01    TPro  x   /  Alb  3.3  /  TBili  x   /  DBili  x   /  AST  x   /  ALT  x   /  AlkPhos  x   04-29

## 2018-05-01 NOTE — DIETITIAN INITIAL EVALUATION ADULT. - NS AS NUTRI INTERV ENTERAL NUTRITION
Volume/Increase Jevity 1.5 to 65ml/hr x 20 hrs to provide a total volume of 1300ml/daily (1950kcal, 80g protein daily)

## 2018-05-01 NOTE — PROGRESS NOTE ADULT - SUBJECTIVE AND OBJECTIVE BOX
KING MCKEON  ----------------------------------------  The patient was seen and evaluated for respiratory failure.  The patient is in no acute distress.  On ventilator support.    Vital Signs Last 24 Hrs  T(C): 37.8 (01 May 2018 07:30), Max: 38 (01 May 2018 03:00)  T(F): 100 (01 May 2018 07:30), Max: 100.4 (01 May 2018 03:00)  HR: 72 (01 May 2018 09:44) (63 - 79)  BP: 120/54 (01 May 2018 09:33) (110/53 - 192/83)  BP(mean): 77 (01 May 2018 06:00) (77 - 119)  RR: 30 (01 May 2018 09:33) (18 - 41)  SpO2: 98% (01 May 2018 09:44) (93% - 100%)    PHYSICAL EXAMINATION:  ----------------------------------------  General appearance: No acute distress, Awake  HEENT: Normocephalic, Atraumatic, Conjunctiva clear  Neck: Supple, No JVD, Tracheostomy in place  Lungs: Breath sound equal bilaterally, No wheezes, Mild rales  Cardiovascular: S1S2, Regular rhythm  Abdomen: Soft, Nondistended, No guarding, Positive bowel sounds, Gastrostomy in place  Extremities: No clubbing, No cyanosis, Lower extremity edema  Neuro: No tremors, Contracted lower extremities  Psychiatric: Non-verbal    LABORATORY STUDIES:  ----------------------------------------             10.0   10.6  )-----------( 290      ( 01 May 2018 06:16 )             34.0     05-01    147<H>  |  106  |  35.0<H>  ----------------------------<  115  3.8   |  26.0  |  0.58    Ca    8.8      01 May 2018 06:16  Phos  3.6     05-01  Mg     2.3     05-01    TPro  x   /  Alb  3.3  /  TBili  x   /  DBili  x   /  AST  x   /  ALT  x   /  AlkPhos  x   04-29    LIVER FUNCTIONS - ( 29 Apr 2018 17:19 )  Alb: 3.3 g/dL / Pro: x     / ALK PHOS: x     / ALT: x     / AST: x     / GGT: x           CARDIAC MARKERS ( 30 Apr 2018 06:53 )  x     / 0.11 ng/mL / x     / x     / x      CARDIAC MARKERS ( 29 Apr 2018 12:40 )  x     / 0.13 ng/mL / x     / x     / x        Culture - Urine (collected 28 Apr 2018 23:40)  Source: .Urine Catheterized  Final Report (30 Apr 2018 09:07):    No growth    MEDICATIONS  (STANDING):  ALBUTerol/ipratropium for Nebulization 3 milliLiter(s) Nebulizer every 6 hours  aspirin  chewable 81 milliGRAM(s) Oral daily  BACItracin   Ointment 1 Application(s) Topical every 8 hours  buDESOnide   0.5 milliGRAM(s) Respule 0.5 milliGRAM(s) Inhalation two times a day  chlorhexidine 0.12% Liquid 15 milliLiter(s) Swish and Spit two times a day  digoxin     Tablet 0.25 milliGRAM(s) Oral daily  enoxaparin Injectable 40 milliGRAM(s) SubCutaneous daily  epoetin giorgio Injectable 12762 Unit(s) SubCutaneous <User Schedule>  ferrous    sulfate Liquid 300 milliGRAM(s) Oral three times a day with meals  furosemide   Injectable 60 milliGRAM(s) IV Push every 12 hours  labetalol 100 milliGRAM(s) Oral three times a day  levETIRAcetam  Solution 1000 milliGRAM(s) Oral every 12 hours  NIFEdipine IR 30 milliGRAM(s) Oral three times a day  nystatin Powder 1 Application(s) Topical two times a day  pantoprazole   Suspension 40 milliGRAM(s) Oral before breakfast  PHENobarbital Elixir 60 milliGRAM(s) Oral every 12 hours  phenytoin   Suspension 200 milliGRAM(s) Oral every 8 hours    MEDICATIONS  (PRN):  acetaminophen   Tablet. 650 milliGRAM(s) Oral every 6 hours PRN Mild Pain (1 - 3)  bisacodyl Suppository 10 milliGRAM(s) Rectal daily PRN Constipation      ASSESSMENT / PLAN:  ----------------------------------------  Fever - Culture results to be followed. To monitor clinically and initiate antibiotics if evidence of infection is found.    Acute on chronic diastolic heart failure - Improved with furosemide. Monitor urine output, chronic Baugh catheter present on admission.    Hypertensive urgency - Blood pressure improved. On labetalol and nifedipine.    Chronic respiratory failure - On ventilator support. Gastrostomy tube feeds. PleurX drainage twice a week as per prior routine from [facility] nursing facility.    Seizure - On phenobarbital and phenytoin.    Sacral decubitus - Present on admission. Wound care and positioning as per routine. KING MCKEON  ----------------------------------------  The patient was seen and evaluated for respiratory failure.  The patient is in no acute distress.  On ventilator support.    Vital Signs Last 24 Hrs  T(C): 37.8 (01 May 2018 07:30), Max: 38 (01 May 2018 03:00)  T(F): 100 (01 May 2018 07:30), Max: 100.4 (01 May 2018 03:00)  HR: 72 (01 May 2018 09:44) (63 - 79)  BP: 120/54 (01 May 2018 09:33) (110/53 - 192/83)  BP(mean): 77 (01 May 2018 06:00) (77 - 119)  RR: 30 (01 May 2018 09:33) (18 - 41)  SpO2: 98% (01 May 2018 09:44) (93% - 100%)    PHYSICAL EXAMINATION:  ----------------------------------------  General appearance: No acute distress, Awake  HEENT: Normocephalic, Atraumatic, Conjunctiva clear  Neck: Supple, No JVD, Tracheostomy in place  Lungs: Breath sound equal bilaterally, No wheezes, Mild rales  Cardiovascular: S1S2, Regular rhythm  Abdomen: Soft, Nondistended, No guarding, Positive bowel sounds, Gastrostomy in place  Extremities: No clubbing, No cyanosis, Lower extremity edema  Neuro: No tremors, Contracted lower extremities  Psychiatric: Non-verbal    LABORATORY STUDIES:  ----------------------------------------             10.0   10.6  )-----------( 290      ( 01 May 2018 06:16 )             34.0     05-01    147<H>  |  106  |  35.0<H>  ----------------------------<  115  3.8   |  26.0  |  0.58    Ca    8.8      01 May 2018 06:16  Phos  3.6     05-01  Mg     2.3     05-01    TPro  x   /  Alb  3.3  /  TBili  x   /  DBili  x   /  AST  x   /  ALT  x   /  AlkPhos  x   04-29    LIVER FUNCTIONS - ( 29 Apr 2018 17:19 )  Alb: 3.3 g/dL / Pro: x     / ALK PHOS: x     / ALT: x     / AST: x     / GGT: x           CARDIAC MARKERS ( 30 Apr 2018 06:53 )  x     / 0.11 ng/mL / x     / x     / x      CARDIAC MARKERS ( 29 Apr 2018 12:40 )  x     / 0.13 ng/mL / x     / x     / x        Culture - Urine (collected 28 Apr 2018 23:40)  Source: .Urine Catheterized  Final Report (30 Apr 2018 09:07):    No growth    MEDICATIONS  (STANDING):  ALBUTerol/ipratropium for Nebulization 3 milliLiter(s) Nebulizer every 6 hours  aspirin  chewable 81 milliGRAM(s) Oral daily  BACItracin   Ointment 1 Application(s) Topical every 8 hours  buDESOnide   0.5 milliGRAM(s) Respule 0.5 milliGRAM(s) Inhalation two times a day  chlorhexidine 0.12% Liquid 15 milliLiter(s) Swish and Spit two times a day  digoxin     Tablet 0.25 milliGRAM(s) Oral daily  enoxaparin Injectable 40 milliGRAM(s) SubCutaneous daily  epoetin giorgio Injectable 22448 Unit(s) SubCutaneous <User Schedule>  ferrous    sulfate Liquid 300 milliGRAM(s) Oral three times a day with meals  furosemide   Injectable 60 milliGRAM(s) IV Push every 12 hours  labetalol 100 milliGRAM(s) Oral three times a day  levETIRAcetam  Solution 1000 milliGRAM(s) Oral every 12 hours  NIFEdipine IR 30 milliGRAM(s) Oral three times a day  nystatin Powder 1 Application(s) Topical two times a day  pantoprazole   Suspension 40 milliGRAM(s) Oral before breakfast  PHENobarbital Elixir 60 milliGRAM(s) Oral every 12 hours  phenytoin   Suspension 200 milliGRAM(s) Oral every 8 hours    MEDICATIONS  (PRN):  acetaminophen   Tablet. 650 milliGRAM(s) Oral every 6 hours PRN Mild Pain (1 - 3)  bisacodyl Suppository 10 milliGRAM(s) Rectal daily PRN Constipation      ASSESSMENT / PLAN:  ----------------------------------------  Fever - Culture results to be followed. To monitor clinically and initiate antibiotics if evidence of infection is found.    Acute on chronic diastolic heart failure - Improved with furosemide. Monitor urine output, chronic Baugh catheter present on admission.    Hypertensive urgency - Blood pressure improved. On labetalol and nifedipine.    Chronic respiratory failure - On ventilator support. Gastrostomy tube feeds. PleurX drainage twice a week as per prior routine from [facility] nursing facility.    Seizure - On phenobarbital and phenytoin.    Sacral decubitus - Present on admission. Wound care and positioning as per routine.    Moderate protein calorie malnutrition - Tube feeds adjusted. Jesse added.

## 2018-05-01 NOTE — CHART NOTE - NSCHARTNOTEFT_GEN_A_CORE
Upon Nutritional Assessment by the Registered Dietitian your patient was determined to meet criteria / has evidence of the following diagnosis/diagnoses:          [ ]  Mild Protein Calorie Malnutrition        [x ]  Moderate Protein Calorie Malnutrition        [ ] Severe Protein Calorie Malnutrition        [ ] Unspecified Protein Calorie Malnutrition        [ ] Underweight / BMI <19        [ ] Morbid Obesity / BMI > 40      Findings as based on:  •  Comprehensive nutrition assessment and consultation  •  Calorie counts (nutrient intake analysis)  •  Food acceptance and intake status from observations by staff  •  Follow up  •  Patient education  •  Intervention secondary to interdisciplinary rounds  •   concerns      Treatment:    The following diet has been recommended:  --add Jesse 1 pkt BID  --Rx: MVI + Vit C 500mg/daily       PROVIDER Section:     By signing this assessment you are acknowledging and agree with the diagnosis/diagnoses assigned by the Registered Dietitian    Comments: I have personally seen and examined this patient.  I have fully participated in the care of this patient. I have reviewed all pertinent clinical information, including history, physical exam, plan and the Resident’s note and agree except as noted.

## 2018-05-01 NOTE — DIETITIAN INITIAL EVALUATION ADULT. - OTHER INFO
Pt admitted with HTN urgency/acute on chronic CHF. NFPE performed- severe muscle wasting at temples, shoulders. Skin: Stage IV sacrum.

## 2018-05-02 LAB
CULTURE RESULTS: NO GROWTH — SIGNIFICANT CHANGE UP
DIGOXIN SERPL-MCNC: 1.4 NG/ML — SIGNIFICANT CHANGE UP (ref 0.8–2)
SPECIMEN SOURCE: SIGNIFICANT CHANGE UP

## 2018-05-02 PROCEDURE — 71045 X-RAY EXAM CHEST 1 VIEW: CPT | Mod: 26

## 2018-05-02 PROCEDURE — 99232 SBSQ HOSP IP/OBS MODERATE 35: CPT

## 2018-05-02 PROCEDURE — 99233 SBSQ HOSP IP/OBS HIGH 50: CPT

## 2018-05-02 RX ORDER — SODIUM CHLORIDE 0.65 %
1 AEROSOL, SPRAY (ML) NASAL
Qty: 0 | Refills: 0 | COMMUNITY

## 2018-05-02 RX ORDER — FUROSEMIDE 40 MG
60 TABLET ORAL DAILY
Qty: 0 | Refills: 0 | Status: DISCONTINUED | OUTPATIENT
Start: 2018-05-02 | End: 2018-05-04

## 2018-05-02 RX ORDER — DIGOXIN 250 MCG
0.12 TABLET ORAL DAILY
Qty: 0 | Refills: 0 | Status: DISCONTINUED | OUTPATIENT
Start: 2018-05-02 | End: 2018-05-04

## 2018-05-02 RX ADMIN — ENOXAPARIN SODIUM 40 MILLIGRAM(S): 100 INJECTION SUBCUTANEOUS at 13:12

## 2018-05-02 RX ADMIN — Medication 30 MILLIGRAM(S): at 13:13

## 2018-05-02 RX ADMIN — Medication 3 MILLILITER(S): at 03:31

## 2018-05-02 RX ADMIN — NYSTATIN CREAM 1 APPLICATION(S): 100000 CREAM TOPICAL at 17:33

## 2018-05-02 RX ADMIN — Medication 30 MILLIGRAM(S): at 21:13

## 2018-05-02 RX ADMIN — CHLORHEXIDINE GLUCONATE 15 MILLILITER(S): 213 SOLUTION TOPICAL at 06:11

## 2018-05-02 RX ADMIN — Medication 3 MILLILITER(S): at 21:02

## 2018-05-02 RX ADMIN — NYSTATIN CREAM 1 APPLICATION(S): 100000 CREAM TOPICAL at 06:16

## 2018-05-02 RX ADMIN — Medication 100 MILLIGRAM(S): at 21:12

## 2018-05-02 RX ADMIN — Medication 3 MILLILITER(S): at 08:36

## 2018-05-02 RX ADMIN — Medication 0.5 MILLIGRAM(S): at 21:02

## 2018-05-02 RX ADMIN — Medication 60 MILLIGRAM(S): at 17:37

## 2018-05-02 RX ADMIN — Medication 200 MILLIGRAM(S): at 06:11

## 2018-05-02 RX ADMIN — Medication 0.5 MILLIGRAM(S): at 08:36

## 2018-05-02 RX ADMIN — Medication 81 MILLIGRAM(S): at 13:12

## 2018-05-02 RX ADMIN — Medication 300 MILLIGRAM(S): at 07:41

## 2018-05-02 RX ADMIN — LEVETIRACETAM 1000 MILLIGRAM(S): 250 TABLET, FILM COATED ORAL at 17:33

## 2018-05-02 RX ADMIN — CHLORHEXIDINE GLUCONATE 15 MILLILITER(S): 213 SOLUTION TOPICAL at 17:33

## 2018-05-02 RX ADMIN — PANTOPRAZOLE SODIUM 40 MILLIGRAM(S): 20 TABLET, DELAYED RELEASE ORAL at 06:17

## 2018-05-02 RX ADMIN — Medication 200 MILLIGRAM(S): at 21:13

## 2018-05-02 RX ADMIN — Medication 1 APPLICATION(S): at 13:12

## 2018-05-02 RX ADMIN — Medication 300 MILLIGRAM(S): at 13:12

## 2018-05-02 RX ADMIN — Medication 60 MILLIGRAM(S): at 06:16

## 2018-05-02 RX ADMIN — Medication 0.25 MILLIGRAM(S): at 06:12

## 2018-05-02 RX ADMIN — Medication 3 MILLILITER(S): at 16:34

## 2018-05-02 RX ADMIN — Medication 1 APPLICATION(S): at 06:11

## 2018-05-02 RX ADMIN — Medication 1 APPLICATION(S): at 21:12

## 2018-05-02 RX ADMIN — Medication 200 MILLIGRAM(S): at 13:12

## 2018-05-02 RX ADMIN — Medication 100 MILLIGRAM(S): at 13:13

## 2018-05-02 RX ADMIN — Medication 40 MILLIGRAM(S): at 06:12

## 2018-05-02 RX ADMIN — Medication 300 MILLIGRAM(S): at 17:33

## 2018-05-02 RX ADMIN — Medication 100 MILLIGRAM(S): at 06:12

## 2018-05-02 RX ADMIN — LEVETIRACETAM 1000 MILLIGRAM(S): 250 TABLET, FILM COATED ORAL at 06:11

## 2018-05-02 NOTE — PROGRESS NOTE ADULT - SUBJECTIVE AND OBJECTIVE BOX
KING MCKEON  ----------------------------------------  The patient was seen and evaluated for respiratory failure.  The patient is in no acute distress.  Lethargic on ventilator support.    Vital Signs Last 24 Hrs  T(C): 37 (02 May 2018 08:49), Max: 38 (01 May 2018 19:00)  T(F): 98.6 (02 May 2018 08:49), Max: 100.4 (01 May 2018 19:00)  HR: 86 (02 May 2018 08:49) (53 - 86)  BP: 130/56 (02 May 2018 08:49) (103/54 - 153/78)  BP(mean): 62 (01 May 2018 20:00) (62 - 95)  RR: 18 (02 May 2018 08:49) (16 - 34)  SpO2: 97% (02 May 2018 08:49) (97% - 100%)    PHYSICAL EXAMINATION:  ----------------------------------------  General appearance: No acute distress, Lethargic  HEENT: Normocephalic, Atraumatic, Conjunctiva clear  Neck: Supple, No JVD, Tracheostomy in place  Lungs: Breath sound equal bilaterally, No wheezes, No rales, No wheeze  Cardiovascular: S1S2, Regular rhythm  Abdomen: Soft, Nondistended, No guarding, Positive bowel sounds, Gastrostomy in place  Extremities: No clubbing, No cyanosis, Lower extremity edema  Neuro: No tremors, Contracted lower extremities  Psychiatric: Non-verbal    LABORATORY STUDIES:  ----------------------------------------             10.0   10.6  )-----------( 290      ( 01 May 2018 06:16 )             34.0     05-01    147<H>  |  106  |  35.0<H>  ----------------------------<  115  3.8   |  26.0  |  0.58    Ca    8.8      01 May 2018 06:16  Phos  3.6     05-01  Mg     2.3     05-01    Urinalysis Basic - ( 01 May 2018 13:19 )  Color: Yellow / Appearance: Clear / S.015 / pH: x  Gluc: x / Ketone: Negative  / Bili: Negative / Urobili: Negative mg/dL   Blood: x / Protein: 15 mg/dL / Nitrite: Negative   Leuk Esterase: Moderate / RBC: 3-5 /HPF / WBC 6-10   Sq Epi: x / Non Sq Epi: Occasional / Bacteria: Occasional    MEDICATIONS  (STANDING):  ALBUTerol/ipratropium for Nebulization 3 milliLiter(s) Nebulizer every 6 hours  aspirin  chewable 81 milliGRAM(s) Oral daily  BACItracin   Ointment 1 Application(s) Topical every 8 hours  buDESOnide   0.5 milliGRAM(s) Respule 0.5 milliGRAM(s) Inhalation two times a day  chlorhexidine 0.12% Liquid 15 milliLiter(s) Swish and Spit two times a day  digoxin     Tablet 0.25 milliGRAM(s) Oral daily  enoxaparin Injectable 40 milliGRAM(s) SubCutaneous daily  epoetin giorgio Injectable 08543 Unit(s) SubCutaneous <User Schedule>  ferrous    sulfate Liquid 300 milliGRAM(s) Oral three times a day with meals  furosemide   Injectable 40 milliGRAM(s) IV Push daily  labetalol 100 milliGRAM(s) Oral three times a day  levETIRAcetam  Solution 1000 milliGRAM(s) Oral every 12 hours  NIFEdipine IR 30 milliGRAM(s) Oral three times a day  nystatin Powder 1 Application(s) Topical two times a day  pantoprazole   Suspension 40 milliGRAM(s) Oral before breakfast  PHENobarbital Elixir 60 milliGRAM(s) Oral every 12 hours  phenytoin   Suspension 200 milliGRAM(s) Oral every 8 hours    MEDICATIONS  (PRN):  acetaminophen   Tablet. 650 milliGRAM(s) Oral every 6 hours PRN Mild Pain (1 - 3)  bisacodyl Suppository 10 milliGRAM(s) Rectal daily PRN Constipation      ASSESSMENT / PLAN:  ----------------------------------------  Fever - Afebrile today so far. Culture results to be followed. To monitor clinically and initiate antibiotics if evidence of infection is found.    Acute on chronic diastolic heart failure - Improved with furosemide, dose decreased. Monitoring urine output, chronic Baugh catheter present on admission.    Hypertensive urgency - On labetalol and nifedipine. Blood pressure improved.    Chronic respiratory failure - On ventilator support. Gastrostomy tube feeds. PleurX drainage twice a week as per prior routine from [facility] nursing facility.    Seizure - On phenobarbital and phenytoin.    Sacral decubitus - Present on admission. Wound care and positioning as per routine.    Moderate protein calorie malnutrition - On gastrostomy tube feeds with Jesse.

## 2018-05-02 NOTE — PROGRESS NOTE ADULT - SUBJECTIVE AND OBJECTIVE BOX
[facility]                                                        Office: [address] [address]                                                       Telephone: [phone]. Fax:[phone]      CARDIOLOGY PROGRESS NOTE   ([facility])    Subjective: Patient seen and examined at bedside.  No events overnight.     CURRENT MEDICATIONS  digoxin     Tablet 0.25 milliGRAM(s) Oral daily  furosemide   Injectable 40 milliGRAM(s) IV Push daily  labetalol 100 milliGRAM(s) Oral three times a day  NIFEdipine IR 30 milliGRAM(s) Oral three times a day  ALBUTerol/ipratropium for Nebulization 3 milliLiter(s) Nebulizer every 6 hours  buDESOnide   0.5 milliGRAM(s) Respule 0.5 milliGRAM(s) Inhalation two times a day  acetaminophen   Tablet. 650 milliGRAM(s) Oral every 6 hours PRN  levETIRAcetam  Solution 1000 milliGRAM(s) Oral every 12 hours  PHENobarbital Elixir 60 milliGRAM(s) Oral every 12 hours  phenytoin   Suspension 200 milliGRAM(s) Oral every 8 hours  bisacodyl Suppository 10 milliGRAM(s) Rectal daily PRN  pantoprazole   Suspension 40 milliGRAM(s) Oral before breakfast  aspirin  chewable 81 milliGRAM(s) Oral daily  BACItracin   Ointment 1 Application(s) Topical every 8 hours  chlorhexidine 0.12% Liquid 15 milliLiter(s) Swish and Spit two times a day  enoxaparin Injectable 40 milliGRAM(s) SubCutaneous daily  epoetin giorgio Injectable 30289 Unit(s) SubCutaneous <User Schedule>  ferrous    sulfate Liquid 300 milliGRAM(s) Oral three times a day with meals  nystatin Powder 1 Application(s) Topical two times a day  	  TELEMETRY: n/a  Vitals:  T(C): 37 (05-02-18 @ 08:49), Max: 38 (05-01-18 @ 19:00)  HR: 86 (05-02-18 @ 08:49) (53 - 86)  BP: 130/56 (05-02-18 @ 08:49) (103/54 - 153/78)  RR: 18 (05-02-18 @ 08:49) (16 - 34)  SpO2: 97% (05-02-18 @ 08:49) (97% - 100%)  Wt(kg): --  I&O's Summary    01 May 2018 07:01  -  02 May 2018 07:00  --------------------------------------------------------  IN: 1995 mL / OUT: 1355 mL / NET: 640 mL      PHYSICAL EXAM:  Appearance: tracheostomy on vent, opens eyes  HEENT:   Normal oral mucosa, PERRL, EOMI	  Lymphatic: No lymphadenopathy  Cardiovascular: Normal S1 S2, No JVD, No murmurs, 1+ foot edema  Respiratory: Lungs clear to auscultation	  Psychiatry: A & O x 3, Mood & affect appropriate  Gastrointestinal:  Soft, Non-tender, + BS	  Skin: No rashes, No ecchymoses, No cyanosis  Neurologic: Non-focal  Extremities: Normal range of motion, No clubbing, cyanosis   Vascular: Peripheral pulses palpable 2+ bilaterally      DIAGNOSTIC TESTING:                       10.0   10.6  )-----------( 290      ( 01 May 2018 06:16 )             34.0     05-01    147<H>  |  106  |  35.0<H>  ----------------------------<  115  3.8   |  26.0  |  0.58    Ca    8.8      01 May 2018 06:16  Phos  3.6     05-01  Mg     2.3     05-01    Digoxin Level, Serum (05.02.18 @ 08:12)    Digoxin Level, Serum: 1.4 ng/mL

## 2018-05-03 LAB
ANION GAP SERPL CALC-SCNC: 13 MMOL/L — SIGNIFICANT CHANGE UP (ref 5–17)
BUN SERPL-MCNC: 36 MG/DL — HIGH (ref 8–20)
CALCIUM SERPL-MCNC: 9.1 MG/DL — SIGNIFICANT CHANGE UP (ref 8.6–10.2)
CHLORIDE SERPL-SCNC: 106 MMOL/L — SIGNIFICANT CHANGE UP (ref 98–107)
CO2 SERPL-SCNC: 30 MMOL/L — HIGH (ref 22–29)
CREAT SERPL-MCNC: 0.49 MG/DL — LOW (ref 0.5–1.3)
GLUCOSE SERPL-MCNC: 133 MG/DL — HIGH (ref 70–115)
HCT VFR BLD CALC: 32.9 % — LOW (ref 42–52)
HGB BLD-MCNC: 9.4 G/DL — LOW (ref 14–18)
MCHC RBC-ENTMCNC: 25.8 PG — LOW (ref 27–31)
MCHC RBC-ENTMCNC: 28.6 G/DL — LOW (ref 32–36)
MCV RBC AUTO: 90.1 FL — SIGNIFICANT CHANGE UP (ref 80–94)
PLATELET # BLD AUTO: 287 K/UL — SIGNIFICANT CHANGE UP (ref 150–400)
POTASSIUM SERPL-MCNC: 3.4 MMOL/L — LOW (ref 3.5–5.3)
POTASSIUM SERPL-SCNC: 3.4 MMOL/L — LOW (ref 3.5–5.3)
RBC # BLD: 3.65 M/UL — LOW (ref 4.6–6.2)
RBC # FLD: 20.4 % — HIGH (ref 11–15.6)
SODIUM SERPL-SCNC: 149 MMOL/L — HIGH (ref 135–145)
WBC # BLD: 6.4 K/UL — SIGNIFICANT CHANGE UP (ref 4.8–10.8)
WBC # FLD AUTO: 6.4 K/UL — SIGNIFICANT CHANGE UP (ref 4.8–10.8)

## 2018-05-03 PROCEDURE — 99233 SBSQ HOSP IP/OBS HIGH 50: CPT

## 2018-05-03 PROCEDURE — 99232 SBSQ HOSP IP/OBS MODERATE 35: CPT

## 2018-05-03 RX ORDER — POTASSIUM CHLORIDE 20 MEQ
40 PACKET (EA) ORAL ONCE
Qty: 0 | Refills: 0 | Status: COMPLETED | OUTPATIENT
Start: 2018-05-03 | End: 2018-05-03

## 2018-05-03 RX ORDER — POTASSIUM CHLORIDE 20 MEQ
40 PACKET (EA) ORAL ONCE
Qty: 0 | Refills: 0 | Status: DISCONTINUED | OUTPATIENT
Start: 2018-05-03 | End: 2018-05-03

## 2018-05-03 RX ADMIN — Medication 60 MILLIGRAM(S): at 05:10

## 2018-05-03 RX ADMIN — Medication 3 MILLILITER(S): at 15:06

## 2018-05-03 RX ADMIN — Medication 0.5 MILLIGRAM(S): at 21:02

## 2018-05-03 RX ADMIN — Medication 200 MILLIGRAM(S): at 14:56

## 2018-05-03 RX ADMIN — NYSTATIN CREAM 1 APPLICATION(S): 100000 CREAM TOPICAL at 05:11

## 2018-05-03 RX ADMIN — Medication 1 APPLICATION(S): at 14:55

## 2018-05-03 RX ADMIN — Medication 3 MILLILITER(S): at 03:38

## 2018-05-03 RX ADMIN — Medication 300 MILLIGRAM(S): at 17:18

## 2018-05-03 RX ADMIN — Medication 81 MILLIGRAM(S): at 12:37

## 2018-05-03 RX ADMIN — Medication 300 MILLIGRAM(S): at 08:23

## 2018-05-03 RX ADMIN — LEVETIRACETAM 1000 MILLIGRAM(S): 250 TABLET, FILM COATED ORAL at 05:10

## 2018-05-03 RX ADMIN — Medication 60 MILLIGRAM(S): at 17:18

## 2018-05-03 RX ADMIN — LEVETIRACETAM 1000 MILLIGRAM(S): 250 TABLET, FILM COATED ORAL at 17:19

## 2018-05-03 RX ADMIN — Medication 3 MILLILITER(S): at 21:02

## 2018-05-03 RX ADMIN — Medication 1 APPLICATION(S): at 21:17

## 2018-05-03 RX ADMIN — PANTOPRAZOLE SODIUM 40 MILLIGRAM(S): 20 TABLET, DELAYED RELEASE ORAL at 05:11

## 2018-05-03 RX ADMIN — Medication 200 MILLIGRAM(S): at 21:18

## 2018-05-03 RX ADMIN — Medication 200 MILLIGRAM(S): at 05:10

## 2018-05-03 RX ADMIN — NYSTATIN CREAM 1 APPLICATION(S): 100000 CREAM TOPICAL at 17:20

## 2018-05-03 RX ADMIN — CHLORHEXIDINE GLUCONATE 15 MILLILITER(S): 213 SOLUTION TOPICAL at 17:19

## 2018-05-03 RX ADMIN — Medication 0.5 MILLIGRAM(S): at 08:46

## 2018-05-03 RX ADMIN — Medication 300 MILLIGRAM(S): at 12:36

## 2018-05-03 RX ADMIN — Medication 60 MILLIGRAM(S): at 05:14

## 2018-05-03 RX ADMIN — Medication 100 MILLIGRAM(S): at 21:18

## 2018-05-03 RX ADMIN — Medication 40 MILLIEQUIVALENT(S): at 21:17

## 2018-05-03 RX ADMIN — Medication 100 MILLIGRAM(S): at 14:56

## 2018-05-03 RX ADMIN — ENOXAPARIN SODIUM 40 MILLIGRAM(S): 100 INJECTION SUBCUTANEOUS at 12:36

## 2018-05-03 RX ADMIN — Medication 1 APPLICATION(S): at 05:09

## 2018-05-03 RX ADMIN — CHLORHEXIDINE GLUCONATE 15 MILLILITER(S): 213 SOLUTION TOPICAL at 05:09

## 2018-05-03 RX ADMIN — Medication 3 MILLILITER(S): at 08:46

## 2018-05-03 RX ADMIN — Medication 100 MILLIGRAM(S): at 05:10

## 2018-05-03 NOTE — PROGRESS NOTE ADULT - PROBLEM SELECTOR PROBLEM 2
Coronary artery disease involving native coronary artery of native heart without angina pectoris
Hypertensive urgency

## 2018-05-03 NOTE — PROGRESS NOTE ADULT - PROBLEM SELECTOR PROBLEM 1
Acute on chronic diastolic congestive heart failure

## 2018-05-03 NOTE — CHART NOTE - NSCHARTNOTEFT_GEN_A_CORE
Source: Patient [ ]  Family [ ]   other [X]: EMR    Current Diet: Diet, NPO with Tube Feed:   Tube Feeding Modality: Gastrostomy  Jevity 1.5 [place]  Total Volume for 24 Hours (mL): 1560  Continuous  Until Goal Tube Feed Rate (mL per Hour): 65  Tube Feed Duration (in Hours): 24  Tube Feed Start Time: 15:00  Jesse(7 Gm Arginine/7 Gm Glut/1.2 Gm HMB     Qty per Day:  2 (05-01-18 @ 14:01)    Current Weight:   175.4lbs (05-01-18)  192.9lbs (04-30-18)    % Weight Change     Pertinent Medications: MEDICATIONS  (STANDING):  ALBUTerol/ipratropium for Nebulization 3 milliLiter(s) Nebulizer every 6 hours  aspirin  chewable 81 milliGRAM(s) Oral daily  BACItracin   Ointment 1 Application(s) Topical every 8 hours  buDESOnide   0.5 milliGRAM(s) Respule 0.5 milliGRAM(s) Inhalation two times a day  chlorhexidine 0.12% Liquid 15 milliLiter(s) Swish and Spit two times a day  digoxin     Tablet 0.125 milliGRAM(s) Oral daily  enoxaparin Injectable 40 milliGRAM(s) SubCutaneous daily  epoetin giorgio Injectable 43400 Unit(s) SubCutaneous <User Schedule>  ferrous    sulfate Liquid 300 milliGRAM(s) Oral three times a day with meals  furosemide    Tablet 60 milliGRAM(s) Oral daily  labetalol 100 milliGRAM(s) Oral three times a day  levETIRAcetam  Solution 1000 milliGRAM(s) Oral every 12 hours  NIFEdipine IR 30 milliGRAM(s) Oral three times a day  nystatin Powder 1 Application(s) Topical two times a day  pantoprazole   Suspension 40 milliGRAM(s) Oral before breakfast  PHENobarbital Elixir 60 milliGRAM(s) Oral every 12 hours  phenytoin   Suspension 200 milliGRAM(s) Oral every 8 hours  potassium chloride   Powder 40 milliEquivalent(s) Oral once    MEDICATIONS  (PRN):  acetaminophen   Tablet. 650 milliGRAM(s) Oral every 6 hours PRN Mild Pain (1 - 3)  bisacodyl Suppository 10 milliGRAM(s) Rectal daily PRN Constipation    Pertinent Labs: CBC Full  -  ( 03 May 2018 07:41 )  WBC Count : 6.4 K/uL  Hemoglobin : 9.4 g/dL  Hematocrit : 32.9 %  Platelet Count - Automated : 287 K/uL  Mean Cell Volume : 90.1 fl  Mean Cell Hemoglobin : 25.8 pg  Mean Cell Hemoglobin Concentration : 28.6 g/dL  Auto Neutrophil # : x  Auto Lymphocyte # : x  Auto Monocyte # : x  Auto Eosinophil # : x  Auto Basophil # : x  Auto Neutrophil % : x  Auto Lymphocyte % : x  Auto Monocyte % : x  Auto Eosinophil % : x  Auto Basophil % : x    Skin: Stage IV sacral pressure ulcer    Nutrition focused physical exam conducted - found signs of malnutrition [ ]absent [X]present    Subcutaneous fat loss: [ ] Orbital fat pads region, [ ]Buccal fat region, [ ]Triceps region,  [ ]Ribs region    Muscle wasting: [X]Temples region, [ ]Clavicle region, [X]Shoulder region, [ ]Scapula region, [ ]Interosseous region,  [ ]thigh region, [ ]Calf region    Estimated Needs:   [X] no change since previous assessment  [ ] recalculated:     Current Nutrition Diagnosis:   Continue dx of chronic moderate malnutrition related to increased protein needs with stage IV sacral wound as evidenced by 5% wt loss x 2 months, severe muscle wasting to temples, shoulders.    Recommendations:   1) Continue current diet order  2) Monitor weight closely    Monitoring and Evaluation:   [ ] PO intake [X] Tolerance to diet prescription [X] Weights  [X] Follow up per protocol [X] Labs:

## 2018-05-03 NOTE — PROGRESS NOTE ADULT - SUBJECTIVE AND OBJECTIVE BOX
[facility]                                                        Office: [address] [address]                                                       Telephone: [phone]. Fax:[phone]      CARDIOLOGY PROGRESS NOTE   ([facility])    Subjective: Patient seen and examined at bedside.  No events overnight.      CURRENT MEDICATIONS  digoxin     Tablet 0.125 milliGRAM(s) Oral daily  furosemide    Tablet 60 milliGRAM(s) Oral daily  labetalol 100 milliGRAM(s) Oral three times a day  NIFEdipine IR 30 milliGRAM(s) Oral three times a day  ALBUTerol/ipratropium for Nebulization 3 milliLiter(s) Nebulizer every 6 hours  buDESOnide   0.5 milliGRAM(s) Respule 0.5 milliGRAM(s) Inhalation two times a day  acetaminophen   Tablet. 650 milliGRAM(s) Oral every 6 hours PRN  levETIRAcetam  Solution 1000 milliGRAM(s) Oral every 12 hours  PHENobarbital Elixir 60 milliGRAM(s) Oral every 12 hours  phenytoin   Suspension 200 milliGRAM(s) Oral every 8 hours  bisacodyl Suppository 10 milliGRAM(s) Rectal daily PRN  pantoprazole   Suspension 40 milliGRAM(s) Oral before breakfast  aspirin  chewable 81 milliGRAM(s) Oral daily  BACItracin   Ointment 1 Application(s) Topical every 8 hours  chlorhexidine 0.12% Liquid 15 milliLiter(s) Swish and Spit two times a day  enoxaparin Injectable 40 milliGRAM(s) SubCutaneous daily  epoetin giorgio Injectable 69166 Unit(s) SubCutaneous <User Schedule>  ferrous    sulfate Liquid 300 milliGRAM(s) Oral three times a day with meals  nystatin Powder 1 Application(s) Topical two times a day  potassium chloride   Powder 40 milliEquivalent(s) Oral once  	  TELEMETRY:   Vitals:  T(C): 37.6 (18 @ 08:00), Max: 37.8 (18 @ 00:32)  HR: 59 (18 @ 08:44) (53 - 65)  BP: 151/66 (18 @ 08:00) (141/68 - 152/67)  RR: 23 (18 @ 08:00) (18 - 30)  SpO2: 100% (05-03-18 @ 08:44) (100% - 100%)  Wt(kg): --  I&O's Summary    02 May 2018 07:01  -  03 May 2018 07:00  --------------------------------------------------------  IN: 1715 mL / OUT: 900 mL / NET: 815 mL    PHYSICAL EXAM:  Appearance: trach on vent  HEENT:   PERRL, anicteric  Lymphatic: No lymphadenopathy  Cardiovascular: Normal S1 S2, No JVD, No murmurs, edematous feet - unchanged  Respiratory: Lungs clear to auscultation	  Psychiatry:   Gastrointestinal:  Soft, Non-tender, + BS	  Skin: No rashes, No ecchymoses, No cyanosis  Neurologic: Non-focal  Extremities: Normal range of motion, No clubbing, cyanosis  Vascular: Peripheral pulses palpable 2+ bilaterally      DIAGNOSTIC TESTIN.4    6.4   )-----------( 287      ( 03 May 2018 07:41 )             32.9         149<H>  |  106  |  36.0<H>  ----------------------------<  133<H>  3.4<L>   |  30.0<H>  |  0.49<L>    Ca    9.1      03 May 2018 07:41

## 2018-05-04 ENCOUNTER — TRANSCRIPTION ENCOUNTER (OUTPATIENT)
Age: 83
End: 2018-05-04

## 2018-05-04 VITALS — HEART RATE: 63 BPM | OXYGEN SATURATION: 99 %

## 2018-05-04 PROCEDURE — 99239 HOSP IP/OBS DSCHRG MGMT >30: CPT

## 2018-05-04 RX ORDER — DIGOXIN 250 MCG
1 TABLET ORAL
Qty: 0 | Refills: 0 | COMMUNITY

## 2018-05-04 RX ORDER — LABETALOL HCL 100 MG
1 TABLET ORAL
Qty: 0 | Refills: 0 | COMMUNITY
Start: 2018-05-04

## 2018-05-04 RX ORDER — DIGOXIN 250 MCG
1 TABLET ORAL
Qty: 0 | Refills: 0 | COMMUNITY
Start: 2018-05-04

## 2018-05-04 RX ORDER — FUROSEMIDE 40 MG
1 TABLET ORAL
Qty: 0 | Refills: 0 | COMMUNITY

## 2018-05-04 RX ORDER — FUROSEMIDE 40 MG
40 TABLET ORAL DAILY
Qty: 0 | Refills: 0 | Status: DISCONTINUED | OUTPATIENT
Start: 2018-05-04 | End: 2018-05-04

## 2018-05-04 RX ORDER — FUROSEMIDE 40 MG
0.5 TABLET ORAL
Qty: 0 | Refills: 0 | COMMUNITY
Start: 2018-05-04

## 2018-05-04 RX ORDER — POTASSIUM CHLORIDE 20 MEQ
40 PACKET (EA) ORAL ONCE
Qty: 0 | Refills: 0 | Status: DISCONTINUED | OUTPATIENT
Start: 2018-05-04 | End: 2018-05-04

## 2018-05-04 RX ORDER — METOPROLOL TARTRATE 50 MG
1 TABLET ORAL
Qty: 0 | Refills: 0 | COMMUNITY

## 2018-05-04 RX ORDER — FUROSEMIDE 40 MG
1 TABLET ORAL
Qty: 0 | Refills: 0 | COMMUNITY
Start: 2018-05-04

## 2018-05-04 RX ADMIN — Medication 3 MILLILITER(S): at 16:10

## 2018-05-04 RX ADMIN — Medication 650 MILLIGRAM(S): at 16:50

## 2018-05-04 RX ADMIN — CHLORHEXIDINE GLUCONATE 15 MILLILITER(S): 213 SOLUTION TOPICAL at 16:51

## 2018-05-04 RX ADMIN — Medication 300 MILLIGRAM(S): at 12:15

## 2018-05-04 RX ADMIN — Medication 60 MILLIGRAM(S): at 05:05

## 2018-05-04 RX ADMIN — ENOXAPARIN SODIUM 40 MILLIGRAM(S): 100 INJECTION SUBCUTANEOUS at 12:15

## 2018-05-04 RX ADMIN — Medication 100 MILLIGRAM(S): at 05:04

## 2018-05-04 RX ADMIN — LEVETIRACETAM 1000 MILLIGRAM(S): 250 TABLET, FILM COATED ORAL at 05:06

## 2018-05-04 RX ADMIN — Medication 0.12 MILLIGRAM(S): at 05:04

## 2018-05-04 RX ADMIN — PANTOPRAZOLE SODIUM 40 MILLIGRAM(S): 20 TABLET, DELAYED RELEASE ORAL at 05:08

## 2018-05-04 RX ADMIN — LEVETIRACETAM 1000 MILLIGRAM(S): 250 TABLET, FILM COATED ORAL at 16:51

## 2018-05-04 RX ADMIN — Medication 200 MILLIGRAM(S): at 14:25

## 2018-05-04 RX ADMIN — Medication 60 MILLIGRAM(S): at 05:00

## 2018-05-04 RX ADMIN — Medication 81 MILLIGRAM(S): at 12:15

## 2018-05-04 RX ADMIN — Medication 1 APPLICATION(S): at 14:29

## 2018-05-04 RX ADMIN — Medication 100 MILLIGRAM(S): at 14:19

## 2018-05-04 RX ADMIN — NYSTATIN CREAM 1 APPLICATION(S): 100000 CREAM TOPICAL at 05:07

## 2018-05-04 RX ADMIN — Medication 3 MILLILITER(S): at 02:36

## 2018-05-04 RX ADMIN — Medication 200 MILLIGRAM(S): at 05:06

## 2018-05-04 RX ADMIN — Medication 40 MILLIGRAM(S): at 16:50

## 2018-05-04 RX ADMIN — Medication 1 APPLICATION(S): at 05:03

## 2018-05-04 RX ADMIN — NYSTATIN CREAM 1 APPLICATION(S): 100000 CREAM TOPICAL at 17:08

## 2018-05-04 RX ADMIN — Medication 0.5 MILLIGRAM(S): at 09:01

## 2018-05-04 RX ADMIN — Medication 60 MILLIGRAM(S): at 16:50

## 2018-05-04 RX ADMIN — Medication 300 MILLIGRAM(S): at 08:07

## 2018-05-04 RX ADMIN — Medication 3 MILLILITER(S): at 09:01

## 2018-05-04 RX ADMIN — CHLORHEXIDINE GLUCONATE 15 MILLILITER(S): 213 SOLUTION TOPICAL at 05:04

## 2018-05-04 RX ADMIN — Medication 300 MILLIGRAM(S): at 16:51

## 2018-05-04 RX ADMIN — ERYTHROPOIETIN 20000 UNIT(S): 10000 INJECTION, SOLUTION INTRAVENOUS; SUBCUTANEOUS at 08:07

## 2018-05-04 NOTE — DISCHARGE NOTE ADULT - HOSPITAL COURSE
86M presented from the nursing home with uncontrolled hypertension. On presentation, BP(172/97), WBC(7.1), H/H(10/33.5), BUN/Cr(34/0.4), Trop(0.1). Chest Xray noted pulmonary vascular congestion. CT of the head noted no acute intracranial hemorrhage, hydrocephalus, midline shift, extra-axial fluid collection, or mass effect, noted old lacunar infarcts in bilateral lentiform nuclei and thalami, chronic microvascular ischemic changes. The patient was seen by Cardiology for congestive heart failure. He was admitted to the intensive care unit and was continued on ventilator support for chronic respiratory failure. Intravenous furosemide was initiated for diuresis with improvement in the symptoms. The patient was transferred to the Hospitalist service for further management. The patient was noted to have a sacral decubitus on admission and wound care was continued. Free water administration was also increased for hypernatremia. The patient had an episode of fever(100.4) and cultures were obtained. The patient was seen by Cardiology and furosemide dose was decreased. The patient remained afebrile and tolerant of ventilator support. Potassium was supplemented for hypokalemia. The patient was discharged to the nursing home for further management.    35 minutes total time

## 2018-05-04 NOTE — DISCHARGE NOTE ADULT - SECONDARY DIAGNOSIS.
Chronic respiratory failure, unspecified whether with hypoxia or hypercapnia HTN (hypertension) Seizure disorder

## 2018-05-04 NOTE — DISCHARGE NOTE ADULT - CARE PLAN
Principal Discharge DX:	CHF (congestive heart failure)  Goal:	.  Assessment and plan of treatment:	Continue on furosemide. Follow up with Cardiology, Repeat laboratory studies to monitor. Potassium supplementation.  Secondary Diagnosis:	Chronic respiratory failure, unspecified whether with hypoxia or hypercapnia  Assessment and plan of treatment:	Continue on ventilator support and nebulized bronchodilator therapy.  Secondary Diagnosis:	HTN (hypertension)  Assessment and plan of treatment:	Continue on antihypertensive medications.  Secondary Diagnosis:	Seizure disorder  Assessment and plan of treatment:	Continue on antiepileptic medications.

## 2018-05-04 NOTE — DISCHARGE NOTE ADULT - MEDICATION SUMMARY - MEDICATIONS TO STOP TAKING
I will STOP taking the medications listed below when I get home from the hospital:    Metoprolol Tartrate 100 mg oral tablet  -- 1 tab(s) by mouth 2 times a day via peg tube

## 2018-05-04 NOTE — PROGRESS NOTE ADULT - SUBJECTIVE AND OBJECTIVE BOX
[facility]  ----------------------------------------  The patient was seen and evaluated for CHF.  The patient is in no acute distress.  On ventilator support.    Vital Signs Last 24 Hrs  T(C): 36.9 (04 May 2018 08:00), Max: 37.6 (04 May 2018 00:01)  T(F): 98.4 (04 May 2018 08:00), Max: 99.7 (04 May 2018 00:01)  HR: 56 (04 May 2018 08:00) (55 - 66)  BP: 137/71 (04 May 2018 08:00) (137/71 - 169/78)  BP(mean): --  RR: 18 (04 May 2018 08:00) (16 - 18)  SpO2: 98% (04 May 2018 08:00) (98% - 100%)    PHYSICAL EXAMINATION:  ----------------------------------------  General appearance: No acute distress, Lethargic  HEENT: Normocephalic, Atraumatic, Conjunctiva clear  Neck: Supple, No JVD, Tracheostomy in place  Lungs: Breath sound equal bilaterally, No wheezes, No rales, No wheeze  Cardiovascular: S1S2, Regular rhythm  Abdomen: Soft, Nondistended, No guarding, Positive bowel sounds, Gastrostomy in place  Extremities: No clubbing, No cyanosis, Lower extremity edema  Neuro: No tremors, Contracted lower extremities  Psychiatric: Non-verbal    LABORATORY STUDIES:  ----------------------------------------             9.4    6.4   )-----------( 287      ( 03 May 2018 07:41 )             32.9     05-03    149<H>  |  106  |  36.0<H>  ----------------------------<  133<H>  3.4<L>   |  30.0<H>  |  0.49<L>    Ca    9.1      03 May 2018 07:41    Culture - Urine (collected 01 May 2018 20:56)  Source: .Urine Catheterized  Final Report (02 May 2018 16:02):    No growth    Culture - Blood (collected 01 May 2018 15:20)  Source: .Blood Blood  Preliminary Report (03 May 2018 17:02):    No growth at 48 hours    Culture - Blood (collected 01 May 2018 14:30)  Source: .Blood Blood  Preliminary Report (03 May 2018 15:02):    No growth at 48 hours    MEDICATIONS  (STANDING):  ALBUTerol/ipratropium for Nebulization 3 milliLiter(s) Nebulizer every 6 hours  aspirin  chewable 81 milliGRAM(s) Oral daily  BACItracin   Ointment 1 Application(s) Topical every 8 hours  buDESOnide   0.5 milliGRAM(s) Respule 0.5 milliGRAM(s) Inhalation two times a day  chlorhexidine 0.12% Liquid 15 milliLiter(s) Swish and Spit two times a day  digoxin     Tablet 0.125 milliGRAM(s) Oral daily  enoxaparin Injectable 40 milliGRAM(s) SubCutaneous daily  epoetin giorgio Injectable 79453 Unit(s) SubCutaneous <User Schedule>  ferrous    sulfate Liquid 300 milliGRAM(s) Oral three times a day with meals  furosemide    Tablet 60 milliGRAM(s) Oral daily  labetalol 100 milliGRAM(s) Oral three times a day  levETIRAcetam  Solution 1000 milliGRAM(s) Oral every 12 hours  NIFEdipine IR 30 milliGRAM(s) Oral three times a day  nystatin Powder 1 Application(s) Topical two times a day  pantoprazole   Suspension 40 milliGRAM(s) Oral before breakfast  PHENobarbital Elixir 60 milliGRAM(s) Oral every 12 hours  phenytoin   Suspension 200 milliGRAM(s) Oral every 8 hours    MEDICATIONS  (PRN):  acetaminophen   Tablet. 650 milliGRAM(s) Oral every 6 hours PRN Mild Pain (1 - 3)  bisacodyl Suppository 10 milliGRAM(s) Rectal daily PRN Constipation      ASSESSMENT / PLAN:  ----------------------------------------  Fever - Remains afebrile. Cultures were without growth.    Acute on chronic diastolic heart failure - Furosemide for diuresis.    Hypertensive urgency - On labetalol and nifedipine. Blood pressure improved.    Chronic respiratory failure - On ventilator support. Gastrostomy tube feeds. PleurX drainage twice a week as per prior routine from the nursing facility.    Hypernatremia / Hypokalemia - Free water and potassium supplementation. Dose of furosemide decreased.    Seizure - On phenobarbital and phenytoin.    Sacral decubitus - Present on admission. Wound care and positioning as per routine.    Moderate protein calorie malnutrition - On gastrostomy tube feeds with Jesse.

## 2018-05-04 NOTE — PROGRESS NOTE ADULT - PROVIDER SPECIALTY LIST ADULT
Cardiology
Hospitalist
Cardiology
Critical Care

## 2018-05-04 NOTE — DISCHARGE NOTE ADULT - PLAN OF CARE
. Continue on furosemide. Follow up with Cardiology, Repeat laboratory studies to monitor. Potassium supplementation. Continue on ventilator support and nebulized bronchodilator therapy. Continue on antihypertensive medications. Continue on antiepileptic medications.

## 2018-05-04 NOTE — DISCHARGE NOTE ADULT - ADDITIONAL INSTRUCTIONS
Follow up with your primary care physician at the nursing facility for further management and treatment.

## 2018-05-04 NOTE — DISCHARGE NOTE ADULT - MEDICATION SUMMARY - MEDICATIONS TO TAKE
I will START or STAY ON the medications listed below when I get home from the hospital:    budesonide 0.5 mg/2 mL inhalation suspension  -- 2 milliliter(s) inhaled 2 times a day  -- Indication: For Respiratory failure    aspirin 81 mg oral tablet, chewable  -- 1 tab(s) by gastrostomy tube once a day  -- Indication: For CAD    acetaminophen 650 mg oral tablet  -- 650 milligram(s) by mouth every 6 hours, As Needed  -- Indication: For Pain    oxyCODONE 5 mg oral tablet  -- 1 tab(s) by mouth 2 times a day, As Needed  -- Indication: For Pain    Milk of Magnesia  -- 30 milliliter(s) by mouth prn, As Needed  -- Indication: For Constipation    Flomax 0.4 mg oral capsule  -- 1 cap(s) by mouth once a day  -- Indication: For BPH    phenytoin 25 mg/mL oral suspension  -- 400 milligram(s) by mouth every 8 hours via peg  -- Indication: For Seizure disorder    digoxin 125 mcg (0.125 mg) oral tablet  -- 1 tab(s) by mouth once a day  -- Indication: For CHF    enoxaparin 40 mg/0.4 mL injectable solution  -- 40 milligram(s) injectable once a day  -- Indication: For Prophylaxis    levETIRAcetam 100 mg/mL oral solution  -- 10 milliliter(s) by mouth every 12 hours  -- Indication: For Seizure disorder    PHENobarbital 60 mg oral tablet  -- 1 tab(s) by mouth 2 times a day via peg  -- Indication: For Seizure disorder    acetylcysteine 10% inhalation solution  -- 3 milliliter(s) inhaled 4 times a day  -- Indication: For Respitaory failure    Peridex 0.12% mucous membrane liquid  -- 15 milliliter(s) mucous membrane 2 times a day  -- Indication: For Respiratory failure    labetalol 100 mg oral tablet  -- 1 tab(s) by mouth 3 times a day  -- Indication: For HTN    DuoNeb 0.5 mg-2.5 mg/3 mL inhalation solution  -- 3 milliliter(s) inhaled every 4 hours, As Needed  -- Indication: For Respiratory failure    DuoNeb 0.5 mg-2.5 mg/3 mL inhalation solution  -- 1 application inhaled every 6 hours  -- Indication: For Respiratory failure    Procardia XL 90 mg oral tablet, extended release  -- 1 tab(s) by mouth once a day  -- Indication: For HTN    Santyl 250 units/g topical ointment  -- Apply on skin to affected area once a day  -- Indication: For Wound    bacitracin 500 units/g topical ointment  -- 1 application on skin every 8 hours  -- Indication: For Rash    nystatin 100,000 units/g topical powder  -- 1 application on skin 2 times a day  -- Indication: For Rash    Hydrocortisone 1% In Absorbase topical ointment  -- Apply on skin to affected area 2 times a day  -- Indication: For Rash    furosemide 40 mg oral tablet  -- 1 tab(s) by mouth once a day  -- Indication: For HTN    Procrit 20,000 units/mL injectable solution  -- 50707 unit(s) injectable once a week every Friday  -- Indication: For Anemia    famotidine 10 mg oral tablet  -- 1 tab(s) by mouth twice  -- Indication: For Gastritis    ferrous sulfate 300 mg/5 mL (60 mg elemental iron) oral liquid  -- 5 milliliter(s) by gastrostomy tube every 8 hours  -- Indication: For Supplement    Fleet Enema 7 g-19 g rectal enema  -- 1 application rectally prn, As Needed  -- Indication: For Constipation    bisacodyl 10 mg rectal suppository  -- 1 suppository(ies) rectally prn, As Needed  -- Indication: For Constipation    potassium chloride 20 mEq oral powder for reconstitution  -- 1 each by mouth once a day  -- Indication: For Supplement    pantoprazole 40 mg oral granule, delayed release  -- 40 milligram(s) by mouth once a day  -- Indication: For Gastritis    Multiple Vitamins oral capsule  -- 1 cap(s) by mouth once a day  -- Indication: For Supplement

## 2018-05-04 NOTE — DISCHARGE NOTE ADULT - PATIENT PORTAL LINK FT
You can access the [facility] Patient Portal, offered by [facility], by registering with the following website: [email or web address]

## 2018-05-04 NOTE — DISCHARGE NOTE ADULT - MEDICATION SUMMARY - MEDICATIONS TO CHANGE
I will SWITCH the dose or number of times a day I take the medications listed below when I get home from the hospital:    Lasix 20 mg oral tablet  -- 1 tab(s) by mouth once a day peg tube

## 2018-05-04 NOTE — DISCHARGE NOTE ADULT - VISION (WITH CORRECTIVE LENSES IF THE PATIENT USUALLY WEARS THEM):
Partially impaired: cannot see medication labels or newsprint, but can see obstacles in path, and the surrounding layout; can count fingers at arm's length/ELLE- pt is non verbal, trached

## 2018-05-06 LAB
CULTURE RESULTS: SIGNIFICANT CHANGE UP
CULTURE RESULTS: SIGNIFICANT CHANGE UP
SPECIMEN SOURCE: SIGNIFICANT CHANGE UP
SPECIMEN SOURCE: SIGNIFICANT CHANGE UP

## 2018-05-23 PROCEDURE — 87040 BLOOD CULTURE FOR BACTERIA: CPT

## 2018-05-23 PROCEDURE — 82947 ASSAY GLUCOSE BLOOD QUANT: CPT

## 2018-05-23 PROCEDURE — 94002 VENT MGMT INPAT INIT DAY: CPT

## 2018-05-23 PROCEDURE — 85027 COMPLETE CBC AUTOMATED: CPT

## 2018-05-23 PROCEDURE — 94640 AIRWAY INHALATION TREATMENT: CPT

## 2018-05-23 PROCEDURE — 82962 GLUCOSE BLOOD TEST: CPT

## 2018-05-23 PROCEDURE — 85610 PROTHROMBIN TIME: CPT

## 2018-05-23 PROCEDURE — 82550 ASSAY OF CK (CPK): CPT

## 2018-05-23 PROCEDURE — 94799 UNLISTED PULMONARY SVC/PX: CPT

## 2018-05-23 PROCEDURE — 83880 ASSAY OF NATRIURETIC PEPTIDE: CPT

## 2018-05-23 PROCEDURE — 84443 ASSAY THYROID STIM HORMONE: CPT

## 2018-05-23 PROCEDURE — 99231 SBSQ HOSP IP/OBS SF/LOW 25: CPT

## 2018-05-23 PROCEDURE — 80048 BASIC METABOLIC PNL TOTAL CA: CPT

## 2018-05-23 PROCEDURE — 70450 CT HEAD/BRAIN W/O DYE: CPT

## 2018-05-23 PROCEDURE — 82040 ASSAY OF SERUM ALBUMIN: CPT

## 2018-05-23 PROCEDURE — 84132 ASSAY OF SERUM POTASSIUM: CPT

## 2018-05-23 PROCEDURE — 86901 BLOOD TYPING SEROLOGIC RH(D): CPT

## 2018-05-23 PROCEDURE — 80053 COMPREHEN METABOLIC PANEL: CPT

## 2018-05-23 PROCEDURE — 71045 X-RAY EXAM CHEST 1 VIEW: CPT

## 2018-05-23 PROCEDURE — 94003 VENT MGMT INPAT SUBQ DAY: CPT

## 2018-05-23 PROCEDURE — 80184 ASSAY OF PHENOBARBITAL: CPT

## 2018-05-23 PROCEDURE — 94770: CPT

## 2018-05-23 PROCEDURE — 83605 ASSAY OF LACTIC ACID: CPT

## 2018-05-23 PROCEDURE — 80185 ASSAY OF PHENYTOIN TOTAL: CPT

## 2018-05-23 PROCEDURE — 99285 EMERGENCY DEPT VISIT HI MDM: CPT | Mod: 25

## 2018-05-23 PROCEDURE — 86850 RBC ANTIBODY SCREEN: CPT

## 2018-05-23 PROCEDURE — 93005 ELECTROCARDIOGRAM TRACING: CPT

## 2018-05-23 PROCEDURE — 82435 ASSAY OF BLOOD CHLORIDE: CPT

## 2018-05-23 PROCEDURE — 82330 ASSAY OF CALCIUM: CPT

## 2018-05-23 PROCEDURE — 84484 ASSAY OF TROPONIN QUANT: CPT

## 2018-05-23 PROCEDURE — 80162 ASSAY OF DIGOXIN TOTAL: CPT

## 2018-05-23 PROCEDURE — 85014 HEMATOCRIT: CPT

## 2018-05-23 PROCEDURE — 36415 COLL VENOUS BLD VENIPUNCTURE: CPT

## 2018-05-23 PROCEDURE — 84100 ASSAY OF PHOSPHORUS: CPT

## 2018-05-23 PROCEDURE — 82803 BLOOD GASES ANY COMBINATION: CPT

## 2018-05-23 PROCEDURE — 86900 BLOOD TYPING SEROLOGIC ABO: CPT

## 2018-05-23 PROCEDURE — 87086 URINE CULTURE/COLONY COUNT: CPT

## 2018-05-23 PROCEDURE — 81001 URINALYSIS AUTO W/SCOPE: CPT

## 2018-05-23 PROCEDURE — 85730 THROMBOPLASTIN TIME PARTIAL: CPT

## 2018-05-23 PROCEDURE — 83735 ASSAY OF MAGNESIUM: CPT

## 2018-05-23 PROCEDURE — 84295 ASSAY OF SERUM SODIUM: CPT

## 2018-05-23 PROCEDURE — 83690 ASSAY OF LIPASE: CPT

## 2019-01-03 ENCOUNTER — INPATIENT (INPATIENT)
Facility: HOSPITAL | Age: 84
LOS: 25 days | Discharge: HOME | DRG: 870 | End: 2019-01-29
Attending: HOSPITALIST | Admitting: STUDENT IN AN ORGANIZED HEALTH CARE EDUCATION/TRAINING PROGRAM
Payer: MEDICARE

## 2019-01-03 VITALS — HEIGHT: 68 IN | WEIGHT: 186.95 LBS

## 2019-01-03 DIAGNOSIS — J34.2 DEVIATED NASAL SEPTUM: Chronic | ICD-10-CM

## 2019-01-03 DIAGNOSIS — A41.9 SEPSIS, UNSPECIFIED ORGANISM: ICD-10-CM

## 2019-01-03 DIAGNOSIS — K46.9 UNSPECIFIED ABDOMINAL HERNIA WITHOUT OBSTRUCTION OR GANGRENE: Chronic | ICD-10-CM

## 2019-01-03 DIAGNOSIS — Z93.1 GASTROSTOMY STATUS: Chronic | ICD-10-CM

## 2019-01-03 DIAGNOSIS — R50.9 FEVER, UNSPECIFIED: ICD-10-CM

## 2019-01-03 DIAGNOSIS — G40.909 EPILEPSY, UNSPECIFIED, NOT INTRACTABLE, WITHOUT STATUS EPILEPTICUS: ICD-10-CM

## 2019-01-03 DIAGNOSIS — R74.8 ABNORMAL LEVELS OF OTHER SERUM ENZYMES: ICD-10-CM

## 2019-01-03 DIAGNOSIS — Z98.89 OTHER SPECIFIED POSTPROCEDURAL STATES: Chronic | ICD-10-CM

## 2019-01-03 DIAGNOSIS — J96.11 CHRONIC RESPIRATORY FAILURE WITH HYPOXIA: ICD-10-CM

## 2019-01-03 DIAGNOSIS — L89.154 PRESSURE ULCER OF SACRAL REGION, STAGE 4: ICD-10-CM

## 2019-01-03 DIAGNOSIS — N39.0 URINARY TRACT INFECTION, SITE NOT SPECIFIED: ICD-10-CM

## 2019-01-03 DIAGNOSIS — Z93.3 COLOSTOMY STATUS: Chronic | ICD-10-CM

## 2019-01-03 LAB
ANION GAP SERPL CALC-SCNC: 13 MMOL/L — SIGNIFICANT CHANGE UP (ref 5–17)
ANISOCYTOSIS BLD QL: SLIGHT — SIGNIFICANT CHANGE UP
APPEARANCE UR: ABNORMAL
APTT BLD: 32 SEC — SIGNIFICANT CHANGE UP (ref 27.5–36.3)
BACTERIA # UR AUTO: ABNORMAL
BASOPHILS # BLD AUTO: 0 K/UL — SIGNIFICANT CHANGE UP (ref 0–0.2)
BASOPHILS NFR BLD AUTO: 0.1 % — SIGNIFICANT CHANGE UP (ref 0–2)
BILIRUB UR-MCNC: NEGATIVE — SIGNIFICANT CHANGE UP
BUN SERPL-MCNC: 38 MG/DL — HIGH (ref 8–20)
CALCIUM SERPL-MCNC: 9.3 MG/DL — SIGNIFICANT CHANGE UP (ref 8.6–10.2)
CHLORIDE SERPL-SCNC: 99 MMOL/L — SIGNIFICANT CHANGE UP (ref 98–107)
CO2 SERPL-SCNC: 29 MMOL/L — SIGNIFICANT CHANGE UP (ref 22–29)
COLOR SPEC: YELLOW — SIGNIFICANT CHANGE UP
CREAT SERPL-MCNC: 0.6 MG/DL — SIGNIFICANT CHANGE UP (ref 0.5–1.3)
DIFF PNL FLD: ABNORMAL
EOSINOPHIL # BLD AUTO: 0.1 K/UL — SIGNIFICANT CHANGE UP (ref 0–0.5)
EOSINOPHIL NFR BLD AUTO: 0.8 % — SIGNIFICANT CHANGE UP (ref 0–5)
EPI CELLS # UR: SIGNIFICANT CHANGE UP
GLUCOSE SERPL-MCNC: 99 MG/DL — SIGNIFICANT CHANGE UP (ref 70–115)
GLUCOSE UR QL: NEGATIVE MG/DL — SIGNIFICANT CHANGE UP
HCT VFR BLD CALC: 26.7 % — LOW (ref 42–52)
HGB BLD-MCNC: 7.7 G/DL — LOW (ref 14–18)
HYPOCHROMIA BLD QL: SLIGHT — SIGNIFICANT CHANGE UP
INR BLD: 1.23 RATIO — HIGH (ref 0.88–1.16)
KETONES UR-MCNC: ABNORMAL
LACTATE BLDV-MCNC: 1.2 MMOL/L — SIGNIFICANT CHANGE UP (ref 0.5–2)
LEUKOCYTE ESTERASE UR-ACNC: ABNORMAL
LYMPHOCYTES # BLD AUTO: 0.6 K/UL — LOW (ref 1–4.8)
LYMPHOCYTES # BLD AUTO: 4 % — LOW (ref 20–55)
MACROCYTES BLD QL: SLIGHT — SIGNIFICANT CHANGE UP
MCHC RBC-ENTMCNC: 22.6 PG — LOW (ref 27–31)
MCHC RBC-ENTMCNC: 28.8 G/DL — LOW (ref 32–36)
MCV RBC AUTO: 78.3 FL — LOW (ref 80–94)
MONOCYTES # BLD AUTO: 0.9 K/UL — HIGH (ref 0–0.8)
MONOCYTES NFR BLD AUTO: 6.5 % — SIGNIFICANT CHANGE UP (ref 3–10)
NEUTROPHILS # BLD AUTO: 12.4 K/UL — HIGH (ref 1.8–8)
NEUTROPHILS NFR BLD AUTO: 88.2 % — HIGH (ref 37–73)
NITRITE UR-MCNC: NEGATIVE — SIGNIFICANT CHANGE UP
NT-PROBNP SERPL-SCNC: 1156 PG/ML — HIGH (ref 0–300)
OVALOCYTES BLD QL SMEAR: SLIGHT — SIGNIFICANT CHANGE UP
PH UR: 6 — SIGNIFICANT CHANGE UP (ref 5–8)
PLAT MORPH BLD: NORMAL — SIGNIFICANT CHANGE UP
PLATELET # BLD AUTO: 351 K/UL — SIGNIFICANT CHANGE UP (ref 150–400)
POLYCHROMASIA BLD QL SMEAR: SLIGHT — SIGNIFICANT CHANGE UP
POTASSIUM SERPL-MCNC: 4.5 MMOL/L — SIGNIFICANT CHANGE UP (ref 3.5–5.3)
POTASSIUM SERPL-SCNC: 4.5 MMOL/L — SIGNIFICANT CHANGE UP (ref 3.5–5.3)
PROT UR-MCNC: 100 MG/DL
PROTHROM AB SERPL-ACNC: 14.2 SEC — HIGH (ref 10–12.9)
RAPID RVP RESULT: SIGNIFICANT CHANGE UP
RBC # BLD: 3.41 M/UL — LOW (ref 4.6–6.2)
RBC # FLD: 18.4 % — HIGH (ref 11–15.6)
RBC BLD AUTO: ABNORMAL
RBC CASTS # UR COMP ASSIST: >50 /HPF (ref 0–4)
SODIUM SERPL-SCNC: 141 MMOL/L — SIGNIFICANT CHANGE UP (ref 135–145)
SP GR SPEC: 1.01 — SIGNIFICANT CHANGE UP (ref 1.01–1.02)
TROPONIN T SERPL-MCNC: 0.19 NG/ML — HIGH (ref 0–0.06)
UROBILINOGEN FLD QL: NEGATIVE MG/DL — SIGNIFICANT CHANGE UP
WBC # BLD: 14.1 K/UL — HIGH (ref 4.8–10.8)
WBC # FLD AUTO: 14.1 K/UL — HIGH (ref 4.8–10.8)
WBC UR QL: ABNORMAL

## 2019-01-03 PROCEDURE — 99222 1ST HOSP IP/OBS MODERATE 55: CPT

## 2019-01-03 PROCEDURE — 93010 ELECTROCARDIOGRAM REPORT: CPT

## 2019-01-03 PROCEDURE — 99223 1ST HOSP IP/OBS HIGH 75: CPT

## 2019-01-03 PROCEDURE — 99291 CRITICAL CARE FIRST HOUR: CPT

## 2019-01-03 PROCEDURE — 71045 X-RAY EXAM CHEST 1 VIEW: CPT | Mod: 26

## 2019-01-03 RX ORDER — KETOROLAC TROMETHAMINE 30 MG/ML
30 SYRINGE (ML) INJECTION ONCE
Qty: 0 | Refills: 0 | Status: DISCONTINUED | OUTPATIENT
Start: 2019-01-03 | End: 2019-01-03

## 2019-01-03 RX ORDER — POTASSIUM CHLORIDE 20 MEQ
20 PACKET (EA) ORAL DAILY
Qty: 0 | Refills: 0 | Status: DISCONTINUED | OUTPATIENT
Start: 2019-01-03 | End: 2019-01-11

## 2019-01-03 RX ORDER — ENOXAPARIN SODIUM 100 MG/ML
40 INJECTION SUBCUTANEOUS DAILY
Qty: 0 | Refills: 0 | Status: DISCONTINUED | OUTPATIENT
Start: 2019-01-03 | End: 2019-01-29

## 2019-01-03 RX ORDER — NIFEDIPINE 30 MG
90 TABLET, EXTENDED RELEASE 24 HR ORAL DAILY
Qty: 0 | Refills: 0 | Status: DISCONTINUED | OUTPATIENT
Start: 2019-01-03 | End: 2019-01-05

## 2019-01-03 RX ORDER — ACETAMINOPHEN 500 MG
650 TABLET ORAL EVERY 6 HOURS
Qty: 0 | Refills: 0 | Status: DISCONTINUED | OUTPATIENT
Start: 2019-01-03 | End: 2019-01-29

## 2019-01-03 RX ORDER — PIPERACILLIN AND TAZOBACTAM 4; .5 G/20ML; G/20ML
3.38 INJECTION, POWDER, LYOPHILIZED, FOR SOLUTION INTRAVENOUS ONCE
Qty: 0 | Refills: 0 | Status: COMPLETED | OUTPATIENT
Start: 2019-01-03 | End: 2019-01-03

## 2019-01-03 RX ORDER — FERROUS SULFATE 325(65) MG
300 TABLET ORAL DAILY
Qty: 0 | Refills: 0 | Status: DISCONTINUED | OUTPATIENT
Start: 2019-01-03 | End: 2019-01-05

## 2019-01-03 RX ORDER — ASPIRIN/CALCIUM CARB/MAGNESIUM 324 MG
81 TABLET ORAL DAILY
Qty: 0 | Refills: 0 | Status: DISCONTINUED | OUTPATIENT
Start: 2019-01-03 | End: 2019-01-04

## 2019-01-03 RX ORDER — FAMOTIDINE 10 MG/ML
20 INJECTION INTRAVENOUS DAILY
Qty: 0 | Refills: 0 | Status: DISCONTINUED | OUTPATIENT
Start: 2019-01-03 | End: 2019-01-29

## 2019-01-03 RX ORDER — SODIUM CHLORIDE 9 MG/ML
2550 INJECTION INTRAMUSCULAR; INTRAVENOUS; SUBCUTANEOUS ONCE
Qty: 0 | Refills: 0 | Status: COMPLETED | OUTPATIENT
Start: 2019-01-03 | End: 2019-01-03

## 2019-01-03 RX ORDER — VANCOMYCIN HCL 1 G
1000 VIAL (EA) INTRAVENOUS ONCE
Qty: 0 | Refills: 0 | Status: COMPLETED | OUTPATIENT
Start: 2019-01-03 | End: 2019-01-03

## 2019-01-03 RX ORDER — DIGOXIN 250 MCG
0.12 TABLET ORAL DAILY
Qty: 0 | Refills: 0 | Status: DISCONTINUED | OUTPATIENT
Start: 2019-01-03 | End: 2019-01-29

## 2019-01-03 RX ORDER — FUROSEMIDE 40 MG
40 TABLET ORAL DAILY
Qty: 0 | Refills: 0 | Status: DISCONTINUED | OUTPATIENT
Start: 2019-01-03 | End: 2019-01-22

## 2019-01-03 RX ORDER — PHENOBARBITAL 60 MG
64.8 TABLET ORAL
Qty: 0 | Refills: 0 | Status: DISCONTINUED | OUTPATIENT
Start: 2019-01-03 | End: 2019-01-10

## 2019-01-03 RX ORDER — DIPHENHYDRAMINE HCL 50 MG
25 CAPSULE ORAL ONCE
Qty: 0 | Refills: 0 | Status: COMPLETED | OUTPATIENT
Start: 2019-01-03 | End: 2019-01-03

## 2019-01-03 RX ORDER — TAMSULOSIN HYDROCHLORIDE 0.4 MG/1
0.4 CAPSULE ORAL AT BEDTIME
Qty: 0 | Refills: 0 | Status: DISCONTINUED | OUTPATIENT
Start: 2019-01-03 | End: 2019-01-29

## 2019-01-03 RX ORDER — LEVETIRACETAM 250 MG/1
1000 TABLET, FILM COATED ORAL
Qty: 0 | Refills: 0 | Status: DISCONTINUED | OUTPATIENT
Start: 2019-01-03 | End: 2019-01-29

## 2019-01-03 RX ORDER — ACETAMINOPHEN 500 MG
975 TABLET ORAL ONCE
Qty: 0 | Refills: 0 | Status: COMPLETED | OUTPATIENT
Start: 2019-01-03 | End: 2019-01-03

## 2019-01-03 RX ORDER — LABETALOL HCL 100 MG
100 TABLET ORAL THREE TIMES A DAY
Qty: 0 | Refills: 0 | Status: DISCONTINUED | OUTPATIENT
Start: 2019-01-03 | End: 2019-01-05

## 2019-01-03 RX ORDER — ACETYLCYSTEINE 200 MG/ML
3 VIAL (ML) MISCELLANEOUS THREE TIMES A DAY
Qty: 0 | Refills: 0 | Status: DISCONTINUED | OUTPATIENT
Start: 2019-01-03 | End: 2019-01-07

## 2019-01-03 RX ADMIN — PIPERACILLIN AND TAZOBACTAM 3.38 GRAM(S): 4; .5 INJECTION, POWDER, LYOPHILIZED, FOR SOLUTION INTRAVENOUS at 22:47

## 2019-01-03 RX ADMIN — Medication 30 MILLIGRAM(S): at 21:51

## 2019-01-03 RX ADMIN — Medication 1000 MILLIGRAM(S): at 23:15

## 2019-01-03 RX ADMIN — Medication 250 MILLIGRAM(S): at 21:51

## 2019-01-03 RX ADMIN — Medication 25 MILLIGRAM(S): at 20:00

## 2019-01-03 RX ADMIN — Medication 975 MILLIGRAM(S): at 19:39

## 2019-01-03 RX ADMIN — Medication 30 MILLIGRAM(S): at 21:18

## 2019-01-03 RX ADMIN — Medication 975 MILLIGRAM(S): at 20:03

## 2019-01-03 RX ADMIN — SODIUM CHLORIDE 2550 MILLILITER(S): 9 INJECTION INTRAMUSCULAR; INTRAVENOUS; SUBCUTANEOUS at 20:02

## 2019-01-03 RX ADMIN — PIPERACILLIN AND TAZOBACTAM 200 GRAM(S): 4; .5 INJECTION, POWDER, LYOPHILIZED, FOR SOLUTION INTRAVENOUS at 20:02

## 2019-01-03 NOTE — ED PROVIDER NOTE - MEDICAL DECISION MAKING DETAILS
PT ORIGINALLY EVAL FOR SOB THEN SPIKED TEMP AND CODE SEPSIS CALLED. SEPSIS PROTOCOL FOLLOWED. CARDIO CALLED. ADMITTED TO HOSPITALIST SERVICE

## 2019-01-03 NOTE — ED CLERICAL - NS ED CLERK UNITS
mon//r/o iso/5TWR mon//5TWR mon//ISO/5TWR 5TWR/184160 mon//ISO VENT/ISO/4BRK VENT/ISO (need monitor DCd)/6TWR MICU/4brkt/VENT/ISO (need monitor DCd) MICU/3111-01

## 2019-01-03 NOTE — ED PROVIDER NOTE - DIAGNOSTIC INTERPRETATION
LIKELY L INFILTRATE, R APPEARS CLEAR LIKELY L INFILTRATE, R APPEARS CLEAR  PLEUROVAC PIG TAIL  CARDIAC LEADS  NO MIDLINE

## 2019-01-03 NOTE — ED PROVIDER NOTE - CARE PLAN
Principal Discharge DX:	Sepsis, due to unspecified organism  Secondary Diagnosis:	NSTEMI (non-ST elevated myocardial infarction)  Secondary Diagnosis:	Pneumonia due to infectious organism, unspecified laterality, unspecified part of lung

## 2019-01-03 NOTE — CONSULT NOTE ADULT - ASSESSMENT
86M hx dementia, HFpEF, h/o CAD s/p 2 stents, CHF, respiratory failure s/p  s/p tracheostomy- vent dependent, PEG, CVA, , colostomy, with h/o infection in the tube, needed IV antibiotics for months ended in 12/2018, sacral decubitus ulcers with h/o sacral osteomyelitis treated about 2 years ago with Abx, colostomy and chronic Baugh's catheter. CT scan in 3/2018 was suspicious for sacral osteomyelitis. Patient was brought in to the ER from [facility] for respiratory distress and fever. in the ER, Temp: 103.6. Labs showed WBC: 14 000. Hgb: 7.7. Trop: 0.19 with h/o chronic elevation.     A/P   Abnl Troponin: Chronically elevated, in presence of HF, Sepsis  CAD   HFpEF  Sepsis    Trend Trop  Echo  EKG  Cont home meds  Abx as per PMT      Primary Cardiologist Dr. Nelson to Follow in am.

## 2019-01-03 NOTE — ED PROVIDER NOTE - SECONDARY DIAGNOSIS.
NSTEMI (non-ST elevated myocardial infarction) Pneumonia due to infectious organism, unspecified laterality, unspecified part of lung

## 2019-01-03 NOTE — H&P ADULT - HISTORY OF PRESENT ILLNESS
85 y/o male with dementia, h/o CAD s/p 2 stents, CHF, respiratory failure on chronic vent via tracheostomy tube with h/o infection in the tube needed IV antibiotics for months ended in 12/2018, sacral decubitus ulcers with h/o sacral osteomyelitis treated about 2 years ago with Abc, colostomy and chronic Baugh's catheter. CT scan in 3/2018 was suspicious for sacral osteomyelitis. Patient was brought in to the ER from [facility] for respiratory distress and fever. in the ER, Temp: 103.6. Labs showed WBC: 14 000. Hgb: 7.7. Trop: 0.19 with h/o chronic elevation.

## 2019-01-03 NOTE — ED ADULT NURSE NOTE - CHIEF COMPLAINT QUOTE
Sent from [facility] for fever of 101.8 and tachypnea. Pt trached and vent dependent. Respiratory called to bedside. Received tylenol PTA by NH staff.

## 2019-01-03 NOTE — ED ADULT TRIAGE NOTE - CHIEF COMPLAINT QUOTE
Sent from [facility] for fever of 101.8 and tachypnea. Pt trached and vent dependent. Respiratory called to bedside. Received tylenol PTA by NH staff.

## 2019-01-03 NOTE — ED ADULT NURSE NOTE - OBJECTIVE STATEMENT
patient brought in from [facility] for respiratory distress. patient trached and ventilated. patient awake unable to speak. no sign of distress. patient was found to have Stage IV and multiple stage II in the coccyx area.

## 2019-01-03 NOTE — ED PROVIDER NOTE - OBJECTIVE STATEMENT
86 MALE FROM [facility] WITH C/O SOB. AS PER EMS, PT HAD AN EPISODE OF SOB, WAS GIVEN A NEB TREATMENT AND IMPROVED. PT SENT TO ER FOR FURTHER EVALUATION. PT IS NON VERBAL AND HAS  EXTENSIVE MEDICAL ISSUES. DAUGHTER CAME TO ER AND STATED PT HAD FEVER LAST NIGHT AND WAS GIVEN TYLENOL.  MED HX BPH (benign prostatic hypertrophy)    CAD (coronary artery disease)    CHF (congestive heart failure)    Clostridium difficile diarrhea  in 2009  Dementia    Dysphagia    Fall  Multiple Mechanical falls  HLD (hyperlipidemia)    HTN (hypertension)    Prostate cancer  Treated w/ radiation  Seizure  (last event >1yr ago)  Stroke  (~5yrs ago)

## 2019-01-03 NOTE — ED PROVIDER NOTE - CRITICAL CARE INDICATION, MLM
patient was critically ill... Patient was critically ill with a high probability of imminent or life threatening deterioration. EVAL FOR SOB THEN CODE SEPSIS AND NSTEMI, ADMIT SEPSIS PROTOCOL FOLLOWED

## 2019-01-03 NOTE — CONSULT NOTE ADULT - SUBJECTIVE AND OBJECTIVE BOX
CARDIOLOGY CONSULTATION NOTE       History obtained by: family and medical record     obtained: No    Chief complaint:    Patient is a 86y old  Male who presents with a chief complaint of respiratory distress. fever (2019 22:39)      PRIMARY CARDIOLOGIST: Dr. Nelson      HPI:  86M hx dementia, h/o CAD s/p 2 stents, CHF, respiratory failure s/p  s/p tracheostomy- vent dependent, PEG, CVA, , colostomy, with h/o infection in the tube, needed IV antibiotics for months ended in 2018, sacral decubitus ulcers with h/o sacral osteomyelitis treated about 2 years ago with Abx, colostomy and chronic Baugh's catheter. CT scan in 3/2018 was suspicious for sacral osteomyelitis. [facility] and fever. in the ER, Temp: 103.6. Labs showed WBC: 14 000. Hgb: 7.7. Trop: 0.19 with h/o chronic elevation.     REVIEW OF SYMPTOMS:   Unable to perform.    ALL OTHER REVIEW OF SYSTEMS ARE NEGATIVE.    MEDICATIONS  (STANDING):  aspirin enteric coated 81 milliGRAM(s) Oral daily  digoxin     Tablet 0.125 milliGRAM(s) Oral daily  enoxaparin Injectable 40 milliGRAM(s) SubCutaneous daily  famotidine    Tablet 20 milliGRAM(s) Oral daily  ferrous    sulfate Liquid 300 milliGRAM(s) Oral daily  furosemide    Tablet 40 milliGRAM(s) Oral daily  labetalol 100 milliGRAM(s) Oral three times a day  levETIRAcetam  Solution 1000 milliGRAM(s) Oral two times a day  multivitamin 1 Tablet(s) Oral daily  NIFEdipine XL 90 milliGRAM(s) Oral daily  PHENobarbital 64.8 milliGRAM(s) Oral two times a day  phenytoin   Suspension 400 milliGRAM(s) Oral every 12 hours  potassium chloride   Powder 20 milliEquivalent(s) Oral daily  tamsulosin 0.4 milliGRAM(s) Oral at bedtime    MEDICATIONS  (PRN):  acetaminophen    Suspension .. 650 milliGRAM(s) Oral every 6 hours PRN Temp greater or equal to 38C (100.4F)  acetylcysteine 10%  Inhalation 3 milliLiter(s) Inhalation three times a day PRN respiratory distress        PAST MEDICAL & SURGICAL HISTORY:  Dysphagia  Fall: Multiple Mechanical falls  CAD (coronary artery disease)  Clostridium difficile diarrhea: in 2009  BPH (benign prostatic hypertrophy)  Dementia  HLD (hyperlipidemia)  CHF (congestive heart failure)  Prostate cancer: Treated w/ radiation  Stroke: (~5yrs ago)  Seizure: (last event &gt;1yr ago)  HTN (hypertension)  Colostomy in place  S/P percutaneous endoscopic gastrostomy (PEG) tube placement  H/O hemorrhoidectomy  Deviated septum  Abdominal hernia  Status post cardiac surgery: stents x 2      FAMILY HISTORY:  No pertinent family history in first degree relatives      SOCIAL HISTORY:    CIGARETTES:  No    ALCOHOL: No    DRUGS: No    Vital Signs Last 24 Hrs  T(C): 39.8 (2019 19:35), Max: 39.8 (2019 19:35)  T(F): 103.6 (2019 19:35), Max: 103.6 (2019 19:35)  HR: 83 (2019 23:18) (80 - 109)  BP: 110/73 (2019 23:18) (110/73 - 134/63)  BP(mean): --  RR: 28 (2019 23:18) (18 - 40)  SpO2: 99% (2019 23:18) (99% - 100%)    PHYSICAL EXAM:      Constitutional: No fever, chills, NAD, Comfortable    Eyes: Not reddened, no discharge    ENMT: No discharge,     Neck: No JVD,     Back: No CVA tenderness    Respiratory: Clear to auscultation bilaterally    Cardiovascular: RRR, Normal S1-2, No S3    Gastrointestinal: Soft, NT/ND. BS+, No organomegaly    Extremities: pedal edema    Vascular: Distal pulses intact    Neurological: Nonverbal    Skin: No ecchymosis, no rash    Musculoskeletal: Non tender,     Psychiatric:  calm        INTERPRETATION OF TELEMETRY:    ECG: SR, NSST-T abnl    I&O's Detail      LABS:                        7.7    14.1  )-----------( 351      ( 2019 18:43 )             26.7     01-    141  |  99  |  38.0<H>  ----------------------------<  99  4.5   |  29.0  |  0.60    Ca    9.3      2019 18:43      CARDIAC MARKERS ( 2019 18:43 )  x     / 0.19 ng/mL / x     / x     / x          PT/INR - ( 2019 20:34 )   PT: 14.2 sec;   INR: 1.23 ratio         PTT - ( 2019 20:34 )  PTT:32.0 sec  Urinalysis Basic - ( 2019 20:12 )    Color: Yellow / Appearance: Slightly Turbid / S.010 / pH: x  Gluc: x / Ketone: Trace  / Bili: Negative / Urobili: Negative mg/dL   Blood: x / Protein: 100 mg/dL / Nitrite: Negative   Leuk Esterase: Moderate / RBC: >50 /HPF / WBC 11-25   Sq Epi: x / Non Sq Epi: Occasional / Bacteria: Occasional      I&O's Summary        ECHO FINDINGS: 3/3/18  ummary:   1. Technically limited study. Limited information is available.   2. Hyperdynamic global left ventricular systolic function.   3. Left ventricular ejection fraction, by visual estimation, is >75%.   4. Thickening of the anterior and posterior mitral valve leaflets.   5. Sclerotic aortic valve with decreased opening.   6. RV is seen on limited views. It is enlarged. RV function appears   preserved.   7. Trivial pericardial effusion.                  \

## 2019-01-03 NOTE — ED PROVIDER NOTE - CRITICAL CARE PROVIDED
consultation with other physicians/direct patient care (not related to procedure)/consult w/ pt's family directly relating to pts condition/documentation/interpretation of diagnostic studies/60 MINUTES/additional history taking

## 2019-01-03 NOTE — ED PROVIDER NOTE - PROGRESS NOTE DETAILS
CAILIN REEVALUATED APPROXIMATELY 1 HOUR PRIOR TO NOTE. PT FLUSHED AND FELT WARM . TEMP RETAKEN AND FOUND TO .6. MEDS ORDERED. CODE SEPSIS CALLED. ED SEPSIS ORDERS PLACED. ELEVATED TROPONIN NOTED. IN PAST ELEVATED DUE TO BNP SO BNP ADDED TO LABS. PLAN ADMIT, IV AB, FEVER CONTROL

## 2019-01-03 NOTE — ED ADULT NURSE REASSESSMENT NOTE - NS ED NURSE REASSESS COMMENT FT1
Report received from ALBERTO Ramirez, chart as noted. MD Velásquez @ bedside for assessment @ this time

## 2019-01-03 NOTE — H&P ADULT - ASSESSMENT
87 y/o male with fever, elevated troponin, UTI, decubitus ulcer r/o osteomyelitis, CAD, HTN, seizure disorder dementia, chronic respiratory failure on vent

## 2019-01-03 NOTE — ED ADULT NURSE NOTE - NSIMPLEMENTINTERV_GEN_ALL_ED
Implemented All Universal Safety Interventions:  [place] to call system. Call bell, personal items and telephone within reach. Instruct patient to call for assistance. Room bathroom lighting operational. Non-slip footwear when patient is off stretcher. Physically safe environment: no spills, clutter or unnecessary equipment. Stretcher in lowest position, wheels locked, appropriate side rails in place.

## 2019-01-04 DIAGNOSIS — D64.9 ANEMIA, UNSPECIFIED: ICD-10-CM

## 2019-01-04 DIAGNOSIS — A41.9 SEPSIS, UNSPECIFIED ORGANISM: ICD-10-CM

## 2019-01-04 PROBLEM — R13.10 DYSPHAGIA, UNSPECIFIED: Chronic | Status: ACTIVE | Noted: 2017-03-01

## 2019-01-04 LAB
-  STREPTOCOCCUS SP. (NOT GRP A, B OR S PNEUMONIAE): SIGNIFICANT CHANGE UP
ANION GAP SERPL CALC-SCNC: 13 MMOL/L — SIGNIFICANT CHANGE UP (ref 5–17)
ANISOCYTOSIS BLD QL: SLIGHT — SIGNIFICANT CHANGE UP
BASOPHILS # BLD AUTO: 0 K/UL — SIGNIFICANT CHANGE UP (ref 0–0.2)
BASOPHILS NFR BLD AUTO: 0.2 % — SIGNIFICANT CHANGE UP (ref 0–2)
BLD GP AB SCN SERPL QL: SIGNIFICANT CHANGE UP
BUN SERPL-MCNC: 37 MG/DL — HIGH (ref 8–20)
CALCIUM SERPL-MCNC: 8.9 MG/DL — SIGNIFICANT CHANGE UP (ref 8.6–10.2)
CHLORIDE SERPL-SCNC: 104 MMOL/L — SIGNIFICANT CHANGE UP (ref 98–107)
CO2 SERPL-SCNC: 28 MMOL/L — SIGNIFICANT CHANGE UP (ref 22–29)
CREAT SERPL-MCNC: 0.74 MG/DL — SIGNIFICANT CHANGE UP (ref 0.5–1.3)
EOSINOPHIL # BLD AUTO: 0.2 K/UL — SIGNIFICANT CHANGE UP (ref 0–0.5)
EOSINOPHIL NFR BLD AUTO: 1.1 % — SIGNIFICANT CHANGE UP (ref 0–6)
GLUCOSE SERPL-MCNC: 146 MG/DL — HIGH (ref 70–115)
HCT VFR BLD CALC: 26.4 % — LOW (ref 42–52)
HGB BLD-MCNC: 7.5 G/DL — LOW (ref 14–18)
HYPOCHROMIA BLD QL: SIGNIFICANT CHANGE UP
LYMPHOCYTES # BLD AUTO: 1 K/UL — SIGNIFICANT CHANGE UP (ref 1–4.8)
LYMPHOCYTES # BLD AUTO: 6.4 % — LOW (ref 20–55)
MAGNESIUM SERPL-MCNC: 2.7 MG/DL — HIGH (ref 1.6–2.6)
MCHC RBC-ENTMCNC: 22.6 PG — LOW (ref 27–31)
MCHC RBC-ENTMCNC: 28.4 G/DL — LOW (ref 32–36)
MCV RBC AUTO: 79.5 FL — LOW (ref 80–94)
METHOD TYPE: SIGNIFICANT CHANGE UP
MICROCYTES BLD QL: SLIGHT — SIGNIFICANT CHANGE UP
MONOCYTES # BLD AUTO: 1.3 K/UL — HIGH (ref 0–0.8)
MONOCYTES NFR BLD AUTO: 8.4 % — SIGNIFICANT CHANGE UP (ref 3–10)
NEUTROPHILS # BLD AUTO: 12.8 K/UL — HIGH (ref 1.8–8)
NEUTROPHILS NFR BLD AUTO: 83.5 % — HIGH (ref 37–73)
PHOSPHATE SERPL-MCNC: 3.6 MG/DL — SIGNIFICANT CHANGE UP (ref 2.4–4.7)
PLAT MORPH BLD: NORMAL — SIGNIFICANT CHANGE UP
PLATELET # BLD AUTO: 377 K/UL — SIGNIFICANT CHANGE UP (ref 150–400)
POTASSIUM SERPL-MCNC: 3.7 MMOL/L — SIGNIFICANT CHANGE UP (ref 3.5–5.3)
POTASSIUM SERPL-SCNC: 3.7 MMOL/L — SIGNIFICANT CHANGE UP (ref 3.5–5.3)
RBC # BLD: 3.32 M/UL — LOW (ref 4.6–6.2)
RBC # FLD: 18.6 % — HIGH (ref 11–15.6)
RBC BLD AUTO: ABNORMAL
SODIUM SERPL-SCNC: 145 MMOL/L — SIGNIFICANT CHANGE UP (ref 135–145)
TRANSFERRIN SERPL-MCNC: 175 MG/DL — LOW (ref 180–329)
TYPE + AB SCN PNL BLD: SIGNIFICANT CHANGE UP
WBC # BLD: 15.4 K/UL — HIGH (ref 4.8–10.8)
WBC # FLD AUTO: 15.4 K/UL — HIGH (ref 4.8–10.8)

## 2019-01-04 PROCEDURE — 93010 ELECTROCARDIOGRAM REPORT: CPT

## 2019-01-04 PROCEDURE — 99233 SBSQ HOSP IP/OBS HIGH 50: CPT

## 2019-01-04 PROCEDURE — 99232 SBSQ HOSP IP/OBS MODERATE 35: CPT

## 2019-01-04 PROCEDURE — 71250 CT THORAX DX C-: CPT | Mod: 26

## 2019-01-04 PROCEDURE — 99222 1ST HOSP IP/OBS MODERATE 55: CPT

## 2019-01-04 PROCEDURE — 74176 CT ABD & PELVIS W/O CONTRAST: CPT | Mod: 26

## 2019-01-04 RX ORDER — PIPERACILLIN AND TAZOBACTAM 4; .5 G/20ML; G/20ML
3.38 INJECTION, POWDER, LYOPHILIZED, FOR SOLUTION INTRAVENOUS EVERY 8 HOURS
Qty: 0 | Refills: 0 | Status: DISCONTINUED | OUTPATIENT
Start: 2019-01-04 | End: 2019-01-07

## 2019-01-04 RX ORDER — VANCOMYCIN HCL 1 G
1000 VIAL (EA) INTRAVENOUS EVERY 12 HOURS
Qty: 0 | Refills: 0 | Status: DISCONTINUED | OUTPATIENT
Start: 2019-01-04 | End: 2019-01-05

## 2019-01-04 RX ORDER — ASPIRIN/CALCIUM CARB/MAGNESIUM 324 MG
81 TABLET ORAL DAILY
Qty: 0 | Refills: 0 | Status: DISCONTINUED | OUTPATIENT
Start: 2019-01-04 | End: 2019-01-29

## 2019-01-04 RX ORDER — VANCOMYCIN HCL 1 G
500 VIAL (EA) INTRAVENOUS EVERY 12 HOURS
Qty: 0 | Refills: 0 | Status: DISCONTINUED | OUTPATIENT
Start: 2019-01-04 | End: 2019-01-04

## 2019-01-04 RX ADMIN — Medication 400 MILLIGRAM(S): at 05:29

## 2019-01-04 RX ADMIN — Medication 250 MILLIGRAM(S): at 18:44

## 2019-01-04 RX ADMIN — Medication 650 MILLIGRAM(S): at 23:13

## 2019-01-04 RX ADMIN — Medication 650 MILLIGRAM(S): at 07:38

## 2019-01-04 RX ADMIN — Medication 300 MILLIGRAM(S): at 13:23

## 2019-01-04 RX ADMIN — Medication 20 MILLIEQUIVALENT(S): at 12:52

## 2019-01-04 RX ADMIN — Medication 400 MILLIGRAM(S): at 18:45

## 2019-01-04 RX ADMIN — LEVETIRACETAM 1000 MILLIGRAM(S): 250 TABLET, FILM COATED ORAL at 05:29

## 2019-01-04 RX ADMIN — Medication 90 MILLIGRAM(S): at 11:16

## 2019-01-04 RX ADMIN — Medication 650 MILLIGRAM(S): at 04:28

## 2019-01-04 RX ADMIN — LEVETIRACETAM 1000 MILLIGRAM(S): 250 TABLET, FILM COATED ORAL at 18:45

## 2019-01-04 RX ADMIN — FAMOTIDINE 20 MILLIGRAM(S): 10 INJECTION INTRAVENOUS at 12:52

## 2019-01-04 RX ADMIN — Medication 40 MILLIGRAM(S): at 05:29

## 2019-01-04 RX ADMIN — Medication 0.12 MILLIGRAM(S): at 05:30

## 2019-01-04 RX ADMIN — Medication 81 MILLIGRAM(S): at 12:52

## 2019-01-04 RX ADMIN — PIPERACILLIN AND TAZOBACTAM 25 GRAM(S): 4; .5 INJECTION, POWDER, LYOPHILIZED, FOR SOLUTION INTRAVENOUS at 21:52

## 2019-01-04 RX ADMIN — Medication 650 MILLIGRAM(S): at 13:32

## 2019-01-04 RX ADMIN — ENOXAPARIN SODIUM 40 MILLIGRAM(S): 100 INJECTION SUBCUTANEOUS at 12:53

## 2019-01-04 RX ADMIN — Medication 64.8 MILLIGRAM(S): at 05:30

## 2019-01-04 RX ADMIN — PIPERACILLIN AND TAZOBACTAM 25 GRAM(S): 4; .5 INJECTION, POWDER, LYOPHILIZED, FOR SOLUTION INTRAVENOUS at 18:08

## 2019-01-04 RX ADMIN — Medication 650 MILLIGRAM(S): at 20:42

## 2019-01-04 RX ADMIN — Medication 650 MILLIGRAM(S): at 13:02

## 2019-01-04 RX ADMIN — Medication 64.8 MILLIGRAM(S): at 21:52

## 2019-01-04 NOTE — CONSULT NOTE ADULT - ASSESSMENT
86 year old male with sacral decubitus ulcer not requiring any operative intervention by ACS/Trauma at this time as patient's wound is unlikely to be source of patient's sepsis. Surgery recommends local wound care. Primary team to continue investigating source of patient's sepsis. If concern for osteomyelitis, recommend long term IV ABx in partnership with ID team. Reconsult as needed.

## 2019-01-04 NOTE — CONSULT NOTE ADULT - ASSESSMENT
86y old male pmhx dementia, bed bound, sacral decubitus ulcers, diverting colostomy, hx of sacral osteo, chronic respiratory failure vent dependent, prostate ca, chronic indwelling jones admitted with sepsis.  Patient examined at bedside, vitals/chart/medications reviewed, case discussed with eICU attending Dr. Fall.  Patient does not require ICU admission at this time.

## 2019-01-04 NOTE — ED ADULT NURSE REASSESSMENT NOTE - NS ED NURSE REASSESS COMMENT FT1
Pt baseline ms, rectal temp 101.8F. Pericare performed, linens chucks and brief changed, pt turned and repositioned for comfort, pillow provided, HOB elevated. Sxn clear mucous from left side of mouth, saO2 100% on vent. Safety precautions maintained. Family remains @ bedside, appears in no apparent distress @ this time. Pending bed assignment

## 2019-01-04 NOTE — PROGRESS NOTE ADULT - PROBLEM SELECTOR PLAN 6
vent. respiratory treatment. pulmonary consult appreciated  consider surg consult for pleurex removal if inactive

## 2019-01-04 NOTE — PROGRESS NOTE ADULT - SUBJECTIVE AND OBJECTIVE BOX
[facility]                                                        Office: [address] [place] [address]                                                       Telephone: [phone]. Fax:[phone]      CARDIOLOGY PROGRESS NOTE   ([facility])    Subjective: Patient seen and examined.  Daughter at bedside.  Chart reviewed.  Unclear source of fever.     ROS: No headache, no chest pain, no SOB, no palpitations, no dizziness, no nausea, no bleeding    Chronic Conditions:     CURRENT MEDICATIONS  digoxin     Tablet 0.125 milliGRAM(s) Oral daily  furosemide    Tablet 40 milliGRAM(s) Oral daily  labetalol 100 milliGRAM(s) Oral three times a day  NIFEdipine XL 90 milliGRAM(s) Oral daily  tamsulosin 0.4 milliGRAM(s) Oral at bedtime      acetylcysteine 10%  Inhalation 3 milliLiter(s) Inhalation three times a day PRN    acetaminophen    Suspension .. 650 milliGRAM(s) Oral every 6 hours PRN  levETIRAcetam  Solution 1000 milliGRAM(s) Oral two times a day  PHENobarbital 64.8 milliGRAM(s) Oral two times a day  phenytoin   Suspension 400 milliGRAM(s) Oral every 12 hours    famotidine    Tablet 20 milliGRAM(s) Oral daily      aspirin enteric coated 81 milliGRAM(s) Oral daily  enoxaparin Injectable 40 milliGRAM(s) SubCutaneous daily  ferrous    sulfate Liquid 300 milliGRAM(s) Oral daily  multivitamin 1 Tablet(s) Oral daily  potassium chloride   Powder 20 milliEquivalent(s) Oral daily    	  TELEMETRY: SR  Vitals:  T(C): 38 (01-04-19 @ 11:19), Max: 39.8 (01-03-19 @ 19:35)  HR: 78 (01-04-19 @ 11:19) (72 - 109)  BP: 124/58 (01-04-19 @ 11:19) (110/70 - 134/63)  RR: 23 (01-04-19 @ 11:19) (18 - 40)  SpO2: 99% (01-04-19 @ 11:19) (99% - 100%)  Wt(kg): --  I&O's Summary    Height (cm): 172.72 (01-03 @ 17:49)  Weight (kg): 84.8 (01-03 @ 17:49)  BMI (kg/m2): 28.4 (01-03 @ 17:49)  BSA (m2): 1.99 (01-03 @ 17:49)  Daily T(C): 38 (01-04-19 @ 11:19), Max: 39.8 (01-03-19 @ 19:35)  HR: 78 (01-04-19 @ 11:19) (72 - 109)  BP: 124/58 (01-04-19 @ 11:19) (110/70 - 134/63)  RR: 23 (01-04-19 @ 11:19) (18 - 40)  SpO2: 99% (01-04-19 @ 11:19) (99% - 100%)  Wt(kg): --    Daily Height (cm): 172.72 (01-03 @ 17:49)  Weight (kg): 84.8 (01-03 @ 17:49)  BMI (kg/m2): 28.4 (01-03 @ 17:49)  BSA (m2): 1.99 (01-03 @ 17:49)    PHYSICAL EXAM:  Appearance: on vent, PEG, no purposeful movements  HEENT:   Normal oral mucosa, PERRL, EOMI	  Lymphatic: No lymphadenopathy  Cardiovascular: Normal S1 S2, No JVD, No murmurs, 2+ bilateral LE edema  Respiratory: Lungs clear to auscultation, 	  Psychiatry: calm  Gastrointestinal:  Soft, Non-tender, + BS; + G tube  Skin: dry  Neurologic: unable to assess  Extremities: Normal range of motion, No clubbing, cyanosis  Vascular: Peripheral pulses palpable 2+ bilaterally, warm      ECG (tracing reviewed by me):  	    DIAGNOSTIC TESTING:  Echocardiogram (images reviewed by me):  Catheterization:  Stress Test:    CXR (image reviewed by me):    CARDIAC MARKERS ( 03 Jan 2019 18:43 )  x     / 0.19 ng/mL / x     / x     / x                     7.7    14.1  )-----------( 351      ( 03 Jan 2019 18:43 )             26.7     01-03    141  |  99  |  38.0<H>  ----------------------------<  99  4.5   |  29.0  |  0.60    Ca    9.3      03 Jan 2019 18:43      BNP: Serum Pro-Brain Natriuretic Peptide: 1156 pg/mL (01-03 @ 20:39)

## 2019-01-04 NOTE — PROGRESS NOTE ADULT - PROBLEM SELECTOR PLAN 5
wound care consult.   CT pelvis showing osteomyelitis  Trauma service called for possible debridement  check ESR/CRP

## 2019-01-04 NOTE — ED ADULT NURSE REASSESSMENT NOTE - NS ED NURSE REASSESS COMMENT FT1
Report given to receiving RN Mag, pts history, current condition and reason for admission discussed, safety concerns addressed and reviewed, pt currently in stable condition, IV patent and running, dressing is clean dry and intact, pt aware of plan of care. Pt education deemed successful after successful patient teach back proves proficiency.

## 2019-01-04 NOTE — CONSULT NOTE ADULT - ASSESSMENT
Respiratory Failure  Hx of multiple electrolytes abnormalities  Had been febrile  Workup for sepsis ongoing  renal functions electrolytes ok     Will follow PRN

## 2019-01-04 NOTE — ED ADULT NURSE REASSESSMENT NOTE - NS ED NURSE REASSESS COMMENT FT1
Assuming care from previous RN Lizet, pt is baseline mental status, pt safety maintained, pt is on the ventillator, family at the bedside, pt family is aware of plan of care and proficiency determined by successful teach back demonstration. Pt does not appear to be in any distress at this time.

## 2019-01-04 NOTE — PROGRESS NOTE ADULT - PROBLEM SELECTOR PLAN 7
Microcytic  No overt bleeding  Hg baseline 8-10  Hold off on transfusion as could be AOCD  f/U anemia, FOBT  Transfuse if Hg < 7

## 2019-01-04 NOTE — ED ADULT NURSE REASSESSMENT NOTE - NS ED NURSE REASSESS COMMENT FT1
Dr. Damon was rounding on the patient this morning when the call from the lab came with the patient having gram positive cocci in both sets of blood cultures. At that time, no new orders were received. MD stated understanding and heard the reported value, no orders were placed, pt was medicated with Tylenol via peg tube as per PRN order.

## 2019-01-04 NOTE — CHART NOTE - NSCHARTNOTEFT_GEN_A_CORE
Palliative care social work note.    SW met with patients dgt at bedside. SW inquired who   was on patients paperwork form [facility]. Dgt reports Van Monroy is patients -Guardian appointed by court for both medical and financial decisions. Dgt reviewed events form  when [facility] went for guardianship and then later guardian also took over pts wifes care. pts wife recently  in Oct 2018. Dgt angry at legal rights being taken away from her and verbalized that events left her homeless, in debt and loss of all belongings. Pts home is presently being sold and all belongings still remain inside. Ptts dgt had been living at previous SNF [facility] in patients room and had been staying in hotel room or sleeping in car. Patients dgt reports that guardian has full code and dgt wants it that way since she believes you have to keep doing everything. Further goals of care discussions need to be handled with Guardian.

## 2019-01-04 NOTE — ED ADULT NURSE REASSESSMENT NOTE - NS ED NURSE REASSESS COMMENT FT1
This RN spoke to MD Velásquez regarding pt admission orders to stepdown. Per logistics pt requires ICU consult for cardiac monitoring and chronic trach/vent. MD Velásquez states he will contact ICU for consult for safe admission

## 2019-01-04 NOTE — CONSULT NOTE ADULT - SUBJECTIVE AND OBJECTIVE BOX
HPI:  85 y/o male with dementia, h/o CAD s/p 2 stents, CHF, respiratory failure on chronic vent via tracheostomy tube with h/o infection in the tube needed IV antibiotics for months ended in 2018, sacral decubitus ulcers with h/o sacral osteomyelitis treated about 2 years ago with Abc, colostomy and chronic Baugh's catheter. CT scan in 3/2018 was suspicious for sacral osteomyelitis. Patient was brought in to the ER from [facility] for respiratory distress and fever. in the ER, Temp: 103.6. Labs showed WBC: 14 000. Hgb: 7.7. Trop: 0.19 with h/o chronic elevation. we follow pt at [facility] have managed his hyponatremia that he has ha in past  ROS UTO (non verbal)      PAST MEDICAL & SURGICAL HISTORY:  Dysphagia  Fall: Multiple Mechanical falls  CAD (coronary artery disease)  Clostridium difficile diarrhea: in   BPH (benign prostatic hypertrophy)  Dementia  HLD (hyperlipidemia)  CHF (congestive heart failure)  Prostate cancer: Treated w/ radiation  Stroke: (~5yrs ago)  Seizure: (last event &gt;1yr ago)  HTN (hypertension)  Colostomy in place  S/P percutaneous endoscopic gastrostomy (PEG) tube placement  H/O hemorrhoidectomy  Deviated septum  Abdominal hernia  Status post cardiac surgery: stents x 2      FAMILY HISTORY:  No pertinent family history in first degree relatives  [place]    Social History:Non smoker    MEDICATIONS  (STANDING):  aspirin  chewable 81 milliGRAM(s) Oral daily  digoxin     Tablet 0.125 milliGRAM(s) Oral daily  enoxaparin Injectable 40 milliGRAM(s) SubCutaneous daily  famotidine    Tablet 20 milliGRAM(s) Oral daily  ferrous    sulfate Liquid 300 milliGRAM(s) Oral daily  furosemide    Tablet 40 milliGRAM(s) Oral daily  labetalol 100 milliGRAM(s) Oral three times a day  levETIRAcetam  Solution 1000 milliGRAM(s) Oral two times a day  multivitamin 1 Tablet(s) Oral daily  NIFEdipine XL 90 milliGRAM(s) Oral daily  PHENobarbital 64.8 milliGRAM(s) Oral two times a day  phenytoin   Suspension 400 milliGRAM(s) Oral every 12 hours  potassium chloride   Powder 20 milliEquivalent(s) Oral daily  tamsulosin 0.4 milliGRAM(s) Oral at bedtime    MEDICATIONS  (PRN):  acetaminophen    Suspension .. 650 milliGRAM(s) Oral every 6 hours PRN Temp greater or equal to 38C (100.4F)  acetylcysteine 10%  Inhalation 3 milliLiter(s) Inhalation three times a day PRN respiratory distress   Meds reviewed    Allergies    clindamycin (Unknown)  Grapes (Rash)  Nuts (Hives; Rash)  PC Pen VK (Unknown)  shellfish (Angioedema; Rash; Hives)  strawberry (Short breath; Rash; Hives)  Xanax (Rash)    Intolerances    Ativan (Unknown)  Haldol (Dystonic RXN)  hydrALAZINE (Unknown)  Zyprexa (Unknown)   ? lasix          Vital Signs Last 24 Hrs  T(C): 38.3 (2019 13:39), Max: 39.8 (2019 19:35)  T(F): 100.9 (2019 13:39), Max: 103.6 (2019 19:35)  HR: 79 (2019 13:39) (72 - 109)  BP: 121/63 (2019 13:39) (110/70 - 134/63)  BP(mean): --  RR: 21 (2019 13:39) (18 - 40)  SpO2: 99% (2019 13:39) (99% - 100%)  Daily Height in cm: 172.72 (2019 17:49)    Daily     PHYSICAL EXAM:    GENERAL: appears chronically ill/ +trach/ +PEG  HEAD:  Atraumatic, Normocephalic  EYES: EOMI  NECK: Supple, neck  veins full  NERVOUS SYSTEM:  Awake  CHEST/LUNG: dec BS B/L  HEART: Regular rate and rhythm; No murmur  ABDOMEN: Soft, Nontender, Nondistended; Bowel sounds present  EXTREMITIES: trace LE edema, contracted      LABS:                        7.7    14.1  )-----------( 351      ( 2019 18:43 )             26.7     01-03    141  |  99  |  38.0<H>  ----------------------------<  99  4.5   |  29.0  |  0.60    Ca    9.3      2019 18:43      PT/INR - ( 2019 20:34 )   PT: 14.2 sec;   INR: 1.23 ratio         PTT - ( 2019 20:34 )  PTT:32.0 sec  Urinalysis Basic - ( 2019 20:12 )    Color: Yellow / Appearance: Slightly Turbid / S.010 / pH: x  Gluc: x / Ketone: Trace  / Bili: Negative / Urobili: Negative mg/dL   Blood: x / Protein: 100 mg/dL / Nitrite: Negative   Leuk Esterase: Moderate / RBC: >50 /HPF / WBC 11-25   Sq Epi: x / Non Sq Epi: Occasional / Bacteria: Occasional              RADIOLOGY & ADDITIONAL TESTS:

## 2019-01-04 NOTE — PROGRESS NOTE ADULT - PROBLEM SELECTOR PLAN 1
likely from UTI & sacral decub/ OM  Bcx +ve for streptococcus.   UA +ve, f/u Ucx with GNR.   Started zosyn and vanco IV.   CT chest abdomen and pelvis: No PNA. Stable appearance of large sacral decubitus ulcer with underlying osteomyelitis. 5 mm distal common bile duct stone without biliary dilatation. 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 Baugh catheter balloon inflated within the urethra and should be repositioned.  ESR/CRP  lactic acid 1.2  Will hold off on IVF for now  ID consult called

## 2019-01-04 NOTE — CONSULT NOTE ADULT - SUBJECTIVE AND OBJECTIVE BOX
PULMONARY CONSULT NOTE      KING MCKEONANTHONY-[id number]    Patient is a 86y old  Male who presents with a chief complaint of respiratory distress. fever (2019 01:02)      HISTORY OF PRESENT ILLNESS:    85 y/o male with dementia, h/o CAD s/p 2 stents, CHF, respiratory failure on chronic vent via tracheostomy tube with h/o infection in the tube needed IV antibiotics for months ended in 2018, sacral decubitus ulcers with h/o sacral osteomyelitis treated about 2 years ago with Abc, colostomy and chronic Baugh's catheter. CT scan in 3/2018 was suspicious for sacral osteomyelitis. Patient was brought in to the ER from [facility] for respiratory distress and fever. in the ER, Temp: 103.6. Labs showed WBC: 14 000. Hgb: 7.7. Trop: 0.19 with h/o chronic elevation.      MEDICATIONS  (STANDING):  aspirin enteric coated 81 milliGRAM(s) Oral daily  digoxin     Tablet 0.125 milliGRAM(s) Oral daily  enoxaparin Injectable 40 milliGRAM(s) SubCutaneous daily  famotidine    Tablet 20 milliGRAM(s) Oral daily  ferrous    sulfate Liquid 300 milliGRAM(s) Oral daily  furosemide    Tablet 40 milliGRAM(s) Oral daily  labetalol 100 milliGRAM(s) Oral three times a day  levETIRAcetam  Solution 1000 milliGRAM(s) Oral two times a day  multivitamin 1 Tablet(s) Oral daily  NIFEdipine XL 90 milliGRAM(s) Oral daily  PHENobarbital 64.8 milliGRAM(s) Oral two times a day  phenytoin   Suspension 400 milliGRAM(s) Oral every 12 hours  potassium chloride   Powder 20 milliEquivalent(s) Oral daily  tamsulosin 0.4 milliGRAM(s) Oral at bedtime      MEDICATIONS  (PRN):  acetaminophen    Suspension .. 650 milliGRAM(s) Oral every 6 hours PRN Temp greater or equal to 38C (100.4F)  acetylcysteine 10%  Inhalation 3 milliLiter(s) Inhalation three times a day PRN respiratory distress      Allergies    clindamycin (Unknown)  Grapes (Rash)  Nuts (Hives; Rash)  PC Pen VK (Unknown)  shellfish (Angioedema; Rash; Hives)  strawberry (Short breath; Rash; Hives)  Xanax (Rash)    Intolerances    Ativan (Unknown)  Haldol (Dystonic RXN)  hydrALAZINE (Unknown)  Zyprexa (Unknown)      PAST MEDICAL & SURGICAL HISTORY:  Dysphagia  Fall: Multiple Mechanical falls  CAD (coronary artery disease)  Clostridium difficile diarrhea: in   BPH (benign prostatic hypertrophy)  Dementia  HLD (hyperlipidemia)  CHF (congestive heart failure)  Prostate cancer: Treated w/ radiation  Stroke: (~5yrs ago)  Seizure: (last event &gt;1yr ago)  HTN (hypertension)  Colostomy in place  S/P percutaneous endoscopic gastrostomy (PEG) tube placement  H/O hemorrhoidectomy  Deviated septum  Abdominal hernia  Status post cardiac surgery: stents x 2      FAMILY HISTORY:  No pertinent family history in first degree relatives      SOCIAL HISTORY  Smoking History:     REVIEW OF SYSTEMS:    CONSTITUTIONAL:  No fevers, chills, sweats    HEENT:  Eyes:  No diplopia or blurred vision. ENT:  No earache, sore throat or runny nose. sinus headache or postnasl drip    CARDIOVASCULAR:  No pressure, squeezing, tightness, or heaviness about the chest; no palpitations, leg swelling, orthopnea or PND    RESPIRATORY:  No cough, shortness of breath, PND or orthopnea. Mild SOBOE    GASTROINTESTINAL:  No abdominal pain, nausea, vomiting or diarrhea.    GENITOURINARY:  No dysuria, frequency or urgency.    NEUROLOGIC:  No paresthesias, fasciculations, seizures or weakness.    PSYCHIATRIC:  No disorder of thought or mood.    Vital Signs Last 24 Hrs  T(C): 38.5 (2019 06:46), Max: 39.8 (2019 19:35)  T(F): 101.3 (2019 06:46), Max: 103.6 (2019 19:35)  HR: 72 (2019 09:45) (72 - 109)  BP: 110/70 (2019 06:46) (110/70 - 134/63)  BP(mean): --  RR: 24 (2019 06:46) (18 - 40)  SpO2: 99% (2019 09:45) (99% - 100%)    PHYSICAL EXAMINATION:    GENERAL: The patient is a well-developed, well-nourished _____in no apparent distress.     HEENT: Head is normocephalic and atraumatic. Extraocular muscles are intact. Mucous membranes are moist.     NECK: Supple.     LUNGS: Clear to auscultation without wheezing, rales, or rhonchi. Respirations unlabored    HEART: Regular rate and rhythm without murmur.    ABDOMEN: Soft, nontender, and nondistended.  No hepatosplenomegaly is noted.    EXTREMITIES: Without any cyanosis, clubbing, rash, lesions or edema.    NEUROLOGIC: Grossly intact.      LABS:                        7.7    14.1  )-----------( 351      ( 2019 18:43 )             26.7         141  |  99  |  38.0<H>  ----------------------------<  99  4.5   |  29.0  |  0.60    Ca    9.3      2019 18:43      PT/INR - ( 2019 20:34 )   PT: 14.2 sec;   INR: 1.23 ratio         PTT - ( 2019 20:34 )  PTT:32.0 sec  Urinalysis Basic - ( 2019 20:12 )    Color: Yellow / Appearance: Slightly Turbid / S.010 / pH: x  Gluc: x / Ketone: Trace  / Bili: Negative / Urobili: Negative mg/dL   Blood: x / Protein: 100 mg/dL / Nitrite: Negative   Leuk Esterase: Moderate / RBC: >50 /HPF / WBC 11-25   Sq Epi: x / Non Sq Epi: Occasional / Bacteria: Occasional        CARDIAC MARKERS ( 2019 18:43 )  x     / 0.19 ng/mL / x     / x     / x            Serum Pro-Brain Natriuretic Peptide: 1156 pg/mL (19 @ 20:39)          MICROBIOLOGY:    RADIOLOGY & ADDITIONAL STUDIES:    < from: CT Chest No Cont (19 @ 06:37) >   EXAM:  CT ABDOMEN AND PELVIS                         EXAM:  CT CHEST                          PROCEDURE DATE:  2019          INTERPRETATION:  CT CHEST, CT ABDOMEN AND PELVIS    CLINICAL INFORMATION: Fever, sepsis, respiratory failure on ventilator,   decubitus ulcers      COMPARISON: CT chest/abdomen/pelvis 3/3/2018    PROCEDURE:   CT of the Chest, Abdomen and Pelvis was performed without intravenous   contrast.   Intravenous contrast: None.  Oral contrast: None.  Sagittal and coronal reformats were performed.    FINDINGS:    CHEST:     LUNGS, AIRWAYS: Tracheostomy tip is now more central in position.   Retained secretions in the left lower lobe bronchus with stable chronic   left lower lobe atelectasis and loss of volume. The remainder of the   lungs are clear without acute airspace disease or consolidation.  PLEURA: Decreased small left pleural effusion with Pleurx catheter in   place.  VESSELS: Aortic atherosclerosis without aneurysm.  HEART: Normal heart size. No pericardial effusion. Coronary artery   calcifications are present.  MEDIASTINUM AND YUN: No adenopathy.  CHEST WALL: No masses.    ABDOMEN AND PELVIS:    LIVER: Normal.  BILE DUCTS: Stable caliber. 5 mm distal CBD stone.  GALLBLADDER: Gallstones.  SPLEEN: Normal.  PANCREAS: Diffuse atrophy.  ADRENALS: Normal.  KIDNEYS/URETERS: No calculi, hydronephrosis, or soft tissue attenuating   renal mass.    BLADDER: Diffuse wall thickening. Baugh catheter balloon inflated within   the urethra.  REPRODUCTIVE ORGANS: Enlarged prostate.    BOWEL: Percutaneous gastrostomy in place. Unremarkable rectal stump. Left   lower quadrant colostomy without bowel obstruction. Large parastomal   herniation of colon has developed.  PERITONEUM: No intra-abdominal fluid collection.  VESSELS:  Aortoiliac atherosclerosis without aneurysm.  RETROPERITONEUM: Mild lower para-aortic lymphadenopathy, slightly   increased.    ABDOMINAL WALL: Stable appearance of large sacral decubitus ulcer with   stable underlying osteomyelitis.  BONES: Stable lytic lesion in the right iliac bone.    IMPRESSION:     No pneumonia.    Stable appearance of large sacral decubitus ulcer with underlying   osteomyelitis.    5 mm distal common bile duct stone without biliary dilatation.    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    Baugh catheter balloon inflated within the urethra and should be   repositioned.                THERESA EDWARDS M.D., ATTENDING RADIOLOGIST  This document has been electronically signed.2019  9:01AM    < end of copied text > PULMONARY CONSULT NOTE      KING MCKEONANTHONY-[id number]    Patient is a 86y old  Male who presents with a chief complaint of respiratory distress. fever (2019 01:02)      HISTORY OF PRESENT ILLNESS:    85 y/o male with dementia, h/o CAD s/p 2 stents, CHF, respiratory failure on chronic vent via tracheostomy tube with h/o infection in the tube needed IV antibiotics for months ended in 2018, sacral decubitus ulcers with h/o sacral osteomyelitis treated about 2 years ago with Abc, colostomy and chronic Baugh's catheter. CT scan in 3/2018 was suspicious for sacral osteomyelitis. Patient was brought in to the ER from [facility] for respiratory distress and fever. in the ER, Temp: 103.6. Labs showed WBC: 14 000. Hgb: 7.7. Trop: 0.19 with h/o chronic elevation.    Daughter Greta a bedside and gave additional history. Started looking ill 2 days ago with increase resp distress and fever. Has had chronic difficulty cleared secretions with Decreased MS.    MEDICATIONS  (STANDING):  aspirin enteric coated 81 milliGRAM(s) Oral daily  digoxin     Tablet 0.125 milliGRAM(s) Oral daily  enoxaparin Injectable 40 milliGRAM(s) SubCutaneous daily  famotidine    Tablet 20 milliGRAM(s) Oral daily  ferrous    sulfate Liquid 300 milliGRAM(s) Oral daily  furosemide    Tablet 40 milliGRAM(s) Oral daily  labetalol 100 milliGRAM(s) Oral three times a day  levETIRAcetam  Solution 1000 milliGRAM(s) Oral two times a day  multivitamin 1 Tablet(s) Oral daily  NIFEdipine XL 90 milliGRAM(s) Oral daily  PHENobarbital 64.8 milliGRAM(s) Oral two times a day  phenytoin   Suspension 400 milliGRAM(s) Oral every 12 hours  potassium chloride   Powder 20 milliEquivalent(s) Oral daily  tamsulosin 0.4 milliGRAM(s) Oral at bedtime      MEDICATIONS  (PRN):  acetaminophen    Suspension .. 650 milliGRAM(s) Oral every 6 hours PRN Temp greater or equal to 38C (100.4F)  acetylcysteine 10%  Inhalation 3 milliLiter(s) Inhalation three times a day PRN respiratory distress      Allergies    clindamycin (Unknown)  Grapes (Rash)  Nuts (Hives; Rash)  PC Pen VK (Unknown)  shellfish (Angioedema; Rash; Hives)  strawberry (Short breath; Rash; Hives)  Xanax (Rash)    Intolerances    Ativan (Unknown)  Haldol (Dystonic RXN)  hydrALAZINE (Unknown)  Zyprexa (Unknown)      PAST MEDICAL & SURGICAL HISTORY:  Dysphagia  Fall: Multiple Mechanical falls  CAD (coronary artery disease)  Clostridium difficile diarrhea: in   BPH (benign prostatic hypertrophy)  Dementia  HLD (hyperlipidemia)  CHF (congestive heart failure)  Prostate cancer: Treated w/ radiation  Stroke: (~5yrs ago)  Seizure: (last event &gt;1yr ago)  HTN (hypertension)  Colostomy in place  S/P percutaneous endoscopic gastrostomy (PEG) tube placement  H/O hemorrhoidectomy  Deviated septum  Abdominal hernia  Status post cardiac surgery: stents x 2      FAMILY HISTORY:  No pertinent family history in first degree relatives      SOCIAL HISTORY  Smoking History:     REVIEW OF SYSTEMS:    CONSTITUTIONAL:  No fevers, chills, sweats    unobtainable    Vital Signs Last 24 Hrs  T(C): 38.5 (2019 06:46), Max: 39.8 (2019 19:35)  T(F): 101.3 (2019 06:46), Max: 103.6 (2019 19:35)  HR: 72 (2019 09:45) (72 - 109)  BP: 110/70 (2019 06:46) (110/70 - 134/63)  BP(mean): --  RR: 24 (2019 06:46) (18 - 40)  SpO2: 99% (2019 09:45) (99% - 100%)    PHYSICAL EXAMINATION:    GENERAL: The patient is a well-developed, poorly responsive with flexure contractions    HEENT: Head is normocephalic and atraumatic. Extraocular muscles are intact. Mucous membranes are moist.     NECK: Supple. trach    LUNGS: Clear to auscultation without wheezing, rales, or rhonchi. Respirations unlabored, left pleurex cath    HEART: Regular rate and rhythm without murmur.    ABDOMEN: Soft, nontender, and nondistended.  No hepatosplenomegaly is noted. peg    EXTREMITIES: Without any cyanosis, clubbing, rash, lesions or edema. sacral decubitus    NEUROLOGIC: flexure contractures of all extremities        LABS:                        7.7    14.1  )-----------( 351      ( 2019 18:43 )             26.7     -    141  |  99  |  38.0<H>  ----------------------------<  99  4.5   |  29.0  |  0.60    Ca    9.3      2019 18:43      PT/INR - ( 2019 20:34 )   PT: 14.2 sec;   INR: 1.23 ratio         PTT - ( 2019 20:34 )  PTT:32.0 sec  Urinalysis Basic - ( 2019 20:12 )    Color: Yellow / Appearance: Slightly Turbid / S.010 / pH: x  Gluc: x / Ketone: Trace  / Bili: Negative / Urobili: Negative mg/dL   Blood: x / Protein: 100 mg/dL / Nitrite: Negative   Leuk Esterase: Moderate / RBC: >50 /HPF / WBC 11-25   Sq Epi: x / Non Sq Epi: Occasional / Bacteria: Occasional        CARDIAC MARKERS ( 2019 18:43 )  x     / 0.19 ng/mL / x     / x     / x            Serum Pro-Brain Natriuretic Peptide: 1156 pg/mL (19 @ 20:39)          MICROBIOLOGY:    RADIOLOGY & ADDITIONAL STUDIES:    < from: CT Chest No Cont (19 @ 06:37) >   EXAM:  CT ABDOMEN AND PELVIS                         EXAM:  CT CHEST                          PROCEDURE DATE:  2019          INTERPRETATION:  CT CHEST, CT ABDOMEN AND PELVIS    CLINICAL INFORMATION: Fever, sepsis, respiratory failure on ventilator,   decubitus ulcers      COMPARISON: CT chest/abdomen/pelvis 3/3/2018    PROCEDURE:   CT of the Chest, Abdomen and Pelvis was performed without intravenous   contrast.   Intravenous contrast: None.  Oral contrast: None.  Sagittal and coronal reformats were performed.    FINDINGS:    CHEST:     LUNGS, AIRWAYS: Tracheostomy tip is now more central in position.   Retained secretions in the left lower lobe bronchus with stable chronic   left lower lobe atelectasis and loss of volume. The remainder of the   lungs are clear without acute airspace disease or consolidation.  PLEURA: Decreased small left pleural effusion with Pleurx catheter in   place.  VESSELS: Aortic atherosclerosis without aneurysm.  HEART: Normal heart size. No pericardial effusion. Coronary artery   calcifications are present.  MEDIASTINUM AND YUN: No adenopathy.  CHEST WALL: No masses.    ABDOMEN AND PELVIS:    LIVER: Normal.  BILE DUCTS: Stable caliber. 5 mm distal CBD stone.  GALLBLADDER: Gallstones.  SPLEEN: Normal.  PANCREAS: Diffuse atrophy.  ADRENALS: Normal.  KIDNEYS/URETERS: No calculi, hydronephrosis, or soft tissue attenuating   renal mass.    BLADDER: Diffuse wall thickening. Baugh catheter balloon inflated within   the urethra.  REPRODUCTIVE ORGANS: Enlarged prostate.    BOWEL: Percutaneous gastrostomy in place. Unremarkable rectal stump. Left   lower quadrant colostomy without bowel obstruction. Large parastomal   herniation of colon has developed.  PERITONEUM: No intra-abdominal fluid collection.  VESSELS:  Aortoiliac atherosclerosis without aneurysm.  RETROPERITONEUM: Mild lower para-aortic lymphadenopathy, slightly   increased.    ABDOMINAL WALL: Stable appearance of large sacral decubitus ulcer with   stable underlying osteomyelitis.  BONES: Stable lytic lesion in the right iliac bone.    IMPRESSION:     No pneumonia.    Stable appearance of large sacral decubitus ulcer with underlying   osteomyelitis.    5 mm distal common bile duct stone without biliary dilatation.    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    Baugh catheter balloon inflated within the urethra and should be   repositioned.                THERESA EDWARDS M.D., ATTENDING RADIOLOGIST  This document has been electronically signed.2019  9:01AM    < end of copied text >

## 2019-01-04 NOTE — ED ADULT NURSE REASSESSMENT NOTE - NS ED NURSE REASSESS COMMENT FT1
Pt is resting in bed comfortably at this time, pt temperature taken, cleaned up, repositioned, suctioned, colostomy emptied and 225 of soft stool removed, pt safety maintained. Pt does not appear to be in any distress at this time. Plan of care explained to the pts daughter, pt daughter states understanding and was able to successfully teach back to show proficiency.

## 2019-01-04 NOTE — CONSULT NOTE ADULT - SUBJECTIVE AND OBJECTIVE BOX
86 year old male from [facility] presenting with fever and elevated WBC and subsequently admitted for urosepsis. ACS/Trauma consulted to evaluate sacral decubitus wound. Patient seen and examined at bedside with on call surgery attending. Subjective information limited given patient is on a ventilator with baseline dementia - information obtained from daughter at bedside. Patient was hospitalized at [facility] in 2016 and left the hospital with a sacral decubitus ulcer that he did not have on admission. Underwent multiple debridements and a flap procedure to the area at [facility] in 2017. Per daughter, patient has been getting local wound care at [facility] but unable to articulate the specifics.     PAST MEDICAL HISTORY:  Dysphagia  Fall  CAD (coronary artery disease)  Clostridium difficile diarrhea  BPH (benign prostatic hypertrophy)  Dementia  HLD (hyperlipidemia)  CHF (congestive heart failure)  Prostate cancer  Stroke  Seizure  HTN (hypertension)      PAST SURGICAL HISTORY:  Colostomy in place  S/P percutaneous endoscopic gastrostomy (PEG) tube placement  H/O hemorrhoidectomy  Deviated septum  Abdominal hernia  Status post cardiac surgery    ALLERGIES:  Ativan (Unknown)  clindamycin (Unknown)  Grapes (Rash)  Haldol (Dystonic RXN)  hydrALAZINE (Unknown)  Nuts (Hives; Rash)  PC Pen VK (Unknown)  shellfish (Angioedema; Rash; Hives)  strawberry (Short breath; Rash; Hives)  Xanax (Rash)  Zyprexa (Unknown)      MEDICATIONS  (STANDING):  aspirin  chewable 81 milliGRAM(s) Oral daily  digoxin     Tablet 0.125 milliGRAM(s) Oral daily  enoxaparin Injectable 40 milliGRAM(s) SubCutaneous daily  famotidine    Tablet 20 milliGRAM(s) Oral daily  ferrous    sulfate Liquid 300 milliGRAM(s) Oral daily  furosemide    Tablet 40 milliGRAM(s) Oral daily  labetalol 100 milliGRAM(s) Oral three times a day  levETIRAcetam  Solution 1000 milliGRAM(s) Oral two times a day  multivitamin 1 Tablet(s) Oral daily  NIFEdipine XL 90 milliGRAM(s) Oral daily  PHENobarbital 64.8 milliGRAM(s) Oral two times a day  phenytoin   Suspension 400 milliGRAM(s) Oral every 12 hours  piperacillin/tazobactam IVPB. 3.375 Gram(s) IV Intermittent every 8 hours  potassium chloride   Powder 20 milliEquivalent(s) Oral daily  tamsulosin 0.4 milliGRAM(s) Oral at bedtime  vancomycin  IVPB 1000 milliGRAM(s) IV Intermittent every 12 hours    MEDICATIONS  (PRN):  acetaminophen    Suspension .. 650 milliGRAM(s) Oral every 6 hours PRN Temp greater or equal to 38C (100.4F)  acetylcysteine 10%  Inhalation 3 milliLiter(s) Inhalation three times a day PRN respiratory distress      VITALS & I/Os:  Vital Signs Last 24 Hrs  T(C): 37.7 (2019 19:28), Max: 39.3 (2019 04:27)  T(F): 99.8 (2019 19:28), Max: 102.8 (2019 04:27)  HR: 75 (2019 21:02) (72 - 95)  BP: 121/65 (2019 19:28) (110/66 - 124/58)  BP(mean): --  RR: 22 (2019 19:28) (21 - 28)  SpO2: 99% (2019 21:02) (99% - 100%)  CAPILLARY BLOOD GLUCOSE        Mode: AC/ CMV (Assist Control/ Continuous Mandatory Ventilation)  RR (machine): 16  TV (machine): 400  FiO2: 40  PEEP: 5  MAP: 9  PIP: 23    I&O's Summary    2019 07:01  -  2019 21:14  --------------------------------------------------------  IN: 0 mL / OUT: 225 mL / NET: -225 mL      Constitutional: Elderly non verbal patient resting comfortably in bed, in no acute distress  HEENT: EOMI / PERRL b/l  Neck: Chronic trach with vent present   Respiratory: Unlabored respirations   Cardiovascular: Normal S1 and S2    Gastrointestinal: Colostomy bag with stool and gas present, non tender non incarcerated non strangulated parastomal hernia present   : Chronic Baugh present   Rectal: Not indicated   Musculoskeletal: Contracted extremities (both upper and lower)   Skin: Stage 3 sacral decubitus ulcer with traces of hematoma present, well granulating base with no necrotic tissue present      LABS:                        7.5    15.4  )-----------( 377      ( 2019 20:23 )             26.4     01-04    145  |  104  |  37.0<H>  ----------------------------<  146<H>  3.7   |  28.0  |  0.74    Ca    8.9      2019 20:28  Phos  3.6     01-04  Mg     2.7     -04      Lactate: acetaminophen    Suspension .. 650 milliGRAM(s) Oral every 6 hours PRN  acetylcysteine 10%  Inhalation 3 milliLiter(s) Inhalation three times a day PRN  aspirin  chewable 81 milliGRAM(s) Oral daily  digoxin     Tablet 0.125 milliGRAM(s) Oral daily  enoxaparin Injectable 40 milliGRAM(s) SubCutaneous daily  famotidine    Tablet 20 milliGRAM(s) Oral daily  ferrous    sulfate Liquid 300 milliGRAM(s) Oral daily  furosemide    Tablet 40 milliGRAM(s) Oral daily  labetalol 100 milliGRAM(s) Oral three times a day  levETIRAcetam  Solution 1000 milliGRAM(s) Oral two times a day  multivitamin 1 Tablet(s) Oral daily  NIFEdipine XL 90 milliGRAM(s) Oral daily  PHENobarbital 64.8 milliGRAM(s) Oral two times a day  phenytoin   Suspension 400 milliGRAM(s) Oral every 12 hours  piperacillin/tazobactam IVPB. 3.375 Gram(s) IV Intermittent every 8 hours  potassium chloride   Powder 20 milliEquivalent(s) Oral daily  tamsulosin 0.4 milliGRAM(s) Oral at bedtime  vancomycin  IVPB 1000 milliGRAM(s) IV Intermittent every 12 hours     PT/INR - ( 2019 20:34 )   PT: 14.2 sec;   INR: 1.23 ratio         PTT - ( 2019 20:34 )  PTT:32.0 sec    CARDIAC MARKERS ( 2019 18:43 )  x     / 0.19 ng/mL / x     / x     / x        Urinalysis Basic - ( 2019 20:12 )    Color: Yellow / Appearance: Slightly Turbid / S.010 / pH: x  Gluc: x / Ketone: Trace  / Bili: Negative / Urobili: Negative mg/dL   Blood: x / Protein: 100 mg/dL / Nitrite: Negative   Leuk Esterase: Moderate / RBC: >50 /HPF / WBC 11-25   Sq Epi: x / Non Sq Epi: Occasional / Bacteria: Occasional

## 2019-01-04 NOTE — ED ADULT NURSE REASSESSMENT NOTE - NS ED NURSE REASSESS COMMENT FT1
Pt turned and positioned, repositioned for comfort, pt vent was repositioned and secured, pt not in any distress as it appears, pt colostomy emptied of 200, IV antibiotics running on the left 20G in the hand, pt VS placed in the EMR.

## 2019-01-04 NOTE — CONSULT NOTE ADULT - SUBJECTIVE AND OBJECTIVE BOX
Patient is a 86y old male pmhx dementia, bed bound, sacral decubitus ulcers, diverting colostomy, hx of sacral osteo, chronic respiratory failure vent dependent, prostate ca, chronic indwelling jones biba from NH with complaints of tachypnea and fever.  Patient admitted with hospitalist service, ICU called due to wanting cardiac monitoring on vent dependent patient.  Patient seen and evaluated in ED, patient hemodynamically stable.  Does not require MICU admission at this time.        PAST MEDICAL & SURGICAL HISTORY:  Dysphagia  Fall: Multiple Mechanical falls  CAD (coronary artery disease)  Clostridium difficile diarrhea: in 2009  BPH (benign prostatic hypertrophy)  Dementia  HLD (hyperlipidemia)  CHF (congestive heart failure)  Prostate cancer: Treated w/ radiation  Stroke: (~5yrs ago)  Seizure: (last event &gt;1yr ago)  HTN (hypertension)  Colostomy in place  S/P percutaneous endoscopic gastrostomy (PEG) tube placement  H/O hemorrhoidectomy  Deviated septum  Abdominal hernia  Status post cardiac surgery: stents x 2    Allergies  clindamycin (Unknown)  Grapes (Rash)  Nuts (Hives; Rash)  PC Pen VK (Unknown)  shellfish (Angioedema; Rash; Hives)  strawberry (Short breath; Rash; Hives)  Xanax (Rash)      Intolerances  Ativan (Unknown)  Haldol (Dystonic RXN)  hydrALAZINE (Unknown)  Zyprexa (Unknown)    FAMILY HISTORY:  No pertinent family history in first degree relatives      Social History:   From NH, vent dependent.  Award of state as per previous CC PA note: legal guardian Van Espinoza [phone]      Review of Systems:  Not able to obtain secondary to nonverbal status.       Vitals During Exam:   HR: 90s  BP: 130s/70s  RR: 21  sPO2: 98% on FiO2 40%, Peep 5    Physical Examination:    General: Elderly male, all 4 extremities contracted, appears comfortable    HEENT: NC/AT, Pupils equal, reactive to light.  Symmetric. Chronic trach in place, site clean.  +clear oral secretions     PULM: Symmetrical thorax expansion upon respiration.  Clear to auscultation bilaterally, left chest wall catheter appreciated, site appears intact.     CVS: Regular rate and rhythm, no murmurs, rubs, or gallops appreciated.     ABD: Soft, nondistended, nontender, normoactive bowel sounds, no masses, colostomy in place, filled with stool.  +large, reducible hernia underlying colostomy.     EXT: All 4 extremities contracted, no edema, nontender.     SKIN: Warm and well perfused, +sacral wounds.     NEURO: Nonverbal, does not follow commands.       Medications:  digoxin     Tablet 0.125 milliGRAM(s) Oral daily  furosemide    Tablet 40 milliGRAM(s) Oral daily  labetalol 100 milliGRAM(s) Oral three times a day  NIFEdipine XL 90 milliGRAM(s) Oral daily  tamsulosin 0.4 milliGRAM(s) Oral at bedtime  acetylcysteine 10%  Inhalation 3 milliLiter(s) Inhalation three times a day PRN  acetaminophen    Suspension .. 650 milliGRAM(s) Oral every 6 hours PRN  levETIRAcetam  Solution 1000 milliGRAM(s) Oral two times a day  PHENobarbital 64.8 milliGRAM(s) Oral two times a day  phenytoin   Suspension 400 milliGRAM(s) Oral every 12 hours  aspirin enteric coated 81 milliGRAM(s) Oral daily  enoxaparin Injectable 40 milliGRAM(s) SubCutaneous daily  famotidine    Tablet 20 milliGRAM(s) Oral daily  ferrous    sulfate Liquid 300 milliGRAM(s) Oral daily  multivitamin 1 Tablet(s) Oral daily  potassium chloride   Powder 20 milliEquivalent(s) Oral daily      Mode: AC/ CMV (Assist Control/ Continuous Mandatory Ventilation)  RR (machine): 16  TV (machine): 400  FiO2: 40  PEEP: 5  MAP: 12  PIP: 39      ICU Vital Signs Last 24 Hrs  T(C): 38.8 (2019 00:19), Max: 39.8 (2019 19:35)  T(F): 101.8 (2019 00:19), Max: 103.6 (2019 19:35)  HR: 81 (2019 00:19) (80 - 109)  BP: 110/73 (2019 23:18) (110/73 - 134/63)  BP(mean): --  ABP: --  ABP(mean): --  RR: 28 (2019 00:19) (18 - 40)  SpO2: 100% (2019 00:19) (99% - 100%)    Vital Signs Last 24 Hrs  T(C): 38.8 (2019 00:19), Max: 39.8 (2019 19:35)  T(F): 101.8 (2019 00:19), Max: 103.6 (2019 19:35)  HR: 81 (2019 00:19) (80 - 109)  BP: 110/73 (2019 23:18) (110/73 - 134/63)  BP(mean): --  RR: 28 (2019 00:19) (18 - 40)  SpO2: 100% (2019 00:19) (99% - 100%)      LABS:                        7.7    14.1  )-----------( 351      ( 2019 18:43 )             26.7     01-03    141  |  99  |  38.0<H>  ----------------------------<  99  4.5   |  29.0  |  0.60    Ca    9.3      2019 18:43        CARDIAC MARKERS ( 2019 18:43 )  x     / 0.19 ng/mL / x     / x     / x            PT/INR - ( 2019 20:34 )   PT: 14.2 sec;   INR: 1.23 ratio    PTT - ( 2019 20:34 )  PTT:32.0 sec      Urinalysis Basic - ( 2019 20:12 )  Color: Yellow / Appearance: Slightly Turbid / S.010 / pH: x  Gluc: x / Ketone: Trace  / Bili: Negative / Urobili: Negative mg/dL   Blood: x / Protein: 100 mg/dL / Nitrite: Negative   Leuk Esterase: Moderate / RBC: >50 /HPF / WBC 11-25   Sq Epi: x / Non Sq Epi: Occasional / Bacteria: Occasional      CULTURES:  Pending.       RADIOLOGY:   CXray: official read pending.       SUPPLEMENTAL O2: AC/VC, chronic vent  LINES: peripheral, left chest wall indwelling catheter  JONES: Y, chronic  PPx: N/A  CONTACT: Y

## 2019-01-04 NOTE — PROGRESS NOTE ADULT - SUBJECTIVE AND OBJECTIVE BOX
CHIEF COMPLAINT/INTERVAL HISTORY:    Patient is a 86y old  Male who presents with a chief complaint of respiratory distress. fever (2019 14:32)      HPI:  87 y/o male with dementia, h/o CAD s/p 2 stents, CHF, respiratory failure on chronic vent via tracheostomy tube with h/o infection in the tube needed IV antibiotics for months ended in 2018, sacral decubitus ulcers with h/o sacral osteomyelitis treated about 2 years ago with Abc, colostomy and chronic Baugh's catheter. CT scan in 3/2018 was suspicious for sacral osteomyelitis. Patient was brought in to the ER from [facility] for respiratory distress and fever. in the ER, Temp: 103.6. Labs showed WBC: 14 000. Hgb: 7.7. Trop: 0.19 with h/o chronic elevation. (2019 22:39)      SUBJECTIVE & OBJECTIVE/ ROS: Pt seen and examined at bedside. Pt with Tmax of 103 yesterday and 102 today. On vent AC FiO2 40%. Daughter at bedside.     ICU Vital Signs Last 24 Hrs  T(C): 37.7 (2019 19:28), Max: 39.8 (2019 19:35)  T(F): 99.8 (2019 19:28), Max: 103.6 (2019 19:35)  HR: 81 (2019 19:28) (72 - 109)  BP: 121/65 (2019 19:28) (110/66 - 134/63)  BP(mean): --  ABP: --  ABP(mean): --  RR: 22 (2019 19:28) (21 - 40)  SpO2: 99% (2019 19:28) (99% - 100%)        MEDICATIONS  (STANDING):  aspirin  chewable 81 milliGRAM(s) Oral daily  digoxin     Tablet 0.125 milliGRAM(s) Oral daily  enoxaparin Injectable 40 milliGRAM(s) SubCutaneous daily  famotidine    Tablet 20 milliGRAM(s) Oral daily  ferrous    sulfate Liquid 300 milliGRAM(s) Oral daily  furosemide    Tablet 40 milliGRAM(s) Oral daily  labetalol 100 milliGRAM(s) Oral three times a day  levETIRAcetam  Solution 1000 milliGRAM(s) Oral two times a day  multivitamin 1 Tablet(s) Oral daily  NIFEdipine XL 90 milliGRAM(s) Oral daily  PHENobarbital 64.8 milliGRAM(s) Oral two times a day  phenytoin   Suspension 400 milliGRAM(s) Oral every 12 hours  piperacillin/tazobactam IVPB. 3.375 Gram(s) IV Intermittent every 8 hours  potassium chloride   Powder 20 milliEquivalent(s) Oral daily  tamsulosin 0.4 milliGRAM(s) Oral at bedtime  vancomycin  IVPB 1000 milliGRAM(s) IV Intermittent every 12 hours    MEDICATIONS  (PRN):  acetaminophen    Suspension .. 650 milliGRAM(s) Oral every 6 hours PRN Temp greater or equal to 38C (100.4F)  acetylcysteine 10%  Inhalation 3 milliLiter(s) Inhalation three times a day PRN respiratory distress      LABS:                        7.7    14.1  )-----------( 351      ( 2019 18:43 )             26.7     -    141  |  99  |  38.0<H>  ----------------------------<  99  4.5   |  29.0  |  0.60    Ca    9.3      2019 18:43      PT/INR - ( 2019 20:34 )   PT: 14.2 sec;   INR: 1.23 ratio         PTT - ( 2019 20:34 )  PTT:32.0 sec  Urinalysis Basic - ( 2019 20:12 )    Color: Yellow / Appearance: Slightly Turbid / S.010 / pH: x  Gluc: x / Ketone: Trace  / Bili: Negative / Urobili: Negative mg/dL   Blood: x / Protein: 100 mg/dL / Nitrite: Negative   Leuk Esterase: Moderate / RBC: >50 /HPF / WBC 11-25   Sq Epi: x / Non Sq Epi: Occasional / Bacteria: Occasional        CAPILLARY BLOOD GLUCOSE          RECENT CULTURES:      RADIOLOGY & ADDITIONAL TESTS:      PHYSICAL EXAM:    GENERAL: NAD, +vent  HEAD:  Atraumatic, Normocephalic  EYES: EOMI, PERRLA, conjunctiva and sclera clear  NECK: Supple, No JVD  NERVOUS SYSTEM:  lethargic, but arousable  CHEST/LUNG: Clear to percussion bilaterally; No rales, rhonchi, wheezing, or rubs  HEART: Regular rate and rhythm; systolic murmur  ABDOMEN: Soft, Nontender, Nondistended; Bowel sounds present, +PEG, +colostomy   EXTREMITIES:  2+ Peripheral Pulses, +++ edema b/l  SKIN: stage 4 decub

## 2019-01-04 NOTE — CONSULT NOTE ADULT - ASSESSMENT
CHronic resp failure secondary to advanced dementia and old CVA  NO recurrence or effusion May be candidate for removal  Fever possibly from decubitus    Plan:  vent support  consider surg consult for pleurex removal if inactive  ABX  surgical eval of decib

## 2019-01-04 NOTE — PROGRESS NOTE ADULT - ASSESSMENT
85 y/o male with fever, elevated troponin, UTI, decubitus ulcer r/o osteomyelitis, CAD, HTN, seizure disorder dementia, chronic respiratory failure on vent

## 2019-01-04 NOTE — PROGRESS NOTE ADULT - PROBLEM SELECTOR PLAN 4
likely from demand ischemia  Chronically elevated, in presence of HF, Sepsis  ECG without ischemic changes  TTE  if TTE unchanged, do not recommend any further cardiac workup needed.   continue digoxin, aspirin, labetalol, nifedipine  switching EC asa to chewable  telemetry x 24 hrs  Cardio appreciated

## 2019-01-04 NOTE — PROGRESS NOTE ADULT - ASSESSMENT
86M hx dementia, HFpEF, h/o CAD s/p 2 stents, CHF, respiratory failure s/p  s/p tracheostomy- vent dependent, PEG, CVA, , colostomy, with h/o infection in the tube, needed IV antibiotics for months ended in 12/2018, sacral decubitus ulcers with h/o sacral osteomyelitis treated about 2 years ago with Abx, colostomy and chronic Baugh's catheter. CT scan in 3/2018 was suspicious for sacral osteomyelitis. Patient was brought in to the ER from [facility] for respiratory distress and fever. in the ER, Temp: 103.6. Labs showed WBC: 14 000. Hgb: 7.7. Trop: 0.19 with h/o chronic elevation.     A/P   Abnl Troponin: Chronically elevated, in presence of HF, Sepsis  CAD   HFpEF  Sepsis  ECG without ischemic changes      Abx as per PMT  TTE  if TTE unchanged, do not recommend any further cardiac workup needed.   continue digoxin, aspirin, labetalol, nifedipine  switching EC asa to chewable  telemetry x 24 hrs    Thank you for allowing me to participate in care of your patient.

## 2019-01-05 LAB
ANION GAP SERPL CALC-SCNC: 13 MMOL/L — SIGNIFICANT CHANGE UP (ref 5–17)
ANISOCYTOSIS BLD QL: SLIGHT — SIGNIFICANT CHANGE UP
BASOPHILS # BLD AUTO: 0 K/UL — SIGNIFICANT CHANGE UP (ref 0–0.2)
BASOPHILS NFR BLD AUTO: 0.2 % — SIGNIFICANT CHANGE UP (ref 0–2)
BUN SERPL-MCNC: 32 MG/DL — HIGH (ref 8–20)
CALCIUM SERPL-MCNC: 9.1 MG/DL — SIGNIFICANT CHANGE UP (ref 8.6–10.2)
CHLORIDE SERPL-SCNC: 106 MMOL/L — SIGNIFICANT CHANGE UP (ref 98–107)
CO2 SERPL-SCNC: 29 MMOL/L — SIGNIFICANT CHANGE UP (ref 22–29)
CREAT SERPL-MCNC: 0.74 MG/DL — SIGNIFICANT CHANGE UP (ref 0.5–1.3)
EOSINOPHIL # BLD AUTO: 0.3 K/UL — SIGNIFICANT CHANGE UP (ref 0–0.5)
EOSINOPHIL NFR BLD AUTO: 2.7 % — SIGNIFICANT CHANGE UP (ref 0–5)
FERRITIN SERPL-MCNC: 268 NG/ML — SIGNIFICANT CHANGE UP (ref 30–400)
FOLATE SERPL-MCNC: >20 NG/ML — SIGNIFICANT CHANGE UP
GLUCOSE SERPL-MCNC: 105 MG/DL — SIGNIFICANT CHANGE UP (ref 70–115)
HCT VFR BLD CALC: 31 % — LOW (ref 42–52)
HGB BLD-MCNC: 8.5 G/DL — LOW (ref 14–18)
HYPOCHROMIA BLD QL: SLIGHT — SIGNIFICANT CHANGE UP
IRON SATN MFR SERPL: 15 UG/DL — LOW (ref 59–158)
IRON SATN MFR SERPL: 6 % — LOW (ref 16–55)
LDH SERPL L TO P-CCNC: 204 U/L — HIGH (ref 98–192)
LYMPHOCYTES # BLD AUTO: 1 K/UL — SIGNIFICANT CHANGE UP (ref 1–4.8)
LYMPHOCYTES # BLD AUTO: 8.4 % — LOW (ref 20–55)
MACROCYTES BLD QL: SLIGHT — SIGNIFICANT CHANGE UP
MAGNESIUM SERPL-MCNC: 2.8 MG/DL — HIGH (ref 1.6–2.6)
MCHC RBC-ENTMCNC: 22.3 PG — LOW (ref 27–31)
MCHC RBC-ENTMCNC: 27.4 G/DL — LOW (ref 32–36)
MCV RBC AUTO: 81.2 FL — SIGNIFICANT CHANGE UP (ref 80–94)
MONOCYTES # BLD AUTO: 1.2 K/UL — HIGH (ref 0–0.8)
MONOCYTES NFR BLD AUTO: 9.3 % — SIGNIFICANT CHANGE UP (ref 3–10)
NEUTROPHILS # BLD AUTO: 9.8 K/UL — HIGH (ref 1.8–8)
NEUTROPHILS NFR BLD AUTO: 78.9 % — HIGH (ref 37–73)
PHOSPHATE SERPL-MCNC: 3.3 MG/DL — SIGNIFICANT CHANGE UP (ref 2.4–4.7)
PLAT MORPH BLD: NORMAL — SIGNIFICANT CHANGE UP
PLATELET # BLD AUTO: 353 K/UL — SIGNIFICANT CHANGE UP (ref 150–400)
POIKILOCYTOSIS BLD QL AUTO: SLIGHT — SIGNIFICANT CHANGE UP
POLYCHROMASIA BLD QL SMEAR: SLIGHT — SIGNIFICANT CHANGE UP
POTASSIUM SERPL-MCNC: 3.8 MMOL/L — SIGNIFICANT CHANGE UP (ref 3.5–5.3)
POTASSIUM SERPL-SCNC: 3.8 MMOL/L — SIGNIFICANT CHANGE UP (ref 3.5–5.3)
RBC # BLD: 3.82 M/UL — LOW (ref 4.6–6.2)
RBC # BLD: 3.82 M/UL — LOW (ref 4.6–6.2)
RBC # FLD: 18.8 % — HIGH (ref 11–15.6)
RBC BLD AUTO: ABNORMAL
RETICS #: 5 K/UL — LOW (ref 25–125)
RETICS/RBC NFR: 1.3 % — SIGNIFICANT CHANGE UP (ref 0.5–2.5)
SODIUM SERPL-SCNC: 148 MMOL/L — HIGH (ref 135–145)
TIBC SERPL-MCNC: 266 UG/DL — SIGNIFICANT CHANGE UP (ref 220–430)
TRANSFERRIN SERPL-MCNC: 186 MG/DL — SIGNIFICANT CHANGE UP (ref 180–329)
VIT B12 SERPL-MCNC: 982 PG/ML — SIGNIFICANT CHANGE UP (ref 232–1245)
WBC # BLD: 12.4 K/UL — HIGH (ref 4.8–10.8)
WBC # FLD AUTO: 12.4 K/UL — HIGH (ref 4.8–10.8)

## 2019-01-05 PROCEDURE — 99223 1ST HOSP IP/OBS HIGH 75: CPT

## 2019-01-05 PROCEDURE — 76937 US GUIDE VASCULAR ACCESS: CPT | Mod: 26

## 2019-01-05 PROCEDURE — 99233 SBSQ HOSP IP/OBS HIGH 50: CPT

## 2019-01-05 PROCEDURE — 36000 PLACE NEEDLE IN VEIN: CPT

## 2019-01-05 RX ORDER — SODIUM CHLORIDE 9 MG/ML
1000 INJECTION, SOLUTION INTRAVENOUS
Qty: 0 | Refills: 0 | Status: DISCONTINUED | OUTPATIENT
Start: 2019-01-05 | End: 2019-01-06

## 2019-01-05 RX ORDER — NIFEDIPINE 30 MG
30 TABLET, EXTENDED RELEASE 24 HR ORAL DAILY
Qty: 0 | Refills: 0 | Status: DISCONTINUED | OUTPATIENT
Start: 2019-01-05 | End: 2019-01-07

## 2019-01-05 RX ORDER — MIDODRINE HYDROCHLORIDE 2.5 MG/1
2.5 TABLET ORAL THREE TIMES A DAY
Qty: 0 | Refills: 0 | Status: DISCONTINUED | OUTPATIENT
Start: 2019-01-05 | End: 2019-01-06

## 2019-01-05 RX ORDER — SODIUM CHLORIDE 9 MG/ML
1000 INJECTION, SOLUTION INTRAVENOUS ONCE
Qty: 0 | Refills: 0 | Status: COMPLETED | OUTPATIENT
Start: 2019-01-05 | End: 2019-01-05

## 2019-01-05 RX ORDER — IRON SUCROSE 20 MG/ML
200 INJECTION, SOLUTION INTRAVENOUS EVERY 24 HOURS
Qty: 0 | Refills: 0 | Status: COMPLETED | OUTPATIENT
Start: 2019-01-05 | End: 2019-01-07

## 2019-01-05 RX ADMIN — IRON SUCROSE 110 MILLIGRAM(S): 20 INJECTION, SOLUTION INTRAVENOUS at 20:45

## 2019-01-05 RX ADMIN — SODIUM CHLORIDE 6000 MILLILITER(S): 9 INJECTION, SOLUTION INTRAVENOUS at 10:33

## 2019-01-05 RX ADMIN — Medication 64.8 MILLIGRAM(S): at 06:13

## 2019-01-05 RX ADMIN — Medication 100 MILLIGRAM(S): at 06:12

## 2019-01-05 RX ADMIN — ENOXAPARIN SODIUM 40 MILLIGRAM(S): 100 INJECTION SUBCUTANEOUS at 11:36

## 2019-01-05 RX ADMIN — PIPERACILLIN AND TAZOBACTAM 25 GRAM(S): 4; .5 INJECTION, POWDER, LYOPHILIZED, FOR SOLUTION INTRAVENOUS at 14:09

## 2019-01-05 RX ADMIN — LEVETIRACETAM 1000 MILLIGRAM(S): 250 TABLET, FILM COATED ORAL at 06:12

## 2019-01-05 RX ADMIN — Medication 250 MILLIGRAM(S): at 06:14

## 2019-01-05 RX ADMIN — Medication 40 MILLIGRAM(S): at 06:11

## 2019-01-05 RX ADMIN — Medication 400 MILLIGRAM(S): at 06:14

## 2019-01-05 RX ADMIN — MIDODRINE HYDROCHLORIDE 2.5 MILLIGRAM(S): 2.5 TABLET ORAL at 22:44

## 2019-01-05 RX ADMIN — PIPERACILLIN AND TAZOBACTAM 25 GRAM(S): 4; .5 INJECTION, POWDER, LYOPHILIZED, FOR SOLUTION INTRAVENOUS at 22:51

## 2019-01-05 RX ADMIN — PIPERACILLIN AND TAZOBACTAM 25 GRAM(S): 4; .5 INJECTION, POWDER, LYOPHILIZED, FOR SOLUTION INTRAVENOUS at 08:24

## 2019-01-05 RX ADMIN — Medication 64.8 MILLIGRAM(S): at 17:14

## 2019-01-05 RX ADMIN — Medication 81 MILLIGRAM(S): at 11:36

## 2019-01-05 RX ADMIN — TAMSULOSIN HYDROCHLORIDE 0.4 MILLIGRAM(S): 0.4 CAPSULE ORAL at 22:51

## 2019-01-05 RX ADMIN — Medication 0.12 MILLIGRAM(S): at 06:11

## 2019-01-05 RX ADMIN — SODIUM CHLORIDE 100 MILLILITER(S): 9 INJECTION, SOLUTION INTRAVENOUS at 12:31

## 2019-01-05 RX ADMIN — Medication 20 MILLIEQUIVALENT(S): at 17:13

## 2019-01-05 RX ADMIN — FAMOTIDINE 20 MILLIGRAM(S): 10 INJECTION INTRAVENOUS at 17:14

## 2019-01-05 RX ADMIN — MIDODRINE HYDROCHLORIDE 2.5 MILLIGRAM(S): 2.5 TABLET ORAL at 11:36

## 2019-01-05 RX ADMIN — Medication 1 TABLET(S): at 17:14

## 2019-01-05 RX ADMIN — Medication 400 MILLIGRAM(S): at 17:14

## 2019-01-05 RX ADMIN — LEVETIRACETAM 1000 MILLIGRAM(S): 250 TABLET, FILM COATED ORAL at 17:13

## 2019-01-05 RX ADMIN — Medication 90 MILLIGRAM(S): at 06:13

## 2019-01-05 RX ADMIN — Medication 650 MILLIGRAM(S): at 06:46

## 2019-01-05 NOTE — PROGRESS NOTE ADULT - SUBJECTIVE AND OBJECTIVE BOX
KING MCKEON    [id number]    86y      Male    INTERVAL HPI/OVERNIGHT EVENTS:    patient being seen for multiple medical problems which include but not limited to chronic sacral ulcers, chronic jones, severe protein calorie malnutrition, strep bacteremia, uti secondary to jones, nstemi, chronic respiratory failure and med management. Patient seen at bedside with daughter. Patient is non verbal .     last 24 hours patient tmax 102.2    REVIEW OF SYSTEMS:    unable to obtain secondary to mental status       Vital Signs Last 24 Hrs  T(C): 37 (2019 14:15), Max: 39 (2019 21:00)  T(F): 98.6 (2019 14:15), Max: 102.2 (2019 21:00)  HR: 66 (2019 14:15) (57 - 89)  BP: 100/51 (2019 14:15) (88/50 - 163/74)  BP(mean): 72 (2019 14:15) (71 - 106)  RR: 25 (2019 14:15) (18 - 34)  SpO2: 97% (2019 14:15) (94% - 100%)    PHYSICAL EXAM:    GENERAL: lethargic appearing, cachetic,   HEENT: temporal wasting   NECK: trach,   CHEST/LUNG: Coarse breath sounds, no wheezing   HEART: S1S2+,   ABDOMEN: Soft, +peg  EXTREMITIES:  +3 edema on bilateral feet   NEURO: Awake,       LABS:                        8.5    12.4  )-----------( 353      ( 2019 06:30 )             31.0     01-05    148<H>  |  106  |  32.0<H>  ----------------------------<  105  3.8   |  29.0  |  0.74    Ca    9.1      2019 06:30  Phos  3.3     01-05  Mg     2.8     01-05      PT/INR - ( 2019 20:34 )   PT: 14.2 sec;   INR: 1.23 ratio         PTT - ( 2019 20:34 )  PTT:32.0 sec  Urinalysis Basic - ( 2019 20:12 )    Color: Yellow / Appearance: Slightly Turbid / S.010 / pH: x  Gluc: x / Ketone: Trace  / Bili: Negative / Urobili: Negative mg/dL   Blood: x / Protein: 100 mg/dL / Nitrite: Negative   Leuk Esterase: Moderate / RBC: >50 /HPF / WBC 11-25   Sq Epi: x / Non Sq Epi: Occasional / Bacteria: Occasional          MEDICATIONS  (STANDING):  aspirin  chewable 81 milliGRAM(s) Oral daily  dextrose 5% + sodium chloride 0.45%. 1000 milliLiter(s) (100 mL/Hr) IV Continuous <Continuous>  digoxin     Tablet 0.125 milliGRAM(s) Oral daily  enoxaparin Injectable 40 milliGRAM(s) SubCutaneous daily  famotidine    Tablet 20 milliGRAM(s) Oral daily  furosemide    Tablet 40 milliGRAM(s) Oral daily  iron sucrose IVPB 200 milliGRAM(s) IV Intermittent every 24 hours  levETIRAcetam  Solution 1000 milliGRAM(s) Oral two times a day  midodrine 2.5 milliGRAM(s) Oral three times a day  multivitamin 1 Tablet(s) Oral daily  NIFEdipine XL 30 milliGRAM(s) Oral daily  PHENobarbital 64.8 milliGRAM(s) Oral two times a day  phenytoin   Suspension 400 milliGRAM(s) Oral every 12 hours  piperacillin/tazobactam IVPB. 3.375 Gram(s) IV Intermittent every 8 hours  potassium chloride   Powder 20 milliEquivalent(s) Oral daily  tamsulosin 0.4 milliGRAM(s) Oral at bedtime    MEDICATIONS  (PRN):  acetaminophen    Suspension .. 650 milliGRAM(s) Oral every 6 hours PRN Temp greater or equal to 38C (100.4F)  acetylcysteine 10%  Inhalation 3 milliLiter(s) Inhalation three times a day PRN respiratory distress  LORazepam   Injectable 0.5 milliGRAM(s) IV Push every 6 hours PRN Agitation      RADIOLOGY & ADDITIONAL TESTS:

## 2019-01-05 NOTE — PROGRESS NOTE ADULT - ASSESSMENT
1) Sepsis secondary to sacral OM and bacteremia --> ID consult appreciated  --> repeat blood cultures  --> c.w iv zosyn    2) uti secondary to chronic jones  --> plan as per #1    3) sacral OM --> plan as per#1  --> [facility] care consult    4) chronic respiratory failure --> pulm following    5) hypernatremia --> d5 1/2 ns started    6) iron def anemia --> start iv iron     7) seizure history 0 home meds    8) hypotension -> hold labetalol  --> dec nifedipijne to 30mg  --> start midodrine     9) dvt prop --> lovenox     10) severe protein calorie malnutrition --> [place] consult   --> start low dose jevity    11) stage 4 sacral wound --> wound consult  --> frequent turning    prognosis grim  full code   pallaitive following

## 2019-01-05 NOTE — PROCEDURE NOTE - PROCEDURE
<<-----Click on this checkbox to enter Procedure Ultrasound guidance  01/05/2019    Active  CORBIN  Insertion and monitoring of intravenous catheter  01/05/2019    Active  RAINPLES

## 2019-01-05 NOTE — CONSULT NOTE ADULT - ASSESSMENT
This 87 y/o male with dementia, h/o CAD s/p 2 stents, CHF, respiratory failure on chronic vent via tracheostomy, sacral decubitus, h/o sacral osteomyelitis     here for fevers  - strep bacteremia  - source may be from sacral wound  - repeat blood cultures    - possible UTI with urine cx + for GNR    no MRSA found on current labs  - stop vancomycin    - continue Zosyn    - follow up all outstanding cultures  - trend temperature and WBC curve  - repeat cultures from blood and all sources if febrile.     continue vent care.     Wound care consult for chronic sacral decub

## 2019-01-05 NOTE — CONSULT NOTE ADULT - SUBJECTIVE AND OBJECTIVE BOX
[facility]  INFECTIOUS DISEASES AND INTERNAL MEDICINE at [place]  =======================================================  Renzo Portillo MD [facility]   Wilberto Salazar MD  Diplomates American Board of Internal Medicine and Infectious Diseases  =======================================================    [facility]-[id number]  KING MCKEON   This is a 86y  Male  with dementia, h/o CAD s/p 2 stents, CHF, respiratory failure on chronic vent, tracheostomy for about 1 year, Chronic sacral decubitus with osteomyelitis, had recently finished course of Iv Cefepime and vancomycin for osteomyelitis in 12/2018 per daughter, diverting colostomy and chronic Jones's catheter. previously seen in march 2018 for  MRSA bacteremia, treated with long course of IV antibiotics.     Patient was brought in to the ER from [facility] for respiratory distress and fever. in the ER, Temp: 103.6. Labs showed WBC: 14 000. Hgb: 7.7. Trop: 0.19 with h/o chronic elevation.   patient cannot offer history.    We are called as blood cultures from admission are now positive for Strep group G.  Urine culture also positive for gram negative rods.   patient cannot offer history.   daughter report that sacral decub had been closing up    =======================================================  Past Medical & Surgical Hx:  =====================  PAST MEDICAL & SURGICAL HISTORY:  Dysphagia  Fall: Multiple Mechanical falls  CAD (coronary artery disease)  Clostridium difficile diarrhea: in 2009  BPH (benign prostatic hypertrophy)  Dementia  HLD (hyperlipidemia)  CHF (congestive heart failure)  Prostate cancer: Treated w/ radiation  Stroke: (~5yrs ago)  Seizure: (last event &gt;1yr ago)  HTN (hypertension)  Colostomy in place  S/P percutaneous endoscopic gastrostomy (PEG) tube placement  H/O hemorrhoidectomy  Deviated septum  Abdominal hernia  Status post cardiac surgery: stents x 2    Problem List:  ==========  HEALTH ISSUES - PROBLEM Dx:  Anemia, unspecified type: Anemia, unspecified type  Sepsis: Sepsis  Chronic respiratory failure with hypoxia: Chronic respiratory failure with hypoxia  Pressure injury of sacral region, stage 4: Pressure injury of sacral region, stage 4  Elevated troponin I level: Elevated troponin I level  Seizure disorder: Seizure disorder  Urinary tract infection with hematuria, site unspecified: Urinary tract infection with hematuria, site unspecified  Fever, unspecified fever cause: Fever, unspecified fever cause    Social Hx:  =======  no toxic habits currently    FAMILY HISTORY:  No pertinent family history in first degree relatives  no significant family history of immunosuppressive disorders in mother or father   =======================================================  REVIEW OF SYSTEMS:  as above  all other ROS negative    =======================================================  Allergies    clindamycin (Unknown)  Grapes (Rash)  Nuts (Hives; Rash)  PC Pen VK (Unknown)  shellfish (Angioedema; Rash; Hives)  strawberry (Short breath; Rash; Hives)  Xanax (Rash)    Intolerances  Ativan (Unknown)  Haldol (Dystonic RXN)  hydrALAZINE (Unknown)  Zyprexa (Unknown)      Antibiotics:  piperacillin/tazobactam IVPB. 3.375 Gram(s) IV Intermittent every 8 hours    Other medications:  aspirin  chewable 81 milliGRAM(s) Oral daily  dextrose 5% + sodium chloride 0.45%. 1000 milliLiter(s) IV Continuous <Continuous>  digoxin     Tablet 0.125 milliGRAM(s) Oral daily  enoxaparin Injectable 40 milliGRAM(s) SubCutaneous daily  famotidine    Tablet 20 milliGRAM(s) Oral daily  ferrous    sulfate Liquid 300 milliGRAM(s) Oral daily  furosemide    Tablet 40 milliGRAM(s) Oral daily  iron sucrose IVPB 200 milliGRAM(s) IV Intermittent every 24 hours  levETIRAcetam  Solution 1000 milliGRAM(s) Oral two times a day  midodrine 2.5 milliGRAM(s) Oral three times a day  multivitamin 1 Tablet(s) Oral daily  NIFEdipine XL 30 milliGRAM(s) Oral daily  PHENobarbital 64.8 milliGRAM(s) Oral two times a day  phenytoin   Suspension 400 milliGRAM(s) Oral every 12 hours  potassium chloride   Powder 20 milliEquivalent(s) Oral daily  tamsulosin 0.4 milliGRAM(s) Oral at bedtime    ======================================================  Physical Exam:  ============  T(F): 98.6 (05 Jan 2019 14:15), Max: 103.6 (03 Jan 2019 19:35)  HR: 66 (05 Jan 2019 14:15)  BP: 100/51 (05 Jan 2019 14:15)  RR: 25 (05 Jan 2019 14:15)  SpO2: 97% (05 Jan 2019 14:15) (94% - 100%)    General:  awake, not interactive,. on vent  Eye: Pupils are equal, round and reactive to light,   HENT: Normocephalic, tracheostomy in place  Neck: Supple,    Respiratory: Lungs are fair air entry anteriorly  Cardiovascular:  mild tachycardia  Gastrointestinal: + PEG tube Normal bowel sounds.  Genitourinary: + jones with yellow urine in tubing.   Musculoskeletal: contracted upper and lower extremities  Integumentary: sacral decubitus present on admission  Neurologic: non verbal      =======================================================  Labs:                           8.5    12.4  )-----------( 353      ( 05 Jan 2019 06:30 )             31.0     01-05    148<H>  |  106  |  32.0<H>  ----------------------------<  105  3.8   |  29.0  |  0.74    Ca    9.1      05 Jan 2019 06:30  Phos  3.3     01-05  Mg     2.8     01-05          Culture - Urine (collected 01-03-19 @ 20:12)  Source: .Urine    Culture - Blood (collected 01-03-19 @ 18:47)  Source: .Blood    Culture - Blood (collected 01-03-19 @ 18:46)  Source: .Blood  Organism: Blood Culture PCR (01-04-19 @ 12:30)  Organism: Blood Culture PCR (01-04-19 @ 12:30)    Sensitivities:      -  Streptococcus sp. (Not Grp A, B or S pneumoniae): Detec      Method Type: PCR

## 2019-01-06 LAB
-  AMIKACIN: SIGNIFICANT CHANGE UP
-  AZTREONAM: SIGNIFICANT CHANGE UP
-  CEFEPIME: SIGNIFICANT CHANGE UP
-  CEFTAZIDIME: SIGNIFICANT CHANGE UP
-  CIPROFLOXACIN: SIGNIFICANT CHANGE UP
-  GENTAMICIN: SIGNIFICANT CHANGE UP
-  IMIPENEM: SIGNIFICANT CHANGE UP
-  LEVOFLOXACIN: SIGNIFICANT CHANGE UP
-  MEROPENEM: SIGNIFICANT CHANGE UP
-  PIPERACILLIN/TAZOBACTAM: SIGNIFICANT CHANGE UP
-  TOBRAMYCIN: SIGNIFICANT CHANGE UP
ALBUMIN SERPL ELPH-MCNC: 2.9 G/DL — LOW (ref 3.3–5.2)
ALP SERPL-CCNC: 136 U/L — HIGH (ref 40–120)
ALT FLD-CCNC: 13 U/L — SIGNIFICANT CHANGE UP
ANION GAP SERPL CALC-SCNC: 13 MMOL/L — SIGNIFICANT CHANGE UP (ref 5–17)
AST SERPL-CCNC: 19 U/L — SIGNIFICANT CHANGE UP
BILIRUB SERPL-MCNC: 0.3 MG/DL — LOW (ref 0.4–2)
BUN SERPL-MCNC: 25 MG/DL — HIGH (ref 8–20)
CALCIUM SERPL-MCNC: 8.5 MG/DL — LOW (ref 8.6–10.2)
CHLORIDE SERPL-SCNC: 109 MMOL/L — HIGH (ref 98–107)
CO2 SERPL-SCNC: 28 MMOL/L — SIGNIFICANT CHANGE UP (ref 22–29)
CREAT SERPL-MCNC: 0.65 MG/DL — SIGNIFICANT CHANGE UP (ref 0.5–1.3)
CULTURE RESULTS: SIGNIFICANT CHANGE UP
GLUCOSE SERPL-MCNC: 130 MG/DL — HIGH (ref 70–115)
HAPTOGLOB SERPL-MCNC: 467 MG/DL — HIGH (ref 34–200)
HCT VFR BLD CALC: 23.7 % — LOW (ref 42–52)
HGB BLD-MCNC: 6.8 G/DL — CRITICAL LOW (ref 14–18)
MAGNESIUM SERPL-MCNC: 2.6 MG/DL — SIGNIFICANT CHANGE UP (ref 1.6–2.6)
MCHC RBC-ENTMCNC: 22.7 PG — LOW (ref 27–31)
MCHC RBC-ENTMCNC: 28.7 G/DL — LOW (ref 32–36)
MCV RBC AUTO: 79 FL — LOW (ref 80–94)
METHOD TYPE: SIGNIFICANT CHANGE UP
ORGANISM # SPEC MICROSCOPIC CNT: SIGNIFICANT CHANGE UP
ORGANISM # SPEC MICROSCOPIC CNT: SIGNIFICANT CHANGE UP
PLATELET # BLD AUTO: 326 K/UL — SIGNIFICANT CHANGE UP (ref 150–400)
POTASSIUM SERPL-MCNC: 3.2 MMOL/L — LOW (ref 3.5–5.3)
POTASSIUM SERPL-SCNC: 3.2 MMOL/L — LOW (ref 3.5–5.3)
PROCALCITONIN SERPL-MCNC: 0.43 NG/ML — HIGH (ref 0–0.04)
PROT SERPL-MCNC: 6.4 G/DL — LOW (ref 6.6–8.7)
RBC # BLD: 3 M/UL — LOW (ref 4.6–6.2)
RBC # FLD: 18.5 % — HIGH (ref 11–15.6)
SODIUM SERPL-SCNC: 150 MMOL/L — HIGH (ref 135–145)
SPECIMEN SOURCE: SIGNIFICANT CHANGE UP
VANCOMYCIN TROUGH SERPL-MCNC: 16.2 UG/ML — SIGNIFICANT CHANGE UP (ref 10–20)
WBC # BLD: 7.8 K/UL — SIGNIFICANT CHANGE UP (ref 4.8–10.8)
WBC # FLD AUTO: 7.8 K/UL — SIGNIFICANT CHANGE UP (ref 4.8–10.8)

## 2019-01-06 PROCEDURE — 12345: CPT | Mod: NC

## 2019-01-06 PROCEDURE — 99233 SBSQ HOSP IP/OBS HIGH 50: CPT

## 2019-01-06 RX ORDER — MIDODRINE HYDROCHLORIDE 2.5 MG/1
2.5 TABLET ORAL
Qty: 0 | Refills: 0 | Status: DISCONTINUED | OUTPATIENT
Start: 2019-01-06 | End: 2019-01-07

## 2019-01-06 RX ORDER — SODIUM CHLORIDE 9 MG/ML
1000 INJECTION, SOLUTION INTRAVENOUS
Qty: 0 | Refills: 0 | Status: DISCONTINUED | OUTPATIENT
Start: 2019-01-06 | End: 2019-01-07

## 2019-01-06 RX ADMIN — PIPERACILLIN AND TAZOBACTAM 25 GRAM(S): 4; .5 INJECTION, POWDER, LYOPHILIZED, FOR SOLUTION INTRAVENOUS at 06:37

## 2019-01-06 RX ADMIN — MIDODRINE HYDROCHLORIDE 2.5 MILLIGRAM(S): 2.5 TABLET ORAL at 18:09

## 2019-01-06 RX ADMIN — Medication 650 MILLIGRAM(S): at 21:16

## 2019-01-06 RX ADMIN — PIPERACILLIN AND TAZOBACTAM 25 GRAM(S): 4; .5 INJECTION, POWDER, LYOPHILIZED, FOR SOLUTION INTRAVENOUS at 22:35

## 2019-01-06 RX ADMIN — LEVETIRACETAM 1000 MILLIGRAM(S): 250 TABLET, FILM COATED ORAL at 06:36

## 2019-01-06 RX ADMIN — Medication 40 MILLIGRAM(S): at 06:36

## 2019-01-06 RX ADMIN — Medication 0.5 MILLIGRAM(S): at 22:59

## 2019-01-06 RX ADMIN — Medication 0.12 MILLIGRAM(S): at 06:36

## 2019-01-06 RX ADMIN — Medication 400 MILLIGRAM(S): at 06:03

## 2019-01-06 RX ADMIN — Medication 650 MILLIGRAM(S): at 22:00

## 2019-01-06 RX ADMIN — Medication 400 MILLIGRAM(S): at 18:10

## 2019-01-06 RX ADMIN — FAMOTIDINE 20 MILLIGRAM(S): 10 INJECTION INTRAVENOUS at 12:06

## 2019-01-06 RX ADMIN — Medication 64.8 MILLIGRAM(S): at 06:37

## 2019-01-06 RX ADMIN — LEVETIRACETAM 1000 MILLIGRAM(S): 250 TABLET, FILM COATED ORAL at 21:05

## 2019-01-06 RX ADMIN — TAMSULOSIN HYDROCHLORIDE 0.4 MILLIGRAM(S): 0.4 CAPSULE ORAL at 21:04

## 2019-01-06 RX ADMIN — Medication 20 MILLIEQUIVALENT(S): at 12:13

## 2019-01-06 RX ADMIN — PIPERACILLIN AND TAZOBACTAM 25 GRAM(S): 4; .5 INJECTION, POWDER, LYOPHILIZED, FOR SOLUTION INTRAVENOUS at 14:08

## 2019-01-06 RX ADMIN — MIDODRINE HYDROCHLORIDE 2.5 MILLIGRAM(S): 2.5 TABLET ORAL at 06:37

## 2019-01-06 RX ADMIN — Medication 81 MILLIGRAM(S): at 12:13

## 2019-01-06 RX ADMIN — Medication 64.8 MILLIGRAM(S): at 18:20

## 2019-01-06 RX ADMIN — Medication 1 TABLET(S): at 14:43

## 2019-01-06 RX ADMIN — ENOXAPARIN SODIUM 40 MILLIGRAM(S): 100 INJECTION SUBCUTANEOUS at 12:06

## 2019-01-06 RX ADMIN — IRON SUCROSE 110 MILLIGRAM(S): 20 INJECTION, SOLUTION INTRAVENOUS at 21:04

## 2019-01-06 NOTE — PROGRESS NOTE ADULT - SUBJECTIVE AND OBJECTIVE BOX
Internal Medicine Hospitalist - Dr. Misty MCKEON    [id number]    86y      Male    Patient is a 86y old  Male who presents with a chief complaint of respiratory distress. fever (06 Jan 2019 11:08)      INTERVAL HPI/ OVERNIGHT EVENTS: Patient is seen and examined, still spiking fever, low grade    REVIEW OF SYSTEMS:    unable to obtain    PHYSICAL EXAM:    Vital Signs Last 24 Hrs  T(C): 37.6 (06 Jan 2019 10:00), Max: 37.9 (06 Jan 2019 02:00)  T(F): 99.7 (06 Jan 2019 10:00), Max: 100.2 (06 Jan 2019 02:00)  HR: 71 (06 Jan 2019 13:00) (61 - 74)  BP: 127/62 (06 Jan 2019 13:00) (100/51 - 129/62)  BP(mean): 89 (06 Jan 2019 13:00) (72 - 89)  RR: 28 (06 Jan 2019 13:00) (16 - 32)  SpO2: 100% (06 Jan 2019 13:00) (95% - 100%)    GENERAL: letahrgic   Neck: tracheostomy  CHEST/LUNG: positive air entry  HEART: S1S2+ audible  ABDOMEN: Soft, Nontender, PEG tube  EXTREMITIES:  sacral DU, positive edema  CNS: lethargic      LABS:                        6.8    7.8   )-----------( 326      ( 06 Jan 2019 07:04 )             23.7     01-06    150<H>  |  109<H>  |  25.0<H>  ----------------------------<  130<H>  3.2<L>   |  28.0  |  0.65    Ca    8.5<L>      06 Jan 2019 07:04  Phos  3.3     01-05  Mg     2.6     01-06    TPro  6.4<L>  /  Alb  2.9<L>  /  TBili  0.3<L>  /  DBili  x   /  AST  19  /  ALT  13  /  AlkPhos  136<H>  01-06      MEDICATIONS  (STANDING):  aspirin  chewable 81 milliGRAM(s) Oral daily  digoxin     Tablet 0.125 milliGRAM(s) Oral daily  enoxaparin Injectable 40 milliGRAM(s) SubCutaneous daily  famotidine    Tablet 20 milliGRAM(s) Oral daily  furosemide    Tablet 40 milliGRAM(s) Oral daily  iron sucrose IVPB 200 milliGRAM(s) IV Intermittent every 24 hours  levETIRAcetam  Solution 1000 milliGRAM(s) Oral two times a day  midodrine 2.5 milliGRAM(s) Oral three times a day  multivitamin 1 Tablet(s) Oral daily  NIFEdipine XL 30 milliGRAM(s) Oral daily  PHENobarbital 64.8 milliGRAM(s) Oral two times a day  phenytoin   Suspension 400 milliGRAM(s) Oral every 12 hours  piperacillin/tazobactam IVPB. 3.375 Gram(s) IV Intermittent every 8 hours  potassium chloride   Powder 20 milliEquivalent(s) Oral daily  sodium chloride 0.45%. 1000 milliLiter(s) (75 mL/Hr) IV Continuous <Continuous>  tamsulosin 0.4 milliGRAM(s) Oral at bedtime    MEDICATIONS  (PRN):  acetaminophen    Suspension .. 650 milliGRAM(s) Oral every 6 hours PRN Temp greater or equal to 38C (100.4F)  acetylcysteine 10%  Inhalation 3 milliLiter(s) Inhalation three times a day PRN respiratory distress  LORazepam   Injectable 0.5 milliGRAM(s) IV Push every 6 hours PRN Agitation      RADIOLOGY & ADDITIONAL TEST

## 2019-01-06 NOTE — PROGRESS NOTE ADULT - ASSESSMENT
Respiratory Failure  Hx of multiple electrolytes abnormalities  Hypernatremia need inc in free water via PEG tube/ hypotonic IVF   HypoKalemia supplemented  Anemia agree w transfusion  Had been febrile  Sepsis- OM bactermia- chronic sacral ulcers, chronic jones   Abx noted    Will follow

## 2019-01-06 NOTE — DIETITIAN INITIAL EVALUATION ADULT. - SIGNS/SYMPTOMS
as evidenced by severe temporal wasting and + 3 edema bilateral feet. as evidenced by moderate temporal wasting and + 3 edema bilateral feet.

## 2019-01-06 NOTE — CHART NOTE - NSCHARTNOTEFT_GEN_A_CORE
Rapid Response PGY 2/ PGY 3 Note  Patient is a 86y old  Male admitted for sepsis secondary to OM.  Rapid response team called because of hypoxia  Patient was seen and examined at the bedside by the rapid response team with attending and ICU PA at bedside.  Patient unable to provide any history. As per nursing staff report, patient hypoxic down to 60s despite suctioning, remained hypoxic.  Patient was bagged without resistance, abundant amount of mucous extracted and saturation increased to 100%    Allergies  clindamycin (Unknown)  Grapes (Rash)  Nuts (Hives; Rash)  PC Pen VK (Unknown)  shellfish (Angioedema; Rash; Hives)  strawberry (Short breath; Rash; Hives)  Xanax (Rash)    Intolerances  Ativan (Unknown)  Haldol (Dystonic RXN)  hydrALAZINE (Unknown)  Zyprexa (Unknown)    PAST MEDICAL & SURGICAL HISTORY:  Dysphagia  Fall: Multiple Mechanical falls  CAD (coronary artery disease)  Clostridium difficile diarrhea: in 2009  BPH (benign prostatic hypertrophy)  Dementia  HLD (hyperlipidemia)  CHF (congestive heart failure)  Prostate cancer: Treated w/ radiation  Stroke: (~5yrs ago)  Seizure: (last event &gt;1yr ago)  HTN (hypertension)  Colostomy in place  S/P percutaneous endoscopic gastrostomy (PEG) tube placement  H/O hemorrhoidectomy  Deviated septum  Abdominal hernia  Status post cardiac surgery: stents x 2      Vital Signs Last 24 Hrs  T(C): 37.9 (06 Jan 2019 16:30), Max: 37.9 (06 Jan 2019 02:00)  T(F): 100.2 (06 Jan 2019 16:30), Max: 100.2 (06 Jan 2019 02:00)  HR: 85 (06 Jan 2019 22:00) (61 - 85)  BP: 143/60 (06 Jan 2019 22:00) (107/53 - 162/73)  BP(mean): 87 (06 Jan 2019 22:00) (77 - 105)  RR: 12 (06 Jan 2019 22:00) (12 - 32)  SpO2: 98% (06 Jan 2019 22:00) (96% - 100%)    GENERAL: Patient is arousable to tactile stimuli. Not following commands. On ventilatory support.  HEENT: Normocephalic, Atraumatic, moist mucous membranes.  NECK: Supple.  LUNGS: Coarse breath sounds bilaterally. Positive trach.   HEART: Tachycardic, regular rhythm ,+S1/+S2, no murmurs, rubs, gallops  EXTREMITIES: Pedal edema bilaterally.  SKIN: No new rashes or lesions.  NEUROLOGIC: Patient is nonverbal. Unable to assess fully due to clinical condition (patient doesn't follow commands)    01-05 @ 07:01  -  01-06 @ 07:00  --------------------------------------------------------  IN: 2155 mL / OUT: 1050 mL / NET: 1105 mL    01-06 @ 07:01 - 01-06 @ 23:32  --------------------------------------------------------  IN: 2355 mL / OUT: 775 mL / NET: 1580 mL                          6.8    7.8   )-----------( 326      ( 06 Jan 2019 07:04 )             23.7     01-06    150<H>  |  109<H>  |  25.0<H>  ----------------------------<  130<H>  3.2<L>   |  28.0  |  0.65    Ca    8.5<L>      06 Jan 2019 07:04  Phos  3.3     01-05  Mg     2.6     01-06  TPro  6.4<L>  /  Alb  2.9<L>  /  TBili  0.3<L>  /  DBili  x   /  AST  19  /  ALT  13  /  AlkPhos  136<H>  01-06      LIVER FUNCTIONS - ( 06 Jan 2019 07:04 )  Alb: 2.9 g/dL / Pro: 6.4 g/dL / ALK PHOS: 136 U/L / ALT: 13 U/L / AST: 19 U/L / GGT: x           MEDICATIONS  (STANDING):  aspirin  chewable 81 milliGRAM(s) Oral daily  digoxin     Tablet 0.125 milliGRAM(s) Oral daily  enoxaparin Injectable 40 milliGRAM(s) SubCutaneous daily  famotidine    Tablet 20 milliGRAM(s) Oral daily  furosemide    Tablet 40 milliGRAM(s) Oral daily  iron sucrose IVPB 200 milliGRAM(s) IV Intermittent every 24 hours  levETIRAcetam  Solution 1000 milliGRAM(s) Oral two times a day  midodrine 2.5 milliGRAM(s) Oral <User Schedule>  multivitamin 1 Tablet(s) Oral daily  NIFEdipine XL 30 milliGRAM(s) Oral daily  PHENobarbital 64.8 milliGRAM(s) Oral two times a day  phenytoin   Suspension 400 milliGRAM(s) Oral every 12 hours  piperacillin/tazobactam IVPB. 3.375 Gram(s) IV Intermittent every 8 hours  potassium chloride   Powder 20 milliEquivalent(s) Oral daily  sodium chloride 0.45%. 1000 milliLiter(s) (75 mL/Hr) IV Continuous <Continuous>  tamsulosin 0.4 milliGRAM(s) Oral at bedtime    MEDICATIONS  (PRN):  acetaminophen    Suspension .. 650 milliGRAM(s) Oral every 6 hours PRN Temp greater or equal to 38C (100.4F)  acetylcysteine 10%  Inhalation 3 milliLiter(s) Inhalation three times a day PRN respiratory distress  LORazepam   Injectable 0.5 milliGRAM(s) IV Push every 6 hours PRN Agitation    Assessment- Rapid Response called for 86y year old Male with PMHx of chronic respiratory failure, admitted with sepsis secondary to bacteremia from sacral OM with acute episode of hypoxia now resolved.  Possibly due to a mucous plug, however given that patient has atelectasis on the left lung evidenced CT of the chest in addition to patient's positioning. Concern for shunting of blood flow.    Plan-  * Transfer in level of care to MICU under Dr. Efrain Daugherty.

## 2019-01-06 NOTE — DIETITIAN INITIAL EVALUATION ADULT. - NS AS NUTRI INTERV ENTERAL NUTRITION
Change enteral formula to Pivot 1.5cal at 30 ml/hr, increase by 10ml/hr every 8 hours to goal rate 70ml/hr x 20 hours (1,400ml/daily) to provide 2,100kcal and 131 grams protein daily.

## 2019-01-06 NOTE — PROGRESS NOTE ADULT - ASSESSMENT
85 y/o male with fever, elevated troponin, UTI, decubitus ulcer r/o osteomyelitis, CAD, HTN, seizure disorder dementia, chronic respiratory failure on vent    A/P    1) Sepsis secondary to sacral OM and bacteremia --> ID consult appreciated  --> repeat blood cultures  --> c.w iv zosyn    2) uti secondary to chronic jones  --> plan as per #1    3) sacral OM present on admission --> plan as per#1  --> [facility] consult    4) chronic respiratory failure --> pulm following - V    5) hypernatremia --> d5 1/2 ns started    6) iron def anemia --> Hg 6.8 - Transfuse 2 units prbc  c/w iv iron     7) seizure history - c/w home meds    8) hypotension -> improving - hold labetalol   --> dec nifedipijne to 30mg  --> start midodrine taper    9) dvt prop --> lovenox     10) severe protein calorie malnutrition --> [place] consult   --> c/w jevity - advance as tolerated    11) stage 4 sacral wound --> wound consult  --> frequent turning    prognosis grim  full code   pallaitive following

## 2019-01-06 NOTE — DIETITIAN INITIAL EVALUATION ADULT. - ADHERENCE
Per pt daughter pt was receiving Jevity 1.5cal at 80ml/hr unsure of time frame but believes about 16 hours per day: (1,280ml/daily) providing 1,920kcal and 82 grams protein daily. Per pt daughter pt was receiving Jevity 1.5cal at 80ml/hr for about 16 hours per day: (1,280ml/daily) providing 1,920kcal and 82 grams protein daily.

## 2019-01-06 NOTE — DIETITIAN INITIAL EVALUATION ADULT. - FACTORS AFF FOOD INTAKE
Pt has history of stroke, dysphagia and has been receiving enteral feedings via PEG for about 1 1/2 years.

## 2019-01-06 NOTE — DIETITIAN INITIAL EVALUATION ADULT. - ETIOLOGY
related to increased nutrient needs in the setting of chronic respiratory failure and stage IV sacral ulcers related to increased nutrient needs in the setting of chronic respiratory failure and stage IV sacral ulcers now + osteomyelitis, sepsis

## 2019-01-06 NOTE — DIETITIAN INITIAL EVALUATION ADULT. - OTHER INFO
Per EMR documented weight since last admission (4/29/18): 12 lb weight gain noted, however pt with + 3 edema bilateral feet. Pt admitted with UTI, sepsis, stage IV sacral ulcer + osteomyelitis. Per EMR documented weight since last admission (4/29/18): 12 lb weight gain noted, however pt with + 3 edema bilateral feet. Pt admitted with UTI, sepsis, stage IV sacral ulcer + osteomyelitis, chronic respiratory failure.

## 2019-01-06 NOTE — CHART NOTE - NSCHARTNOTEFT_GEN_A_CORE
Upon Nutritional Assessment by the Registered Dietitian your patient was determined to meet criteria / has evidence of the following diagnosis/diagnoses:          [ ]  Mild Protein Calorie Malnutrition        [ ]  Moderate Protein Calorie Malnutrition        [X ] Severe Protein Calorie Malnutrition (acute)        [ ] Unspecified Protein Calorie Malnutrition        [ ] Underweight / BMI <19        [ ] Morbid Obesity / BMI > 40      Findings as based on:  •  Comprehensive nutrition assessment and consultation  •  Calorie counts (nutrient intake analysis)  •  Food acceptance and intake status from observations by staff  •  Follow up  •  Patient education  •  Intervention secondary to interdisciplinary rounds  •   concerns      Treatment:    The following diet has been recommended:  Change enteral formula to Pivot 1.5cal at 30 ml/hr, increase by 10ml/hr every 8 hours to goal rate 70ml/hr x 20 hours (1,400ml/daily) to provide 2,100kcal and 131 grams protein daily      PROVIDER Section:     By signing this assessment you are acknowledging and agree with the diagnosis/diagnoses assigned by the Registered Dietitian    Comments:

## 2019-01-06 NOTE — PROGRESS NOTE ADULT - SUBJECTIVE AND OBJECTIVE BOX
NEPHROLOGY INTERVAL HPI/OVERNIGHT EVENTS:    Examined earlier  dtr at bedside    MEDICATIONS  (STANDING):  aspirin  chewable 81 milliGRAM(s) Oral daily  dextrose 5% + sodium chloride 0.45%. 1000 milliLiter(s) (100 mL/Hr) IV Continuous <Continuous>  digoxin     Tablet 0.125 milliGRAM(s) Oral daily  enoxaparin Injectable 40 milliGRAM(s) SubCutaneous daily  famotidine    Tablet 20 milliGRAM(s) Oral daily  furosemide    Tablet 40 milliGRAM(s) Oral daily  iron sucrose IVPB 200 milliGRAM(s) IV Intermittent every 24 hours  levETIRAcetam  Solution 1000 milliGRAM(s) Oral two times a day  midodrine 2.5 milliGRAM(s) Oral three times a day  multivitamin 1 Tablet(s) Oral daily  NIFEdipine XL 30 milliGRAM(s) Oral daily  PHENobarbital 64.8 milliGRAM(s) Oral two times a day  phenytoin   Suspension 400 milliGRAM(s) Oral every 12 hours  piperacillin/tazobactam IVPB. 3.375 Gram(s) IV Intermittent every 8 hours  potassium chloride   Powder 20 milliEquivalent(s) Oral daily  tamsulosin 0.4 milliGRAM(s) Oral at bedtime    MEDICATIONS  (PRN):  acetaminophen    Suspension .. 650 milliGRAM(s) Oral every 6 hours PRN Temp greater or equal to 38C (100.4F)  acetylcysteine 10%  Inhalation 3 milliLiter(s) Inhalation three times a day PRN respiratory distress  LORazepam   Injectable 0.5 milliGRAM(s) IV Push every 6 hours PRN Agitation      Allergies    clindamycin (Unknown)  Grapes (Rash)  Nuts (Hives; Rash)  PC Pen VK (Unknown)  shellfish (Angioedema; Rash; Hives)  strawberry (Short breath; Rash; Hives)  Xanax (Rash)    Intolerances    Ativan (Unknown)  Haldol (Dystonic RXN)  hydrALAZINE (Unknown)  Zyprexa (Unknown)      Vital Signs Last 24 Hrs  T(C): 37.6 (2019 10:00), Max: 37.9 (2019 02:00)  T(F): 99.7 (2019 10:00), Max: 100.2 (2019 02:00)  HR: 66 (2019 10:06) (61 - 74)  BP: 123/57 (2019 10:06) (94/54 - 124/60)  BP(mean): 82 (2019 10:06) (72 - 87)  RR: 18 (2019 10:06) (16 - 32)  SpO2: 99% (2019 10:06) (95% - 100%)  Daily     Daily Weight in k.3 (2019 09:04)    PHYSICAL EXAM:  GENERAL: appears chronically ill/ +trach/ +PEG  HEAD:  Atraumatic, Normocephalic  EYES: EOMI  NECK: Supple, neck  veins full  NERVOUS SYSTEM:  Awake  CHEST/LUNG: dec BS B/L  HEART: Regular rate and rhythm; No murmur  ABDOMEN: Soft, Nontender, Nondistended; Bowel sounds present  EXTREMITIES: trace LE edema, contracted    LABS:                        6.8    7.8   )-----------( 326      ( 2019 07:04 )             23.7     01-06    150<H>  |  109<H>  |  25.0<H>  ----------------------------<  130<H>  3.2<L>   |  28.0  |  0.65    Ca    8.5<L>      2019 07:04  Phos  3.3     -05  Mg     2.6     -    TPro  6.4<L>  /  Alb  2.9<L>  /  TBili  0.3<L>  /  DBili  x   /  AST  19  /  ALT  13  /  AlkPhos  136<H>          Magnesium, Serum: 2.6 mg/dL ( @ 07:04)          RADIOLOGY & ADDITIONAL TESTS:

## 2019-01-06 NOTE — DIETITIAN INITIAL EVALUATION ADULT. - PHYSICAL APPEARANCE
contracted/physical signs of malnutrition [ ]absent [X]present. [ ]Mild [] Moderate [X]Severe Muscle wasting present at [X] temporal [ ]clavicle [ ]interosseos [ ]calf. [ ]Mild [ ]Moderate [ ]Severe subcutaneous fat loss presents at [ ]ribs []orbital region [ ]triceps [ ]buccal area. contracted/physical signs of malnutrition [ ]absent [X]present. [ ]Mild [X] Moderate [ ]Severe Muscle wasting present at [X] temporal [ ]clavicle [ ]interosseos [ ]calf. [ ]Mild [ ]Moderate [ ]Severe subcutaneous fat loss presents at [ ]ribs []orbital region [ ]triceps [ ]buccal area.

## 2019-01-07 DIAGNOSIS — R78.81 BACTEREMIA: ICD-10-CM

## 2019-01-07 DIAGNOSIS — J96.21 ACUTE AND CHRONIC RESPIRATORY FAILURE WITH HYPOXIA: ICD-10-CM

## 2019-01-07 DIAGNOSIS — A41.9 SEPSIS, UNSPECIFIED ORGANISM: ICD-10-CM

## 2019-01-07 DIAGNOSIS — Z71.89 OTHER SPECIFIED COUNSELING: ICD-10-CM

## 2019-01-07 DIAGNOSIS — E87.0 HYPEROSMOLALITY AND HYPERNATREMIA: ICD-10-CM

## 2019-01-07 DIAGNOSIS — M86.10 OTHER ACUTE OSTEOMYELITIS, UNSPECIFIED SITE: ICD-10-CM

## 2019-01-07 LAB
ANION GAP SERPL CALC-SCNC: 12 MMOL/L — SIGNIFICANT CHANGE UP (ref 5–17)
APPEARANCE UR: CLEAR — SIGNIFICANT CHANGE UP
BACTERIA # UR AUTO: ABNORMAL
BILIRUB UR-MCNC: NEGATIVE — SIGNIFICANT CHANGE UP
BUN SERPL-MCNC: 19 MG/DL — SIGNIFICANT CHANGE UP (ref 8–20)
CALCIUM SERPL-MCNC: 8.4 MG/DL — LOW (ref 8.6–10.2)
CHLORIDE SERPL-SCNC: 111 MMOL/L — HIGH (ref 98–107)
CO2 SERPL-SCNC: 27 MMOL/L — SIGNIFICANT CHANGE UP (ref 22–29)
COLOR SPEC: YELLOW — SIGNIFICANT CHANGE UP
CREAT SERPL-MCNC: 0.6 MG/DL — SIGNIFICANT CHANGE UP (ref 0.5–1.3)
DIFF PNL FLD: ABNORMAL
GAS PNL BLDA: SIGNIFICANT CHANGE UP
GLUCOSE SERPL-MCNC: 155 MG/DL — HIGH (ref 70–115)
GLUCOSE UR QL: NEGATIVE MG/DL — SIGNIFICANT CHANGE UP
GRAM STN FLD: SIGNIFICANT CHANGE UP
GRAM STN FLD: SIGNIFICANT CHANGE UP
HCT VFR BLD CALC: 31.4 % — LOW (ref 42–52)
HGB BLD-MCNC: 9.2 G/DL — LOW (ref 14–18)
KETONES UR-MCNC: NEGATIVE — SIGNIFICANT CHANGE UP
LEUKOCYTE ESTERASE UR-ACNC: ABNORMAL
MAGNESIUM SERPL-MCNC: 2.4 MG/DL — SIGNIFICANT CHANGE UP (ref 1.8–2.6)
MCHC RBC-ENTMCNC: 23.8 PG — LOW (ref 27–31)
MCHC RBC-ENTMCNC: 29.3 G/DL — LOW (ref 32–36)
MCV RBC AUTO: 81.1 FL — SIGNIFICANT CHANGE UP (ref 80–94)
NITRITE UR-MCNC: NEGATIVE — SIGNIFICANT CHANGE UP
PH UR: 5 — SIGNIFICANT CHANGE UP (ref 5–8)
PHENYTOIN FREE SERPL-MCNC: 4.6 UG/ML — LOW (ref 10–20)
PHOSPHATE SERPL-MCNC: 1.9 MG/DL — LOW (ref 2.4–4.7)
PLATELET # BLD AUTO: 330 K/UL — SIGNIFICANT CHANGE UP (ref 150–400)
POTASSIUM SERPL-MCNC: 3.9 MMOL/L — SIGNIFICANT CHANGE UP (ref 3.5–5.3)
POTASSIUM SERPL-SCNC: 3.9 MMOL/L — SIGNIFICANT CHANGE UP (ref 3.5–5.3)
PROT UR-MCNC: 100 MG/DL
RBC # BLD: 3.87 M/UL — LOW (ref 4.6–6.2)
RBC # FLD: 17.8 % — HIGH (ref 11–15.6)
RBC CASTS # UR COMP ASSIST: ABNORMAL /HPF (ref 0–4)
SODIUM SERPL-SCNC: 150 MMOL/L — HIGH (ref 135–145)
SP GR SPEC: 1.01 — SIGNIFICANT CHANGE UP (ref 1.01–1.02)
SPECIMEN SOURCE: SIGNIFICANT CHANGE UP
SPECIMEN SOURCE: SIGNIFICANT CHANGE UP
UROBILINOGEN FLD QL: NEGATIVE MG/DL — SIGNIFICANT CHANGE UP
WBC # BLD: 8.6 K/UL — SIGNIFICANT CHANGE UP (ref 4.8–10.8)
WBC # FLD AUTO: 8.6 K/UL — SIGNIFICANT CHANGE UP (ref 4.8–10.8)
WBC UR QL: SIGNIFICANT CHANGE UP

## 2019-01-07 PROCEDURE — 93306 TTE W/DOPPLER COMPLETE: CPT | Mod: 26

## 2019-01-07 PROCEDURE — 99291 CRITICAL CARE FIRST HOUR: CPT

## 2019-01-07 PROCEDURE — 71045 X-RAY EXAM CHEST 1 VIEW: CPT | Mod: 26

## 2019-01-07 PROCEDURE — 99221 1ST HOSP IP/OBS SF/LOW 40: CPT

## 2019-01-07 PROCEDURE — 99233 SBSQ HOSP IP/OBS HIGH 50: CPT

## 2019-01-07 RX ORDER — GENTAMICIN SULFATE 40 MG/ML
340 VIAL (ML) INJECTION ONCE
Qty: 0 | Refills: 0 | Status: COMPLETED | OUTPATIENT
Start: 2019-01-07 | End: 2019-01-07

## 2019-01-07 RX ORDER — MORPHINE SULFATE 50 MG/1
2 CAPSULE, EXTENDED RELEASE ORAL ONCE
Qty: 0 | Refills: 0 | Status: DISCONTINUED | OUTPATIENT
Start: 2019-01-07 | End: 2019-01-07

## 2019-01-07 RX ORDER — POTASSIUM PHOSPHATE, MONOBASIC POTASSIUM PHOSPHATE, DIBASIC 236; 224 MG/ML; MG/ML
15 INJECTION, SOLUTION INTRAVENOUS ONCE
Qty: 0 | Refills: 0 | Status: COMPLETED | OUTPATIENT
Start: 2019-01-07 | End: 2019-01-07

## 2019-01-07 RX ORDER — FENTANYL CITRATE 50 UG/ML
25 INJECTION INTRAVENOUS ONCE
Qty: 0 | Refills: 0 | Status: DISCONTINUED | OUTPATIENT
Start: 2019-01-07 | End: 2019-01-07

## 2019-01-07 RX ORDER — SODIUM CHLORIDE 9 MG/ML
4 INJECTION INTRAMUSCULAR; INTRAVENOUS; SUBCUTANEOUS
Qty: 0 | Refills: 0 | Status: DISCONTINUED | OUTPATIENT
Start: 2019-01-07 | End: 2019-01-29

## 2019-01-07 RX ORDER — SODIUM,POTASSIUM PHOSPHATES 278-250MG
1 POWDER IN PACKET (EA) ORAL EVERY 4 HOURS
Qty: 0 | Refills: 0 | Status: COMPLETED | OUTPATIENT
Start: 2019-01-07 | End: 2019-01-07

## 2019-01-07 RX ORDER — DEXMEDETOMIDINE HYDROCHLORIDE IN 0.9% SODIUM CHLORIDE 4 UG/ML
0.7 INJECTION INTRAVENOUS
Qty: 200 | Refills: 0 | Status: DISCONTINUED | OUTPATIENT
Start: 2019-01-07 | End: 2019-01-08

## 2019-01-07 RX ADMIN — Medication 0.12 MILLIGRAM(S): at 06:15

## 2019-01-07 RX ADMIN — IRON SUCROSE 110 MILLIGRAM(S): 20 INJECTION, SOLUTION INTRAVENOUS at 20:09

## 2019-01-07 RX ADMIN — Medication 64.8 MILLIGRAM(S): at 06:15

## 2019-01-07 RX ADMIN — Medication 1 TABLET(S): at 12:31

## 2019-01-07 RX ADMIN — Medication 1 TABLET(S): at 21:44

## 2019-01-07 RX ADMIN — Medication 400 MILLIGRAM(S): at 18:41

## 2019-01-07 RX ADMIN — LEVETIRACETAM 1000 MILLIGRAM(S): 250 TABLET, FILM COATED ORAL at 17:24

## 2019-01-07 RX ADMIN — SODIUM CHLORIDE 4 MILLILITER(S): 9 INJECTION INTRAMUSCULAR; INTRAVENOUS; SUBCUTANEOUS at 21:41

## 2019-01-07 RX ADMIN — DEXMEDETOMIDINE HYDROCHLORIDE IN 0.9% SODIUM CHLORIDE 14.84 MICROGRAM(S)/KG/HR: 4 INJECTION INTRAVENOUS at 10:18

## 2019-01-07 RX ADMIN — Medication 650 MILLIGRAM(S): at 04:07

## 2019-01-07 RX ADMIN — MORPHINE SULFATE 2 MILLIGRAM(S): 50 CAPSULE, EXTENDED RELEASE ORAL at 22:07

## 2019-01-07 RX ADMIN — Medication 40 MILLIGRAM(S): at 06:15

## 2019-01-07 RX ADMIN — LEVETIRACETAM 1000 MILLIGRAM(S): 250 TABLET, FILM COATED ORAL at 06:14

## 2019-01-07 RX ADMIN — Medication 200 MILLIGRAM(S): at 03:17

## 2019-01-07 RX ADMIN — PIPERACILLIN AND TAZOBACTAM 25 GRAM(S): 4; .5 INJECTION, POWDER, LYOPHILIZED, FOR SOLUTION INTRAVENOUS at 06:14

## 2019-01-07 RX ADMIN — Medication 650 MILLIGRAM(S): at 21:44

## 2019-01-07 RX ADMIN — DEXMEDETOMIDINE HYDROCHLORIDE IN 0.9% SODIUM CHLORIDE 14.84 MICROGRAM(S)/KG/HR: 4 INJECTION INTRAVENOUS at 17:24

## 2019-01-07 RX ADMIN — Medication 81 MILLIGRAM(S): at 12:31

## 2019-01-07 RX ADMIN — FENTANYL CITRATE 25 MICROGRAM(S): 50 INJECTION INTRAVENOUS at 10:00

## 2019-01-07 RX ADMIN — ENOXAPARIN SODIUM 40 MILLIGRAM(S): 100 INJECTION SUBCUTANEOUS at 12:31

## 2019-01-07 RX ADMIN — Medication 0.5 MILLIGRAM(S): at 20:08

## 2019-01-07 RX ADMIN — Medication 1 TABLET(S): at 17:24

## 2019-01-07 RX ADMIN — MORPHINE SULFATE 2 MILLIGRAM(S): 50 CAPSULE, EXTENDED RELEASE ORAL at 23:08

## 2019-01-07 RX ADMIN — Medication 400 MILLIGRAM(S): at 06:15

## 2019-01-07 RX ADMIN — Medication 650 MILLIGRAM(S): at 04:37

## 2019-01-07 RX ADMIN — Medication 64.8 MILLIGRAM(S): at 17:24

## 2019-01-07 RX ADMIN — Medication 650 MILLIGRAM(S): at 22:52

## 2019-01-07 RX ADMIN — Medication 20 MILLIEQUIVALENT(S): at 12:31

## 2019-01-07 RX ADMIN — Medication 650 MILLIGRAM(S): at 12:38

## 2019-01-07 RX ADMIN — Medication 650 MILLIGRAM(S): at 13:48

## 2019-01-07 RX ADMIN — POTASSIUM PHOSPHATE, MONOBASIC POTASSIUM PHOSPHATE, DIBASIC 62.5 MILLIMOLE(S): 236; 224 INJECTION, SOLUTION INTRAVENOUS at 17:24

## 2019-01-07 RX ADMIN — TAMSULOSIN HYDROCHLORIDE 0.4 MILLIGRAM(S): 0.4 CAPSULE ORAL at 22:07

## 2019-01-07 NOTE — CONSULT NOTE ADULT - PROVIDER SPECIALTY LIST ADULT
Cardiology
Critical Care
Critical Care
Infectious Disease
Nephrology
Palliative Care
Pulmonology
Surgery

## 2019-01-07 NOTE — CONSULT NOTE ADULT - CONSULT REASON
Non-verbal  Chronic Tracheostomy, peg tube, now Vent dependent  Sacral OM  Advance Directives needed
Abnormal Troponin
Acute on Chronic Hypoxemic Respiratory Failure
Cardiac Monitoring
Resp distress
electrolytes
fevers, bacteremia
Sacral decubitus evaluation

## 2019-01-07 NOTE — PROGRESS NOTE ADULT - SUBJECTIVE AND OBJECTIVE BOX
[facility]  INFECTIOUS DISEASES AND INTERNAL MEDICINE at [place]  =======================================================  Renzo Salazar MD  Diplomates American Board of Internal Medicine and Infectious Diseases  =======================================================    [id number]  [facility] [place]   follow up for: sepsis  patient seen and examined.     pt under ICU care for increased secretions.  on vent  labs reviewed   CRE pseudomonas SS Levaquin in urine  strep on initial blood cultures, repeat cultures negative    ===================================================  REVIEW OF SYSTEMS:  as above  all other ROS negative    =======================================================  Allergies    clindamycin (Unknown)  Grapes (Rash)  Nuts (Hives; Rash)  PC Pen VK (Unknown)  shellfish (Angioedema; Rash; Hives)  strawberry (Short breath; Rash; Hives)  Xanax (Rash)    Intolerances    Ativan (Unknown)  Haldol (Dystonic RXN)  hydrALAZINE (Unknown)  Zyprexa (Unknown)     Antibiotics:  levoFLOXacin IVPB 750 milliGRAM(s) IV Intermittent once  levoFLOXacin IVPB        Other medications:  aspirin  chewable 81 milliGRAM(s) Oral daily  dexmedetomidine Infusion 0.7 MICROgram(s)/kG/Hr IV Continuous <Continuous>  digoxin     Tablet 0.125 milliGRAM(s) Oral daily  enoxaparin Injectable 40 milliGRAM(s) SubCutaneous daily  famotidine    Tablet 20 milliGRAM(s) Oral <User Schedule>  furosemide    Tablet 40 milliGRAM(s) Oral daily  iron sucrose IVPB 200 milliGRAM(s) IV Intermittent every 24 hours  levETIRAcetam  Solution 1000 milliGRAM(s) Oral two times a day  multivitamin 1 Tablet(s) Oral daily  PHENobarbital 64.8 milliGRAM(s) Oral two times a day  phenytoin   Suspension 400 milliGRAM(s) Oral every 12 hours  potassium chloride   Powder 20 milliEquivalent(s) Oral daily  sodium chloride 3%  Inhalation 4 milliLiter(s) Inhalation two times a day  tamsulosin 0.4 milliGRAM(s) Oral at bedtime     ======================================================  Physical Exam:  ============  T(F): 100.6 (07 Jan 2019 08:00), Max: 102.4 (07 Jan 2019 04:00)  HR: 75 (07 Jan 2019 11:00)  BP: 115/56 (07 Jan 2019 11:00)  RR: 39 (07 Jan 2019 11:00)  SpO2: 100% (07 Jan 2019 11:00) (78% - 100%)    General:  awake, not interactive,. on vent  Eye: Pupils are equal, round and reactive to light,   HENT: Normocephalic, tracheostomy in place  Neck: Supple,    Respiratory: Lungs are fair air entry anteriorly  Cardiovascular:  mild tachycardia  Gastrointestinal: + PEG tube Normal bowel sounds.  Genitourinary: + jones with yellow urine in tubing. No more sediment  Musculoskeletal: contracted upper and lower extremities  Integumentary: sacral decubitus present on admission  Neurologic: non verbal      =======================================================  Labs:  ====                 9.2    8.6   )-----------( 330      ( 07 Jan 2019 08:26 )             31.4     01-07    150<H>  |  111<H>  |  19.0  ----------------------------<  155<H>  3.9   |  27.0  |  0.60    Ca    8.4<L>      07 Jan 2019 03:36  Phos  1.9     01-07  Mg     2.4     01-07    TPro  6.4<L>  /  Alb  2.9<L>  /  TBili  0.3<L>  /  DBili  x   /  AST  19  /  ALT  13  /  AlkPhos  136<H>  01-06        Culture - Blood (collected 01-05-19 @ 10:49)  Source: .Blood    Culture - Urine (collected 01-03-19 @ 20:12)  Source: .Urine  Final Report (01-06-19 @ 16:21):    10,000 - 49,000 CFU/mL Pseudomonas aeruginosa (Carbapenem Resistant)    TYPE: (C=Critical, N=Notification, A=Abnormal) c    TESTS:  _ mdro    DATE/TIME CALLED: _ 01/06/2019 16:13:29    CALLED TO: Amanda pulliam    READ BACK (2 Patient Identifiers)(Y/N): _ y    READ BACK VALUES (Y/N): _ y    CALLED BY: _ kb    send a copy to i[facility]c and logistics  Organism: Pseudomonas aeruginosa (Carbapenem Resistant) (01-06-19 @ 16:21)  Organism: Pseudomonas aeruginosa (Carbapenem Resistant) (01-06-19 @ 16:21)    Sensitivities:      -  Amikacin: S <=16      -  Aztreonam: R >16      -  Cefepime: I 16      -  Ceftazidime: R >16      -  Ciprofloxacin: S <=1      -  Gentamicin: S <=4      -  Imipenem: R >8      -  Levofloxacin: S <=2      -  Meropenem: R >8      -  Piperacillin/Tazobactam: R >64      -  Tobramycin: S <=4      Method Type: ERNESTINE    Culture - Blood (collected 01-03-19 @ 18:47)  Source: .Blood  Organism: Beta Hemolytic Streptococci, Group G (01-06-19 @ 13:58)  Organism: Beta Hemolytic Streptococci, Group G (01-06-19 @ 13:58)    Sensitivities:      Method Type: ERNESTINE    Culture - Blood (collected 01-03-19 @ 18:46)  Source: .Blood  Organism: Beta Hemolytic Streptococci, Group G  Blood Culture PCR (01-06-19 @ 13:57)  Organism: Beta Hemolytic Streptococci, Group G (01-06-19 @ 13:57)    Sensitivities:      Method Type: ERNESTNIE  Organism: Blood Culture PCR (01-04-19 @ 12:30)    Sensitivities:      -  Streptococcus sp. (Not Grp A, B or S pneumoniae): Detec      Method Type: PCR

## 2019-01-07 NOTE — CONSULT NOTE ADULT - PROBLEM SELECTOR RECOMMENDATION 5
Continue Maintenance Medications:  Phenobarbital 64.8mg  BID  Keppra BID  Lorazepam 0.5mg Q6prn
as above

## 2019-01-07 NOTE — CONSULT NOTE ADULT - REASON FOR ADMISSION
Urosepsis
respiratory distress. fever

## 2019-01-07 NOTE — CONSULT NOTE ADULT - SUBJECTIVE AND OBJECTIVE BOX
HPI: This is an 87yo debilitated, chronically ill male PMH (see list below) of Seizures, transferred from [facility] in Respiratory distress and fevers. Intubated shortly after arrival is now unresponsive to verbal stimuli, agitated at times.   Daughter at bedside insists he tries to talk with her. He has had difficulty with copious secretions, becoming hypoxic and tachypneic. Dysphagia so is NPO with continuous G tube feeding in progress.   PMH Dementia, Stroke, Dysphagia , chronic Respiratory failure, needed Tracheostomy. Sacral decubiti with +OM has multifocal infections, UTI and Pneumonia/Bacteremia.    CC: Acute on Chronic Respiratory Failure, Non-verbal with Bacteremia    85 y/o male with dementia, h/o CAD s/p 2 stents, CHF, respiratory failure on chronic vent via tracheostomy tube with h/o infection in the tube needed IV antibiotics for months ended in 2018, sacral decubitus ulcers with h/o sacral osteomyelitis treated about 2 years ago with Abc, colostomy and chronic Jones's catheter. CT scan in 3/2018 was suspicious for sacral osteomyelitis. Patient was brought in to the ER from [facility] for respiratory distress and fever. in the ER, Temp: 103.6. Labs showed WBC: 14 000. Hgb: 7.7. Trop: 0.19 with h/o chronic elevation. (2019 22:39)      PERTINENT PMH REVIEWED: Yes     PAST MEDICAL & SURGICAL HISTORY:  Dysphagia  Fall: Multiple Mechanical falls  CAD (coronary artery disease)  Clostridium difficile diarrhea: in   BPH (benign prostatic hypertrophy)  Dementia  HLD (hyperlipidemia)  CHF (congestive heart failure)  Prostate cancer: Treated w/ radiation  Stroke: (~5yrs ago)  Seizure: (last event &gt;1yr ago)  HTN (hypertension)  Colostomy in place  S/P percutaneous endoscopic gastrostomy (PEG) tube placement  H/O hemorrhoidectomy  Deviated septum  Abdominal hernia  Status post cardiac surgery: stents x 2      SOCIAL HISTORY:  EtOH    No                                    Drugs    No                                   nonsmoker                                    Admitted from:   [facility] ______Legal__Surrogate: Van Monroy [phone]    FAMILY HISTORY:  No pertinent family history in first degree relatives    Allergies  clindamycin (Unknown)  Grapes (Rash)  Nuts (Hives; Rash)  PC Pen VK (Unknown)  shellfish (Angioedema; Rash; Hives)  strawberry (Short breath; Rash; Hives)  Xanax (Rash)    Intolerances  Ativan (Unknown)  Haldol (Dystonic RXN)  hydrALAZINE (Unknown)  Zyprexa (Unknown)    Baseline ADLs (prior to admission):   Dependent      Present Symptoms:   Dyspnea: RR > to 30/min  Vent dependent  Nausea/Vomiting:  No  Anxiety: No  Depression: Yes   Fatigue: Yes  Loss of appetite: N/A  NPO tube feeding    Pain: Assume Pain is Present with Deep sacral decubiti and OM            Character-            Duration-            Effect-            Factors-            Frequency-            Location-            Severity-moderate to severe    Review of Systems: Reviewed                       Unable to obtain due to poor mentation     MEDICATIONS  (STANDING):  aspirin  chewable 81 milliGRAM(s) Oral daily  dexmedetomidine Infusion 0.7 MICROgram(s)/kG/Hr (14.84 mL/Hr) IV Continuous <Continuous>  digoxin     Tablet 0.125 milliGRAM(s) Oral daily  enoxaparin Injectable 40 milliGRAM(s) SubCutaneous daily  famotidine    Tablet 20 milliGRAM(s) Oral <User Schedule>  furosemide    Tablet 40 milliGRAM(s) Oral daily  iron sucrose IVPB 200 milliGRAM(s) IV Intermittent every 24 hours  levETIRAcetam  Solution 1000 milliGRAM(s) Oral two times a day  levoFLOXacin IVPB      multivitamin 1 Tablet(s) Oral daily  PHENobarbital 64.8 milliGRAM(s) Oral two times a day  phenytoin   Suspension 400 milliGRAM(s) Oral every 12 hours  potassium chloride   Powder 20 milliEquivalent(s) Oral daily  sodium chloride 3%  Inhalation 4 milliLiter(s) Inhalation two times a day  tamsulosin 0.4 milliGRAM(s) Oral at bedtime    MEDICATIONS  (PRN):  acetaminophen    Suspension .. 650 milliGRAM(s) Oral every 6 hours PRN Temp greater or equal to 38C (100.4F)  LORazepam   Injectable 0.5 milliGRAM(s) IV Push every 6 hours PRN Agitation      PHYSICAL EXAM:    Vital Signs Last 24 Hrs  T(C): 38 (2019 14:00), Max: 39.1 (2019 04:00)  T(F): 100.4 (2019 14:00), Max: 102.4 (2019 04:00)  HR: 56 (2019 16:00) (56 - 125)  BP: 119/56 (2019 16:00) (109/54 - 194/91)  BP(mean): 81 (2019 16:00) (76 - 131)  RR: 37 (2019 16:00) (3 - 43)  SpO2: 100% (2019 16:00) (78% - 100%)    General:  lethargic                   cachexia  nonverbal Unresponsive    HEENT:   tracheostomy on Vent support    Lungs:  tachypnea/l  excessive secretions    CV: normal      GI: distended  colostomy-liquid stool               PEG tube  -Tube feeding    :  incontinent   chronic  jones    MSK:   weakness  edema 2-3+ B/L lower extremities  legs contracted           bedbound    Skin: sacral  pressure ulcer Stage IV with OM-____  no rash    LABS:                        9.2    8.6   )-----------( 330      ( 2019 08:26 )             31.4     01-    150<H>  |  111<H>  |  19.0  ----------------------------<  155<H>  3.9   |  27.0  |  0.60    Ca    8.4<L>      2019 03:36  Phos  1.9       Mg     2.4         TPro  6.4<L>  /  Alb  2.9<L>  /  TBili  0.3<L>  /  DBili  x   /  AST  19  /  ALT  13  /  AlkPhos  136<H>      Urinalysis Basic - ( 2019 14:02 )    Color: Yellow / Appearance: Clear / S.010 / pH: x  Gluc: x / Ketone: Negative  / Bili: Negative / Urobili: Negative mg/dL   Blood: x / Protein: 100 mg/dL / Nitrite: Negative   Leuk Esterase: Trace / RBC: 6-10 /HPF / WBC 0-2   Sq Epi: x / Non Sq Epi: x / Bacteria: Occasional    I&O's Summary    2019 07:01  -  2019 07:00  --------------------------------------------------------  IN: 3530 mL / OUT: 1500 mL / NET: 2030 mL    2019 07:  -  2019 16:11  --------------------------------------------------------  IN: 1068.6 mL / OUT: 445 mL / NET: 623.6 mL      RADIOLOGY & ADDITIONAL STUDIES:  < from: Xray Chest 1 View- PORTABLE-Urgent (19 @ 01:24) >  Findings:     Tracheostomy tube in good position. A left chest tube   unchanged in position. Mild perihilar pulmonic congestion. No significant   pneumothorax or pleural fluid  .      Heart size is unremarkable.     The thorax is normal for age.    Impression: Perihilar pulmonic congestion. Tracheostomy  < from: CT Chest No Cont (19 @ 06:37) >    IMPRESSION:     No pneumonia.    Stable appearance of large sacral decubitus ulcer with underlying   osteomyelitis.    5 mm distal common bile duct stone without biliary dilatation.    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    Jones catheter balloon inflated within the urethra and should be   repositioned.      ADVANCE DIRECTIVES:   DNR YES   Completed on:    verbal Directive DNR/DNI                  MOLST  NO   Completed on: In process of recontacting legal surrogate to reconfirm for MOLST Directive documentation  Living Will  45 NO   Completed on:

## 2019-01-07 NOTE — CONSULT NOTE ADULT - PROBLEM SELECTOR RECOMMENDATION 9
Tracheostomy-Full Vent Support  Recommend: Manage Copious secretions with Robinul 0.4mg IV Q4 prn  Lorazepam 0.5mg IV Q
Febrile, unknown source suggest antipyretics   +U/A with chronic indwelling jones   suggest following cultures, broad spectrum abx for now.   Suggest aggressive pulmonary toilet, suctioning, chest percussion.   Suggest CT surg eval of indwelling left pigtail catheter.    Patient hemodynamically stable, does not require cardiac monitoring, does not MICU admission at this time.  Suggest 6 tower with q6-8 hour vitals.
as above

## 2019-01-07 NOTE — CONSULT NOTE ADULT - PROBLEM SELECTOR RECOMMENDATION 4
Assume Pain is Present  Recommend putting patient on Fentanyl 12 mcg/hr patch and using Tylenol infusion as needed-  Acute Osteomyleitis Wound Consult, ABX Pain Mgmt
as above

## 2019-01-07 NOTE — CONSULT NOTE ADULT - CONSULT REQUESTED DATE/TIME
03-Jan-2019 23:37
04-Jan-2019 01:02
04-Jan-2019 10:31
04-Jan-2019 14:32
05-Jan-2019 14:46
07-Jan-2019 02:00
04-Jan-2019

## 2019-01-07 NOTE — PROGRESS NOTE ADULT - SUBJECTIVE AND OBJECTIVE BOX
Patient is a 86y old  Male who presents with a chief complaint of respiratory distress. fever (07 Jan 2019 04:42)      BRIEF HOSPITAL COURSE: 85 yo male, PMHx chronic trach/PEG secondary to pneumonia ~2 years ago complicated by multiple bouts of pneumonia, unable to liberate from vent due to secretions, PEG infection, MDR infections, seizure d/o, CHF, HTN, HLD, BPH with chronic jones, colostomy, who presented from [facility] 1/3/18 with respiratory distress and fever >102'F axillary. Patient was found to have multiple foci of infection: Group G beta-hemolytic Strep bacteremia, CRE Pseudomonas UTI, sacral osteomyelitis. Patient was transfused 3u PRBC today for acute anemia.    RRT called for acute hypoxemia SpO2 60% on FiO2 100% and PEEP 5. On my arrival, patient being ambu-bagged by RT, lavaged, and deep suctioned with copious secretions. Patient's daughter at bedside states he has a compressed left lung that she was told is always supposed to be down, and had just previously been repositioned onto right side prior to hypoxemic event. Patient was placed back on vent, SpO2 improved >90%, and FiO2 was weaned to 70% and PEEP increased to 8. Chest xray after RRT with improved aeration of L lung. Persistently febrile 101.8'F, sinus tachycardia, cultures reviewed, UCx speciated to CRE Pseudomonas, given 1 dose of Gentamycin.     Later this morning, patient had another episode of hypoxia and resp distress and tachycardia requiring suction and BMV by RT and dose of fentanyl and Precedex to help assist the distress      PAST MEDICAL & SURGICAL HISTORY:  Dysphagia  Fall: Multiple Mechanical falls  CAD (coronary artery disease)  Clostridium difficile diarrhea: in 2009  BPH (benign prostatic hypertrophy)  Dementia  HLD (hyperlipidemia)  CHF (congestive heart failure)  Prostate cancer: Treated w/ radiation  Stroke: (~5yrs ago)  Seizure: (last event &gt;1yr ago)  HTN (hypertension)  Colostomy in place  S/P percutaneous endoscopic gastrostomy (PEG) tube placement  H/O hemorrhoidectomy  Deviated septum  Abdominal hernia  Status post cardiac surgery: stents x 2      Could not obtain ROS due to underlying mentation    Physical Examination:    ICU Vital Signs Last 24 Hrs  T(C): 38.1 (07 Jan 2019 08:00), Max: 39.1 (07 Jan 2019 04:00)  T(F): 100.6 (07 Jan 2019 08:00), Max: 102.4 (07 Jan 2019 04:00)  HR: 70 (07 Jan 2019 11:52) (68 - 125)  BP: 115/56 (07 Jan 2019 11:00) (111/55 - 194/91)  BP(mean): 80 (07 Jan 2019 11:00) (76 - 131)  ABP: --  ABP(mean): --  RR: 39 (07 Jan 2019 11:00) (3 - 43)  SpO2: 100% (07 Jan 2019 11:52) (78% - 100%)      General: Respiratory distress  Neuro: could not assess, occasionally follows simple commands   HEENT: Pupils equal, reactive to light, Oral mucosa dry with thick secretion, trach+ connected to MV  PULM: Clear to auscultation bilaterally except at the bases, no significant adventitious breath sounds   CVS: Regular rhythm and controlled rate, no murmurs, rubs, or gallops  ABD: Soft, nondistended, nontender, normoactive bowel sounds, no CVA tenderness  EXT: 2+ b/l LE edema, nontender with pedal pulse palpable, contracted extremities   SKIN: Warm and well perfused, no acute rashes       Medications:  levoFLOXacin IVPB 750 milliGRAM(s) IV Intermittent once  levoFLOXacin IVPB      digoxin     Tablet 0.125 milliGRAM(s) Oral daily  furosemide    Tablet 40 milliGRAM(s) Oral daily  tamsulosin 0.4 milliGRAM(s) Oral at bedtime  acetylcysteine 10%  Inhalation 3 milliLiter(s) Inhalation three times a day PRN  sodium chloride 3%  Inhalation 4 milliLiter(s) Inhalation two times a day  acetaminophen    Suspension .. 650 milliGRAM(s) Oral every 6 hours PRN  dexmedetomidine Infusion 0.7 MICROgram(s)/kG/Hr IV Continuous <Continuous>  levETIRAcetam  Solution 1000 milliGRAM(s) Oral two times a day  LORazepam   Injectable 0.5 milliGRAM(s) IV Push every 6 hours PRN  PHENobarbital 64.8 milliGRAM(s) Oral two times a day  phenytoin   Suspension 400 milliGRAM(s) Oral every 12 hours  aspirin  chewable 81 milliGRAM(s) Oral daily  enoxaparin Injectable 40 milliGRAM(s) SubCutaneous daily  famotidine    Tablet 20 milliGRAM(s) Oral <User Schedule>  iron sucrose IVPB 200 milliGRAM(s) IV Intermittent every 24 hours  multivitamin 1 Tablet(s) Oral daily  potassium chloride   Powder 20 milliEquivalent(s) Oral daily      Mode: AC/ CMV (Assist Control/ Continuous Mandatory Ventilation)  RR (machine): 16  TV (machine): 400  FiO2: 100  PEEP: 10  MAP: 14  PIP: 32      I&O's Detail    06 Jan 2019 07:01  -  07 Jan 2019 07:00  --------------------------------------------------------  IN:    dextrose 5% + sodium chloride 0.45%.: 400 mL    Free Water: 400 mL    ns in tub fed  [id number]: 930 mL    Packed Red Blood Cells: 675 mL    sodium chloride 0.45%: 675 mL    Solution: 100 mL    Solution: 250 mL    Solution: 100 mL  Total IN: 3530 mL    OUT:    Colostomy: 100 mL    Indwelling Catheter - Urethral: 1400 mL  Total OUT: 1500 mL    Total NET: 2030 mL      07 Jan 2019 07:01  -  07 Jan 2019 12:18  --------------------------------------------------------  IN:    dexmedetomidine Infusion: 29.6 mL    ns in tub fed  [id number]: 240 mL    Solution: 50 mL  Total IN: 319.6 mL    OUT:    Indwelling Catheter - Urethral: 125 mL  Total OUT: 125 mL    Total NET: 194.6 mL    RADIOLOGY/ Microbiology/ Labs: reviewed     CRITICAL CARE TIME SPENT: 55 min

## 2019-01-07 NOTE — PROGRESS NOTE ADULT - ASSESSMENT
Respiratory Failure  Hx of multiple electrolytes abnormalities  Hypernatremia would inc free water via PEG tube    Anemia better w transfusion yest    Sepsis- OM bactermia- chronic sacral ulcers, chronic jones   Abx noted    Will follow

## 2019-01-07 NOTE — CONSULT NOTE ADULT - SUBJECTIVE AND OBJECTIVE BOX
Patient is a 86y old  Male who presents with a chief complaint of respiratory distress. fever (06 Jan 2019 13:53)      BRIEF HOSPITAL COURSE: 87 yo male, PMHx chronic trach/PEG secondary to pneumonia ~2 years ago complicated by multiple bouts of pneumonia, unable to liberate from vent due to secretions, PEG infection, MDR infections, seizure d/o, CHF, HTN, HLD, BPH with chronic dial, colostomy, who presented from [facility] 1/3/18 with respiratory distress and fever >102'F axillary. Patient was found to have multiple foci of infection: Group G beta-hemolytic Strep bacteremia, CRE Pseudomonas UTI, sacral osteomyelitis. Patient was transfused 3u PRBC today for acute anemia.    Events last 24 hours: RRT called for acute hypoxemia SpO2 60% on FiO2 100% and PEEP 5. On my arrival, patient being ambu-bagged by RT, lavaged, and deep suctioned with copious secretions. Patient's daughter at bedside states he has a compressed left lung that she was told is always supposed to be down, and had just previously been repositioned onto right side prior to hypoxemic event. Patient was placed back on vent, SpO2 improved >90%, and FiO2 was weaned to 70% and PEEP increased to 8. Chest xray after RRT with improved aeration of L lung. Persistently febrile 101.8'F, sinus tachycardia, cultures reviewed, UCx speciated to CRE Pseudomonas, given 1 dose of Gentamycin.     PAST MEDICAL & SURGICAL HISTORY:  Dysphagia  Fall: Multiple Mechanical falls  CAD (coronary artery disease)  Clostridium difficile diarrhea: in 2009  BPH (benign prostatic hypertrophy)  Dementia  HLD (hyperlipidemia)  CHF (congestive heart failure)  Prostate cancer: Treated w/ radiation  Stroke: (~5yrs ago)  Seizure: (last event &gt;1yr ago)  HTN (hypertension)  Colostomy in place  S/P percutaneous endoscopic gastrostomy (PEG) tube placement  H/O hemorrhoidectomy  Deviated septum  Abdominal hernia  Status post cardiac surgery: stents x 2      SOCIAL HISTORY: UATO due to mental status    Review of Systems: Due to tracheostomy, subjective information was not able to be obtained from the patient. History was obtained, to the extent possible, from review of the chart and collateral sources of information.      Medications:  piperacillin/tazobactam IVPB. 3.375 Gram(s) IV Intermittent every 8 hours  digoxin     Tablet 0.125 milliGRAM(s) Oral daily  furosemide    Tablet 40 milliGRAM(s) Oral daily  midodrine 2.5 milliGRAM(s) Oral <User Schedule>  NIFEdipine XL 30 milliGRAM(s) Oral daily  tamsulosin 0.4 milliGRAM(s) Oral at bedtime  acetylcysteine 10%  Inhalation 3 milliLiter(s) Inhalation three times a day PRN  acetaminophen    Suspension .. 650 milliGRAM(s) Oral every 6 hours PRN  levETIRAcetam  Solution 1000 milliGRAM(s) Oral two times a day  LORazepam   Injectable 0.5 milliGRAM(s) IV Push every 6 hours PRN  PHENobarbital 64.8 milliGRAM(s) Oral two times a day  phenytoin   Suspension 400 milliGRAM(s) Oral every 12 hours  aspirin  chewable 81 milliGRAM(s) Oral daily  enoxaparin Injectable 40 milliGRAM(s) SubCutaneous daily  famotidine    Tablet 20 milliGRAM(s) Oral daily  iron sucrose IVPB 200 milliGRAM(s) IV Intermittent every 24 hours  multivitamin 1 Tablet(s) Oral daily  potassium chloride   Powder 20 milliEquivalent(s) Oral daily      Mode: AC/ CMV (Assist Control/ Continuous Mandatory Ventilation)  RR (machine): 16  TV (machine): 400  FiO2: 50  PEEP: 8  MAP: 12  PIP: 23      ICU Vital Signs Last 24 Hrs  T(C): 38.8 (07 Jan 2019 02:00), Max: 38.8 (07 Jan 2019 02:00)  T(F): 101.8 (07 Jan 2019 02:00), Max: 101.8 (07 Jan 2019 02:00)  HR: 102 (07 Jan 2019 04:09) (61 - 125)  BP: 111/55 (07 Jan 2019 02:00) (109/55 - 194/91)  BP(mean): 76 (07 Jan 2019 02:00) (76 - 131)  ABP: --  ABP(mean): --  RR: 22 (07 Jan 2019 02:00) (3 - 43)  SpO2: 97% (07 Jan 2019 04:09) (78% - 100%)      I&O's Detail    05 Jan 2019 07:01  -  06 Jan 2019 07:00  --------------------------------------------------------  IN:    dextrose 5% + sodium chloride 0.45%.: 1800 mL    ns in tub fed  [id number]: 130 mL    Solution: 100 mL    Solution: 125 mL  Total IN: 2155 mL    OUT:    Colostomy: 100 mL    Indwelling Catheter - Urethral: 950 mL  Total OUT: 1050 mL    Total NET: 1105 mL      06 Jan 2019 07:01  -  07 Jan 2019 04:42  --------------------------------------------------------  IN:    dextrose 5% + sodium chloride 0.45%.: 400 mL    Free Water: 200 mL    ns in tub fed  [id number]: 680 mL    Packed Red Blood Cells: 675 mL    sodium chloride 0.45%: 675 mL    Solution: 100 mL    Solution: 200 mL  Total IN: 2930 mL    OUT:    Colostomy: 100 mL    Indwelling Catheter - Urethral: 1150 mL  Total OUT: 1250 mL    Total NET: 1680 mL      LABS:                        6.8    7.8   )-----------( 326      ( 06 Jan 2019 07:04 )             23.7     01-07    150<H>  |  111<H>  |  19.0  ----------------------------<  155<H>  3.9   |  27.0  |  0.60    Ca    8.4<L>      07 Jan 2019 03:36  Phos  3.3     01-05  Mg     2.6     01-06    TPro  6.4<L>  /  Alb  2.9<L>  /  TBili  0.3<L>  /  DBili  x   /  AST  19  /  ALT  13  /  AlkPhos  136<H>  01-06      CAPILLARY BLOOD GLUCOSE      CULTURES:  Culture Results:   10,000 - 49,000 CFU/mL Pseudomonas aeruginosa (Carbapenem Resistant)  TYPE: (C=Critical, N=Notification, A=Abnormal) c  TESTS:  _ mdro  DATE/TIME CALLED: _ 01/06/2019 16:13:29  CALLED TO: Amanda pulliam  READ BACK (2 Patient Identifiers)(Y/N): _ y  READ BACK VALUES (Y/N): _ y  CALLED BY: _ alessia  send a copy to [place] (01-03 @ 20:12)  Rapid RVP Result: NotDetec (01-03 @ 20:04)  Culture Results:   Growth in anaerobic bottle: Beta Hemolytic Streptococci, Group G  Anaerobic Bottle: 10:36 Hours to positivity  Aerobic Bottle: No growth to date  .  TYPE: (C=Critical, N=Notification, A=Abnormal) C  TESTS:  _ Positive Blood GS  DATE/TIME CALLED: _ 01/04/2019 12:01  CALLED TO: _ GUANAKITO: Andrzej Duffy RN  READ BACK (2 Patient Identifiers)(Y/N): _ Y  READ BACK VALUES (Y/N): _ Y  CALLED BY: Amanda carpenter (01-03 @ 18:47)  Culture Results:   Growth in aerobic bottle: Beta Hemolytic Streptococci, Group G  Aerobic Bottle: 10:54 Hours to positivity  Anaerobic Bottle: No growth to date  .  ***Blood Panel PCR results on this specimen are available  approximately 3 hours after the Gram stainresult.***  Gram stain, PCR, and/or culture results may not always  correspond due to difference in methodologies.  ************************************************************  This PCR assay was performed using [employer].  The following targets are tested for: Enterococcus,  vancomycin resistant enterococci, Listeria monocytogenes,  coagulase negative staphylococci, S. aureus,  methicillin resistant S. aureus, Streptococcus agalactiae  (Group B), S. pneumoniae, S. pyogenes (Group A),  Acinetobacter baumannii, Enterobacter cloacae, E. coli,  Klebsiella oxytoca, K. pneumoniae, Proteus sp.,  Serratia marcescens, Haemophilus influenzae,  Neisseria meningitidis, Pseudomonas aeruginosa, Candida  albicans, C. glabrata, C krusei, C parapsilosis,  C. tropicalis and the KPC resistance gene.  "Due to technical problems, Proteus sp. will Not be reported as part of  the BCID panel until further notice"  .  TYPE: (C=Critical, N=Notification, A=Abnormal) C  TESTS:  _ Positive Blood GS  DATE/TIME CALLED: _ 01/04/2019 10:20  CALLED TO: Amanda ERHR: Andrzej Garza RN  READ BACK (2 Patient Identifiers)(Y/N): _ Y  READ BACK VALUES (Y/N): _ Y  CALLED BY: Amanda carpenter (01-03 @ 18:46)      Physical Examination:    General: Acute respiratory distress    HEENT: Pupils equal, reactive to light.  Symmetric.    PULM: Diminished to auscultation bilaterally, +sputum production    CVS: Sinus tachycardia, no murmurs, rubs, or gallops    ABD: Soft, nondistended, nontender, normoactive bowel sounds, +PEG, colostomy    EXT: Contracted, 1+ nonpitting edema of LE          RADIOLOGY:   < from: CT Chest No Cont (01.04.19 @ 06:37) >  IMPRESSION:     No pneumonia.    Stable appearance of large sacral decubitus ulcer with underlying   osteomyelitis.    5 mm distal common bile duct stone without biliary dilatation.    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    Dial catheter balloon inflated within the urethra and should be   repositioned.    THERESA EDWARDS M.D., ATTENDING RADIOLOGIST  This document has been electronically signed.Jan 4 2019  9:01AM    < end of copied text >      INVASIVE LINES:   INDWELLING DIAL: Chronic dial, PEG   VTE PROPHYLAXIS: Lovenox SC   CAM ICU:   CODE STATUS: FULL CODE     CRITICAL CARE TIME SPENT: 45 minutes assessing presenting problems of acute illness, which pose high probability of life threatening deterioration or end organ damage/dysfunction, as well as medical decision making including initiating plan of care, reviewing data, reviewing radiologic exams, discussing with multidisciplinary team, non-inclusive of procedures performed, discussing goals of care with patient's daughter.

## 2019-01-07 NOTE — PROVIDER CONTACT NOTE (EICU) - BACKGROUND
86 M trach dependednt, PEG from [facility] xfer from floors after a rapid for hypoxia secondary to mucus plugging, in hospital being treated for sepsis secondary to decubitis, osteomyelitis and UTI. Aggressively suctioned on floors and sat improved, CXR no significant atelectasis or volume loss . In MICU, seen on camera.  comfortable on vent AC mode no pressors. sating well on  50 % oxygen.  pseudomonas in urine resistant to Zosyn and carbapenems, sens to aminoglycosides, beta hemolytic strep bacteremia.

## 2019-01-07 NOTE — PROGRESS NOTE ADULT - SUBJECTIVE AND OBJECTIVE BOX
NEPHROLOGY INTERVAL HPI/OVERNIGHT EVENTS:    Examined earlier    MEDICATIONS  (STANDING):  aspirin  chewable 81 milliGRAM(s) Oral daily  dexmedetomidine Infusion 0.7 MICROgram(s)/kG/Hr (14.84 mL/Hr) IV Continuous <Continuous>  digoxin     Tablet 0.125 milliGRAM(s) Oral daily  enoxaparin Injectable 40 milliGRAM(s) SubCutaneous daily  famotidine    Tablet 20 milliGRAM(s) Oral <User Schedule>  furosemide    Tablet 40 milliGRAM(s) Oral daily  iron sucrose IVPB 200 milliGRAM(s) IV Intermittent every 24 hours  levETIRAcetam  Solution 1000 milliGRAM(s) Oral two times a day  levoFLOXacin IVPB      multivitamin 1 Tablet(s) Oral daily  PHENobarbital 64.8 milliGRAM(s) Oral two times a day  phenytoin   Suspension 400 milliGRAM(s) Oral every 12 hours  potassium chloride   Powder 20 milliEquivalent(s) Oral daily  sodium chloride 3%  Inhalation 4 milliLiter(s) Inhalation two times a day  tamsulosin 0.4 milliGRAM(s) Oral at bedtime    MEDICATIONS  (PRN):  acetaminophen    Suspension .. 650 milliGRAM(s) Oral every 6 hours PRN Temp greater or equal to 38C (100.4F)  LORazepam   Injectable 0.5 milliGRAM(s) IV Push every 6 hours PRN Agitation      Allergies    clindamycin (Unknown)  Grapes (Rash)  Nuts (Hives; Rash)  PC Pen VK (Unknown)  shellfish (Angioedema; Rash; Hives)  strawberry (Short breath; Rash; Hives)  Xanax (Rash)    Intolerances    Ativan (Unknown)  Haldol (Dystonic RXN)  hydrALAZINE (Unknown)  Zyprexa (Unknown)      Vital Signs Last 24 Hrs  T(C): 38.4 (2019 12:00), Max: 39.1 (2019 04:00)  T(F): 101.1 (2019 12:00), Max: 102.4 (2019 04:00)  HR: 68 (2019 13:00) (68 - 125)  BP: 120/58 (2019 13:00) (109/54 - 194/91)  BP(mean): 83 (2019 13:00) (76 - 131)  RR: 39 (2019 13:00) (3 - 43)  SpO2: 100% (2019 13:00) (78% - 100%)  Daily     Daily     PHYSICAL EXAM:  GENERAL: appears chronically ill/ +trach/ +PEG  HEAD:  Atraumatic, Normocephalic  EYES: EOMI  NECK: Supple, neck  veins full  NERVOUS SYSTEM:  Awake  CHEST/LUNG: dec BS B/L  HEART: Regular rate and rhythm; No murmur  ABDOMEN: Soft, Nontender, Nondistended; Bowel sounds present  EXTREMITIES: trace LE edema, contracted    LABS:                        9.2    8.6   )-----------( 330      ( 2019 08:26 )             31.4         150<H>  |  111<H>  |  19.0  ----------------------------<  155<H>  3.9   |  27.0  |  0.60    Ca    8.4<L>      2019 03:36  Phos  1.9       Mg     2.4         TPro  6.4<L>  /  Alb  2.9<L>  /  TBili  0.3<L>  /  DBili  x   /  AST  19  /  ALT  13  /  AlkPhos  136<H>        Urinalysis Basic - ( 2019 14:02 )    Color: Yellow / Appearance: Clear / S.010 / pH: x  Gluc: x / Ketone: Negative  / Bili: Negative / Urobili: Negative mg/dL   Blood: x / Protein: 100 mg/dL / Nitrite: Negative   Leuk Esterase: Trace / RBC: 6-10 /HPF / WBC 0-2   Sq Epi: x / Non Sq Epi: x / Bacteria: Occasional      Magnesium, Serum: 2.4 mg/dL ( @ 08:26)  Phosphorus Level, Serum: 1.9 mg/dL ( @ 08:26)    ABG - ( 2019 04:41 )  pH, Arterial: 7.52  pH, Blood: x     /  pCO2: 36    /  pO2: 100   / HCO3: 30    / Base Excess: 5.6   /  SaO2: 99                    RADIOLOGY & ADDITIONAL TESTS:

## 2019-01-07 NOTE — CONSULT NOTE ADULT - PROBLEM SELECTOR RECOMMENDATION 6
Called legal surrogate for Medical Decison making to reconfirm MOLST  Directive DNR/DNI  Waiting for call back to complete MOLST documentation for chart
as above

## 2019-01-07 NOTE — CONSULT NOTE ADULT - ATTENDING COMMENTS
Agree with above.  Acute febrile illness, chronic anemia.  Multiple potential sources of infection.  Hemodynamically stable.  Lactate negative.  No need for ICU or closer monitoring at this time.  Thank you for the consult. Please feel free to re-consult if condition changes.
COUNSELING:    Face to face meeting to discuss Advanced Care Planning - Time Spent __40____ Minutes.    Daughter at bedside distraught, listened to her concerns, offered emotional support.  Daughter Greta is not her father's legal guardian, and cannot make medical decisions on his behalf.    More than 50% time spent in counseling and coordinating care. _15___ Minutes.     Thank you for the opportunity to assist with the care of this patient.   [facility] Consult [facility] [phone].

## 2019-01-07 NOTE — CONSULT NOTE ADULT - ASSESSMENT
This is a frail elderly Male, Acute on Chronic Respiratory Failure due to multi foci infections-  He is Vent dependent

## 2019-01-07 NOTE — PROVIDER CONTACT NOTE (EICU) - RECOMMENDATIONS
keep on AC mode tonight, CXR without substantial atelectasis, c/w aggressive pulmonary toliet/chest PT. Add Gent x1 dose for MDR pseudomonas in urine. c/W zosyn for strep bacteremia. ID is on board.  D/W JIM Torres

## 2019-01-07 NOTE — PROGRESS NOTE ADULT - ASSESSMENT
1: Acute on chronic hypoxic respiratory failure   2: HCAP/ Sepsis/ Strep Bacteremia/ sacral OM/ CRE pseudomonal UTI  3: Hypernatremia  4: Chronic Trach/ PEG/ seizure d/o, CHF, HTN, HLD, BPH with chronic Baugh colostomy    Patient seen and examined   Palliative has been involved for goals of care      Neuro:   Pain Mx: C/w Tylenol   Sedation/Anxiolytic: Added Precedex c/w PRN Ativan for agitation   C/w HD Keppra, Phenobarb, phenytoin       Cardiovascular:   MAP Target: 65  HDS for now  Pending TTE  On Dig should monitor levels)     Resp/Chest:   On Volume AC; To wean per protocol as tolerated and daily SBT  PLS  Vent bundle  Frequent suctioning, Chest PT  Added 3 % saline nebs with duoneb, if recurrent plugging and desaturation-> will contemplate bronchoscopy     Gi/Nutrition:   Diet: Resume PEG tube feeds  IV PPI  Bowel Regimen as needed    ID:   Microbiological studies: Will get repeat blood cx, repeat UA and urine cx as well as sputum cx  Abx: changed to levaquin IV today from zosyn, d/w ID  Baugh has been changed since this admission     Nephro/Electrolyte/Acid-Base:  Free water to be continued with close Na    Avoid nephrotoxic agents  Strict I&O with Cr monitoring   Close Electrolyte monitoring and correction as needed     Endocrinology:   Stable    Haem/Oncology:   Mechanical and Chemical DVT ppx  Stable H&H  S/p transfusion through this hospital course Glucose, Ur 250 (A) Negative mg/dL    Bilirubin Urine SMALL (A) Negative    Ketones, Urine TRACE (A) Negative mg/dL    Specific Gravity, UA 1.025 1.005 - 1.030    Blood, Urine SMALL (A) Negative    pH, UA 6.0 5.0 - 8.0    Protein,  (A) Negative mg/dL    Urobilinogen, Urine 1.0 <2.0 E.U./dL    Nitrite, Urine Negative Negative    Leukocyte Esterase, Urine Negative Negative    Microscopic Examination YES     Urine Reflex to Culture Not Indicated     Urine Type Not Specified    Lactic Acid, Plasma   Result Value Ref Range    Lactic Acid 1.6 0.4 - 2.0 mmol/L   Microscopic Urinalysis   Result Value Ref Range    Mucus, UA 3+ (A) /LPF    WBC, UA 3-5 0 - 5 /HPF    RBC, UA 0-2 0 - 2 /HPF    Epi Cells 0-2 /HPF    Bacteria, UA Rare (A) /HPF   CK   Result Value Ref Range    Total CK 29 (L) 39 - 308 U/L        I spoke with Dr. Chaz Denton. We thoroughly discussed the history, physical exam, laboratory and imaging studies, as well as, emergency department course. Based upon that discussion, we've decided to admit Ruben Hodge for further observation and evaluation of Amrit Zarate Génesis's cellulitis. As I have deemed necessary from their history, physical, and studies, I have considered and evaluated Ruben Hodge for the following diagnoses: DEEP SPACE INFECTIONS, DEEP VENOUS THROMBOSIS, RENEE'S GANGRENE, SEPSIS, and TOXIC SHOCK SYNDROME. FINAL IMPRESSION  1. Cellulitis of left lower extremity    2. Sepsis, due to unspecified organism ([address]    3. Candidiasis of mouth    4. Nausea vomiting and diarrhea        Vitals:  Blood pressure 120/85, pulse 85, temperature 97.7 °F (36.5 °C), temperature source Oral, resp. rate 18, weight 252 lb (114.3 kg), SpO2 100 %.        Jad Saldivar MD  09/13/18 [id number]

## 2019-01-07 NOTE — PROGRESS NOTE ADULT - ASSESSMENT
This 85 y/o male with dementia, h/o CAD s/p 2 stents, CHF, respiratory failure on chronic vent via tracheostomy, sacral decubitus, h/o sacral osteomyelitis     here for fevers  - strep bacteremia; source likely from sacral wound  - repeat blood cultures negative    - possible UTI with urine cx + for GNR; now identified as CRE pseudomonas  - had been on Zosyn - will stop  LEVAQUIN to cover all organisms identified so far    - follow up all outstanding cultures  - trend temperature and WBC curve  - repeat cultures from blood and all sources if febrile.     continue vent care.     Wound care consult for chronic sacral decub

## 2019-01-07 NOTE — CONSULT NOTE ADULT - PROBLEM SELECTOR PROBLEM 3
Acute osteomyelitis
R/O Pneumonia due to infectious organism, unspecified laterality, unspecified part of lung

## 2019-01-07 NOTE — CONSULT NOTE ADULT - ASSESSMENT
85 yo male, PMHx chronic trach/PEG secondary to pneumonia ~2 years ago complicated by multiple bouts of pneumonia, unable to liberate from vent due to secretions, PEG infection, MDR infections, seizure d/o, CHF, HTN, HLD, BPH with chronic jones, colostomy, admitted from [facility] with multiple foci of infection: Group G beta-hemolytic Strep bacteremia, CRE Pseudomonas UTI, sacral osteomyelitis. Patient was transfused 3u PRBC today for acute anemia. Now s/p RRT for acute on chronic hypoxemic respiratory failure likely related to mucous plugging/atelectasis.     Acute on Chronic Hypoxemic Respiratory Failure  - Admit to MICU for monitoring  - Continue full vent support, per daughter has failed weaning attempts in the past due to secretions   - f/u ABG, wean Fio2 and PEEP as tolerated to maintain SpO2 >90%  - Aggressive chest PT and suctioning    Sepsis / Bacteremia / UTI / Osteomyelitis  - On Zosyn for Strep bacteremia, source likely multiple sacral wounds with OM  - Repeat blood cultures sent  - CRE Pseudomonas UTI, given 1 dose of Gentamycin given persistent fevers, will d/w ID for further recommendations  - May need wound debridement; obtain wound c/s  - RVP negative  - Trend fever curve; leukocytosis improved    Hypernatremia  - d/c Normal saline infusion  - Free water flushes via PEG  - Continue to trend Na levels    Anemia  - continue to trend H/H  - s/p 3u PRBC 1/6 AM  - Transfuse as needed for Hgb < 7 or symptomatic anemia      Overall very poor prognosis for meaningful recovery given recurrent MDR infections, trach unable to liberate from ventilator, chronic PEG/ostomy/jnoes, multiple pressure ulcers with recurrent osteomyelitis, and debility. Patient has a court appointed legal guardian who makes his medical decisions and manages his finances. Will need to further discuss GOC with guardian. Daughter emphasizes he is full code, and expresses legal guardian has been taking advantage of their finances and selling their possessions/home. Palliative care c/s had been placed.     Case was d/w eICU Attending Dr. Diaz who agrees with the above assessment and plan. 85 yo male, PMHx chronic trach/PEG secondary to pneumonia ~2 years ago complicated by multiple bouts of pneumonia, unable to liberate from vent due to secretions, PEG infection, MDR infections, seizure d/o, CHF, HTN, HLD, BPH with chronic jones, colostomy, admitted from [facility] with multiple foci of infection: Group G beta-hemolytic Strep bacteremia, CRE Pseudomonas UTI, sacral osteomyelitis. Patient was transfused 3u PRBC today for acute anemia. Now s/p RRT for acute on chronic hypoxemic respiratory failure likely related to mucous plugging/atelectasis.     Acute on Chronic Hypoxemic Respiratory Failure  - Admit to MICU for monitoring  - Continue full vent support, per daughter has failed weaning attempts in the past due to secretions   - f/u ABG, wean Fio2 and PEEP as tolerated to maintain SpO2 >90%  - Aggressive chest PT and suctioning    Sepsis / Bacteremia / UTI / Osteomyelitis  - On Zosyn for Strep bacteremia, source likely multiple sacral wounds with OM  - Repeat blood cultures sent  - CRE Pseudomonas UTI, given 1 dose of Gentamycin given persistent fevers, will d/w ID for further recommendations  - May need wound debridement; obtain wound c/s  - RVP negative  - Send sputum culture  - Trend fever curve; leukocytosis improved    Hypernatremia  - d/c Normal saline infusion  - Free water flushes via PEG  - Continue to trend Na levels    Anemia  - continue to trend H/H  - s/p 3u PRBC 1/6 AM  - Transfuse as needed for Hgb < 7 or symptomatic anemia      Overall very poor prognosis for meaningful recovery given recurrent MDR infections, trach unable to liberate from ventilator, chronic PEG/ostomy/jones, multiple pressure ulcers with recurrent osteomyelitis, and debility. Patient has a court appointed legal guardian who makes his medical decisions and manages his finances. Will need to further discuss GOC with guardian. Daughter emphasizes he is full code, and expresses legal guardian has been taking advantage of their finances and selling their possessions/home. Palliative care c/s had been placed.     Case was d/w eICU Attending Dr. Diaz who agrees with the above assessment and plan.

## 2019-01-07 NOTE — CONSULT NOTE ADULT - PROBLEM SELECTOR PROBLEM 4
Urinary tract infection with hematuria, site unspecified
R/O Pressure injury of sacral region, stage 4

## 2019-01-07 NOTE — PROGRESS NOTE ADULT - NSHPATTENDINGPLANDISCUSS_GEN_ALL_CORE
Daughter at bedside and Left a message for Van Monroy (legal guardian) Daughter at bedside/ discussed at length with patient's legal and medical gaurdian Mr. Espinoza and updated about his current medical conditions, also learned that he is DNR and will try to get paperwork from [employer]

## 2019-01-07 NOTE — CONSULT NOTE ADULT - PROBLEM SELECTOR PROBLEM 1
Acute on chronic respiratory failure with hypoxia
Acute on chronic respiratory failure with hypoxia
Sepsis

## 2019-01-08 LAB
ANION GAP SERPL CALC-SCNC: 11 MMOL/L — SIGNIFICANT CHANGE UP (ref 5–17)
BUN SERPL-MCNC: 20 MG/DL — SIGNIFICANT CHANGE UP (ref 8–20)
CALCIUM SERPL-MCNC: 8.3 MG/DL — LOW (ref 8.6–10.2)
CHLORIDE SERPL-SCNC: 113 MMOL/L — HIGH (ref 98–107)
CO2 SERPL-SCNC: 28 MMOL/L — SIGNIFICANT CHANGE UP (ref 22–29)
CREAT SERPL-MCNC: 0.62 MG/DL — SIGNIFICANT CHANGE UP (ref 0.5–1.3)
CULTURE RESULTS: NO GROWTH — SIGNIFICANT CHANGE UP
GLUCOSE SERPL-MCNC: 102 MG/DL — SIGNIFICANT CHANGE UP (ref 70–115)
HCT VFR BLD CALC: 27.5 % — LOW (ref 42–52)
HGB BLD-MCNC: 7.9 G/DL — LOW (ref 14–18)
MAGNESIUM SERPL-MCNC: 2.4 MG/DL — SIGNIFICANT CHANGE UP (ref 1.6–2.6)
MCHC RBC-ENTMCNC: 23.8 PG — LOW (ref 27–31)
MCHC RBC-ENTMCNC: 28.7 G/DL — LOW (ref 32–36)
MCV RBC AUTO: 82.8 FL — SIGNIFICANT CHANGE UP (ref 80–94)
PHOSPHATE SERPL-MCNC: 2.8 MG/DL — SIGNIFICANT CHANGE UP (ref 2.4–4.7)
PLATELET # BLD AUTO: 278 K/UL — SIGNIFICANT CHANGE UP (ref 150–400)
POTASSIUM SERPL-MCNC: 4.4 MMOL/L — SIGNIFICANT CHANGE UP (ref 3.5–5.3)
POTASSIUM SERPL-SCNC: 4.4 MMOL/L — SIGNIFICANT CHANGE UP (ref 3.5–5.3)
RBC # BLD: 3.32 M/UL — LOW (ref 4.6–6.2)
RBC # FLD: 18.1 % — HIGH (ref 11–15.6)
SODIUM SERPL-SCNC: 152 MMOL/L — HIGH (ref 135–145)
SPECIMEN SOURCE: SIGNIFICANT CHANGE UP
WBC # BLD: 8.2 K/UL — SIGNIFICANT CHANGE UP (ref 4.8–10.8)
WBC # FLD AUTO: 8.2 K/UL — SIGNIFICANT CHANGE UP (ref 4.8–10.8)

## 2019-01-08 PROCEDURE — 99232 SBSQ HOSP IP/OBS MODERATE 35: CPT

## 2019-01-08 PROCEDURE — 99233 SBSQ HOSP IP/OBS HIGH 50: CPT

## 2019-01-08 RX ORDER — IOHEXOL 300 MG/ML
30 INJECTION, SOLUTION INTRAVENOUS ONCE
Qty: 0 | Refills: 0 | Status: DISCONTINUED | OUTPATIENT
Start: 2019-01-08 | End: 2019-01-08

## 2019-01-08 RX ORDER — HYDROMORPHONE HYDROCHLORIDE 2 MG/ML
2 INJECTION INTRAMUSCULAR; INTRAVENOUS; SUBCUTANEOUS ONCE
Qty: 0 | Refills: 0 | Status: DISCONTINUED | OUTPATIENT
Start: 2019-01-08 | End: 2019-01-08

## 2019-01-08 RX ORDER — HYDROMORPHONE HYDROCHLORIDE 2 MG/ML
1 INJECTION INTRAMUSCULAR; INTRAVENOUS; SUBCUTANEOUS
Qty: 0 | Refills: 0 | Status: DISCONTINUED | OUTPATIENT
Start: 2019-01-08 | End: 2019-01-10

## 2019-01-08 RX ADMIN — Medication 0.12 MILLIGRAM(S): at 05:44

## 2019-01-08 RX ADMIN — Medication 650 MILLIGRAM(S): at 05:44

## 2019-01-08 RX ADMIN — HYDROMORPHONE HYDROCHLORIDE 2 MILLIGRAM(S): 2 INJECTION INTRAMUSCULAR; INTRAVENOUS; SUBCUTANEOUS at 02:10

## 2019-01-08 RX ADMIN — Medication 400 MILLIGRAM(S): at 17:17

## 2019-01-08 RX ADMIN — SODIUM CHLORIDE 4 MILLILITER(S): 9 INJECTION INTRAMUSCULAR; INTRAVENOUS; SUBCUTANEOUS at 21:26

## 2019-01-08 RX ADMIN — Medication 64.8 MILLIGRAM(S): at 05:44

## 2019-01-08 RX ADMIN — TAMSULOSIN HYDROCHLORIDE 0.4 MILLIGRAM(S): 0.4 CAPSULE ORAL at 23:22

## 2019-01-08 RX ADMIN — Medication 1 TABLET(S): at 12:19

## 2019-01-08 RX ADMIN — LEVETIRACETAM 1000 MILLIGRAM(S): 250 TABLET, FILM COATED ORAL at 05:44

## 2019-01-08 RX ADMIN — HYDROMORPHONE HYDROCHLORIDE 1 MILLIGRAM(S): 2 INJECTION INTRAMUSCULAR; INTRAVENOUS; SUBCUTANEOUS at 14:15

## 2019-01-08 RX ADMIN — Medication 650 MILLIGRAM(S): at 06:14

## 2019-01-08 RX ADMIN — LEVETIRACETAM 1000 MILLIGRAM(S): 250 TABLET, FILM COATED ORAL at 17:17

## 2019-01-08 RX ADMIN — Medication 64.8 MILLIGRAM(S): at 17:17

## 2019-01-08 RX ADMIN — Medication 400 MILLIGRAM(S): at 05:44

## 2019-01-08 RX ADMIN — HYDROMORPHONE HYDROCHLORIDE 1 MILLIGRAM(S): 2 INJECTION INTRAMUSCULAR; INTRAVENOUS; SUBCUTANEOUS at 14:01

## 2019-01-08 RX ADMIN — FAMOTIDINE 20 MILLIGRAM(S): 10 INJECTION INTRAVENOUS at 05:44

## 2019-01-08 RX ADMIN — Medication 81 MILLIGRAM(S): at 12:18

## 2019-01-08 RX ADMIN — SODIUM CHLORIDE 4 MILLILITER(S): 9 INJECTION INTRAMUSCULAR; INTRAVENOUS; SUBCUTANEOUS at 08:27

## 2019-01-08 RX ADMIN — Medication 20 MILLIEQUIVALENT(S): at 12:18

## 2019-01-08 RX ADMIN — Medication 40 MILLIGRAM(S): at 05:44

## 2019-01-08 RX ADMIN — HYDROMORPHONE HYDROCHLORIDE 2 MILLIGRAM(S): 2 INJECTION INTRAMUSCULAR; INTRAVENOUS; SUBCUTANEOUS at 02:42

## 2019-01-08 RX ADMIN — ENOXAPARIN SODIUM 40 MILLIGRAM(S): 100 INJECTION SUBCUTANEOUS at 12:18

## 2019-01-08 NOTE — PROGRESS NOTE ADULT - SUBJECTIVE AND OBJECTIVE BOX
[facility]  INFECTIOUS DISEASES AND INTERNAL MEDICINE at [place]  =======================================================  Renzo Salazar MD  Diplomates American Board of Internal Medicine and Infectious Diseases  =======================================================    [id number]  [facility]   follow up for: sepsis  patient seen and examined.     pt under ICU care for increased secretions.  on vent   CRE pseudomonas SS Levaquin in urine  sputum culture with GNR    blood culture reviewed negative so far.    ===================================================  REVIEW OF SYSTEMS:  as above  all other ROS negative    =======================================================  Allergies  clindamycin (Unknown)  Grapes (Rash)  Nuts (Hives; Rash)  PC Pen VK (Unknown)  shellfish (Angioedema; Rash; Hives)  strawberry (Short breath; Rash; Hives)  Xanax (Rash)    =======================================================  Antibiotics:  levoFLOXacin IVPB 750 milliGRAM(s) IV Intermittent every 24 hours  levoFLOXacin IVPB        Other medications:  aspirin  chewable 81 milliGRAM(s) Oral daily  dexmedetomidine Infusion 0.7 MICROgram(s)/kG/Hr IV Continuous <Continuous>  digoxin     Tablet 0.125 milliGRAM(s) Oral daily  enoxaparin Injectable 40 milliGRAM(s) SubCutaneous daily  famotidine    Tablet 20 milliGRAM(s) Oral <User Schedule>  furosemide    Tablet 40 milliGRAM(s) Oral daily  levETIRAcetam  Solution 1000 milliGRAM(s) Oral two times a day  multivitamin 1 Tablet(s) Oral daily  PHENobarbital 64.8 milliGRAM(s) Oral two times a day  phenytoin   Suspension 400 milliGRAM(s) Oral every 12 hours  potassium chloride   Powder 20 milliEquivalent(s) Oral daily  sodium chloride 3%  Inhalation 4 milliLiter(s) Inhalation two times a day  tamsulosin 0.4 milliGRAM(s) Oral at bedtime    =======================================================    Physical Exam:  T(F): 99.3 (08 Jan 2019 08:00), Max: 102.4 (07 Jan 2019 04:00)  HR: 65 (08 Jan 2019 13:00)  BP: 121/57 (08 Jan 2019 13:00)  RR: 35 (08 Jan 2019 13:00)  SpO2: 100% (08 Jan 2019 13:00) (78% - 100%)    General:  awake, not interactive,. on vent  Eye: Pupils are equal, round and reactive to light,   HENT: Normocephalic, tracheostomy in place  Neck: Supple,    Respiratory: Lungs are fair air entry anteriorly  Cardiovascular:  mild tachycardia  Gastrointestinal: + PEG tube Normal bowel sounds.  Genitourinary: + Baugh with yellow urine in tubing. No more sediment  Musculoskeletal: contracted upper and lower extremities  Integumentary: sacral decubitus present on admission  Neurologic: non verbal      =======================================================  Labs:                        7.9    8.2   )-----------( 278      ( 08 Jan 2019 05:08 )             27.5     01-08    152<H>  |  113<H>  |  20.0  ----------------------------<  102  4.4   |  28.0  |  0.62    Ca    8.3<L>      08 Jan 2019 05:08  Phos  2.8     01-08  Mg     2.4     01-08          Culture - Sputum (collected 01-07-19 @ 18:20)  Source: .Sputum  Gram Stain (01-07-19 @ 19:29):    Moderate White blood cells    Few Gram Negative Rods    Few Gram Positive Cocci in Pairs and Chains    Culture - Urine (collected 01-07-19 @ 14:01)  Source: .Urine  Final Report (01-08-19 @ 09:23):    No growth    Culture - Sputum (collected 01-07-19 @ 07:30)  Source: .Sputum  Gram Stain (01-07-19 @ 15:35):    Numerous White blood cells    Moderate Gram Negative Rods    Few Gram positive cocci in pairs    Culture - Blood (collected 01-05-19 @ 10:49)  Source: .Blood    Culture - Urine (collected 01-03-19 @ 20:12)  Source: .Urine  Final Report (01-06-19 @ 16:21):    10,000 - 49,000 CFU/mL Pseudomonas aeruginosa (Carbapenem Resistant)    TYPE: (C=Critical, N=Notification, A=Abnormal) c    TESTS:  _ mdro    DATE/TIME CALLED: _ 01/06/2019 16:13:29    CALLED TO: Amanda pulliam    READ BACK (2 Patient Identifiers)(Y/N): _ y    READ BACK VALUES (Y/N): _ y    CALLED BY: _ kb    send a copy to i.c and logistics  Organism: Pseudomonas aeruginosa (Carbapenem Resistant) (01-06-19 @ 16:21)  Organism: Pseudomonas aeruginosa (Carbapenem Resistant) (01-06-19 @ 16:21)    Sensitivities:      -  Amikacin: S <=16      -  Aztreonam: R >16      -  Cefepime: I 16      -  Ceftazidime: R >16      -  Ciprofloxacin: S <=1      -  Gentamicin: S <=4      -  Imipenem: R >8      -  Levofloxacin: S <=2      -  Meropenem: R >8      -  Piperacillin/Tazobactam: R >64      -  Tobramycin: S <=4      Method Type: ERNESTINE    Culture - Blood (collected 01-03-19 @ 18:47)  Source: .Blood  Organism: Beta Hemolytic Streptococci, Group G (01-06-19 @ 13:58)  Organism: Beta Hemolytic Streptococci, Group G (01-06-19 @ 13:58)    Sensitivities:      Method Type: ERNESTINE    Culture - Blood (collected 01-03-19 @ 18:46)  Source: .Blood  Organism: Beta Hemolytic Streptococci, Group G  Blood Culture PCR (01-06-19 @ 13:57)  Organism: Beta Hemolytic Streptococci, Group G (01-06-19 @ 13:57)    Sensitivities:      Method Type: ERNESTINE  Organism: Blood Culture PCR (01-04-19 @ 12:30)    Sensitivities:      -  Streptococcus sp. (Not Grp A, B or S pneumoniae): Detec      Method Type: PCR

## 2019-01-08 NOTE — PROGRESS NOTE ADULT - ASSESSMENT
Respiratory Failure +trach/ PEG  Hx of multiple electrolytes abnormalities  Hypernatremia would inc free water via PEG tube  it is 200cc Q 6 will inc to 300 cc Q6    Anemia better w transfusion yest    Sepsis- OM bactermia- chronic sacral ulcers, chronic jones   Abx noted    Will follow

## 2019-01-08 NOTE — PHYSICAL THERAPY INITIAL EVALUATION ADULT - ADDITIONAL COMMENTS
pt a poor historian, as per daughter, pt is non ambulatory, he was living at a long term care facility, was hoyered at times to and from hospital bed to W/C

## 2019-01-08 NOTE — PROGRESS NOTE ADULT - SUBJECTIVE AND OBJECTIVE BOX
INTERVAL HPI/OVERNIGHT EVENTS: 87yo Male Patient PMH of Dementia Chronic Respiratory Failure with Tracheostomy and Vent Dependency. Transferred from SNF  Patient was found to have multiple foci of infection: Group G beta-hemolytic Strep bacteremia, CRE Pseudomonas UTI, sacral osteomyelitis.   Patient has transfused 3u PRBC  for acute anemia.  Patient still Vent dependent, not responding to commands, not able to participate with PT  Seen in ICU    86y old  Male who presents with a chief complaint of respiratory distress. fever (2019 16:09)  PAST MEDICAL & SURGICAL HISTORY:  Dysphagia  Fall: Multiple Mechanical falls  CAD (coronary artery disease)  Clostridium difficile diarrhea: in   BPH (benign prostatic hypertrophy)  Dementia  HLD (hyperlipidemia)  CHF (congestive heart failure)  Prostate cancer: Treated w/ radiation  Stroke: (~5yrs ago)  Seizure: (last event &gt;1yr ago)  HTN (hypertension)  Colostomy in place  S/P percutaneous endoscopic gastrostomy (PEG) tube placement  H/O hemorrhoidectomy  Deviated septum  Abdominal hernia  Status post cardiac surgery: stents x 2    Present Symptoms:     Dyspnea:  3   Nausea/Vomiting:  No  Anxiety:  Yes   Depression: Yes   Fatigue: Yes   Loss of appetite: Yes     Pain: Sacral Decubiti  Stage IV with OM            Character-            Duration-            Effect-            Factors-            Frequency-            Location-            Severity-moderate    Review of Systems: Reviewed                     Unable to obtain due to poor mentation       MEDICATIONS  (STANDING):  aspirin  chewable 81 milliGRAM(s) Oral daily  dexmedetomidine Infusion 0.7 MICROgram(s)/kG/Hr (14.84 mL/Hr) IV Continuous <Continuous>  digoxin     Tablet 0.125 milliGRAM(s) Oral daily  enoxaparin Injectable 40 milliGRAM(s) SubCutaneous daily  famotidine    Tablet 20 milliGRAM(s) Oral <User Schedule>  furosemide    Tablet 40 milliGRAM(s) Oral daily  levETIRAcetam  Solution 1000 milliGRAM(s) Oral two times a day  levoFLOXacin IVPB 750 milliGRAM(s) IV Intermittent every 24 hours  levoFLOXacin IVPB      multivitamin 1 Tablet(s) Oral daily  PHENobarbital 64.8 milliGRAM(s) Oral two times a day  phenytoin   Suspension 400 milliGRAM(s) Oral every 12 hours  potassium chloride   Powder 20 milliEquivalent(s) Oral daily  sodium chloride 3%  Inhalation 4 milliLiter(s) Inhalation two times a day  tamsulosin 0.4 milliGRAM(s) Oral at bedtime    MEDICATIONS  (PRN):  acetaminophen    Suspension .. 650 milliGRAM(s) Oral every 6 hours PRN Temp greater or equal to 38C (100.4F)  HYDROmorphone  Injectable 1 milliGRAM(s) IV Push every 2 hours PRN resp distress  LORazepam   Injectable 0.5 milliGRAM(s) IV Push every 6 hours PRN Agitation      PHYSICAL EXAM:    Vital Signs Last 24 Hrs  T(C): 37.4 (2019 08:00), Max: 38.1 (2019 00:00)  T(F): 99.3 (2019 08:00), Max: 100.6 (2019 00:00)  HR: 68 (2019 12:52) (52 - 88)  BP: 130/66 (2019 11:00) (106/55 - 141/64)  BP(mean): 92 (2019 11:00) (75 - 94)  RR: 32 (2019 11:00) (27 - 47)  SpO2: 100% (2019 12:52) (96% - 100%)    General: __ lethargic                   cachexia  nonverbal  coma    HEENT:     /tracheostomy    Lungs:  tachypnea/labored breathing  excessive secretions Vent dependent    CV: normal      GI:  distended  tender  n               PEG/tube  constipation  last BM:     : n robert    MSK:  weakness  edema              bedbound/    Skin: normal  pressure ulcers- Stage__IV  Sacral      LABS:                        7.9    8.2   )-----------( 278      ( 2019 05:08 )             27.5     01-08    152<H>  |  113<H>  |  20.0  ----------------------------<  102  4.4   |  28.0  |  0.62    Ca    8.3<L>      2019 05:08  Phos  2.8     01-08  Mg     2.4     01-08    Urinalysis Basic - ( 2019 14:02 )    Color: Yellow / Appearance: Clear / S.010 / pH: x  Gluc: x / Ketone: Negative  / Bili: Negative / Urobili: Negative mg/dL   Blood: x / Protein: 100 mg/dL / Nitrite: Negative   Leuk Esterase: Trace / RBC: 6-10 /HPF / WBC 0-2   Sq Epi: x / Non Sq Epi: x / Bacteria: Occasional    I&O's Summary    2019 07:  -  2019 07:00  --------------------------------------------------------  IN: 2968 mL / OUT: 890 mL / NET: 2078 mL    2019 07:  -  2019 13:25  --------------------------------------------------------  IN: 300.3 mL / OUT: 125 mL / NET: 175.3 mL    RADIOLOGY & ADDITIONAL STUDIES:    ADVANCE DIRECTIVES:   DNR YES   Completed on:                     JAQUELINE  YES    Completed on: Need  Living Will   NO   Completed on:

## 2019-01-08 NOTE — ADVANCED PRACTICE NURSE CONSULT - RECOMMEDATIONS
Follow Nutritional suggestions as per Dietary consult.    Recommendation for Sacral Pressure Injury:  Daily cleanse wound and periwound with ¼ strength Dakins solution (until odor resolves then switch to normal saline), pack wound bed and dead space with Hydrogel impregnated gauze cover with dry dressing and tape.    Goal of Care:  Autolytic debridement    Continue low air loss bed therapy, continue to turn & reposition q2h with Z-martin fluidized positioning device, Z-Martin Heel boots to bilateral feet and single breathable pad, continue measures to decrease friction/shear/pressure.     Plan discussed with patient’s primary nurse, Violeta Musa and JIM Lazcano.  Please call wound care service line at [phone], if further assistance is needed.

## 2019-01-08 NOTE — PROGRESS NOTE ADULT - ASSESSMENT
Assessment and Plan:   · Assessment		  1: Acute on chronic hypoxic respiratory failure   2: HCAP/ Sepsis/ Strep Bacteremia/ sacral OM/ CRE pseudomonal UTI    3.  Pain from Sacral Decubiti and OM Recommend Fentanyl 12 mcg/hr patch for long acting pain relief      Tylenol Q6 ATC  Wound Care Assessment    4: Chronic Trach/ PEG/ seizure d/o, CHF, HTN, HLD, BPH with chronic Baugh colostomy    Neuro:   Pain Mx: C/w Tylenol   Sedation/Anxiolytic: Added Precedex c/w PRN Ativan for agitation   C/w HD Keppra, Phenobarb, phenytoin     Advance Directives: Waiting to speak to  [employer] [facility]roy Legal Guardian;  to reconfirm Directives and Complete MOLST Document

## 2019-01-08 NOTE — ADVANCED PRACTICE NURSE CONSULT - ASSESSMENT
Lethargic aroused to voice, trached on vent support, bedbound, diverting colostomy and urethral catheter in place.  Skin warm, dry with adequate turgor, scattered areas of hyperpigmentation and hypopigmentation, blanchable erythema to bilateral heels.    Patient is noted to have a Stage 4 Sacral pressure injury which is mildly malodorous, measuring 8.0 x 6.8 x 1.5 cm, the wound bed is covered with 70% yellow fibrin tissue and 10% soft eschar, small amount of serous drainage, no tunneling, undermining from 8 o’clock to 1 o’clock with the deepest point measuring 1.5 cm at 9 o’clock and 3 o’clock to 5 o’clock with the deepest point measuring 2.2 cm at 4 o’clock.  No erythema, warmth or induration noted to periwound.

## 2019-01-08 NOTE — PROGRESS NOTE ADULT - ASSESSMENT
87 yo male with hx of pneumonia and subsequent trach/peg, seizures, CHF, HTN, HLD, BPH, chroic jones for urinary retention, decub ulcers s/p diverting colostomy, now with acute on chronic respiratory failure, group b strep bacteremia, pseudomonas UTI, sacral osteomyelitis, anemia requiring blood transfusions.  Transferred to ICU for worsening hypoxemia likely due to mucous plugging.      Overall prognosis is grim, HCP agreed to DNR.

## 2019-01-08 NOTE — ADVANCED PRACTICE NURSE CONSULT - REASON FOR CONSULT
Patient seen on skin care rounds after wound care referral received for assessment of skin impairment and recommendations of topical management.  Chart reviewed:   BMI (28.4), Varun (11), Serum albumin (2.9 g/dL) and Total Protein (6.4 g/dL).  Patient H/O Dementia, Stroke, Dysphagia, chronic Respiratory failure, needed Tracheostomy.   Sacral decubiti with +OM has multifocal infections, UTI and Pneumonia/Bacteremia.  Transferred from [facility] in Respiratory distress and fevers.

## 2019-01-08 NOTE — PROGRESS NOTE ADULT - SUBJECTIVE AND OBJECTIVE BOX
NEPHROLOGY INTERVAL HPI/OVERNIGHT EVENTS:    Examined earlier  dtr at bedside    MEDICATIONS  (STANDING):  aspirin  chewable 81 milliGRAM(s) Oral daily  dexmedetomidine Infusion 0.7 MICROgram(s)/kG/Hr (14.84 mL/Hr) IV Continuous <Continuous>  digoxin     Tablet 0.125 milliGRAM(s) Oral daily  enoxaparin Injectable 40 milliGRAM(s) SubCutaneous daily  famotidine    Tablet 20 milliGRAM(s) Oral <User Schedule>  furosemide    Tablet 40 milliGRAM(s) Oral daily  levETIRAcetam  Solution 1000 milliGRAM(s) Oral two times a day  levoFLOXacin IVPB 750 milliGRAM(s) IV Intermittent every 24 hours  levoFLOXacin IVPB      multivitamin 1 Tablet(s) Oral daily  PHENobarbital 64.8 milliGRAM(s) Oral two times a day  phenytoin   Suspension 400 milliGRAM(s) Oral every 12 hours  potassium chloride   Powder 20 milliEquivalent(s) Oral daily  sodium chloride 3%  Inhalation 4 milliLiter(s) Inhalation two times a day  tamsulosin 0.4 milliGRAM(s) Oral at bedtime    MEDICATIONS  (PRN):  acetaminophen    Suspension .. 650 milliGRAM(s) Oral every 6 hours PRN Temp greater or equal to 38C (100.4F)  HYDROmorphone  Injectable 1 milliGRAM(s) IV Push every 2 hours PRN resp distress  LORazepam   Injectable 0.5 milliGRAM(s) IV Push every 6 hours PRN Agitation      Allergies    clindamycin (Unknown)  Grapes (Rash)  Nuts (Hives; Rash)  PC Pen VK (Unknown)  shellfish (Angioedema; Rash; Hives)  strawberry (Short breath; Rash; Hives)  Xanax (Rash)    Intolerances    Ativan (Unknown)  Haldol (Dystonic RXN)  hydrALAZINE (Unknown)  Zyprexa (Unknown)      Vital Signs Last 24 Hrs  T(C): 37.4 (2019 08:00), Max: 38.1 (2019 00:00)  T(F): 99.3 (2019 08:00), Max: 100.6 (2019 00:00)  HR: 68 (2019 12:52) (52 - 88)  BP: 130/66 (2019 11:00) (106/55 - 141/64)  BP(mean): 92 (2019 11:00) (75 - 94)  RR: 32 (2019 11:00) (27 - 47)  SpO2: 100% (2019 12:52) (96% - 100%)  Daily     Daily     PHYSICAL EXAM:  GENERAL: appears chronically ill/ +trach/ +PEG  HEAD:  Atraumatic, Normocephalic  EYES: EOMI  NECK: Supple, neck  veins full  NERVOUS SYSTEM:  Awake  CHEST/LUNG: dec BS B/L  HEART: Regular rate and rhythm; No murmur  ABDOMEN: Soft, Nontender, Nondistended; Bowel sounds present  EXTREMITIES: trace LE edema, contracted    LABS:                        7.9    8.2   )-----------( 278      ( 2019 05:08 )             27.5     08    152<H>  |  113<H>  |  20.0  ----------------------------<  102  4.4   |  28.0  |  0.62    Ca    8.3<L>      2019 05:08  Phos  2.8       Mg     2.4     08        Urinalysis Basic - ( 2019 14:02 )    Color: Yellow / Appearance: Clear / S.010 / pH: x  Gluc: x / Ketone: Negative  / Bili: Negative / Urobili: Negative mg/dL   Blood: x / Protein: 100 mg/dL / Nitrite: Negative   Leuk Esterase: Trace / RBC: 6-10 /HPF / WBC 0-2   Sq Epi: x / Non Sq Epi: x / Bacteria: Occasional      Magnesium, Serum: 2.4 mg/dL ( @ 05:08)  Phosphorus Level, Serum: 2.8 mg/dL ( @ 05:08)    ABG - ( 2019 04:41 )  pH, Arterial: 7.52  pH, Blood: x     /  pCO2: 36    /  pO2: 100   / HCO3: 30    / Base Excess: 5.6   /  SaO2: 99                    RADIOLOGY & ADDITIONAL TESTS:

## 2019-01-08 NOTE — PROGRESS NOTE ADULT - ASSESSMENT
This 85 y/o male with dementia, h/o CAD s/p 2 stents, CHF, respiratory failure on chronic vent via tracheostomy, sacral decubitus, h/o sacral osteomyelitis     here for fevers  - strep bacteremia; source likely from sacral wound  - repeat blood cultures negative    - possible UTI with urine + for CRE pseudomonas  - continue LEVAQUIN to cover all organisms identified so far    - follow up all outstanding cultures  - trend temperature and WBC curve  - repeat cultures from blood and all sources if febrile.     continue vent care.     Wound care consult for chronic sacral decub

## 2019-01-08 NOTE — PROGRESS NOTE ADULT - SUBJECTIVE AND OBJECTIVE BOX
INTERVAL HPI/OVERNIGHT EVENTS: Weaning down fio2    On Admission  19 (5d)  HPI:  85 y/o male with dementia, h/o CAD s/p 2 stents, CHF, respiratory failure on chronic vent via tracheostomy tube with h/o infection in the tube needed IV antibiotics for months ended in 2018, sacral decubitus ulcers with h/o sacral osteomyelitis treated about 2 years ago with Abc, colostomy and chronic Baugh's catheter. CT scan in 3/2018 was suspicious for sacral osteomyelitis. Patient was brought in to the ER from [facility] for respiratory distress and fever. in the ER, Temp: 103.6. Labs showed WBC: 14 000. Hgb: 7.7. Trop: 0.19 with h/o chronic elevation. (2019 22:39)    PAST MEDICAL & SURGICAL HISTORY:  Dysphagia  Fall: Multiple Mechanical falls  CAD (coronary artery disease)  Clostridium difficile diarrhea: in   BPH (benign prostatic hypertrophy)  Dementia  HLD (hyperlipidemia)  CHF (congestive heart failure)  Prostate cancer: Treated w/ radiation  Stroke: (~5yrs ago)  Seizure: (last event &gt;1yr ago)  HTN (hypertension)  Colostomy in place  S/P percutaneous endoscopic gastrostomy (PEG) tube placement  H/O hemorrhoidectomy  Deviated septum  Abdominal hernia  Status post cardiac surgery: stents x 2      Antimicrobial:  levoFLOXacin IVPB 750 milliGRAM(s) IV Intermittent every 24 hours  levoFLOXacin IVPB        Cardiovascular:  digoxin     Tablet 0.125 milliGRAM(s) Oral daily  furosemide    Tablet 40 milliGRAM(s) Oral daily  tamsulosin 0.4 milliGRAM(s) Oral at bedtime    Pulmonary:  sodium chloride 3%  Inhalation 4 milliLiter(s) Inhalation two times a day    Hematalogic:  aspirin  chewable 81 milliGRAM(s) Oral daily  enoxaparin Injectable 40 milliGRAM(s) SubCutaneous daily    Other:  acetaminophen    Suspension .. 650 milliGRAM(s) Oral every 6 hours PRN  dexmedetomidine Infusion 0.7 MICROgram(s)/kG/Hr IV Continuous <Continuous>  famotidine    Tablet 20 milliGRAM(s) Oral <User Schedule>  HYDROmorphone  Injectable 1 milliGRAM(s) IV Push every 2 hours PRN  levETIRAcetam  Solution 1000 milliGRAM(s) Oral two times a day  LORazepam   Injectable 0.5 milliGRAM(s) IV Push every 6 hours PRN  multivitamin 1 Tablet(s) Oral daily  PHENobarbital 64.8 milliGRAM(s) Oral two times a day  phenytoin   Suspension 400 milliGRAM(s) Oral every 12 hours  potassium chloride   Powder 20 milliEquivalent(s) Oral daily      Drug Dosing Weight  Height (cm): 172.72 (2019 17:49)  Weight (kg): 84.8 (2019 17:49)  BMI (kg/m2): 28.4 (2019 17:49)  BSA (m2): 1.99 (2019 17:49)    T(C): 37.5 (19 @ 16:00), Max: 38.1 (19 @ 00:00)  HR: 77 (19 @ 18:00)  BP: 173/82 (19 @ 18:00)  BP(mean): 118 (19 @ 18:00)  ABP: --  ABP(mean): --  RR: 29 (19 @ 18:00)  SpO2: 100% (19 @ 18:00)    ABG - ( 2019 04:41 )  pH, Arterial: 7.52  pH, Blood: x     /  pCO2: 36    /  pO2: 100   / HCO3: 30    / Base Excess: 5.6   /  SaO2: 99                     @ 07:01  -   @ 07:00  --------------------------------------------------------  IN: 2968 mL / OUT: 890 mL / NET: 2078 mL        Mode: AC/ CMV (Assist Control/ Continuous Mandatory Ventilation)  RR (machine): 16  TV (machine): 400  FiO2: 50  PEEP: 8  MAP: 11  PIP: 21      PHYSICAL EXAM:    trach, peg, cachectic, contracted  does not follow commands  MMM  abd nontender  equal air entry   reg rhythm  bilat edema     LABS:  CBC Full  -  ( 2019 05:08 )  WBC Count : 8.2 K/uL  Hemoglobin : 7.9 g/dL  Hematocrit : 27.5 %  Platelet Count - Automated : 278 K/uL  Mean Cell Volume : 82.8 fl  Mean Cell Hemoglobin : 23.8 pg  Mean Cell Hemoglobin Concentration : 28.7 g/dL  Auto Neutrophil # : x  Auto Lymphocyte # : x  Auto Monocyte # : x  Auto Eosinophil # : x  Auto Basophil # : x  Auto Neutrophil % : x  Auto Lymphocyte % : x  Auto Monocyte % : x  Auto Eosinophil % : x  Auto Basophil % : x        152<H>  |  113<H>  |  20.0  ----------------------------<  102  4.4   |  28.0  |  0.62    Ca    8.3<L>      2019 05:08  Phos  2.8       Mg     2.4             Urinalysis Basic - ( 2019 14:02 )    Color: Yellow / Appearance: Clear / S.010 / pH: x  Gluc: x / Ketone: Negative  / Bili: Negative / Urobili: Negative mg/dL   Blood: x / Protein: 100 mg/dL / Nitrite: Negative   Leuk Esterase: Trace / RBC: 6-10 /HPF / WBC 0-2   Sq Epi: x / Non Sq Epi: x / Bacteria: Occasional      Culture Results:   Moderate Gram Negative Rods Identification and susceptibility to follow.  Culture in progress ( @ 18:20)  Culture Results:   No growth ( @ 14:01)  Culture Results:   Moderate Gram Negative Rods Identification and susceptibility to follow.  Few Routine respiratory bonnie present  Culture in progress ( @ 07:30)      RADIOLOGY personally reviewed

## 2019-01-08 NOTE — PHYSICAL THERAPY INITIAL EVALUATION ADULT - PERTINENT HX OF CURRENT PROBLEM, REHAB EVAL
pt presents to [facility] due to sepsis secondary to sacral OM and bacteremia, UTI, acute on chronic respiratory failure, rapid 1/6 due to hypoxia

## 2019-01-09 LAB
ANION GAP SERPL CALC-SCNC: 11 MMOL/L — SIGNIFICANT CHANGE UP (ref 5–17)
BASE EXCESS BLDA CALC-SCNC: 5 MMOL/L — HIGH (ref -2–2)
BLOOD GAS COMMENTS ARTERIAL: SIGNIFICANT CHANGE UP
BUN SERPL-MCNC: 19 MG/DL — SIGNIFICANT CHANGE UP (ref 8–20)
CALCIUM SERPL-MCNC: 8.4 MG/DL — LOW (ref 8.6–10.2)
CHLORIDE SERPL-SCNC: 109 MMOL/L — HIGH (ref 98–107)
CO2 SERPL-SCNC: 27 MMOL/L — SIGNIFICANT CHANGE UP (ref 22–29)
CREAT SERPL-MCNC: 0.66 MG/DL — SIGNIFICANT CHANGE UP (ref 0.5–1.3)
GAS PNL BLDA: SIGNIFICANT CHANGE UP
GLUCOSE SERPL-MCNC: 154 MG/DL — HIGH (ref 70–115)
HCO3 BLDA-SCNC: 29 MMOL/L — HIGH (ref 20–26)
HCT VFR BLD CALC: 29.6 % — LOW (ref 42–52)
HGB BLD-MCNC: 8.3 G/DL — LOW (ref 14–18)
HOROWITZ INDEX BLDA+IHG-RTO: 0.5 — SIGNIFICANT CHANGE UP
MAGNESIUM SERPL-MCNC: 2.3 MG/DL — SIGNIFICANT CHANGE UP (ref 1.6–2.6)
MCHC RBC-ENTMCNC: 23.6 PG — LOW (ref 27–31)
MCHC RBC-ENTMCNC: 28 G/DL — LOW (ref 32–36)
MCV RBC AUTO: 84.1 FL — SIGNIFICANT CHANGE UP (ref 80–94)
PCO2 BLDA: 44 MMHG — SIGNIFICANT CHANGE UP (ref 35–45)
PH BLDA: 7.44 — SIGNIFICANT CHANGE UP (ref 7.35–7.45)
PHOSPHATE SERPL-MCNC: 1.9 MG/DL — LOW (ref 2.4–4.7)
PLATELET # BLD AUTO: 321 K/UL — SIGNIFICANT CHANGE UP (ref 150–400)
PO2 BLDA: 206 MMHG — HIGH (ref 83–108)
POTASSIUM SERPL-MCNC: 4.4 MMOL/L — SIGNIFICANT CHANGE UP (ref 3.5–5.3)
POTASSIUM SERPL-SCNC: 4.4 MMOL/L — SIGNIFICANT CHANGE UP (ref 3.5–5.3)
RBC # BLD: 3.52 M/UL — LOW (ref 4.6–6.2)
RBC # FLD: 18.7 % — HIGH (ref 11–15.6)
SAO2 % BLDA: 100 % — HIGH (ref 95–99)
SODIUM SERPL-SCNC: 147 MMOL/L — HIGH (ref 135–145)
WBC # BLD: 11.4 K/UL — HIGH (ref 4.8–10.8)
WBC # FLD AUTO: 11.4 K/UL — HIGH (ref 4.8–10.8)

## 2019-01-09 PROCEDURE — 99232 SBSQ HOSP IP/OBS MODERATE 35: CPT

## 2019-01-09 PROCEDURE — 71045 X-RAY EXAM CHEST 1 VIEW: CPT | Mod: 26

## 2019-01-09 PROCEDURE — 12345: CPT | Mod: NC

## 2019-01-09 PROCEDURE — 99233 SBSQ HOSP IP/OBS HIGH 50: CPT

## 2019-01-09 RX ADMIN — Medication 0.5 MILLIGRAM(S): at 01:10

## 2019-01-09 RX ADMIN — Medication 400 MILLIGRAM(S): at 17:40

## 2019-01-09 RX ADMIN — Medication 650 MILLIGRAM(S): at 02:31

## 2019-01-09 RX ADMIN — HYDROMORPHONE HYDROCHLORIDE 1 MILLIGRAM(S): 2 INJECTION INTRAMUSCULAR; INTRAVENOUS; SUBCUTANEOUS at 00:33

## 2019-01-09 RX ADMIN — HYDROMORPHONE HYDROCHLORIDE 1 MILLIGRAM(S): 2 INJECTION INTRAMUSCULAR; INTRAVENOUS; SUBCUTANEOUS at 18:00

## 2019-01-09 RX ADMIN — Medication 64.8 MILLIGRAM(S): at 17:40

## 2019-01-09 RX ADMIN — Medication 0.12 MILLIGRAM(S): at 05:40

## 2019-01-09 RX ADMIN — HYDROMORPHONE HYDROCHLORIDE 1 MILLIGRAM(S): 2 INJECTION INTRAMUSCULAR; INTRAVENOUS; SUBCUTANEOUS at 13:44

## 2019-01-09 RX ADMIN — LEVETIRACETAM 1000 MILLIGRAM(S): 250 TABLET, FILM COATED ORAL at 17:40

## 2019-01-09 RX ADMIN — Medication 64.8 MILLIGRAM(S): at 05:40

## 2019-01-09 RX ADMIN — LEVETIRACETAM 1000 MILLIGRAM(S): 250 TABLET, FILM COATED ORAL at 05:41

## 2019-01-09 RX ADMIN — HYDROMORPHONE HYDROCHLORIDE 1 MILLIGRAM(S): 2 INJECTION INTRAMUSCULAR; INTRAVENOUS; SUBCUTANEOUS at 02:31

## 2019-01-09 RX ADMIN — Medication 40 MILLIGRAM(S): at 05:40

## 2019-01-09 RX ADMIN — Medication 650 MILLIGRAM(S): at 14:01

## 2019-01-09 RX ADMIN — Medication 1 TABLET(S): at 12:36

## 2019-01-09 RX ADMIN — ENOXAPARIN SODIUM 40 MILLIGRAM(S): 100 INJECTION SUBCUTANEOUS at 12:35

## 2019-01-09 RX ADMIN — HYDROMORPHONE HYDROCHLORIDE 1 MILLIGRAM(S): 2 INJECTION INTRAMUSCULAR; INTRAVENOUS; SUBCUTANEOUS at 17:40

## 2019-01-09 RX ADMIN — SODIUM CHLORIDE 4 MILLILITER(S): 9 INJECTION INTRAMUSCULAR; INTRAVENOUS; SUBCUTANEOUS at 20:30

## 2019-01-09 RX ADMIN — Medication 20 MILLIEQUIVALENT(S): at 12:35

## 2019-01-09 RX ADMIN — HYDROMORPHONE HYDROCHLORIDE 1 MILLIGRAM(S): 2 INJECTION INTRAMUSCULAR; INTRAVENOUS; SUBCUTANEOUS at 13:13

## 2019-01-09 RX ADMIN — Medication 81 MILLIGRAM(S): at 12:35

## 2019-01-09 RX ADMIN — FAMOTIDINE 20 MILLIGRAM(S): 10 INJECTION INTRAVENOUS at 05:41

## 2019-01-09 RX ADMIN — Medication 650 MILLIGRAM(S): at 01:10

## 2019-01-09 RX ADMIN — Medication 400 MILLIGRAM(S): at 05:40

## 2019-01-09 RX ADMIN — SODIUM CHLORIDE 4 MILLILITER(S): 9 INJECTION INTRAMUSCULAR; INTRAVENOUS; SUBCUTANEOUS at 09:07

## 2019-01-09 RX ADMIN — TAMSULOSIN HYDROCHLORIDE 0.4 MILLIGRAM(S): 0.4 CAPSULE ORAL at 22:44

## 2019-01-09 NOTE — PROGRESS NOTE ADULT - SUBJECTIVE AND OBJECTIVE BOX
Pt is 87 yo M who initially presented w/ resp distress and fever found to have bacteremia and sepsis due to Sacral OM. PMH seizures, CHF, HTN, HLD, BPH    87 yo male with hx of pneumonia and subsequent trach/peg, seizures, CHF, HTN, HLD, BPH, chroic jones for urinary retention, decub ulcers s/p diverting colostomy, now with acute on chronic respiratory failure, group b strep bacteremia, pseudomonas UTI, sacral osteomyelitis, anemia requiring blood transfusions.  Transferred to ICU for worsening hypoxemia likely due to mucous plugging.      Overall prognosis is grim, HCP agreed to DNR. Pt is 85 yo M who initially presented w/ resp distress and fever found to have bacteremia and sepsis due to Sacral OM. PMH seizures, CHF, HTN, HLD, BPH, chronic jones, chronic tracheostomy due to resp failure, h.o anemia.     Patient was transferred to ICU 2 days ago due to acute hypoxic respiratory failure likely 2/2 to mucous plugging, required suctioning.   Patient is unable to provide any history. He is non verbal, does not follow commands. NO events overnight. No destaurations as of recently.     T(C): 38.1 (01-09-19 @ 01:24), Max: 38.1 (01-09-19 @ 01:24)  HR: 91 (01-09-19 @ 03:44) (54 - 91)  BP: 155/76 (01-09-19 @ 00:00) (107/53 - 173/82)  RR: 41 (01-09-19 @ 00:00) (25 - 41)  SpO2: 99% (01-09-19 @ 03:44) (95% - 100%)  Wt(kg): --    Physical Exam:   GENERAL: no distress, frail gentleman  HEENT: head NC/AT; would not track fingers to test EOM PERRLA, moist mucous membranes  NECK: supple, no JVD  RESPIRATORY: coarse breath sounds bilaterally.   CARDIOVASCULAR: S1&S2, RRR, no murmurs or gallops  ABDOMEN: soft, non-tender, non-distended, + Bowel sounds x4 quadrants, no guarding, rebound or rigidity+ peg tube  MUSCULOSKELETAL:  no clubbing, cyanosis, b/l LE pitting edema  LYMPH: no cervical lymphadenopathy  VASCULAR: Radial pulses 2+ bilaterally, no varicose veins   SKIN: warm and dry, color normal  NEUROLOGIC: arousable to verbal stimuli, unable to test cranial nerves, all ext in flex contractures, minimal movement of ext, daughter at bedside states that is his baseline  Psych: abnormal affect and mood.

## 2019-01-09 NOTE — PROGRESS NOTE ADULT - SUBJECTIVE AND OBJECTIVE BOX
[facility]  INFECTIOUS DISEASES AND INTERNAL MEDICINE at [place]  =======================================================  Renzo Salazar MD  Diplomates American Board of Internal Medicine and Infectious Diseases  =======================================================    [id number]  [facility]   follow up for: sepsis  patient seen and examined.     remains on vent   CRE pseudomonas SS Levaquin in urine  sputum culture with GNR    blood culture reviewed negative so far.    ===================================================  REVIEW OF SYSTEMS:  as above  all other ROS negative    =======================================================  Allergies  clindamycin (Unknown)  Grapes (Rash)  Nuts (Hives; Rash)  PC Pen VK (Unknown)  shellfish (Angioedema; Rash; Hives)  strawberry (Short breath; Rash; Hives)  Xanax (Rash)  =======================================================  Antibiotics:  levoFLOXacin IVPB 750 milliGRAM(s) IV Intermittent every 24 hours  levoFLOXacin IVPB        Other medications:  aspirin  chewable 81 milliGRAM(s) Oral daily  digoxin     Tablet 0.125 milliGRAM(s) Oral daily  enoxaparin Injectable 40 milliGRAM(s) SubCutaneous daily  famotidine    Tablet 20 milliGRAM(s) Oral <User Schedule>  furosemide    Tablet 40 milliGRAM(s) Oral daily  levETIRAcetam  Solution 1000 milliGRAM(s) Oral two times a day  multivitamin 1 Tablet(s) Oral daily  PHENobarbital 64.8 milliGRAM(s) Oral two times a day  phenytoin   Suspension 400 milliGRAM(s) Oral every 12 hours  potassium chloride   Powder 20 milliEquivalent(s) Oral daily  sodium chloride 3%  Inhalation 4 milliLiter(s) Inhalation two times a day  tamsulosin 0.4 milliGRAM(s) Oral at bedtime    =======================================================    Physical Exam:  T(F): 100.5 (09 Jan 2019 08:00), Max: 100.6 (08 Jan 2019 00:00)  HR: 77 (09 Jan 2019 12:35)  BP: 119/61 (09 Jan 2019 08:00)  RR: 35 (09 Jan 2019 08:00)  SpO2: 100% (09 Jan 2019 12:35) (94% - 100%)    General:  awake, not interactive,. on vent  Eye: Pupils are equal, round and reactive to light,   HENT: Normocephalic, tracheostomy in place  Neck: Supple,    Respiratory: Lungs are fair air entry anteriorly  Cardiovascular:  mild tachycardia  Gastrointestinal: + PEG tube Normal bowel sounds.  Genitourinary: + Baugh with yellow urine in tubing. No more sediment  Musculoskeletal: contracted upper and lower extremities  Integumentary: sacral decubitus present on admission  Neurologic: non verbal         =======================================================  Labs:                        8.3    11.4  )-----------( 321      ( 09 Jan 2019 04:31 )             29.6     01-09    147<H>  |  109<H>  |  19.0  ----------------------------<  154<H>  4.4   |  27.0  |  0.66    Ca    8.4<L>      09 Jan 2019 04:31  Phos  1.9     01-09  Mg     2.3     01-09      Culture - Sputum (collected 01-07-19 @ 18:20)  Source: .Sputum  Gram Stain (01-07-19 @ 19:29):    Moderate White blood cells    Few Gram Negative Rods    Few Gram Positive Cocci in Pairs and Chains    Culture - Urine (collected 01-07-19 @ 14:01)  Source: .Urine  Final Report (01-08-19 @ 09:23):    No growth    Culture - Sputum (collected 01-07-19 @ 07:30)  Source: .Sputum  Gram Stain (01-07-19 @ 15:35):    Numerous White blood cells    Moderate Gram Negative Rods    Few Gram positive cocci in pairs    Culture - Blood (collected 01-07-19 @ 05:46)  Source: .Blood    Culture - Blood (collected 01-05-19 @ 10:49)  Source: .Blood    Culture - Urine (collected 01-03-19 @ 20:12)  Source: .Urine  Final Report (01-06-19 @ 16:21):    10,000 - 49,000 CFU/mL Pseudomonas aeruginosa (Carbapenem Resistant)    TYPE: (C=Critical, N=Notification, A=Abnormal) c    TESTS:  _ mdro    DATE/TIME CALLED: _ 01/06/2019 16:13:29    CALLED TO: Amanda pulliam    READ BACK (2 Patient Identifiers)(Y/N): _ y    READ BACK VALUES (Y/N): _ y    CALLED BY: _ kb    send a copy to i.c and logistics  Organism: Pseudomonas aeruginosa (Carbapenem Resistant) (01-06-19 @ 16:21)  Organism: Pseudomonas aeruginosa (Carbapenem Resistant) (01-06-19 @ 16:21)    Sensitivities:      -  Amikacin: S <=16      -  Aztreonam: R >16      -  Cefepime: I 16      -  Ceftazidime: R >16      -  Ciprofloxacin: S <=1      -  Gentamicin: S <=4      -  Imipenem: R >8      -  Levofloxacin: S <=2      -  Meropenem: R >8      -  Piperacillin/Tazobactam: R >64      -  Tobramycin: S <=4      Method Type: ERNESTINE    Culture - Blood (collected 01-03-19 @ 18:47)  Source: .Blood  Organism: Beta Hemolytic Streptococci, Group G (01-06-19 @ 13:58)  Organism: Beta Hemolytic Streptococci, Group G (01-06-19 @ 13:58)    Sensitivities:      Method Type: ERNESTINE    Culture - Blood (collected 01-03-19 @ 18:46)  Source: .Blood  Organism: Beta Hemolytic Streptococci, Group G  Blood Culture PCR (01-06-19 @ 13:57)  Organism: Beta Hemolytic Streptococci, Group G (01-06-19 @ 13:57)    Sensitivities:      Method Type: ERNESTINE  Organism: Blood Culture PCR (01-04-19 @ 12:30)    Sensitivities:      -  Streptococcus sp. (Not Grp A, B or S pneumoniae): Detec      Method Type: PCR

## 2019-01-09 NOTE — CHART NOTE - NSCHARTNOTEFT_GEN_A_CORE
Upon Nutritional Assessment by the Registered Dietitian your patient was determined to meet criteria / has evidence of the following diagnosis/diagnoses:          [ ]  Mild Protein Calorie Malnutrition        [ ]  Moderate Protein Calorie Malnutrition        [x ] Severe Protein Calorie Malnutrition        [ ] Unspecified Protein Calorie Malnutrition        [ ] Underweight / BMI <19        [ ] Morbid Obesity / BMI > 40      Findings as based on:  •  Comprehensive nutrition assessment and consultation  •  Calorie counts (nutrient intake analysis)  •  Food acceptance and intake status from observations by staff  •  Follow up  •  Patient education  •  Intervention secondary to interdisciplinary rounds  •   concerns      Treatment:    The following diet has been recommended:    Change enteral feeds to Pivot @ 70ml/hr   Add free water 240ml every 6 hours     PROVIDER Section:     By signing this assessment you are acknowledging and agree with the diagnosis/diagnoses assigned by the Registered Dietitian    Comments:

## 2019-01-09 NOTE — PROGRESS NOTE ADULT - ASSESSMENT
85 yo M w/ pseudomonas UTI, strep bacteremia and sacral OM, acute on chronic resp insuff w/ hypoxia  Sepsis secondary to sacral OM and strep bacteremia   -ID following. Currently on Levaquin.     UTI: Continue Levaquin.   -has a chronic jones.   -ID input appreciated.     Acute on Chronic Resp Failure:   -on chronic vent. Continue to monitor.   -repeat ABG  -was being seen by pulm.   -reconsult Pulm in AM    Hypernatremia: Na 152   -on tube feeds his free water flushes have been increased, repeat BMP today and adjust accordingly.      iron def anemia -->Hgb stable. Was transfused earlier this week. No signs of active bleeding.     seizure history - c/w anti-epileptics    Sacral Decub ulcer: frequent repositioning.   DVT ppx: Lovenox

## 2019-01-09 NOTE — CHART NOTE - NSCHARTNOTEFT_GEN_A_CORE
Source: Patient [ ]  Family [ ]   other [x ]  EMR and staff     Enteral /Parenteral Nutrition: Diet, NPO with Tube Feed:   Tube Feeding Modality: Gastrostomy  Jevity 1.5 [place]  Total Volume for 24 Hours (mL): 1560  Continuous  Starting Tube Feed Rate {mL per Hour}: 20  Increase Tube Feed Rate by (mL): 10     Every 4 hours  Until Goal Tube Feed Rate (mL per Hour): 65  Tube Feed Duration (in Hours): 24  Tube Feed Start Time: 10:00 (01-08-19 @ 13:22)      Current Weight:   1/3: 84.8kg     % Weight Change No new wt (N/A)     Pertinent Medications: MEDICATIONS  (STANDING):  aspirin  chewable 81 milliGRAM(s) Oral daily  digoxin     Tablet 0.125 milliGRAM(s) Oral daily  enoxaparin Injectable 40 milliGRAM(s) SubCutaneous daily  famotidine    Tablet 20 milliGRAM(s) Oral <User Schedule>  furosemide    Tablet 40 milliGRAM(s) Oral daily  levETIRAcetam  Solution 1000 milliGRAM(s) Oral two times a day  levoFLOXacin IVPB 750 milliGRAM(s) IV Intermittent every 24 hours  levoFLOXacin IVPB      multivitamin 1 Tablet(s) Oral daily  PHENobarbital 64.8 milliGRAM(s) Oral two times a day  phenytoin   Suspension 400 milliGRAM(s) Oral every 12 hours  potassium chloride   Powder 20 milliEquivalent(s) Oral daily  sodium chloride 3%  Inhalation 4 milliLiter(s) Inhalation two times a day  tamsulosin 0.4 milliGRAM(s) Oral at bedtime    MEDICATIONS  (PRN):  acetaminophen    Suspension .. 650 milliGRAM(s) Oral every 6 hours PRN Temp greater or equal to 38C (100.4F)  HYDROmorphone  Injectable 1 milliGRAM(s) IV Push every 2 hours PRN resp distress  LORazepam   Injectable 0.5 milliGRAM(s) IV Push every 6 hours PRN Agitation    Pertinent Labs: CBC Full  -  ( 09 Jan 2019 04:31 )  WBC Count : 11.4 K/uL  Hemoglobin : 8.3 g/dL  Hematocrit : 29.6 %  Platelet Count - Automated : 321 K/uL  Mean Cell Volume : 84.1 fl  Mean Cell Hemoglobin : 23.6 pg  Mean Cell Hemoglobin Concentration : 28.0 g/dL  Auto Neutrophil # : x  Auto Lymphocyte # : x  Auto Monocyte # : x  Auto Eosinophil # : x  Auto Basophil # : x  Auto Neutrophil % : x  Auto Lymphocyte % : x  Auto Monocyte % : x  Auto Eosinophil % : x  Auto Basophil % : x        Skin: Stage IV sacral ulcer, R leg stage 1    Nutrition focused physical exam Previously conducted - found signs of malnutrition [ ]absent [x ]present    Subcutaneous fat loss: [ ] Orbital fat pads region, [ ]Buccal fat region, [ ]Triceps region,  [ ]Ribs region    Muscle wasting: [x ]Temples region, [ ]Clavicle region, [ ]Shoulder region, [ ]Scapula region, [ ]Interosseous region,  [ ]thigh region, [ ]Calf region    Estimated Needs:   [x ] no change since previous assessment  [ ] recalculated:     Current Nutrition Diagnosis:  Pt remains at high nutrition risk secondary to severe protein calorie malnutrition related to increased nutrient needs in the setting of chronic respiratory failure and wound healing with stage IV sacral ulcers now + osteomyelitis, sepsis as evidenced by  moderate temporal wasting and +fluid accumulation in B/L lower extremities. Pt continues to receive Jevity @ 65ml/hr (x20 hours) 1300 ml/day 1950kcal, 83gm protein, inadequate to meet pt's estimated nutrition needs. Recommendations below:     Recommendations:   1. Continue with MVI daily   2. Rx: VIt. C (500mg) daily   3. Rx: Zinc (220mg x 10 days) to aid in wound healing  4. Change enteral formula to Pivot @70ml/hr ([id number]) 1400ml/day (2100kcal, 131gm protein) to aid in wound healing   5. Check wt daily to monitor trends  6. Add free water 240ml every 6 hours   7. Monitor Na+, H/H     Monitoring and Evaluation:   [ ] PO intake [x ] Tolerance to diet prescription [X] Weights  [X] Follow up per protocol [X] Labs:

## 2019-01-09 NOTE — PROGRESS NOTE ADULT - SUBJECTIVE AND OBJECTIVE BOX
CC: Follow up respiratory failure     INTERVAL HPI/OVERNIGHT EVENTS: Patient seen and examined, non verbal on vent support        Vital Signs Last 24 Hrs  T(C): 38.1 (2019 08:00), Max: 38.1 (2019 01:24)  T(F): 100.5 (2019 08:00), Max: 100.6 (2019 01:24)  HR: 88 (2019 09:08) (58 - 91)  BP: 119/61 (2019 08:00) (105/59 - 173/82)  BP(mean): 84 (2019 08:00) (79 - 118)  RR: 35 (2019 08:00) (25 - 41)  SpO2: 94% (2019 09:08) (94% - 100%)    PHYSICAL EXAM:    GENERAL: no distress  RESPIRATORY: coarse breath sounds bilaterally. + trach   CARDIOVASCULAR: S1&S2, RRR, no murmurs or gallops  SKIN: warm and dry, color normal  Stage 4 sacral wound   Extremities; Bilateral pitting edema        MEDICATIONS  (STANDING):  aspirin  chewable 81 milliGRAM(s) Oral daily  digoxin     Tablet 0.125 milliGRAM(s) Oral daily  enoxaparin Injectable 40 milliGRAM(s) SubCutaneous daily  famotidine    Tablet 20 milliGRAM(s) Oral <User Schedule>  furosemide    Tablet 40 milliGRAM(s) Oral daily  levETIRAcetam  Solution 1000 milliGRAM(s) Oral two times a day  levoFLOXacin IVPB 750 milliGRAM(s) IV Intermittent every 24 hours  levoFLOXacin IVPB      multivitamin 1 Tablet(s) Oral daily  PHENobarbital 64.8 milliGRAM(s) Oral two times a day  phenytoin   Suspension 400 milliGRAM(s) Oral every 12 hours  potassium chloride   Powder 20 milliEquivalent(s) Oral daily  sodium chloride 3%  Inhalation 4 milliLiter(s) Inhalation two times a day  tamsulosin 0.4 milliGRAM(s) Oral at bedtime    MEDICATIONS  (PRN):  acetaminophen    Suspension .. 650 milliGRAM(s) Oral every 6 hours PRN Temp greater or equal to 38C (100.4F)  HYDROmorphone  Injectable 1 milliGRAM(s) IV Push every 2 hours PRN resp distress  LORazepam   Injectable 0.5 milliGRAM(s) IV Push every 6 hours PRN Agitation      Allergies    clindamycin (Unknown)  Grapes (Rash)  Nuts (Hives; Rash)  PC Pen VK (Unknown)  shellfish (Angioedema; Rash; Hives)  strawberry (Short breath; Rash; Hives)  Xanax (Rash)    Intolerances    Ativan (Unknown)  Haldol (Dystonic RXN)  hydrALAZINE (Unknown)  Zyprexa (Unknown)        LABS:                          8.3    11.4  )-----------( 321      ( 2019 04:31 )             29.6     01-    147<H>  |  109<H>  |  19.0  ----------------------------<  154<H>  4.4   |  27.0  |  0.66    Ca    8.4<L>      2019 04:31  Phos  1.9       Mg     2.3             Urinalysis Basic - ( 2019 14:02 )    Color: Yellow / Appearance: Clear / S.010 / pH: x  Gluc: x / Ketone: Negative  / Bili: Negative / Urobili: Negative mg/dL   Blood: x / Protein: 100 mg/dL / Nitrite: Negative   Leuk Esterase: Trace / RBC: 6-10 /HPF / WBC 0-2   Sq Epi: x / Non Sq Epi: x / Bacteria: Occasional        RADIOLOGY & ADDITIONAL TESTS:

## 2019-01-09 NOTE — PROGRESS NOTE ADULT - ASSESSMENT
The patient is an 87 yo male, PMHx chronic trach/PEG secondary to pneumonia ~2 years ago complicated by multiple bouts of pneumonia, unable to liberate from vent due to secretions, PEG infection, MDR infections, seizure d/o, CHF, HTN, HLD, BPH with chronic jones, colostomy, who presented from [facility] 1/3/18 with respiratory distress and fever >102'F axillary. Admitted for sepsis secondary to Group beta hemolytic bacteremia secondary to stage 4 sacral ulcer and pseudomonas UTI. Was evaluated by ID, started on broad spectrum antibiotics and then transition to Levaquin Sacral wound evaluated by surgery and wound care. RRT called for acute on chronic hypoxic respiratory requiring vent support secondary to mucous plugging. Was transferred to the ICU.  Patient was transfused 3u PRBC today for acute anemia.    Assessment/Plan:    1. Sepsis secondary to sacral OM, Pseudomonas UTI and strep bacteremia:  repeat blood cultures negative  IV levaquin day 2      2. Acute on chronic hypoxic respiratory failure: improving  Frequent suctioning  Repeat chest xray       3. Hypernatremia: Improving  Free water via tube feed  Monitor bmp    4. Iron defiency anemia: Hb/hct stable  Status post 3 units prbc    5. Seizure disorder: Continue aeds      DVT ppx: Lovenox    Plan discussed with the patient's daughter at bedside.

## 2019-01-09 NOTE — PROGRESS NOTE ADULT - SUBJECTIVE AND OBJECTIVE BOX
Patient seen and examined    I&O's Summary    08 Jan 2019 07:01  -  09 Jan 2019 07:00  --------------------------------------------------------  IN: 2356.6 mL / OUT: 1070 mL / NET: 1286.6 mL    09 Jan 2019 07:01  -  09 Jan 2019 22:11  --------------------------------------------------------  IN: 850 mL / OUT: 825 mL / NET: 25 mL    Patient lethargic/vented  daughter present    REVIEW OF SYSTEMS: NA    CONSTITUTIONAL: No F/C  Vented via trach  has PEG      Vital Signs Last 24 Hrs  T(C): 37.1 (09 Jan 2019 17:36), Max: 38.4 (09 Jan 2019 14:05)  T(F): 98.8 (09 Jan 2019 17:36), Max: 101.2 (09 Jan 2019 14:05)  HR: 60 (09 Jan 2019 20:30) (56 - 91)  BP: 141/65 (09 Jan 2019 20:00) (105/59 - 155/76)  BP(mean): 94 (09 Jan 2019 20:00) (78 - 109)  RR: 21 (09 Jan 2019 20:00) (21 - 41)  SpO2: 100% (09 Jan 2019 20:30) (94% - 100%)    PHYSICAL EXAM:    GENERAL: NAD,   EYES:  conjunctiva and sclera clear  NECK: Supple, No JVD/Bruit  NERVOUS SYSTEM:  Lethargic, not following VC  CHEST:  No rales or rhonchi  HEART:  RRR, No murmur  ABDOMEN: Soft, NT/ND BS+  EXTREMITIES:  1-2+ chr Edema;  SKIN: No rashes;  stasis changes present    LABS:                          8.3    11.4  )-----------( 321      ( 09 Jan 2019 04:31 )             29.6     01-09    147<H>  |  109<H>  |  19.0  ----------------------------<  154<H>  4.4   |  27.0  |  0.66    Ca    8.4<L>      09 Jan 2019 04:31  Phos  1.9     01-09  Mg     2.3     01-09        MEDICATIONS  (STANDING):  acetaminophen    Suspension .. PRN  aspirin  chewable  digoxin     Tablet  enoxaparin Injectable  famotidine    Tablet  furosemide    Tablet  HYDROmorphone  Injectable PRN  levETIRAcetam  Solution  levoFLOXacin IVPB  levoFLOXacin IVPB  LORazepam   Injectable PRN  multivitamin  PHENobarbital  phenytoin   Suspension  potassium chloride   Powder  sodium chloride 3%  Inhalation  tamsulosin

## 2019-01-10 DIAGNOSIS — R13.10 DYSPHAGIA, UNSPECIFIED: ICD-10-CM

## 2019-01-10 DIAGNOSIS — Z51.5 ENCOUNTER FOR PALLIATIVE CARE: ICD-10-CM

## 2019-01-10 LAB
-  AMIKACIN: SIGNIFICANT CHANGE UP
-  AMPICILLIN/SULBACTAM: SIGNIFICANT CHANGE UP
-  AMPICILLIN: SIGNIFICANT CHANGE UP
-  AZTREONAM: SIGNIFICANT CHANGE UP
-  CEFAZOLIN: SIGNIFICANT CHANGE UP
-  CEFEPIME: SIGNIFICANT CHANGE UP
-  CEFOXITIN: SIGNIFICANT CHANGE UP
-  CEFTAZIDIME: SIGNIFICANT CHANGE UP
-  CEFTRIAXONE: SIGNIFICANT CHANGE UP
-  CIPROFLOXACIN: SIGNIFICANT CHANGE UP
-  ERTAPENEM: SIGNIFICANT CHANGE UP
-  GENTAMICIN: SIGNIFICANT CHANGE UP
-  IMIPENEM: SIGNIFICANT CHANGE UP
-  IMIPENEM: SIGNIFICANT CHANGE UP
-  LEVOFLOXACIN: SIGNIFICANT CHANGE UP
-  MEROPENEM: SIGNIFICANT CHANGE UP
-  PIPERACILLIN/TAZOBACTAM: SIGNIFICANT CHANGE UP
-  TOBRAMYCIN: SIGNIFICANT CHANGE UP
-  TRIMETHOPRIM/SULFAMETHOXAZOLE: SIGNIFICANT CHANGE UP
ANION GAP SERPL CALC-SCNC: 9 MMOL/L — SIGNIFICANT CHANGE UP (ref 5–17)
BUN SERPL-MCNC: 19 MG/DL — SIGNIFICANT CHANGE UP (ref 8–20)
CALCIUM SERPL-MCNC: 8.7 MG/DL — SIGNIFICANT CHANGE UP (ref 8.6–10.2)
CHLORIDE SERPL-SCNC: 110 MMOL/L — HIGH (ref 98–107)
CO2 SERPL-SCNC: 27 MMOL/L — SIGNIFICANT CHANGE UP (ref 22–29)
CREAT SERPL-MCNC: 0.55 MG/DL — SIGNIFICANT CHANGE UP (ref 0.5–1.3)
CULTURE RESULTS: SIGNIFICANT CHANGE UP
GLUCOSE SERPL-MCNC: 112 MG/DL — SIGNIFICANT CHANGE UP (ref 70–115)
HCT VFR BLD CALC: 31.3 % — LOW (ref 42–52)
HGB BLD-MCNC: 9 G/DL — LOW (ref 14–18)
MCHC RBC-ENTMCNC: 24.2 PG — LOW (ref 27–31)
MCHC RBC-ENTMCNC: 28.8 G/DL — LOW (ref 32–36)
MCV RBC AUTO: 84.1 FL — SIGNIFICANT CHANGE UP (ref 80–94)
METHOD TYPE: SIGNIFICANT CHANGE UP
ORGANISM # SPEC MICROSCOPIC CNT: SIGNIFICANT CHANGE UP
PLATELET # BLD AUTO: 299 K/UL — SIGNIFICANT CHANGE UP (ref 150–400)
POTASSIUM SERPL-MCNC: 4.7 MMOL/L — SIGNIFICANT CHANGE UP (ref 3.5–5.3)
POTASSIUM SERPL-SCNC: 4.7 MMOL/L — SIGNIFICANT CHANGE UP (ref 3.5–5.3)
RBC # BLD: 3.72 M/UL — LOW (ref 4.6–6.2)
RBC # FLD: 19.6 % — HIGH (ref 11–15.6)
SODIUM SERPL-SCNC: 146 MMOL/L — HIGH (ref 135–145)
SPECIMEN SOURCE: SIGNIFICANT CHANGE UP
WBC # BLD: 8.4 K/UL — SIGNIFICANT CHANGE UP (ref 4.8–10.8)
WBC # FLD AUTO: 8.4 K/UL — SIGNIFICANT CHANGE UP (ref 4.8–10.8)

## 2019-01-10 PROCEDURE — 99232 SBSQ HOSP IP/OBS MODERATE 35: CPT

## 2019-01-10 PROCEDURE — 99233 SBSQ HOSP IP/OBS HIGH 50: CPT

## 2019-01-10 RX ORDER — PIPERACILLIN AND TAZOBACTAM 4; .5 G/20ML; G/20ML
3.38 INJECTION, POWDER, LYOPHILIZED, FOR SOLUTION INTRAVENOUS EVERY 8 HOURS
Qty: 0 | Refills: 0 | Status: COMPLETED | OUTPATIENT
Start: 2019-01-10 | End: 2019-01-16

## 2019-01-10 RX ORDER — HYDROMORPHONE HYDROCHLORIDE 2 MG/ML
0.5 INJECTION INTRAMUSCULAR; INTRAVENOUS; SUBCUTANEOUS
Qty: 0 | Refills: 0 | Status: DISCONTINUED | OUTPATIENT
Start: 2019-01-10 | End: 2019-01-16

## 2019-01-10 RX ADMIN — HYDROMORPHONE HYDROCHLORIDE 1 MILLIGRAM(S): 2 INJECTION INTRAMUSCULAR; INTRAVENOUS; SUBCUTANEOUS at 05:00

## 2019-01-10 RX ADMIN — HYDROMORPHONE HYDROCHLORIDE 1 MILLIGRAM(S): 2 INJECTION INTRAMUSCULAR; INTRAVENOUS; SUBCUTANEOUS at 04:01

## 2019-01-10 RX ADMIN — Medication 64.8 MILLIGRAM(S): at 19:12

## 2019-01-10 RX ADMIN — Medication 400 MILLIGRAM(S): at 05:02

## 2019-01-10 RX ADMIN — Medication 0.12 MILLIGRAM(S): at 05:01

## 2019-01-10 RX ADMIN — Medication 64.8 MILLIGRAM(S): at 05:01

## 2019-01-10 RX ADMIN — Medication 400 MILLIGRAM(S): at 19:10

## 2019-01-10 RX ADMIN — SODIUM CHLORIDE 4 MILLILITER(S): 9 INJECTION INTRAMUSCULAR; INTRAVENOUS; SUBCUTANEOUS at 20:54

## 2019-01-10 RX ADMIN — Medication 81 MILLIGRAM(S): at 11:11

## 2019-01-10 RX ADMIN — LEVETIRACETAM 1000 MILLIGRAM(S): 250 TABLET, FILM COATED ORAL at 19:11

## 2019-01-10 RX ADMIN — Medication 1 TABLET(S): at 15:47

## 2019-01-10 RX ADMIN — LEVETIRACETAM 1000 MILLIGRAM(S): 250 TABLET, FILM COATED ORAL at 05:02

## 2019-01-10 RX ADMIN — FAMOTIDINE 20 MILLIGRAM(S): 10 INJECTION INTRAVENOUS at 05:02

## 2019-01-10 RX ADMIN — TAMSULOSIN HYDROCHLORIDE 0.4 MILLIGRAM(S): 0.4 CAPSULE ORAL at 23:17

## 2019-01-10 RX ADMIN — ENOXAPARIN SODIUM 40 MILLIGRAM(S): 100 INJECTION SUBCUTANEOUS at 11:10

## 2019-01-10 RX ADMIN — Medication 650 MILLIGRAM(S): at 06:00

## 2019-01-10 RX ADMIN — Medication 40 MILLIGRAM(S): at 05:01

## 2019-01-10 RX ADMIN — PIPERACILLIN AND TAZOBACTAM 25 GRAM(S): 4; .5 INJECTION, POWDER, LYOPHILIZED, FOR SOLUTION INTRAVENOUS at 13:20

## 2019-01-10 RX ADMIN — Medication 650 MILLIGRAM(S): at 05:01

## 2019-01-10 RX ADMIN — Medication 20 MILLIEQUIVALENT(S): at 11:10

## 2019-01-10 RX ADMIN — SODIUM CHLORIDE 4 MILLILITER(S): 9 INJECTION INTRAMUSCULAR; INTRAVENOUS; SUBCUTANEOUS at 08:34

## 2019-01-10 RX ADMIN — PIPERACILLIN AND TAZOBACTAM 25 GRAM(S): 4; .5 INJECTION, POWDER, LYOPHILIZED, FOR SOLUTION INTRAVENOUS at 23:00

## 2019-01-10 NOTE — PROGRESS NOTE ADULT - SUBJECTIVE AND OBJECTIVE BOX
[facility]  INFECTIOUS DISEASES AND INTERNAL MEDICINE at [place]  =======================================================  Renzo Salazar MD  Diplomates American Board of Internal Medicine and Infectious Diseases  =======================================================    [id number]  [facility] [place]   follow up for: sepsis  patient seen and examined.     remains on vent   CRE pseudomonas SS Levaquin in urine  sputum culture in process    blood culture reviewed negative so far.    ===================================================  REVIEW OF SYSTEMS:  as above  all other ROS negative    =======================================================  Allergies  clindamycin (Unknown)  Grapes (Rash)  Nuts (Hives; Rash)  PC Pen VK (Unknown)  shellfish (Angioedema; Rash; Hives)  strawberry (Short breath; Rash; Hives)  Xanax (Rash)  =======================================================  Antibiotics:  levoFLOXacin IVPB 750 milliGRAM(s) IV Intermittent every 24 hours  levoFLOXacin IVPB        Other medications:  aspirin  chewable 81 milliGRAM(s) Oral daily  digoxin     Tablet 0.125 milliGRAM(s) Oral daily  enoxaparin Injectable 40 milliGRAM(s) SubCutaneous daily  famotidine    Tablet 20 milliGRAM(s) Oral <User Schedule>  furosemide    Tablet 40 milliGRAM(s) Oral daily  levETIRAcetam  Solution 1000 milliGRAM(s) Oral two times a day  multivitamin 1 Tablet(s) Oral daily  PHENobarbital 64.8 milliGRAM(s) Oral two times a day  phenytoin   Suspension 400 milliGRAM(s) Oral every 12 hours  potassium chloride   Powder 20 milliEquivalent(s) Oral daily  sodium chloride 3%  Inhalation 4 milliLiter(s) Inhalation two times a day  tamsulosin 0.4 milliGRAM(s) Oral at bedtime    =======================================================    Physical Exam:  T(F): 100.6 (10 Hernan 2019 06:00), Max: 101.2 (09 Jan 2019 14:05)  HR: 79 (10 Hernan 2019 08:34)  BP: 143/69 (10 Hernan 2019 04:00)  RR: 34 (10 Hernan 2019 04:00)  SpO2: 100% (10 Hernan 2019 08:34) (94% - 100%)    General:  awake, not interactive,. on vent  Eye: Pupils are equal, round and reactive to light,   HENT: Normocephalic, tracheostomy in place  Neck: Supple,    Respiratory: Lungs are fair air entry anteriorly  Cardiovascular:  mild tachycardia  Gastrointestinal: + PEG tube Normal bowel sounds.  Genitourinary: + Baugh with yellow urine in tubing. No more sediment  Musculoskeletal: contracted upper and lower extremities  Integumentary: sacral decubitus present on admission  Neurologic: non verbal         =======================================================  Labs:                        9.0    8.4   )-----------( 299      ( 10 Hernan 2019 04:40 )             31.3     01-10    146<H>  |  110<H>  |  19.0  ----------------------------<  112  4.7   |  27.0  |  0.55    Ca    8.7      10 Hernan 2019 04:40  Phos  1.9     01-09  Mg     2.3     01-09          Culture - Sputum (collected 01-07-19 @ 18:20)  Source: .Sputum  Gram Stain (01-07-19 @ 19:29):    Moderate White blood cells    Few Gram Negative Rods    Few Gram Positive Cocci in Pairs and Chains    Culture - Urine (collected 01-07-19 @ 14:01)  Source: .Urine  Final Report (01-08-19 @ 09:23):    No growth    Culture - Sputum (collected 01-07-19 @ 07:30)  Source: .Sputum  Gram Stain (01-07-19 @ 15:35):    Numerous White blood cells    Moderate Gram Negative Rods    Few Gram positive cocci in pairs    Culture - Blood (collected 01-07-19 @ 05:46)  Source: .Blood    Culture - Blood (collected 01-05-19 @ 10:49)  Source: .Blood    Culture - Urine (collected 01-03-19 @ 20:12)  Source: .Urine  Final Report (01-06-19 @ 16:21):    10,000 - 49,000 CFU/mL Pseudomonas aeruginosa (Carbapenem Resistant)    TYPE: (C=Critical, N=Notification, A=Abnormal) c    TESTS:  _ mdro    DATE/TIME CALLED: _ 01/06/2019 16:13:29    CALLED TO: Amanda pulliam    READ BACK (2 Patient Identifiers)(Y/N): _ y    READ BACK VALUES (Y/N): _ y    CALLED BY: _ kb    send a copy to i.c and logistics  Organism: Pseudomonas aeruginosa (Carbapenem Resistant) (01-06-19 @ 16:21)  Organism: Pseudomonas aeruginosa (Carbapenem Resistant) (01-06-19 @ 16:21)    Sensitivities:      -  Amikacin: S <=16      -  Aztreonam: R >16      -  Cefepime: I 16      -  Ceftazidime: R >16      -  Ciprofloxacin: S <=1      -  Gentamicin: S <=4      -  Imipenem: R >8      -  Levofloxacin: S <=2      -  Meropenem: R >8      -  Piperacillin/Tazobactam: R >64      -  Tobramycin: S <=4      Method Type: ERNESTINE    Culture - Blood (collected 01-03-19 @ 18:47)  Source: .Blood  Organism: Beta Hemolytic Streptococci, Group G (01-06-19 @ 13:58)  Organism: Beta Hemolytic Streptococci, Group G (01-06-19 @ 13:58)    Sensitivities:      Method Type: ERNESTINE    Culture - Blood (collected 01-03-19 @ 18:46)  Source: .Blood  Organism: Beta Hemolytic Streptococci, Group G  Blood Culture PCR (01-06-19 @ 13:57)  Organism: Beta Hemolytic Streptococci, Group G (01-06-19 @ 13:57)    Sensitivities:      Method Type: ERNESTINE  Organism: Blood Culture PCR (01-04-19 @ 12:30)    Sensitivities:      -  Streptococcus sp. (Not Grp A, B or S pneumoniae): Detec      Method Type: PCR

## 2019-01-10 NOTE — PROGRESS NOTE ADULT - PROBLEM SELECTOR PLAN 2
Wound consult has been requested  Treat pain Tylenol ATC via peg  Dilaudid 0.5mg IVP before painful procedures

## 2019-01-10 NOTE — PROGRESS NOTE ADULT - ASSESSMENT
Hypernatremia - slowly improving 146 now  Has Sacral wound / Strept bacteremia/ UTI - on Levaquin  on lasix for edema, can increase if not better  d/w daughter

## 2019-01-10 NOTE — PROGRESS NOTE ADULT - ASSESSMENT
87yo debilitated Male Acute on Chronic Respiratory Failure  VENT DEPENDENT          Multifocal Infections

## 2019-01-10 NOTE — PROGRESS NOTE ADULT - SUBJECTIVE AND OBJECTIVE BOX
Patient seen and examined    I&O's Summary    09 Jan 2019 07:01  -  10 Hernan 2019 07:00  --------------------------------------------------------  IN: 1700 mL / OUT: 1475 mL / NET: 225 mL    10 Hernan 2019 07:01  -  10 Hernan 2019 21:49  --------------------------------------------------------  IN: 1465 mL / OUT: 675 mL / NET: 790 mL    Sl more interactive, smiled briefly  daughter present    REVIEW OF SYSTEMS:    CONSTITUTIONAL: No F/C  Vented via trach    Vital Signs Last 24 Hrs  T(C): 37.2 (10 Hernan 2019 18:00), Max: 38.1 (10 Hernan 2019 00:01)  T(F): 99 (10 Hernan 2019 18:00), Max: 100.6 (10 Hernan 2019 00:01)  HR: 74 (10 Hernan 2019 20:44) (71 - 85)  BP: 140/70 (10 Hernan 2019 16:00) (108/54 - 143/69)  BP(mean): 99 (10 Hernan 2019 16:00) (78 - 99)  RR: 33 (10 Hernan 2019 16:00) (26 - 34)  SpO2: 99% (10 Hernan 2019 20:44) (99% - 100%)    PHYSICAL EXAM:    GENERAL: NAD,   EYES:  conjunctiva and sclera clear  NECK: Supple, No JVD/Bruit  NERVOUS SYSTEM:  Lethargic, occ opens eyes,   CHEST:  No rales Few rhonchi Vented via trach  HEART:  RRR, No murmur  ABDOMEN: Soft, NT/ND BS+ Colostomy bag half full; PEG noted  EXTREMITIES:  1-2 + Edema; kaylee feet  SKIN: No rashes    LABS:                          9.0    8.4   )-----------( 299      ( 10 Hernan 2019 04:40 )             31.3     01-10    146<H>  |  110<H>  |  19.0  ----------------------------<  112  4.7   |  27.0  |  0.55    Ca    8.7      10 Hernan 2019 04:40  Phos  1.9     01-09  Mg     2.3     01-09        MEDICATIONS  (STANDING):  acetaminophen    Suspension .. PRN  aspirin  chewable  digoxin     Tablet  enoxaparin Injectable  famotidine    Tablet  furosemide    Tablet  HYDROmorphone  Injectable PRN  levETIRAcetam  Solution  levoFLOXacin IVPB  levoFLOXacin IVPB  LORazepam   Injectable PRN  multivitamin  phenytoin   Suspension  piperacillin/tazobactam IVPB.  potassium chloride   Powder  sodium chloride 3%  Inhalation  tamsulosin

## 2019-01-10 NOTE — PROGRESS NOTE ADULT - PROBLEM SELECTOR PLAN 3
Vancomycin/Zosyn IV Antibiotics  Last blood culture negative  Treat other sites of infection  Wound Crae

## 2019-01-10 NOTE — PROGRESS NOTE ADULT - ASSESSMENT
The patient is an 85 yo male, PMHx chronic trach/PEG secondary to pneumonia ~2 years ago complicated by multiple bouts of pneumonia, unable to liberate from vent due to secretions, PEG infection, MDR infections, seizure d/o, CHF, HTN, HLD, BPH with chronic jones, colostomy, who presented from [facility] 1/3/18 with respiratory distress and fever >102'F axillary. Admitted for sepsis secondary to Group beta hemolytic bacteremia secondary to stage 4 sacral ulcer and pseudomonas UTI. Was evaluated by ID, started on broad spectrum antibiotics and then transition to Levaquin Sacral wound evaluated by surgery and wound care. RRT called for acute on chronic hypoxic respiratory requiring vent support secondary to mucous plugging. Was transferred to the ICU.  Patient was transfused 3u PRBC today for acute anemia.    Assessment/Plan:    1. Sepsis secondary to sacral OM, Pseudomonas UTI and strep bacteremia:  repeat blood cultures negative  IV levaquin day 3  Persistent fever  Sputum culture with GNR  ID for further recommendations      2. Acute on chronic hypoxic respiratory failure: improving  Frequent suctioning    3. Hypernatremia: Improving  Free water via tube feed  Monitor bmp    4. Iron defiency anemia: Hb/hct stable  Status post 3 units prbc    5. Seizure disorder: Continue aeds      DVT ppx: Lovenox    Plan discussed with the patient's daughter at bedside. The patient is an 87 yo male, PMHx chronic trach/PEG secondary to pneumonia ~2 years ago complicated by multiple bouts of pneumonia, unable to liberate from vent due to secretions, PEG infection, MDR infections, seizure d/o, CHF, HTN, HLD, BPH with chronic jones, colostomy, who presented from [facility] 1/3/18 with respiratory distress and fever >102'F axillary. Admitted for sepsis secondary to Group beta hemolytic bacteremia secondary to stage 4 sacral ulcer and pseudomonas UTI. Was evaluated by ID, started on broad spectrum antibiotics and then transition to Levaquin Sacral wound evaluated by surgery and wound care. RRT called for acute on chronic hypoxic respiratory requiring vent support secondary to mucous plugging. Was transferred to the ICU.  Patient was transfused 3u PRBC today for acute anemia.    Assessment/Plan:    1. Sepsis secondary to sacral OM, Pseudomonas UTI and strep bacteremia:  repeat blood cultures negative  IV levaquin day 3  Persistent fever  Sputum culture with GNR  Persistent fever rectal temp of 101; spoke with ID recommend adding zosyn       2. Acute on chronic hypoxic respiratory failure: improving  Frequent suctioning    3. Hypernatremia: Improving  Free water via tube feed  Monitor bmp    4. Iron defiency anemia: Hb/hct stable  Status post 3 units prbc    5. Seizure disorder: Continue aeds      DVT ppx: Lovenox    Plan discussed with the patient's daughter at bedside.

## 2019-01-10 NOTE — PROGRESS NOTE ADULT - PROBLEM SELECTOR PLAN 1
Remains Vent dependent  Sputum culture (-) Rods- ID added Zosyn IV  Manage secretions  Use Dilaudid 0.5mg IV Q2h for Respiratory Distress/Pre-emptive pain relief

## 2019-01-10 NOTE — PROGRESS NOTE ADULT - SUBJECTIVE AND OBJECTIVE BOX
CC: Follow up for respiratory failure     INTERVAL HPI/OVERNIGHT EVENTS: Patient seen and examined, non verbal. On vent support FIo2 decreaed to 40%. Febrile tmax of 101.       Vital Signs Last 24 Hrs  T(C): 38.1 (10 Hernan 2019 06:00), Max: 38.4 (09 Jan 2019 14:05)  T(F): 100.6 (10 Hernan 2019 06:00), Max: 101.2 (09 Jan 2019 14:05)  HR: 79 (10 Hernan 2019 08:34) (56 - 85)  BP: 143/69 (10 Hernan 2019 04:00) (113/55 - 143/69)  BP(mean): 99 (10 Hernan 2019 04:00) (78 - 99)  RR: 34 (10 Hernan 2019 04:00) (21 - 34)  SpO2: 100% (10 Hernan 2019 08:34) (97% - 100%)    PHYSICAL EXAM:    GENERAL: NAD on vent support   NECK: + trach  CHEST/LUNG: Biltaral course breath sounds   CVS: Regular rate and rhythm; No murmurs, rubs, or gallops  ABDOMEN: Soft, Nontender, Nondistended; Bowel sounds present + peg  EXTREMITIES:  2+ Peripheral Pulses, No clubbing, cyanosis  Bilateral pedal edema  SKIN: Stage 4 sacral wound         MEDICATIONS  (STANDING):  aspirin  chewable 81 milliGRAM(s) Oral daily  digoxin     Tablet 0.125 milliGRAM(s) Oral daily  enoxaparin Injectable 40 milliGRAM(s) SubCutaneous daily  famotidine    Tablet 20 milliGRAM(s) Oral <User Schedule>        Tablet 40 milliGRAM(s) Oral daily  levETIRAcetam  Solution 1000 milliGRAM(s) Oral two times a day  levoFLOXacin IVPB 750 milliGRAM(s) IV Intermittent every 24 hours  levoFLOXacin IVPB      multivitamin 1 Tablet(s) Oral daily  PHENobarbital 64.8 milliGRAM(s) Oral two times a day  phenytoin   Suspension 400 milliGRAM(s) Oral every 12 hours  potassium chloride   Powder 20 milliEquivalent(s) Oral daily  sodium chloride 3%  Inhalation 4 milliLiter(s) Inhalation two times a day  tamsulosin 0.4 milliGRAM(s) Oral at bedtime    MEDICATIONS  (PRN):  acetaminophen    Suspension .. 650 milliGRAM(s) Oral every 6 hours PRN Temp greater or equal to 38C (100.4F)  HYDROmorphone  Injectable 1 milliGRAM(s) IV Push every 2 hours PRN resp distress  LORazepam   Injectable 0.5 milliGRAM(s) IV Push every 6 hours PRN Agitation      Allergies    clindamycin (Unknown)  Grapes (Rash)  Nuts (Hives; Rash)  PC Pen VK (Unknown)  shellfish (Angioedema; Rash; Hives)  strawberry (Short breath; Rash; Hives)  Xanax (Rash)    Intolerances    Ativan (Unknown)  Haldol (Dystonic RXN)  hydrALAZINE (Unknown)  Zyprexa (Unknown)        LABS:                          9.0    8.4   )-----------( 299      ( 10 Hernan 2019 04:40 )             31.3     01-10    146<H>  |  110<H>  |  19.0  ----------------------------<  112  4.7   |  27.0  |  0.55    Ca    8.7      10 Hernan 2019 04:40  Phos  1.9     01-09  Mg     2.3     01-09            RADIOLOGY & ADDITIONAL TESTS:

## 2019-01-10 NOTE — PROGRESS NOTE ADULT - PROBLEM SELECTOR PLAN 7
Met with daughter at bedside, lending her support,  discussing hospital course, her hopes and expectations.  MOLST Directives are in place consent by Legal Guardian  DNR/DNI

## 2019-01-10 NOTE — PROGRESS NOTE ADULT - SUBJECTIVE AND OBJECTIVE BOX
INTERVAL HPI/OVERNIGHT EVENTS: 87yo Male Patient PMH Dementia and Chronic Respiratory Failure-Tracheostomy and GTube placement for Dysphagia.  CAD, BPH, CHF, Stroke HTN,  Seizure   Vent dependent via tracheostomy transferred from SNF with fever, and in Respiratory distress. Large sacral Grade IV decubiti +OM. thought to  be site of origin for Bacteremia.    OVERNIGHT Events  More awake and alert today, daughter at bedside , encouraged by her father's ability to smile at her. Sputum culture Gm (-) Rods ID following Patient.  Continues to be Vent dependent Temp max 100.6 F    Not able to ROS due to Mental state and Nonverbal state    86y old  Male who presents with a chief complaint of respiratory distress. fever (10 Hernan 2019 11:21)    PAST MEDICAL & SURGICAL HISTORY:  Dysphagia  Fall: Multiple Mechanical falls  CAD (coronary artery disease)  Clostridium difficile diarrhea: in 2009  BPH (benign prostatic hypertrophy)  Dementia  HLD (hyperlipidemia)  CHF (congestive heart failure)  Prostate cancer: Treated w/ radiation  Stroke: (~5yrs ago)  Seizure: (last event &gt;1yr ago)  HTN (hypertension)  Colostomy in place  S/P percutaneous endoscopic gastrostomy (PEG) tube placement  H/O hemorrhoidectomy  Deviated septum  Abdominal hernia  Status post cardiac surgery: stents x 2    Present Symptoms:     Dyspnea: Vent dependent and Tachypnea   Nausea/Vomiting:  No  Anxiety:  No  Depression:  No  Fatigue: Yes   Loss of appetite: Yes NPO Dysphagia tube feedings resumed    Pain: Assume Pain is present when doing wound care/ T & P            Character-            Duration-            Effect-            Factors-            Frequency-            Location-            Severity-moderate    Review of Systems: Reviewed                     Unable to obtain due to poor mentation       MEDICATIONS  (STANDING):  aspirin  chewable 81 milliGRAM(s) Oral daily  digoxin     Tablet 0.125 milliGRAM(s) Oral daily  enoxaparin Injectable 40 milliGRAM(s) SubCutaneous daily  famotidine    Tablet 20 milliGRAM(s) Oral <User Schedule>  furosemide    Tablet 40 milliGRAM(s) Oral daily  levETIRAcetam  Solution 1000 milliGRAM(s) Oral two times a day  levoFLOXacin IVPB 750 milliGRAM(s) IV Intermittent every 24 hours  levoFLOXacin IVPB      multivitamin 1 Tablet(s) Oral daily  PHENobarbital 64.8 milliGRAM(s) Oral two times a day  phenytoin   Suspension 400 milliGRAM(s) Oral every 12 hours  piperacillin/tazobactam IVPB. 3.375 Gram(s) IV Intermittent every 8 hours  potassium chloride   Powder 20 milliEquivalent(s) Oral daily  sodium chloride 3%  Inhalation 4 milliLiter(s) Inhalation two times a day  tamsulosin 0.4 milliGRAM(s) Oral at bedtime    MEDICATIONS  (PRN):  acetaminophen    Suspension .. 650 milliGRAM(s) Oral every 6 hours PRN Temp greater or equal to 38C (100.4F)  HYDROmorphone  Injectable 1 milliGRAM(s) IV Push every 2 hours PRN resp distress  LORazepam   Injectable 0.5 milliGRAM(s) IV Push every 6 hours PRN Agitation      PHYSICAL EXAM:    Vital Signs Last 24 Hrs  T(C): 38.1 (10 Hernan 2019 06:00), Max: 38.1 (10 Hernan 2019 00:01)  T(F): 100.6 (10 Hernan 2019 06:00), Max: 100.6 (10 Hernan 2019 00:01)  HR: 79 (10 Hernan 2019 08:34) (56 - 85)  BP: 108/54 (10 Hernan 2019 08:00) (108/54 - 143/69)  BP(mean): 78 (10 Hernan 2019 08:00) (78 - 99)  RR: 27 (10 Hernan 2019 08:00) (21 - 34)  SpO2: 100% (10 Hernan 2019 08:34) (99% - 100%)    General: more alert  _ lethargic restless at times               nonverbal      HEENT: normal    tracheostomy    Lungs:  tachypnea/labored breathing  excessive secretions-Vent dependent    CV: normal      GI: softly  distended                PEG/ tube  constipation  last BM:     :  incontinent    MSK:   weakness  edema               bedbound/wheelchair bound    Skin: normal  pressure ulcers- Stage__IV SACRAL___      LABS:                        9.0    8.4   )-----------( 299      ( 10 Hernan 2019 04:40 )             31.3     01-10    146<H>  |  110<H>  |  19.0  ----------------------------<  112  4.7   |  27.0  |  0.55    Ca    8.7      10 Hernan 2019 04:40  Phos  1.9     01-09  Mg     2.3     01-09    I&O's Summary    09 Jan 2019 07:01  -  10 Hernan 2019 07:00  --------------------------------------------------------  IN: 1700 mL / OUT: 1475 mL / NET: 225 mL    10 Hernan 2019 07:01  -  10 Hernan 2019 17:06  --------------------------------------------------------  IN: 675 mL / OUT: 275 mL / NET: 400 mL    RADIOLOGY & ADDITIONAL STUDIES:  < from: Xray Chest 1 View- PORTABLE-Routine (01.09.19 @ 15:16) >  IMPRESSION: There are interstitial and airspace opacities identified   bilaterally, increased from prior examination. Clinical correlation and   follow-up suggested.    ADVANCE DIRECTIVES:   DNR YES   Completed on:                     JAQUELINE  YES    Completed on: this admission with Legal guardian  Living Will   NO   Completed on:

## 2019-01-11 LAB
ANION GAP SERPL CALC-SCNC: 11 MMOL/L — SIGNIFICANT CHANGE UP (ref 5–17)
BUN SERPL-MCNC: 19 MG/DL — SIGNIFICANT CHANGE UP (ref 8–20)
CALCIUM SERPL-MCNC: 8.9 MG/DL — SIGNIFICANT CHANGE UP (ref 8.6–10.2)
CHLORIDE SERPL-SCNC: 107 MMOL/L — SIGNIFICANT CHANGE UP (ref 98–107)
CO2 SERPL-SCNC: 26 MMOL/L — SIGNIFICANT CHANGE UP (ref 22–29)
CREAT SERPL-MCNC: 0.59 MG/DL — SIGNIFICANT CHANGE UP (ref 0.5–1.3)
CULTURE RESULTS: SIGNIFICANT CHANGE UP
CULTURE RESULTS: SIGNIFICANT CHANGE UP
GLUCOSE SERPL-MCNC: 137 MG/DL — HIGH (ref 70–115)
ORGANISM # SPEC MICROSCOPIC CNT: SIGNIFICANT CHANGE UP
ORGANISM # SPEC MICROSCOPIC CNT: SIGNIFICANT CHANGE UP
POTASSIUM SERPL-MCNC: 5.3 MMOL/L — SIGNIFICANT CHANGE UP (ref 3.5–5.3)
POTASSIUM SERPL-SCNC: 5.3 MMOL/L — SIGNIFICANT CHANGE UP (ref 3.5–5.3)
SODIUM SERPL-SCNC: 144 MMOL/L — SIGNIFICANT CHANGE UP (ref 135–145)
SPECIMEN SOURCE: SIGNIFICANT CHANGE UP

## 2019-01-11 PROCEDURE — 99232 SBSQ HOSP IP/OBS MODERATE 35: CPT

## 2019-01-11 PROCEDURE — 99233 SBSQ HOSP IP/OBS HIGH 50: CPT

## 2019-01-11 RX ADMIN — Medication 400 MILLIGRAM(S): at 05:50

## 2019-01-11 RX ADMIN — PIPERACILLIN AND TAZOBACTAM 25 GRAM(S): 4; .5 INJECTION, POWDER, LYOPHILIZED, FOR SOLUTION INTRAVENOUS at 14:10

## 2019-01-11 RX ADMIN — LEVETIRACETAM 1000 MILLIGRAM(S): 250 TABLET, FILM COATED ORAL at 05:52

## 2019-01-11 RX ADMIN — HYDROMORPHONE HYDROCHLORIDE 0.5 MILLIGRAM(S): 2 INJECTION INTRAMUSCULAR; INTRAVENOUS; SUBCUTANEOUS at 01:04

## 2019-01-11 RX ADMIN — HYDROMORPHONE HYDROCHLORIDE 0.5 MILLIGRAM(S): 2 INJECTION INTRAMUSCULAR; INTRAVENOUS; SUBCUTANEOUS at 05:49

## 2019-01-11 RX ADMIN — PIPERACILLIN AND TAZOBACTAM 25 GRAM(S): 4; .5 INJECTION, POWDER, LYOPHILIZED, FOR SOLUTION INTRAVENOUS at 21:30

## 2019-01-11 RX ADMIN — TAMSULOSIN HYDROCHLORIDE 0.4 MILLIGRAM(S): 0.4 CAPSULE ORAL at 21:31

## 2019-01-11 RX ADMIN — Medication 40 MILLIGRAM(S): at 05:50

## 2019-01-11 RX ADMIN — FAMOTIDINE 20 MILLIGRAM(S): 10 INJECTION INTRAVENOUS at 05:50

## 2019-01-11 RX ADMIN — SODIUM CHLORIDE 4 MILLILITER(S): 9 INJECTION INTRAMUSCULAR; INTRAVENOUS; SUBCUTANEOUS at 20:00

## 2019-01-11 RX ADMIN — LEVETIRACETAM 1000 MILLIGRAM(S): 250 TABLET, FILM COATED ORAL at 17:52

## 2019-01-11 RX ADMIN — HYDROMORPHONE HYDROCHLORIDE 0.5 MILLIGRAM(S): 2 INJECTION INTRAMUSCULAR; INTRAVENOUS; SUBCUTANEOUS at 06:19

## 2019-01-11 RX ADMIN — SODIUM CHLORIDE 4 MILLILITER(S): 9 INJECTION INTRAMUSCULAR; INTRAVENOUS; SUBCUTANEOUS at 08:08

## 2019-01-11 RX ADMIN — HYDROMORPHONE HYDROCHLORIDE 0.5 MILLIGRAM(S): 2 INJECTION INTRAMUSCULAR; INTRAVENOUS; SUBCUTANEOUS at 00:01

## 2019-01-11 RX ADMIN — Medication 0.12 MILLIGRAM(S): at 05:49

## 2019-01-11 RX ADMIN — Medication 81 MILLIGRAM(S): at 12:27

## 2019-01-11 RX ADMIN — PIPERACILLIN AND TAZOBACTAM 25 GRAM(S): 4; .5 INJECTION, POWDER, LYOPHILIZED, FOR SOLUTION INTRAVENOUS at 05:49

## 2019-01-11 RX ADMIN — Medication 1 TABLET(S): at 12:26

## 2019-01-11 RX ADMIN — Medication 400 MILLIGRAM(S): at 17:53

## 2019-01-11 RX ADMIN — ENOXAPARIN SODIUM 40 MILLIGRAM(S): 100 INJECTION SUBCUTANEOUS at 12:27

## 2019-01-11 NOTE — PROGRESS NOTE ADULT - ASSESSMENT
The patient is an 85 yo male, PMHx chronic trach/PEG secondary to pneumonia ~2 years ago complicated by multiple bouts of pneumonia, unable to liberate from vent due to secretions, PEG infection, MDR infections, seizure d/o, CHF, HTN, HLD, BPH with chronic jones, colostomy, who [facility] with respiratory distress and fever >102'F axillary. Admitted for sepsis secondary to Group beta hemolytic bacteremia secondary to stage 4 sacral ulcer and pseudomonas UTI. Was evaluated by ID, started on broad spectrum antibiotics and then transition to Levaquin Sacral wound evaluated by surgery and wound care. RRT called for acute on chronic hypoxic respiratory requiring vent support secondary to mucous plugging. Was transferred to the ICU.  Patient was transfused 3u PRBC today for acute anemia.    Assessment/Plan:    1. Sepsis secondary to sacral OM, Pseudomonas UTI and strep bacteremia:  repeat blood cultures negative  IV levaquin day 4  Persistent fever  Sputum culture with GNR   zosyn day 2      2. Acute on chronic hypoxic respiratory failure: improving  Frequent suctioning    3. Hypernatremia: Improving  Free water via tube feed  Monitor bmp    4. Iron defiency anemia: Hb/hct stable  Status post 3 units prbc    5. Seizure disorder: Continue aeds      DVT ppx: Lovenox

## 2019-01-11 NOTE — PROGRESS NOTE ADULT - SUBJECTIVE AND OBJECTIVE BOX
[facility]  INFECTIOUS DISEASES AND INTERNAL MEDICINE at [place]  =======================================================  Renzo Salazar MD  Diplomates American Board of Internal Medicine and Infectious Diseases  =======================================================    [id number]  [place]   follow up for: sepsis  patient seen and examined.     remains on vent  still with intermittent fevers.    ===================================================  REVIEW OF SYSTEMS:  as above  all other ROS negative    =======================================================  Allergies  clindamycin (Unknown)  Grapes (Rash)  Nuts (Hives; Rash)  PC Pen VK (Unknown)  shellfish (Angioedema; Rash; Hives)  strawberry (Short breath; Rash; Hives)  Xanax (Rash)  =======================================================  Antibiotics:  levoFLOXacin IVPB 750 milliGRAM(s) IV Intermittent every 24 hours  levoFLOXacin IVPB      piperacillin/tazobactam IVPB. 3.375 Gram(s) IV Intermittent every 8 hours    Other medications:  aspirin  chewable 81 milliGRAM(s) Oral daily  digoxin     Tablet 0.125 milliGRAM(s) Oral daily  enoxaparin Injectable 40 milliGRAM(s) SubCutaneous daily  famotidine    Tablet 20 milliGRAM(s) Oral <User Schedule>  furosemide    Tablet 40 milliGRAM(s) Oral daily  levETIRAcetam  Solution 1000 milliGRAM(s) Oral two times a day  multivitamin 1 Tablet(s) Oral daily  phenytoin   Suspension 400 milliGRAM(s) Oral every 12 hours  sodium chloride 3%  Inhalation 4 milliLiter(s) Inhalation two times a day  tamsulosin 0.4 milliGRAM(s) Oral at bedtime    =======================================================    Physical Exam:  T(F): 100 (11 Jan 2019 00:20), Max: 101.2 (09 Jan 2019 14:05)  HR: 69 (11 Jan 2019 12:28)  BP: 121/63 (11 Jan 2019 04:00)  RR: 31 (11 Jan 2019 04:00)  SpO2: 100% (11 Jan 2019 12:28) (97% - 100%)    General:  awake, not interactive,. on vent  Eye: Pupils are equal, round and reactive to light,   HENT: Normocephalic, tracheostomy in place  Neck: Supple,    Respiratory: Lungs are fair air entry anteriorly  Cardiovascular:  mild tachycardia  Gastrointestinal: + PEG tube Normal bowel sounds.  Genitourinary: + Baugh with yellow urine in tubing. No more sediment  Musculoskeletal: contracted upper and lower extremities  Integumentary: sacral decubitus present on admission  Neurologic: non verbal      =======================================================    Labs:                        9.0    8.4   )-----------( 299      ( 10 Hernan 2019 04:40 )             31.3     01-11    144  |  107  |  19.0  ----------------------------<  137<H>  5.3   |  26.0  |  0.59    Ca    8.9      11 Jan 2019 03:04          Culture - Sputum (collected 01-07-19 @ 18:20)  Source: .Sputum  Gram Stain (01-07-19 @ 19:29):    Moderate White blood cells    Few Gram Negative Rods    Few Gram Positive Cocci in Pairs and Chains  Final Report (01-11-19 @ 11:15):    Moderate Acinetobacter baumannii/haemolyticus (Carbapenem Resistant)    ***KNOWN***    Moderate Pseudomonas aeruginosa (Carbapenem Resistant)  Organism: Acinetobacter baumannii/haemolyticus (Carbapenem Resistant)  Pseudomonas aeruginosa (01-10-19 @ 17:52)  Organism: Pseudomonas aeruginosa (01-10-19 @ 17:52)    Sensitivities:      -  Amikacin: S <=16      -  Aztreonam: S 8      -  Cefepime: S <=4      -  Ceftazidime: S 4      -  Ciprofloxacin: S <=1      -  Gentamicin: I 8      -  Imipenem: R >8      -  Levofloxacin: S <=2      -  Meropenem: I 4      -  Piperacillin/Tazobactam: S <=16      -  Tobramycin: S <=4      Method Type: ERNESTINE  Organism: Acinetobacter baumannii/haemolyticus (Carbapenem Resistant) (01-10-19 @ 17:52)    Sensitivities:      -  Amikacin: R >32      -  Ampicillin/Sulbactam: S <=8/4      -  Cefepime: R >16      -  Ceftazidime: R >16      -  Ciprofloxacin: R >2      -  Gentamicin: R >8      -  Levofloxacin: R >4      -  Meropenem: I 8      -  Tobramycin: I 8      -  Trimethoprim/Sulfamethoxazole: R >2/38      Method Type: ERNESTINE    Culture - Urine (collected 01-07-19 @ 14:01)  Source: .Urine  Final Report (01-08-19 @ 09:23):    No growth    Culture - Sputum (collected 01-07-19 @ 07:30)  Source: .Sputum  Gram Stain (01-07-19 @ 15:35):    Numerous White blood cells    Moderate Gram Negative Rods    Few Gram positive cocci in pairs  Final Report (01-10-19 @ 12:01):    Moderate Acinetobacter baumannii (Carbapenem Resistant)    Moderate Stenotrophomonas maltophilia    Moderate Pseudomonas aeruginosa (Carbapenem Resistant)    Moderate Proteus mirabilis    Few Routine respiratory bonnie present    .    TYPE: (C=Critical, N=Notification, A=Abnormal) C    TESTS:  _ Acinetobacter MDRO    DATE/TIME CALLED: _ 01/10/2019 12:00:51    CALLED TO: _ Frank Arriaga RN    READ BACK (2 Patient Identifiers)(Y/N): _ Y    READ BACK VALUES (Y/N): _ Y    CALLED BY: _ [email or web address]  Organism: Stenotrophomonas maltophilia  Proteus mirabilis  Acinetobacter baumannii/haemolyticus (Carbapenem Resistant)  Pseudomonas aeruginosa (Carbapenem Resistant) (01-10-19 @ 12:01)  Organism: Pseudomonas aeruginosa (Carbapenem Resistant) (01-10-19 @ 12:01)    Sensitivities:      -  Amikacin: S <=16      -  Aztreonam: R >16      -  Cefepime: S 8      -  Ceftazidime: S 4      -  Ciprofloxacin: S <=1      -  Gentamicin: S <=4      -  Imipenem: R >8      -  Levofloxacin: S <=2      -  Meropenem: R >8      -  Piperacillin/Tazobactam: S <=16      -  Tobramycin: S <=4      Method Type: ERNESTINE  Organism: Acinetobacter baumannii/haemolyticus (Carbapenem Resistant) (01-10-19 @ 12:01)    Sensitivities:      -  Amikacin: R >32      -  Ampicillin/Sulbactam: S <=8/4      -  Cefepime: R >16      -  Ceftazidime: R >16      -  Ciprofloxacin: R >2      -  Gentamicin: R >8      -  Levofloxacin: R >4      -  Meropenem: R >8      -  Tobramycin: R >8      -  Trimethoprim/Sulfamethoxazole: R >2/38      Method Type: ERNESTINE  Organism: Proteus mirabilis (01-10-19 @ 12:01)    Sensitivities:      -  Amikacin: S <=16      -  Ampicillin: S <=8 These ampicillin results predict results for amoxicillin      -  Ampicillin/Sulbactam: S <=8/4      -  Aztreonam: S <=4      -  Cefazolin: S <=8      -  Cefepime: S <=4      -  Cefoxitin: S <=8      -  Ceftriaxone: S <=1 Enterobacter, Citrobacter, and Serratia may develop resistance during prolonged therapy      -  Ciprofloxacin: S <=1      -  Ertapenem: S <=1      -  Gentamicin: S <=4      -  Levofloxacin: S <=2      -  Meropenem: S <=1      -  Piperacillin/Tazobactam: S <=16      -  Tobramycin: S <=4      -  Trimethoprim/Sulfamethoxazole: S <=2/38      Method Type: ERNESTINE  Organism: Stenotrophomonas maltophilia (01-10-19 @ 12:01)    Sensitivities:      -  Levofloxacin: S <=2      -  Trimethoprim/Sulfamethoxazole: S <=2/38      Method Type: ERNESTINE    Culture - Blood (collected 01-07-19 @ 05:46)  Source: .Blood    Culture - Blood (collected 01-05-19 @ 10:49)  Source: .Blood  Final Report (01-10-19 @ 11:01):    No growth at 5 days.    Culture - Urine (collected 01-03-19 @ 20:12)  Source: .Urine  Final Report (01-06-19 @ 16:21):    10,000 - 49,000 CFU/mL Pseudomonas aeruginosa (Carbapenem Resistant)    TYPE: (C=Critical, N=Notification, A=Abnormal) c    TESTS:  _ mdro    DATE/TIME CALLED: _ 01/06/2019 16:13:29    CALLED TO: Amanda pulliam    READ BACK (2 Patient Identifiers)(Y/N): _ y    READ BACK VALUES (Y/N): _ y    CALLED BY: Amanda aggarwal    send a copy to i[employer] and logistics  Organism: Pseudomonas aeruginosa (Carbapenem Resistant) (01-06-19 @ 16:21)  Organism: Pseudomonas aeruginosa (Carbapenem Resistant) (01-06-19 @ 16:21)    Sensitivities:      -  Amikacin: S <=16      -  Aztreonam: R >16      -  Cefepime: I 16      -  Ceftazidime: R >16      -  Ciprofloxacin: S <=1      -  Gentamicin: S <=4      -  Imipenem: R >8      -  Levofloxacin: S <=2      -  Meropenem: R >8      -  Piperacillin/Tazobactam: R >64      -  Tobramycin: S <=4      Method Type: ERNESTINE    Culture - Blood (collected 01-03-19 @ 18:47)  Source: .Blood  Final Report (01-11-19 @ 11:40):    Growth in anaerobic bottle: Beta Hemolytic Streptococci, Group G    Anaerobic Bottle: 10:36 Hours to positivity    Aerobic Bottle: No growth at 5 days.    .    TYPE: (C=Critical, N=Notification, A=Abnormal) C    TESTS:  _ Positive Blood GS    DATE/TIME CALLED:_ 01/04/2019 12:01    CALLED TO: _ ERHR: Andrzej Duffy RN    READ BACK (2 Patient Identifiers)(Y/N): _ Y    READ BACK VALUES (Y/N): _ Y    CALLED BY: Amanda carpenter  Organism: Beta Hemolytic Streptococci, Group G (01-11-19 @ 11:40)  Organism: Beta Hemolytic Streptococci, Group G (01-11-19 @ 11:40)    Sensitivities:      Method Type: ERNESTINE    Culture - Blood (collected 01-03-19 @ 18:46)  Source: .Blood  Organism: Beta Hemolytic Streptococci, Group G  Blood Culture PCR (01-06-19 @ 13:57)  Organism: Beta Hemolytic Streptococci, Group G (01-06-19 @ 13:57)    Sensitivities:      Method Type: ERNESTINE  Organism: Blood Culture PCR (01-04-19 @ 12:30)    Sensitivities:      -  Streptococcus sp. (Not Grp A, B or S pneumoniae): Detec      Method Type: PCR

## 2019-01-11 NOTE — PROGRESS NOTE ADULT - ATTENDING COMMENTS
COUNSELING:    Face to face meeting to discuss Advanced Care Planning - Time Spent ______ Minutes.  See goals of care note.    More than 50% time spent in counseling and coordinating care. _35___ Minutes.     Thank you for the opportunity to assist with the care of this patient.   [facility] [phone].
We will sign off call if needed.
discussed with daughter present bedside  left message for emergency contact [place], Van - [phone]
COUNSELING:    Face to face meeting to discuss Advanced Care Planning - Time Spent ______ Minutes.  See goals of care note.    More than 50% time spent in counseling and coordinating care. __20____ Minutes.     Thank you for the opportunity to assist with the care of this patient.   [facility] [phone].
Discussed with pt's daughter in details. As per her the father is a full code.

## 2019-01-11 NOTE — CHART NOTE - NSCHARTNOTEFT_GEN_A_CORE
87yo M with chronic Respiratory Failure; Tracheostomy and Peg tube for dysphagia; treating for fevers, Sepsis. ID on board managing infection  Vent dependent Nonverbal, large Stage IV sacral ulcer OM may be origin of sepsis. Palliative Team will be signing off this case, as we have   addressed Advance Directives made recommendations for pre-emptive pain management. Plan is to return to LTC facility when medically stable.  Please reconsult our service if any change in Goals of Care, or need for End of Life support.  Morena Wilson NP

## 2019-01-11 NOTE — PROGRESS NOTE ADULT - SUBJECTIVE AND OBJECTIVE BOX
CC: Follow up fever    INTERVAL HPI/OVERNIGHT EVENTS:  Patient seen and examined, non verbal. Vent on 40%fio2. tmax 100.     Vital Signs Last 24 Hrs  T(C): 37.8 (11 Jan 2019 00:20), Max: 37.8 (11 Jan 2019 00:20)  T(F): 100 (11 Jan 2019 00:20), Max: 100 (11 Jan 2019 00:20)  HR: 79 (11 Jan 2019 08:08) (64 - 85)  BP: 121/63 (11 Jan 2019 04:00) (121/63 - 140/70)  BP(mean): 86 (11 Jan 2019 04:00) (86 - 99)  RR: 31 (11 Jan 2019 04:00) (31 - 39)  SpO2: 100% (11 Jan 2019 08:08) (99% - 100%)    PHYSICAL EXAM:    GENERAL: NAD on vent support   NECK: + trach  CHEST/LUNG: Biltaral course breath sounds   CVS: Regular rate and rhythm; No murmurs, rubs, or gallops  ABDOMEN: Soft, Nontender, Nondistended; Bowel sounds present + peg  EXTREMITIES:  2+ Peripheral Pulses, No clubbing, cyanosis  Bilateral pedal edema  SKIN: Stage 4 sacral wound         MEDICATIONS  (STANDING):  aspirin  chewable 81 milliGRAM(s) Oral daily  digoxin     Tablet 0.125 milliGRAM(s) Oral daily  enoxaparin Injectable 40 milliGRAM(s) SubCutaneous daily  famotidine    Tablet 20 milliGRAM(s) Oral <User Schedule>  furosemide    Tablet 40 milliGRAM(s) Oral daily  levETIRAcetam  Solution 1000 milliGRAM(s) Oral two times a day  levoFLOXacin IVPB 750 milliGRAM(s) IV Intermittent every 24 hours  levoFLOXacin IVPB      multivitamin 1 Tablet(s) Oral daily  phenytoin   Suspension 400 milliGRAM(s) Oral every 12 hours  piperacillin/tazobactam IVPB. 3.375 Gram(s) IV Intermittent every 8 hours  sodium chloride 3%  Inhalation 4 milliLiter(s) Inhalation two times a day  tamsulosin 0.4 milliGRAM(s) Oral at bedtime    MEDICATIONS  (PRN):  acetaminophen    Suspension .. 650 milliGRAM(s) Oral every 6 hours PRN Temp greater or equal to 38C (100.4F)  HYDROmorphone  Injectable 0.5 milliGRAM(s) IV Push every 2 hours PRN Dyspnea/ Pre-emptive analgesia for wound care-painful procedures  LORazepam   Injectable 0.5 milliGRAM(s) IV Push every 6 hours PRN Agitation      Allergies    clindamycin (Unknown)  Grapes (Rash)  Nuts (Hives; Rash)  PC Pen VK (Unknown)  shellfish (Angioedema; Rash; Hives)  strawberry (Short breath; Rash; Hives)  Xanax (Rash)    Intolerances    Ativan (Unknown)  Haldol (Dystonic RXN)  hydrALAZINE (Unknown)  Zyprexa (Unknown)        LABS:                          9.0    8.4   )-----------( 299      ( 10 Hernan 2019 04:40 )             31.3     01-11    144  |  107  |  19.0  ----------------------------<  137<H>  5.3   |  26.0  |  0.59    Ca    8.9      11 Jan 2019 03:04            RADIOLOGY & ADDITIONAL TESTS:

## 2019-01-11 NOTE — PROGRESS NOTE ADULT - ASSESSMENT
This 85 y/o male with dementia, h/o CAD s/p 2 stents, CHF, respiratory failure on chronic vent via tracheostomy, sacral decubitus, h/o sacral osteomyelitis     here for fevers  - strep bacteremia; source likely from sacral wound  - repeat blood cultures negative  - possible UTI with urine + for CRE pseudomonas  - continue LEVAQUIN to cover all organisms identified so far      recurrent fevers on 1/10/19  - added Zosyn to regimen  - follow up on culture data  - if still with fevers, may need to broaden with addition of Vancomycin    prognosis guarded.

## 2019-01-11 NOTE — PROGRESS NOTE ADULT - SUBJECTIVE AND OBJECTIVE BOX
Patient seen and examined, in bed on vent.    I&O's Summary    09 Jan 2019 07:01  -  10 Hernan 2019 07:00  --------------------------------------------------------  IN: 1700 mL / OUT: 1475 mL / NET: 225 mL    10 Hernan 2019 07:01  -  10 Hernan 2019 21:49  --------------------------------------------------------  IN: 1465 mL / OUT: 675 mL / NET: 790 mL          CONSTITUTIONAL: No F/C  Vented via trach    Vital Signs Last 24 HrsICU     T(C): 37.8 (11 Jan 2019 00:20), Max: 37.8 (11 Jan 2019 00:20)  T(F): 100 (11 Jan 2019 00:20), Max: 100 (11 Jan 2019 00:20)  HR: 79 (11 Jan 2019 08:08) (64 - 85)  BP: 121/63 (11 Jan 2019 04:00) (121/61 - 140/70)  BP(mean): 86 (11 Jan 2019 04:00) (85 - 99)  ABP: --  ABP(mean): --  RR: 31 (11 Jan 2019 04:00) (31 - 39)  SpO2: 100% (11 Jan 2019 08:08) (99% - 100%)    T(C): 37.2 (10 Hernan 2019 18:00), Max: 38.1 (10 Hernan 2019 00:01)  T(F): 99 (10 Hernan 2019 18:00), Max: 100.6 (10 Hernan 2019 00:01)  HR: 74 (10 Hernan 2019 20:44) (71 - 85)  BP: 140/70 (10 Hernan 2019 16:00) (108/54 - 143/69)  BP(mean): 99 (10 Hernan 2019 16:00) (78 - 99)  RR: 33 (10 Hernan 2019 16:00) (26 - 34)  SpO2: 99% (10 Hernan 2019 20:44) (99% - 100%)    PHYSICAL EXAM:    GENERAL: NAD,   EYES:  conjunctiva and sclera clear  NECK: Supple, No JVD/Bruit  NERVOUS SYSTEM:  Same,   CHEST:  No rales Few rhonchi Vented via trach  HEART:  RRR, No rub noted  ABDOMEN: Soft, NT/ND BS+ Colostomy bag half full; PEG noted  EXTREMITIES:  1-2 + Edema; kaylee feet  SKIN: No rashes    LABS:    01-11    144  |  107  |  19.0  ----------------------------<  137<H>  5.3   |  26.0  |  0.59    Ca    8.9      11 Jan 2019 03:04                            9.0    8.4   )-----------( 299      ( 10 Hernan 2019 04:40 )             31.3     01-10    146<H>  |  110<H>  |  19.0  ----------------------------<  112  4.7   |  27.0  |  0.55    Ca    8.7      10 Hernan 2019 04:40  Phos  1.9     01-09  Mg     2.3     01-09        MEDICATIONS  (STANDING):  acetaminophen    Suspension .. PRN  aspirin  chewable  digoxin     Tablet  enoxaparin Injectable  famotidine    Tablet  furosemide    Tablet  HYDROmorphone  Injectable PRN  levETIRAcetam  Solution  levoFLOXacin IVPB  levoFLOXacin IVPB  LORazepam   Injectable PRN  multivitamin  phenytoin   Suspension  piperacillin/tazobactam IVPB.  potassium chloride   Powder  sodium chloride 3%  Inhalation  tamsulosin

## 2019-01-12 LAB
ANION GAP SERPL CALC-SCNC: 13 MMOL/L — SIGNIFICANT CHANGE UP (ref 5–17)
BUN SERPL-MCNC: 21 MG/DL — HIGH (ref 8–20)
CALCIUM SERPL-MCNC: 9.5 MG/DL — SIGNIFICANT CHANGE UP (ref 8.6–10.2)
CHLORIDE SERPL-SCNC: 108 MMOL/L — HIGH (ref 98–107)
CO2 SERPL-SCNC: 26 MMOL/L — SIGNIFICANT CHANGE UP (ref 22–29)
CREAT SERPL-MCNC: 0.57 MG/DL — SIGNIFICANT CHANGE UP (ref 0.5–1.3)
CULTURE RESULTS: SIGNIFICANT CHANGE UP
CULTURE RESULTS: SIGNIFICANT CHANGE UP
GLUCOSE SERPL-MCNC: 119 MG/DL — HIGH (ref 70–115)
ORGANISM # SPEC MICROSCOPIC CNT: SIGNIFICANT CHANGE UP
POTASSIUM SERPL-MCNC: 4.6 MMOL/L — SIGNIFICANT CHANGE UP (ref 3.5–5.3)
POTASSIUM SERPL-SCNC: 4.6 MMOL/L — SIGNIFICANT CHANGE UP (ref 3.5–5.3)
SODIUM SERPL-SCNC: 147 MMOL/L — HIGH (ref 135–145)
SPECIMEN SOURCE: SIGNIFICANT CHANGE UP
SPECIMEN SOURCE: SIGNIFICANT CHANGE UP

## 2019-01-12 PROCEDURE — 99232 SBSQ HOSP IP/OBS MODERATE 35: CPT

## 2019-01-12 RX ORDER — LABETALOL HCL 100 MG
100 TABLET ORAL THREE TIMES A DAY
Qty: 0 | Refills: 0 | Status: DISCONTINUED | OUTPATIENT
Start: 2019-01-12 | End: 2019-01-29

## 2019-01-12 RX ADMIN — HYDROMORPHONE HYDROCHLORIDE 0.5 MILLIGRAM(S): 2 INJECTION INTRAMUSCULAR; INTRAVENOUS; SUBCUTANEOUS at 23:31

## 2019-01-12 RX ADMIN — Medication 81 MILLIGRAM(S): at 16:48

## 2019-01-12 RX ADMIN — Medication 1 TABLET(S): at 16:49

## 2019-01-12 RX ADMIN — Medication 400 MILLIGRAM(S): at 05:03

## 2019-01-12 RX ADMIN — ENOXAPARIN SODIUM 40 MILLIGRAM(S): 100 INJECTION SUBCUTANEOUS at 13:48

## 2019-01-12 RX ADMIN — Medication 100 MILLIGRAM(S): at 22:17

## 2019-01-12 RX ADMIN — LEVETIRACETAM 1000 MILLIGRAM(S): 250 TABLET, FILM COATED ORAL at 17:05

## 2019-01-12 RX ADMIN — Medication 0.12 MILLIGRAM(S): at 05:01

## 2019-01-12 RX ADMIN — FAMOTIDINE 20 MILLIGRAM(S): 10 INJECTION INTRAVENOUS at 05:02

## 2019-01-12 RX ADMIN — Medication 40 MILLIGRAM(S): at 05:01

## 2019-01-12 RX ADMIN — PIPERACILLIN AND TAZOBACTAM 25 GRAM(S): 4; .5 INJECTION, POWDER, LYOPHILIZED, FOR SOLUTION INTRAVENOUS at 05:03

## 2019-01-12 RX ADMIN — PIPERACILLIN AND TAZOBACTAM 25 GRAM(S): 4; .5 INJECTION, POWDER, LYOPHILIZED, FOR SOLUTION INTRAVENOUS at 22:18

## 2019-01-12 RX ADMIN — Medication 400 MILLIGRAM(S): at 17:05

## 2019-01-12 RX ADMIN — Medication 100 MILLIGRAM(S): at 16:49

## 2019-01-12 RX ADMIN — LEVETIRACETAM 1000 MILLIGRAM(S): 250 TABLET, FILM COATED ORAL at 05:02

## 2019-01-12 RX ADMIN — SODIUM CHLORIDE 4 MILLILITER(S): 9 INJECTION INTRAMUSCULAR; INTRAVENOUS; SUBCUTANEOUS at 08:46

## 2019-01-12 RX ADMIN — PIPERACILLIN AND TAZOBACTAM 25 GRAM(S): 4; .5 INJECTION, POWDER, LYOPHILIZED, FOR SOLUTION INTRAVENOUS at 14:55

## 2019-01-12 RX ADMIN — SODIUM CHLORIDE 4 MILLILITER(S): 9 INJECTION INTRAMUSCULAR; INTRAVENOUS; SUBCUTANEOUS at 20:44

## 2019-01-12 RX ADMIN — TAMSULOSIN HYDROCHLORIDE 0.4 MILLIGRAM(S): 0.4 CAPSULE ORAL at 22:17

## 2019-01-12 NOTE — PROGRESS NOTE ADULT - ASSESSMENT
The patient is an 87 yo male, PMHx chronic trach/PEG secondary to pneumonia ~2 years ago complicated by multiple bouts of pneumonia, unable to liberate from vent due to secretions, PEG infection, MDR infections, seizure d/o, CHF, HTN, HLD, BPH with chronic jones, colostomy, who [facility] 1/3/18 with respiratory distress and fever >102'F axillary. Admitted for sepsis secondary to Group beta hemolytic bacteremia secondary to stage 4 sacral ulcer and pseudomonas UTI. Was evaluated by ID, started on broad spectrum antibiotics and then transition to Levaquin Sacral wound evaluated by surgery and wound care. RRT called for acute on chronic hypoxic respiratory requiring vent support secondary to mucous plugging. Was transferred to the ICU.  Patient was transfused 3u PRBC for acute anemia. Febrile with pseudomonas and carbapenem resistant acetinobacter in the sputum. Started on IV zosyn.     Assessment/Plan:    1. Sepsis secondary to sacral OM, Pseudomonas UTI and strep bacteremia:  repeat blood cultures negative  IV levaquin day 5  IV zosyn day 3  Frequent suctioning   Repeat cultures add iv vancomcyin if spikes another temp  ID following     2. Acute on chronic hypoxic respiratory failure: improving  Frequent suctioning    3. Hypernatremia: Improving  Free water via tube feed  Monitor bmp    4. Iron defiency anemia: Hb/hct stable  Status post 3 units prbc    5. Seizure disorder: Continue aeds    6. Hypertension: procardia and labetalol were held for hypotension on admission  Resume labetalol for elevated BP    7. Chronic Diastolic CHF; On po lasix  Potassium supplementation held for elevated potassium. Monitor bmp for resumption    8 Stage 4 sacral ulcer: Continue wound care; was evaluated by ACS on admission with no further recommendations       DVT ppx: Lovenox

## 2019-01-12 NOTE — PROGRESS NOTE ADULT - SUBJECTIVE AND OBJECTIVE BOX
CC: Follow up respiratory failure    INTERVAL HPI/OVERNIGHT EVENTS:  Patient seen and examined, non verbal. Afebrile overnight. Fio2 40% on vent     Vital Signs Last 24 Hrs  T(C): 37.4 (12 Jan 2019 04:00), Max: 37.4 (12 Jan 2019 04:00)  T(F): 99.4 (12 Jan 2019 04:00), Max: 99.4 (12 Jan 2019 04:00)  HR: 77 (12 Jan 2019 08:48) (69 - 79)  BP: 174/86 (12 Jan 2019 04:00) (141/71 - 174/86)  BP(mean): 123 (12 Jan 2019 04:00) (100 - 123)  RR: 25 (12 Jan 2019 04:00) (16 - 32)  SpO2: 100% (12 Jan 2019 08:48) (98% - 100%)    PHYSICAL EXAM:    GENERAL: NAD on vent support   NECK: + trach  CHEST/LUNG: Biltaral course breath sounds   CVS: Regular rate and rhythm; No murmurs, rubs, or gallops  ABDOMEN: Soft, Nontender, Nondistended; Bowel sounds present + peg  EXTREMITIES:  2+ Peripheral Pulses, No clubbing, cyanosis  Bilateral pedal edema  SKIN: Stage 4 sacral wound         MEDICATIONS  (STANDING):  aspirin  chewable 81 milliGRAM(s) Oral daily  digoxin     Tablet 0.125 milliGRAM(s) Oral daily  enoxaparin Injectable 40 milliGRAM(s) SubCutaneous daily  famotidine    Tablet 20 milliGRAM(s) Oral <User Schedule>  furosemide    Tablet 40 milliGRAM(s) Oral daily  labetalol 100 milliGRAM(s) Oral three times a day  levETIRAcetam  Solution 1000 milliGRAM(s) Oral two times a day  levoFLOXacin IVPB 750 milliGRAM(s) IV Intermittent every 24 hours  levoFLOXacin IVPB      multivitamin 1 Tablet(s) Oral daily  phenytoin   Suspension 400 milliGRAM(s) Oral every 12 hours  piperacillin/tazobactam IVPB. 3.375 Gram(s) IV Intermittent every 8 hours  sodium chloride 3%  Inhalation 4 milliLiter(s) Inhalation two times a day  tamsulosin 0.4 milliGRAM(s) Oral at bedtime    MEDICATIONS  (PRN):  acetaminophen    Suspension .. 650 milliGRAM(s) Oral every 6 hours PRN Temp greater or equal to 38C (100.4F)  HYDROmorphone  Injectable 0.5 milliGRAM(s) IV Push every 2 hours PRN Dyspnea/ Pre-emptive analgesia for wound care-painful procedures  LORazepam   Injectable 0.5 milliGRAM(s) IV Push every 6 hours PRN Agitation      Allergies    clindamycin (Unknown)  Grapes (Rash)  Nuts (Hives; Rash)  PC Pen VK (Unknown)  shellfish (Angioedema; Rash; Hives)  strawberry (Short breath; Rash; Hives)  Xanax (Rash)    Intolerances    Ativan (Unknown)  Haldol (Dystonic RXN)  hydrALAZINE (Unknown)  Zyprexa (Unknown)        LABS:      01-12    147<H>  |  108<H>  |  21.0<H>  ----------------------------<  119<H>  4.6   |  26.0  |  0.57    Ca    9.5      12 Jan 2019 05:25            RADIOLOGY & ADDITIONAL TESTS:

## 2019-01-13 LAB
ANION GAP SERPL CALC-SCNC: 12 MMOL/L — SIGNIFICANT CHANGE UP (ref 5–17)
ANION GAP SERPL CALC-SCNC: 19 MMOL/L — HIGH (ref 5–17)
BUN SERPL-MCNC: 21 MG/DL — HIGH (ref 8–20)
BUN SERPL-MCNC: 27 MG/DL — HIGH (ref 8–20)
CALCIUM SERPL-MCNC: 7.7 MG/DL — LOW (ref 8.6–10.2)
CALCIUM SERPL-MCNC: 9.5 MG/DL — SIGNIFICANT CHANGE UP (ref 8.6–10.2)
CHLORIDE SERPL-SCNC: 106 MMOL/L — SIGNIFICANT CHANGE UP (ref 98–107)
CHLORIDE SERPL-SCNC: 94 MMOL/L — LOW (ref 98–107)
CO2 SERPL-SCNC: 21 MMOL/L — LOW (ref 22–29)
CO2 SERPL-SCNC: 26 MMOL/L — SIGNIFICANT CHANGE UP (ref 22–29)
CREAT SERPL-MCNC: 0.69 MG/DL — SIGNIFICANT CHANGE UP (ref 0.5–1.3)
CREAT SERPL-MCNC: 0.73 MG/DL — SIGNIFICANT CHANGE UP (ref 0.5–1.3)
GLUCOSE BLDC GLUCOMTR-MCNC: 134 MG/DL — HIGH (ref 70–99)
GLUCOSE SERPL-MCNC: 103 MG/DL — SIGNIFICANT CHANGE UP (ref 70–115)
GLUCOSE SERPL-MCNC: 613 MG/DL — CRITICAL HIGH (ref 70–115)
POTASSIUM SERPL-MCNC: 4.1 MMOL/L — SIGNIFICANT CHANGE UP (ref 3.5–5.3)
POTASSIUM SERPL-MCNC: 4.8 MMOL/L — SIGNIFICANT CHANGE UP (ref 3.5–5.3)
POTASSIUM SERPL-SCNC: 4.1 MMOL/L — SIGNIFICANT CHANGE UP (ref 3.5–5.3)
POTASSIUM SERPL-SCNC: 4.8 MMOL/L — SIGNIFICANT CHANGE UP (ref 3.5–5.3)
SODIUM SERPL-SCNC: 134 MMOL/L — LOW (ref 135–145)
SODIUM SERPL-SCNC: 144 MMOL/L — SIGNIFICANT CHANGE UP (ref 135–145)

## 2019-01-13 PROCEDURE — 99232 SBSQ HOSP IP/OBS MODERATE 35: CPT

## 2019-01-13 RX ADMIN — ENOXAPARIN SODIUM 40 MILLIGRAM(S): 100 INJECTION SUBCUTANEOUS at 11:03

## 2019-01-13 RX ADMIN — FAMOTIDINE 20 MILLIGRAM(S): 10 INJECTION INTRAVENOUS at 05:00

## 2019-01-13 RX ADMIN — SODIUM CHLORIDE 4 MILLILITER(S): 9 INJECTION INTRAMUSCULAR; INTRAVENOUS; SUBCUTANEOUS at 21:35

## 2019-01-13 RX ADMIN — Medication 100 MILLIGRAM(S): at 05:00

## 2019-01-13 RX ADMIN — PIPERACILLIN AND TAZOBACTAM 25 GRAM(S): 4; .5 INJECTION, POWDER, LYOPHILIZED, FOR SOLUTION INTRAVENOUS at 14:07

## 2019-01-13 RX ADMIN — PIPERACILLIN AND TAZOBACTAM 25 GRAM(S): 4; .5 INJECTION, POWDER, LYOPHILIZED, FOR SOLUTION INTRAVENOUS at 22:14

## 2019-01-13 RX ADMIN — Medication 400 MILLIGRAM(S): at 05:00

## 2019-01-13 RX ADMIN — HYDROMORPHONE HYDROCHLORIDE 0.5 MILLIGRAM(S): 2 INJECTION INTRAMUSCULAR; INTRAVENOUS; SUBCUTANEOUS at 06:16

## 2019-01-13 RX ADMIN — Medication 40 MILLIGRAM(S): at 05:00

## 2019-01-13 RX ADMIN — PIPERACILLIN AND TAZOBACTAM 25 GRAM(S): 4; .5 INJECTION, POWDER, LYOPHILIZED, FOR SOLUTION INTRAVENOUS at 05:01

## 2019-01-13 RX ADMIN — Medication 81 MILLIGRAM(S): at 11:03

## 2019-01-13 RX ADMIN — Medication 100 MILLIGRAM(S): at 22:14

## 2019-01-13 RX ADMIN — TAMSULOSIN HYDROCHLORIDE 0.4 MILLIGRAM(S): 0.4 CAPSULE ORAL at 22:14

## 2019-01-13 RX ADMIN — LEVETIRACETAM 1000 MILLIGRAM(S): 250 TABLET, FILM COATED ORAL at 17:46

## 2019-01-13 RX ADMIN — SODIUM CHLORIDE 4 MILLILITER(S): 9 INJECTION INTRAMUSCULAR; INTRAVENOUS; SUBCUTANEOUS at 08:34

## 2019-01-13 RX ADMIN — HYDROMORPHONE HYDROCHLORIDE 0.5 MILLIGRAM(S): 2 INJECTION INTRAMUSCULAR; INTRAVENOUS; SUBCUTANEOUS at 09:45

## 2019-01-13 RX ADMIN — Medication 400 MILLIGRAM(S): at 17:46

## 2019-01-13 RX ADMIN — LEVETIRACETAM 1000 MILLIGRAM(S): 250 TABLET, FILM COATED ORAL at 05:01

## 2019-01-13 RX ADMIN — Medication 100 MILLIGRAM(S): at 14:34

## 2019-01-13 RX ADMIN — Medication 0.12 MILLIGRAM(S): at 05:00

## 2019-01-13 RX ADMIN — HYDROMORPHONE HYDROCHLORIDE 0.5 MILLIGRAM(S): 2 INJECTION INTRAMUSCULAR; INTRAVENOUS; SUBCUTANEOUS at 00:00

## 2019-01-13 RX ADMIN — HYDROMORPHONE HYDROCHLORIDE 0.5 MILLIGRAM(S): 2 INJECTION INTRAMUSCULAR; INTRAVENOUS; SUBCUTANEOUS at 09:24

## 2019-01-13 RX ADMIN — HYDROMORPHONE HYDROCHLORIDE 0.5 MILLIGRAM(S): 2 INJECTION INTRAMUSCULAR; INTRAVENOUS; SUBCUTANEOUS at 04:57

## 2019-01-13 RX ADMIN — Medication 1 TABLET(S): at 11:02

## 2019-01-13 NOTE — PROGRESS NOTE ADULT - SUBJECTIVE AND OBJECTIVE BOX
CC: Follow up     INTERVAL HPI/OVERNIGHT EVENTS: Patient seen and examined, afebrile overnight. BP improved. On vent support 40% Fio2. Thick yellow/white suctioned secretions.       Vital Signs Last 24 Hrs  T(C): 37.7 (12 Jan 2019 22:00), Max: 37.7 (12 Jan 2019 22:00)  T(F): 99.9 (12 Jan 2019 22:00), Max: 99.9 (12 Jan 2019 22:00)  HR: 66 (13 Jan 2019 08:31) (63 - 86)  BP: 103/57 (13 Jan 2019 08:00) (103/57 - 143/80)  BP(mean): 76 (13 Jan 2019 08:00) (76 - 107)  RR: 25 (13 Jan 2019 08:00) (25 - 35)  SpO2: 99% (13 Jan 2019 08:31) (95% - 100%)    PHYSICAL EXAM:    GENERAL: NAD Non verbal   NECK: + Trach   NERVOUS SYSTEM:  Non verbal, does not follow commands, contracted extremities   CHEST/LUNG: Bilateral rhonchi   HEART: Regular rate and rhythm; No murmurs, rubs, or gallops  ABDOMEN: Soft, Nontender, Nondistended; + Colostomy tube LLQ  + PEG RUQ  EXTREMITIES:  2+ Peripheral Pulses, No clubbing, cyanosis, +2 pedal plantar edema bilaterally   Skin: Stage 4 sacral ulcer with surrounding induration   DTI right shin    LINES: + Colostomy LLQ, + PEG RUQ, Peripheral IV Left wrist, + Trach + jones  + Left lung Pigtail catheter       MEDICATIONS  (STANDING):  aspirin  chewable 81 milliGRAM(s) Oral daily  digoxin     Tablet 0.125 milliGRAM(s) Oral daily  enoxaparin Injectable 40 milliGRAM(s) SubCutaneous daily  famotidine    Tablet 20 milliGRAM(s) Oral <User Schedule>  furosemide    Tablet 40 milliGRAM(s) Oral daily  labetalol 100 milliGRAM(s) Oral three times a day  levETIRAcetam  Solution 1000 milliGRAM(s) Oral two times a day  levoFLOXacin IVPB 750 milliGRAM(s) IV Intermittent every 24 hours  levoFLOXacin IVPB      multivitamin 1 Tablet(s) Oral daily  phenytoin   Suspension 400 milliGRAM(s) Oral every 12 hours  piperacillin/tazobactam IVPB. 3.375 Gram(s) IV Intermittent every 8 hours  sodium chloride 3%  Inhalation 4 milliLiter(s) Inhalation two times a day  tamsulosin 0.4 milliGRAM(s) Oral at bedtime    MEDICATIONS  (PRN):  acetaminophen    Suspension .. 650 milliGRAM(s) Oral every 6 hours PRN Temp greater or equal to 38C (100.4F)  HYDROmorphone  Injectable 0.5 milliGRAM(s) IV Push every 2 hours PRN Dyspnea/ Pre-emptive analgesia for wound care-painful procedures      Allergies    clindamycin (Unknown)  Grapes (Rash)  Nuts (Hives; Rash)  PC Pen VK (Unknown)  shellfish (Angioedema; Rash; Hives)  strawberry (Short breath; Rash; Hives)  Xanax (Rash)    Intolerances    Ativan (Unknown)  Haldol (Dystonic RXN)  hydrALAZINE (Unknown)  Zyprexa (Unknown)        LABS:      01-13    134<L>  |  94<L>  |  21.0<H>  ----------------------------<  613<HH>  4.1   |  21.0<L>  |  0.73    Ca    7.7<L>      13 Jan 2019 07:29            RADIOLOGY & ADDITIONAL TESTS:

## 2019-01-13 NOTE — PROGRESS NOTE ADULT - ASSESSMENT
The patient is an 87 yo male, PMHx chronic trach/PEG secondary to pneumonia ~2 years ago complicated by multiple bouts of pneumonia, unable to liberate from vent due to secretions, PEG infection, MDR infections, seizure d/o, CHF, HTN, HLD, BPH with chronic jones, colostomy, who presented from [facility] 1/3[facility]18 with respiratory distress and fever >102'F axillary. Admitted for sepsis secondary to Group beta hemolytic bacteremia secondary to stage 4 sacral ulcer and pseudomonas UTI. Was evaluated by ID, started on broad spectrum antibiotics and then transition to Levaquin Sacral wound evaluated by surgery and wound care. RRT called for acute on chronic hypoxic respiratory requiring vent support secondary to mucous plugging. Was transferred to the ICU.  Patient was transfused 3u PRBC for acute anemia. Febrile with pseudomonas and carbapenem resistant acetinobacter in the sputum. Started on IV zosyn.     Assessment/Plan:    1. Sepsis secondary to sacral OM, Pseudomonas UTI and strep bacteremia:  repeat blood cultures negative  IV levaquin day 6  IV zosyn day 4  Frequent suctioning   Repeat cultures add iv vancomcyin if spikes another temp  ID following     2. Acute on chronic hypoxic respiratory failure: improving  Frequent suctioning    3. Hypernatremia: Improving  Free water via tube feed  Monitor bmp    4. Iron Deficiency anemia: Hb/hct stable  Status post 3 units prbc    5. Seizure disorder: Continue aeds    6. Hypertension: procardia and labetalol were held for hypotension on admission  Labetalol was restarted    7. Chronic Diastolic CHF; On po lasix  Potassium supplementation held for elevated potassium. Monitor bmp for resumption    8 Stage 4 sacral ulcer: Continue wound care; was evaluated by ACS on admission with no further recommendations     9. Elevated BSl on labs this morning; Likely error; Follow up repeat BMP     DVT ppx: Lovenox    Plan discussed with RN and patient's daughter at the bedside in detail     DNR/DNI    Awaiting bed for transfer to [facility] The patient is an 85 yo male, PMHx chronic trach/PEG secondary to pneumonia ~2 years ago complicated by multiple bouts of pneumonia, unable to liberate from vent due to secretions, PEG infection, MDR infections, seizure d/o, CHF, HTN, HLD, BPH with chronic jones, colostomy, who presented from [facility] 1/3/18 with respiratory distress and fever >102'F axillary. Admitted for sepsis secondary to Group beta hemolytic bacteremia secondary to stage 4 sacral ulcer and pseudomonas UTI. Was evaluated by ID, started on broad spectrum antibiotics and then transition to Levaquin Sacral wound evaluated by surgery and wound care. CT suggestive of sacral osteomyelitis. RRT called for acute on chronic hypoxic respiratory requiring vent support secondary to mucous plugging. Was transferred to the ICU.  Patient was transfused 3u PRBC for acute anemia. Febrile with pseudomonas and carbapenem resistant acetinobacter in the sputum. Started on IV zosyn.     Assessment/Plan:    1. Sepsis secondary to sacral OM, Pseudomonas UTI and strep bacteremia:  repeat blood cultures negative  IV levaquin day 6  IV zosyn day 4  Frequent suctioning   Repeat cultures add iv vancomcyin if spikes another temp  ID following     2. Acute on chronic hypoxic respiratory failure: improving  Frequent suctioning    3. Hypernatremia: Improving  Free water via tube feed  Monitor bmp    4. Iron Deficiency anemia: Hb/hct stable  Status post 3 units prbc    5. Seizure disorder: Continue aeds    6. Hypertension: procardia and labetalol were held for hypotension on admission  Labetalol was restarted    7. Chronic Diastolic CHF; On po lasix  Potassium supplementation held for elevated potassium. Monitor bmp for resumption    8 Stage 4 sacral ulcer: Continue wound care; was evaluated by ACS on admission with no further recommendations     9. Elevated BSl on labs this morning; Likely error; Follow up repeat BMP     DVT ppx: Lovenox    Plan discussed with RN and patient's daughter at the bedside in detail     DNR/DNI    Awaiting bed for transfer to [facility]

## 2019-01-14 LAB
ANION GAP SERPL CALC-SCNC: 14 MMOL/L — SIGNIFICANT CHANGE UP (ref 5–17)
BUN SERPL-MCNC: 25 MG/DL — HIGH (ref 8–20)
CALCIUM SERPL-MCNC: 9.2 MG/DL — SIGNIFICANT CHANGE UP (ref 8.6–10.2)
CHLORIDE SERPL-SCNC: 106 MMOL/L — SIGNIFICANT CHANGE UP (ref 98–107)
CO2 SERPL-SCNC: 27 MMOL/L — SIGNIFICANT CHANGE UP (ref 22–29)
CREAT SERPL-MCNC: 0.67 MG/DL — SIGNIFICANT CHANGE UP (ref 0.5–1.3)
GLUCOSE SERPL-MCNC: 110 MG/DL — SIGNIFICANT CHANGE UP (ref 70–115)
HCT VFR BLD CALC: 35.2 % — LOW (ref 42–52)
HGB BLD-MCNC: 10.2 G/DL — LOW (ref 14–18)
MCHC RBC-ENTMCNC: 24.1 PG — LOW (ref 27–31)
MCHC RBC-ENTMCNC: 29 G/DL — LOW (ref 32–36)
MCV RBC AUTO: 83.2 FL — SIGNIFICANT CHANGE UP (ref 80–94)
PLATELET # BLD AUTO: 341 K/UL — SIGNIFICANT CHANGE UP (ref 150–400)
POTASSIUM SERPL-MCNC: 4.4 MMOL/L — SIGNIFICANT CHANGE UP (ref 3.5–5.3)
POTASSIUM SERPL-SCNC: 4.4 MMOL/L — SIGNIFICANT CHANGE UP (ref 3.5–5.3)
RBC # BLD: 4.23 M/UL — LOW (ref 4.6–6.2)
RBC # FLD: 21.8 % — HIGH (ref 11–15.6)
SODIUM SERPL-SCNC: 147 MMOL/L — HIGH (ref 135–145)
WBC # BLD: 8.5 K/UL — SIGNIFICANT CHANGE UP (ref 4.8–10.8)
WBC # FLD AUTO: 8.5 K/UL — SIGNIFICANT CHANGE UP (ref 4.8–10.8)

## 2019-01-14 PROCEDURE — 99232 SBSQ HOSP IP/OBS MODERATE 35: CPT

## 2019-01-14 RX ORDER — ZINC SULFATE TAB 220 MG (50 MG ZINC EQUIVALENT) 220 (50 ZN) MG
220 TAB ORAL DAILY
Qty: 0 | Refills: 0 | Status: COMPLETED | OUTPATIENT
Start: 2019-01-14 | End: 2019-01-23

## 2019-01-14 RX ORDER — ASCORBIC ACID 60 MG
500 TABLET,CHEWABLE ORAL DAILY
Qty: 0 | Refills: 0 | Status: DISCONTINUED | OUTPATIENT
Start: 2019-01-14 | End: 2019-01-29

## 2019-01-14 RX ADMIN — LEVETIRACETAM 1000 MILLIGRAM(S): 250 TABLET, FILM COATED ORAL at 05:25

## 2019-01-14 RX ADMIN — HYDROMORPHONE HYDROCHLORIDE 0.5 MILLIGRAM(S): 2 INJECTION INTRAMUSCULAR; INTRAVENOUS; SUBCUTANEOUS at 05:24

## 2019-01-14 RX ADMIN — Medication 100 MILLIGRAM(S): at 12:52

## 2019-01-14 RX ADMIN — Medication 1 TABLET(S): at 12:52

## 2019-01-14 RX ADMIN — Medication 100 MILLIGRAM(S): at 05:25

## 2019-01-14 RX ADMIN — Medication 0.12 MILLIGRAM(S): at 05:25

## 2019-01-14 RX ADMIN — SODIUM CHLORIDE 4 MILLILITER(S): 9 INJECTION INTRAMUSCULAR; INTRAVENOUS; SUBCUTANEOUS at 09:14

## 2019-01-14 RX ADMIN — ENOXAPARIN SODIUM 40 MILLIGRAM(S): 100 INJECTION SUBCUTANEOUS at 12:52

## 2019-01-14 RX ADMIN — Medication 81 MILLIGRAM(S): at 12:52

## 2019-01-14 RX ADMIN — ZINC SULFATE TAB 220 MG (50 MG ZINC EQUIVALENT) 220 MILLIGRAM(S): 220 (50 ZN) TAB at 12:52

## 2019-01-14 RX ADMIN — Medication 100 MILLIGRAM(S): at 21:00

## 2019-01-14 RX ADMIN — HYDROMORPHONE HYDROCHLORIDE 0.5 MILLIGRAM(S): 2 INJECTION INTRAMUSCULAR; INTRAVENOUS; SUBCUTANEOUS at 05:40

## 2019-01-14 RX ADMIN — FAMOTIDINE 20 MILLIGRAM(S): 10 INJECTION INTRAVENOUS at 05:25

## 2019-01-14 RX ADMIN — PIPERACILLIN AND TAZOBACTAM 25 GRAM(S): 4; .5 INJECTION, POWDER, LYOPHILIZED, FOR SOLUTION INTRAVENOUS at 12:51

## 2019-01-14 RX ADMIN — Medication 40 MILLIGRAM(S): at 05:25

## 2019-01-14 RX ADMIN — Medication 400 MILLIGRAM(S): at 18:33

## 2019-01-14 RX ADMIN — Medication 500 MILLIGRAM(S): at 12:52

## 2019-01-14 RX ADMIN — TAMSULOSIN HYDROCHLORIDE 0.4 MILLIGRAM(S): 0.4 CAPSULE ORAL at 21:01

## 2019-01-14 RX ADMIN — LEVETIRACETAM 1000 MILLIGRAM(S): 250 TABLET, FILM COATED ORAL at 18:32

## 2019-01-14 RX ADMIN — Medication 400 MILLIGRAM(S): at 05:26

## 2019-01-14 RX ADMIN — PIPERACILLIN AND TAZOBACTAM 25 GRAM(S): 4; .5 INJECTION, POWDER, LYOPHILIZED, FOR SOLUTION INTRAVENOUS at 21:00

## 2019-01-14 RX ADMIN — SODIUM CHLORIDE 4 MILLILITER(S): 9 INJECTION INTRAMUSCULAR; INTRAVENOUS; SUBCUTANEOUS at 20:53

## 2019-01-14 RX ADMIN — PIPERACILLIN AND TAZOBACTAM 25 GRAM(S): 4; .5 INJECTION, POWDER, LYOPHILIZED, FOR SOLUTION INTRAVENOUS at 05:26

## 2019-01-14 NOTE — CHART NOTE - NSCHARTNOTEFT_GEN_A_CORE
Source: Patient [ ]  Family [ ]   other [x ]  EMR and staff     Enteral /Parenteral Nutrition: Diet, NPO with Tube Feed:   Tube Feeding Modality: Gastrostomy  Jevity 1.5 [place]  Total Volume for 24 Hours (mL): 1560  Continuous  Starting Tube Feed Rate {mL per Hour}: 20  Increase Tube Feed Rate by (mL): 10     Every 4 hours  Until Goal Tube Feed Rate (mL per Hour): 65  Tube Feed Duration (in Hours): 24  Tube Feed Start Time: 10:00 (01-08-19 @ 13:22)      Current Weight: 1/3: 84kg     % Weight Change (No new wt), N/A    Pertinent Medications: MEDICATIONS  (STANDING):  aspirin  chewable 81 milliGRAM(s) Oral daily  digoxin     Tablet 0.125 milliGRAM(s) Oral daily  enoxaparin Injectable 40 milliGRAM(s) SubCutaneous daily  famotidine    Tablet 20 milliGRAM(s) Oral <User Schedule>  furosemide    Tablet 40 milliGRAM(s) Oral daily  labetalol 100 milliGRAM(s) Oral three times a day  levETIRAcetam  Solution 1000 milliGRAM(s) Oral two times a day  levoFLOXacin IVPB 750 milliGRAM(s) IV Intermittent every 24 hours  levoFLOXacin IVPB      multivitamin 1 Tablet(s) Oral daily  phenytoin   Suspension 400 milliGRAM(s) Oral every 12 hours  piperacillin/tazobactam IVPB. 3.375 Gram(s) IV Intermittent every 8 hours  sodium chloride 3%  Inhalation 4 milliLiter(s) Inhalation two times a day  tamsulosin 0.4 milliGRAM(s) Oral at bedtime    MEDICATIONS  (PRN):  acetaminophen    Suspension .. 650 milliGRAM(s) Oral every 6 hours PRN Temp greater or equal to 38C (100.4F)  HYDROmorphone  Injectable 0.5 milliGRAM(s) IV Push every 2 hours PRN Dyspnea/ Pre-emptive analgesia for wound care-painful procedures    Pertinent Labs: CBC Full  -  ( 14 Jan 2019 05:52 )  WBC Count : 8.5 K/uL  Hemoglobin : 10.2 g/dL  Hematocrit : 35.2 %  Platelet Count - Automated : 341 K/uL  Mean Cell Volume : 83.2 fl  Mean Cell Hemoglobin : 24.1 pg  Mean Cell Hemoglobin Concentration : 29.0 g/dL  Auto Neutrophil # : x  Auto Lymphocyte # : x  Auto Monocyte # : x  Auto Eosinophil # : x  Auto Basophil # : x  Auto Neutrophil % : x  Auto Lymphocyte % : x  Auto Monocyte % : x  Auto Eosinophil % : x  Auto Basophil % : x        Skin: Stage IV sacral ulcer, R inner leg wound, R shin abrasion     Nutrition focused physical exam Previously conducted - found signs of malnutrition [ ]absent [ x]present    Subcutaneous fat loss: [ ] Orbital fat pads region, [ ]Buccal fat region, [ ]Triceps region,  [ ]Ribs region    Muscle wasting: [x ]Temples region, [ ]Clavicle region, [ ]Shoulder region, [ ]Scapula region, [ ]Interosseous region,  [ ]thigh region, [ ]Calf region    Estimated Needs:   [x ] no change since previous assessment  [ ] recalculated:     Current Nutrition Diagnosis:  Pt remains at high nutrition risk secondary to severe protein calorie malnutrition related to increased nutrient needs in the setting of chronic respiratory failure and wound healing with stage IV sacral ulcers now + osteomyelitis, sepsis as evidenced by  moderate temporal wasting and +fluid accumulation in B/L lower extremities. Pt continues to receive Jevity @ 65ml/hr (x20 hours) 1300 ml/day 1950kcal, 83gm protein, inadequate to meet pt's estimated nutrition needs. Recommendations below:       Recommendations:   1. Continue with MVI daily   2. Rx: VIt. C (500mg) daily   3. Rx: Zinc (220mg x 10 days) to aid in wound healing  4. Change enteral formula to Pivot @70ml/hr ([id number]) 1400ml/day (2100kcal, 131gm protein) to aid in wound healing   5. Check wt daily to monitor trends  6. Add free water 240ml every 6 hours   7. Monitor Na+, BUN, H/H       Monitoring and Evaluation:   [ ] PO intake [x ] Tolerance to diet prescription [X] Weights  [X] Follow up per protocol [X] Labs:

## 2019-01-14 NOTE — CHART NOTE - NSCHARTNOTEFT_GEN_A_CORE
Upon Nutritional Assessment by the Registered Dietitian your patient was determined to meet criteria / has evidence of the following diagnosis/diagnoses:          [ ]  Mild Protein Calorie Malnutrition        [ ]  Moderate Protein Calorie Malnutrition        [x ] Severe Protein Calorie Malnutrition        [ ] Unspecified Protein Calorie Malnutrition        [ ] Underweight / BMI <19        [ ] Morbid Obesity / BMI > 40      Findings as based on:  •  Comprehensive nutrition assessment and consultation  •  Calorie counts (nutrient intake analysis)  •  Food acceptance and intake status from observations by staff  •  Follow up  •  Patient education  •  Intervention secondary to interdisciplinary rounds  •   concerns      Treatment:    The following diet has been recommended:    1. Rx: Vit. C daily (500mg)  2. Rx: zinc (220mg x 10 days)   3. Change enteral formula to Pivot @ 70ml/hr   4. Free water 240ml every 6 hours       PROVIDER Section:     By signing this assessment you are acknowledging and agree with the diagnosis/diagnoses assigned by the Registered Dietitian    Comments:

## 2019-01-14 NOTE — PROGRESS NOTE ADULT - SUBJECTIVE AND OBJECTIVE BOX
Patient: KING MCKEON [id number] 86y Male                           Internal Medicine Hospitalist Progress Note    Interval History:  Seen with daughter at bedside.  States pt at baseline.  Remains on vent support.      ____________________PHYSICAL EXAM:  GENERAL: NAD Non verbal   NECK: + Trach   NERVOUS SYSTEM:  Non verbal, does not follow commands, contracted extremities   CHEST/LUNG: Bilateral rhonchi   HEART: Regular rate and rhythm; No murmurs, rubs, or gallops  ABDOMEN: Soft, Nontender, Nondistended; + Colostomy tube LLQ  + PEG RUQ  EXTREMITIES:  2+ Peripheral Pulses, No clubbing, cyanosis, +2 pedal plantar edema bilaterally   Skin: Stage 4 sacral ulcer with surrounding induration   DTI right shin  ____________________      BACKGROUND:  HEALTH ISSUES - PROBLEM Dx:  Palliative care encounter: Palliative care encounter  Dysphagia, unspecified type: Dysphagia, unspecified type  Goals of care, counseling/discussion: Goals of care, counseling/discussion  Hypernatremia: Hypernatremia  Sepsis, due to unspecified organism: Sepsis, due to unspecified organism  Acute osteomyelitis: Acute osteomyelitis  Bacteremia due to Streptococcus: Bacteremia due to Streptococcus  Acute on chronic respiratory failure with hypoxia: Acute on chronic respiratory failure with hypoxia  Anemia, unspecified type: Anemia, unspecified type  Sepsis: Sepsis  Chronic respiratory failure with hypoxia: Chronic respiratory failure with hypoxia  Elevated troponin I level: Elevated troponin I level  Seizure disorder: Seizure disorder  Urinary tract infection with hematuria, site unspecified: Urinary tract infection with hematuria, site unspecified  Fever, unspecified fever cause: Fever, unspecified fever cause        Allergies    clindamycin (Unknown)  Grapes (Rash)  Nuts (Hives; Rash)  PC Pen VK (Unknown)  shellfish (Angioedema; Rash; Hives)  strawberry (Short breath; Rash; Hives)  Xanax (Rash)    Intolerances    Ativan (Unknown)  Haldol (Dystonic RXN)  hydrALAZINE (Unknown)  Zyprexa (Unknown)    PAST MEDICAL & SURGICAL HISTORY:  Dysphagia  Fall: Multiple Mechanical falls  CAD (coronary artery disease)  Clostridium difficile diarrhea: in 2009  BPH (benign prostatic hypertrophy)  Dementia  HLD (hyperlipidemia)  CHF (congestive heart failure)  Prostate cancer: Treated w/ radiation  Stroke: (~5yrs ago)  Seizure: (last event &gt;1yr ago)  HTN (hypertension)  Colostomy in place  S/P percutaneous endoscopic gastrostomy (PEG) tube placement  H/O hemorrhoidectomy  Deviated septum  Abdominal hernia  Status post cardiac surgery: stents x 2        VITALS:  Vital Signs Last 24 Hrs  T(C): 37.2 (14 Jan 2019 05:37), Max: 37.2 (14 Jan 2019 05:37)  T(F): 98.9 (14 Jan 2019 05:37), Max: 98.9 (14 Jan 2019 05:37)  HR: 66 (14 Jan 2019 12:37) (62 - 72)  BP: 124/58 (14 Jan 2019 12:00) (104/57 - 145/71)  BP(mean): 83 (14 Jan 2019 12:00) (78 - 101)  RR: 28 (14 Jan 2019 12:00) (24 - 36)  SpO2: 100% (14 Jan 2019 12:37) (99% - 100%) Daily     Daily   CAPILLARY BLOOD GLUCOSE        I&O's Summary    13 Jan 2019 07:01  -  14 Jan 2019 07:00  --------------------------------------------------------  IN: 2580 mL / OUT: 1065 mL / NET: 1515 mL    14 Jan 2019 07:01  -  14 Jan 2019 15:00  --------------------------------------------------------  IN: 575 mL / OUT: 0 mL / NET: 575 mL          LABS:                        10.2   8.5   )-----------( 341      ( 14 Jan 2019 05:52 )             35.2     01-14    147<H>  |  106  |  25.0<H>  ----------------------------<  110  4.4   |  27.0  |  0.67    Ca    9.2      14 Jan 2019 05:52                    MEDICATIONS:  MEDICATIONS  (STANDING):  ascorbic acid 500 milliGRAM(s) Oral daily  aspirin  chewable 81 milliGRAM(s) Oral daily  digoxin     Tablet 0.125 milliGRAM(s) Oral daily  enoxaparin Injectable 40 milliGRAM(s) SubCutaneous daily  famotidine    Tablet 20 milliGRAM(s) Oral <User Schedule>  furosemide    Tablet 40 milliGRAM(s) Oral daily  labetalol 100 milliGRAM(s) Oral three times a day  levETIRAcetam  Solution 1000 milliGRAM(s) Oral two times a day  levoFLOXacin IVPB 750 milliGRAM(s) IV Intermittent every 24 hours  levoFLOXacin IVPB      multivitamin 1 Tablet(s) Oral daily  phenytoin   Suspension 400 milliGRAM(s) Oral every 12 hours  piperacillin/tazobactam IVPB. 3.375 Gram(s) IV Intermittent every 8 hours  sodium chloride 3%  Inhalation 4 milliLiter(s) Inhalation two times a day  tamsulosin 0.4 milliGRAM(s) Oral at bedtime  zinc sulfate 220 milliGRAM(s) Oral daily    MEDICATIONS  (PRN):  acetaminophen    Suspension .. 650 milliGRAM(s) Oral every 6 hours PRN Temp greater or equal to 38C (100.4F)  HYDROmorphone  Injectable 0.5 milliGRAM(s) IV Push every 2 hours PRN Dyspnea/ Pre-emptive analgesia for wound care-painful procedures

## 2019-01-14 NOTE — PROGRESS NOTE ADULT - ASSESSMENT
The patient is an 85 yo male, PMHx chronic trach/PEG secondary to pneumonia ~2 years ago complicated by multiple bouts of pneumonia, unable to liberate from vent due to secretions, PEG infection, MDR infections, seizure d/o, CHF, HTN, HLD, BPH with chronic jones, colostomy, who presented from [facility] 1/3/18 with respiratory distress and fever >102'F axillary. Admitted for sepsis secondary to Group beta hemolytic bacteremia secondary to stage 4 sacral ulcer and pseudomonas UTI. Was evaluated by ID, started on broad spectrum antibiotics and then transition to Levaquin Sacral wound evaluated by surgery and wound care. CT suggestive of sacral osteomyelitis. RRT called for acute on chronic hypoxic respiratory requiring vent support secondary to mucous plugging. Was transferred to the ICU.  Patient was transfused 3u PRBC for acute anemia. Febrile with pseudomonas and carbapenem resistant acetinobacter in the sputum. Started on IV zosyn.     Assessment/Plan:    1. Sepsis secondary to sacral Osteomyelitis, Pseudomonas UTI and strep bacteremia:  ID following, On Levaquin, Zosyn.  Currently afebrile.      2. Acute on chronic hypoxic respiratory failure: improving.  Frequent suctioning    3. Hypernatremia: Continue Free water via tube feed.  Monitor bmp    4. Iron Deficiency anemia: Hb/hct stable  Status post 3 units prbc    5. Seizure disorder: Continue aeds    6. Hypertension: procardia and labetalol were held for hypotension on admission  Labetalol was restarted    7. Chronic Diastolic CHF; On po lasix Potassium supplementation held for elevated potassium. Monitor bmp for resumption    8 Stage 4 sacral ulcer: Continue wound care; was evaluated by ACS on admission with no further recommendations     9. Severe Protein Calorie Malnutrition - Nutrition input appreciated.  Adjusted tube feeds.  Continue Supplemetns    10. Functional Quadriplegia - supportive care.  PT followup.    DVT ppx: Lovenox    DNR/DNI    Awaiting bed for transfer to [facility]

## 2019-01-15 PROCEDURE — 99233 SBSQ HOSP IP/OBS HIGH 50: CPT

## 2019-01-15 PROCEDURE — 99232 SBSQ HOSP IP/OBS MODERATE 35: CPT

## 2019-01-15 RX ADMIN — Medication 100 MILLIGRAM(S): at 22:44

## 2019-01-15 RX ADMIN — SODIUM CHLORIDE 4 MILLILITER(S): 9 INJECTION INTRAMUSCULAR; INTRAVENOUS; SUBCUTANEOUS at 20:17

## 2019-01-15 RX ADMIN — Medication 100 MILLIGRAM(S): at 05:23

## 2019-01-15 RX ADMIN — Medication 40 MILLIGRAM(S): at 05:24

## 2019-01-15 RX ADMIN — Medication 0.12 MILLIGRAM(S): at 05:23

## 2019-01-15 RX ADMIN — LEVETIRACETAM 1000 MILLIGRAM(S): 250 TABLET, FILM COATED ORAL at 17:23

## 2019-01-15 RX ADMIN — LEVETIRACETAM 1000 MILLIGRAM(S): 250 TABLET, FILM COATED ORAL at 05:24

## 2019-01-15 RX ADMIN — PIPERACILLIN AND TAZOBACTAM 25 GRAM(S): 4; .5 INJECTION, POWDER, LYOPHILIZED, FOR SOLUTION INTRAVENOUS at 22:44

## 2019-01-15 RX ADMIN — SODIUM CHLORIDE 4 MILLILITER(S): 9 INJECTION INTRAMUSCULAR; INTRAVENOUS; SUBCUTANEOUS at 09:07

## 2019-01-15 RX ADMIN — ZINC SULFATE TAB 220 MG (50 MG ZINC EQUIVALENT) 220 MILLIGRAM(S): 220 (50 ZN) TAB at 12:22

## 2019-01-15 RX ADMIN — FAMOTIDINE 20 MILLIGRAM(S): 10 INJECTION INTRAVENOUS at 05:23

## 2019-01-15 RX ADMIN — Medication 100 MILLIGRAM(S): at 12:23

## 2019-01-15 RX ADMIN — Medication 81 MILLIGRAM(S): at 12:22

## 2019-01-15 RX ADMIN — Medication 1 TABLET(S): at 12:22

## 2019-01-15 RX ADMIN — PIPERACILLIN AND TAZOBACTAM 25 GRAM(S): 4; .5 INJECTION, POWDER, LYOPHILIZED, FOR SOLUTION INTRAVENOUS at 13:27

## 2019-01-15 RX ADMIN — Medication 400 MILLIGRAM(S): at 17:23

## 2019-01-15 RX ADMIN — TAMSULOSIN HYDROCHLORIDE 0.4 MILLIGRAM(S): 0.4 CAPSULE ORAL at 22:44

## 2019-01-15 RX ADMIN — Medication 400 MILLIGRAM(S): at 05:24

## 2019-01-15 RX ADMIN — ENOXAPARIN SODIUM 40 MILLIGRAM(S): 100 INJECTION SUBCUTANEOUS at 12:22

## 2019-01-15 RX ADMIN — Medication 500 MILLIGRAM(S): at 12:23

## 2019-01-15 RX ADMIN — PIPERACILLIN AND TAZOBACTAM 25 GRAM(S): 4; .5 INJECTION, POWDER, LYOPHILIZED, FOR SOLUTION INTRAVENOUS at 05:23

## 2019-01-15 NOTE — PROGRESS NOTE ADULT - SUBJECTIVE AND OBJECTIVE BOX
CC: Sepsis is resolved . Acute on chronic resp failure. Trach peg.   HPI:  87 y/o male with dementia, h/o CAD s/p 2 stents, CHF, respiratory failure on chronic vent via tracheostomy tube with h/o infection in the tube needed IV antibiotics for months ended in 12/2018, sacral decubitus ulcers with h/o sacral osteomyelitis treated about 2 years ago with Abc, colostomy and chronic Baugh's catheter. CT scan in 3/2018 was suspicious for sacral osteomyelitis. [facility] and fever. in the ER, Temp: 103.6. Labs showed WBC: 14 000. Hgb: 7.7. Trop: 0.19 with h/o chronic elevation. (03 Jan 2019 22:39)    REVIEW OF SYSTEMS:    Patient denied fever, chills, abdominal pain, nausea, vomiting, cough, shortness of breath, chest pain or palpitations    Vital Signs Last 24 Hrs  T(C): 37.5 (15 Hernan 2019 16:00), Max: 37.8 (15 Hernan 2019 08:00)  T(F): 99.5 (15 Hernan 2019 16:00), Max: 100 (15 Hernan 2019 08:00)  HR: 66 (15 Hernan 2019 17:41) (63 - 71)  BP: 159/72 (15 Hernan 2019 16:00) (98/53 - 159/72)  BP(mean): 104 (15 Hernan 2019 16:00) (73 - 104)  RR: 36 (15 Hernan 2019 16:00) (20 - 36)  SpO2: 100% (15 Hernan 2019 17:41) (89% - 100%)I&O's Summary    14 Jan 2019 07:01  -  15 Hernan 2019 07:00  --------------------------------------------------------  IN: 3170 mL / OUT: 880 mL / NET: 2290 mL    15 Hernan 2019 07:01  -  15 Hernan 2019 18:20  --------------------------------------------------------  IN: 1445 mL / OUT: 600 mL / NET: 845 mL      PHYSICAL EXAM:  GENERAL: NAD,   HEENT: PERRL, +EOMI, anicteric, no [employer]  NECK: Supple, No JVD . Trach  CHEST/LUNG: CTA bilaterally; Normal effort  HEART: S1S2 Normal intensity, no murmurs, gallops or rubs noted  ABDOMEN: Soft, BS Normoactive, NT, ND, no HSM noted. Peg   EXTREMITIES:  2+ radial and DP pulses noted, no clubbing, cyanosis, or edema noted, Bed bound   SKIN: No rashes or lesions noted  NEURO: Non verbal   PSYCH: Non verbal   LABS:                        10.2   8.5   )-----------( 341      ( 14 Jan 2019 05:52 )             35.2     01-14    147<H>  |  106  |  25.0<H>  ----------------------------<  110  4.4   |  27.0  |  0.67    Ca    9.2      14 Jan 2019 05:52          RADIOLOGY & ADDITIONAL TESTS:    MEDICATIONS:  MEDICATIONS  (STANDING):  ascorbic acid 500 milliGRAM(s) Oral daily  aspirin  chewable 81 milliGRAM(s) Oral daily  digoxin     Tablet 0.125 milliGRAM(s) Oral daily  enoxaparin Injectable 40 milliGRAM(s) SubCutaneous daily  famotidine    Tablet 20 milliGRAM(s) Oral <User Schedule>  furosemide    Tablet 40 milliGRAM(s) Oral daily  labetalol 100 milliGRAM(s) Oral three times a day  levETIRAcetam  Solution 1000 milliGRAM(s) Oral two times a day  levoFLOXacin IVPB 750 milliGRAM(s) IV Intermittent every 24 hours  levoFLOXacin IVPB      multivitamin 1 Tablet(s) Oral daily  phenytoin   Suspension 400 milliGRAM(s) Oral every 12 hours  piperacillin/tazobactam IVPB. 3.375 Gram(s) IV Intermittent every 8 hours  sodium chloride 3%  Inhalation 4 milliLiter(s) Inhalation two times a day  tamsulosin 0.4 milliGRAM(s) Oral at bedtime  zinc sulfate 220 milliGRAM(s) Oral daily    MEDICATIONS  (PRN):  acetaminophen    Suspension .. 650 milliGRAM(s) Oral every 6 hours PRN Temp greater or equal to 38C (100.4F)  HYDROmorphone  Injectable 0.5 milliGRAM(s) IV Push every 2 hours PRN Dyspnea/ Pre-emptive analgesia for wound care-painful procedures

## 2019-01-15 NOTE — PROGRESS NOTE ADULT - ASSESSMENT
This 85 y/o male with dementia, h/o CAD s/p 2 stents, CHF, respiratory failure on chronic vent via tracheostomy, sacral decubitus, h/o sacral osteomyelitis     here for fevers  - strep bacteremia; source likely from sacral wound  - repeat blood cultures negative  - possible UTI with urine + for CRE pseudomonas  - SPUTUM with Stenotrophomonas, Acinetobacter, Proteus and  Pseudomonas  - continue LEVAQUIN and Unasyn to cover all antibiotics      recurrent fevers on 1/10/19; then resolve  will monitor.     prognosis guarded.

## 2019-01-15 NOTE — PROGRESS NOTE ADULT - ASSESSMENT
The patient is an 85 yo male, PMHx chronic trach/PEG secondary to pneumonia ~2 years ago complicated by multiple bouts of pneumonia, unable to liberate from vent due to secretions, PEG infection, MDR infections, seizure d/o, CHF, HTN, HLD, BPH with chronic jones, colostomy, who presented from [facility] 1/3/18 with respiratory distress and fever >102'F axillary. Admitted for sepsis secondary to Group beta hemolytic bacteremia secondary to stage 4 sacral ulcer and pseudomonas UTI. Was evaluated by ID, started on broad spectrum antibiotics and then transition to Levaquin Sacral wound evaluated by surgery and wound care. CT suggestive of sacral osteomyelitis. RRT called for acute on chronic hypoxic respiratory requiring vent support secondary to mucous plugging. Was transferred to the ICU.  Patient was transfused 3u PRBC for acute anemia. Febrile with pseudomonas and carbapenem resistant acetinobacter in the sputum. Started on IV zosyn.     1. Sepsis secondary to sacral Osteomyelitis, Pseudomonas UTI and strep bacteremia:  ID following, On Levaquin, Zosyn.  Currently afebrile.      2. Acute on chronic hypoxic respiratory failure: improving.  Frequent suctioning    3. Hypernatremia: Continue Free water via tube feed.  Monitor bmp    4. Iron Deficiency anemia: Hb/hct stable  Status post 3 units prbc    5. Seizure disorder: Continue aeds    6. Hypertension: procardia and labetalol were held for hypotension on admission  Labetalol was restarted    7. Chronic Diastolic CHF; On po lasix Potassium supplementation held for elevated potassium. Monitor bmp for resumption    8 Stage 4 sacral ulcer: Continue wound care; was evaluated by ACS on admission with no further recommendations     9. Severe Protein Calorie Malnutrition - Nutrition input appreciated.  Adjusted tube feeds.  Continue Supplemetns    10. Functional Quadriplegia - supportive care.  PT followup.    DVT ppx: Lovenox    DNR/DNI    Awaiting bed for transfer to [facility]

## 2019-01-15 NOTE — PROGRESS NOTE ADULT - SUBJECTIVE AND OBJECTIVE BOX
[facility]  INFECTIOUS DISEASES AND INTERNAL MEDICINE at [place]  =======================================================  Renzo Salazar MD  Diplomates American Board of Internal Medicine and Infectious Diseases  =======================================================    [id number]  KING MCKEON   follow up for: sepsis  patient seen and examined.     remains on vent, trached    had resolved fevers over weekend, low grade temp today at 100    ===================================================  REVIEW OF SYSTEMS:  not obtained, due to medical condition.     =======================================================  Allergies  clindamycin (Unknown)  Grapes (Rash)  Nuts (Hives; Rash)  PC Pen VK (Unknown)  shellfish (Angioedema; Rash; Hives)  strawberry (Short breath; Rash; Hives)  Xanax (Rash)  =======================================================  =======================================================  Antibiotics:  levoFLOXacin IVPB 750 milliGRAM(s) IV Intermittent every 24 hours  levoFLOXacin IVPB      piperacillin/tazobactam IVPB. 3.375 Gram(s) IV Intermittent every 8 hours    Other medications:  ascorbic acid 500 milliGRAM(s) Oral daily  aspirin  chewable 81 milliGRAM(s) Oral daily  digoxin     Tablet 0.125 milliGRAM(s) Oral daily  enoxaparin Injectable 40 milliGRAM(s) SubCutaneous daily  famotidine    Tablet 20 milliGRAM(s) Oral <User Schedule>  furosemide    Tablet 40 milliGRAM(s) Oral daily  labetalol 100 milliGRAM(s) Oral three times a day  levETIRAcetam  Solution 1000 milliGRAM(s) Oral two times a day  multivitamin 1 Tablet(s) Oral daily  phenytoin   Suspension 400 milliGRAM(s) Oral every 12 hours  sodium chloride 3%  Inhalation 4 milliLiter(s) Inhalation two times a day  tamsulosin 0.4 milliGRAM(s) Oral at bedtime  zinc sulfate 220 milliGRAM(s) Oral daily    =======================================================    Physical Exam:  T(F): 99.7 (15 Hernan 2019 12:00), Max: 100 (15 Hernan 2019 08:00)  HR: 69 (15 Hernan 2019 12:13)  BP: 136/70 (15 Hernan 2019 12:00)  RR: 22 (15 Hernan 2019 12:00)  SpO2: 99% (15 Hernan 2019 12:13) (89% - 100%)    General:  awake, not interactive,. on vent  Eye: Pupils are equal, round and reactive to light,   HENT: Normocephalic, tracheostomy in place  Neck: Supple,    Respiratory: Lungs are fair air entry anteriorly  Cardiovascular:  mild tachycardia  Gastrointestinal: + PEG tube Normal bowel sounds.  Genitourinary: + Baugh    Musculoskeletal: contracted upper and lower extremities  Integumentary: sacral decubitus present on admission  Neurologic: non verbal      =======================================================  Labs:                        10.2   8.5   )-----------( 341      ( 14 Jan 2019 05:52 )             35.2     01-14    147<H>  |  106  |  25.0<H>  ----------------------------<  110  4.4   |  27.0  |  0.67    Ca    9.2      14 Jan 2019 05:52          Culture - Sputum (collected 01-07-19 @ 18:20)  Source: .Sputum  Gram Stain (01-07-19 @ 19:29):    Moderate White blood cells    Few Gram Negative Rods    Few Gram Positive Cocci in Pairs and Chains  Final Report (01-11-19 @ 11:15):    Moderate Acinetobacter baumannii/haemolyticus (Carbapenem Resistant)    ***KNOWN***    Moderate Pseudomonas aeruginosa (Carbapenem Resistant)  Organism: Acinetobacter baumannii/haemolyticus (Carbapenem Resistant)  Pseudomonas aeruginosa (01-10-19 @ 17:52)  Organism: Pseudomonas aeruginosa (01-10-19 @ 17:52)    Sensitivities:      -  Amikacin: S <=16      -  Aztreonam: S 8      -  Cefepime: S <=4      -  Ceftazidime: S 4      -  Ciprofloxacin: S <=1      -  Gentamicin: I 8      -  Imipenem: R >8      -  Levofloxacin: S <=2      -  Meropenem: I 4      -  Piperacillin/Tazobactam: S <=16      -  Tobramycin: S <=4      Method Type: ERNESTINE  Organism: Acinetobacter baumannii/haemolyticus (Carbapenem Resistant) (01-10-19 @ 17:52)    Sensitivities:      -  Amikacin: R >32      -  Ampicillin/Sulbactam: S <=8/4      -  Cefepime: R >16      -  Ceftazidime: R >16      -  Ciprofloxacin: R >2      -  Gentamicin: R >8      -  Levofloxacin: R >4      -  Meropenem: I 8      -  Tobramycin: I 8      -  Trimethoprim/Sulfamethoxazole: R >2/38      Method Type: ERNESTINE    Culture - Urine (collected 01-07-19 @ 14:01)  Source: .Urine  Final Report (01-08-19 @ 09:23):    No growth    Culture - Sputum (collected 01-07-19 @ 07:30)  Source: .Sputum  Gram Stain (01-07-19 @ 15:35):    Numerous White blood cells    Moderate Gram Negative Rods    Few Gram positive cocci in pairs  Final Report (01-10-19 @ 12:01):    Moderate Acinetobacter baumannii (Carbapenem Resistant)    Moderate Stenotrophomonas maltophilia    Moderate Pseudomonas aeruginosa (Carbapenem Resistant)    Moderate Proteus mirabilis    Few Routine respiratory bonnie present    .    TYPE: (C=Critical, N=Notification, A=Abnormal) C    TESTS:  _ Acinetobacter MDRO    DATE/TIME CALLED: _ 01/10/2019 12:00:51    CALLED TO: Amanda Arriaga RN    READ BACK (2 Patient Identifiers)(Y/N): _ Y    READ BACK VALUES (Y/N): _ Y    CALLED BY: Amanda leviin  Organism: Stenotrophomonas maltophilia  Proteus mirabilis  Acinetobacter baumannii/haemolyticus (Carbapenem Resistant)  Pseudomonas aeruginosa (Carbapenem Resistant) (01-10-19 @ 12:01)  Organism: Pseudomonas aeruginosa (Carbapenem Resistant) (01-10-19 @ 12:01)    Sensitivities:      -  Amikacin: S <=16      -  Aztreonam: R >16      -  Cefepime: S 8      -  Ceftazidime: S 4      -  Ciprofloxacin: S <=1      -  Gentamicin: S <=4      -  Imipenem: R >8      -  Levofloxacin: S <=2      -  Meropenem: R >8      -  Piperacillin/Tazobactam: S <=16      -  Tobramycin: S <=4      Method Type: ERNESTINE  Organism: Acinetobacter baumannii/haemolyticus (Carbapenem Resistant) (01-10-19 @ 12:01)    Sensitivities:      -  Amikacin: R >32      -  Ampicillin/Sulbactam: S <=8/4      -  Cefepime: R >16      -  Ceftazidime: R >16      -  Ciprofloxacin: R >2      -  Gentamicin: R >8      -  Levofloxacin: R >4      -  Meropenem: R >8      -  Tobramycin: R >8      -  Trimethoprim/Sulfamethoxazole: R >2/38      Method Type: ERNESTINE  Organism: Proteus mirabilis (01-10-19 @ 12:01)    Sensitivities:      -  Amikacin: S <=16      -  Ampicillin: S <=8 These ampicillin results predict results for amoxicillin      -  Ampicillin/Sulbactam: S <=8/4      -  Aztreonam: S <=4      -  Cefazolin: S <=8      -  Cefepime: S <=4      -  Cefoxitin: S <=8      -  Ceftriaxone: S <=1 Enterobacter, Citrobacter, and Serratia may develop resistance during prolonged therapy      -  Ciprofloxacin: S <=1      -  Ertapenem: S <=1      -  Gentamicin: S <=4      -  Levofloxacin: S <=2      -  Meropenem: S <=1      -  Piperacillin/Tazobactam: S <=16      -  Tobramycin: S <=4      -  Trimethoprim/Sulfamethoxazole: S <=2/38      Method Type: ERNESTINE  Organism: Stenotrophomonas maltophilia (01-10-19 @ 12:01)    Sensitivities:      -  Levofloxacin: S <=2      -  Trimethoprim/Sulfamethoxazole: S <=2/38      Method Type: ERNESTINE    Culture - Blood (collected 01-07-19 @ 05:46)  Source: .Blood  Final Report (01-12-19 @ 07:01):    No growth at 5 days.    Culture - Blood (collected 01-05-19 @ 10:49)  Source: .Blood  Final Report (01-10-19 @ 11:01):    No growth at 5 days.    Culture - Urine (collected 01-03-19 @ 20:12)  Source: .Urine  Final Report (01-06-19 @ 16:21):    10,000 - 49,000 CFU/mL Pseudomonas aeruginosa (Carbapenem Resistant)    TYPE: (C=Critical, N=Notification, A=Abnormal) c    TESTS:  _ mdro    DATE/TIME CALLED: _ 01/06/2019 16:13:29    CALLED TO: Amanda pulliam    READ BACK (2 Patient Identifiers)(Y/N): _ y    READ BACK VALUES (Y/N): _ y    CALLED BY: _ alessia    send a copy to i[employer] [facility] logistics  Organism: Pseudomonas aeruginosa (Carbapenem Resistant) (01-06-19 @ 16:21)  Organism: Pseudomonas aeruginosa (Carbapenem Resistant) (01-06-19 @ 16:21)    Sensitivities:      -  Amikacin: S <=16      -  Aztreonam: R >16      -  Cefepime: I 16      -  Ceftazidime: R >16      -  Ciprofloxacin: S <=1      -  Gentamicin: S <=4      -  Imipenem: R >8      -  Levofloxacin: S <=2      -  Meropenem: R >8      -  Piperacillin/Tazobactam: R >64      -  Tobramycin: S <=4      Method Type: ERNESTINE    Culture - Blood (collected 01-03-19 @ 18:47)  Source: .Blood  Final Report (01-11-19 @ 11:40):    Growth in anaerobic bottle: Beta Hemolytic Streptococci, Group G    Anaerobic Bottle: 10:36 Hours to positivity    Aerobic Bottle: No growth at 5 days.    .    TYPE: (C=Critical, N=Notification, A=Abnormal) C    TESTS:  _ Positive Blood GS    DATE/TIME CALLED:_ 01/04/2019 12:01    CALLED TO: _ ERHR: Andrzej Duffy RN    READ BACK (2 Patient Identifiers)(Y/N): _ Y    READ BACK VALUES (Y/N): _ Y    CALLED BY: Amanda carpenter  Organism: Beta Hemolytic Streptococci, Group G (01-11-19 @ 11:40)  Organism: Beta Hemolytic Streptococci, Group G (01-11-19 @ 11:40)    Sensitivities:      Method Type: ERNESTINE    Culture - Blood (collected 01-03-19 @ 18:46)  Source: .Blood  Final Report (01-12-19 @ 10:10):    Growth in aerobic bottle: Beta Hemolytic Streptococci, Group G    Aerobic Bottle: 10:54 Hours to positivity    Anaerobic Bottle: No growth at 5 days.    .    ***Blood Panel PCR results on this specimen are available    approximately 3 hours after the Gram stain result.***    Gram stain, PCR, and/or culture results may not always    correspond due to difference in methodologies.    ************************************************************    This PCR assay was performed using [employer].    The following targets are tested for: Enterococcus,    vancomycin resistant enterococci, Listeria monocytogenes,    coagulase negative staphylococci, S. aureus,    methicillin resistant S. aureus, Streptococcus agalactiae    (Group B), S. pneumoniae, S. pyogenes (Group A),    Acinetobacter baumannii, Enterobacter cloacae, E. coli,    Klebsiella oxytoca, K. pneumoniae, Proteus sp.,    Serratia marcescens, Haemophilus influenzae,    Neisseria meningitidis, Pseudomonas aeruginosa, Candida    albicans, C. glabrata, C krusei, C parapsilosis,    C. tropicalis and the KPC resistance gene.    "Due to technical problems, Proteus sp. will Not be reported as part of    the BCID panel until further notice"    .    TYPE: (C=Critical, N=Notification, A=Abnormal) C    TESTS:  _ Positive Blood GS    DATE/TIME CALLED: _ 01/04/2019 10:20    CALLED TO: _ MICHELLEHR: Andrzej Garza RN    READ BACK (2 Patient Identifiers)(Y/N): _ Y    READ BACK VALUES (Y/N): _ Y    CALLED BY: Amanda carpenter  Organism: Beta Hemolytic Streptococci, Group G  Blood Culture PCR (01-12-19 @ 10:10)  Organism: Blood Culture PCR (01-12-19 @ 10:10)    Sensitivities:      -  Streptococcus sp. (Not Grp A, B or S pneumoniae): Detec      Method Type: PCR  Organism: Beta Hemolytic Streptococci, Group G (01-12-19 @ 10:10)    Sensitivities:      Method Type: ERNESTINE

## 2019-01-16 DIAGNOSIS — A40.9 STREPTOCOCCAL SEPSIS, UNSPECIFIED: ICD-10-CM

## 2019-01-16 DIAGNOSIS — M46.28 OSTEOMYELITIS OF VERTEBRA, SACRAL AND SACROCOCCYGEAL REGION: ICD-10-CM

## 2019-01-16 DIAGNOSIS — Z88.0 ALLERGY STATUS TO PENICILLIN: ICD-10-CM

## 2019-01-16 DIAGNOSIS — J18.9 PNEUMONIA, UNSPECIFIED ORGANISM: ICD-10-CM

## 2019-01-16 LAB
ANION GAP SERPL CALC-SCNC: 10 MMOL/L — SIGNIFICANT CHANGE UP (ref 5–17)
BUN SERPL-MCNC: 32 MG/DL — HIGH (ref 8–20)
CALCIUM SERPL-MCNC: 9.3 MG/DL — SIGNIFICANT CHANGE UP (ref 8.6–10.2)
CHLORIDE SERPL-SCNC: 101 MMOL/L — SIGNIFICANT CHANGE UP (ref 98–107)
CO2 SERPL-SCNC: 29 MMOL/L — SIGNIFICANT CHANGE UP (ref 22–29)
CREAT SERPL-MCNC: 0.68 MG/DL — SIGNIFICANT CHANGE UP (ref 0.5–1.3)
GLUCOSE SERPL-MCNC: 109 MG/DL — SIGNIFICANT CHANGE UP (ref 70–115)
HCT VFR BLD CALC: 33.2 % — LOW (ref 42–52)
HGB BLD-MCNC: 9.7 G/DL — LOW (ref 14–18)
MCHC RBC-ENTMCNC: 24.4 PG — LOW (ref 27–31)
MCHC RBC-ENTMCNC: 29.2 G/DL — LOW (ref 32–36)
MCV RBC AUTO: 83.6 FL — SIGNIFICANT CHANGE UP (ref 80–94)
PLATELET # BLD AUTO: 339 K/UL — SIGNIFICANT CHANGE UP (ref 150–400)
POTASSIUM SERPL-MCNC: 4 MMOL/L — SIGNIFICANT CHANGE UP (ref 3.5–5.3)
POTASSIUM SERPL-SCNC: 4 MMOL/L — SIGNIFICANT CHANGE UP (ref 3.5–5.3)
RBC # BLD: 3.97 M/UL — LOW (ref 4.6–6.2)
RBC # FLD: 22.6 % — HIGH (ref 11–15.6)
SODIUM SERPL-SCNC: 140 MMOL/L — SIGNIFICANT CHANGE UP (ref 135–145)
WBC # BLD: 7.8 K/UL — SIGNIFICANT CHANGE UP (ref 4.8–10.8)
WBC # FLD AUTO: 7.8 K/UL — SIGNIFICANT CHANGE UP (ref 4.8–10.8)

## 2019-01-16 PROCEDURE — 99233 SBSQ HOSP IP/OBS HIGH 50: CPT

## 2019-01-16 PROCEDURE — 99232 SBSQ HOSP IP/OBS MODERATE 35: CPT

## 2019-01-16 RX ORDER — AMPICILLIN SODIUM AND SULBACTAM SODIUM 250; 125 MG/ML; MG/ML
3 INJECTION, POWDER, FOR SUSPENSION INTRAMUSCULAR; INTRAVENOUS EVERY 6 HOURS
Qty: 0 | Refills: 0 | Status: DISCONTINUED | OUTPATIENT
Start: 2019-01-16 | End: 2019-01-29

## 2019-01-16 RX ADMIN — FAMOTIDINE 20 MILLIGRAM(S): 10 INJECTION INTRAVENOUS at 05:17

## 2019-01-16 RX ADMIN — Medication 400 MILLIGRAM(S): at 05:16

## 2019-01-16 RX ADMIN — Medication 100 MILLIGRAM(S): at 05:13

## 2019-01-16 RX ADMIN — ZINC SULFATE TAB 220 MG (50 MG ZINC EQUIVALENT) 220 MILLIGRAM(S): 220 (50 ZN) TAB at 12:11

## 2019-01-16 RX ADMIN — Medication 1 TABLET(S): at 12:11

## 2019-01-16 RX ADMIN — SODIUM CHLORIDE 4 MILLILITER(S): 9 INJECTION INTRAMUSCULAR; INTRAVENOUS; SUBCUTANEOUS at 20:24

## 2019-01-16 RX ADMIN — PIPERACILLIN AND TAZOBACTAM 25 GRAM(S): 4; .5 INJECTION, POWDER, LYOPHILIZED, FOR SOLUTION INTRAVENOUS at 22:49

## 2019-01-16 RX ADMIN — PIPERACILLIN AND TAZOBACTAM 25 GRAM(S): 4; .5 INJECTION, POWDER, LYOPHILIZED, FOR SOLUTION INTRAVENOUS at 05:16

## 2019-01-16 RX ADMIN — Medication 40 MILLIGRAM(S): at 05:13

## 2019-01-16 RX ADMIN — Medication 81 MILLIGRAM(S): at 12:11

## 2019-01-16 RX ADMIN — Medication 100 MILLIGRAM(S): at 12:12

## 2019-01-16 RX ADMIN — LEVETIRACETAM 1000 MILLIGRAM(S): 250 TABLET, FILM COATED ORAL at 05:14

## 2019-01-16 RX ADMIN — Medication 0.12 MILLIGRAM(S): at 05:13

## 2019-01-16 RX ADMIN — SODIUM CHLORIDE 4 MILLILITER(S): 9 INJECTION INTRAMUSCULAR; INTRAVENOUS; SUBCUTANEOUS at 09:36

## 2019-01-16 RX ADMIN — Medication 400 MILLIGRAM(S): at 17:34

## 2019-01-16 RX ADMIN — LEVETIRACETAM 1000 MILLIGRAM(S): 250 TABLET, FILM COATED ORAL at 17:34

## 2019-01-16 RX ADMIN — ENOXAPARIN SODIUM 40 MILLIGRAM(S): 100 INJECTION SUBCUTANEOUS at 12:10

## 2019-01-16 RX ADMIN — Medication 500 MILLIGRAM(S): at 12:11

## 2019-01-16 RX ADMIN — PIPERACILLIN AND TAZOBACTAM 25 GRAM(S): 4; .5 INJECTION, POWDER, LYOPHILIZED, FOR SOLUTION INTRAVENOUS at 14:50

## 2019-01-16 RX ADMIN — Medication 100 MILLIGRAM(S): at 22:49

## 2019-01-16 RX ADMIN — AMPICILLIN SODIUM AND SULBACTAM SODIUM 200 GRAM(S): 250; 125 INJECTION, POWDER, FOR SUSPENSION INTRAMUSCULAR; INTRAVENOUS at 17:33

## 2019-01-16 RX ADMIN — TAMSULOSIN HYDROCHLORIDE 0.4 MILLIGRAM(S): 0.4 CAPSULE ORAL at 22:49

## 2019-01-16 RX ADMIN — HYDROMORPHONE HYDROCHLORIDE 0.5 MILLIGRAM(S): 2 INJECTION INTRAMUSCULAR; INTRAVENOUS; SUBCUTANEOUS at 22:59

## 2019-01-16 NOTE — PROGRESS NOTE ADULT - SUBJECTIVE AND OBJECTIVE BOX
[facility]  INFECTIOUS DISEASES AND INTERNAL MEDICINE at [place]  =======================================================  Renzo Salazar MD  Diplomates American Board of Internal Medicine and Infectious Diseases  =======================================================    FRANKSALVADORISABELA KING [id number]    Follow up: Streptococcus sepsis / MDR bacteria Pneumonia      No fevers      Allergies:  Ativan (Unknown)  clindamycin (Unknown)  Grapes (Rash)  Haldol (Dystonic RXN)  hydrALAZINE (Unknown)  Nuts (Hives; Rash)  PC Pen VK (Unknown)  shellfish (Angioedema; Rash; Hives)  strawberry (Short breath; Rash; Hives)  Xanax (Rash)  Zyprexa (Unknown)      Antibiotics:  levoFLOXacin IVPB 750 milliGRAM(s) IV Intermittent every 24 hours  piperacillin/tazobactam IVPB. 3.375 Gram(s) IV Intermittent every 8 hours        REVIEW OF SYSTEMS:  Unable to obtain, patient not able to provide history       Physical Exam:  Vital Signs Last 24 Hrs  T(C): 36.9 (16 Jan 2019 12:00), Max: 37.5 (15 Hernan 2019 16:00)  T(F): 98.5 (16 Jan 2019 12:00), Max: 99.5 (15 Hernan 2019 16:00)  HR: 68 (16 Jan 2019 12:00) (61 - 70)  BP: 116/56 (16 Jan 2019 12:00) (111/58 - 162/72)  BP(mean): 80 (16 Jan 2019 12:00) (78 - 104)  RR: 27 (16 Jan 2019 12:00) (25 - 36)  SpO2: 100% (16 Jan 2019 12:00) (98% - 100%)      GEN: NAD  HEENT: normocephalic and atraumatic. Anicteric. + Trach  NECK: Supple.   LUNGS: Course BS B/L  HEART: Regular rate and rhythm   ABDOMEN: Soft, nontender, and nondistended.  Positive bowel sounds.  + PEG  EXTREMITIES: + edema.  MSK: no joint swelling  NEUROLOGIC: Awake, no meaningful communication   PSYCHIATRIC:  unable to assess  SKIN: No Rash      Labs:  01-16    140  |  101  |  32.0<H>  ----------------------------<  109  4.0   |  29.0  |  0.68    Ca    9.3      16 Jan 2019 04:31               9.7    7.8   )-----------( 339      ( 16 Jan 2019 04:31 )             33.2     RECENT CULTURES:  01-07 @ 18:20 .Sputum Acinetobacter baumannii/haemolyticus (Carbapenem Resistant)  Pseudomonas aeruginosa    Moderate Acinetobacter baumannii/haemolyticus (Carbapenem Resistant)  ***KNOWN***  Moderate Pseudomonas aeruginosa (Carbapenem Resistant)  Moderate White blood cells  Few Gram Negative Rods  Few Gram Positive Cocci in Pairs and Chains      01-07 @ 14:01 .Urine     No growth      01-07 @ 07:30 .Sputum Stenotrophomonas maltophilia  Proteus mirabilis  Acinetobacter baumannii/haemolyticus (Carbapenem Resistant)  Pseudomonas aeruginosa (Carbapenem Resistant)    Moderate Acinetobacter baumannii (Carbapenem Resistant)  Moderate Stenotrophomonas maltophilia  Moderate Pseudomonas aeruginosa (Carbapenem Resistant)  Moderate Proteus mirabilis  Few Routine respiratory bonnie present  Numerous White blood cells  Moderate Gram Negative Rods  Few Gram positive cocci in pairs      01-07 @ 05:46 .Blood     No growth at 5 days.      01-05 @ 10:49 .Blood     No growth at 5 days.      01-03 @ 20:12 .Urine Pseudomonas aeruginosa (Carbapenem Resistant)    10,000 - 49,000 CFU/mL Pseudomonas aeruginosa (Carbapenem Resistant)      01-03 @ 20:04      NotDetec      01-03 @ 18:47 .Blood Beta Hemolytic Streptococci, Group G    Growth in anaerobic bottle: Beta Hemolytic Streptococci, Group G  Anaerobic Bottle: 10:36 Hours to positivity  Aerobic Bottle: No growth at 5 days.      01-03 @ 18:46 .Blood Beta Hemolytic Streptococci, Group G  Blood Culture PCR    Growth in aerobic bottle: Beta Hemolytic Streptococci, Group G  Aerobic Bottle: 10:54 Hours to positivity  Anaerobic Bottle: No growth at 5 days.  ***Blood Panel PCR results on this specimen are available  approximately 3 hours after the Gram stain result.***  Gram stain, PCR, and/or culture results may not always  correspond due to difference in methodologies.  ************************************************************  This PCR assay was performed using [employer].  The following targets are tested for: Enterococcus,  vancomycin resistant enterococci, Listeria monocytogenes,  coagulase negative staphylococci, S. aureus,  methicillin resistant S. aureus, Streptococcus agalactiae  (Group B), S. pneumoniae, S. pyogenes (Group A),  Acinetobacter baumannii, Enterobacter cloacae, E. coli,  Klebsiella oxytoca, K. pneumoniae, Proteus sp.,  Serratia marcescens, Haemophilus influenzae,  Neisseria meningitidis, Pseudomonas aeruginosa, Candida  albicans, C. glabrata, C krusei, C parapsilosis,  C. tropicalis and the KPC resistance gene.  "Due to technical problems, Proteus sp. will Not be reported as part of  the BCID panel until further notice"

## 2019-01-16 NOTE — PROGRESS NOTE ADULT - SUBJECTIVE AND OBJECTIVE BOX
CC: Bacteremia with fever and sepsis from sacral decubital ulcer.  Sepsis resolved.  Intermittent fevers is trending down. . Tmax today 99.5.  Trach with vent support.  Peg.   HPI:  85 y/o male with dementia, h/o CAD s/p 2 stents, CHF, respiratory failure on chronic vent via tracheostomy tube with h/o infection in the tube needed IV antibiotics for months ended in 12/2018, sacral decubitus ulcers with h/o sacral osteomyelitis treated about 2 years ago with Abc, colostomy and chronic Baugh's catheter. CT scan in 3/2018 was suspicious for sacral osteomyelitis. Patient was brought in to the ER from [facility] beds at the [facility] for respiratory distress and fever. in the ER, Temp: 103.6. Labs showed WBC: 14 000. Hgb: 7.7. Trop: 0.19 with h/o chronic elevation. (03 Jan 2019 22:39)    REVIEW OF SYSTEMS:    Patient denied fever, chills, abdominal pain, nausea, vomiting, cough, shortness of breath, chest pain or palpitations    Vital Signs Last 24 Hrs  T(C): 36.9 (16 Jan 2019 12:00), Max: 37.5 (15 Hernan 2019 16:00)  T(F): 98.5 (16 Jan 2019 12:00), Max: 99.5 (15 Hernan 2019 16:00)  HR: 68 (16 Jan 2019 12:00) (61 - 70)  BP: 116/56 (16 Jan 2019 12:00) (111/58 - 162/72)  BP(mean): 80 (16 Jan 2019 12:00) (78 - 104)  RR: 27 (16 Jan 2019 12:00) (25 - 36)  SpO2: 100% (16 Jan 2019 12:00) (98% - 100%)I&O's Summary    15 Hernan 2019 07:01  -  16 Jan 2019 07:00  --------------------------------------------------------  IN: 2965 mL / OUT: 1125 mL / NET: 1840 mL    16 Jan 2019 07:01  -  16 Jan 2019 12:30  --------------------------------------------------------  IN: 740 mL / OUT: 0 mL / NET: 740 mL      PHYSICAL EXAM:  GENERAL: NAD,   HEENT: PERRL, +EOMI, anicteric, no [place]  NECK: Supple, No JVD   CHEST/LUNG: Bilateral transmitted breath sounds  HEART: S1S2 Normal intensity, no murmurs, gallops or rubs noted  ABDOMEN: Soft, BS Normoactive, NT, ND, no HSM noted  EXTREMITIES:  2+ radial and DP pulses noted, no clubbing, cyanosis, or edema noted, bed bound   SKIN: No rashes or lesions noted  NEURO: Non verbal ,   PSYCH: Non verbal   LABS:                        9.7    7.8   )-----------( 339      ( 16 Jan 2019 04:31 )             33.2     01-16    140  |  101  |  32.0<H>  ----------------------------<  109  4.0   |  29.0  |  0.68    Ca    9.3      16 Jan 2019 04:31          RADIOLOGY & ADDITIONAL TESTS:    MEDICATIONS:  MEDICATIONS  (STANDING):  ascorbic acid 500 milliGRAM(s) Oral daily  aspirin  chewable 81 milliGRAM(s) Oral daily  digoxin     Tablet 0.125 milliGRAM(s) Oral daily  enoxaparin Injectable 40 milliGRAM(s) SubCutaneous daily  famotidine    Tablet 20 milliGRAM(s) Oral <User Schedule>  furosemide    Tablet 40 milliGRAM(s) Oral daily  labetalol 100 milliGRAM(s) Oral three times a day  levETIRAcetam  Solution 1000 milliGRAM(s) Oral two times a day  levoFLOXacin IVPB 750 milliGRAM(s) IV Intermittent every 24 hours  levoFLOXacin IVPB      multivitamin 1 Tablet(s) Oral daily  phenytoin   Suspension 400 milliGRAM(s) Oral every 12 hours  piperacillin/tazobactam IVPB. 3.375 Gram(s) IV Intermittent every 8 hours  sodium chloride 3%  Inhalation 4 milliLiter(s) Inhalation two times a day  tamsulosin 0.4 milliGRAM(s) Oral at bedtime  zinc sulfate 220 milliGRAM(s) Oral daily    MEDICATIONS  (PRN):  acetaminophen    Suspension .. 650 milliGRAM(s) Oral every 6 hours PRN Temp greater or equal to 38C (100.4F)  HYDROmorphone  Injectable 0.5 milliGRAM(s) IV Push every 2 hours PRN Dyspnea/ Pre-emptive analgesia for wound care-painful procedures

## 2019-01-16 NOTE — PROGRESS NOTE ADULT - ASSESSMENT
The patient is an 85 yo male, PMHx chronic trach/PEG secondary to pneumonia ~2 years ago complicated by multiple bouts of pneumonia, unable to liberate from vent due to secretions, PEG infection, MDR infections, seizure d/o, CHF, HTN, HLD, BPH with chronic jones, colostomy, who presented from [facility] 1/3/18 with respiratory distress and fever >102'F axillary. Admitted for sepsis secondary to Group beta hemolytic bacteremia secondary to stage 4 sacral ulcer and pseudomonas UTI. Was evaluated by ID, started on broad spectrum antibiotics and then transition to Levaquin Sacral wound evaluated by surgery and wound care. CT suggestive of sacral osteomyelitis. RRT called for acute on chronic hypoxic respiratory requiring vent support secondary to mucous plugging. Was transferred to the ICU.  Patient was transfused 3u PRBC for acute anemia. Febrile with pseudomonas and carbapenem resistant acetinobacter in the sputum. Started on IV zosyn.     1. Sepsis secondary to sacral Osteomyelitis, Pseudomonas UTI and strep bacteremia:  ID following, On Levaquin, Zosyn.  Currently afebrile.      2. Acute on chronic hypoxic respiratory failure: improving.  Frequent suctioning    3. Hypernatremia: Continue Free water via tube feed.  Monitor bmp    4. Iron Deficiency anemia: Hb/hct stable  Status post 3 units prbc    5. Seizure disorder: Continue aeds    6. Hypertension: procardia and labetalol were held for hypotension on admission  Labetalol was restarted    7. Chronic Diastolic CHF; On po lasix Potassium supplementation held for elevated potassium. Monitor bmp for resumption    8 Stage 4 sacral ulcer: Continue wound care; was evaluated by ACS on admission with no further recommendations     9. Severe Protein Calorie Malnutrition - Nutrition input appreciated.  Adjusted tube feeds.  Continue Supplemetns    10. Functional Quadriplegia - supportive care.  PT followup.

## 2019-01-16 NOTE — PROGRESS NOTE ADULT - ASSESSMENT
87 y/o male with dementia, h/o CAD s/p 2 stents, CHF, respiratory failure on chronic vent via tracheostomy, sacral decubitus, h/o sacral osteomyelitis     Streptococcus septicemia  Stenotrophomonas, MDR Acinetobacter, Proteus and  Pseudomonas Pneumonia  VDRF  PCN allergy    - strep bacteremia; source likely from sacral wound  - repeat blood cultures negative 1/5/19  - possible UTI with urine + for CRE pseudomonas  - Sputum with Stenotrophomonas, MDR Acinetobacter, Proteus and  Pseudomonas  - Will Complete 10 days of Levaquin today   - Will Complete Zosyn 7 fays from 1/10/10 today  - Will Start Unasyn 3gm IV q 6hours tomorrow  - Plan for antibiotics till 2/15/19  - Trend Fever  - Trend Leukocytosis    will follow

## 2019-01-17 PROCEDURE — 99232 SBSQ HOSP IP/OBS MODERATE 35: CPT

## 2019-01-17 PROCEDURE — 99233 SBSQ HOSP IP/OBS HIGH 50: CPT

## 2019-01-17 RX ADMIN — SODIUM CHLORIDE 4 MILLILITER(S): 9 INJECTION INTRAMUSCULAR; INTRAVENOUS; SUBCUTANEOUS at 08:25

## 2019-01-17 RX ADMIN — ZINC SULFATE TAB 220 MG (50 MG ZINC EQUIVALENT) 220 MILLIGRAM(S): 220 (50 ZN) TAB at 11:36

## 2019-01-17 RX ADMIN — Medication 100 MILLIGRAM(S): at 06:11

## 2019-01-17 RX ADMIN — Medication 81 MILLIGRAM(S): at 11:34

## 2019-01-17 RX ADMIN — Medication 100 MILLIGRAM(S): at 14:00

## 2019-01-17 RX ADMIN — LEVETIRACETAM 1000 MILLIGRAM(S): 250 TABLET, FILM COATED ORAL at 18:44

## 2019-01-17 RX ADMIN — Medication 100 MILLIGRAM(S): at 21:24

## 2019-01-17 RX ADMIN — Medication 400 MILLIGRAM(S): at 06:13

## 2019-01-17 RX ADMIN — TAMSULOSIN HYDROCHLORIDE 0.4 MILLIGRAM(S): 0.4 CAPSULE ORAL at 21:24

## 2019-01-17 RX ADMIN — AMPICILLIN SODIUM AND SULBACTAM SODIUM 200 GRAM(S): 250; 125 INJECTION, POWDER, FOR SUSPENSION INTRAMUSCULAR; INTRAVENOUS at 00:00

## 2019-01-17 RX ADMIN — LEVETIRACETAM 1000 MILLIGRAM(S): 250 TABLET, FILM COATED ORAL at 06:11

## 2019-01-17 RX ADMIN — Medication 0.12 MILLIGRAM(S): at 06:12

## 2019-01-17 RX ADMIN — Medication 500 MILLIGRAM(S): at 11:33

## 2019-01-17 RX ADMIN — Medication 400 MILLIGRAM(S): at 18:49

## 2019-01-17 RX ADMIN — Medication 40 MILLIGRAM(S): at 06:13

## 2019-01-17 RX ADMIN — FAMOTIDINE 20 MILLIGRAM(S): 10 INJECTION INTRAVENOUS at 06:11

## 2019-01-17 RX ADMIN — ENOXAPARIN SODIUM 40 MILLIGRAM(S): 100 INJECTION SUBCUTANEOUS at 11:33

## 2019-01-17 RX ADMIN — SODIUM CHLORIDE 4 MILLILITER(S): 9 INJECTION INTRAMUSCULAR; INTRAVENOUS; SUBCUTANEOUS at 20:39

## 2019-01-17 RX ADMIN — AMPICILLIN SODIUM AND SULBACTAM SODIUM 200 GRAM(S): 250; 125 INJECTION, POWDER, FOR SUSPENSION INTRAMUSCULAR; INTRAVENOUS at 11:33

## 2019-01-17 RX ADMIN — AMPICILLIN SODIUM AND SULBACTAM SODIUM 200 GRAM(S): 250; 125 INJECTION, POWDER, FOR SUSPENSION INTRAMUSCULAR; INTRAVENOUS at 06:10

## 2019-01-17 RX ADMIN — AMPICILLIN SODIUM AND SULBACTAM SODIUM 200 GRAM(S): 250; 125 INJECTION, POWDER, FOR SUSPENSION INTRAMUSCULAR; INTRAVENOUS at 18:49

## 2019-01-17 RX ADMIN — Medication 1 TABLET(S): at 11:33

## 2019-01-17 NOTE — PROGRESS NOTE ADULT - SUBJECTIVE AND OBJECTIVE BOX
CC: Sepsis from unstageable sacral decubital ulcers.  Fever leukocytosis resolved.  Trach, peg and vent support for resp failurej.   HPI:  85 y/o male with dementia, h/o CAD s/p 2 stents, CHF, respiratory failure on chronic vent via tracheostomy tube with h/o infection in the tube needed IV antibiotics for months ended in 12/2018, sacral decubitus ulcers with h/o sacral osteomyelitis treated about 2 years ago with Abc, colostomy and chronic Baugh's catheter. CT scan in 3/2018 was suspicious for sacral osteomyelitis. Patient was brought in to the ER from [facility] for respiratory distress and fever. in the ER, Temp: 103.6. Labs showed WBC: 14 000. Hgb: 7.7. Trop: 0.19 with h/o chronic elevation. (03 Jan 2019 22:39)    REVIEW OF SYSTEMS:    Patient denied fever, chills, abdominal pain, nausea, vomiting, cough, shortness of breath, chest pain or palpitations    Vital Signs Last 24 Hrs  T(C): 37.1 (17 Jan 2019 11:00), Max: 37.4 (17 Jan 2019 07:00)  T(F): 98.8 (17 Jan 2019 11:00), Max: 99.3 (17 Jan 2019 07:00)  HR: 75 (17 Jan 2019 12:20) (64 - 100)  BP: 113/55 (17 Jan 2019 12:00) (113/55 - 147/81)  BP(mean): 79 (17 Jan 2019 12:00) (79 - 109)  RR: 32 (17 Jan 2019 12:00) (18 - 32)  SpO2: 100% (17 Jan 2019 12:20) (100% - 100%)I&O's Summary    16 Jan 2019 07:01  -  17 Jan 2019 07:00  --------------------------------------------------------  IN: 3140 mL / OUT: 1200 mL / NET: 1940 mL      PHYSICAL EXAM:  GENERAL: NAD,   HEENT: PERRL, +EOMI, anicteric, no [place]  NECK: Supple, No JVD Trach  CHEST/LUNG: bilateral transmitted breath sound.   HEART: S1S2 Normal intensity, no murmurs, gallops or rubs noted  ABDOMEN: Soft, BS Normoactive, NT, ND, no HSM noted. Peg   EXTREMITIES:  2+ radial and DP pulses noted, no clubbing, cyanosis, or edema noted, Bed bound.   SKIN: No rashes or lesions noted  NEURO: Non verbal, , CN II-XII intact  PSYCH: Depressed.  mood and affect; insight/judgement appropriate  LABS:                        9.7    7.8   )-----------( 339      ( 16 Jan 2019 04:31 )             33.2     01-16    140  |  101  |  32.0<H>  ----------------------------<  109  4.0   |  29.0  |  0.68    Ca    9.3      16 Jan 2019 04:31          RADIOLOGY & ADDITIONAL TESTS:    MEDICATIONS:  MEDICATIONS  (STANDING):  ampicillin/sulbactam  IVPB 3 Gram(s) IV Intermittent every 6 hours  ascorbic acid 500 milliGRAM(s) Oral daily  aspirin  chewable 81 milliGRAM(s) Oral daily  digoxin     Tablet 0.125 milliGRAM(s) Oral daily  enoxaparin Injectable 40 milliGRAM(s) SubCutaneous daily  famotidine    Tablet 20 milliGRAM(s) Oral <User Schedule>  furosemide    Tablet 40 milliGRAM(s) Oral daily  labetalol 100 milliGRAM(s) Oral three times a day  levETIRAcetam  Solution 1000 milliGRAM(s) Oral two times a day  multivitamin 1 Tablet(s) Oral daily  phenytoin   Suspension 400 milliGRAM(s) Oral every 12 hours  sodium chloride 3%  Inhalation 4 milliLiter(s) Inhalation two times a day  tamsulosin 0.4 milliGRAM(s) Oral at bedtime  zinc sulfate 220 milliGRAM(s) Oral daily    MEDICATIONS  (PRN):  acetaminophen    Suspension .. 650 milliGRAM(s) Oral every 6 hours PRN Temp greater or equal to 38C (100.4F)  HYDROmorphone  Injectable 0.5 milliGRAM(s) IV Push every 2 hours PRN Dyspnea/ Pre-emptive analgesia for wound care-painful procedures

## 2019-01-17 NOTE — PROGRESS NOTE ADULT - ASSESSMENT
The patient is an 85 yo male, PMHx chronic trach/PEG secondary to pneumonia ~2 years ago complicated by multiple bouts of pneumonia, unable to liberate from vent due to secretions, PEG infection, MDR infections, seizure d/o, CHF, HTN, HLD, BPH with chronic jones, colostomy, who presented from [facility] 1/3/18 with respiratory distress and fever >102'F axillary. Admitted for sepsis secondary to Group beta hemolytic bacteremia secondary to stage 4 sacral ulcer and pseudomonas UTI. Was evaluated by ID, started on broad spectrum antibiotics and then transition to Levaquin Sacral wound evaluated by surgery and wound care. CT suggestive of sacral osteomyelitis. RRT called for acute on chronic hypoxic respiratory requiring vent support secondary to mucous plugging. Was transferred to the ICU.  Patient was transfused 3u PRBC for acute anemia. Febrile with pseudomonas and carbapenem resistant acetinobacter in the sputum. Started on IV zosyn.     1. Sepsis secondary to sacral Osteomyelitis, Pseudomonas UTI and strep bacteremia:  ID following. Unsyn till Feb 15.       2. Acute on chronic hypoxic respiratory failure: improving.  Frequent suctioning    3. Hypernatremia: Resolved. Continue Free water via tube feed.  Monitor bmp    4. Iron Deficiency anemia: Hb/hct stable  Status post 3 units prbc    5. Seizure disorder: Continue keppra    6. Hypertension: procardia and labetalol were held for hypotension on admission  Labetalol    7. Chronic Diastolic CHF; On po lasix . Monitor bmp for resumption    8 Stage 4 sacral ulcer: Continue wound care;     9. Severe Protein Calorie Malnutrition - Nutrition input appreciated.  Adjusted tube feeds.  Continue Supplements . Recommend prosobee .. To limited Tube feeding to 65ml/hr to avoid aspiration.     10. Functional Quadriplegia - supportive care.  PT followup.            Israel Lewis (KARLA)  (Sig

## 2019-01-17 NOTE — PROGRESS NOTE ADULT - ASSESSMENT
87 y/o male with dementia, h/o CAD s/p 2 stents, CHF, respiratory failure on chronic vent via tracheostomy, sacral decubitus, h/o sacral osteomyelitis     Streptococcus septicemia  Stenotrophomonas, MDR Acinetobacter, Proteus and  Pseudomonas Pneumonia  VDRF  PCN allergy    - strep bacteremia; source likely from sacral wound  - repeat blood cultures negative 1/5/19  - possible UTI with urine + for CRE pseudomonas  - Sputum with Stenotrophomonas, MDR Acinetobacter, Proteus and  Pseudomonas  - Completed 10 days of Levaquin   - Completed Zosyn 7 days  - Started Unasyn 3gm IV q 6hours   - Plan for antibiotics till 2/15/19  - Trend Fever  - Trend Leukocytosis    will follow up weekly

## 2019-01-18 LAB
ANION GAP SERPL CALC-SCNC: 13 MMOL/L — SIGNIFICANT CHANGE UP (ref 5–17)
BUN SERPL-MCNC: 42 MG/DL — HIGH (ref 8–20)
CALCIUM SERPL-MCNC: 9.5 MG/DL — SIGNIFICANT CHANGE UP (ref 8.6–10.2)
CHLORIDE SERPL-SCNC: 104 MMOL/L — SIGNIFICANT CHANGE UP (ref 98–107)
CO2 SERPL-SCNC: 28 MMOL/L — SIGNIFICANT CHANGE UP (ref 22–29)
CREAT SERPL-MCNC: 0.58 MG/DL — SIGNIFICANT CHANGE UP (ref 0.5–1.3)
GLUCOSE SERPL-MCNC: 122 MG/DL — HIGH (ref 70–115)
HCT VFR BLD CALC: 34.8 % — LOW (ref 42–52)
HGB BLD-MCNC: 10 G/DL — LOW (ref 14–18)
MCHC RBC-ENTMCNC: 24.1 PG — LOW (ref 27–31)
MCHC RBC-ENTMCNC: 28.7 G/DL — LOW (ref 32–36)
MCV RBC AUTO: 83.9 FL — SIGNIFICANT CHANGE UP (ref 80–94)
PLATELET # BLD AUTO: 370 K/UL — SIGNIFICANT CHANGE UP (ref 150–400)
POTASSIUM SERPL-MCNC: 4.3 MMOL/L — SIGNIFICANT CHANGE UP (ref 3.5–5.3)
POTASSIUM SERPL-SCNC: 4.3 MMOL/L — SIGNIFICANT CHANGE UP (ref 3.5–5.3)
RBC # BLD: 4.15 M/UL — LOW (ref 4.6–6.2)
RBC # FLD: 24.2 % — HIGH (ref 11–15.6)
SODIUM SERPL-SCNC: 145 MMOL/L — SIGNIFICANT CHANGE UP (ref 135–145)
WBC # BLD: 8.5 K/UL — SIGNIFICANT CHANGE UP (ref 4.8–10.8)
WBC # FLD AUTO: 8.5 K/UL — SIGNIFICANT CHANGE UP (ref 4.8–10.8)

## 2019-01-18 PROCEDURE — 99232 SBSQ HOSP IP/OBS MODERATE 35: CPT

## 2019-01-18 PROCEDURE — 99233 SBSQ HOSP IP/OBS HIGH 50: CPT

## 2019-01-18 PROCEDURE — 99231 SBSQ HOSP IP/OBS SF/LOW 25: CPT

## 2019-01-18 RX ADMIN — ZINC SULFATE TAB 220 MG (50 MG ZINC EQUIVALENT) 220 MILLIGRAM(S): 220 (50 ZN) TAB at 12:51

## 2019-01-18 RX ADMIN — Medication 40 MILLIGRAM(S): at 05:04

## 2019-01-18 RX ADMIN — Medication 0.12 MILLIGRAM(S): at 05:04

## 2019-01-18 RX ADMIN — LEVETIRACETAM 1000 MILLIGRAM(S): 250 TABLET, FILM COATED ORAL at 17:54

## 2019-01-18 RX ADMIN — Medication 500 MILLIGRAM(S): at 12:52

## 2019-01-18 RX ADMIN — AMPICILLIN SODIUM AND SULBACTAM SODIUM 200 GRAM(S): 250; 125 INJECTION, POWDER, FOR SUSPENSION INTRAMUSCULAR; INTRAVENOUS at 12:51

## 2019-01-18 RX ADMIN — Medication 100 MILLIGRAM(S): at 05:04

## 2019-01-18 RX ADMIN — SODIUM CHLORIDE 4 MILLILITER(S): 9 INJECTION INTRAMUSCULAR; INTRAVENOUS; SUBCUTANEOUS at 20:59

## 2019-01-18 RX ADMIN — Medication 100 MILLIGRAM(S): at 23:22

## 2019-01-18 RX ADMIN — Medication 400 MILLIGRAM(S): at 05:03

## 2019-01-18 RX ADMIN — AMPICILLIN SODIUM AND SULBACTAM SODIUM 200 GRAM(S): 250; 125 INJECTION, POWDER, FOR SUSPENSION INTRAMUSCULAR; INTRAVENOUS at 05:04

## 2019-01-18 RX ADMIN — LEVETIRACETAM 1000 MILLIGRAM(S): 250 TABLET, FILM COATED ORAL at 05:03

## 2019-01-18 RX ADMIN — Medication 100 MILLIGRAM(S): at 14:01

## 2019-01-18 RX ADMIN — AMPICILLIN SODIUM AND SULBACTAM SODIUM 200 GRAM(S): 250; 125 INJECTION, POWDER, FOR SUSPENSION INTRAMUSCULAR; INTRAVENOUS at 17:54

## 2019-01-18 RX ADMIN — Medication 81 MILLIGRAM(S): at 12:54

## 2019-01-18 RX ADMIN — SODIUM CHLORIDE 4 MILLILITER(S): 9 INJECTION INTRAMUSCULAR; INTRAVENOUS; SUBCUTANEOUS at 09:02

## 2019-01-18 RX ADMIN — FAMOTIDINE 20 MILLIGRAM(S): 10 INJECTION INTRAVENOUS at 05:04

## 2019-01-18 RX ADMIN — ENOXAPARIN SODIUM 40 MILLIGRAM(S): 100 INJECTION SUBCUTANEOUS at 12:53

## 2019-01-18 RX ADMIN — AMPICILLIN SODIUM AND SULBACTAM SODIUM 200 GRAM(S): 250; 125 INJECTION, POWDER, FOR SUSPENSION INTRAMUSCULAR; INTRAVENOUS at 23:22

## 2019-01-18 RX ADMIN — TAMSULOSIN HYDROCHLORIDE 0.4 MILLIGRAM(S): 0.4 CAPSULE ORAL at 23:23

## 2019-01-18 RX ADMIN — Medication 1 TABLET(S): at 12:51

## 2019-01-18 RX ADMIN — Medication 400 MILLIGRAM(S): at 17:54

## 2019-01-18 RX ADMIN — AMPICILLIN SODIUM AND SULBACTAM SODIUM 200 GRAM(S): 250; 125 INJECTION, POWDER, FOR SUSPENSION INTRAMUSCULAR; INTRAVENOUS at 00:41

## 2019-01-18 NOTE — PROGRESS NOTE ADULT - ASSESSMENT
The patient is an 87 yo male, PMHx chronic trach/PEG secondary to pneumonia ~2 years ago complicated by multiple bouts of pneumonia, unable to liberate from vent due to secretions, PEG infection, MDR infections, seizure d/o, CHF, HTN, HLD, BPH with chronic jones, colostomy, who presented from [facility] 1/3/18 with respiratory distress and fever >102'F axillary. Admitted for sepsis secondary to Group beta hemolytic bacteremia secondary to stage 4 sacral ulcer and pseudomonas UTI. Was evaluated by ID, started on broad spectrum antibiotics and then transition to Levaquin Sacral wound evaluated by surgery and wound care. CT suggestive of sacral osteomyelitis. RRT called for acute on chronic hypoxic respiratory requiring vent support secondary to mucous plugging. Was transferred to the ICU.  Patient was transfused 3u PRBC for acute anemia. Febrile with pseudomonas and carbapenem resistant acetinobacter in the sputum. Started on IV zosyn.     1. Sepsis secondary to sacral Osteomyelitis, Pseudomonas UTI and strep bacteremia:  ID following. Unsyn till Feb 15.       2. Acute on chronic hypoxic respiratory failure: improving.  Frequent suctioning    3. Hypernatremia: Resolved. Continue Free water via tube feed.  Monitor bmp    4. Iron Deficiency anemia: Hb/hct stable  Status post 3 units prbc    5. Seizure disorder: Continue keppra    6. Hypertension: procardia and labetalol were held for hypotension on admission  Labetalol    7. Chronic Diastolic CHF; On po lasix . Monitor bmp for resumption    8 Stage 4 sacral ulcer: Continue wound care;     9. Severe Protein Calorie Malnutrition -   Adjusted tube feeds.  Continue Supplements . Recommend protein source. To consult nutritionist.  To limited Tube feeding to 65ml/hr to avoid aspiration.     10. Functional Quadriplegia - supportive care.  PT followup.

## 2019-01-18 NOTE — PROGRESS NOTE ADULT - SUBJECTIVE AND OBJECTIVE BOX
[facility]  INFECTIOUS DISEASES AND INTERNAL MEDICINE at [place]  =======================================================  Renzo Salazar MD  Diplomates American Board of Internal Medicine and Infectious Diseases  =======================================================    [place] [id number]    Follow up: Streptococcus sepsis / MDR bacteria Pneumonia      No fevers      Allergies:  Ativan (Unknown)  clindamycin (Unknown)  Grapes (Rash)  Haldol (Dystonic RXN)  hydrALAZINE (Unknown)  Nuts (Hives; Rash)  PC Pen VK (Unknown)  shellfish (Angioedema; Rash; Hives)  strawberry (Short breath; Rash; Hives)  Xanax (Rash)  Zyprexa (Unknown)      Antibiotics:  ampicillin/sulbactam  IVPB 3 Gram(s) IV Intermittent every 6 hours       REVIEW OF SYSTEMS:  Unable to obtain, patient not able to provide history       Physical Exam:  Vital Signs Last 24 Hrs  T(C): 36.7 (18 Jan 2019 00:00), Max: 37.1 (17 Jan 2019 11:00)  T(F): 98 (18 Jan 2019 00:00), Max: 98.8 (17 Jan 2019 11:00)  HR: 78 (18 Jan 2019 08:00) (75 - 92)  BP: 125/60 (18 Jan 2019 08:00) (113/55 - 125/66)  BP(mean): 87 (18 Jan 2019 08:00) (79 - 89)  RR: 22 (18 Jan 2019 08:00) (22 - 39)  SpO2: 97% (18 Jan 2019 08:00) (94% - 100%)      GEN: NAD  HEENT: normocephalic and atraumatic. Anicteric. + Trach  NECK: Supple.   LUNGS: Course BS B/L  HEART: Regular rate and rhythm   ABDOMEN: Soft, nontender, and nondistended.  Positive bowel sounds.  + PEG  EXTREMITIES: + edema.  MSK: no joint swelling  NEUROLOGIC: Awake, no meaningful communication   PSYCHIATRIC:  unable to assess  SKIN: No Rash      Labs:  01-18    145  |  104  |  42.0<H>  ----------------------------<  122<H>  4.3   |  28.0  |  0.58    Ca    9.5      18 Jan 2019 07:19               10.0   8.5   )-----------( 370      ( 18 Jan 2019 07:19 )             34.8     RECENT CULTURES:  01-07 @ 18:20 .Sputum Acinetobacter baumannii/haemolyticus (Carbapenem Resistant)  Pseudomonas aeruginosa    Moderate Acinetobacter baumannii/haemolyticus (Carbapenem Resistant)  ***KNOWN***  Moderate Pseudomonas aeruginosa (Carbapenem Resistant)  Moderate White blood cells  Few Gram Negative Rods  Few Gram Positive Cocci in Pairs and Chains      01-07 @ 14:01 .Urine     No growth      01-07 @ 07:30 .Sputum Stenotrophomonas maltophilia  Proteus mirabilis  Acinetobacter baumannii/haemolyticus (Carbapenem Resistant)  Pseudomonas aeruginosa (Carbapenem Resistant)    Moderate Acinetobacter baumannii (Carbapenem Resistant)  Moderate Stenotrophomonas maltophilia  Moderate Pseudomonas aeruginosa (Carbapenem Resistant)  Moderate Proteus mirabilis  Few Routine respiratory bonnie present  Numerous White blood cells  Moderate Gram Negative Rods  Few Gram positive cocci in pairs      01-07 @ 05:46 .Blood     No growth at 5 days.      01-05 @ 10:49 .Blood     No growth at 5 days.      01-03 @ 20:12 .Urine Pseudomonas aeruginosa (Carbapenem Resistant)    10,000 - 49,000 CFU/mL Pseudomonas aeruginosa (Carbapenem Resistant)      01-03 @ 20:04    [facility]      01-03 @ 18:47 .Blood Beta Hemolytic Streptococci, Group G    Growth in anaerobic bottle: Beta Hemolytic Streptococci, Group G  Anaerobic Bottle: 10:36 Hours to positivity  Aerobic Bottle: No growth at 5 days.      01-03 @ 18:46 .Blood Beta Hemolytic Streptococci, Group G  Blood Culture PCR    Growth in aerobic bottle: Beta Hemolytic Streptococci, Group G  Aerobic Bottle: 10:54 Hours to positivity  Anaerobic Bottle: No growth at 5 days.  ***Blood Panel PCR results on this specimen are available  approximately 3 hours after the Gram stain result.***  Gram stain, PCR, and/or culture results may not always  correspond due to difference in methodologies.  ************************************************************  This PCR assay was performed using [employer].  The following targets are tested for: Enterococcus,  vancomycin resistant enterococci, Listeria monocytogenes,  coagulase negative staphylococci, S. aureus,  methicillin resistant S. aureus, Streptococcus agalactiae  (Group B), S. pneumoniae, S. pyogenes (Group A),  Acinetobacter baumannii, Enterobacter cloacae, E. coli,  Klebsiella oxytoca, K. pneumoniae, Proteus sp.,  Serratia marcescens, Haemophilus influenzae,  Neisseria meningitidis, Pseudomonas aeruginosa, Candida  albicans, C. glabrata, C krusei, C parapsilosis,  C. tropicalis and the KPC resistance gene.  "Due to technical problems, Proteus sp. will Not be reported as part of  the BCID panel until further notice"

## 2019-01-18 NOTE — PROGRESS NOTE ADULT - ASSESSMENT
VDRF secondary to neurologic process  Post sepsis, infected decub  No change in respiratory status or ventilatory requirements    Plan:  Vent support  Per medical team  Not weanable.

## 2019-01-18 NOTE — PROGRESS NOTE ADULT - ASSESSMENT
87 y/o male with dementia, h/o CAD s/p 2 stents, CHF, respiratory failure on chronic vent via tracheostomy, sacral decubitus, h/o sacral osteomyelitis     Streptococcus septicemia  Stenotrophomonas, MDR Acinetobacter, Proteus and  Pseudomonas Pneumonia  VDRF  PCN allergy    - strep bacteremia; source likely from sacral wound  - repeat blood cultures negative 1/5/19  - possible UTI with urine + for CRE pseudomonas  - Sputum with Stenotrophomonas, MDR Acinetobacter, Proteus and  Pseudomonas  - Completed 10 days of Levaquin for pneumonia  - Completed Zosyn 7 days for pneumonia  - Continue Unasyn 3gm IV q 6hours for septicemia  - Plan for antibiotics till 2/15/19 for septicemia osteomyelitis   - Trend Fever  - Trend Leukocytosis    will follow up weekly

## 2019-01-18 NOTE — PROGRESS NOTE ADULT - SUBJECTIVE AND OBJECTIVE BOX
CC: Unstageable sacral decubital ulcer.  Resp failure on vent support. Trach and peg.  Right chest pleural catheter from previous hospitalization.   HPI:  85 y/o male with dementia, h/o CAD s/p 2 stents, CHF, respiratory failure on chronic vent via tracheostomy tube with h/o infection in the tube needed IV antibiotics for months ended in 12/2018, sacral decubitus ulcers with h/o sacral osteomyelitis treated about 2 years ago with Abc, colostomy and chronic Baugh's catheter. CT scan in 3/2018 was suspicious for sacral osteomyelitis. [facility]. in the ER, Temp: 103.6. Labs showed WBC: 14 000. Hgb: 7.7. Trop: 0.19 with h/o chronic elevation. (03 Jan 2019 22:39)    REVIEW OF SYSTEMS:    Patient denied fever, chills, abdominal pain, nausea, vomiting, cough, shortness of breath, chest pain or palpitations    Vital Signs Last 24 Hrs  T(C): 38.1 (18 Jan 2019 09:08), Max: 38.1 (18 Jan 2019 09:08)  T(F): 100.5 (18 Jan 2019 09:08), Max: 100.5 (18 Jan 2019 09:08)  HR: 80 (18 Jan 2019 16:13) (78 - 92)  BP: 118/58 (18 Jan 2019 12:00) (118/58 - 125/66)  BP(mean): 83 (18 Jan 2019 12:00) (83 - 89)  RR: 33 (18 Jan 2019 12:00) (22 - 39)  SpO2: 97% (18 Jan 2019 16:13) (94% - 100%)I&O's Summary    17 Jan 2019 07:01  -  18 Jan 2019 07:00  --------------------------------------------------------  IN: 2560 mL / OUT: 1500 mL / NET: 1060 mL      PHYSICAL EXAM:  GENERAL: NAD,   HEENT: PERRL, +EOMI,   NECK: Supple, No JVD Trach   CHEST/LUNG: Bilateral transmitted breath sound   HEART: S1S2 Normal intensity, no murmurs, gallops or rubs noted  ABDOMEN: Soft, BS Normoactive, NT, ND, no HSM noted. Peg   EXTREMITIES:  2+ radial and DP pulses noted, no clubbing, cyanosis, or edema noted, Bed bound   SKIN: Unstageable sacral decubital ulcer   NEURO: Non verbal , CN II-XII intact  PSYCH: Non verbal   LABS:                        10.0   8.5   )-----------( 370      ( 18 Jan 2019 07:19 )             34.8     01-18    145  |  104  |  42.0<H>  ----------------------------<  122<H>  4.3   |  28.0  |  0.58    Ca    9.5      18 Jan 2019 07:19          RADIOLOGY & ADDITIONAL TESTS:    MEDICATIONS:  MEDICATIONS  (STANDING):  ampicillin/sulbactam  IVPB 3 Gram(s) IV Intermittent every 6 hours  ascorbic acid 500 milliGRAM(s) Oral daily  aspirin  chewable 81 milliGRAM(s) Oral daily  digoxin     Tablet 0.125 milliGRAM(s) Oral daily  enoxaparin Injectable 40 milliGRAM(s) SubCutaneous daily  famotidine    Tablet 20 milliGRAM(s) Oral <User Schedule>  furosemide    Tablet 40 milliGRAM(s) Oral daily  labetalol 100 milliGRAM(s) Oral three times a day  levETIRAcetam  Solution 1000 milliGRAM(s) Oral two times a day  multivitamin 1 Tablet(s) Oral daily  phenytoin   Suspension 400 milliGRAM(s) Oral every 12 hours  sodium chloride 3%  Inhalation 4 milliLiter(s) Inhalation two times a day  tamsulosin 0.4 milliGRAM(s) Oral at bedtime  zinc sulfate 220 milliGRAM(s) Oral daily    MEDICATIONS  (PRN):  acetaminophen    Suspension .. 650 milliGRAM(s) Oral every 6 hours PRN Temp greater or equal to 38C (100.4F)

## 2019-01-19 LAB
ANION GAP SERPL CALC-SCNC: 13 MMOL/L — SIGNIFICANT CHANGE UP (ref 5–17)
BUN SERPL-MCNC: 51 MG/DL — HIGH (ref 8–20)
CALCIUM SERPL-MCNC: 9.5 MG/DL — SIGNIFICANT CHANGE UP (ref 8.6–10.2)
CHLORIDE SERPL-SCNC: 112 MMOL/L — HIGH (ref 98–107)
CO2 SERPL-SCNC: 26 MMOL/L — SIGNIFICANT CHANGE UP (ref 22–29)
CREAT SERPL-MCNC: 0.63 MG/DL — SIGNIFICANT CHANGE UP (ref 0.5–1.3)
GLUCOSE SERPL-MCNC: 142 MG/DL — HIGH (ref 70–115)
HCT VFR BLD CALC: 34.7 % — LOW (ref 42–52)
HGB BLD-MCNC: 9.7 G/DL — LOW (ref 14–18)
MCHC RBC-ENTMCNC: 24 PG — LOW (ref 27–31)
MCHC RBC-ENTMCNC: 28 G/DL — LOW (ref 32–36)
MCV RBC AUTO: 85.9 FL — SIGNIFICANT CHANGE UP (ref 80–94)
PLATELET # BLD AUTO: 345 K/UL — SIGNIFICANT CHANGE UP (ref 150–400)
POTASSIUM SERPL-MCNC: 4 MMOL/L — SIGNIFICANT CHANGE UP (ref 3.5–5.3)
POTASSIUM SERPL-SCNC: 4 MMOL/L — SIGNIFICANT CHANGE UP (ref 3.5–5.3)
RBC # BLD: 4.04 M/UL — LOW (ref 4.6–6.2)
RBC # FLD: 25 % — HIGH (ref 11–15.6)
SODIUM SERPL-SCNC: 151 MMOL/L — HIGH (ref 135–145)
WBC # BLD: 8.8 K/UL — SIGNIFICANT CHANGE UP (ref 4.8–10.8)
WBC # FLD AUTO: 8.8 K/UL — SIGNIFICANT CHANGE UP (ref 4.8–10.8)

## 2019-01-19 PROCEDURE — 99233 SBSQ HOSP IP/OBS HIGH 50: CPT

## 2019-01-19 RX ADMIN — Medication 100 MILLIGRAM(S): at 13:19

## 2019-01-19 RX ADMIN — AMPICILLIN SODIUM AND SULBACTAM SODIUM 200 GRAM(S): 250; 125 INJECTION, POWDER, FOR SUSPENSION INTRAMUSCULAR; INTRAVENOUS at 13:00

## 2019-01-19 RX ADMIN — SODIUM CHLORIDE 4 MILLILITER(S): 9 INJECTION INTRAMUSCULAR; INTRAVENOUS; SUBCUTANEOUS at 19:55

## 2019-01-19 RX ADMIN — Medication 81 MILLIGRAM(S): at 13:19

## 2019-01-19 RX ADMIN — ZINC SULFATE TAB 220 MG (50 MG ZINC EQUIVALENT) 220 MILLIGRAM(S): 220 (50 ZN) TAB at 13:19

## 2019-01-19 RX ADMIN — Medication 1 TABLET(S): at 13:19

## 2019-01-19 RX ADMIN — Medication 650 MILLIGRAM(S): at 08:33

## 2019-01-19 RX ADMIN — ENOXAPARIN SODIUM 40 MILLIGRAM(S): 100 INJECTION SUBCUTANEOUS at 13:19

## 2019-01-19 RX ADMIN — Medication 100 MILLIGRAM(S): at 05:32

## 2019-01-19 RX ADMIN — AMPICILLIN SODIUM AND SULBACTAM SODIUM 200 GRAM(S): 250; 125 INJECTION, POWDER, FOR SUSPENSION INTRAMUSCULAR; INTRAVENOUS at 18:33

## 2019-01-19 RX ADMIN — TAMSULOSIN HYDROCHLORIDE 0.4 MILLIGRAM(S): 0.4 CAPSULE ORAL at 22:12

## 2019-01-19 RX ADMIN — Medication 40 MILLIGRAM(S): at 05:33

## 2019-01-19 RX ADMIN — Medication 400 MILLIGRAM(S): at 05:32

## 2019-01-19 RX ADMIN — Medication 650 MILLIGRAM(S): at 05:33

## 2019-01-19 RX ADMIN — LEVETIRACETAM 1000 MILLIGRAM(S): 250 TABLET, FILM COATED ORAL at 18:33

## 2019-01-19 RX ADMIN — AMPICILLIN SODIUM AND SULBACTAM SODIUM 200 GRAM(S): 250; 125 INJECTION, POWDER, FOR SUSPENSION INTRAMUSCULAR; INTRAVENOUS at 05:32

## 2019-01-19 RX ADMIN — LEVETIRACETAM 1000 MILLIGRAM(S): 250 TABLET, FILM COATED ORAL at 05:32

## 2019-01-19 RX ADMIN — SODIUM CHLORIDE 4 MILLILITER(S): 9 INJECTION INTRAMUSCULAR; INTRAVENOUS; SUBCUTANEOUS at 09:05

## 2019-01-19 RX ADMIN — Medication 500 MILLIGRAM(S): at 13:19

## 2019-01-19 RX ADMIN — FAMOTIDINE 20 MILLIGRAM(S): 10 INJECTION INTRAVENOUS at 05:32

## 2019-01-19 RX ADMIN — Medication 100 MILLIGRAM(S): at 22:12

## 2019-01-19 RX ADMIN — Medication 400 MILLIGRAM(S): at 18:33

## 2019-01-19 RX ADMIN — Medication 0.12 MILLIGRAM(S): at 05:32

## 2019-01-19 NOTE — PROGRESS NOTE ADULT - SUBJECTIVE AND OBJECTIVE BOX
CC: Sacral ulcer unstageable. Sepsis is resolving . Intermittent fevers. Peg trach. Vent support.   HPI:  85 y/o male with dementia, h/o CAD s/p 2 stents, CHF, respiratory failure on chronic vent via tracheostomy tube with h/o infection in the tube needed IV antibiotics for months ended in 12/2018, sacral decubitus ulcers with h/o sacral osteomyelitis treated about 2 years ago with Abc, colostomy and chronic Baugh's catheter. CT scan in 3/2018 was suspicious for sacral osteomyelitis. Patient was brought in to the ER from [facility] for respiratory distress and fever. in the ER, Temp: 103.6. Labs showed WBC: 14 000. Hgb: 7.7. Trop: 0.19 with h/o chronic elevation. (03 Jan 2019 22:39)    REVIEW OF SYSTEMS:    Patient denied fever, chills, abdominal pain, nausea, vomiting, cough, shortness of breath, chest pain or palpitations    Vital Signs Last 24 Hrs  T(C): 37 (19 Jan 2019 11:03), Max: 38.5 (19 Jan 2019 05:01)  T(F): 98.6 (19 Jan 2019 11:03), Max: 101.3 (19 Jan 2019 05:01)  HR: 66 (19 Jan 2019 16:15) (65 - 79)  BP: 113/59 (19 Jan 2019 16:00) (112/59 - 136/63)  BP(mean): 82 (19 Jan 2019 16:00) (82 - 91)  RR: 20 (19 Jan 2019 16:00) (20 - 39)  SpO2: 99% (19 Jan 2019 16:15) (93% - 99%)I&O's Summary    18 Jan 2019 07:01  -  19 Jan 2019 07:00  --------------------------------------------------------  IN: 2890 mL / OUT: 1450 mL / NET: 1440 mL    19 Jan 2019 07:01  -  19 Jan 2019 18:10  --------------------------------------------------------  IN: 890 mL / OUT: 475 mL / NET: 415 mL      PHYSICAL EXAM:  GENERAL: NAD,   HEENT: PERRL, +EOMI, anicteric, no [place]  NECK: Supple, No JVD . Trach   CHEST/LUNG: CTA bilaterally; Normal effort  HEART: S1S2 Normal intensity, no murmurs, gallops or rubs noted  ABDOMEN: Soft, BS Normoactive, NT, ND, no HSM noted  EXTREMITIES:  2+ radial and DP pulses noted, no clubbing, cyanosis, or edema noted, Bed bound   SKIN: Unstageable sacral decubital ulcer.   NEURO: Non verbal  CN II-XII intact  PSYCH: Non verbal   LABS:                        9.7    8.8   )-----------( 345      ( 19 Jan 2019 07:22 )             34.7     01-19    151<H>  |  112<H>  |  51.0<H>  ----------------------------<  142<H>  4.0   |  26.0  |  0.63    Ca    9.5      19 Jan 2019 07:22          RADIOLOGY & ADDITIONAL TESTS:    MEDICATIONS:  MEDICATIONS  (STANDING):  ampicillin/sulbactam  IVPB 3 Gram(s) IV Intermittent every 6 hours  ascorbic acid 500 milliGRAM(s) Oral daily  aspirin  chewable 81 milliGRAM(s) Oral daily  digoxin     Tablet 0.125 milliGRAM(s) Oral daily  enoxaparin Injectable 40 milliGRAM(s) SubCutaneous daily  famotidine    Tablet 20 milliGRAM(s) Oral <User Schedule>  furosemide    Tablet 40 milliGRAM(s) Oral daily  labetalol 100 milliGRAM(s) Oral three times a day  levETIRAcetam  Solution 1000 milliGRAM(s) Oral two times a day  multivitamin 1 Tablet(s) Oral daily  phenytoin   Suspension 400 milliGRAM(s) Oral every 12 hours  sodium chloride 3%  Inhalation 4 milliLiter(s) Inhalation two times a day  tamsulosin 0.4 milliGRAM(s) Oral at bedtime  zinc sulfate 220 milliGRAM(s) Oral daily    MEDICATIONS  (PRN):  acetaminophen    Suspension .. 650 milliGRAM(s) Oral every 6 hours PRN Temp greater or equal to 38C (100.4F)

## 2019-01-19 NOTE — PROGRESS NOTE ADULT - ASSESSMENT
The patient is an 87 yo male, PMHx chronic trach/PEG secondary to pneumonia ~2 years ago complicated by multiple bouts of pneumonia, unable to liberate from vent due to secretions, PEG infection, MDR infections, seizure d/o, CHF, HTN, HLD, BPH with chronic jones, colostomy, who presented from [facility] 1/3/18 with respiratory distress and fever >102'F axillary. Admitted for sepsis secondary to Group beta hemolytic bacteremia secondary to stage 4 sacral ulcer and pseudomonas UTI. Was evaluated by ID, started on broad spectrum antibiotics and then transition to Levaquin Sacral wound evaluated by surgery and wound care. CT suggestive of sacral osteomyelitis. RRT called for acute on chronic hypoxic respiratory requiring vent support secondary to mucous plugging. Was transferred to the ICU.  Patient was transfused 3u PRBC for acute anemia. Febrile with pseudomonas and carbapenem resistant acetinobacter in the sputum. Started on IV zosyn.     1. Sepsis secondary to sacral Osteomyelitis, Pseudomonas UTI and strep bacteremia:  ID following. Unsyn till Feb 15.       2. Acute on chronic hypoxic respiratory failure: improving.  Frequent suctioning    3. Hypernatremia: Resolved. Continue Free water via tube feed.  Monitor bmp    4. Iron Deficiency anemia: Hb/hct stable  Status post 3 units prbc    5. Seizure disorder: Continue keppra    6. Hypertension: procardia and labetalol were held for hypotension on admission  Labetalol    7. Chronic Diastolic CHF; On po lasix . Monitor bmp for resumption    8 Stage 4 sacral ulcer: Continue wound care;     9. Severe Protein Calorie Malnutrition -   Adjusted tube feeds.  Continue Supplements . Recommend protein source. To consult nutritionist.  To limited Tube feeding to 65ml/hr to avoid aspiration.     10. Functional Quadriplegia - supportive care.  PT followup.

## 2019-01-20 LAB
ANION GAP SERPL CALC-SCNC: 12 MMOL/L — SIGNIFICANT CHANGE UP (ref 5–17)
BUN SERPL-MCNC: 54 MG/DL — HIGH (ref 8–20)
CALCIUM SERPL-MCNC: 9.5 MG/DL — SIGNIFICANT CHANGE UP (ref 8.6–10.2)
CHLORIDE SERPL-SCNC: 112 MMOL/L — HIGH (ref 98–107)
CO2 SERPL-SCNC: 28 MMOL/L — SIGNIFICANT CHANGE UP (ref 22–29)
CREAT SERPL-MCNC: 0.62 MG/DL — SIGNIFICANT CHANGE UP (ref 0.5–1.3)
GLUCOSE SERPL-MCNC: 129 MG/DL — HIGH (ref 70–115)
HCT VFR BLD CALC: 33.8 % — LOW (ref 42–52)
HGB BLD-MCNC: 9.6 G/DL — LOW (ref 14–18)
MCHC RBC-ENTMCNC: 24.9 PG — LOW (ref 27–31)
MCHC RBC-ENTMCNC: 28.4 G/DL — LOW (ref 32–36)
MCV RBC AUTO: 87.8 FL — SIGNIFICANT CHANGE UP (ref 80–94)
PLATELET # BLD AUTO: 329 K/UL — SIGNIFICANT CHANGE UP (ref 150–400)
POTASSIUM SERPL-MCNC: 3.9 MMOL/L — SIGNIFICANT CHANGE UP (ref 3.5–5.3)
POTASSIUM SERPL-SCNC: 3.9 MMOL/L — SIGNIFICANT CHANGE UP (ref 3.5–5.3)
RBC # BLD: 3.85 M/UL — LOW (ref 4.6–6.2)
RBC # FLD: 24.6 % — HIGH (ref 11–15.6)
SODIUM SERPL-SCNC: 152 MMOL/L — HIGH (ref 135–145)
WBC # BLD: 6.3 K/UL — SIGNIFICANT CHANGE UP (ref 4.8–10.8)
WBC # FLD AUTO: 6.3 K/UL — SIGNIFICANT CHANGE UP (ref 4.8–10.8)

## 2019-01-20 PROCEDURE — 99233 SBSQ HOSP IP/OBS HIGH 50: CPT

## 2019-01-20 RX ADMIN — AMPICILLIN SODIUM AND SULBACTAM SODIUM 200 GRAM(S): 250; 125 INJECTION, POWDER, FOR SUSPENSION INTRAMUSCULAR; INTRAVENOUS at 23:59

## 2019-01-20 RX ADMIN — LEVETIRACETAM 1000 MILLIGRAM(S): 250 TABLET, FILM COATED ORAL at 18:06

## 2019-01-20 RX ADMIN — AMPICILLIN SODIUM AND SULBACTAM SODIUM 200 GRAM(S): 250; 125 INJECTION, POWDER, FOR SUSPENSION INTRAMUSCULAR; INTRAVENOUS at 18:06

## 2019-01-20 RX ADMIN — TAMSULOSIN HYDROCHLORIDE 0.4 MILLIGRAM(S): 0.4 CAPSULE ORAL at 22:04

## 2019-01-20 RX ADMIN — ZINC SULFATE TAB 220 MG (50 MG ZINC EQUIVALENT) 220 MILLIGRAM(S): 220 (50 ZN) TAB at 12:11

## 2019-01-20 RX ADMIN — Medication 0.12 MILLIGRAM(S): at 05:19

## 2019-01-20 RX ADMIN — ENOXAPARIN SODIUM 40 MILLIGRAM(S): 100 INJECTION SUBCUTANEOUS at 12:10

## 2019-01-20 RX ADMIN — AMPICILLIN SODIUM AND SULBACTAM SODIUM 200 GRAM(S): 250; 125 INJECTION, POWDER, FOR SUSPENSION INTRAMUSCULAR; INTRAVENOUS at 00:37

## 2019-01-20 RX ADMIN — SODIUM CHLORIDE 4 MILLILITER(S): 9 INJECTION INTRAMUSCULAR; INTRAVENOUS; SUBCUTANEOUS at 20:50

## 2019-01-20 RX ADMIN — Medication 400 MILLIGRAM(S): at 05:20

## 2019-01-20 RX ADMIN — Medication 100 MILLIGRAM(S): at 05:20

## 2019-01-20 RX ADMIN — Medication 100 MILLIGRAM(S): at 12:11

## 2019-01-20 RX ADMIN — Medication 1 TABLET(S): at 12:11

## 2019-01-20 RX ADMIN — FAMOTIDINE 20 MILLIGRAM(S): 10 INJECTION INTRAVENOUS at 05:21

## 2019-01-20 RX ADMIN — Medication 400 MILLIGRAM(S): at 18:06

## 2019-01-20 RX ADMIN — Medication 100 MILLIGRAM(S): at 22:04

## 2019-01-20 RX ADMIN — SODIUM CHLORIDE 4 MILLILITER(S): 9 INJECTION INTRAMUSCULAR; INTRAVENOUS; SUBCUTANEOUS at 08:58

## 2019-01-20 RX ADMIN — Medication 40 MILLIGRAM(S): at 05:19

## 2019-01-20 RX ADMIN — Medication 81 MILLIGRAM(S): at 12:10

## 2019-01-20 RX ADMIN — Medication 500 MILLIGRAM(S): at 12:10

## 2019-01-20 RX ADMIN — LEVETIRACETAM 1000 MILLIGRAM(S): 250 TABLET, FILM COATED ORAL at 05:20

## 2019-01-20 NOTE — PROGRESS NOTE ADULT - ASSESSMENT
The patient is an 87 yo male, PMHx chronic trach/PEG secondary to pneumonia ~2 years ago complicated by multiple bouts of pneumonia, unable to liberate from vent due to secretions, PEG infection, MDR infections, seizure d/o, CHF, HTN, HLD, BPH with chronic jones, colostomy, who presented from [facility] 1/3/18 with respiratory distress and fever >102'F axillary. Admitted for sepsis secondary to Group beta hemolytic bacteremia secondary to stage 4 sacral ulcer and pseudomonas UTI. Was evaluated by ID, started on broad spectrum antibiotics and then transition to Levaquin Sacral wound evaluated by surgery and wound care. CT suggestive of sacral osteomyelitis. RRT called for acute on chronic hypoxic respiratory requiring vent support secondary to mucous plugging. Was transferred to the ICU.  Patient was transfused 3u PRBC for acute anemia. Febrile with pseudomonas and carbapenem resistant acetinobacter in the sputum. Started on IV zosyn.     1. Sepsis secondary to sacral Osteomyelitis, Pseudomonas UTI and strep bacteremia:  ID following. Unsyn till Feb 15.       2. Acute on chronic hypoxic respiratory failure: improving.  Frequent suctioning    3. Hypernatremia: Resolved. Continue Free water via tube feed.  Monitor bmp    4. Iron Deficiency anemia: Hb/hct stable  Status post 3 units prbc    5. Seizure disorder: Continue keppra    6. Hypertension: procardia and labetalol were held for hypotension on admission  Labetalol    7. Chronic Diastolic CHF; On po lasix . Monitor bmp for resumption    8 Stage 4 sacral ulcer: Continue wound care;     9. Severe Protein Calorie Malnutrition -   Adjusted tube feeds.  Continue Supplements . Recommend protein source. To consult nutritionist.  To limited Tube feeding to 65ml/hr to avoid aspiration.     10. Functional Quadriplegia - supportive care.  PT followup.

## 2019-01-20 NOTE — PROGRESS NOTE ADULT - SUBJECTIVE AND OBJECTIVE BOX
CC: Sepsis from unstageable sacral ulcer.  Sepsis syndrome resolved. Trach peg. Tube feeding   HPI:  87 y/o male with dementia, h/o CAD s/p 2 stents, CHF, respiratory failure on chronic vent via tracheostomy tube with h/o infection in the tube needed IV antibiotics for months ended in 12/2018, sacral decubitus ulcers with h/o sacral osteomyelitis treated about 2 years ago with Abc, colostomy and chronic Baugh's catheter. CT scan in 3/2018 was suspicious for sacral osteomyelitis. [facility]. in the ER, Temp: 103.6. Labs showed WBC: 14 000. Hgb: 7.7. Trop: 0.19 with h/o chronic elevation. (03 Jan 2019 22:39)    REVIEW OF SYSTEMS:    Patient denied fever, chills, abdominal pain, nausea, vomiting, cough, shortness of breath, chest pain or palpitations    Vital Signs Last 24 Hrs  T(C): 37.6 (20 Jan 2019 17:28), Max: 37.6 (19 Jan 2019 20:09)  T(F): 99.7 (20 Jan 2019 17:28), Max: 99.7 (19 Jan 2019 20:09)  HR: 69 (20 Jan 2019 16:39) (69 - 76)  BP: 118/67 (20 Jan 2019 16:00) (118/67 - 139/65)  BP(mean): 87 (20 Jan 2019 16:00) (86 - 93)  RR: 29 (20 Jan 2019 16:00) (22 - 33)  SpO2: 97% (20 Jan 2019 16:39) (95% - 99%)I&O's Summary    19 Jan 2019 07:01  -  20 Jan 2019 07:00  --------------------------------------------------------  IN: 2530 mL / OUT: 1375 mL / NET: 1155 mL    20 Jan 2019 07:01  -  20 Jan 2019 19:33  --------------------------------------------------------  IN: 1400 mL / OUT: 1650 mL / NET: -250 mL      PHYSICAL EXAM:  GENERAL: NAD,   HEENT: PERRL, +EOMI, anicteric, no [place]  NECK: Supple, No JVD Trach  CHEST/LUNG: CTA bilaterally; Normal effort  HEART: S1S2 Normal intensity, no murmurs, gallops or rubs noted  ABDOMEN: Soft, BS Normoactive, NT, ND, no HSM noted Peg   EXTREMITIES:  2+ radial and DP pulses noted, no clubbing, cyanosis, or edema noted, Bed bound   SKIN: No rashes or lesions noted  NEURO: Non verbal   PSYCH: Non verbal   LABS:                        9.6    6.3   )-----------( 329      ( 20 Jan 2019 03:00 )             33.8     01-20    152<H>  |  112<H>  |  54.0<H>  ----------------------------<  129<H>  3.9   |  28.0  |  0.62    Ca    9.5      20 Jan 2019 03:00          RADIOLOGY & ADDITIONAL TESTS:    MEDICATIONS:  MEDICATIONS  (STANDING):  ampicillin/sulbactam  IVPB 3 Gram(s) IV Intermittent every 6 hours  ascorbic acid 500 milliGRAM(s) Oral daily  aspirin  chewable 81 milliGRAM(s) Oral daily  digoxin     Tablet 0.125 milliGRAM(s) Oral daily  enoxaparin Injectable 40 milliGRAM(s) SubCutaneous daily  famotidine    Tablet 20 milliGRAM(s) Oral <User Schedule>  furosemide    Tablet 40 milliGRAM(s) Oral daily  labetalol 100 milliGRAM(s) Oral three times a day  levETIRAcetam  Solution 1000 milliGRAM(s) Oral two times a day  multivitamin 1 Tablet(s) Oral daily  phenytoin   Suspension 400 milliGRAM(s) Oral every 12 hours  sodium chloride 3%  Inhalation 4 milliLiter(s) Inhalation two times a day  tamsulosin 0.4 milliGRAM(s) Oral at bedtime  zinc sulfate 220 milliGRAM(s) Oral daily    MEDICATIONS  (PRN):  acetaminophen    Suspension .. 650 milliGRAM(s) Oral every 6 hours PRN Temp greater or equal to 38C (100.4F)

## 2019-01-21 LAB
ANION GAP SERPL CALC-SCNC: 11 MMOL/L — SIGNIFICANT CHANGE UP (ref 5–17)
BUN SERPL-MCNC: 47 MG/DL — HIGH (ref 8–20)
CALCIUM SERPL-MCNC: 9.5 MG/DL — SIGNIFICANT CHANGE UP (ref 8.6–10.2)
CHLORIDE SERPL-SCNC: 115 MMOL/L — HIGH (ref 98–107)
CO2 SERPL-SCNC: 26 MMOL/L — SIGNIFICANT CHANGE UP (ref 22–29)
CREAT SERPL-MCNC: 0.5 MG/DL — SIGNIFICANT CHANGE UP (ref 0.5–1.3)
GLUCOSE SERPL-MCNC: 135 MG/DL — HIGH (ref 70–115)
HCT VFR BLD CALC: 32.3 % — LOW (ref 42–52)
HGB BLD-MCNC: 9.1 G/DL — LOW (ref 14–18)
MCHC RBC-ENTMCNC: 24.5 PG — LOW (ref 27–31)
MCHC RBC-ENTMCNC: 28.2 G/DL — LOW (ref 32–36)
MCV RBC AUTO: 86.8 FL — SIGNIFICANT CHANGE UP (ref 80–94)
PLATELET # BLD AUTO: 327 K/UL — SIGNIFICANT CHANGE UP (ref 150–400)
POTASSIUM SERPL-MCNC: 3.8 MMOL/L — SIGNIFICANT CHANGE UP (ref 3.5–5.3)
POTASSIUM SERPL-SCNC: 3.8 MMOL/L — SIGNIFICANT CHANGE UP (ref 3.5–5.3)
RBC # BLD: 3.72 M/UL — LOW (ref 4.6–6.2)
RBC # FLD: 25.2 % — HIGH (ref 11–15.6)
SODIUM SERPL-SCNC: 152 MMOL/L — HIGH (ref 135–145)
WBC # BLD: 8.1 K/UL — SIGNIFICANT CHANGE UP (ref 4.8–10.8)
WBC # FLD AUTO: 8.1 K/UL — SIGNIFICANT CHANGE UP (ref 4.8–10.8)

## 2019-01-21 PROCEDURE — 99232 SBSQ HOSP IP/OBS MODERATE 35: CPT

## 2019-01-21 RX ADMIN — ENOXAPARIN SODIUM 40 MILLIGRAM(S): 100 INJECTION SUBCUTANEOUS at 14:06

## 2019-01-21 RX ADMIN — Medication 400 MILLIGRAM(S): at 18:11

## 2019-01-21 RX ADMIN — AMPICILLIN SODIUM AND SULBACTAM SODIUM 200 GRAM(S): 250; 125 INJECTION, POWDER, FOR SUSPENSION INTRAMUSCULAR; INTRAVENOUS at 14:05

## 2019-01-21 RX ADMIN — FAMOTIDINE 20 MILLIGRAM(S): 10 INJECTION INTRAVENOUS at 05:07

## 2019-01-21 RX ADMIN — Medication 1 TABLET(S): at 14:06

## 2019-01-21 RX ADMIN — Medication 40 MILLIGRAM(S): at 05:08

## 2019-01-21 RX ADMIN — TAMSULOSIN HYDROCHLORIDE 0.4 MILLIGRAM(S): 0.4 CAPSULE ORAL at 22:08

## 2019-01-21 RX ADMIN — Medication 100 MILLIGRAM(S): at 05:08

## 2019-01-21 RX ADMIN — SODIUM CHLORIDE 4 MILLILITER(S): 9 INJECTION INTRAMUSCULAR; INTRAVENOUS; SUBCUTANEOUS at 20:57

## 2019-01-21 RX ADMIN — AMPICILLIN SODIUM AND SULBACTAM SODIUM 200 GRAM(S): 250; 125 INJECTION, POWDER, FOR SUSPENSION INTRAMUSCULAR; INTRAVENOUS at 05:06

## 2019-01-21 RX ADMIN — ZINC SULFATE TAB 220 MG (50 MG ZINC EQUIVALENT) 220 MILLIGRAM(S): 220 (50 ZN) TAB at 14:05

## 2019-01-21 RX ADMIN — Medication 81 MILLIGRAM(S): at 14:06

## 2019-01-21 RX ADMIN — LEVETIRACETAM 1000 MILLIGRAM(S): 250 TABLET, FILM COATED ORAL at 18:11

## 2019-01-21 RX ADMIN — Medication 100 MILLIGRAM(S): at 22:08

## 2019-01-21 RX ADMIN — LEVETIRACETAM 1000 MILLIGRAM(S): 250 TABLET, FILM COATED ORAL at 07:30

## 2019-01-21 RX ADMIN — AMPICILLIN SODIUM AND SULBACTAM SODIUM 200 GRAM(S): 250; 125 INJECTION, POWDER, FOR SUSPENSION INTRAMUSCULAR; INTRAVENOUS at 18:11

## 2019-01-21 RX ADMIN — SODIUM CHLORIDE 4 MILLILITER(S): 9 INJECTION INTRAMUSCULAR; INTRAVENOUS; SUBCUTANEOUS at 09:09

## 2019-01-21 RX ADMIN — Medication 100 MILLIGRAM(S): at 14:06

## 2019-01-21 RX ADMIN — Medication 500 MILLIGRAM(S): at 14:06

## 2019-01-21 RX ADMIN — Medication 0.12 MILLIGRAM(S): at 05:08

## 2019-01-21 RX ADMIN — Medication 400 MILLIGRAM(S): at 05:07

## 2019-01-21 NOTE — PROGRESS NOTE ADULT - ASSESSMENT
Assess    VDRF secondary to neurologic process  Post sepsis, infected decub  No change in respiratory status or ventilatory requirements    Plan:  Vent support  Per medical team  Not weanable.

## 2019-01-21 NOTE — PROGRESS NOTE ADULT - SUBJECTIVE AND OBJECTIVE BOX
KING MCKEON Patient is a 86y old  Male who presents with a chief complaint of respiratory distress. fever (21 Jan 2019 12:20)     HPI:  87 y/o male with dementia, h/o CAD s/p 2 stents, CHF, respiratory failure on chronic vent via tracheostomy tube with h/o infection in the tube needed IV antibiotics for months ended in 12/2018, sacral decubitus ulcers with h/o sacral osteomyelitis treated about 2 years ago with Abc, colostomy and chronic Baugh's catheter. CT scan in 3/2018 was suspicious for sacral osteomyelitis. Patient was brought in to the ER from [facility] for respiratory distress and fever. in the ER, Temp: 103.6. Labs showed WBC: 14 000. Hgb: 7.7. Trop: 0.19 with h/o chronic elevation. (03 Jan 2019 22:39)    The patient was seen and evaluated   The patient is in no acute distress.  f     Mode: AC/ CMV (Assist Control/ Continuous Mandatory Ventilation), RR (machine): 16, TV (machine): 400, FiO2: 30, PEEP: 6, MAP: 10, PIP: 30I&O's Summary    20 Jan 2019 07:01  -  21 Jan 2019 07:00  --------------------------------------------------------  IN: 2850 mL / OUT: 2450 mL / NET: 400 mL      Allergies    clindamycin (Unknown)  Grapes (Rash)  Nuts (Hives; Rash)  PC Pen VK (Unknown)  shellfish (Angioedema; Rash; Hives)  strawberry (Short breath; Rash; Hives)  Xanax (Rash)    Intolerances    Ativan (Unknown)  Haldol (Dystonic RXN)  hydrALAZINE (Unknown)  Zyprexa (Unknown)    HEALTH ISSUES - PROBLEM Dx:  Penicillin allergy: Penicillin allergy  Sacral osteomyelitis: Sacral osteomyelitis  Pneumonia due to infectious organism, unspecified laterality, unspecified part of lung: Pneumonia due to infectious organism, unspecified laterality, unspecified part of lung  Streptococcal septicemia: Streptococcal septicemia  Palliative care encounter: Palliative care encounter  Dysphagia, unspecified type: Dysphagia, unspecified type  Goals of care, counseling/discussion: Goals of care, counseling/discussion  Hypernatremia: Hypernatremia  Sepsis, due to unspecified organism: Sepsis, due to unspecified organism  Acute osteomyelitis: Acute osteomyelitis  Bacteremia due to Streptococcus: Bacteremia due to Streptococcus  Acute on chronic respiratory failure with hypoxia: Acute on chronic respiratory failure with hypoxia  Anemia, unspecified type: Anemia, unspecified type  Sepsis: Sepsis  Chronic respiratory failure with hypoxia: Chronic respiratory failure with hypoxia  Elevated troponin I level: Elevated troponin I level  Seizure disorder: Seizure disorder  Urinary tract infection with hematuria, site unspecified: Urinary tract infection with hematuria, site unspecified  Fever, unspecified fever cause: Fever, unspecified fever cause        PAST MEDICAL & SURGICAL HISTORY:  Dysphagia  Fall: Multiple Mechanical falls  CAD (coronary artery disease)  Clostridium difficile diarrhea: in 2009  BPH (benign prostatic hypertrophy)  Dementia  HLD (hyperlipidemia)  CHF (congestive heart failure)  Prostate cancer: Treated w/ radiation  Stroke: (~5yrs ago)  Seizure: (last event &gt;1yr ago)  HTN (hypertension)  Colostomy in place  S/P percutaneous endoscopic gastrostomy (PEG) tube placement  H/O hemorrhoidectomy  Deviated septum  Abdominal hernia  Status post cardiac surgery: stents x 2          Vital Signs Last 24 Hrs  T(C): 37.7 (21 Jan 2019 04:00), Max: 37.7 (20 Jan 2019 20:00)  T(F): 99.8 (21 Jan 2019 04:00), Max: 99.8 (20 Jan 2019 20:00)  HR: 68 (21 Jan 2019 16:33) (65 - 76)  BP: 158/76 (21 Jan 2019 12:00) (128/60 - 160/70)  BP(mean): 105 (21 Jan 2019 12:00) (87 - 105)  RR: 19 (21 Jan 2019 12:00) (19 - 30)  SpO2: 95% (21 Jan 2019 16:33) (91% - 100%)T(C): 37.7 (01-21-19 @ 04:00), Max: 37.7 (01-20-19 @ 20:00)  HR: 68 (01-21-19 @ 16:33) (65 - 76)  BP: 158/76 (01-21-19 @ 12:00) (128/60 - 160/70)  RR: 19 (01-21-19 @ 12:00) (19 - 30)  SpO2: 95% (01-21-19 @ 16:33) (91% - 100%)  Wt(kg): --    PHYSICAL EXAM:  GENERAL: NAD,   HEENT: PERRL, +EOMI, anicteric, no [place]  NECK: Supple, No JVD Trach  CHEST/LUNG: CTA bilaterally; Normal effort  HEART: S1S2 Normal intensity, no murmurs, gallops or rubs noted  ABDOMEN: Soft, BS Normoactive, NT, ND, no HSM noted Peg   EXTREMITIES:  2+ radial and DP pulses noted, no clubbing, cyanosis, or edema noted, Bed bound   SKIN: No rashes or lesions noted  NEURO: Non verbal   PSYCH: Non verbal       acetaminophen    Suspension .. 650 milliGRAM(s) Oral every 6 hours PRN  ampicillin/sulbactam  IVPB 3 Gram(s) IV Intermittent every 6 hours  ascorbic acid 500 milliGRAM(s) Oral daily  aspirin  chewable 81 milliGRAM(s) Oral daily  digoxin     Tablet 0.125 milliGRAM(s) Oral daily  enoxaparin Injectable 40 milliGRAM(s) SubCutaneous daily  famotidine    Tablet 20 milliGRAM(s) Oral <User Schedule>  furosemide    Tablet 40 milliGRAM(s) Oral daily  labetalol 100 milliGRAM(s) Oral three times a day  levETIRAcetam  Solution 1000 milliGRAM(s) Oral two times a day  multivitamin 1 Tablet(s) Oral daily  phenytoin   Suspension 400 milliGRAM(s) Oral every 12 hours  sodium chloride 3%  Inhalation 4 milliLiter(s) Inhalation two times a day  tamsulosin 0.4 milliGRAM(s) Oral at bedtime  zinc sulfate 220 milliGRAM(s) Oral daily      LABS:                          9.1    8.1   )-----------( 327      ( 21 Jan 2019 06:54 )             32.3     01-21    152<H>  |  115<H>  |  47.0<H>  ----------------------------<  135<H>  3.8   |  26.0  |  0.50    Ca    9.5      21 Jan 2019 06:54                CAPILLARY BLOOD GLUCOSE          RADIOLOGY & ADDITIONAL TESTS:      Consultant notes reviewed    Case discussed with consultant/provider/ family /patient

## 2019-01-21 NOTE — PROGRESS NOTE ADULT - ASSESSMENT
87 yo male, PMHx chronic trach/PEG secondary to pneumonia ~2 years ago complicated by multiple bouts of pneumonia, unable to liberate from vent due to secretions, PEG infection, MDR infections, seizure d/o, CHF, HTN, HLD, BPH with chronic jones, colostomy, who presented from [facility] 1/3/18 with respiratory distress and fever >102'F axillary. Admitted for sepsis secondary to Group beta hemolytic bacteremia secondary to stage 4 sacral ulcer and pseudomonas UTI. Was evaluated by ID, started on broad spectrum antibiotics and then transition to Levaquin Sacral wound evaluated by surgery and wound care. CT suggestive of sacral osteomyelitis. RRT called for acute on chronic hypoxic respiratory requiring vent support secondary to mucous plugging. Was transferred to the ICU.  Patient was transfused 3u PRBC for acute anemia. Febrile with pseudomonas and carbapenem resistant acetinobacter in the sputum. Started on IV zosyn.     1. Sepsis secondary to sacral Osteomyelitis, Pseudomonas UTI and strep bacteremia:  ID following. Unsyn till Feb 15.       2. Acute on chronic hypoxic respiratory failure: improving.  Frequent suctioning    3. Hypernatremia: increased  Free water via tube feed.  Monitor bmp    4. Iron Deficiency anemia: Hb/hct stable  Status post 3 units prbc    5. Seizure disorder: Continue keppra    6. Hypertension: procardia and labetalol were held for hypotension on admission  Labetalol    7. Chronic Diastolic CHF; On po lasix . Monitor bmp for resumption    8 Stage 4 sacral ulcer: Continue wound care;     9. Severe Protein Calorie Malnutrition -   Adjusted tube feeds.  Continue Supplements . Recommend protein source. To consult nutritionist.  To limited Tube feeding to 65ml/hr to avoid aspiration.     10. Functional Quadriplegia - supportive care.  PT followup.

## 2019-01-21 NOTE — PROGRESS NOTE ADULT - SUBJECTIVE AND OBJECTIVE BOX
PULMONARY PROGRESS NOTE      [id number]    Patient is a 86y old  Male who presents with a chief complaint of respiratory distress. fever (20 Jan 2019 19:32)  85 y/o male with dementia, h/o CAD s/p 2 stents, CHF, respiratory failure on chronic vent via tracheostomy tube with h/o infection in the tube needed IV antibiotics for months ended in 12/2018, sacral decubitus ulcers with h/o sacral osteomyelitis treated about 2 years ago with Abc, colostomy and chronic Baugh's catheter. CT scan in 3/2018 was suspicious for sacral osteomyelitis. Patient was brought in to the ER from [facility] for respiratory distress and fever. in the ER, Temp: 103.6. Labs showed WBC: 14 000. Hgb: 7.7. Trop: 0.19 with h/o chronic elevation. (03 Jan 2019 22:39)    INTERVAL HPI/OVERNIGHT EVENTS:  Stable on vent off ABx    MEDICATIONS  (STANDING):  ampicillin/sulbactam  IVPB 3 Gram(s) IV Intermittent every 6 hours  ascorbic acid 500 milliGRAM(s) Oral daily  aspirin  chewable 81 milliGRAM(s) Oral daily  digoxin     Tablet 0.125 milliGRAM(s) Oral daily  enoxaparin Injectable 40 milliGRAM(s) SubCutaneous daily  famotidine    Tablet 20 milliGRAM(s) Oral <User Schedule>  furosemide    Tablet 40 milliGRAM(s) Oral daily  labetalol 100 milliGRAM(s) Oral three times a day  levETIRAcetam  Solution 1000 milliGRAM(s) Oral two times a day  multivitamin 1 Tablet(s) Oral daily  phenytoin   Suspension 400 milliGRAM(s) Oral every 12 hours  sodium chloride 3%  Inhalation 4 milliLiter(s) Inhalation two times a day  tamsulosin 0.4 milliGRAM(s) Oral at bedtime  zinc sulfate 220 milliGRAM(s) Oral daily      MEDICATIONS  (PRN):  acetaminophen    Suspension .. 650 milliGRAM(s) Oral every 6 hours PRN Temp greater or equal to 38C (100.4F)      Allergies    clindamycin (Unknown)  Grapes (Rash)  Nuts (Hives; Rash)  PC Pen VK (Unknown)  shellfish (Angioedema; Rash; Hives)  strawberry (Short breath; Rash; Hives)  Xanax (Rash)    Intolerances    Ativan (Unknown)  Haldol (Dystonic RXN)  hydrALAZINE (Unknown)  Zyprexa (Unknown)      PAST MEDICAL & SURGICAL HISTORY:  Dysphagia  Fall: Multiple Mechanical falls  CAD (coronary artery disease)  Clostridium difficile diarrhea: in 2009  BPH (benign prostatic hypertrophy)  Dementia  HLD (hyperlipidemia)  CHF (congestive heart failure)  Prostate cancer: Treated w/ radiation  Stroke: (~5yrs ago)  Seizure: (last event &gt;1yr ago)  HTN (hypertension)  Colostomy in place  S/P percutaneous endoscopic gastrostomy (PEG) tube placement  H/O hemorrhoidectomy  Deviated septum  Abdominal hernia  Status post cardiac surgery: stents x 2      SOCIAL HISTORY  Smoking History:       REVIEW OF SYSTEMS:    Poor informant    Vital Signs Last 24 Hrs  T(C): 37.7 (21 Jan 2019 04:00), Max: 37.7 (20 Jan 2019 20:00)  T(F): 99.8 (21 Jan 2019 04:00), Max: 99.8 (20 Jan 2019 20:00)  HR: 70 (21 Jan 2019 12:09) (65 - 76)  BP: 160/70 (21 Jan 2019 08:00) (118/67 - 160/70)  BP(mean): 101 (21 Jan 2019 08:00) (87 - 101)  RR: 30 (21 Jan 2019 08:00) (20 - 30)  SpO2: 97% (21 Jan 2019 12:09) (91% - 100%)    PHYSICAL EXAMINATION:    GENERAL: The patient is awake and alert in no apparent distress.     HEENT: Head is normocephalic and atraumatic. Extraocular muscles are intact. Mucous membranes are moist.    NECK: Supple.    LUNGS: Clear to auscultation without wheezing, rales or rhonchi; respirations unlabored    HEART: Regular rate and rhythm without murmur.    ABDOMEN: Soft, nontender, and nondistended.      EXTREMITIES: Without any cyanosis, clubbing, rash, lesions or edema.    NEUROLOGIC: Grossly intact.    LABS:                        9.1    8.1   )-----------( 327      ( 21 Jan 2019 06:54 )             32.3     01-21    152<H>  |  115<H>  |  47.0<H>  ----------------------------<  135<H>  3.8   |  26.0  |  0.50    Ca    9.5      21 Jan 2019 06:54

## 2019-01-22 LAB
ALBUMIN SERPL ELPH-MCNC: 3.2 G/DL — LOW (ref 3.3–5.2)
ALP SERPL-CCNC: 148 U/L — HIGH (ref 40–120)
ALT FLD-CCNC: 43 U/L — HIGH
ANION GAP SERPL CALC-SCNC: 13 MMOL/L — SIGNIFICANT CHANGE UP (ref 5–17)
ANISOCYTOSIS BLD QL: SLIGHT — SIGNIFICANT CHANGE UP
AST SERPL-CCNC: 59 U/L — HIGH
BASOPHILS # BLD AUTO: 0 K/UL — SIGNIFICANT CHANGE UP (ref 0–0.2)
BASOPHILS NFR BLD AUTO: 0.5 % — SIGNIFICANT CHANGE UP (ref 0–2)
BILIRUB SERPL-MCNC: 0.3 MG/DL — LOW (ref 0.4–2)
BUN SERPL-MCNC: 38 MG/DL — HIGH (ref 8–20)
CALCIUM SERPL-MCNC: 9.3 MG/DL — SIGNIFICANT CHANGE UP (ref 8.6–10.2)
CHLORIDE SERPL-SCNC: 118 MMOL/L — HIGH (ref 98–107)
CO2 SERPL-SCNC: 29 MMOL/L — SIGNIFICANT CHANGE UP (ref 22–29)
CREAT SERPL-MCNC: 0.55 MG/DL — SIGNIFICANT CHANGE UP (ref 0.5–1.3)
EOSINOPHIL # BLD AUTO: 0.6 K/UL — HIGH (ref 0–0.5)
EOSINOPHIL NFR BLD AUTO: 9 % — HIGH (ref 0–5)
GLUCOSE SERPL-MCNC: 130 MG/DL — HIGH (ref 70–115)
HCT VFR BLD CALC: 34.9 % — LOW (ref 42–52)
HGB BLD-MCNC: 9.6 G/DL — LOW (ref 14–18)
HYPOCHROMIA BLD QL: SLIGHT — SIGNIFICANT CHANGE UP
LYMPHOCYTES # BLD AUTO: 1.2 K/UL — SIGNIFICANT CHANGE UP (ref 1–4.8)
LYMPHOCYTES # BLD AUTO: 20.2 % — SIGNIFICANT CHANGE UP (ref 20–55)
MACROCYTES BLD QL: SLIGHT — SIGNIFICANT CHANGE UP
MCHC RBC-ENTMCNC: 24.4 PG — LOW (ref 27–31)
MCHC RBC-ENTMCNC: 27.5 G/DL — LOW (ref 32–36)
MCV RBC AUTO: 88.8 FL — SIGNIFICANT CHANGE UP (ref 80–94)
MICROCYTES BLD QL: SLIGHT — SIGNIFICANT CHANGE UP
MONOCYTES # BLD AUTO: 0.6 K/UL — SIGNIFICANT CHANGE UP (ref 0–0.8)
MONOCYTES NFR BLD AUTO: 9.4 % — SIGNIFICANT CHANGE UP (ref 3–10)
NEUTROPHILS # BLD AUTO: 3.7 K/UL — SIGNIFICANT CHANGE UP (ref 1.8–8)
NEUTROPHILS NFR BLD AUTO: 60.4 % — SIGNIFICANT CHANGE UP (ref 37–73)
OVALOCYTES BLD QL SMEAR: SLIGHT — SIGNIFICANT CHANGE UP
PLAT MORPH BLD: NORMAL — SIGNIFICANT CHANGE UP
PLATELET # BLD AUTO: 346 K/UL — SIGNIFICANT CHANGE UP (ref 150–400)
POIKILOCYTOSIS BLD QL AUTO: SLIGHT — SIGNIFICANT CHANGE UP
POTASSIUM SERPL-MCNC: 3.9 MMOL/L — SIGNIFICANT CHANGE UP (ref 3.5–5.3)
POTASSIUM SERPL-SCNC: 3.9 MMOL/L — SIGNIFICANT CHANGE UP (ref 3.5–5.3)
PROT SERPL-MCNC: 7.3 G/DL — SIGNIFICANT CHANGE UP (ref 6.6–8.7)
RBC # BLD: 3.93 M/UL — LOW (ref 4.6–6.2)
RBC # FLD: 25.4 % — HIGH (ref 11–15.6)
RBC BLD AUTO: ABNORMAL
SODIUM SERPL-SCNC: 160 MMOL/L — CRITICAL HIGH (ref 135–145)
WBC # BLD: 6.1 K/UL — SIGNIFICANT CHANGE UP (ref 4.8–10.8)
WBC # FLD AUTO: 6.1 K/UL — SIGNIFICANT CHANGE UP (ref 4.8–10.8)

## 2019-01-22 PROCEDURE — 99232 SBSQ HOSP IP/OBS MODERATE 35: CPT

## 2019-01-22 PROCEDURE — 99233 SBSQ HOSP IP/OBS HIGH 50: CPT

## 2019-01-22 RX ORDER — KETOROLAC TROMETHAMINE 30 MG/ML
15 SYRINGE (ML) INJECTION ONCE
Qty: 0 | Refills: 0 | Status: DISCONTINUED | OUTPATIENT
Start: 2019-01-22 | End: 2019-01-22

## 2019-01-22 RX ADMIN — SODIUM CHLORIDE 4 MILLILITER(S): 9 INJECTION INTRAMUSCULAR; INTRAVENOUS; SUBCUTANEOUS at 08:23

## 2019-01-22 RX ADMIN — Medication 81 MILLIGRAM(S): at 15:06

## 2019-01-22 RX ADMIN — AMPICILLIN SODIUM AND SULBACTAM SODIUM 200 GRAM(S): 250; 125 INJECTION, POWDER, FOR SUSPENSION INTRAMUSCULAR; INTRAVENOUS at 06:41

## 2019-01-22 RX ADMIN — Medication 400 MILLIGRAM(S): at 19:04

## 2019-01-22 RX ADMIN — Medication 0.12 MILLIGRAM(S): at 06:41

## 2019-01-22 RX ADMIN — Medication 500 MILLIGRAM(S): at 15:07

## 2019-01-22 RX ADMIN — Medication 100 MILLIGRAM(S): at 19:05

## 2019-01-22 RX ADMIN — LEVETIRACETAM 1000 MILLIGRAM(S): 250 TABLET, FILM COATED ORAL at 19:07

## 2019-01-22 RX ADMIN — Medication 1 TABLET(S): at 15:06

## 2019-01-22 RX ADMIN — Medication 400 MILLIGRAM(S): at 06:40

## 2019-01-22 RX ADMIN — SODIUM CHLORIDE 4 MILLILITER(S): 9 INJECTION INTRAMUSCULAR; INTRAVENOUS; SUBCUTANEOUS at 21:02

## 2019-01-22 RX ADMIN — Medication 650 MILLIGRAM(S): at 19:30

## 2019-01-22 RX ADMIN — AMPICILLIN SODIUM AND SULBACTAM SODIUM 200 GRAM(S): 250; 125 INJECTION, POWDER, FOR SUSPENSION INTRAMUSCULAR; INTRAVENOUS at 15:06

## 2019-01-22 RX ADMIN — Medication 650 MILLIGRAM(S): at 20:15

## 2019-01-22 RX ADMIN — AMPICILLIN SODIUM AND SULBACTAM SODIUM 200 GRAM(S): 250; 125 INJECTION, POWDER, FOR SUSPENSION INTRAMUSCULAR; INTRAVENOUS at 19:04

## 2019-01-22 RX ADMIN — Medication 40 MILLIGRAM(S): at 06:41

## 2019-01-22 RX ADMIN — TAMSULOSIN HYDROCHLORIDE 0.4 MILLIGRAM(S): 0.4 CAPSULE ORAL at 21:04

## 2019-01-22 RX ADMIN — ENOXAPARIN SODIUM 40 MILLIGRAM(S): 100 INJECTION SUBCUTANEOUS at 15:07

## 2019-01-22 RX ADMIN — LEVETIRACETAM 1000 MILLIGRAM(S): 250 TABLET, FILM COATED ORAL at 06:40

## 2019-01-22 RX ADMIN — Medication 15 MILLIGRAM(S): at 21:00

## 2019-01-22 RX ADMIN — AMPICILLIN SODIUM AND SULBACTAM SODIUM 200 GRAM(S): 250; 125 INJECTION, POWDER, FOR SUSPENSION INTRAMUSCULAR; INTRAVENOUS at 00:19

## 2019-01-22 RX ADMIN — Medication 100 MILLIGRAM(S): at 21:04

## 2019-01-22 RX ADMIN — Medication 100 MILLIGRAM(S): at 06:41

## 2019-01-22 RX ADMIN — ZINC SULFATE TAB 220 MG (50 MG ZINC EQUIVALENT) 220 MILLIGRAM(S): 220 (50 ZN) TAB at 15:06

## 2019-01-22 RX ADMIN — AMPICILLIN SODIUM AND SULBACTAM SODIUM 200 GRAM(S): 250; 125 INJECTION, POWDER, FOR SUSPENSION INTRAMUSCULAR; INTRAVENOUS at 23:49

## 2019-01-22 RX ADMIN — FAMOTIDINE 20 MILLIGRAM(S): 10 INJECTION INTRAVENOUS at 06:41

## 2019-01-22 NOTE — PROGRESS NOTE ADULT - ASSESSMENT
85 y/o male with dementia, h/o CAD s/p 2 stents, CHF, respiratory failure on chronic vent via tracheostomy, sacral decubitus, h/o sacral osteomyelitis     Streptococcus septicemia  Stenotrophomonas, MDR Acinetobacter, Proteus and  Pseudomonas Pneumonia  VDRF  PCN allergy    - strep bacteremia; source likely from sacral wound  - repeat blood cultures negative 1/5/19  - possible UTI with urine + for CRE pseudomonas  - Sputum with Stenotrophomonas, MDR Acinetobacter, Proteus and  Pseudomonas  - Completed 10 days of Levaquin for pneumonia  - Completed Zosyn 7 days for pneumonia  - Continue Unasyn 3gm IV q 6hours for septicemia  - Plan for antibiotics till 2/15/19 for septicemia osteomyelitis     - Trend Fever  - Trend Leukocytosis    will follow up weekly

## 2019-01-22 NOTE — PROGRESS NOTE ADULT - SUBJECTIVE AND OBJECTIVE BOX
[facility]  INFECTIOUS DISEASES AND INTERNAL MEDICINE at [place]  =======================================================  Renzo Salazar MD  Diplomates American Board of Internal Medicine and Infectious Diseases  =======================================================    KING MCKEON [id number]    Follow up: Streptococcus sepsis / MDR bacteria Pneumonia  No fevers  no new issues overnight.     Allergies:  Ativan (Unknown)  clindamycin (Unknown)  Grapes (Rash)  Haldol (Dystonic RXN)  hydrALAZINE (Unknown)  Nuts (Hives; Rash)  PC Pen VK (Unknown)  shellfish (Angioedema; Rash; Hives)  strawberry (Short breath; Rash; Hives)  Xanax (Rash)  Zyprexa (Unknown)    REVIEW OF SYSTEMS:  Unable to obtain, patient not able to provide history     =======================================================  Antibiotics:  ampicillin/sulbactam  IVPB 3 Gram(s) IV Intermittent every 6 hours    Other medications:  ascorbic acid 500 milliGRAM(s) Oral daily  aspirin  chewable 81 milliGRAM(s) Oral daily  digoxin     Tablet 0.125 milliGRAM(s) Oral daily  enoxaparin Injectable 40 milliGRAM(s) SubCutaneous daily  famotidine    Tablet 20 milliGRAM(s) Oral <User Schedule>  furosemide    Tablet 40 milliGRAM(s) Oral daily  labetalol 100 milliGRAM(s) Oral three times a day  levETIRAcetam  Solution 1000 milliGRAM(s) Oral two times a day  multivitamin 1 Tablet(s) Oral daily  phenytoin   Suspension 400 milliGRAM(s) Oral every 12 hours  sodium chloride 3%  Inhalation 4 milliLiter(s) Inhalation two times a day  tamsulosin 0.4 milliGRAM(s) Oral at bedtime  zinc sulfate 220 milliGRAM(s) Oral daily    =======================================================    Physical Exam:  T(F): 99.3 (22 Jan 2019 00:00), Max: 99.8 (20 Jan 2019 20:00)  HR: 74 (22 Jan 2019 08:24)  BP: 129/64 (22 Jan 2019 07:01)  RR: 25 (22 Jan 2019 07:01)  SpO2: 96% (22 Jan 2019 08:24) (91% - 100%)    GEN: NAD  HEENT: normocephalic and atraumatic. Anicteric. + Trach  NECK: Supple.   LUNGS: Course BS B/L  HEART: Regular rate and rhythm   ABDOMEN: Soft, nontender, and nondistended.  Positive bowel sounds.  + PEG  EXTREMITIES: + edema.  MSK: no joint swelling  NEUROLOGIC: Awake, no meaningful communication   PSYCHIATRIC:  unable to assess  SKIN: No Rash    ==================================  Labs:                        9.6    6.1   )-----------( 346      ( 22 Jan 2019 05:11 )             34.9     01-22    160<HH>  |  118<H>  |  38.0<H>  ----------------------------<  130<H>  3.9   |  29.0  |  0.55    Ca    9.3      22 Jan 2019 05:11    TPro  7.3  /  Alb  3.2<L>  /  TBili  0.3<L>  /  DBili  x   /  AST  59<H>  /  ALT  43<H>  /  AlkPhos  148<H>  01-22

## 2019-01-22 NOTE — PROGRESS NOTE ADULT - SUBJECTIVE AND OBJECTIVE BOX
HOSPITALIST PROGRESS NOTE    KING MCKEON  [id number]  86yMale    Patient is a 86y old  Male who presents with a chief complaint of respiratory distress. fever (21 Jan 2019 17:21)      SUBJECTIVE:   Chart reviewed since last visit.  Patient seen and examined at bedside.      OBJECTIVE:  Vital Signs Last 24 Hrs  T(C): 37.4 (22 Jan 2019 00:00), Max: 37.4 (22 Jan 2019 00:00)  T(F): 99.3 (22 Jan 2019 00:00), Max: 99.3 (22 Jan 2019 00:00)  HR: 90 (22 Jan 2019 16:19) (68 - 90)  BP: 136/68 (22 Jan 2019 16:00) (129/64 - 139/60)  BP(mean): 90 (22 Jan 2019 16:00) (87 - 95)  RR: 18 (22 Jan 2019 16:00) (18 - 31)  SpO2: 96% (22 Jan 2019 16:19) (94% - 99%)    PHYSICAL EXAMINATION  General: Obese male, lying on right side  HEENT:   NECK:  Trach[+]  CVS: regular rate and rhythm S1 S2  RESP:  Fair air entry bilaterally, Pleurx left chest  GI:  G-tube, Soft NT  : Condom catheter - patient removed  MS:  increased tone  CNS:  Encephalopathic  INTEG:  Decubitus sacral  PSYCH:  Unable to obtain    MONITOR:  CAPILLARY BLOOD GLUCOSE            I&O's Summary    21 Jan 2019 07:01  -  22 Jan 2019 07:00  --------------------------------------------------------  IN: 3260 mL / OUT: 1326 mL / NET: 1934 mL    22 Jan 2019 07:01  -  22 Jan 2019 17:00  --------------------------------------------------------  IN: 180 mL / OUT: 325 mL / NET: -145 mL                            9.6    6.1   )-----------( 346      ( 22 Jan 2019 05:11 )             34.9       01-22    160<HH>  |  118<H>  |  38.0<H>  ----------------------------<  130<H>  3.9   |  29.0  |  0.55    Ca    9.3      22 Jan 2019 05:11    TPro  7.3  /  Alb  3.2<L>  /  TBili  0.3<L>  /  DBili  x   /  AST  59<H>  /  ALT  43<H>  /  AlkPhos  148<H>  01-22            Culture:    TTE:    RADIOLOGY        MEDICATIONS  (STANDING):  ampicillin/sulbactam  IVPB 3 Gram(s) IV Intermittent every 6 hours  ascorbic acid 500 milliGRAM(s) Oral daily  aspirin  chewable 81 milliGRAM(s) Oral daily  digoxin     Tablet 0.125 milliGRAM(s) Oral daily  enoxaparin Injectable 40 milliGRAM(s) SubCutaneous daily  famotidine    Tablet 20 milliGRAM(s) Oral <User Schedule>  furosemide    Tablet 40 milliGRAM(s) Oral daily  labetalol 100 milliGRAM(s) Oral three times a day  levETIRAcetam  Solution 1000 milliGRAM(s) Oral two times a day  multivitamin 1 Tablet(s) Oral daily  phenytoin   Suspension 400 milliGRAM(s) Oral every 12 hours  sodium chloride 3%  Inhalation 4 milliLiter(s) Inhalation two times a day  tamsulosin 0.4 milliGRAM(s) Oral at bedtime  zinc sulfate 220 milliGRAM(s) Oral daily      MEDICATIONS  (PRN):  acetaminophen    Suspension .. 650 milliGRAM(s) Oral every 6 hours PRN Temp greater or equal to 38C (100.4F)

## 2019-01-22 NOTE — CHART NOTE - NSCHARTNOTEFT_GEN_A_CORE
Source: Patient [ ]  Family [ ]   other [x ]  EMR and staff     Enteral /Parenteral Nutrition: Diet, NPO with Tube Feed:   Tube Feeding Modality: Gastrostomy  Jevity 1.5 [place]  Total Volume for 24 Hours (mL): 1680  Continuous  Starting Tube Feed Rate {mL per Hour}: 30  Increase Tube Feed Rate by (mL): 10     Every 4 hours  Until Goal Tube Feed Rate (mL per Hour): 70  Tube Feed Duration (in Hours): 24  Tube Feed Start Time: 10:00 (01-20-19 @ 19:39)    Current Weight: 1/3: 84kg     % Weight Change (N/A) no new wt     Pertinent Medications: MEDICATIONS  (STANDING):  ampicillin/sulbactam  IVPB 3 Gram(s) IV Intermittent every 6 hours  ascorbic acid 500 milliGRAM(s) Oral daily  aspirin  chewable 81 milliGRAM(s) Oral daily  digoxin     Tablet 0.125 milliGRAM(s) Oral daily  enoxaparin Injectable 40 milliGRAM(s) SubCutaneous daily  famotidine    Tablet 20 milliGRAM(s) Oral <User Schedule>  furosemide    Tablet 40 milliGRAM(s) Oral daily  labetalol 100 milliGRAM(s) Oral three times a day  levETIRAcetam  Solution 1000 milliGRAM(s) Oral two times a day  multivitamin 1 Tablet(s) Oral daily  phenytoin   Suspension 400 milliGRAM(s) Oral every 12 hours  sodium chloride 3%  Inhalation 4 milliLiter(s) Inhalation two times a day  tamsulosin 0.4 milliGRAM(s) Oral at bedtime  zinc sulfate 220 milliGRAM(s) Oral daily    MEDICATIONS  (PRN):  acetaminophen    Suspension .. 650 milliGRAM(s) Oral every 6 hours PRN Temp greater or equal to 38C (100.4F)    Pertinent Labs: CBC Full  -  ( 22 Jan 2019 05:11 )  WBC Count : 6.1 K/uL  Hemoglobin : 9.6 g/dL  Hematocrit : 34.9 %  Platelet Count - Automated : 346 K/uL  Mean Cell Volume : 88.8 fl  Mean Cell Hemoglobin : 24.4 pg  Mean Cell Hemoglobin Concentration : 27.5 g/dL  Auto Neutrophil # : 3.7 K/uL  Auto Lymphocyte # : 1.2 K/uL  Auto Monocyte # : 0.6 K/uL  Auto Eosinophil # : 0.6 K/uL  Auto Basophil # : 0.0 K/uL  Auto Neutrophil % : 60.4 %  Auto Lymphocyte % : 20.2 %  Auto Monocyte % : 9.4 %  Auto Eosinophil % : 9.0 %  Auto Basophil % : 0.5 %        Skin:  Stage IV sacral ulcer, R inner leg wound, R shin abrasion     Nutrition focused physical exam Previously conducted- found signs of malnutrition [ ]absent [ ]present    Subcutaneous fat loss: [ ] Orbital fat pads region, [ ]Buccal fat region, [ ]Triceps region,  [ ]Ribs region    Muscle wasting: [ ]Temples region, [ ]Clavicle region, [ ]Shoulder region, [ ]Scapula region, [ ]Interosseous region,  [ ]thigh region, [ ]Calf region    Estimated Needs:   [x ] no change since previous assessment  [ ] recalculated:     Current Nutrition Diagnosis:  Pt remains at high nutrition risk secondary to severe protein calorie malnutrition related to increased nutrient needs in the setting of chronic respiratory failure and wound healing with stage IV sacral ulcers now + osteomyelitis, sepsis as evidenced by  moderate temporal wasting and +fluid accumulation in B/L lower extremities. Pt continues to receive Jevity @ 70ml/hr ([id number]) 1400ml/day; 2100kcal, 89gm protein; not yet meeting estimated protein needs. Recommendations below:     Recommendations:   1. Continue with MVI, VIt. C and zinc daily to aid in wound healing.   2. Change enteral formula to Pivot @70ml/hr ([id number]) 1400ml/day (2100kcal, 131gm protein) to aid in wound healing   3. Check wt daily to monitor trends  4. Continue with free water 350ml every 4 hours   5. Monitor H/H, Na+, BUN       Monitoring and Evaluation:   [ ] PO intake [x ] Tolerance to diet prescription [X] Weights  [X] Follow up per protocol [X] Labs:

## 2019-01-22 NOTE — PROGRESS NOTE ADULT - ASSESSMENT
85 yo male, PMHx chronic trach/PEG secondary to pneumonia ~2 years ago complicated by multiple bouts of pneumonia, unable to liberate from vent due to secretions, PEG infection, MDR infections, seizure d/o, CHF, HTN, HLD, BPH with chronic jones, colostomy, who presented from [facility] 1/3/18 with respiratory distress and fever >102'F axillary. Admitted for sepsis secondary to Group beta hemolytic bacteremia secondary to stage 4 sacral ulcer and pseudomonas UTI. Was evaluated by ID, started on broad spectrum antibiotics and then transition to Levaquin Sacral wound evaluated by surgery and wound care. CT suggestive of sacral osteomyelitis. RRT called for acute on chronic hypoxic respiratory requiring vent support secondary to mucous plugging. Was transferred to the ICU.  Patient was transfused 3u PRBC for acute anemia. Febrile with pseudomonas and carbapenem resistant Acinetobacter in the sputum. Started on IV zosyn.       1. Sepsis secondary to sacral Osteomyelitis, Pseudomonas UTI and strep bacteremia: Afebrile, with normal WBC. Unasyn till 2/15/19 as per ID    2. Acute on chronic hypoxic respiratory failure: improving.  Frequent suctioning. Continue Vent support - Pulmonology follow up    3. Hypernatremia: increased  Free water via tube feed.  Worsening over past 24 hours - discontinue diuretic, continue free water, check UOsm, repeat SNa    4. Iron Deficiency anemia: Hb/hct stable  Status post 3 units prbc - Stable Hgb    5. Seizure disorder: Continue Keppra - No seizure noted    6. Hypertension: procardia and labetalol were held for hypotension on admission  Labetalol - stable    7. Chronic Diastolic CHF; On po lasix . Monitor bmp for resumption - Lasix to be discontinued secondary to hypernatremia    8 Stage 4 sacral ulcer: Continue wound care;     9. Severe Protein Calorie Malnutrition -   Adjusted tube feeds.  Continue Supplements . Recommend protein source. To consult nutritionist.  To limited Tube feeding to 65ml/hr to avoid aspiration.     10. Functional Quadriplegia - supportive care.  PT followup.

## 2019-01-23 PROCEDURE — 71045 X-RAY EXAM CHEST 1 VIEW: CPT | Mod: 26

## 2019-01-23 PROCEDURE — 99233 SBSQ HOSP IP/OBS HIGH 50: CPT

## 2019-01-23 RX ADMIN — Medication 100 MILLIGRAM(S): at 08:49

## 2019-01-23 RX ADMIN — FAMOTIDINE 20 MILLIGRAM(S): 10 INJECTION INTRAVENOUS at 05:43

## 2019-01-23 RX ADMIN — AMPICILLIN SODIUM AND SULBACTAM SODIUM 200 GRAM(S): 250; 125 INJECTION, POWDER, FOR SUSPENSION INTRAMUSCULAR; INTRAVENOUS at 12:00

## 2019-01-23 RX ADMIN — ENOXAPARIN SODIUM 40 MILLIGRAM(S): 100 INJECTION SUBCUTANEOUS at 14:17

## 2019-01-23 RX ADMIN — SODIUM CHLORIDE 4 MILLILITER(S): 9 INJECTION INTRAMUSCULAR; INTRAVENOUS; SUBCUTANEOUS at 09:40

## 2019-01-23 RX ADMIN — Medication 1 TABLET(S): at 14:18

## 2019-01-23 RX ADMIN — Medication 81 MILLIGRAM(S): at 14:18

## 2019-01-23 RX ADMIN — ZINC SULFATE TAB 220 MG (50 MG ZINC EQUIVALENT) 220 MILLIGRAM(S): 220 (50 ZN) TAB at 14:19

## 2019-01-23 RX ADMIN — LEVETIRACETAM 1000 MILLIGRAM(S): 250 TABLET, FILM COATED ORAL at 05:44

## 2019-01-23 RX ADMIN — AMPICILLIN SODIUM AND SULBACTAM SODIUM 200 GRAM(S): 250; 125 INJECTION, POWDER, FOR SUSPENSION INTRAMUSCULAR; INTRAVENOUS at 19:09

## 2019-01-23 RX ADMIN — AMPICILLIN SODIUM AND SULBACTAM SODIUM 200 GRAM(S): 250; 125 INJECTION, POWDER, FOR SUSPENSION INTRAMUSCULAR; INTRAVENOUS at 05:45

## 2019-01-23 RX ADMIN — SODIUM CHLORIDE 4 MILLILITER(S): 9 INJECTION INTRAMUSCULAR; INTRAVENOUS; SUBCUTANEOUS at 21:08

## 2019-01-23 RX ADMIN — Medication 100 MILLIGRAM(S): at 14:18

## 2019-01-23 RX ADMIN — Medication 500 MILLIGRAM(S): at 14:18

## 2019-01-23 RX ADMIN — TAMSULOSIN HYDROCHLORIDE 0.4 MILLIGRAM(S): 0.4 CAPSULE ORAL at 22:31

## 2019-01-23 RX ADMIN — Medication 0.12 MILLIGRAM(S): at 05:44

## 2019-01-23 RX ADMIN — AMPICILLIN SODIUM AND SULBACTAM SODIUM 200 GRAM(S): 250; 125 INJECTION, POWDER, FOR SUSPENSION INTRAMUSCULAR; INTRAVENOUS at 22:30

## 2019-01-23 RX ADMIN — LEVETIRACETAM 1000 MILLIGRAM(S): 250 TABLET, FILM COATED ORAL at 19:08

## 2019-01-23 RX ADMIN — Medication 100 MILLIGRAM(S): at 22:31

## 2019-01-23 RX ADMIN — Medication 400 MILLIGRAM(S): at 05:43

## 2019-01-23 RX ADMIN — Medication 400 MILLIGRAM(S): at 19:09

## 2019-01-23 NOTE — PROCEDURE NOTE - PROCEDURE
<<-----Click on this checkbox to enter Procedure PICC line placement  01/23/2019    Active  [id number]

## 2019-01-23 NOTE — PROGRESS NOTE ADULT - SUBJECTIVE AND OBJECTIVE BOX
HOSPITALIST PROGRESS NOTE    KING MCKEON  [id number]  86yMale    Patient is a 86y old  Male who presents with a chief complaint of respiratory distress. fever (22 Jan 2019 11:02)      SUBJECTIVE:   Chart reviewed since last visit.  Patient seen and examined at bedside.  Nonverbal - comfortable, daughter Greta at bedside.        OBJECTIVE:  Vital Signs Last 24 Hrs  T(C): 37.9 (23 Jan 2019 20:00), Max: 37.9 (23 Jan 2019 20:00)  T(F): 100.2 (23 Jan 2019 20:00), Max: 100.2 (23 Jan 2019 20:00)  HR: 64 (23 Jan 2019 22:25) (63 - 80)  BP: 117/57 (23 Jan 2019 22:25) (105/62 - 136/68)  BP(mean): 82 (23 Jan 2019 22:25) (77 - 92)  RR: 22 (23 Jan 2019 22:25) (20 - 27)  SpO2: 98% (23 Jan 2019 22:25) (97% - 100%)    PHYSICAL EXAMINATION  General: Obese male, lying on right side  HEENT:   NECK:  Trach[+]  CVS: regular rate and rhythm S1 S2  RESP:  Fair air entry bilaterally, Pleurx left chest  GI:  G-tube, Soft NT  : Condom catheter - patient removed  MS:  increased tone  CNS:  Encephalopathic  INTEG:  Decubitus sacral  PSYCH:  Unable to obtain      MONITOR:  CAPILLARY BLOOD GLUCOSE            I&O's Summary    22 Jan 2019 07:01  -  23 Jan 2019 07:00  --------------------------------------------------------  IN: 2910 mL / OUT: 1650 mL / NET: 1260 mL    23 Jan 2019 07:01  -  23 Jan 2019 22:35  --------------------------------------------------------  IN: 2460 mL / OUT: 1075 mL / NET: 1385 mL               Culture:    TTE:    RADIOLOGY        MEDICATIONS  (STANDING):  ampicillin/sulbactam  IVPB 3 Gram(s) IV Intermittent every 6 hours  ascorbic acid 500 milliGRAM(s) Oral daily  aspirin  chewable 81 milliGRAM(s) Oral daily  digoxin     Tablet 0.125 milliGRAM(s) Oral daily  enoxaparin Injectable 40 milliGRAM(s) SubCutaneous daily  famotidine    Tablet 20 milliGRAM(s) Oral <User Schedule>  labetalol 100 milliGRAM(s) Oral three times a day  levETIRAcetam  Solution 1000 milliGRAM(s) Oral two times a day  multivitamin 1 Tablet(s) Oral daily  phenytoin   Suspension 400 milliGRAM(s) Oral every 12 hours  sodium chloride 3%  Inhalation 4 milliLiter(s) Inhalation two times a day  tamsulosin 0.4 milliGRAM(s) Oral at bedtime      MEDICATIONS  (PRN):  acetaminophen    Suspension .. 650 milliGRAM(s) Oral every 6 hours PRN Temp greater or equal to 38C (100.4F)

## 2019-01-23 NOTE — PROGRESS NOTE ADULT - ASSESSMENT
85 yo male, PMHx chronic trach/PEG secondary to pneumonia ~2 years ago complicated by multiple bouts of pneumonia, unable to liberate from vent due to secretions, PEG infection, MDR infections, seizure d/o, CHF, HTN, HLD, BPH with chronic jones, colostomy, [facility] and fever >102'F axillary. Admitted for sepsis secondary to Group beta hemolytic bacteremia secondary to stage 4 sacral ulcer and pseudomonas UTI. Was evaluated by ID, started on broad spectrum antibiotics and then transition to Levaquin Sacral wound evaluated by surgery and wound care. CT suggestive of sacral osteomyelitis. RRT called for acute on chronic hypoxic respiratory requiring vent support secondary to mucous plugging. Was transferred to the ICU.  Patient was transfused 3u PRBC for acute anemia. Febrile with pseudomonas and carbapenem resistant Acinetobacter in the sputum. Started on IV zosyn.       1. Sepsis secondary to sacral Osteomyelitis, Pseudomonas UTI and strep bacteremia: Afebrile, with normal WBC. Unasyn till 2/15/19 as per ID. Has IV, needs PICC, discussed with PICC lead. No labs drawn either - discussed with ALBERTO Barry.    2. Acute on chronic hypoxic respiratory failure: improving.  Frequent suctioning. Continue Vent support - Pulmonology follow up    3. Hypernatremia: increased  Free water via tube feed.  Worsening over past 24 hours - discontinue diuretic, continue free water, check UOsm, repeat SNa - Awaiting lab draw - Advied ALBERTO Barry to draw ASAP.    4. Iron Deficiency anemia: Hb/hct stable  Status post 3 units prbc - Stable Hgb    5. Seizure disorder: Continue Keppra - No seizure noted    6. Hypertension: procardia and labetalol were held for hypotension on admission  Labetalol - stable    7. Chronic Diastolic CHF; On po lasix . Monitor bmp for resumption - Lasix to be discontinued secondary to hypernatremia    8 Stage 4 sacral ulcer: Continue wound care;     9. Severe Protein Calorie Malnutrition -   Adjusted tube feeds.  Continue Supplements . Recommend protein source. To consult nutritionist.  To limited Tube feeding to 65ml/hr to avoid aspiration.     10. Functional Quadriplegia - supportive care.  PT followup.    DIspo - once hyponatremia stabilised, discharge back to Nursing Home; SW asked to prepare for discharge - Discussed with daughter at bedside.

## 2019-01-23 NOTE — PROCEDURE NOTE - NSINFORMCONSENT_GEN_A_CORE
This was an emergent procedure.
Benefits, risks, and possible complications of procedure explained to patient/caregiver who verbalized understanding and gave written consent./Daughter

## 2019-01-23 NOTE — PROCEDURE NOTE - NSPROCDETAILS_GEN_ALL_CORE
blood seen on insertion/flushes easily/ultrasound utilization/dressing applied
supine position/sterile technique, catheter placed/ultrasound guidance/sterile dressing applied/location identified, draped/prepped, sterile technique used/ultrasound assessment

## 2019-01-24 LAB
ANION GAP SERPL CALC-SCNC: 13 MMOL/L — SIGNIFICANT CHANGE UP (ref 5–17)
BUN SERPL-MCNC: 40 MG/DL — HIGH (ref 8–20)
CALCIUM SERPL-MCNC: 8.6 MG/DL — SIGNIFICANT CHANGE UP (ref 8.6–10.2)
CHLORIDE SERPL-SCNC: 110 MMOL/L — HIGH (ref 98–107)
CO2 SERPL-SCNC: 28 MMOL/L — SIGNIFICANT CHANGE UP (ref 22–29)
CREAT SERPL-MCNC: 0.66 MG/DL — SIGNIFICANT CHANGE UP (ref 0.5–1.3)
GLUCOSE SERPL-MCNC: 113 MG/DL — SIGNIFICANT CHANGE UP (ref 70–115)
HCT VFR BLD CALC: 32.7 % — LOW (ref 42–52)
HGB BLD-MCNC: 9.1 G/DL — LOW (ref 14–18)
MCHC RBC-ENTMCNC: 24.9 PG — LOW (ref 27–31)
MCHC RBC-ENTMCNC: 27.8 G/DL — LOW (ref 32–36)
MCV RBC AUTO: 89.6 FL — SIGNIFICANT CHANGE UP (ref 80–94)
PLATELET # BLD AUTO: 256 K/UL — SIGNIFICANT CHANGE UP (ref 150–400)
POTASSIUM SERPL-MCNC: 3.3 MMOL/L — LOW (ref 3.5–5.3)
POTASSIUM SERPL-SCNC: 3.3 MMOL/L — LOW (ref 3.5–5.3)
RBC # BLD: 3.65 M/UL — LOW (ref 4.6–6.2)
RBC # FLD: 26 % — HIGH (ref 11–15.6)
SODIUM SERPL-SCNC: 151 MMOL/L — HIGH (ref 135–145)
WBC # BLD: 9.1 K/UL — SIGNIFICANT CHANGE UP (ref 4.8–10.8)
WBC # FLD AUTO: 9.1 K/UL — SIGNIFICANT CHANGE UP (ref 4.8–10.8)

## 2019-01-24 PROCEDURE — 99233 SBSQ HOSP IP/OBS HIGH 50: CPT

## 2019-01-24 PROCEDURE — 99232 SBSQ HOSP IP/OBS MODERATE 35: CPT

## 2019-01-24 PROCEDURE — 99231 SBSQ HOSP IP/OBS SF/LOW 25: CPT

## 2019-01-24 RX ORDER — POTASSIUM CHLORIDE 20 MEQ
40 PACKET (EA) ORAL EVERY 4 HOURS
Qty: 0 | Refills: 0 | Status: DISCONTINUED | OUTPATIENT
Start: 2019-01-24 | End: 2019-01-24

## 2019-01-24 RX ORDER — POTASSIUM CHLORIDE 20 MEQ
40 PACKET (EA) ORAL EVERY 4 HOURS
Qty: 0 | Refills: 0 | Status: COMPLETED | OUTPATIENT
Start: 2019-01-24 | End: 2019-01-24

## 2019-01-24 RX ADMIN — FAMOTIDINE 20 MILLIGRAM(S): 10 INJECTION INTRAVENOUS at 06:14

## 2019-01-24 RX ADMIN — LEVETIRACETAM 1000 MILLIGRAM(S): 250 TABLET, FILM COATED ORAL at 17:15

## 2019-01-24 RX ADMIN — Medication 40 MILLIEQUIVALENT(S): at 17:15

## 2019-01-24 RX ADMIN — Medication 500 MILLIGRAM(S): at 11:13

## 2019-01-24 RX ADMIN — Medication 1 TABLET(S): at 11:13

## 2019-01-24 RX ADMIN — Medication 81 MILLIGRAM(S): at 11:13

## 2019-01-24 RX ADMIN — Medication 650 MILLIGRAM(S): at 01:36

## 2019-01-24 RX ADMIN — ENOXAPARIN SODIUM 40 MILLIGRAM(S): 100 INJECTION SUBCUTANEOUS at 11:13

## 2019-01-24 RX ADMIN — Medication 650 MILLIGRAM(S): at 23:10

## 2019-01-24 RX ADMIN — Medication 100 MILLIGRAM(S): at 22:25

## 2019-01-24 RX ADMIN — Medication 650 MILLIGRAM(S): at 02:36

## 2019-01-24 RX ADMIN — TAMSULOSIN HYDROCHLORIDE 0.4 MILLIGRAM(S): 0.4 CAPSULE ORAL at 22:25

## 2019-01-24 RX ADMIN — AMPICILLIN SODIUM AND SULBACTAM SODIUM 200 GRAM(S): 250; 125 INJECTION, POWDER, FOR SUSPENSION INTRAMUSCULAR; INTRAVENOUS at 06:14

## 2019-01-24 RX ADMIN — LEVETIRACETAM 1000 MILLIGRAM(S): 250 TABLET, FILM COATED ORAL at 06:14

## 2019-01-24 RX ADMIN — Medication 400 MILLIGRAM(S): at 17:15

## 2019-01-24 RX ADMIN — Medication 0.12 MILLIGRAM(S): at 06:14

## 2019-01-24 RX ADMIN — Medication 650 MILLIGRAM(S): at 13:21

## 2019-01-24 RX ADMIN — SODIUM CHLORIDE 4 MILLILITER(S): 9 INJECTION INTRAMUSCULAR; INTRAVENOUS; SUBCUTANEOUS at 10:05

## 2019-01-24 RX ADMIN — Medication 650 MILLIGRAM(S): at 22:26

## 2019-01-24 RX ADMIN — SODIUM CHLORIDE 4 MILLILITER(S): 9 INJECTION INTRAMUSCULAR; INTRAVENOUS; SUBCUTANEOUS at 20:49

## 2019-01-24 RX ADMIN — Medication 100 MILLIGRAM(S): at 06:14

## 2019-01-24 RX ADMIN — Medication 40 MILLIEQUIVALENT(S): at 11:14

## 2019-01-24 RX ADMIN — Medication 400 MILLIGRAM(S): at 06:14

## 2019-01-24 RX ADMIN — Medication 100 MILLIGRAM(S): at 13:21

## 2019-01-24 RX ADMIN — AMPICILLIN SODIUM AND SULBACTAM SODIUM 200 GRAM(S): 250; 125 INJECTION, POWDER, FOR SUSPENSION INTRAMUSCULAR; INTRAVENOUS at 11:14

## 2019-01-24 RX ADMIN — AMPICILLIN SODIUM AND SULBACTAM SODIUM 200 GRAM(S): 250; 125 INJECTION, POWDER, FOR SUSPENSION INTRAMUSCULAR; INTRAVENOUS at 17:15

## 2019-01-24 NOTE — PROGRESS NOTE ADULT - ASSESSMENT
87 yo male, PMHx chronic trach/PEG secondary to pneumonia ~2 years ago complicated by multiple bouts of pneumonia, unable to liberate from vent due to secretions, PEG infection, MDR infections, seizure d/o, CHF, HTN, HLD, BPH with chronic jones, colostomy, who presented from [facility] 1/3/18 with respiratory distress and fever >102'F axillary. Admitted for sepsis secondary to Group beta hemolytic bacteremia secondary to stage 4 sacral ulcer and pseudomonas UTI. Was evaluated by ID, started on broad spectrum antibiotics and then transition to Levaquin Sacral wound evaluated by surgery and wound care. CT suggestive of sacral osteomyelitis. RRT called for acute on chronic hypoxic respiratory requiring vent support secondary to mucous plugging. Was transferred to the ICU.  Patient was transfused 3u PRBC for acute anemia. Febrile with pseudomonas and carbapenem resistant Acinetobacter in the sputum. Started on IV unasyn.       1. Sepsis secondary to sacral Osteomyelitis, Pseudomonas UTI and strep bacteremia: Afebrile, with normal WBC. Unasyn till 2/15/19 as per ID. Had picc line placed yesterday  --> had fever overnight  --> monitor     2. Acute on chronic hypoxic respiratory failure: improving.  Frequent suctioning. Continue Vent support - Pulmonology following  --> c,w current care    3. Hypernatremia: increased  Free water via tube feed increased by renal  --> improved    4. Iron Deficiency anemia: Hb/hct stable  Status post 3 units prbc - Stable Hgb    5. Seizure disorder: Continue Keppra - No seizure noted    6. Hypertension: procardia and labetalol were held for hypotension on admission  Labetalol - stable    7. Chronic Diastolic CHF; On po lasix . Monitor bmp for resumption -     8 Stage 4 sacral ulcer: Continue wound care;     9. Severe Protein Calorie Malnutrition -   Adjusted tube feeds.  Continue Supplements . Recommend protein source. To consult nutritionist.  To limited Tube feeding to 65ml/hr to avoid aspiration.     10. Functional Quadriplegia - supportive care.  PT followup.    DIspo - monitor, fever overnight   dnr dni  prognosis grim

## 2019-01-24 NOTE — PROGRESS NOTE ADULT - ASSESSMENT
Respiratory Failure +trach/ PEG  Hx of multiple electrolytes abnormalities  Hypernatremia would inc free water via PEG tube  it is 350 Q 4 will inc to 400 cc Q4    Will follow

## 2019-01-24 NOTE — PROGRESS NOTE ADULT - SUBJECTIVE AND OBJECTIVE BOX
NEPHROLOGY INTERVAL HPI/OVERNIGHT EVENTS:    Examined earlier    MEDICATIONS  (STANDING):  ampicillin/sulbactam  IVPB 3 Gram(s) IV Intermittent every 6 hours  ascorbic acid 500 milliGRAM(s) Oral daily  aspirin  chewable 81 milliGRAM(s) Oral daily  digoxin     Tablet 0.125 milliGRAM(s) Oral daily  enoxaparin Injectable 40 milliGRAM(s) SubCutaneous daily  famotidine    Tablet 20 milliGRAM(s) Oral <User Schedule>  labetalol 100 milliGRAM(s) Oral three times a day  levETIRAcetam  Solution 1000 milliGRAM(s) Oral two times a day  multivitamin 1 Tablet(s) Oral daily  phenytoin   Suspension 400 milliGRAM(s) Oral every 12 hours  potassium chloride   Powder 40 milliEquivalent(s) Oral every 4 hours  sodium chloride 3%  Inhalation 4 milliLiter(s) Inhalation two times a day  tamsulosin 0.4 milliGRAM(s) Oral at bedtime    MEDICATIONS  (PRN):  acetaminophen    Suspension .. 650 milliGRAM(s) Oral every 6 hours PRN Temp greater or equal to 38C (100.4F)      Allergies    clindamycin (Unknown)  Grapes (Rash)  Nuts (Hives; Rash)  PC Pen VK (Unknown)  shellfish (Angioedema; Rash; Hives)  strawberry (Short breath; Rash; Hives)  Xanax (Rash)    Intolerances    Ativan (Unknown)  Haldol (Dystonic RXN)  hydrALAZINE (Unknown)  Zyprexa (Unknown)      Vital Signs Last 24 Hrs  T(C): 37.6 (24 Jan 2019 11:00), Max: 38.1 (24 Jan 2019 01:30)  T(F): 99.7 (24 Jan 2019 11:00), Max: 100.6 (24 Jan 2019 01:30)  HR: 60 (24 Jan 2019 10:12) (57 - 80)  BP: 130/65 (24 Jan 2019 08:00) (117/57 - 155/69)  BP(mean): 93 (24 Jan 2019 08:00) (82 - 99)  RR: 22 (24 Jan 2019 10:00) (20 - 29)  SpO2: 98% (24 Jan 2019 10:12) (94% - 99%)  Daily     Daily     PHYSICAL EXAM:  GEN: NAD  HEENT: + Trach  LUNGS: dec bs B/L   HEART: Regular rate and rhythm   ABDOMEN: Soft ND NT +bowel sounds.  + PEG/ + ostomy  EXTREMITIES: + edema.  NEUROLOGIC: Awake     LABS:                        9.1    9.1   )-----------( 256      ( 24 Jan 2019 09:03 )             32.7     01-24    151<H>  |  110<H>  |  40.0<H>  ----------------------------<  113  3.3<L>   |  28.0  |  0.66    Ca    8.6      24 Jan 2019 09:03                  RADIOLOGY & ADDITIONAL TESTS:

## 2019-01-24 NOTE — PROGRESS NOTE ADULT - SUBJECTIVE AND OBJECTIVE BOX
KING MCKEON    [id number]    86y      Male    INTERVAL HPI/OVERNIGHT EVENTS:    patient being seen for chronic resp failure, sepsis secondary to OM, Pseudomonas UTI and strep bacteremia. Patient seen at bedside with daughter. patient in nad, non verbal     last 24 hours patients tmax 100.6      REVIEW OF SYSTEMS:    unable to obtain secondary to mental status       Vital Signs Last 24 Hrs  T(C): 37.7 (24 Jan 2019 12:00), Max: 38.1 (24 Jan 2019 01:30)  T(F): 99.9 (24 Jan 2019 12:00), Max: 100.6 (24 Jan 2019 01:30)  HR: 62 (24 Jan 2019 12:00) (57 - 67)  BP: 135/60 (24 Jan 2019 12:00) (117/57 - 155/69)  BP(mean): 86 (24 Jan 2019 12:00) (82 - 99)  RR: 25 (24 Jan 2019 12:00) (20 - 29)  SpO2: 98% (24 Jan 2019 12:00) (94% - 99%)    PHYSICAL EXAM:    GENERAL: NAD, non verbal, ill appearing   HEENT: dry MM  NECK: trach   CHEST/LUNG:  diminished  HEART: S1S2+, Regular rate and rhythm; No murmurs  ABDOMEN: Soft, colostomy,   EXTREMITIES:  anasarca, jones   NEURO: Awake      LABS:                        9.1    9.1   )-----------( 256      ( 24 Jan 2019 09:03 )             32.7     01-24    151<H>  |  110<H>  |  40.0<H>  ----------------------------<  113  3.3<L>   |  28.0  |  0.66    Ca    8.6      24 Jan 2019 09:03      MEDICATIONS  (STANDING):  ampicillin/sulbactam  IVPB 3 Gram(s) IV Intermittent every 6 hours  ascorbic acid 500 milliGRAM(s) Oral daily  aspirin  chewable 81 milliGRAM(s) Oral daily  digoxin     Tablet 0.125 milliGRAM(s) Oral daily  enoxaparin Injectable 40 milliGRAM(s) SubCutaneous daily  famotidine    Tablet 20 milliGRAM(s) Oral <User Schedule>  labetalol 100 milliGRAM(s) Oral three times a day  levETIRAcetam  Solution 1000 milliGRAM(s) Oral two times a day  multivitamin 1 Tablet(s) Oral daily  phenytoin   Suspension 400 milliGRAM(s) Oral every 12 hours  potassium chloride   Powder 40 milliEquivalent(s) Oral every 4 hours  sodium chloride 3%  Inhalation 4 milliLiter(s) Inhalation two times a day  tamsulosin 0.4 milliGRAM(s) Oral at bedtime    MEDICATIONS  (PRN):  acetaminophen    Suspension .. 650 milliGRAM(s) Oral every 6 hours PRN Temp greater or equal to 38C (100.4F)      RADIOLOGY & ADDITIONAL TESTS:

## 2019-01-24 NOTE — PROGRESS NOTE ADULT - SUBJECTIVE AND OBJECTIVE BOX
[facility]  INFECTIOUS DISEASES AND INTERNAL MEDICINE at [place]  =======================================================  Renzo Salazar MD  Diplomates American Board of Internal Medicine and Infectious Diseases  =======================================================    KING MCKEON [id number]    Follow up: Streptococcus sepsis / MDR bacteria Pneumonia  No fevers  no new issues overnight.   labs reviewed  new PICC placed yesterday    ===============  Allergies:  Ativan (Unknown)  clindamycin (Unknown)  Grapes (Rash)  Haldol (Dystonic RXN)  hydrALAZINE (Unknown)  Nuts (Hives; Rash)  PC Pen VK (Unknown)  shellfish (Angioedema; Rash; Hives)  strawberry (Short breath; Rash; Hives)  Xanax (Rash)  Zyprexa (Unknown)    REVIEW OF SYSTEMS:  Unable to obtain, patient not able to provide history   =======================================================  Antibiotics:  ampicillin/sulbactam  IVPB 3 Gram(s) IV Intermittent every 6 hours    Other medications:  ascorbic acid 500 milliGRAM(s) Oral daily  aspirin  chewable 81 milliGRAM(s) Oral daily  digoxin     Tablet 0.125 milliGRAM(s) Oral daily  enoxaparin Injectable 40 milliGRAM(s) SubCutaneous daily  famotidine    Tablet 20 milliGRAM(s) Oral <User Schedule>  labetalol 100 milliGRAM(s) Oral three times a day  levETIRAcetam  Solution 1000 milliGRAM(s) Oral two times a day  multivitamin 1 Tablet(s) Oral daily  phenytoin   Suspension 400 milliGRAM(s) Oral every 12 hours  sodium chloride 3%  Inhalation 4 milliLiter(s) Inhalation two times a day  tamsulosin 0.4 milliGRAM(s) Oral at bedtime    =======================================================    Physical Exam:  T(F): 98.4 (24 Jan 2019 08:00), Max: 102.5 (22 Jan 2019 21:00)  HR: 60 (24 Jan 2019 10:12)  BP: 130/65 (24 Jan 2019 08:00)  RR: 29 (24 Jan 2019 08:00)  SpO2: 98% (24 Jan 2019 10:12) (94% - 100%)    GEN: NAD  HEENT: normocephalic and atraumatic. Anicteric. + Trach  NECK: Supple.   LUNGS: fair air entry anteriroly   HEART: Regular rate and rhythm   ABDOMEN: Soft, nontender, and nondistended.  Positive bowel sounds.  + PEG  + ostomy  EXTREMITIES: + edema.  MSK: no joint swelling  NEUROLOGIC: Awake, no meaningful communication   PSYCHIATRIC:  unable to assess  SKIN: No Rash    ==================================    Labs:                        9.1    9.1   )-----------( 256      ( 24 Jan 2019 09:03 )             32.7     01-24    151<H>  |  110<H>  |  40.0<H>  ----------------------------<  113  3.3<L>   |  28.0  |  0.66    Ca    8.6      24 Jan 2019 09:03

## 2019-01-24 NOTE — PROGRESS NOTE ADULT - SUBJECTIVE AND OBJECTIVE BOX
PULMON[facility] PROGRESS NOTE      [id number]    Patient is a 86y old  Male who presents with a chief complaint of respiratory distress. fever (24 Jan 2019 11:32)      INTERVAL HPI/OVERNIGHT EVENTS:  No change  Being prepared for discharge to LTC    MEDICATIONS  (STANDING):  ampicillin/sulbactam  IVPB 3 Gram(s) IV Intermittent every 6 hours  ascorbic acid 500 milliGRAM(s) Oral daily  aspirin  chewable 81 milliGRAM(s) Oral daily  digoxin     Tablet 0.125 milliGRAM(s) Oral daily  enoxaparin Injectable 40 milliGRAM(s) SubCutaneous daily  famotidine    Tablet 20 milliGRAM(s) Oral <User Schedule>  labetalol 100 milliGRAM(s) Oral three times a day  levETIRAcetam  Solution 1000 milliGRAM(s) Oral two times a day  multivitamin 1 Tablet(s) Oral daily  phenytoin   Suspension 400 milliGRAM(s) Oral every 12 hours  potassium chloride   Powder 40 milliEquivalent(s) Oral every 4 hours  sodium chloride 3%  Inhalation 4 milliLiter(s) Inhalation two times a day  tamsulosin 0.4 milliGRAM(s) Oral at bedtime      MEDICATIONS  (PRN):  acetaminophen    Suspension .. 650 milliGRAM(s) Oral every 6 hours PRN Temp greater or equal to 38C (100.4F)      Allergies    clindamycin (Unknown)  Grapes (Rash)  Nuts (Hives; Rash)  PC Pen VK (Unknown)  shellfish (Angioedema; Rash; Hives)  strawberry (Short breath; Rash; Hives)  Xanax (Rash)    Intolerances    Ativan (Unknown)  Haldol (Dystonic RXN)  hydrALAZINE (Unknown)  Zyprexa (Unknown)      PAST MEDICAL & SURGICAL HISTORY:  Dysphagia  Fall: Multiple Mechanical falls  CAD (coronary artery disease)  Clostridium difficile diarrhea: in 2009  BPH (benign prostatic hypertrophy)  Dementia  HLD (hyperlipidemia)  CHF (congestive heart failure)  Prostate cancer: Treated w/ radiation  Stroke: (~5yrs ago)  Seizure: (last event &gt;1yr ago)  HTN (hypertension)  Colostomy in place  S/P percutaneous endoscopic gastrostomy (PEG) tube placement  H/O hemorrhoidectomy  Deviated septum  Abdominal hernia  Status post cardiac surgery: stents x 2        HEMATOLOGICAL:  No easy bruising or bleeding.     Vital Signs Last 24 Hrs  T(C): 37.7 (24 Jan 2019 12:00), Max: 38.1 (24 Jan 2019 01:30)  T(F): 99.9 (24 Jan 2019 12:00), Max: 100.6 (24 Jan 2019 01:30)  HR: 62 (24 Jan 2019 12:00) (57 - 67)  BP: 135/60 (24 Jan 2019 12:00) (117/57 - 155/69)  BP(mean): 86 (24 Jan 2019 12:00) (82 - 99)  RR: 25 (24 Jan 2019 12:00) (20 - 29)  SpO2: 98% (24 Jan 2019 12:00) (94% - 99%)    PHYSICAL EXAMINATION:    GENERAL: The patient is poorly responsive    HEENT: Head is normocephalic and atraumatic.    NECK: Supple.    LUNGS: Scattered rhonchi    HEART: Regular rate and rhythm without murmur.    ABDOMEN: Soft, nontender, and nondistended.      EXTREMITIES: Without any cyanosis, clubbing, rash, lesions or edema.    NEUROLOGIC: Grossly intact.    SKIN: No ulceration or induration present.      LABS:                        9.1    9.1   )-----------( 256      ( 24 Jan 2019 09:03 )             32.7     01-24    151<H>  |  110<H>  |  40.0<H>  ----------------------------<  113  3.3<L>   |  28.0  |  0.66    Ca    8.6      24 Jan 2019 09:03                          MICROBIOLOGY:    RADIOLOGY & ADDITIONAL STUDIES:

## 2019-01-24 NOTE — PROGRESS NOTE ADULT - ASSESSMENT
85 y/o male with dementia, h/o CAD s/p 2 stents, CHF, respiratory failure on chronic vent via tracheostomy, sacral decubitus, h/o sacral osteomyelitis     Streptococcus septicemia  Stenotrophomonas, MDR Acinetobacter, Proteus and  Pseudomonas Pneumonia  VDRF  PCN allergy    - strep bacteremia; source likely from sacral wound  - repeat blood cultures negative 1/5/19  - possible UTI with urine + for CRE pseudomonas  - Sputum with Stenotrophomonas, MDR Acinetobacter, Proteus and  Pseudomonas  - Completed 10 days of Levaquin for pneumonia  - Completed Zosyn 7 days for pneumonia  - Continue Unasyn 3gm IV q 6hours for septicemia  - Plan for antibiotics till 2/15/19 for septicemia osteomyelitis       Hypernatremia improving  - continue medical care      - follow up all outstanding cultures  - trend temperature and WBC curve  - repeat cultures from blood and all sources if febrile.

## 2019-01-25 LAB
ALBUMIN SERPL ELPH-MCNC: 2.7 G/DL — LOW (ref 3.3–5.2)
ALP SERPL-CCNC: 121 U/L — HIGH (ref 40–120)
ALT FLD-CCNC: 29 U/L — SIGNIFICANT CHANGE UP
ANION GAP SERPL CALC-SCNC: 10 MMOL/L — SIGNIFICANT CHANGE UP (ref 5–17)
AST SERPL-CCNC: 34 U/L — SIGNIFICANT CHANGE UP
BILIRUB SERPL-MCNC: <0.2 MG/DL — LOW (ref 0.4–2)
BUN SERPL-MCNC: 31 MG/DL — HIGH (ref 8–20)
CALCIUM SERPL-MCNC: 8.7 MG/DL — SIGNIFICANT CHANGE UP (ref 8.6–10.2)
CHLORIDE SERPL-SCNC: 114 MMOL/L — HIGH (ref 98–107)
CO2 SERPL-SCNC: 28 MMOL/L — SIGNIFICANT CHANGE UP (ref 22–29)
CREAT SERPL-MCNC: 0.51 MG/DL — SIGNIFICANT CHANGE UP (ref 0.5–1.3)
GLUCOSE SERPL-MCNC: 125 MG/DL — HIGH (ref 70–115)
HCT VFR BLD CALC: 30.1 % — LOW (ref 42–52)
HGB BLD-MCNC: 8.5 G/DL — LOW (ref 14–18)
MAGNESIUM SERPL-MCNC: 2.5 MG/DL — SIGNIFICANT CHANGE UP (ref 1.6–2.6)
MCHC RBC-ENTMCNC: 24.9 PG — LOW (ref 27–31)
MCHC RBC-ENTMCNC: 28.2 G/DL — LOW (ref 32–36)
MCV RBC AUTO: 88 FL — SIGNIFICANT CHANGE UP (ref 80–94)
PLATELET # BLD AUTO: 236 K/UL — SIGNIFICANT CHANGE UP (ref 150–400)
POTASSIUM SERPL-MCNC: 3.9 MMOL/L — SIGNIFICANT CHANGE UP (ref 3.5–5.3)
POTASSIUM SERPL-SCNC: 3.9 MMOL/L — SIGNIFICANT CHANGE UP (ref 3.5–5.3)
PROT SERPL-MCNC: 6.5 G/DL — LOW (ref 6.6–8.7)
RBC # BLD: 3.42 M/UL — LOW (ref 4.6–6.2)
RBC # FLD: 25.2 % — HIGH (ref 11–15.6)
SODIUM SERPL-SCNC: 152 MMOL/L — HIGH (ref 135–145)
WBC # BLD: 6.4 K/UL — SIGNIFICANT CHANGE UP (ref 4.8–10.8)
WBC # FLD AUTO: 6.4 K/UL — SIGNIFICANT CHANGE UP (ref 4.8–10.8)

## 2019-01-25 PROCEDURE — 99233 SBSQ HOSP IP/OBS HIGH 50: CPT

## 2019-01-25 PROCEDURE — 99232 SBSQ HOSP IP/OBS MODERATE 35: CPT

## 2019-01-25 RX ADMIN — Medication 100 MILLIGRAM(S): at 21:56

## 2019-01-25 RX ADMIN — AMPICILLIN SODIUM AND SULBACTAM SODIUM 200 GRAM(S): 250; 125 INJECTION, POWDER, FOR SUSPENSION INTRAMUSCULAR; INTRAVENOUS at 12:55

## 2019-01-25 RX ADMIN — SODIUM CHLORIDE 4 MILLILITER(S): 9 INJECTION INTRAMUSCULAR; INTRAVENOUS; SUBCUTANEOUS at 08:27

## 2019-01-25 RX ADMIN — Medication 100 MILLIGRAM(S): at 06:33

## 2019-01-25 RX ADMIN — AMPICILLIN SODIUM AND SULBACTAM SODIUM 200 GRAM(S): 250; 125 INJECTION, POWDER, FOR SUSPENSION INTRAMUSCULAR; INTRAVENOUS at 00:07

## 2019-01-25 RX ADMIN — Medication 500 MILLIGRAM(S): at 12:59

## 2019-01-25 RX ADMIN — Medication 81 MILLIGRAM(S): at 12:59

## 2019-01-25 RX ADMIN — Medication 1 TABLET(S): at 12:59

## 2019-01-25 RX ADMIN — LEVETIRACETAM 1000 MILLIGRAM(S): 250 TABLET, FILM COATED ORAL at 06:33

## 2019-01-25 RX ADMIN — AMPICILLIN SODIUM AND SULBACTAM SODIUM 200 GRAM(S): 250; 125 INJECTION, POWDER, FOR SUSPENSION INTRAMUSCULAR; INTRAVENOUS at 17:11

## 2019-01-25 RX ADMIN — AMPICILLIN SODIUM AND SULBACTAM SODIUM 200 GRAM(S): 250; 125 INJECTION, POWDER, FOR SUSPENSION INTRAMUSCULAR; INTRAVENOUS at 06:34

## 2019-01-25 RX ADMIN — ENOXAPARIN SODIUM 40 MILLIGRAM(S): 100 INJECTION SUBCUTANEOUS at 13:00

## 2019-01-25 RX ADMIN — Medication 400 MILLIGRAM(S): at 17:11

## 2019-01-25 RX ADMIN — Medication 0.12 MILLIGRAM(S): at 06:33

## 2019-01-25 RX ADMIN — SODIUM CHLORIDE 4 MILLILITER(S): 9 INJECTION INTRAMUSCULAR; INTRAVENOUS; SUBCUTANEOUS at 21:25

## 2019-01-25 RX ADMIN — TAMSULOSIN HYDROCHLORIDE 0.4 MILLIGRAM(S): 0.4 CAPSULE ORAL at 21:56

## 2019-01-25 RX ADMIN — FAMOTIDINE 20 MILLIGRAM(S): 10 INJECTION INTRAVENOUS at 06:33

## 2019-01-25 RX ADMIN — Medication 400 MILLIGRAM(S): at 06:33

## 2019-01-25 RX ADMIN — LEVETIRACETAM 1000 MILLIGRAM(S): 250 TABLET, FILM COATED ORAL at 17:11

## 2019-01-25 NOTE — PROGRESS NOTE ADULT - PROBLEM SELECTOR PROBLEM 5
Penicillin allergy
Pressure injury of sacral region, stage 4
Urinary tract infection with hematuria, site unspecified

## 2019-01-25 NOTE — PROGRESS NOTE ADULT - PROBLEM SELECTOR PROBLEM 2
Acute on chronic respiratory failure with hypoxia
Pneumonia due to infectious organism, unspecified laterality, unspecified part of lung
R/O Pressure injury of sacral region, stage 4
Urinary tract infection with hematuria, site unspecified

## 2019-01-25 NOTE — PROGRESS NOTE ADULT - ASSESSMENT
85 y/o male with dementia, h/o CAD s/p 2 stents, CHF, respiratory failure on chronic vent via tracheostomy, sacral decubitus, h/o sacral osteomyelitis     Streptococcus septicemia  Stenotrophomonas, MDR Acinetobacter, Proteus and  Pseudomonas Pneumonia  VDRF  PCN allergy    - strep bacteremia; source likely from sacral wound  - repeat blood cultures negative 1/5/19  - possible UTI with urine + for CRE pseudomonas  - Sputum with Stenotrophomonas, MDR Acinetobacter, Proteus and  Pseudomonas  - Completed 10 days of Levaquin for pneumonia  - Completed Zosyn 7 days for pneumonia  - Continue Unasyn 3gm IV q 6hours for septicemia    - Plan for antibiotics till 2/15/19 for septicemia osteomyelitis       Hypernatremia improving  - continue medical care      - repeat cultures from blood and all sources if febrile.   - weekly CBC CMP ESR CRP from ID perspective.       Will sign off at this time,   please call back if new concerns arise.

## 2019-01-25 NOTE — PROGRESS NOTE ADULT - SUBJECTIVE AND OBJECTIVE BOX
KING MCKEON    [id number]    86y      Male    INTERVAL HPI/OVERNIGHT EVENTS:    patient being seen for chronic resp failure, sepsis secondary to OM, Pseudomonas UTI and strep bacteremia. patient seen at bedside and in nad, non verbal     REVIEW OF SYSTEMS:  unable to obtain secondary to mental status     Vital Signs Last 24 Hrs  T(C): 37.3 (25 Jan 2019 12:00), Max: 38 (24 Jan 2019 13:45)  T(F): 99.1 (25 Jan 2019 12:00), Max: 100.4 (24 Jan 2019 13:45)  HR: 60 (25 Jan 2019 12:00) (56 - 66)  BP: 115/57 (25 Jan 2019 12:00) (107/56 - 162/78)  BP(mean): 80 (25 Jan 2019 12:00) (76 - 112)  RR: 30 (25 Jan 2019 12:00) (19 - 32)  SpO2: 98% (25 Jan 2019 12:00) (94% - 100%)    PHYSICAL EXAM:    GENERAL: NAD, non verbal, ill appearing   HEENT: dry MM  NECK: trach   CHEST/LUNG:  diminished  HEART: S1S2+, Regular rate and rhythm; No murmurs  ABDOMEN: Soft, colostomy,   EXTREMITIES:  anasarca, jones   NEURO: Awake      LABS:                        8.5    6.4   )-----------( 236      ( 25 Jan 2019 03:30 )             30.1     01-25    152<H>  |  114<H>  |  31.0<H>  ----------------------------<  125<H>  3.9   |  28.0  |  0.51    Ca    8.7      25 Jan 2019 03:30  Mg     2.5     01-25    TPro  6.5<L>  /  Alb  2.7<L>  /  TBili  <0.2<L>  /  DBili  x   /  AST  34  /  ALT  29  /  AlkPhos  121<H>  01-25            MEDICATIONS  (STANDING):  ampicillin/sulbactam  IVPB 3 Gram(s) IV Intermittent every 6 hours  ascorbic acid 500 milliGRAM(s) Oral daily  aspirin  chewable 81 milliGRAM(s) Oral daily  digoxin     Tablet 0.125 milliGRAM(s) Oral daily  enoxaparin Injectable 40 milliGRAM(s) SubCutaneous daily  famotidine    Tablet 20 milliGRAM(s) Oral <User Schedule>  labetalol 100 milliGRAM(s) Oral three times a day  levETIRAcetam  Solution 1000 milliGRAM(s) Oral two times a day  multivitamin 1 Tablet(s) Oral daily  phenytoin   Suspension 400 milliGRAM(s) Oral every 12 hours  sodium chloride 3%  Inhalation 4 milliLiter(s) Inhalation two times a day  tamsulosin 0.4 milliGRAM(s) Oral at bedtime    MEDICATIONS  (PRN):  acetaminophen    Suspension .. 650 milliGRAM(s) Oral every 6 hours PRN Temp greater or equal to 38C (100.4F)      RADIOLOGY & ADDITIONAL TESTS:

## 2019-01-25 NOTE — PROGRESS NOTE ADULT - SUBJECTIVE AND OBJECTIVE BOX
[facility]  INFECTIOUS DISEASES AND INTERNAL MEDICINE at [place]  =======================================================  Renzo Salazar MD  Diplomates American Board of Internal Medicine and Infectious Diseases  =======================================================    [place] [facility] [id number]    Follow up: Streptococcus sepsis / MDR bacteria Pneumonia  no new issues    ===============  Allergies:  Ativan (Unknown)  clindamycin (Unknown)  Grapes (Rash)  Haldol (Dystonic RXN)  hydrALAZINE (Unknown)  Nuts (Hives; Rash)  PC Pen VK (Unknown)  shellfish (Angioedema; Rash; Hives)  strawberry (Short breath; Rash; Hives)  Xanax (Rash)  Zyprexa (Unknown)    REVIEW OF SYSTEMS:  Unable to obtain, patient not able to provide history   =======================================================  Antibiotics:  ampicillin/sulbactam  IVPB 3 Gram(s) IV Intermittent every 6 hours    Other medications:  ascorbic acid 500 milliGRAM(s) Oral daily  aspirin  chewable 81 milliGRAM(s) Oral daily  digoxin     Tablet 0.125 milliGRAM(s) Oral daily  enoxaparin Injectable 40 milliGRAM(s) SubCutaneous daily  famotidine    Tablet 20 milliGRAM(s) Oral <User Schedule>  labetalol 100 milliGRAM(s) Oral three times a day  levETIRAcetam  Solution 1000 milliGRAM(s) Oral two times a day  multivitamin 1 Tablet(s) Oral daily  phenytoin   Suspension 400 milliGRAM(s) Oral every 12 hours  sodium chloride 3%  Inhalation 4 milliLiter(s) Inhalation two times a day  tamsulosin 0.4 milliGRAM(s) Oral at bedtime    =======================================================    Physical Exam:  T(F): 99.1 (25 Jan 2019 12:00), Max: 100.6 (24 Jan 2019 01:30)  HR: 60 (25 Jan 2019 12:00)  BP: 115/57 (25 Jan 2019 12:00)  RR: 30 (25 Jan 2019 12:00)  SpO2: 98% (25 Jan 2019 12:00) (94% - 100%)      GEN: NAD  HEENT: normocephalic and atraumatic. Anicteric. + Trach on vent  NECK: Supple.   LUNGS: fair air entry anteriorly   HEART: Regular rate and rhythm   ABDOMEN: Soft, nontender, and nondistended.  Positive bowel sounds.  + PEG  + ostomy  + jones  EXTREMITIES: + edema. contracted extremities  MSK: no joint swelling  NEUROLOGIC: Awake, no meaningful communication   PSYCHIATRIC:  unable to assess  SKIN: No Rash    ==================================  Labs:                        8.5    6.4   )-----------( 236      ( 25 Jan 2019 03:30 )             30.1     01-25    152<H>  |  114<H>  |  31.0<H>  ----------------------------<  125<H>  3.9   |  28.0  |  0.51    Ca    8.7      25 Jan 2019 03:30  Mg     2.5     01-25    TPro  6.5<L>  /  Alb  2.7<L>  /  TBili  <0.2<L>  /  DBili  x   /  AST  34  /  ALT  29  /  AlkPhos  121<H>  01-25

## 2019-01-25 NOTE — PROGRESS NOTE ADULT - PROBLEM SELECTOR PROBLEM 3
Bacteremia due to Streptococcus
Sacral osteomyelitis
Bacteremia due to Streptococcus
Seizure disorder

## 2019-01-25 NOTE — PROGRESS NOTE ADULT - ASSESSMENT
Respiratory Failure +trach/ PEG  Hx of multiple electrolytes abnormalities  Hypernatremia  inc free water via PEG tube serum Na cont to rise  cont free water via peg 400 cc Q4hr if Na remains elevated sanchez will escalate free water    Will follow

## 2019-01-25 NOTE — PROGRESS NOTE ADULT - SUBJECTIVE AND OBJECTIVE BOX
NEPHROLOGY INTERVAL HPI/OVERNIGHT EVENTS:    Examined earlier    MEDICATIONS  (STANDING):  ampicillin/sulbactam  IVPB 3 Gram(s) IV Intermittent every 6 hours  ascorbic acid 500 milliGRAM(s) Oral daily  aspirin  chewable 81 milliGRAM(s) Oral daily  digoxin     Tablet 0.125 milliGRAM(s) Oral daily  enoxaparin Injectable 40 milliGRAM(s) SubCutaneous daily  famotidine    Tablet 20 milliGRAM(s) Oral <User Schedule>  labetalol 100 milliGRAM(s) Oral three times a day  levETIRAcetam  Solution 1000 milliGRAM(s) Oral two times a day  multivitamin 1 Tablet(s) Oral daily  phenytoin   Suspension 400 milliGRAM(s) Oral every 12 hours  sodium chloride 3%  Inhalation 4 milliLiter(s) Inhalation two times a day  tamsulosin 0.4 milliGRAM(s) Oral at bedtime    MEDICATIONS  (PRN):  acetaminophen    Suspension .. 650 milliGRAM(s) Oral every 6 hours PRN Temp greater or equal to 38C (100.4F)      Allergies    clindamycin (Unknown)  Grapes (Rash)  Nuts (Hives; Rash)  PC Pen VK (Unknown)  shellfish (Angioedema; Rash; Hives)  strawberry (Short breath; Rash; Hives)  Xanax (Rash)    Intolerances    Ativan (Unknown)  Haldol (Dystonic RXN)  hydrALAZINE (Unknown)  Zyprexa (Unknown)      Vital Signs Last 24 Hrs  T(C): 37.3 (25 Jan 2019 12:00), Max: 38 (24 Jan 2019 22:30)  T(F): 99.1 (25 Jan 2019 12:00), Max: 100.4 (24 Jan 2019 22:30)  HR: 58 (25 Jan 2019 15:51) (56 - 66)  BP: 115/57 (25 Jan 2019 12:00) (107/56 - 162/78)  BP(mean): 80 (25 Jan 2019 12:00) (76 - 112)  RR: 30 (25 Jan 2019 12:00) (19 - 30)  SpO2: 100% (25 Jan 2019 15:51) (94% - 100%)  Daily     Daily     PHYSICAL EXAM:  GEN: NAD  HEENT: + Trach  LUNGS: dec bs B/L   HEART: Regular rate and rhythm   ABDOMEN: Soft ND NT +bowel sounds.  + PEG/ + ostomy  EXTREMITIES: + edema.  NEUROLOGIC: Awake     LABS:                        8.5    6.4   )-----------( 236      ( 25 Jan 2019 03:30 )             30.1     01-25    152<H>  |  114<H>  |  31.0<H>  ----------------------------<  125<H>  3.9   |  28.0  |  0.51    Ca    8.7      25 Jan 2019 03:30  Mg     2.5     01-25    TPro  6.5<L>  /  Alb  2.7<L>  /  TBili  <0.2<L>  /  DBili  x   /  AST  34  /  ALT  29  /  AlkPhos  121<H>  01-25        Magnesium, Serum: 2.5 mg/dL (01-25 @ 03:30)          RADIOLOGY & ADDITIONAL TESTS:

## 2019-01-25 NOTE — PROGRESS NOTE ADULT - ASSESSMENT
87 yo male, PMHx chronic trach/PEG secondary to pneumonia ~2 years ago complicated by multiple bouts of pneumonia, unable to liberate from vent due to secretions, PEG infection, MDR infections, seizure d/o, CHF, HTN, HLD, BPH with chronic jones, colostomy, who presented from [facility] 1/3/18 with respiratory distress and fever >102'F axillary. Admitted for sepsis secondary to Group beta hemolytic bacteremia secondary to stage 4 sacral ulcer and pseudomonas UTI. Was evaluated by ID, started on broad spectrum antibiotics and then transition to Levaquin Sacral wound evaluated by surgery and wound care. CT suggestive of sacral osteomyelitis. RRT called for acute on chronic hypoxic respiratory requiring vent support secondary to mucous plugging. Was transferred to the ICU.  Patient was transfused 3u PRBC for acute anemia. Febrile with pseudomonas and carbapenem resistant Acinetobacter in the sputum. Started on IV unasyn.       1. Sepsis secondary to sacral Osteomyelitis, Pseudomonas UTI and strep bacteremia: Afebrile, with normal WBC. Unasyn till 2/15/19 as per ID. Had picc line placed on 1/23  --> had fever overnight  --> will reculture if continues to spike as per ID    2. Acute on chronic hypoxic respiratory failure: improving.  Frequent suctioning. Continue Vent support - Pulmonology following  --> c,w current care    3. Hypernatremia: increased  Free water via tube feed increased by renal  -->worsened     4. Iron Deficiency anemia: Hb/hct stable  Status post 3 units prbc - Stable Hgb  --> type and screen for tomorrow, may need transfusion     5. Seizure disorder: Continue Keppra - No seizure noted    6. Hypertension: procardia and labetalol were held for hypotension on admission  Labetalol - stable    7. Chronic Diastolic CHF; On po lasix . Monitor bmp for resumption -     8 Stage 4 sacral ulcer: Continue wound care;     9. Severe Protein Calorie Malnutrition -   Continue Supplements . Recommend protein source.   --> tube feeds    10. Functional Quadriplegia - supportive care.  PT followup.    DIspo - monitor,   dnr dni  prognosis grim

## 2019-01-25 NOTE — PROGRESS NOTE ADULT - PROBLEM SELECTOR PROBLEM 4
Chronic respiratory failure with hypoxia
Elevated troponin I level
R/O Sacral osteomyelitis

## 2019-01-25 NOTE — PROGRESS NOTE ADULT - PROBLEM/PLAN-1
DISPLAY PLAN FREE TEXT
Billy Quach(Resident)

## 2019-01-26 LAB
ALBUMIN SERPL ELPH-MCNC: 2.8 G/DL — LOW (ref 3.3–5.2)
ALP SERPL-CCNC: 118 U/L — SIGNIFICANT CHANGE UP (ref 40–120)
ALT FLD-CCNC: 28 U/L — SIGNIFICANT CHANGE UP
ANION GAP SERPL CALC-SCNC: 9 MMOL/L — SIGNIFICANT CHANGE UP (ref 5–17)
AST SERPL-CCNC: 33 U/L — SIGNIFICANT CHANGE UP
BILIRUB SERPL-MCNC: <0.2 MG/DL — LOW (ref 0.4–2)
BLD GP AB SCN SERPL QL: SIGNIFICANT CHANGE UP
BUN SERPL-MCNC: 24 MG/DL — HIGH (ref 8–20)
CALCIUM SERPL-MCNC: 8.6 MG/DL — SIGNIFICANT CHANGE UP (ref 8.6–10.2)
CHLORIDE SERPL-SCNC: 109 MMOL/L — HIGH (ref 98–107)
CO2 SERPL-SCNC: 27 MMOL/L — SIGNIFICANT CHANGE UP (ref 22–29)
CREAT SERPL-MCNC: 0.45 MG/DL — LOW (ref 0.5–1.3)
GLUCOSE SERPL-MCNC: 128 MG/DL — HIGH (ref 70–115)
HCT VFR BLD CALC: 30 % — LOW (ref 42–52)
HGB BLD-MCNC: 8.5 G/DL — LOW (ref 14–18)
MAGNESIUM SERPL-MCNC: 2.3 MG/DL — SIGNIFICANT CHANGE UP (ref 1.6–2.6)
MCHC RBC-ENTMCNC: 24.6 PG — LOW (ref 27–31)
MCHC RBC-ENTMCNC: 28.3 G/DL — LOW (ref 32–36)
MCV RBC AUTO: 86.7 FL — SIGNIFICANT CHANGE UP (ref 80–94)
PLATELET # BLD AUTO: 207 K/UL — SIGNIFICANT CHANGE UP (ref 150–400)
POTASSIUM SERPL-MCNC: 3.9 MMOL/L — SIGNIFICANT CHANGE UP (ref 3.5–5.3)
POTASSIUM SERPL-SCNC: 3.9 MMOL/L — SIGNIFICANT CHANGE UP (ref 3.5–5.3)
PROT SERPL-MCNC: 6.4 G/DL — LOW (ref 6.6–8.7)
RBC # BLD: 3.46 M/UL — LOW (ref 4.6–6.2)
RBC # FLD: 24.3 % — HIGH (ref 11–15.6)
SODIUM SERPL-SCNC: 145 MMOL/L — SIGNIFICANT CHANGE UP (ref 135–145)
TYPE + AB SCN PNL BLD: SIGNIFICANT CHANGE UP
WBC # BLD: 4.3 K/UL — LOW (ref 4.8–10.8)
WBC # FLD AUTO: 4.3 K/UL — LOW (ref 4.8–10.8)

## 2019-01-26 PROCEDURE — 99232 SBSQ HOSP IP/OBS MODERATE 35: CPT

## 2019-01-26 PROCEDURE — 74018 RADEX ABDOMEN 1 VIEW: CPT | Mod: 26

## 2019-01-26 RX ADMIN — LEVETIRACETAM 1000 MILLIGRAM(S): 250 TABLET, FILM COATED ORAL at 17:27

## 2019-01-26 RX ADMIN — AMPICILLIN SODIUM AND SULBACTAM SODIUM 200 GRAM(S): 250; 125 INJECTION, POWDER, FOR SUSPENSION INTRAMUSCULAR; INTRAVENOUS at 11:26

## 2019-01-26 RX ADMIN — Medication 100 MILLIGRAM(S): at 14:47

## 2019-01-26 RX ADMIN — SODIUM CHLORIDE 4 MILLILITER(S): 9 INJECTION INTRAMUSCULAR; INTRAVENOUS; SUBCUTANEOUS at 20:18

## 2019-01-26 RX ADMIN — Medication 81 MILLIGRAM(S): at 11:22

## 2019-01-26 RX ADMIN — Medication 1 DROP(S): at 21:35

## 2019-01-26 RX ADMIN — FAMOTIDINE 20 MILLIGRAM(S): 10 INJECTION INTRAVENOUS at 05:25

## 2019-01-26 RX ADMIN — SODIUM CHLORIDE 4 MILLILITER(S): 9 INJECTION INTRAMUSCULAR; INTRAVENOUS; SUBCUTANEOUS at 09:38

## 2019-01-26 RX ADMIN — AMPICILLIN SODIUM AND SULBACTAM SODIUM 200 GRAM(S): 250; 125 INJECTION, POWDER, FOR SUSPENSION INTRAMUSCULAR; INTRAVENOUS at 17:26

## 2019-01-26 RX ADMIN — Medication 500 MILLIGRAM(S): at 11:23

## 2019-01-26 RX ADMIN — AMPICILLIN SODIUM AND SULBACTAM SODIUM 200 GRAM(S): 250; 125 INJECTION, POWDER, FOR SUSPENSION INTRAMUSCULAR; INTRAVENOUS at 05:25

## 2019-01-26 RX ADMIN — TAMSULOSIN HYDROCHLORIDE 0.4 MILLIGRAM(S): 0.4 CAPSULE ORAL at 21:34

## 2019-01-26 RX ADMIN — Medication 400 MILLIGRAM(S): at 17:28

## 2019-01-26 RX ADMIN — Medication 100 MILLIGRAM(S): at 21:34

## 2019-01-26 RX ADMIN — Medication 400 MILLIGRAM(S): at 05:25

## 2019-01-26 RX ADMIN — Medication 1 TABLET(S): at 11:23

## 2019-01-26 RX ADMIN — LEVETIRACETAM 1000 MILLIGRAM(S): 250 TABLET, FILM COATED ORAL at 05:24

## 2019-01-26 RX ADMIN — AMPICILLIN SODIUM AND SULBACTAM SODIUM 200 GRAM(S): 250; 125 INJECTION, POWDER, FOR SUSPENSION INTRAMUSCULAR; INTRAVENOUS at 00:13

## 2019-01-26 RX ADMIN — ENOXAPARIN SODIUM 40 MILLIGRAM(S): 100 INJECTION SUBCUTANEOUS at 11:23

## 2019-01-26 RX ADMIN — Medication 0.12 MILLIGRAM(S): at 05:25

## 2019-01-26 NOTE — PROGRESS NOTE ADULT - ASSESSMENT
85 yo male, PMHx chronic trach/PEG secondary to pneumonia ~2 years ago complicated by multiple bouts of pneumonia, unable to liberate from vent due to secretions, PEG infection, MDR infections, seizure d/o, CHF, HTN, HLD, BPH with chronic jones, colostomy, who presented from [facility] 1/3/18 with respiratory distress and fever >102'F axillary. Admitted for sepsis secondary to Group beta hemolytic bacteremia secondary to stage 4 sacral ulcer and pseudomonas UTI. Was evaluated by ID, started on broad spectrum antibiotics and then transition to Levaquin Sacral wound evaluated by surgery and wound care. CT suggestive of sacral osteomyelitis. RRT called for acute on chronic hypoxic respiratory requiring vent support secondary to mucous plugging. Was transferred to the ICU.  Patient was transfused 3u PRBC for acute anemia. Febrile with pseudomonas and carbapenem resistant Acinetobacter in the sputum. Started on IV unasyn.       1. Sepsis secondary to sacral Osteomyelitis, Pseudomonas UTI and strep bacteremia: Afebrile, with normal WBC. Unasyn till 2/15/19 as per ID. Had picc line placed on 1/23  --> will reculture if continues to spike as per ID  --> afebrile last 24 hours     2. Acute on chronic hypoxic respiratory failure: improving.  Frequent suctioning. Continue Vent support - Pulmonology following  --> c,w current care    3. Hypernatremia: resolved  --> dec free water   --> renal following     4. Iron Deficiency anemia: Hb/hct stable  Status post 3 units prbc - Stable Hgb  --> will order 1 more unit prbc today     5. Seizure disorder: Continue Keppra - No seizure noted    6. Hypertension:  Labetalol - stable    7. Chronic Diastolic CHF; --> hold lasix     8 Stage 4 sacral ulcer: Continue wound care;     9. Severe Protein Calorie Malnutrition -   Continue Supplements . Recommend protein source.   --> tube feeds    10. Functional Quadriplegia - supportive care.  PT followup.    DIspo - monitor,   dnr dni  prognosis grim    possible dc monday to [facility]

## 2019-01-26 NOTE — PROGRESS NOTE ADULT - SUBJECTIVE AND OBJECTIVE BOX
KING MCKEON    [id number]    86y      Male    INTERVAL HPI/OVERNIGHT EVENTS:    patient being seen for chronic resp failure, sepsis secondary to OM, Pseudomonas UTI and strep bacteremia. patient seen at bedside and in nad.     as per nurse patients peg tube was clogged temporarily in am.     last 24 hours patient is afebrile       REVIEW OF SYSTEMS:    unable to obtain secondary to mental status     Vital Signs Last 24 Hrs  T(C): 36.9 (26 Jan 2019 04:00), Max: 37.1 (25 Jan 2019 16:00)  T(F): 98.5 (26 Jan 2019 04:00), Max: 98.8 (25 Jan 2019 16:00)  HR: 60 (26 Jan 2019 12:26) (56 - 66)  BP: 137/64 (26 Jan 2019 12:00) (119/64 - 163/77)  BP(mean): 92 (26 Jan 2019 12:00) (85 - 111)  RR: 31 (26 Jan 2019 12:00) (15 - 31)  SpO2: 100% (26 Jan 2019 12:26) (99% - 100%)    PHYSICAL EXAM:    GENERAL: NAD, non verbal, ill appearing   HEENT: dry MM  NECK: trach   CHEST/LUNG:  diminished  HEART: S1S2+, Regular rate and rhythm; No murmurs  ABDOMEN: Soft, colostomy, peg tube   EXTREMITIES:  anasarca, jones   NEURO: Awake        LABS:                        8.5    4.3   )-----------( 207      ( 26 Jan 2019 02:43 )             30.0     01-26    145  |  109<H>  |  24.0<H>  ----------------------------<  128<H>  3.9   |  27.0  |  0.45<L>    Ca    8.6      26 Jan 2019 02:43  Mg     2.3     01-26    TPro  6.4<L>  /  Alb  2.8<L>  /  TBili  <0.2<L>  /  DBili  x   /  AST  33  /  ALT  28  /  AlkPhos  118  01-26            MEDICATIONS  (STANDING):  ampicillin/sulbactam  IVPB 3 Gram(s) IV Intermittent every 6 hours  ascorbic acid 500 milliGRAM(s) Oral daily  aspirin  chewable 81 milliGRAM(s) Oral daily  digoxin     Tablet 0.125 milliGRAM(s) Oral daily  enoxaparin Injectable 40 milliGRAM(s) SubCutaneous daily  famotidine    Tablet 20 milliGRAM(s) Oral <User Schedule>  labetalol 100 milliGRAM(s) Oral three times a day  levETIRAcetam  Solution 1000 milliGRAM(s) Oral two times a day  multivitamin 1 Tablet(s) Oral daily  phenytoin   Suspension 400 milliGRAM(s) Oral every 12 hours  sodium chloride 3%  Inhalation 4 milliLiter(s) Inhalation two times a day  tamsulosin 0.4 milliGRAM(s) Oral at bedtime    MEDICATIONS  (PRN):  acetaminophen    Suspension .. 650 milliGRAM(s) Oral every 6 hours PRN Temp greater or equal to 38C (100.4F)      RADIOLOGY & ADDITIONAL TESTS:

## 2019-01-27 LAB
ANION GAP SERPL CALC-SCNC: 10 MMOL/L — SIGNIFICANT CHANGE UP (ref 5–17)
BUN SERPL-MCNC: 20 MG/DL — SIGNIFICANT CHANGE UP (ref 8–20)
CALCIUM SERPL-MCNC: 8.7 MG/DL — SIGNIFICANT CHANGE UP (ref 8.6–10.2)
CHLORIDE SERPL-SCNC: 112 MMOL/L — HIGH (ref 98–107)
CO2 SERPL-SCNC: 25 MMOL/L — SIGNIFICANT CHANGE UP (ref 22–29)
CREAT SERPL-MCNC: 0.43 MG/DL — LOW (ref 0.5–1.3)
GLUCOSE SERPL-MCNC: 121 MG/DL — HIGH (ref 70–115)
HCT VFR BLD CALC: 32.5 % — LOW (ref 42–52)
HGB BLD-MCNC: 9.5 G/DL — LOW (ref 14–18)
MAGNESIUM SERPL-MCNC: 2.3 MG/DL — SIGNIFICANT CHANGE UP (ref 1.6–2.6)
MCHC RBC-ENTMCNC: 25.3 PG — LOW (ref 27–31)
MCHC RBC-ENTMCNC: 29.2 G/DL — LOW (ref 32–36)
MCV RBC AUTO: 86.4 FL — SIGNIFICANT CHANGE UP (ref 80–94)
PLATELET # BLD AUTO: 213 K/UL — SIGNIFICANT CHANGE UP (ref 150–400)
POTASSIUM SERPL-MCNC: 3.8 MMOL/L — SIGNIFICANT CHANGE UP (ref 3.5–5.3)
POTASSIUM SERPL-SCNC: 3.8 MMOL/L — SIGNIFICANT CHANGE UP (ref 3.5–5.3)
RBC # BLD: 3.76 M/UL — LOW (ref 4.6–6.2)
RBC # FLD: 23.4 % — HIGH (ref 11–15.6)
SODIUM SERPL-SCNC: 147 MMOL/L — HIGH (ref 135–145)
WBC # BLD: 4.9 K/UL — SIGNIFICANT CHANGE UP (ref 4.8–10.8)
WBC # FLD AUTO: 4.9 K/UL — SIGNIFICANT CHANGE UP (ref 4.8–10.8)

## 2019-01-27 PROCEDURE — 99232 SBSQ HOSP IP/OBS MODERATE 35: CPT

## 2019-01-27 RX ADMIN — AMPICILLIN SODIUM AND SULBACTAM SODIUM 200 GRAM(S): 250; 125 INJECTION, POWDER, FOR SUSPENSION INTRAMUSCULAR; INTRAVENOUS at 17:02

## 2019-01-27 RX ADMIN — LEVETIRACETAM 1000 MILLIGRAM(S): 250 TABLET, FILM COATED ORAL at 05:28

## 2019-01-27 RX ADMIN — AMPICILLIN SODIUM AND SULBACTAM SODIUM 200 GRAM(S): 250; 125 INJECTION, POWDER, FOR SUSPENSION INTRAMUSCULAR; INTRAVENOUS at 11:35

## 2019-01-27 RX ADMIN — Medication 400 MILLIGRAM(S): at 17:03

## 2019-01-27 RX ADMIN — Medication 1 DROP(S): at 17:04

## 2019-01-27 RX ADMIN — SODIUM CHLORIDE 4 MILLILITER(S): 9 INJECTION INTRAMUSCULAR; INTRAVENOUS; SUBCUTANEOUS at 21:48

## 2019-01-27 RX ADMIN — ENOXAPARIN SODIUM 40 MILLIGRAM(S): 100 INJECTION SUBCUTANEOUS at 11:35

## 2019-01-27 RX ADMIN — LEVETIRACETAM 1000 MILLIGRAM(S): 250 TABLET, FILM COATED ORAL at 17:03

## 2019-01-27 RX ADMIN — Medication 1 DROP(S): at 05:29

## 2019-01-27 RX ADMIN — AMPICILLIN SODIUM AND SULBACTAM SODIUM 200 GRAM(S): 250; 125 INJECTION, POWDER, FOR SUSPENSION INTRAMUSCULAR; INTRAVENOUS at 05:29

## 2019-01-27 RX ADMIN — Medication 500 MILLIGRAM(S): at 11:35

## 2019-01-27 RX ADMIN — Medication 0.12 MILLIGRAM(S): at 05:29

## 2019-01-27 RX ADMIN — Medication 100 MILLIGRAM(S): at 21:32

## 2019-01-27 RX ADMIN — FAMOTIDINE 20 MILLIGRAM(S): 10 INJECTION INTRAVENOUS at 05:29

## 2019-01-27 RX ADMIN — Medication 100 MILLIGRAM(S): at 13:21

## 2019-01-27 RX ADMIN — TAMSULOSIN HYDROCHLORIDE 0.4 MILLIGRAM(S): 0.4 CAPSULE ORAL at 21:32

## 2019-01-27 RX ADMIN — SODIUM CHLORIDE 4 MILLILITER(S): 9 INJECTION INTRAMUSCULAR; INTRAVENOUS; SUBCUTANEOUS at 08:46

## 2019-01-27 RX ADMIN — AMPICILLIN SODIUM AND SULBACTAM SODIUM 200 GRAM(S): 250; 125 INJECTION, POWDER, FOR SUSPENSION INTRAMUSCULAR; INTRAVENOUS at 01:29

## 2019-01-27 RX ADMIN — Medication 81 MILLIGRAM(S): at 11:35

## 2019-01-27 RX ADMIN — Medication 100 MILLIGRAM(S): at 05:29

## 2019-01-27 RX ADMIN — Medication 1 TABLET(S): at 11:35

## 2019-01-27 RX ADMIN — Medication 400 MILLIGRAM(S): at 05:29

## 2019-01-27 NOTE — PROGRESS NOTE ADULT - SUBJECTIVE AND OBJECTIVE BOX
KING MCKEON    [id number]    86y      Male    INTERVAL HPI/OVERNIGHT EVENTS:    patient being seen for chronic resp failure, sepsis secondary to OM, Pseudomonas UTI and strep bacteremia. patient seen at bedside and in nad.     last 24 hours patient had 1 unit prbc     REVIEW OF SYSTEMS:    unable to obtain secondary to mental status       Vital Signs Last 24 Hrs  T(C): 37.3 (27 Jan 2019 04:00), Max: 37.7 (27 Jan 2019 00:00)  T(F): 99.1 (27 Jan 2019 04:00), Max: 99.8 (27 Jan 2019 00:00)  HR: 53 (27 Jan 2019 08:54) (52 - 64)  BP: 125/58 (27 Jan 2019 08:00) (124/61 - 156/76)  BP(mean): 84 (27 Jan 2019 08:00) (84 - 108)  RR: 24 (27 Jan 2019 08:00) (21 - 31)  SpO2: 99% (27 Jan 2019 08:54) (98% - 100%)    PHYSICAL EXAM:    GENERAL: NAD, non verbal, ill appearing   HEENT: dry MM  NECK: trach   CHEST/LUNG:  diminished  HEART: S1S2+, Regular rate and rhythm; No murmurs  ABDOMEN: Soft, colostomy, peg tube   EXTREMITIES:  anasarca, jones   NEURO: Awake      LABS:                        9.5    4.9   )-----------( x        ( 27 Jan 2019 10:12 )             32.5     01-27    147<H>  |  112<H>  |  20.0  ----------------------------<  121<H>  3.8   |  25.0  |  0.43<L>    Ca    8.7      27 Jan 2019 10:12  Mg     2.3     01-27    TPro  6.4<L>  /  Alb  2.8<L>  /  TBili  <0.2<L>  /  DBili  x   /  AST  33  /  ALT  28  /  AlkPhos  118  01-26            MEDICATIONS  (STANDING):  ampicillin/sulbactam  IVPB 3 Gram(s) IV Intermittent every 6 hours  artificial  tears Solution 1 Drop(s) Both EYES two times a day  ascorbic acid 500 milliGRAM(s) Oral daily  aspirin  chewable 81 milliGRAM(s) Oral daily  digoxin     Tablet 0.125 milliGRAM(s) Oral daily  enoxaparin Injectable 40 milliGRAM(s) SubCutaneous daily  famotidine    Tablet 20 milliGRAM(s) Oral <User Schedule>  labetalol 100 milliGRAM(s) Oral three times a day  levETIRAcetam  Solution 1000 milliGRAM(s) Oral two times a day  multivitamin 1 Tablet(s) Oral daily  phenytoin   Suspension 400 milliGRAM(s) Oral every 12 hours  sodium chloride 3%  Inhalation 4 milliLiter(s) Inhalation two times a day  tamsulosin 0.4 milliGRAM(s) Oral at bedtime    MEDICATIONS  (PRN):  acetaminophen    Suspension .. 650 milliGRAM(s) Oral every 6 hours PRN Temp greater or equal to 38C (100.4F)      RADIOLOGY & ADDITIONAL TESTS:

## 2019-01-27 NOTE — PROGRESS NOTE ADULT - ASSESSMENT
85 yo male, PMHx chronic trach/PEG secondary to pneumonia ~2 years ago complicated by multiple bouts of pneumonia, unable to liberate from vent due to secretions, PEG infection, MDR infections, seizure d/o, CHF, HTN, HLD, BPH with chronic jones, colostomy, who presented from [facility] 1/3/18 with respiratory distress and fever >102'F axillary. Admitted for sepsis secondary to Group beta hemolytic bacteremia secondary to stage 4 sacral ulcer and pseudomonas UTI. Was evaluated by ID, started on broad spectrum antibiotics and then transition to Levaquin Sacral wound evaluated by surgery and wound care. CT suggestive of sacral osteomyelitis. RRT called for acute on chronic hypoxic respiratory requiring vent support secondary to mucous plugging. Was transferred to the ICU.  Patient was transfused 3u PRBC for acute anemia. Febrile with pseudomonas and carbapenem resistant Acinetobacter in the sputum. Started on IV unasyn.       1. Sepsis secondary to sacral Osteomyelitis, Pseudomonas UTI and strep bacteremia: Afebrile, with normal WBC. Unasyn till 2/15/19 as per ID. Had picc line placed on 1/23  --> had fever overnight  --> will reculture if continues to spike as per ID    2. Acute on chronic hypoxic respiratory failure: improving.  Frequent suctioning. Continue Vent support - Pulmonology following  --> c,w current care    3. Hypernatremia: stable  -->c.w free water flushes     4. Iron Deficiency anemia: Hb/hct stable  Status post 3 units prbc - Stable Hgb  --> s/p 1 unit prbc yesterday     5. Seizure disorder: Continue Keppra - No seizure noted    6. Hypertension: procardia and labetalol were held for hypotension on admission  Labetalol - stable    7. Chronic Diastolic CHF; On po lasix . Monitor bmp for resumption -     8 Stage 4 sacral ulcer: Continue wound care;     9. Severe Protein Calorie Malnutrition -   Continue Supplements . Recommend protein source.   --> tube feeds    10. Functional Quadriplegia - supportive care.  PT followup.    DIspo - monitor,   dnr dni  prognosis grim   dc tomorrow likely

## 2019-01-28 ENCOUNTER — TRANSCRIPTION ENCOUNTER (OUTPATIENT)
Age: 84
End: 2019-01-28

## 2019-01-28 LAB
ANION GAP SERPL CALC-SCNC: 8 MMOL/L — SIGNIFICANT CHANGE UP (ref 5–17)
BUN SERPL-MCNC: 21 MG/DL — HIGH (ref 8–20)
CALCIUM SERPL-MCNC: 8.8 MG/DL — SIGNIFICANT CHANGE UP (ref 8.6–10.2)
CHLORIDE SERPL-SCNC: 110 MMOL/L — HIGH (ref 98–107)
CO2 SERPL-SCNC: 26 MMOL/L — SIGNIFICANT CHANGE UP (ref 22–29)
CREAT SERPL-MCNC: 0.45 MG/DL — LOW (ref 0.5–1.3)
GLUCOSE SERPL-MCNC: 120 MG/DL — HIGH (ref 70–115)
MAGNESIUM SERPL-MCNC: 2.3 MG/DL — SIGNIFICANT CHANGE UP (ref 1.6–2.6)
POTASSIUM SERPL-MCNC: 4 MMOL/L — SIGNIFICANT CHANGE UP (ref 3.5–5.3)
POTASSIUM SERPL-SCNC: 4 MMOL/L — SIGNIFICANT CHANGE UP (ref 3.5–5.3)
SODIUM SERPL-SCNC: 144 MMOL/L — SIGNIFICANT CHANGE UP (ref 135–145)

## 2019-01-28 PROCEDURE — 99232 SBSQ HOSP IP/OBS MODERATE 35: CPT

## 2019-01-28 RX ORDER — ASPIRIN/CALCIUM CARB/MAGNESIUM 324 MG
1 TABLET ORAL
Qty: 0 | Refills: 0 | COMMUNITY
Start: 2019-01-28

## 2019-01-28 RX ORDER — LEVETIRACETAM 250 MG/1
10 TABLET, FILM COATED ORAL
Qty: 0 | Refills: 0 | COMMUNITY
Start: 2019-01-28

## 2019-01-28 RX ORDER — TAMSULOSIN HYDROCHLORIDE 0.4 MG/1
1 CAPSULE ORAL
Qty: 0 | Refills: 0 | COMMUNITY

## 2019-01-28 RX ORDER — ASCORBIC ACID 60 MG
1 TABLET,CHEWABLE ORAL
Qty: 0 | Refills: 0 | COMMUNITY
Start: 2019-01-28

## 2019-01-28 RX ORDER — ACETYLCYSTEINE 200 MG/ML
3 VIAL (ML) MISCELLANEOUS
Qty: 0 | Refills: 0 | COMMUNITY

## 2019-01-28 RX ORDER — FAMOTIDINE 10 MG/ML
1 INJECTION INTRAVENOUS
Qty: 0 | Refills: 0 | COMMUNITY

## 2019-01-28 RX ORDER — DIGOXIN 250 MCG
1 TABLET ORAL
Qty: 0 | Refills: 0 | COMMUNITY
Start: 2019-01-28

## 2019-01-28 RX ORDER — LABETALOL HCL 100 MG
1 TABLET ORAL
Qty: 0 | Refills: 0 | COMMUNITY
Start: 2019-01-28

## 2019-01-28 RX ORDER — AMPICILLIN SODIUM AND SULBACTAM SODIUM 250; 125 MG/ML; MG/ML
3 INJECTION, POWDER, FOR SUSPENSION INTRAMUSCULAR; INTRAVENOUS
Qty: 72 | Refills: 0 | OUTPATIENT
Start: 2019-01-28 | End: 2019-02-14

## 2019-01-28 RX ORDER — TAMSULOSIN HYDROCHLORIDE 0.4 MG/1
1 CAPSULE ORAL
Qty: 0 | Refills: 0 | COMMUNITY
Start: 2019-01-28

## 2019-01-28 RX ORDER — NIFEDIPINE 30 MG
1 TABLET, EXTENDED RELEASE 24 HR ORAL
Qty: 0 | Refills: 0 | COMMUNITY

## 2019-01-28 RX ORDER — FAMOTIDINE 10 MG/ML
1 INJECTION INTRAVENOUS
Qty: 0 | Refills: 0 | COMMUNITY
Start: 2019-01-28

## 2019-01-28 RX ADMIN — AMPICILLIN SODIUM AND SULBACTAM SODIUM 200 GRAM(S): 250; 125 INJECTION, POWDER, FOR SUSPENSION INTRAMUSCULAR; INTRAVENOUS at 05:41

## 2019-01-28 RX ADMIN — Medication 1 TABLET(S): at 11:49

## 2019-01-28 RX ADMIN — ENOXAPARIN SODIUM 40 MILLIGRAM(S): 100 INJECTION SUBCUTANEOUS at 11:48

## 2019-01-28 RX ADMIN — Medication 100 MILLIGRAM(S): at 13:58

## 2019-01-28 RX ADMIN — Medication 400 MILLIGRAM(S): at 05:40

## 2019-01-28 RX ADMIN — Medication 0.12 MILLIGRAM(S): at 05:42

## 2019-01-28 RX ADMIN — Medication 500 MILLIGRAM(S): at 11:49

## 2019-01-28 RX ADMIN — AMPICILLIN SODIUM AND SULBACTAM SODIUM 200 GRAM(S): 250; 125 INJECTION, POWDER, FOR SUSPENSION INTRAMUSCULAR; INTRAVENOUS at 17:58

## 2019-01-28 RX ADMIN — Medication 400 MILLIGRAM(S): at 17:58

## 2019-01-28 RX ADMIN — FAMOTIDINE 20 MILLIGRAM(S): 10 INJECTION INTRAVENOUS at 05:41

## 2019-01-28 RX ADMIN — Medication 81 MILLIGRAM(S): at 11:49

## 2019-01-28 RX ADMIN — LEVETIRACETAM 1000 MILLIGRAM(S): 250 TABLET, FILM COATED ORAL at 05:40

## 2019-01-28 RX ADMIN — SODIUM CHLORIDE 4 MILLILITER(S): 9 INJECTION INTRAMUSCULAR; INTRAVENOUS; SUBCUTANEOUS at 10:12

## 2019-01-28 RX ADMIN — Medication 100 MILLIGRAM(S): at 05:42

## 2019-01-28 RX ADMIN — LEVETIRACETAM 1000 MILLIGRAM(S): 250 TABLET, FILM COATED ORAL at 17:59

## 2019-01-28 RX ADMIN — Medication 1 DROP(S): at 17:58

## 2019-01-28 RX ADMIN — AMPICILLIN SODIUM AND SULBACTAM SODIUM 200 GRAM(S): 250; 125 INJECTION, POWDER, FOR SUSPENSION INTRAMUSCULAR; INTRAVENOUS at 00:51

## 2019-01-28 RX ADMIN — Medication 1 DROP(S): at 05:42

## 2019-01-28 RX ADMIN — AMPICILLIN SODIUM AND SULBACTAM SODIUM 200 GRAM(S): 250; 125 INJECTION, POWDER, FOR SUSPENSION INTRAMUSCULAR; INTRAVENOUS at 11:50

## 2019-01-28 RX ADMIN — Medication 100 MILLIGRAM(S): at 22:41

## 2019-01-28 RX ADMIN — TAMSULOSIN HYDROCHLORIDE 0.4 MILLIGRAM(S): 0.4 CAPSULE ORAL at 22:41

## 2019-01-28 RX ADMIN — SODIUM CHLORIDE 4 MILLILITER(S): 9 INJECTION INTRAMUSCULAR; INTRAVENOUS; SUBCUTANEOUS at 19:47

## 2019-01-28 NOTE — DISCHARGE NOTE ADULT - CARE PLAN
Principal Discharge DX:	Acute on chronic respiratory failure with hypoxia  Goal:	resolved secondary to pneumonia  Assessment and plan of treatment:	follow up with pcp  Secondary Diagnosis:	Sepsis, due to unspecified organism  Goal:	resolcved secondary to sacral OM, bacteremia  Assessment and plan of treatment:	present on admission  Secondary Diagnosis:	Streptococcal septicemia  Goal:	dc with picc and abx  Secondary Diagnosis:	Sacral osteomyelitis  Assessment and plan of treatment:	dc with picc and abx  Secondary Diagnosis:	Seizure disorder  Goal:	home meds  Secondary Diagnosis:	Pneumonia due to infectious organism, unspecified laterality, unspecified part of lung  Goal:	resolved  Secondary Diagnosis:	HTN (hypertension)  Goal:	home meds

## 2019-01-28 NOTE — DISCHARGE NOTE ADULT - MEDICATION SUMMARY - MEDICATIONS TO STOP TAKING
I will STOP taking the medications listed below when I get home from the hospital:    Milk of Magnesia  -- 30 milliliter(s) by mouth prn, As Needed    PHENobarbital 60 mg oral tablet  -- 1 tab(s) by mouth 2 times a day via peg    Procardia XL 90 mg oral tablet, extended release  -- 1 tab(s) by mouth once a day

## 2019-01-28 NOTE — DISCHARGE NOTE ADULT - MEDICATION SUMMARY - MEDICATIONS TO TAKE
I will START or STAY ON the medications listed below when I get home from the hospital:    free water flushes  -- 300 milliliter(s) by PEG tube every 6 hours   -- Indication: For Hypernatremia    budesonide 0.5 mg/2 mL inhalation suspension  -- 2 milliliter(s) inhaled 2 times a day  -- Indication: For Chronic respiratory failure with hypoxia    acetaminophen 650 mg oral tablet  -- 650 milligram(s) by mouth every 6 hours, As Needed  -- Indication: For Pain    oxyCODONE 5 mg oral tablet  -- 1 tab(s) by mouth 2 times a day, As Needed  -- Indication: For Pain    aspirin 81 mg oral tablet, chewable  -- 1 tab(s) by mouth once a day  -- Indication: For Heart health    Milk of Magnesia  -- 30 milliliter(s) by mouth prn, As Needed  -- Indication: For Consitpation    tamsulosin 0.4 mg oral capsule  -- 1 cap(s) by mouth once a day (at bedtime)  -- Indication: For Bph    phenytoin 25 mg/mL oral suspension  -- 16 milliliter(s) by mouth every 12 hours  -- Indication: For Seizure disorder    digoxin 125 mcg (0.125 mg) oral tablet  -- 1 tab(s) by mouth once a day  -- Indication: For Home med    enoxaparin 40 mg/0.4 mL injectable solution  -- 40 milligram(s) injectable once a day  -- Indication: For Dvt prop    levETIRAcetam 100 mg/mL oral solution  -- 10 milliliter(s) by mouth 2 times a day  -- Indication: For Seizure disorder    Peridex 0.12% mucous membrane liquid  -- 15 milliliter(s) mucous membrane 2 times a day  -- Indication: For mouth care    labetalol 100 mg oral tablet  -- 1 tab(s) by mouth 3 times a day  -- Indication: For Htrn    DuoNeb 0.5 mg-2.5 mg/3 mL inhalation solution  -- 3 milliliter(s) inhaled every 4 hours, As Needed  -- Indication: For Chronic respiratory failure with hypoxia    DuoNeb 0.5 mg-2.5 mg/3 mL inhalation solution  -- 1 application inhaled every 6 hours  -- Indication: For Chf    Hydrocortisone 1% In Absorbase topical ointment  -- Apply on skin to affected area 2 times a day  -- Indication: For Sacral osteomyelitis    bacitracin 500 units/g topical ointment  -- 1 application on skin every 8 hours  -- Indication: For Sacral osteomyelitis    nystatin 100,000 units/g topical powder  -- 1 application on skin 2 times a day  -- Indication: For Sacral osteomyelitis    Santyl 250 units/g topical ointment  -- Apply on skin to affected area once a day  -- Indication: For Sacral osteomyelitis    furosemide 40 mg oral tablet  -- 0.5 tab(s) by mouth every 48 hours  -- Indication: For Swelling    Procrit 20,000 units/mL injectable solution  -- 00912 unit(s) injectable once a week every Friday  -- Indication: For Anemia, unspecified type    famotidine 20 mg oral tablet  -- 1 tab(s) by mouth   -- Indication: For Gerds    ferrous sulfate 300 mg/5 mL (60 mg elemental iron) oral liquid  -- 5 milliliter(s) by gastrostomy tube every 8 hours  -- Indication: For Anemia, unspecified type    Fleet Enema 7 g-19 g rectal enema  -- 1 application rectally prn, As Needed  -- Indication: For Consitoatin    bisacodyl 10 mg rectal suppository  -- 1 suppository(ies) rectally prn, As Needed  -- Indication: For Consitpaiton    potassium chloride 20 mEq oral powder for reconstitution  -- 1 each by mouth once a day  -- Indication: For replacements    ocular lubricant ophthalmic solution  -- 1 drop(s) to each affected eye 2 times a day  -- Indication: For Eye    Unasyn 2 g-1 g injection  -- 3 gram(s) intravenously every 6 hours   -- Indication: For Sacral osteomyelitis    pantoprazole 40 mg oral granule, delayed release  -- 40 milligram(s) by mouth once a day  -- Indication: For Gerd    Multiple Vitamins oral capsule  -- 1 cap(s) by mouth once a day  -- Indication: For vitamins    ascorbic acid 500 mg oral tablet  -- 1 tab(s) by mouth once a day  -- Indication: For vitamins I will START or STAY ON the medications listed below when I get home from the hospital:    free water flushes  -- 300 milliliter(s) by PEG tube every 6 hours   -- Indication: For Hypernatremia    budesonide 0.5 mg/2 mL inhalation suspension  -- 2 milliliter(s) inhaled 2 times a day  -- Indication: For Chronic respiratory failure with hypoxia    acetaminophen 650 mg oral tablet  -- 650 milligram(s) by mouth every 6 hours, As Needed  -- Indication: For Pain    oxyCODONE 5 mg oral tablet  -- 1 tab(s) by mouth 2 times a day, As Needed  -- Indication: For Pain    aspirin 81 mg oral tablet, chewable  -- 1 tab(s) by mouth once a day  -- Indication: For Heart health    Milk of Magnesia  -- 30 milliliter(s) by mouth prn, As Needed  -- Indication: For Consitpation    tamsulosin 0.4 mg oral capsule  -- 1 cap(s) by mouth once a day (at bedtime)  -- Indication: For Bph    phenytoin 25 mg/mL oral suspension  -- 16 milliliter(s) by mouth every 12 hours  -- Indication: For Seizure disorder    digoxin 125 mcg (0.125 mg) oral tablet  -- 1 tab(s) by mouth once a day  -- Indication: For Home med    enoxaparin 40 mg/0.4 mL injectable solution  -- 40 milligram(s) injectable once a day  -- Indication: For Dvt prop    levETIRAcetam 100 mg/mL oral solution  -- 10 milliliter(s) by mouth 2 times a day  -- Indication: For Seizure disorder    Peridex 0.12% mucous membrane liquid  -- 15 milliliter(s) mucous membrane 2 times a day  -- Indication: For mouth care    labetalol 100 mg oral tablet  -- 1 tab(s) by mouth 3 times a day  -- Indication: For Htrn    DuoNeb 0.5 mg-2.5 mg/3 mL inhalation solution  -- 3 milliliter(s) inhaled every 4 hours, As Needed  -- Indication: For Chronic respiratory failure with hypoxia    DuoNeb 0.5 mg-2.5 mg/3 mL inhalation solution  -- 1 application inhaled every 6 hours  -- Indication: For Chf    Hydrocortisone 1% In Absorbase topical ointment  -- Apply on skin to affected area 2 times a day  -- Indication: For Sacral osteomyelitis    bacitracin 500 units/g topical ointment  -- 1 application on skin every 8 hours  -- Indication: For Sacral osteomyelitis    nystatin 100,000 units/g topical powder  -- 1 application on skin 2 times a day  -- Indication: For Sacral osteomyelitis    Santyl 250 units/g topical ointment  -- Apply on skin to affected area once a day  -- Indication: For Sacral osteomyelitis    furosemide 40 mg oral tablet  -- 0.5 tab(s) by mouth every 48 hours  -- Indication: For Swelling    Procrit 20,000 units/mL injectable solution  -- 48730 unit(s) injectable once a week every Friday  -- Indication: For Anemia, unspecified type    famotidine 20 mg oral tablet  -- 1 tab(s) by mouth   -- Indication: For Gerds    ferrous sulfate 300 mg/5 mL (60 mg elemental iron) oral liquid  -- 5 milliliter(s) by gastrostomy tube every 8 hours  -- Indication: For Anemia, unspecified type    Fleet Enema 7 g-19 g rectal enema  -- 1 application rectally prn, As Needed  -- Indication: For Consitoatin    bisacodyl 10 mg rectal suppository  -- 1 suppository(ies) rectally prn, As Needed  -- Indication: For Consitpaiton    potassium chloride 20 mEq oral powder for reconstitution  -- 1 each by mouth once a day  -- Indication: For replacements    ocular lubricant ophthalmic solution  -- 1 drop(s) to each affected eye 2 times a day  -- Indication: For Eye    Unasyn 2 g-1 g injection  -- 3 gram(s) intravenously every 6 hours   -- Indication: For Sacral osteomyelitis    pantoprazole 40 mg oral granule, delayed release  -- 40 milligram(s) by mouth once a day  -- Indication: For Gerd    Multiple Vitamins oral capsule  -- 1 cap(s) by mouth once a day  -- Indication: For vitamins    ascorbic acid 500 mg oral tablet  -- 1 tab(s) by mouth once a day  -- Indication: For vitamins

## 2019-01-28 NOTE — CHART NOTE - NSCHARTNOTEFT_GEN_A_CORE
Patient maintained on AC 16 / 400 / +6 / .30 and given sodium neb. treatment.  Patient has lots of secretions and frequent suctioning.  Will continue to monitor the patient.

## 2019-01-28 NOTE — DISCHARGE NOTE ADULT - PLAN OF CARE
resolved secondary to pneumonia follow up with pcp resolcved secondary to sacral OM, bacteremia present on admission dc with picc and abx home meds resolved

## 2019-01-28 NOTE — DISCHARGE NOTE ADULT - SECONDARY DIAGNOSIS.
Sepsis, due to unspecified organism Streptococcal septicemia Sacral osteomyelitis Seizure disorder Pneumonia due to infectious organism, unspecified laterality, unspecified part of lung HTN (hypertension)

## 2019-01-28 NOTE — PROGRESS NOTE ADULT - ASSESSMENT
85 yo male, PMHx chronic trach/PEG secondary to pneumonia ~2 years ago complicated by multiple bouts of pneumonia, unable to liberate from vent due to secretions, PEG infection, MDR infections, seizure d/o, CHF, HTN, HLD, BPH with chronic jones, colostomy, who presented from [facility] 1/3/18 with respiratory distress and fever >102'F axillary. Admitted for sepsis secondary to Group beta hemolytic bacteremia secondary to stage 4 sacral ulcer and pseudomonas UTI. Was evaluated by ID, started on broad spectrum antibiotics and then transition to Levaquin Sacral wound evaluated by surgery and wound care. CT suggestive of sacral osteomyelitis. RRT called for acute on chronic hypoxic respiratory requiring vent support secondary to mucous plugging. Was transferred to the ICU.  Patient was transfused 3u PRBC for acute anemia. Febrile with pseudomonas and carbapenem resistant Acinetobacter in the sputum. Started on IV unasyn.       1. Sepsis secondary to sacral Osteomyelitis, Pseudomonas UTI and strep bacteremia: Afebrile, with normal WBC. Unasyn till 2/15/19 as per ID. Had picc line placed on 1/23  --> no longer having fevers  --> will reculture if continues to spike as per ID    2. Acute on chronic hypoxic respiratory failure: improving.  Frequent suctioning. Continue Vent support - Pulmonology following  --> c,w current care    3. Hypernatremia: improved  -->c.w free water flushes will decreased to 250mg q6    4. Iron Deficiency anemia: Hb/hct stable  Status post 4 units prbc - Stable Hgb  --> cbc stable     5. Seizure disorder: Continue Keppra - No seizure noted    6. Hypertension: procardia and labetalol were held for hypotension on admission  Labetalol - stable    7. Chronic Diastolic CHF; On po lasix . Monitor bmp for resumption -     8 Stage 4 sacral ulcer: Continue wound care;     9. Severe Protein Calorie Malnutrition -   Continue Supplements . Recommend protein source.   --> tube feeds    10. Functional Quadriplegia - supportive care.  PT followup.    DIspo - monitor,   dnr dni  prognosis grim   spoke to , there are no current vent beds avaiable at NH   dc when bed available

## 2019-01-28 NOTE — DISCHARGE NOTE ADULT - MEDICATION SUMMARY - MEDICATIONS TO CHANGE
I will SWITCH the dose or number of times a day I take the medications listed below when I get home from the hospital:    famotidine 10 mg oral tablet  -- 1 tab(s) by mouth twice    furosemide 40 mg oral tablet  -- 1 tab(s) by mouth once a day

## 2019-01-28 NOTE — DISCHARGE NOTE ADULT - HOSPITAL COURSE
87 yo male, PMHx chronic trach/PEG secondary to pneumonia ~2 years ago complicated by multiple bouts of pneumonia, unable to liberate from vent due to secretions, PEG infection, MDR infections, seizure d/o, CHF, HTN, HLD, BPH with chronic jones, colostomy, who presented from [facility] 1/3/18 with respiratory distress and fever >102'F axillary. Admitted for sepsis secondary to Group beta hemolytic bacteremia secondary to stage 4 sacral ulcer and pseudomonas UTI. Was evaluated by ID, started on broad spectrum antibiotics and then transition to Levaquin Sacral wound evaluated by surgery and wound care. CT suggestive of sacral osteomyelitis. RRT called for acute on chronic hypoxic respiratory requiring vent support secondary to mucous plugging. Was transferred to the ICU.  Patient was transfused 4u total PRBC for acute anemia. Febrile with pseudomonas and carbapenem resistant Acinetobacter in the sputum. Started on IV unasyn and picc placed and end date is 2/15.     Patient had course complicated by electrolyte abnormalities and started on free water flushes and sodium improved.     patient is guaranteed risk of readmission  dnr dni  patient has grim prognosis and is hospice approrpiate

## 2019-01-28 NOTE — PROGRESS NOTE ADULT - SUBJECTIVE AND OBJECTIVE BOX
KING MCKEON    [id number]    86y      Male    INTERVAL HPI/OVERNIGHT EVENTS:    patient being seen for chronic resp failure, sepsis secondary to OM, Pseudomonas UTI and strep bacteremia. patient seen at bedside and in nad. patient is non verbal     REVIEW OF SYSTEMS:    unable to obtain secondary to mental status     Vital Signs Last 24 Hrs  T(C): 36.8 (28 Jan 2019 12:06), Max: 37.6 (27 Jan 2019 16:00)  T(F): 98.2 (28 Jan 2019 12:06), Max: 99.7 (27 Jan 2019 16:00)  HR: 60 (28 Jan 2019 14:00) (57 - 90)  BP: 138/65 (28 Jan 2019 14:00) (113/56 - 173/82)  BP(mean): 93 (28 Jan 2019 14:00) (80 - 125)  RR: 26 (28 Jan 2019 14:00) (16 - 34)  SpO2: 96% (28 Jan 2019 14:00) (92% - 100%)    PHYSICAL EXAM:    GENERAL: NAD, non verbal, ill appearing   HEENT: dry MM  NECK: trach   CHEST/LUNG:  diminished  HEART: S1S2+, Regular rate and rhythm; No murmurs  ABDOMEN: Soft, colostomy, peg tube   EXTREMITIES:  anasarca, jones   NEURO: Awake        LABS:                        9.5    4.9   )-----------( 213      ( 27 Jan 2019 10:12 )             32.5     01-28    144  |  110<H>  |  21.0<H>  ----------------------------<  120<H>  4.0   |  26.0  |  0.45<L>    Ca    8.8      28 Jan 2019 02:29  Mg     2.3     01-28              MEDICATIONS  (STANDING):  ampicillin/sulbactam  IVPB 3 Gram(s) IV Intermittent every 6 hours  artificial  tears Solution 1 Drop(s) Both EYES two times a day  ascorbic acid 500 milliGRAM(s) Oral daily  aspirin  chewable 81 milliGRAM(s) Oral daily  digoxin     Tablet 0.125 milliGRAM(s) Oral daily  enoxaparin Injectable 40 milliGRAM(s) SubCutaneous daily  famotidine    Tablet 20 milliGRAM(s) Oral <User Schedule>  labetalol 100 milliGRAM(s) Oral three times a day  levETIRAcetam  Solution 1000 milliGRAM(s) Oral two times a day  multivitamin 1 Tablet(s) Oral daily  phenytoin   Suspension 400 milliGRAM(s) Oral every 12 hours  sodium chloride 3%  Inhalation 4 milliLiter(s) Inhalation two times a day  tamsulosin 0.4 milliGRAM(s) Oral at bedtime    MEDICATIONS  (PRN):  acetaminophen    Suspension .. 650 milliGRAM(s) Oral every 6 hours PRN Temp greater or equal to 38C (100.4F)      RADIOLOGY & ADDITIONAL TESTS:

## 2019-01-28 NOTE — DISCHARGE NOTE ADULT - PATIENT PORTAL LINK FT
You can access the [facility] Portal, offered by [facility], by registering with the following website: [email or web address]

## 2019-01-29 VITALS
TEMPERATURE: 99 F | OXYGEN SATURATION: 98 % | HEART RATE: 65 BPM | RESPIRATION RATE: 34 BRPM | SYSTOLIC BLOOD PRESSURE: 182 MMHG | DIASTOLIC BLOOD PRESSURE: 97 MMHG

## 2019-01-29 PROCEDURE — 94640 AIRWAY INHALATION TREATMENT: CPT

## 2019-01-29 PROCEDURE — 71045 X-RAY EXAM CHEST 1 VIEW: CPT

## 2019-01-29 PROCEDURE — 87633 RESP VIRUS 12-25 TARGETS: CPT

## 2019-01-29 PROCEDURE — 83010 ASSAY OF HAPTOGLOBIN QUANT: CPT

## 2019-01-29 PROCEDURE — 85610 PROTHROMBIN TIME: CPT

## 2019-01-29 PROCEDURE — 84295 ASSAY OF SERUM SODIUM: CPT

## 2019-01-29 PROCEDURE — 94799 UNLISTED PULMONARY SVC/PX: CPT

## 2019-01-29 PROCEDURE — 36415 COLL VENOUS BLD VENIPUNCTURE: CPT

## 2019-01-29 PROCEDURE — 96375 TX/PRO/DX INJ NEW DRUG ADDON: CPT

## 2019-01-29 PROCEDURE — 82746 ASSAY OF FOLIC ACID SERUM: CPT

## 2019-01-29 PROCEDURE — 93005 ELECTROCARDIOGRAM TRACING: CPT

## 2019-01-29 PROCEDURE — 36430 TRANSFUSION BLD/BLD COMPNT: CPT

## 2019-01-29 PROCEDURE — 87186 SC STD MICRODIL/AGAR DIL: CPT

## 2019-01-29 PROCEDURE — 83615 LACTATE (LD) (LDH) ENZYME: CPT

## 2019-01-29 PROCEDURE — 84100 ASSAY OF PHOSPHORUS: CPT

## 2019-01-29 PROCEDURE — 84466 ASSAY OF TRANSFERRIN: CPT

## 2019-01-29 PROCEDURE — 83880 ASSAY OF NATRIURETIC PEPTIDE: CPT

## 2019-01-29 PROCEDURE — 87581 M.PNEUMON DNA AMP PROBE: CPT

## 2019-01-29 PROCEDURE — 97163 PT EVAL HIGH COMPLEX 45 MIN: CPT

## 2019-01-29 PROCEDURE — 86901 BLOOD TYPING SEROLOGIC RH(D): CPT

## 2019-01-29 PROCEDURE — 74176 CT ABD & PELVIS W/O CONTRAST: CPT

## 2019-01-29 PROCEDURE — 80202 ASSAY OF VANCOMYCIN: CPT

## 2019-01-29 PROCEDURE — 80048 BASIC METABOLIC PNL TOTAL CA: CPT

## 2019-01-29 PROCEDURE — 83550 IRON BINDING TEST: CPT

## 2019-01-29 PROCEDURE — 85730 THROMBOPLASTIN TIME PARTIAL: CPT

## 2019-01-29 PROCEDURE — 86850 RBC ANTIBODY SCREEN: CPT

## 2019-01-29 PROCEDURE — 96374 THER/PROPH/DIAG INJ IV PUSH: CPT

## 2019-01-29 PROCEDURE — 82803 BLOOD GASES ANY COMBINATION: CPT

## 2019-01-29 PROCEDURE — 36600 WITHDRAWAL OF ARTERIAL BLOOD: CPT

## 2019-01-29 PROCEDURE — 81001 URINALYSIS AUTO W/SCOPE: CPT

## 2019-01-29 PROCEDURE — 80053 COMPREHEN METABOLIC PANEL: CPT

## 2019-01-29 PROCEDURE — 87070 CULTURE OTHR SPECIMN AEROBIC: CPT

## 2019-01-29 PROCEDURE — 87486 CHLMYD PNEUM DNA AMP PROBE: CPT

## 2019-01-29 PROCEDURE — 87040 BLOOD CULTURE FOR BACTERIA: CPT

## 2019-01-29 PROCEDURE — 82947 ASSAY GLUCOSE BLOOD QUANT: CPT

## 2019-01-29 PROCEDURE — 82728 ASSAY OF FERRITIN: CPT

## 2019-01-29 PROCEDURE — 87798 DETECT AGENT NOS DNA AMP: CPT

## 2019-01-29 PROCEDURE — 94003 VENT MGMT INPAT SUBQ DAY: CPT

## 2019-01-29 PROCEDURE — 83605 ASSAY OF LACTIC ACID: CPT

## 2019-01-29 PROCEDURE — 94760 N-INVAS EAR/PLS OXIMETRY 1: CPT

## 2019-01-29 PROCEDURE — 87086 URINE CULTURE/COLONY COUNT: CPT

## 2019-01-29 PROCEDURE — 94002 VENT MGMT INPAT INIT DAY: CPT

## 2019-01-29 PROCEDURE — 85027 COMPLETE CBC AUTOMATED: CPT

## 2019-01-29 PROCEDURE — 99291 CRITICAL CARE FIRST HOUR: CPT | Mod: 25

## 2019-01-29 PROCEDURE — 74018 RADEX ABDOMEN 1 VIEW: CPT

## 2019-01-29 PROCEDURE — 93306 TTE W/DOPPLER COMPLETE: CPT

## 2019-01-29 PROCEDURE — 99231 SBSQ HOSP IP/OBS SF/LOW 25: CPT

## 2019-01-29 PROCEDURE — 84484 ASSAY OF TROPONIN QUANT: CPT

## 2019-01-29 PROCEDURE — 82435 ASSAY OF BLOOD CHLORIDE: CPT

## 2019-01-29 PROCEDURE — P9016: CPT

## 2019-01-29 PROCEDURE — 84132 ASSAY OF SERUM POTASSIUM: CPT

## 2019-01-29 PROCEDURE — 80185 ASSAY OF PHENYTOIN TOTAL: CPT

## 2019-01-29 PROCEDURE — 86900 BLOOD TYPING SEROLOGIC ABO: CPT

## 2019-01-29 PROCEDURE — 99239 HOSP IP/OBS DSCHRG MGMT >30: CPT

## 2019-01-29 PROCEDURE — 87150 DNA/RNA AMPLIFIED PROBE: CPT

## 2019-01-29 PROCEDURE — 84145 PROCALCITONIN (PCT): CPT

## 2019-01-29 PROCEDURE — 83735 ASSAY OF MAGNESIUM: CPT

## 2019-01-29 PROCEDURE — 82962 GLUCOSE BLOOD TEST: CPT

## 2019-01-29 PROCEDURE — 83540 ASSAY OF IRON: CPT

## 2019-01-29 PROCEDURE — 85045 AUTOMATED RETICULOCYTE COUNT: CPT

## 2019-01-29 PROCEDURE — 71250 CT THORAX DX C-: CPT

## 2019-01-29 PROCEDURE — 82330 ASSAY OF CALCIUM: CPT

## 2019-01-29 PROCEDURE — 82607 VITAMIN B-12: CPT

## 2019-01-29 PROCEDURE — 85014 HEMATOCRIT: CPT

## 2019-01-29 PROCEDURE — 86923 COMPATIBILITY TEST ELECTRIC: CPT

## 2019-01-29 RX ADMIN — Medication 100 MILLIGRAM(S): at 15:31

## 2019-01-29 RX ADMIN — Medication 0.12 MILLIGRAM(S): at 06:05

## 2019-01-29 RX ADMIN — Medication 1 DROP(S): at 06:05

## 2019-01-29 RX ADMIN — AMPICILLIN SODIUM AND SULBACTAM SODIUM 200 GRAM(S): 250; 125 INJECTION, POWDER, FOR SUSPENSION INTRAMUSCULAR; INTRAVENOUS at 12:53

## 2019-01-29 RX ADMIN — Medication 1 TABLET(S): at 12:53

## 2019-01-29 RX ADMIN — AMPICILLIN SODIUM AND SULBACTAM SODIUM 200 GRAM(S): 250; 125 INJECTION, POWDER, FOR SUSPENSION INTRAMUSCULAR; INTRAVENOUS at 06:05

## 2019-01-29 RX ADMIN — Medication 100 MILLIGRAM(S): at 06:05

## 2019-01-29 RX ADMIN — Medication 81 MILLIGRAM(S): at 12:53

## 2019-01-29 RX ADMIN — LEVETIRACETAM 1000 MILLIGRAM(S): 250 TABLET, FILM COATED ORAL at 06:05

## 2019-01-29 RX ADMIN — ENOXAPARIN SODIUM 40 MILLIGRAM(S): 100 INJECTION SUBCUTANEOUS at 12:53

## 2019-01-29 RX ADMIN — FAMOTIDINE 20 MILLIGRAM(S): 10 INJECTION INTRAVENOUS at 06:05

## 2019-01-29 RX ADMIN — AMPICILLIN SODIUM AND SULBACTAM SODIUM 200 GRAM(S): 250; 125 INJECTION, POWDER, FOR SUSPENSION INTRAMUSCULAR; INTRAVENOUS at 00:09

## 2019-01-29 RX ADMIN — Medication 400 MILLIGRAM(S): at 06:05

## 2019-01-29 RX ADMIN — Medication 500 MILLIGRAM(S): at 12:53

## 2019-01-29 NOTE — PROGRESS NOTE ADULT - SUBJECTIVE AND OBJECTIVE BOX
PULMONARY PROGRESS NOTE      [id number]    Patient is a 86y old  Male who presents with a chief complaint of respiratory distress. fever (28 Jan 2019 15:20)      INTERVAL HPI/OVERNIGHT EVENTS:  On vent  Remains on 30% FiO2 with good sats  Poorly responsive without change  MEDICATIONS  (STANDING):  ampicillin/sulbactam  IVPB 3 Gram(s) IV Intermittent every 6 hours  artificial  tears Solution 1 Drop(s) Both EYES two times a day  ascorbic acid 500 milliGRAM(s) Oral daily  aspirin  chewable 81 milliGRAM(s) Oral daily  digoxin     Tablet 0.125 milliGRAM(s) Oral daily  enoxaparin Injectable 40 milliGRAM(s) SubCutaneous daily  famotidine    Tablet 20 milliGRAM(s) Oral <User Schedule>  labetalol 100 milliGRAM(s) Oral three times a day  levETIRAcetam  Solution 1000 milliGRAM(s) Oral two times a day  multivitamin 1 Tablet(s) Oral daily  phenytoin   Suspension 400 milliGRAM(s) Oral every 12 hours  sodium chloride 3%  Inhalation 4 milliLiter(s) Inhalation two times a day  tamsulosin 0.4 milliGRAM(s) Oral at bedtime      MEDICATIONS  (PRN):  acetaminophen    Suspension .. 650 milliGRAM(s) Oral every 6 hours PRN Temp greater or equal to 38C (100.4F)      Allergies    clindamycin (Unknown)  Grapes (Rash)  Nuts (Hives; Rash)  PC Pen VK (Unknown)  shellfish (Angioedema; Rash; Hives)  strawberry (Short breath; Rash; Hives)  Xanax (Rash)    Intolerances    Ativan (Unknown)  Haldol (Dystonic RXN)  hydrALAZINE (Unknown)  Zyprexa (Unknown)      PAST MEDICAL & SURGICAL HISTORY:  Dysphagia  Fall: Multiple Mechanical falls  CAD (coronary artery disease)  Clostridium difficile diarrhea: in 2009  BPH (benign prostatic hypertrophy)  Dementia  HLD (hyperlipidemia)  CHF (congestive heart failure)  Prostate cancer: Treated w/ radiation  Stroke: (~5yrs ago)  Seizure: (last event &gt;1yr ago)  HTN (hypertension)  Colostomy in place  S/P percutaneous endoscopic gastrostomy (PEG) tube placement  H/O hemorrhoidectomy  Deviated septum  Abdominal hernia  Status post cardiac surgery: stents x 2      SOCIAL HISTORY  Smoking History:       REVIEW OF SYSTEMS:Unobtainable    CONSTITUTIONAL:  No distress          Vital Signs Last 24 Hrs  T(C): 37.6 (29 Jan 2019 12:00), Max: 37.6 (29 Jan 2019 12:00)  T(F): 99.7 (29 Jan 2019 12:00), Max: 99.7 (29 Jan 2019 12:00)  HR: 64 (29 Jan 2019 12:00) (57 - 75)  BP: 146/70 (29 Jan 2019 12:00) (129/60 - 174/81)  BP(mean): 101 (29 Jan 2019 12:00) (87 - 117)  RR: 34 (29 Jan 2019 12:00) (17 - 35)  SpO2: 100% (29 Jan 2019 12:00) (98% - 100%)    PHYSICAL EXAMINATION:    GENERAL: The patient is awake and alert in no apparent distress.     HEENT: Head is normocephalic and atraumatic.    NECK:Trach site clear      LUNGS: Scattered rhonchi upper airway    HEART: Regular rate and rhythm without murmur.    ABDOMEN: Soft, nontender, and nondistended.      EXTREMITIES: Without any cyanosis, clubbing, rash, lesions or edema.    NEUROLOGIC: Lethargic          LABS:    01-28    144  |  110<H>  |  21.0<H>  ----------------------------<  120<H>  4.0   |  26.0  |  0.45<L>    Ca    8.8      28 Jan 2019 02:29  Mg     2.3     01-28                          MICROBIOLOGY:    RADIOLOGY & ADDITIONAL STUDIES:

## 2019-01-29 NOTE — PROGRESS NOTE ADULT - REASON FOR ADMISSION
respiratory distress. fever

## 2019-01-29 NOTE — PROGRESS NOTE ADULT - PROVIDER SPECIALTY LIST ADULT
Cardiology
Critical Care
Critical Care
Hospitalist
Infectious Disease
Internal Medicine
Nephrology
Palliative Care
Palliative Care
Pulmonology
Internal Medicine
Internal Medicine

## 2019-01-29 NOTE — CHART NOTE - NSCHARTNOTEFT_GEN_A_CORE
Source: Patient [ ]  Family [ ]   other [x ] EMR    Current Diet: Diet, NPO with Tube Feed:   Tube Feeding Modality: Gastrostomy  Jevity 1.5 [place]  Total Volume for 24 Hours (mL): 1680  Continuous  Starting Tube Feed Rate {mL per Hour}: 30  Increase Tube Feed Rate by (mL): 10     Every 4 hours  Until Goal Tube Feed Rate (mL per Hour): 70  Tube Feed Duration (in Hours): 24  Tube Feed Start Time: 10:00 (01-20-19 @ 19:39)    Enteral /Parenteral Nutrition: Pt is currently tolerating Jevity @ 70ml/hr daily via PEG    Current Weight:   (1/3) 84kg    % Weight Change -- no new wt to assess, will continue to monitor    Pertinent Medications: MEDICATIONS  (STANDING):  ampicillin/sulbactam  IVPB 3 Gram(s) IV Intermittent every 6 hours  artificial  tears Solution 1 Drop(s) Both EYES two times a day  ascorbic acid 500 milliGRAM(s) Oral daily  aspirin  chewable 81 milliGRAM(s) Oral daily  digoxin     Tablet 0.125 milliGRAM(s) Oral daily  enoxaparin Injectable 40 milliGRAM(s) SubCutaneous daily  famotidine    Tablet 20 milliGRAM(s) Oral <User Schedule>  labetalol 100 milliGRAM(s) Oral three times a day  levETIRAcetam  Solution 1000 milliGRAM(s) Oral two times a day  multivitamin 1 Tablet(s) Oral daily  phenytoin   Suspension 400 milliGRAM(s) Oral every 12 hours  sodium chloride 3%  Inhalation 4 milliLiter(s) Inhalation two times a day  tamsulosin 0.4 milliGRAM(s) Oral at bedtime    MEDICATIONS  (PRN):  acetaminophen    Suspension .. 650 milliGRAM(s) Oral every 6 hours PRN Temp greater or equal to 38C (100.4F)    Pertinent Labs: 01-28 Na144 mmol/L Glu 120 mg/dL<H> K+ 4.0 mmol/L Cr  0.45 mg/dL<L> BUN 21.0 mg/dL<H> Phos n/a   Alb n/a   PAB n/a       Skin: unstageable wound on sacrum, right inner thigh wound    Nutrition focused physical exam conducted ON PREVIOUS ASSESSMENT - found signs of malnutrition [ ]absent [x ]present    Subcutaneous fat loss: [ ] Orbital fat pads region, [ ]Buccal fat region, [ ]Triceps region,  [ ]Ribs region    Muscle wasting: [x ]Temples region, [ ]Clavicle region, [ ]Shoulder region, [ ]Scapula region, [ ]Interosseous region,  [ ]thigh region, [ ]Calf region    Estimated Needs:   [x ] no change since previous assessment  [ ] recalculated:     Current Nutrition Diagnosis: Pt remains at nutrition risk secondary to severe protein calorie malnutrition related to increased nutrient needs in the setting of chronic respiratory failure and wound healing with stage IV sacral ulcers, now + osteomyelitis, sepsis as evidenced by moderate temporal wasting, generalized 2+ edema and 3+ edema b/l feet.  Pt continues to tolerate Jevity @ 70ml/hr x 20 hrs daily (1400ml, 2100kcal, 89g pro; not yet meeting estimated protein needs.     Recommendations:   Continue with MVI, and Vit C 500mg daily  Change enteral formula to Pivot @70ml/hr x 20 hours daily (1400ml, 2100kcal, 131g pro) to promote wound healing   Check current wt and daily wt to monitor trends    Monitoring and Evaluation:   [ ] PO intake [x ] Tolerance to diet prescription [X] Weights  [X] Follow up per protocol [X] Labs:

## 2019-02-04 ENCOUNTER — INPATIENT (INPATIENT)
Facility: HOSPITAL | Age: 84
LOS: 8 days | Discharge: NURSING, REHAB OR LONG-TERM CARE | DRG: 607 | End: 2019-02-13
Attending: GENERAL ACUTE CARE HOSPITAL | Admitting: FAMILY MEDICINE
Payer: MEDICARE

## 2019-02-04 VITALS — SYSTOLIC BLOOD PRESSURE: 157 MMHG | RESPIRATION RATE: 16 BRPM | DIASTOLIC BLOOD PRESSURE: 78 MMHG | HEART RATE: 66 BPM

## 2019-02-04 DIAGNOSIS — Z99.11 DEPENDENCE ON RESPIRATOR [VENTILATOR] STATUS: ICD-10-CM

## 2019-02-04 DIAGNOSIS — L27.0 GENERALIZED SKIN ERUPTION DUE TO DRUGS AND MEDICAMENTS TAKEN INTERNALLY: ICD-10-CM

## 2019-02-04 DIAGNOSIS — Z93.1 GASTROSTOMY STATUS: Chronic | ICD-10-CM

## 2019-02-04 DIAGNOSIS — F03.90 UNSPECIFIED DEMENTIA WITHOUT BEHAVIORAL DISTURBANCE: ICD-10-CM

## 2019-02-04 DIAGNOSIS — Z98.89 OTHER SPECIFIED POSTPROCEDURAL STATES: Chronic | ICD-10-CM

## 2019-02-04 DIAGNOSIS — M86.60 OTHER CHRONIC OSTEOMYELITIS, UNSPECIFIED SITE: ICD-10-CM

## 2019-02-04 DIAGNOSIS — Z93.3 COLOSTOMY STATUS: Chronic | ICD-10-CM

## 2019-02-04 DIAGNOSIS — K46.9 UNSPECIFIED ABDOMINAL HERNIA WITHOUT OBSTRUCTION OR GANGRENE: Chronic | ICD-10-CM

## 2019-02-04 DIAGNOSIS — J34.2 DEVIATED NASAL SEPTUM: Chronic | ICD-10-CM

## 2019-02-04 LAB
ALBUMIN SERPL ELPH-MCNC: 2.7 G/DL — LOW (ref 3.3–5.2)
ALP SERPL-CCNC: 150 U/L — HIGH (ref 40–120)
ALT FLD-CCNC: 17 U/L — SIGNIFICANT CHANGE UP
ANION GAP SERPL CALC-SCNC: 13 MMOL/L — SIGNIFICANT CHANGE UP (ref 5–17)
ANISOCYTOSIS BLD QL: SLIGHT — SIGNIFICANT CHANGE UP
APTT BLD: 34.7 SEC — SIGNIFICANT CHANGE UP (ref 27.5–36.3)
AST SERPL-CCNC: 21 U/L — SIGNIFICANT CHANGE UP
BASOPHILS # BLD AUTO: 0 K/UL — SIGNIFICANT CHANGE UP (ref 0–0.2)
BASOPHILS NFR BLD AUTO: 0.4 % — SIGNIFICANT CHANGE UP (ref 0–2)
BILIRUB SERPL-MCNC: 0.3 MG/DL — LOW (ref 0.4–2)
BUN SERPL-MCNC: 16 MG/DL — SIGNIFICANT CHANGE UP (ref 8–20)
CALCIUM SERPL-MCNC: 8.8 MG/DL — SIGNIFICANT CHANGE UP (ref 8.6–10.2)
CHLORIDE SERPL-SCNC: 102 MMOL/L — SIGNIFICANT CHANGE UP (ref 98–107)
CO2 SERPL-SCNC: 26 MMOL/L — SIGNIFICANT CHANGE UP (ref 22–29)
CREAT SERPL-MCNC: 0.48 MG/DL — LOW (ref 0.5–1.3)
CRP SERPL-MCNC: 7.6 MG/DL — HIGH (ref 0–0.4)
EOSINOPHIL # BLD AUTO: 0.8 K/UL — HIGH (ref 0–0.5)
EOSINOPHIL NFR BLD AUTO: 15.9 % — HIGH (ref 0–6)
ERYTHROCYTE [SEDIMENTATION RATE] IN BLOOD: 58 MM/HR — HIGH (ref 0–20)
GLUCOSE SERPL-MCNC: 91 MG/DL — SIGNIFICANT CHANGE UP (ref 70–115)
HCT VFR BLD CALC: 32.8 % — LOW (ref 42–52)
HGB BLD-MCNC: 9.7 G/DL — LOW (ref 14–18)
HYPOCHROMIA BLD QL: SLIGHT — SIGNIFICANT CHANGE UP
INR BLD: 1.2 RATIO — HIGH (ref 0.88–1.16)
LYMPHOCYTES # BLD AUTO: 0.6 K/UL — LOW (ref 1–4.8)
LYMPHOCYTES # BLD AUTO: 12.8 % — LOW (ref 20–55)
MACROCYTES BLD QL: SLIGHT — SIGNIFICANT CHANGE UP
MCHC RBC-ENTMCNC: 26.1 PG — LOW (ref 27–31)
MCHC RBC-ENTMCNC: 29.6 G/DL — LOW (ref 32–36)
MCV RBC AUTO: 88.2 FL — SIGNIFICANT CHANGE UP (ref 80–94)
MICROCYTES BLD QL: SLIGHT — SIGNIFICANT CHANGE UP
MONOCYTES # BLD AUTO: 0.6 K/UL — SIGNIFICANT CHANGE UP (ref 0–0.8)
MONOCYTES NFR BLD AUTO: 11.6 % — HIGH (ref 3–10)
NEUTROPHILS # BLD AUTO: 2.9 K/UL — SIGNIFICANT CHANGE UP (ref 1.8–8)
NEUTROPHILS NFR BLD AUTO: 58.7 % — SIGNIFICANT CHANGE UP (ref 37–73)
OVALOCYTES BLD QL SMEAR: SLIGHT — SIGNIFICANT CHANGE UP
PLAT MORPH BLD: NORMAL — SIGNIFICANT CHANGE UP
PLATELET # BLD AUTO: 192 K/UL — SIGNIFICANT CHANGE UP (ref 150–400)
POIKILOCYTOSIS BLD QL AUTO: SLIGHT — SIGNIFICANT CHANGE UP
POTASSIUM SERPL-MCNC: 4.3 MMOL/L — SIGNIFICANT CHANGE UP (ref 3.5–5.3)
POTASSIUM SERPL-SCNC: 4.3 MMOL/L — SIGNIFICANT CHANGE UP (ref 3.5–5.3)
PROT SERPL-MCNC: 6.6 G/DL — SIGNIFICANT CHANGE UP (ref 6.6–8.7)
PROTHROM AB SERPL-ACNC: 13.9 SEC — HIGH (ref 10–12.9)
RBC # BLD: 3.72 M/UL — LOW (ref 4.6–6.2)
RBC # FLD: 24.1 % — HIGH (ref 11–15.6)
RBC BLD AUTO: ABNORMAL
SODIUM SERPL-SCNC: 141 MMOL/L — SIGNIFICANT CHANGE UP (ref 135–145)
WBC # BLD: 4.9 K/UL — SIGNIFICANT CHANGE UP (ref 4.8–10.8)
WBC # FLD AUTO: 4.9 K/UL — SIGNIFICANT CHANGE UP (ref 4.8–10.8)

## 2019-02-04 PROCEDURE — 99291 CRITICAL CARE FIRST HOUR: CPT

## 2019-02-04 PROCEDURE — 93971 EXTREMITY STUDY: CPT | Mod: 26,RT

## 2019-02-04 PROCEDURE — 71045 X-RAY EXAM CHEST 1 VIEW: CPT | Mod: 26

## 2019-02-04 PROCEDURE — 99223 1ST HOSP IP/OBS HIGH 75: CPT | Mod: AI

## 2019-02-04 RX ORDER — BUDESONIDE, MICRONIZED 100 %
0.25 POWDER (GRAM) MISCELLANEOUS
Qty: 0 | Refills: 0 | Status: DISCONTINUED | OUTPATIENT
Start: 2019-02-04 | End: 2019-02-13

## 2019-02-04 RX ORDER — FERROUS SULFATE 325(65) MG
5 TABLET ORAL
Qty: 0 | Refills: 0 | COMMUNITY

## 2019-02-04 RX ORDER — POTASSIUM CHLORIDE 20 MEQ
1 PACKET (EA) ORAL
Qty: 0 | Refills: 0 | COMMUNITY

## 2019-02-04 RX ORDER — IPRATROPIUM/ALBUTEROL SULFATE 18-103MCG
1 AEROSOL WITH ADAPTER (GRAM) INHALATION
Qty: 0 | Refills: 0 | COMMUNITY

## 2019-02-04 RX ORDER — MULTIVIT-MIN/FERROUS GLUCONATE 9 MG/15 ML
1 LIQUID (ML) ORAL DAILY
Qty: 0 | Refills: 0 | Status: DISCONTINUED | OUTPATIENT
Start: 2019-02-04 | End: 2019-02-13

## 2019-02-04 RX ORDER — BUDESONIDE, MICRONIZED 100 %
2 POWDER (GRAM) MISCELLANEOUS
Qty: 0 | Refills: 0 | COMMUNITY

## 2019-02-04 RX ORDER — LABETALOL HCL 100 MG
1 TABLET ORAL
Qty: 0 | Refills: 0 | COMMUNITY

## 2019-02-04 RX ORDER — AMPICILLIN SODIUM AND SULBACTAM SODIUM 250; 125 MG/ML; MG/ML
3 INJECTION, POWDER, FOR SUSPENSION INTRAMUSCULAR; INTRAVENOUS
Qty: 0 | Refills: 0 | COMMUNITY

## 2019-02-04 RX ORDER — ASCORBIC ACID 60 MG
500 TABLET,CHEWABLE ORAL DAILY
Qty: 0 | Refills: 0 | Status: DISCONTINUED | OUTPATIENT
Start: 2019-02-04 | End: 2019-02-13

## 2019-02-04 RX ORDER — OXYCODONE HYDROCHLORIDE 5 MG/1
5 TABLET ORAL EVERY 6 HOURS
Qty: 0 | Refills: 0 | Status: DISCONTINUED | OUTPATIENT
Start: 2019-02-04 | End: 2019-02-04

## 2019-02-04 RX ORDER — LEVETIRACETAM 250 MG/1
1000 TABLET, FILM COATED ORAL
Qty: 0 | Refills: 0 | Status: DISCONTINUED | OUTPATIENT
Start: 2019-02-04 | End: 2019-02-05

## 2019-02-04 RX ORDER — HYDROCORTISONE 1 %
1 OINTMENT (GRAM) TOPICAL
Qty: 0 | Refills: 0 | COMMUNITY

## 2019-02-04 RX ORDER — DIPHENHYDRAMINE HCL 50 MG
50 CAPSULE ORAL ONCE
Qty: 0 | Refills: 0 | Status: COMPLETED | OUTPATIENT
Start: 2019-02-04 | End: 2019-02-04

## 2019-02-04 RX ORDER — ERTAPENEM SODIUM 1 G/1
1000 INJECTION, POWDER, LYOPHILIZED, FOR SOLUTION INTRAMUSCULAR; INTRAVENOUS EVERY 24 HOURS
Qty: 0 | Refills: 0 | Status: DISCONTINUED | OUTPATIENT
Start: 2019-02-05 | End: 2019-02-10

## 2019-02-04 RX ORDER — DOCUSATE SODIUM 100 MG
5 CAPSULE ORAL DAILY
Qty: 0 | Refills: 0 | Status: DISCONTINUED | OUTPATIENT
Start: 2019-02-04 | End: 2019-02-05

## 2019-02-04 RX ORDER — ASPIRIN/CALCIUM CARB/MAGNESIUM 324 MG
81 TABLET ORAL DAILY
Qty: 0 | Refills: 0 | Status: DISCONTINUED | OUTPATIENT
Start: 2019-02-04 | End: 2019-02-13

## 2019-02-04 RX ORDER — FERROUS SULFATE 325(65) MG
300 TABLET ORAL
Qty: 0 | Refills: 0 | Status: DISCONTINUED | OUTPATIENT
Start: 2019-02-04 | End: 2019-02-13

## 2019-02-04 RX ORDER — DIGOXIN 250 MCG
1 TABLET ORAL
Qty: 0 | Refills: 0 | COMMUNITY

## 2019-02-04 RX ORDER — OXYCODONE HYDROCHLORIDE 5 MG/1
1 TABLET ORAL
Qty: 0 | Refills: 0 | COMMUNITY

## 2019-02-04 RX ORDER — FAMOTIDINE 10 MG/ML
20 INJECTION INTRAVENOUS DAILY
Qty: 0 | Refills: 0 | Status: DISCONTINUED | OUTPATIENT
Start: 2019-02-04 | End: 2019-02-05

## 2019-02-04 RX ORDER — COLLAGENASE CLOSTRIDIUM HIST. 250 UNIT/G
1 OINTMENT (GRAM) TOPICAL
Qty: 0 | Refills: 0 | COMMUNITY

## 2019-02-04 RX ORDER — ENOXAPARIN SODIUM 100 MG/ML
0 INJECTION SUBCUTANEOUS
Qty: 0 | Refills: 0 | COMMUNITY

## 2019-02-04 RX ORDER — ACETAMINOPHEN 500 MG
650 TABLET ORAL EVERY 6 HOURS
Qty: 0 | Refills: 0 | Status: DISCONTINUED | OUTPATIENT
Start: 2019-02-04 | End: 2019-02-13

## 2019-02-04 RX ORDER — IPRATROPIUM/ALBUTEROL SULFATE 18-103MCG
3 AEROSOL WITH ADAPTER (GRAM) INHALATION EVERY 6 HOURS
Qty: 0 | Refills: 0 | Status: DISCONTINUED | OUTPATIENT
Start: 2019-02-04 | End: 2019-02-13

## 2019-02-04 RX ORDER — FAMOTIDINE 10 MG/ML
20 INJECTION INTRAVENOUS ONCE
Qty: 0 | Refills: 0 | Status: COMPLETED | OUTPATIENT
Start: 2019-02-04 | End: 2019-02-04

## 2019-02-04 RX ORDER — COLLAGENASE CLOSTRIDIUM HIST. 250 UNIT/G
1 OINTMENT (GRAM) TOPICAL DAILY
Qty: 0 | Refills: 0 | Status: DISCONTINUED | OUTPATIENT
Start: 2019-02-04 | End: 2019-02-13

## 2019-02-04 RX ORDER — TAMSULOSIN HYDROCHLORIDE 0.4 MG/1
0.4 CAPSULE ORAL AT BEDTIME
Qty: 0 | Refills: 0 | Status: DISCONTINUED | OUTPATIENT
Start: 2019-02-04 | End: 2019-02-13

## 2019-02-04 RX ORDER — IPRATROPIUM/ALBUTEROL SULFATE 18-103MCG
3 AEROSOL WITH ADAPTER (GRAM) INHALATION
Qty: 0 | Refills: 0 | COMMUNITY

## 2019-02-04 RX ORDER — SODIUM CHLORIDE 9 MG/ML
1000 INJECTION INTRAMUSCULAR; INTRAVENOUS; SUBCUTANEOUS ONCE
Qty: 0 | Refills: 0 | Status: COMPLETED | OUTPATIENT
Start: 2019-02-04 | End: 2019-02-04

## 2019-02-04 RX ORDER — CHLORHEXIDINE GLUCONATE 213 G/1000ML
15 SOLUTION TOPICAL
Qty: 0 | Refills: 0 | COMMUNITY

## 2019-02-04 RX ORDER — DIPHENHYDRAMINE HCL 50 MG
0 CAPSULE ORAL
Qty: 0 | Refills: 0 | COMMUNITY

## 2019-02-04 RX ORDER — ENOXAPARIN SODIUM 100 MG/ML
40 INJECTION SUBCUTANEOUS DAILY
Qty: 0 | Refills: 0 | Status: DISCONTINUED | OUTPATIENT
Start: 2019-02-04 | End: 2019-02-13

## 2019-02-04 RX ORDER — DIPHENHYDRAMINE HCL 50 MG
25 CAPSULE ORAL EVERY 6 HOURS
Qty: 0 | Refills: 0 | Status: COMPLETED | OUTPATIENT
Start: 2019-02-04 | End: 2019-02-05

## 2019-02-04 RX ORDER — ERYTHROPOIETIN 10000 [IU]/ML
20000 INJECTION, SOLUTION INTRAVENOUS; SUBCUTANEOUS
Qty: 0 | Refills: 0 | COMMUNITY

## 2019-02-04 RX ORDER — FUROSEMIDE 40 MG
20 TABLET ORAL DAILY
Qty: 0 | Refills: 0 | Status: ACTIVE | OUTPATIENT
Start: 2019-02-04 | End: 2020-01-03

## 2019-02-04 RX ORDER — LACTOBACILLUS ACIDOPHILUS 100MM CELL
1 CAPSULE ORAL
Qty: 0 | Refills: 0 | Status: DISCONTINUED | OUTPATIENT
Start: 2019-02-04 | End: 2019-02-10

## 2019-02-04 RX ORDER — CHLORHEXIDINE GLUCONATE 213 G/1000ML
15 SOLUTION TOPICAL
Qty: 0 | Refills: 0 | Status: DISCONTINUED | OUTPATIENT
Start: 2019-02-04 | End: 2019-02-13

## 2019-02-04 RX ORDER — ENOXAPARIN SODIUM 100 MG/ML
40 INJECTION SUBCUTANEOUS
Qty: 0 | Refills: 0 | COMMUNITY

## 2019-02-04 RX ORDER — ERYTHROPOIETIN 10000 [IU]/ML
10000 INJECTION, SOLUTION INTRAVENOUS; SUBCUTANEOUS
Qty: 0 | Refills: 0 | Status: DISCONTINUED | OUTPATIENT
Start: 2019-02-04 | End: 2019-02-13

## 2019-02-04 RX ORDER — ERTAPENEM SODIUM 1 G/1
1000 INJECTION, POWDER, LYOPHILIZED, FOR SOLUTION INTRAMUSCULAR; INTRAVENOUS ONCE
Qty: 0 | Refills: 0 | Status: COMPLETED | OUTPATIENT
Start: 2019-02-04 | End: 2019-02-04

## 2019-02-04 RX ORDER — ACETAMINOPHEN 500 MG
650 TABLET ORAL
Qty: 0 | Refills: 0 | COMMUNITY

## 2019-02-04 RX ORDER — POTASSIUM CHLORIDE 20 MEQ
20 PACKET (EA) ORAL DAILY
Qty: 0 | Refills: 0 | Status: DISCONTINUED | OUTPATIENT
Start: 2019-02-04 | End: 2019-02-09

## 2019-02-04 RX ORDER — LABETALOL HCL 100 MG
100 TABLET ORAL EVERY 8 HOURS
Qty: 0 | Refills: 0 | Status: DISCONTINUED | OUTPATIENT
Start: 2019-02-04 | End: 2019-02-05

## 2019-02-04 RX ORDER — DIGOXIN 250 MCG
0.12 TABLET ORAL DAILY
Qty: 0 | Refills: 0 | Status: DISCONTINUED | OUTPATIENT
Start: 2019-02-04 | End: 2019-02-05

## 2019-02-04 RX ADMIN — Medication 40 MILLIGRAM(S): at 21:58

## 2019-02-04 RX ADMIN — Medication 0.25 MILLIGRAM(S): at 22:24

## 2019-02-04 RX ADMIN — SODIUM CHLORIDE 666.67 MILLILITER(S): 9 INJECTION INTRAMUSCULAR; INTRAVENOUS; SUBCUTANEOUS at 14:08

## 2019-02-04 RX ADMIN — Medication 50 MILLIGRAM(S): at 14:08

## 2019-02-04 RX ADMIN — Medication 3 MILLILITER(S): at 22:24

## 2019-02-04 RX ADMIN — FAMOTIDINE 20 MILLIGRAM(S): 10 INJECTION INTRAVENOUS at 14:07

## 2019-02-04 RX ADMIN — Medication 125 MILLIGRAM(S): at 14:08

## 2019-02-04 RX ADMIN — Medication 25 MILLIGRAM(S): at 21:58

## 2019-02-04 RX ADMIN — ERTAPENEM SODIUM 120 MILLIGRAM(S): 1 INJECTION, POWDER, LYOPHILIZED, FOR SOLUTION INTRAMUSCULAR; INTRAVENOUS at 18:33

## 2019-02-04 NOTE — CONSULT NOTE ADULT - SUBJECTIVE AND OBJECTIVE BOX
[facility]  INFECTIOUS DISEASES AND INTERNAL MEDICINE at [place]  =======================================================  Renzo Salazar MD  Diplomates American Board of Internal Medicine and Infectious Diseases  =======================================================    [id number]  [place] MC[place]   This is a 87y  Male  with dementia, h/o CAD s/p 2 stents, CHF, respiratory failure on chronic vent, tracheostomy for about 1 year, Chronic sacral decubitus with osteomyelitis, previously finished course of Iv Cefepime and vancomycin for osteomyelitis in 12/2018 per daughter, diverting colostomy and chronic Jones's catheter. previously seen in march 2018 for  MRSA bacteremia, treated with long course of IV antibiotics.   patient was admitted in January to [facility] and found with Streptococcus septicemia;  SPUTUM Cx with Stenotrophomonas, MDR Acinetobacter, Proteus and  Pseudomonas Pneumonia.   He Completed 10 days of Levaquin for pneumonia, Completed Zosyn 7 days for pneumonia, and was on Unasyn 3gm IV q 6hours for septicemia osteomyelitis , due to  end on 2/15/19.    Patient was admitted for evaluation of urticaria  Per ER note,  he started having urticaria on his right upper extremity, Unasyn was continued, but also started on Benadryl. Urticaria persisted, and today started getting urticaria on bilateral arms as well as swelling of his lower lip which caused the NH to send her to [facility] for evaluation. He was given his last dose of Unasyn at 6 am this morning, last dose of Benadryl was at 12:05 AM.     Prior note from ID service reviewed.  Patient had previously tolerated Unasyn and Zosyn in the hospital.       =======================================================  Past Medical & Surgical Hx:  =====================  PAST MEDICAL & SURGICAL HISTORY:  Dysphagia  Fall: Multiple Mechanical falls  CAD (coronary artery disease)  Clostridium difficile diarrhea: in 2009  BPH (benign prostatic hypertrophy)  Dementia  HLD (hyperlipidemia)  CHF (congestive heart failure)  Prostate cancer: Treated w/ radiation  Stroke: (~5yrs ago)  Seizure: (last event &gt;1yr ago)  HTN (hypertension)  Colostomy in place  S/P percutaneous endoscopic gastrostomy (PEG) tube placement  H/O hemorrhoidectomy  Deviated septum  Abdominal hernia  Status post cardiac surgery: stents x 2    Problem List:  ==========  HEALTH ISSUES - PROBLEM Dx:  Anemia, unspecified type: Anemia, unspecified type  Sepsis: Sepsis  Chronic respiratory failure with hypoxia: Chronic respiratory failure with hypoxia  Pressure injury of sacral region, stage 4: Pressure injury of sacral region, stage 4  Elevated troponin I level: Elevated troponin I level  Seizure disorder: Seizure disorder  Urinary tract infection with hematuria, site unspecified: Urinary tract infection with hematuria, site unspecified  Fever, unspecified fever cause: Fever, unspecified fever cause    Social Hx:  =======  no toxic habits currently    FAMILY HISTORY:  No pertinent family history in first degree relatives  no significant family history of immunosuppressive disorders in mother or father   =======================================================  REVIEW OF SYSTEMS:  unable to obtain      =======================================================  Allergies    clindamycin (Unknown)  Grapes (Rash)  Nuts (Hives; Rash)  PC Pen VK (Unknown)  shellfish (Angioedema; Rash; Hives)  strawberry (Short breath; Rash; Hives)  Xanax (Rash)      MEDICATIONS  (STANDING):    MEDICATIONS  (PRN):      ======================================================  Physical Exam:  ============  T(F): 98.3 (04 Feb 2019 13:15), Max: 98.3 (04 Feb 2019 13:15)  HR: 61 (04 Feb 2019 15:57)  BP: 157/78 (04 Feb 2019 13:07)  RR: 16 (04 Feb 2019 13:07)  SpO2: 100% (04 Feb 2019 15:57) (100% - 100%)    General:  awake, not interactive,. on vent  Eye: Pupils are equal, round and reactive to light,   HENT: Normocephalic, tracheostomy in place  SWELLING OF THE LOWER LIP MORE THAN UPPER LIP  Neck: Supple,    Respiratory: Lungs are fair air entry anteriorly  Cardiovascular:  s1 s2 regular  Gastrointestinal: + PEG tube Normal bowel sounds.  + ostomy  Genitourinary: no CVAT; + jones  Musculoskeletal: contracted upper and lower extremities  SKIN injury noted on FEET, present on admission.   Integumentary: RASH ON RIGHT ARM WORSE THAN LEFT ARM. slight erythematous rash on legs bilaterally.   NEUROLOGIC: Awake, no meaningful communication   PSYCHIATRIC:  unable to assess    =======================    Labs:                        9.7    4.9   )-----------( 192      ( 04 Feb 2019 14:08 )             32.8     02-04    141  |  102  |  16.0  ----------------------------<  91  4.3   |  26.0  |  0.48<L>    Ca    8.8      04 Feb 2019 14:08    TPro  6.6  /  Alb  2.7<L>  /  TBili  0.3<L>  /  DBili  x   /  AST  21  /  ALT  17  /  AlkPhos  150<H>  02-04

## 2019-02-04 NOTE — ED ADULT TRIAGE NOTE - CHIEF COMPLAINT QUOTE
[facility] from [facility] for eval of angioedema and rash to right arm s/p beginning unasyn for osteomyelitis. patient subjective fevers at home.

## 2019-02-04 NOTE — ED ADULT NURSE NOTE - INTERVENTIONS DEFINITIONS
Provide visual clues: red socks/Stretcher in lowest position, wheels locked, appropriate side rails in place

## 2019-02-04 NOTE — ED PROVIDER NOTE - CARE PLAN
Principal Discharge DX:	Allergic drug rash  Secondary Diagnosis:	Chronic osteomyelitis  Secondary Diagnosis:	Dependent on ventilator

## 2019-02-04 NOTE — ED PROVIDER NOTE - MEDICAL DECISION MAKING DETAILS
Patient who is chronically trached on a vent, PEG, colostomy, jones catheter in place on IV Abx for septicemia 2/2 osteomyelitis, on Unasyn despite PCN allergy, with urticaria and angioedema of the lower lip, no wheezing, no vomiting. Patient is trached and on a vent, therefore airway is secured despite the angioedema. Will give 50 IV benadryl, 125 mg solu-medrol and 20 mg IV pepcid for allergic reaction and I contacted ID to determine which antibiotic to start patient on now. Will arrange for PICC to be replaced as it is clotted and r/o DVT in extremity.

## 2019-02-04 NOTE — ED PROVIDER NOTE - PHYSICAL EXAMINATION
Const: Awake, no effort to communicate, but does localize to pain and voice.  HEENT: NC/AT. Dry mucous membranes. Angioedema of the lower lip and tongue.  Eyes: No scleral icterus. EOMI.  Neck:. Soft and supple. Trach in place, no surrounding discharge or bleeding.  Cardiac: Regular rate and regular rhythm. +S1/S2. No murmurs. Peripheral pulses 2+ and symmetric. 3+ pitting edema to the bilateral LE.  Resp: Speaking in full sentences. No evidence of respiratory distress. No wheezes, rales or rhonchi.  Abd: PEG tube in place, colostomy in the LLQ with hernia which is soft and reducible. Soft, non-tender, non-distended. Normal bowel sounds in all 4 quadrants. No guarding or rebound.  Skin: 7 cm unstagable sacral ulcer with serous discharge. PICC line in right upper arm. Erythema, warmth and edema of the right arm. Urticaria bilateral forearms and forelegs.  Neuro: Awake, withdraws to pain in all extremities. No attempt to communicate. Does move eyes to voice. Const: Awake, no effort to communicate, but does localize to pain and voice.  HEENT: NC/AT. Dry mucous membranes. Angioedema of the lower lip and tongue.  Eyes: No scleral icterus. EOMI.  Neck:. Soft and supple. Trach in place, no surrounding discharge or bleeding.  Cardiac: Regular rate and regular rhythm. +S1/S2. No murmurs. Peripheral pulses 2+ and symmetric. 3+ pitting edema to the bilateral LE.  Resp: Speaking in full sentences. No evidence of respiratory distress. No wheezes, rales or rhonchi. Left chest tube in place.  Abd: PEG tube in place, colostomy in the LLQ with hernia which is soft and reducible. Soft, non-tender, non-distended. Normal bowel sounds in all 4 quadrants. No guarding or rebound.  Skin: 7 cm unstagable sacral ulcer with serous discharge. PICC line in right upper arm. Erythema, warmth and edema of the right arm. Urticaria bilateral forearms and forelegs.  Neuro: Awake, withdraws to pain in all extremities. No attempt to communicate. Does move eyes to voice.

## 2019-02-04 NOTE — H&P ADULT - HISTORY OF PRESENT ILLNESS
INCOMPLETE 87 years old male with PMH of Respiratory Failure s/p Trach + PEG, Sacral Ulcer, Osteomyelitis, Colostomy, Prostate Cancer, Chronic Baugh's Catheter, Dementia, Seizure Disorder, CAD, CHF, HTN and HLD sent from [facility] with increased temperature, clogged PICC and body rash secondary to Unasyn.   Patient's daughter at bedside, as per her, patient developed rash while on Unasyn. Benadryl was started however Unasyn was continued. His rash was persistent and this morning he developed lip swelling so he was sent to ER.   Patient has a history of Multiple Hospitalizations and he was discharged from [facility] on 1/29/19 after being treated for Sepsis secondary to Sacral Osteomyelitis.

## 2019-02-04 NOTE — CONSULT NOTE ADULT - ASSESSMENT
85 y/o male with dementia, h/o CAD s/p 2 stents, CHF, respiratory failure on chronic vent via tracheostomy, sacral decubitus, h/o sacral osteomyelitis     admitted for urticarial rash  PCN allergy  Chronic osteomyelitis.     previously:  - Completed 10 days of Levaquin for pneumonia  - Completed Zosyn 7 days for pneumonia      stop UNASYN  - start Ertapenem for septicemia osteomyelitis   - Plan for antibiotics till 2/15/19     for allergy  - continue Benadryl and Steroids, PPI per medical team  advise admission to medical service for further care.

## 2019-02-04 NOTE — ED ADULT NURSE NOTE - OBJECTIVE STATEMENT
Pt presents to ED from [facility] for allergic rxn to antibiotic, allergic rxn began 3 days ago, has been given benadryl with no improvement.  Pt with swelling to lips, rash to b/l arms and b/l legs.  Bilateral pitting pedal edema present.  Chronic trach on vent.  Colostomy present, no output in bag.  PEG tube in place.  Indwelling jones in place.  Code team at bedside.  Last given benadryl at 12:05 am.

## 2019-02-04 NOTE — ED PROVIDER NOTE - OBJECTIVE STATEMENT
Patient with extensive PMH of CAD, CHF, dementia, HTN, CVA, dysphagia who is chronically trached on a vent, PEG, colostomy and indwelling jones presents from [facility] for "allergic reaction" which is urticaria on bilateral arms, legs and edema of his lower lip. Patient was admitted to [facility] on 1/7/19 for septicemia with MDR PNA and osteomyelitis from his sacral ulcer. Patient with extensive PMH of CAD, CHF, dementia, HTN, CVA, dysphagia who is chronically trached on a vent, PEG, colostomy and indwelling jones presents from [facility] for "allergic reaction" which is urticaria on bilateral arms, legs and edema of his lower lip. Patient was admitted to [facility] on 1/7/19 for septicemia with MDR PNA and osteomyelitis from his sacral ulcer, underwent a course of Levaquin, Zosyn and Unasyn, discharged to [facility] on Unasyn, started having urticaria on his right upper extremity, Unasyn was continued, but also started on Benadryl. Urticaria persisted, and today started getting urticaria on bilateral arms as well as angioedema of his lower lip which caused the NH to send her to [facility] for evaluation. He was given his last dose of Unasyn at 6 am this morning, last dose of Benadryl was at 12:05 AM. Daughter, who is at bedside, but is not the health care proxy for the patient, states that the patient has innumerable allergies and is very concerned that the next ABx given will also cause an allergic reaction. She is adamant that the listed health care proxy is a court appointed  who is in no way related to her or her family, and is trying to steal money from her and her family, kicked her out of her home and caused the death of her mother because he denied her treatment. She wants to be the decision maker, but has no paperwork for that and needs to go back to court. She notes that he is full code and wants everything to be done. Patient was previously admitted to [facility] in the past because he was at [facility], but since being at [facility], he has been coming here, but does not truly follow with any physicians. She states the sacral ulcer has been chronic for the past 2-3 years and the reason for the colostomy and jones is to keep the area clean. She states since going back to [facility], the ulcer has increased in size and redness. Patient does not communicate in anyway at baseline.

## 2019-02-04 NOTE — H&P ADULT - ASSESSMENT
INCOMPLETE 87 years old male with PMH of Respiratory Failure s/p Trach + PEG, Sacral Ulcer, Osteomyelitis, Colostomy, Prostate Cancer, Chronic Baugh's Catheter, Dementia, Seizure Disorder, CAD, CHF, HTN and HLD sent from [facility] with increased temperature, clogged PICC and body rash secondary to Unasyn.   Patient's daughter at bedside, as per her, patient developed rash while on Unasyn. Benadryl was started however Unasyn was continued. His rash was persistent and this morning he developed lip swelling so he was sent to ER.   Patient has a history of Multiple Hospitalizations and he was discharged from [facility] on 1/29/19 after being treated for Sepsis secondary to Sacral Osteomyelitis.     1) Allergic Reaction  - Hold Unasyn  - Solumedrol 40 mg q 8 hours  - Benadryl 25 mg q 6 hours  - Pepcid 20 mg daily  2) Sacral Osteomyelitis  - Hold Unasyn  - Continue Invanz as per ID until 2/15/19  - Probiotics  - Wound Care  3) CAD / HTN  - Continue Aspirin and Labetalol  4) Chronic Diastolic Heart Failure  - Continue Lasix and Labetalol  5) Seizure Disorder  - Continue Keppra and Phenytoin  6) Chronic Respiratory Failure  - Continue Vent Support  DVT Prophylaxis -- Lovenox 40 mg

## 2019-02-04 NOTE — PHARMACOTHERAPY INTERVENTION NOTE - COMMENTS
Referenced paperwork from NH. Patient on amp/sulbactam since last admission through 2/15 for strep osteomyelitis/septicemia

## 2019-02-04 NOTE — ED PROVIDER NOTE - CRITICAL CARE PROVIDED
consultation with other physicians/documentation/consult w/ pt's family directly relating to pts condition/direct patient care (not related to procedure)/interpretation of diagnostic studies/additional history taking

## 2019-02-04 NOTE — H&P ADULT - NSHPPHYSICALEXAM_GEN_ALL_CORE
Vital Signs   T(C): 36.8 (04 Feb 2019 13:15), Max: 36.8 (04 Feb 2019 13:15)  T(F): 98.3 (04 Feb 2019 13:15), Max: 98.3 (04 Feb 2019 13:15)  HR: 65 (04 Feb 2019 18:35) (61 - 75)  BP: 143/69 (04 Feb 2019 18:35) (143/69 - 157/78)  RR: 16 (04 Feb 2019 18:35) (16 - 16)  SpO2: 100% (04 Feb 2019 18:35) (100% - 100%)  General: Elderly male lying in bed comfortably. No acute distress  HEENT: PERRLA. EOMI. Clear conjunctivae. Moist mucus membrane. Lips swollen.   Neck: Trach in place.  Chest: CTA bilaterally - no wheezing, rales or rhonchi.   Heart: Normal S1 & S2 with RRR.   Abdomen: Soft. Non-tender. Non-distended. + BS. PEG and Colostomy in place.   Ext: 2+ pedal edema. No calf tenderness   Neuro: Sleeping  Skin: Warm and Dry. Rash on extremities and trunk (more on right arm)  Psychiatry: Sleeping

## 2019-02-04 NOTE — H&P ADULT - NSHPLABSRESULTS_GEN_ALL_CORE
LABS:                        9.7    4.9   )-----------( 192      ( 04 Feb 2019 14:08 )             32.8     02-04    141  |  102  |  16.0  ----------------------------<  91  4.3   |  26.0  |  0.48<L>    Ca    8.8      04 Feb 2019 14:08    TPro  6.6  /  Alb  2.7<L>  /  TBili  0.3<L>  /  DBili  x   /  AST  21  /  ALT  17  /  AlkPhos  150<H>  02-04    PT/INR - ( 04 Feb 2019 14:08 )   PT: 13.9 sec;   INR: 1.20 ratio         PTT - ( 04 Feb 2019 14:08 )  PTT:34.7 sec      Xray Chest 1 View- PORTABLE-Urgent (02.04.19 @ 15:52)  LEFT chest tube in place.  Residual LEFT lower lobe multifocal airspace consolidations obscuring   LEFT diaphragmatic contour.    US Duplex Venous Upper Ext Ltd, Right (02.04.19 @ 17:31)  No evidence of upper extremity DVT.   Duplicated and second brachial vein and smaller in size with them mild on peripheral wall thickening versus recanalized of prior thrombus . second   brachial vein patent with indwelling PICC line catheter otherwise unremarkable exam.

## 2019-02-04 NOTE — ED ADULT NURSE REASSESSMENT NOTE - NS ED NURSE REASSESS COMMENT FT1
received pt at this time from ongoing shift. pt alert, non verbal, contracted, vent dependent with peg and jones. vent settings 400/16/40%/6, tolerating well, resp even and unlabored. skin color appropriate for race, warm and dry. jones draining concentrated yellow urine. rash to BUE, meds as rxd, no s/s resp distress. abd obese, non tender, + bs. wound caRE as rxd, t&p q2h as tolerated. RUE PICC- not patent- pink banded. pending bed. daughter at bedside. updated on plan of care, verbalizes understanding. call  bell in reach

## 2019-02-04 NOTE — H&P ADULT - NSHPSOCIALHISTORY_GEN_ALL_CORE
Resident of Nursing Home.  No smoking, alcohol or illicit drug use. Resident of [facility].  No smoking, alcohol or illicit drug use.        Family History Unknown.

## 2019-02-05 LAB
-  COAGULASE NEGATIVE STAPHYLOCOCCUS: SIGNIFICANT CHANGE UP
ANION GAP SERPL CALC-SCNC: 11 MMOL/L — SIGNIFICANT CHANGE UP (ref 5–17)
BASOPHILS # BLD AUTO: 0 K/UL — SIGNIFICANT CHANGE UP (ref 0–0.2)
BASOPHILS NFR BLD AUTO: 0.5 % — SIGNIFICANT CHANGE UP (ref 0–2)
BUN SERPL-MCNC: 20 MG/DL — SIGNIFICANT CHANGE UP (ref 8–20)
CALCIUM SERPL-MCNC: 8.6 MG/DL — SIGNIFICANT CHANGE UP (ref 8.6–10.2)
CHLORIDE SERPL-SCNC: 107 MMOL/L — SIGNIFICANT CHANGE UP (ref 98–107)
CO2 SERPL-SCNC: 24 MMOL/L — SIGNIFICANT CHANGE UP (ref 22–29)
CREAT SERPL-MCNC: 0.48 MG/DL — LOW (ref 0.5–1.3)
DIGOXIN SERPL-MCNC: 0.6 NG/ML — LOW (ref 0.8–2)
EOSINOPHIL # BLD AUTO: 0.1 K/UL — SIGNIFICANT CHANGE UP (ref 0–0.5)
EOSINOPHIL NFR BLD AUTO: 2.6 % — SIGNIFICANT CHANGE UP (ref 0–6)
GLUCOSE SERPL-MCNC: 96 MG/DL — SIGNIFICANT CHANGE UP (ref 70–115)
HCT VFR BLD CALC: 36.3 % — LOW (ref 42–52)
HGB BLD-MCNC: 10.7 G/DL — LOW (ref 14–18)
LYMPHOCYTES # BLD AUTO: 0.5 K/UL — LOW (ref 1–4.8)
LYMPHOCYTES # BLD AUTO: 12 % — LOW (ref 20–55)
MAGNESIUM SERPL-MCNC: 2.3 MG/DL — SIGNIFICANT CHANGE UP (ref 1.6–2.6)
MCHC RBC-ENTMCNC: 25.8 PG — LOW (ref 27–31)
MCHC RBC-ENTMCNC: 29.5 G/DL — LOW (ref 32–36)
MCV RBC AUTO: 87.7 FL — SIGNIFICANT CHANGE UP (ref 80–94)
METHOD TYPE: SIGNIFICANT CHANGE UP
MONOCYTES # BLD AUTO: 0.5 K/UL — SIGNIFICANT CHANGE UP (ref 0–0.8)
MONOCYTES NFR BLD AUTO: 13 % — HIGH (ref 3–10)
NEUTROPHILS # BLD AUTO: 3 K/UL — SIGNIFICANT CHANGE UP (ref 1.8–8)
NEUTROPHILS NFR BLD AUTO: 71.4 % — SIGNIFICANT CHANGE UP (ref 37–73)
PLATELET # BLD AUTO: 201 K/UL — SIGNIFICANT CHANGE UP (ref 150–400)
POTASSIUM SERPL-MCNC: 4.1 MMOL/L — SIGNIFICANT CHANGE UP (ref 3.5–5.3)
POTASSIUM SERPL-SCNC: 4.1 MMOL/L — SIGNIFICANT CHANGE UP (ref 3.5–5.3)
RBC # BLD: 4.14 M/UL — LOW (ref 4.6–6.2)
RBC # FLD: 23.5 % — HIGH (ref 11–15.6)
SODIUM SERPL-SCNC: 142 MMOL/L — SIGNIFICANT CHANGE UP (ref 135–145)
WBC # BLD: 4.2 K/UL — LOW (ref 4.8–10.8)
WBC # FLD AUTO: 4.2 K/UL — LOW (ref 4.8–10.8)

## 2019-02-05 PROCEDURE — 74018 RADEX ABDOMEN 1 VIEW: CPT | Mod: 26

## 2019-02-05 PROCEDURE — 99233 SBSQ HOSP IP/OBS HIGH 50: CPT

## 2019-02-05 PROCEDURE — 99221 1ST HOSP IP/OBS SF/LOW 40: CPT

## 2019-02-05 RX ORDER — LEVETIRACETAM 250 MG/1
1000 TABLET, FILM COATED ORAL EVERY 12 HOURS
Qty: 0 | Refills: 0 | Status: DISCONTINUED | OUTPATIENT
Start: 2019-02-05 | End: 2019-02-08

## 2019-02-05 RX ORDER — FOSPHENYTOIN 50 MG/ML
400 INJECTION INTRAMUSCULAR; INTRAVENOUS EVERY 12 HOURS
Qty: 0 | Refills: 0 | Status: DISCONTINUED | OUTPATIENT
Start: 2019-02-05 | End: 2019-02-08

## 2019-02-05 RX ORDER — ALTEPLASE 100 MG
2 KIT INTRAVENOUS ONCE
Qty: 0 | Refills: 0 | Status: COMPLETED | OUTPATIENT
Start: 2019-02-05 | End: 2019-02-05

## 2019-02-05 RX ORDER — POLYETHYLENE GLYCOL 3350 17 G/17G
17 POWDER, FOR SOLUTION ORAL DAILY
Qty: 0 | Refills: 0 | Status: DISCONTINUED | OUTPATIENT
Start: 2019-02-05 | End: 2019-02-13

## 2019-02-05 RX ORDER — LABETALOL HCL 100 MG
10 TABLET ORAL EVERY 8 HOURS
Qty: 0 | Refills: 0 | Status: DISCONTINUED | OUTPATIENT
Start: 2019-02-05 | End: 2019-02-06

## 2019-02-05 RX ORDER — VANCOMYCIN HCL 1 G
1000 VIAL (EA) INTRAVENOUS ONCE
Qty: 0 | Refills: 0 | Status: COMPLETED | OUTPATIENT
Start: 2019-02-05 | End: 2019-02-06

## 2019-02-05 RX ORDER — FAMOTIDINE 10 MG/ML
20 INJECTION INTRAVENOUS DAILY
Qty: 0 | Refills: 0 | Status: DISCONTINUED | OUTPATIENT
Start: 2019-02-05 | End: 2019-02-13

## 2019-02-05 RX ORDER — DIGOXIN 250 MCG
0.25 TABLET ORAL DAILY
Qty: 0 | Refills: 0 | Status: DISCONTINUED | OUTPATIENT
Start: 2019-02-05 | End: 2019-02-06

## 2019-02-05 RX ADMIN — Medication 1 APPLICATION(S): at 11:36

## 2019-02-05 RX ADMIN — Medication 10 MILLIGRAM(S): at 18:11

## 2019-02-05 RX ADMIN — Medication 3 MILLILITER(S): at 03:23

## 2019-02-05 RX ADMIN — Medication 100 MILLIGRAM(S): at 05:08

## 2019-02-05 RX ADMIN — Medication 300 MILLIGRAM(S): at 08:24

## 2019-02-05 RX ADMIN — Medication 40 MILLIGRAM(S): at 05:08

## 2019-02-05 RX ADMIN — ERTAPENEM SODIUM 120 MILLIGRAM(S): 1 INJECTION, POWDER, LYOPHILIZED, FOR SOLUTION INTRAMUSCULAR; INTRAVENOUS at 18:12

## 2019-02-05 RX ADMIN — CHLORHEXIDINE GLUCONATE 15 MILLILITER(S): 213 SOLUTION TOPICAL at 19:05

## 2019-02-05 RX ADMIN — Medication 3 MILLILITER(S): at 17:14

## 2019-02-05 RX ADMIN — ALTEPLASE 2 MILLIGRAM(S): KIT at 15:48

## 2019-02-05 RX ADMIN — FOSPHENYTOIN 116 MILLIGRAM(S) PE: 50 INJECTION INTRAMUSCULAR; INTRAVENOUS at 22:00

## 2019-02-05 RX ADMIN — Medication 1 TABLET(S): at 08:24

## 2019-02-05 RX ADMIN — Medication 3 MILLILITER(S): at 21:03

## 2019-02-05 RX ADMIN — Medication 0.25 MILLIGRAM(S): at 21:09

## 2019-02-05 RX ADMIN — Medication 3 MILLILITER(S): at 09:03

## 2019-02-05 RX ADMIN — Medication 25 MILLIGRAM(S): at 05:08

## 2019-02-05 RX ADMIN — Medication 40 MILLIGRAM(S): at 11:36

## 2019-02-05 RX ADMIN — ENOXAPARIN SODIUM 40 MILLIGRAM(S): 100 INJECTION SUBCUTANEOUS at 11:36

## 2019-02-05 RX ADMIN — LEVETIRACETAM 1000 MILLIGRAM(S): 250 TABLET, FILM COATED ORAL at 05:07

## 2019-02-05 RX ADMIN — Medication 100 MILLIGRAM(S): at 02:08

## 2019-02-05 RX ADMIN — Medication 20 MILLIGRAM(S): at 05:08

## 2019-02-05 RX ADMIN — CHLORHEXIDINE GLUCONATE 15 MILLILITER(S): 213 SOLUTION TOPICAL at 05:07

## 2019-02-05 RX ADMIN — Medication 0.25 MILLIGRAM(S): at 09:03

## 2019-02-05 RX ADMIN — Medication 400 MILLIGRAM(S): at 05:08

## 2019-02-05 RX ADMIN — Medication 300 MILLIGRAM(S): at 11:37

## 2019-02-05 RX ADMIN — Medication 40 MILLIGRAM(S): at 22:00

## 2019-02-05 RX ADMIN — Medication 25 MILLIGRAM(S): at 10:38

## 2019-02-05 RX ADMIN — Medication 0.12 MILLIGRAM(S): at 05:08

## 2019-02-05 RX ADMIN — TAMSULOSIN HYDROCHLORIDE 0.4 MILLIGRAM(S): 0.4 CAPSULE ORAL at 02:08

## 2019-02-05 RX ADMIN — LEVETIRACETAM 400 MILLIGRAM(S): 250 TABLET, FILM COATED ORAL at 18:14

## 2019-02-05 RX ADMIN — Medication 25 MILLIGRAM(S): at 18:12

## 2019-02-05 NOTE — CONSULT NOTE ADULT - SUBJECTIVE AND OBJECTIVE BOX
HPI:  87 years old male with PMH of Respiratory Failure s/p Trach + PEG, Sacral Ulcer, Sacral Osteomyelitis, Diverting Colostomy, Prostate Cancer, Chronic Baugh Catheter, recurrent UTI with resistant organisms,  Dementia, Seizure Disorder, CAD, CHF, HTN and HLD sent from [facility] with increased temperature, clogged PICC and body rash secondary to Unasyn.   Patient's daughter at bedside, as per her, patient developed rash while on Unasyn. Benadryl was started however Unasyn was continued. His rash was persistent and this morning he developed lip swelling so he was sent to ER.   Patient has a history of Multiple Hospitalizations and he was discharged from [facility] on 1/29/19 after being treated for Sepsis secondary to Sacral Osteomyelitis. Known to have a resistant organism from prior UTI's.  Recently treated for pneumonia.   Daughter reports that PEG and colostomy were done about 2 yrs ago.  Peg tube has been changed several times, last about 6 months ago.    Currently, the PEG tube is clogged and refractory to several attempts at change.  Kinked at external bumper.  Last dose of Lovenox at 11 am.  Not on systemic anticoagulation.    Seen earlier by ID and antibiotics switched from Unisyn to Invanz.  Drug rash treated with Benadryl and IV steroids.        PAST MEDICAL & SURGICAL HISTORY:  Dysphagia  Fall: Multiple Mechanical falls  CAD (coronary artery disease)  Clostridium difficile diarrhea: in 2009  BPH (benign prostatic hypertrophy)  Dementia  HLD (hyperlipidemia)  CHF (congestive heart failure)  Prostate cancer: Treated w/ radiation  Stroke: (~5yrs ago)  Seizure: (last event &gt;1yr ago)  HTN (hypertension)  Colostomy in place  S/P percutaneous endoscopic gastrostomy (PEG) tube placement  H/O hemorrhoidectomy  Deviated septum  Abdominal hernia  Status post cardiac surgery: stents x 2      ROS:  No Heartburn, regurgitation, dysphagia, odynophagia.  No dyspepsia  No abdominal pain.    No Nausea, vomiting.  No Bleeding.  No hematemesis.   No diarrhea.    No hematochesia.  No weight loss, anorexia.  No edema.      MEDICATIONS  (STANDING):  ALBUTerol/ipratropium for Nebulization 3 milliLiter(s) Nebulizer every 6 hours  ascorbic acid 500 milliGRAM(s) Oral daily  aspirin  chewable 81 milliGRAM(s) Oral daily  buDESOnide   0.25 milliGRAM(s) Respule 0.25 milliGRAM(s) Inhalation two times a day  chlorhexidine 0.12% Liquid 15 milliLiter(s) Oral Mucosa two times a day  collagenase Ointment 1 Application(s) Topical daily  digoxin  Injectable 0.25 milliGRAM(s) IV Push daily  enoxaparin Injectable 40 milliGRAM(s) SubCutaneous daily  epoetin giorgio Injectable 72780 Unit(s) SubCutaneous <User Schedule>  ertapenem  IVPB 1000 milliGRAM(s) IV Intermittent every 24 hours  famotidine Injectable 20 milliGRAM(s) IV Push daily  ferrous    sulfate Liquid 300 milliGRAM(s) Oral three times a day with meals  fosphenytoin IVPB 400 milliGRAM(s) PE IV Intermittent every 12 hours  furosemide    Tablet 20 milliGRAM(s) Oral daily  labetalol Injectable 10 milliGRAM(s) IV Push every 8 hours  lactobacillus acidophilus 1 Tablet(s) Oral two times a day with meals  levETIRAcetam  IVPB 1000 milliGRAM(s) IV Intermittent every 12 hours  methylPREDNISolone sodium succinate Injectable 40 milliGRAM(s) IV Push every 8 hours  multivitamin/minerals 1 Tablet(s) Oral daily  potassium chloride    Tablet ER 20 milliEquivalent(s) Oral daily  tamsulosin 0.4 milliGRAM(s) Oral at bedtime    MEDICATIONS  (PRN):  acetaminophen   Tablet .. 650 milliGRAM(s) Oral every 6 hours PRN Temp greater or equal to 38C (100.4F), Mild Pain (1 - 3), Moderate Pain (4 - 6)  artificial  tears Solution 1 Drop(s) Both EYES every 6 hours PRN Dry Eyes  bisacodyl Suppository 10 milliGRAM(s) Rectal daily PRN Constipation  oxyCODONE    IR 5 milliGRAM(s) Oral every 6 hours PRN Severe Pain (7 - 10)  polyethylene glycol 3350 17 Gram(s) Oral daily PRN Constipation      Allergies    clindamycin (Unknown)  Grapes (Rash)  Nuts (Hives; Rash)  PC Pen VK (Unknown)  shellfish (Angioedema; Rash; Hives)  strawberry (Short breath; Rash; Hives)  Xanax (Rash)    Intolerances    Ativan (Unknown)  Haldol (Dystonic RXN)  hydrALAZINE (Unknown)  Zyprexa (Unknown)      SOCIAL HISTORY:  NC    FAMILY HISTORY:  NC      Vital Signs Last 24 Hrs  T(C): 36.7 (05 Feb 2019 02:20), Max: 36.7 (05 Feb 2019 02:20)  T(F): 98 (05 Feb 2019 02:20), Max: 98 (05 Feb 2019 02:20)  HR: 65 (05 Feb 2019 17:31) (59 - 69)  BP: 171/77 (05 Feb 2019 17:31) (164/90 - 178/86)  BP(mean): --  RR: 20 (05 Feb 2019 08:20) (18 - 20)  SpO2: 96% (05 Feb 2019 17:17) (94% - 100%)    PHYSICAL EXAM:    GENERAL: NAD, well-groomed, well-developed;  Multiple contractures.    HEAD:  Atraumatic, Normocephalic  EYES: EOMI, PERRLA, conjunctiva and sclera clear  ENMT: No tonsillar erythema, exudates, or enlargement; Moist mucous membranes, Good dentition, No lesions; Swollen lips.  thick secretions.    NECK: Supple, No JVD, Normal thyroid;  Trach to vent.   CHEST/LUNG: Clear to percussion bilaterally; No rales, rhonchi, wheezing, or rubs  HEART: Regular rate and rhythm; No murmurs, rubs, or gallops  ABDOMEN: Soft, Nontender, Nondistended; Bowel sounds present;  PEG site clean / dry.  18 Vincentian replacement G tube in place with external bumper at 4 cm.  LLQ colostomy.    EXTREMITIES:  2+ Peripheral Pulses, No clubbing, cyanosis, or edema  LYMPH: No lymphadenopathy noted  SKIN: No rashes or lesions      LABS:                        10.7   4.2   )-----------( 201      ( 05 Feb 2019 13:26 )             36.3     02-05    142  |  107  |  20.0  ----------------------------<  96  4.1   |  24.0  |  0.48<L>    Ca    8.6      05 Feb 2019 13:26  Mg     2.3     02-05    TPro  6.6  /  Alb  2.7<L>  /  TBili  0.3<L>  /  DBili  x   /  AST  21  /  ALT  17  /  AlkPhos  150<H>  02-04    PT/INR - ( 04 Feb 2019 14:08 )   PT: 13.9 sec;   INR: 1.20 ratio         PTT - ( 04 Feb 2019 14:08 )  PTT:34.7 sec       LIVER FUNCTIONS - ( 04 Feb 2019 14:08 )  Alb: 2.7 g/dL / Pro: 6.6 g/dL / ALK PHOS: 150 U/L / ALT: 17 U/L / AST: 21 U/L / GGT: x           EKG:  NSR normal.     RADIOLOGY & ADDITIONAL STUDIES:  CXR  LLL Infiltrate.  Trach.

## 2019-02-05 NOTE — ED ADULT NURSE REASSESSMENT NOTE - NS ED NURSE REASSESS COMMENT FT1
pt alert, tolerating vent settings, no s/s resp distress. t&p q2h as tolerated. meds as rxd. no s/s pain/discomfort. rash to BUE persist. no s/s resp distress. pending bed.

## 2019-02-05 NOTE — CHART NOTE - NSCHARTNOTEFT_GEN_A_CORE
Called by Rn with pt's +blood culture for gram positive cocci in clusters.  Pt is on invanz.  Will order vancomycin 1gm stat dose.  ID to follow tomorrow.  RN made aware.  Spoke with Dr. Almeida, agrees with plan. Called by Rn with pt's +blood culture for gram positive cocci in clusters.  Pt is on invanz.  Pt's kidney function wnl.  Will order vancomycin 1gm stat dose to cover MRSA.  ID to follow tomorrow.  RN made aware.  Spoke with Dr. Almeida, agrees with plan. Called by Rn with pt's +blood culture for gram positive cocci in clusters.  Pt is on invanz.  Pt's kidney function wnl.  Will order vancomycin 1gm stat dose to cover MRSA.  ID to follow up tomorrow.  RN made aware.  Spoke with Dr. Almeida, agrees with plan. Called by Rn with pt's blood culture + for gram positive cocci in clusters.  Pt is on invanz.  Pt's kidney function wnl.  Will order vancomycin 1gm stat dose to cover MRSA.  ID to follow up tomorrow.  RN made aware.  Spoke with Dr. Almeida, agrees with plan.

## 2019-02-05 NOTE — ED ADULT NURSE REASSESSMENT NOTE - NS ED NURSE REASSESS COMMENT FT1
Dr. Nichols, GI, at the bedside to replace G-tube, old tube deflated and removed by MD and new size 8 tube placed, inflated with 10ccs of saline. Dr Nichols to order x-ray with gastrografin to confirm placement of tube prior to use.  Pt tolerated procedure well. Dr. Barrett, GI, at the bedside to replace G-tube, old tube deflated and removed by MD and new size 8 tube placed, inflated with 10ccs of saline. Dr Barrett to order x-ray with gastrografin to confirm placement of tube prior to use.  Pt tolerated procedure well.

## 2019-02-05 NOTE — PROGRESS NOTE ADULT - ASSESSMENT
87 years old male with PMH of Respiratory Failure s/p Trach + PEG, Sacral Ulcer, Osteomyelitis, Colostomy, Prostate Cancer, Chronic Baugh's Catheter, Dementia, Seizure Disorder, CAD, CHF, HTN and [facility], clogged [facility] and body rash secondary to Unasyn.   Patient's daughter at bedside, as per her, patient developed rash while on Unasyn. Benadryl was started however Unasyn was continued. His rash was persistent and this morning he developed lip swelling so he was sent to ER.   Patient has a history of Multiple Hospitalizations and he was discharged from [facility] on 1/29/19 after being treated for Sepsis secondary to Sacral Osteomyelitis.     1) Allergic Reaction  - Hold Unasyn  - Solumedrol 40 mg q 8 hours (will taper from tomorrow)  - Benadryl 25 mg q 6 hours  - Pepcid 20 mg daily  2) Sacral Osteomyelitis  - Hold Unasyn  - Continue Invanz as per ID until 2/15/19  - Probiotics  - Wound Care  3) CAD / HTN  - Continue Aspirin and Labetalol  4) Chronic Diastolic Heart Failure  - Continue Lasix and Labetalol  5) Seizure Disorder  - Continue Keppra and Phenytoin  6) Chronic Respiratory Failure  - Continue Vent Support  7) Clogged PEG  - GI Consult  DVT Prophylaxis -- Lovenox 40 mg 87 years old male with PMH of Respiratory Failure s/p Trach + PEG, Sacral Ulcer, Osteomyelitis, Colostomy, Prostate Cancer, Chronic Baugh's Catheter, Dementia, Seizure Disorder, CAD, CHF, HTN and HLD sent from [facility] with increased temperature, clogged PICC and body rash secondary to Unasyn.   Patient's daughter at bedside, as per her, patient developed rash while on Unasyn. Benadryl was started however Unasyn was continued. His rash was persistent and this morning he developed lip swelling so he was sent to ER.   Patient has a history of Multiple Hospitalizations and he was discharged from [facility] on 1/29/19 after being treated for Sepsis secondary to Sacral Osteomyelitis.     1) Allergic Reaction  - Hold Unasyn  - Solumedrol 40 mg q 8 hours (will taper from tomorrow)  - Benadryl 25 mg q 6 hours  - Pepcid 20 mg daily  2) Sacral Osteomyelitis  - Hold Unasyn  - Continue Invanz as per ID until 2/15/19  - Probiotics  - Wound Care  3) CAD / HTN  - Continue Aspirin and Labetalol  4) Chronic Diastolic Heart Failure  - Continue Lasix and Labetalol  5) Seizure Disorder  - Continue Keppra and Phenytoin  6) Chronic Respiratory Failure  - Continue Vent Support  7) Clogged PEG  - GI Consult  8) Clogged PICC Line  - CATHFLO administered and it is working.   DVT Prophylaxis -- Lovenox 40 mg

## 2019-02-05 NOTE — PROGRESS NOTE ADULT - SUBJECTIVE AND OBJECTIVE BOX
[facility]  INFECTIOUS DISEASES AND INTERNAL MEDICINE at [place]  =======================================================  Renzo Salazar MD  Diplomates American Board of Internal Medicine and Infectious Diseases  =======================================================    [place] [id number]    Follow up: fevers rash    Allergies:  Ativan (Unknown)  clindamycin (Unknown)  Grapes (Rash)  Haldol (Dystonic RXN)  hydrALAZINE (Unknown)  Nuts (Hives; Rash)  PC Pen VK (Unknown)  shellfish (Angioedema; Rash; Hives)  strawberry (Short breath; Rash; Hives)  Xanax (Rash)  Zyprexa (Unknown)      Medications:  acetaminophen   Tablet .. 650 milliGRAM(s) Oral every 6 hours PRN  ALBUTerol/ipratropium for Nebulization 3 milliLiter(s) Nebulizer every 6 hours  alteplase for catheter clearance 2 milliGRAM(s) Catheter once  artificial  tears Solution 1 Drop(s) Both EYES every 6 hours PRN  ascorbic acid 500 milliGRAM(s) Oral daily  aspirin  chewable 81 milliGRAM(s) Oral daily  bisacodyl Suppository 10 milliGRAM(s) Rectal daily PRN  buDESOnide   0.25 milliGRAM(s) Respule 0.25 milliGRAM(s) Inhalation two times a day  chlorhexidine 0.12% Liquid 15 milliLiter(s) Oral Mucosa two times a day  collagenase Ointment 1 Application(s) Topical daily  digoxin     Tablet 0.125 milliGRAM(s) Oral daily  diphenhydrAMINE   Injectable 25 milliGRAM(s) IV Push every 6 hours  enoxaparin Injectable 40 milliGRAM(s) SubCutaneous daily  epoetin giorgio Injectable 47816 Unit(s) SubCutaneous <User Schedule>  ertapenem  IVPB 1000 milliGRAM(s) IV Intermittent every 24 hours  famotidine    Tablet 20 milliGRAM(s) Oral daily  ferrous    sulfate Liquid 300 milliGRAM(s) Oral three times a day with meals  furosemide    Tablet 20 milliGRAM(s) Oral daily  labetalol 100 milliGRAM(s) Oral every 8 hours  lactobacillus acidophilus 1 Tablet(s) Oral two times a day with meals  levETIRAcetam  Solution 1000 milliGRAM(s) Oral two times a day  methylPREDNISolone sodium succinate Injectable 40 milliGRAM(s) IV Push every 8 hours  multivitamin/minerals 1 Tablet(s) Oral daily  oxyCODONE    IR 5 milliGRAM(s) Oral every 6 hours PRN  phenytoin   Suspension 400 milliGRAM(s) Oral every 12 hours  polyethylene glycol 3350 17 Gram(s) Oral daily PRN  potassium chloride    Tablet ER 20 milliEquivalent(s) Oral daily  tamsulosin 0.4 milliGRAM(s) Oral at bedtime    SOCIAL       FAMILY   FAMILY HISTORY:    REVIEW OF SYSTEMS:  OBTAINED FROM DAUGHTER UNABLE TO COMMUNICATE   CONSTITUTIONAL:   Fever   chills  RASH            Physical Exam:  ICU Vital Signs Last 24 Hrs  T(C): 36.7 (05 Feb 2019 02:20), Max: 36.7 (05 Feb 2019 02:20)  T(F): 98 (05 Feb 2019 02:20), Max: 98 (05 Feb 2019 02:20)  HR: 62 (05 Feb 2019 12:30) (59 - 68)  BP: 165/92 (05 Feb 2019 10:41) (143/69 - 178/86)  BP(mean): --  ABP: --  ABP(mean): --  RR: 20 (05 Feb 2019 08:20) (16 - 20)  SpO2: 94% (05 Feb 2019 12:30) (94% - 100%)    GEN: NAD, pleasant  HEENT: normocephalic and atraumatic. EOMI. MIRIAM.    NECK: Supple. No carotid bruits.  No lymphadenopathy or thyromegaly.TRACH ON VENT   LUNGS: Clear to auscultation.  HEART: Regular rate and rhythm without murmur.  ABDOMEN: Soft, nontender, and nondistended.  Positive bowel sounds.    : No CVA tenderness  EXTREMITIES: CONTRACTED .  MSK: no joint swelling  NEUROLOGIC: AWAKE EYES OPEN .     SKIN: DIFFUSE MACULOPAPULAR RASH         Labs:  02-04    141  |  102  |  16.0  ----------------------------<  91  4.3   |  26.0  |  0.48<L>    Ca    8.8      04 Feb 2019 14:08    TPro  6.6  /  Alb  2.7<L>  /  TBili  0.3<L>  /  DBili  x   /  AST  21  /  ALT  17  /  AlkPhos  150<H>  02-04                          9.7    4.9   )-----------( 192      ( 04 Feb 2019 14:08 )             32.8       PT/INR - ( 04 Feb 2019 14:08 )   PT: 13.9 sec;   INR: 1.20 ratio         PTT - ( 04 Feb 2019 14:08 )  PTT:34.7 sec    LIVER FUNCTIONS - ( 04 Feb 2019 14:08 )  Alb: 2.7 g/dL / Pro: 6.6 g/dL / ALK PHOS: 150 U/L / ALT: 17 U/L / AST: 21 U/L / GGT: x               CAPILLARY BLOOD GLUCOSE            RECENT CULTURES:

## 2019-02-05 NOTE — CHART NOTE - NSCHARTNOTEFT_GEN_A_CORE
Medicine PA Note       Cd to evaluate patient for difficult venipunture and picc line evaluation.  Blood acquired from right PICC line with some resistance. Blood sent to lab for analysis.  Cath martin administered to right PICC line for catheter clearance.  Dwell time 45 minutes and then aspirate and flush when complete with normal saline.  Patient tolerated well.

## 2019-02-05 NOTE — PROGRESS NOTE ADULT - SUBJECTIVE AND OBJECTIVE BOX
Generalized skin eruption due to medication taken internally      HPI:  87 years old male with PMH of Respiratory Failure s/p Trach + PEG, Sacral Ulcer, Osteomyelitis, Colostomy, Prostate Cancer, Chronic Baugh's Catheter, Dementia, Seizure Disorder, CAD, CHF, HTN and HLD sent from [facility] with increased temperature, clogged PICC and body rash secondary to Unasyn.   Patient's daughter at bedside, as per her, patient developed rash while on Unasyn. Benadryl was started however Unasyn was continued. His rash was persistent and this morning he developed lip swelling so he was sent to ER.   Patient has a history of Multiple Hospitalizations and he was discharged from [facility] on 1/29/19 after being treated for Sepsis secondary to Sacral Osteomyelitis. (04 Feb 2019 19:11)    Interval History:  Patient was seen and examined at bedside around 8:45 am. Daughter at bedside. As per her, he is doing OK. Rash is stable. Lip swelling is not worsening.     ROS:  As per interval history otherwise unremarkable.    PHYSICAL EXAM:  Vital Signs  T(C): 36.7 (05 Feb 2019 02:20), Max: 36.7 (05 Feb 2019 02:20)  T(F): 98 (05 Feb 2019 02:20), Max: 98 (05 Feb 2019 02:20)  HR: 65 (05 Feb 2019 17:31) (59 - 69)  BP: 171/77 (05 Feb 2019 17:31) (143/69 - 178/86)  RR: 20 (05 Feb 2019 08:20) (16 - 20)  SpO2: 96% (05 Feb 2019 17:17) (94% - 100%)  General: Elderly male lying in bed comfortably. No acute distress  HEENT: PERRLA. EOMI. Clear conjunctivae. Moist mucus membrane. Lips swollen.   Neck: Trach in place.  Chest: CTA bilaterally - no wheezing, rales or rhonchi.   Heart: Normal S1 & S2 with RRR.   Abdomen: Soft. Non-tender. Non-distended. + BS. PEG and Colostomy in place.   Ext: 2+ pedal edema. No calf tenderness   Neuro: Sleeping  Skin: Warm and Dry. Rash on extremities and trunk (more on right arm)  Psychiatry: Sleeping    MEDICATIONS  (STANDING):  ALBUTerol/ipratropium for Nebulization 3 milliLiter(s) Nebulizer every 6 hours  ascorbic acid 500 milliGRAM(s) Oral daily  aspirin  chewable 81 milliGRAM(s) Oral daily  buDESOnide   0.25 milliGRAM(s) Respule 0.25 milliGRAM(s) Inhalation two times a day  chlorhexidine 0.12% Liquid 15 milliLiter(s) Oral Mucosa two times a day  collagenase Ointment 1 Application(s) Topical daily  digoxin     Tablet 0.125 milliGRAM(s) Oral daily  diphenhydrAMINE   Injectable 25 milliGRAM(s) IV Push every 6 hours  enoxaparin Injectable 40 milliGRAM(s) SubCutaneous daily  epoetin giorgio Injectable 78722 Unit(s) SubCutaneous <User Schedule>  ertapenem  IVPB 1000 milliGRAM(s) IV Intermittent every 24 hours  famotidine Injectable 20 milliGRAM(s) IV Push daily  ferrous    sulfate Liquid 300 milliGRAM(s) Oral three times a day with meals  furosemide    Tablet 20 milliGRAM(s) Oral daily  labetalol Injectable 10 milliGRAM(s) IV Push every 8 hours  lactobacillus acidophilus 1 Tablet(s) Oral two times a day with meals  levETIRAcetam  IVPB 1000 milliGRAM(s) IV Intermittent every 12 hours  methylPREDNISolone sodium succinate Injectable 40 milliGRAM(s) IV Push every 8 hours  multivitamin/minerals 1 Tablet(s) Oral daily  phenytoin   Suspension 400 milliGRAM(s) Oral every 12 hours  potassium chloride    Tablet ER 20 milliEquivalent(s) Oral daily  tamsulosin 0.4 milliGRAM(s) Oral at bedtime    MEDICATIONS  (PRN):  acetaminophen   Tablet .. 650 milliGRAM(s) Oral every 6 hours PRN Temp greater or equal to 38C (100.4F), Mild Pain (1 - 3), Moderate Pain (4 - 6)  artificial  tears Solution 1 Drop(s) Both EYES every 6 hours PRN Dry Eyes  bisacodyl Suppository 10 milliGRAM(s) Rectal daily PRN Constipation  oxyCODONE    IR 5 milliGRAM(s) Oral every 6 hours PRN Severe Pain (7 - 10)  polyethylene glycol 3350 17 Gram(s) Oral daily PRN Constipation      LABS:                        10.7   4.2   )-----------( 201      ( 05 Feb 2019 13:26 )             36.3     02-05    142  |  107  |  20.0  ----------------------------<  96  4.1   |  24.0  |  0.48<L>    Ca    8.6      05 Feb 2019 13:26  Mg     2.3     02-05    TPro  6.6  /  Alb  2.7<L>  /  TBili  0.3<L>  /  DBili  x   /  AST  21  /  ALT  17  /  AlkPhos  150<H>  02-04    PT/INR - ( 04 Feb 2019 14:08 )   PT: 13.9 sec;   INR: 1.20 ratio         PTT - ( 04 Feb 2019 14:08 )  PTT:34.7 sec      RADIOLOGY & ADDITIONAL STUDIES:  Reviewed

## 2019-02-05 NOTE — PROGRESS NOTE ADULT - ASSESSMENT
This is a 87y  Male  with dementia, h/o CAD s/p 2 stents, CHF, respiratory failure on chronic vent, tracheostomy for about 1 year, Chronic sacral decubitus with osteomyelitis, previously finished course of Iv Cefepime and vancomycin for osteomyelitis in 12/2018 per daughter, diverting colostomy and chronic Baugh's catheter. previously seen in march 2018 for  MRSA bacteremia, treated with long course of IV antibiotics.   patient was admitted in January to [facility] and found with Streptococcus septicemia;  SPUTUM Cx with Stenotrophomonas, MDR Acinetobacter, Proteus and  Pseudomonas Pneumonia.   He Completed 10 days of Levaquin for pneumonia, Completed Zosyn 7 days for pneumonia, and was on Unasyn 3gm IV q 6hours for septicemia osteomyelitis , due to  end on 2/15/19.    Patient was admitted for evaluation of urticaria  Per ER note,  he started having urticaria on his right upper extremity, Unasyn was continued, but also started on Benadryl. Urticaria persisted,   started getting urticaria on bilateral arms as well as swelling of his lower lip which caused the NH to send her to [facility] for evaluation.    Prior note from ID service reviewed.  Patient had previously tolerated Unasyn and Zosyn in the hospital.  PT PLACED ON INVANZ  WILL CHECK CULTURES AND FOLLOW UP   PICC RIGHT ARM   DAUGHTER STATES NOT WORKING  IF NOT FUNCTIONING WOULD D/C     WILL FOLLOW UP

## 2019-02-05 NOTE — ED ADULT NURSE REASSESSMENT NOTE - NS ED NURSE REASSESS COMMENT FT1
pt awake, tolerating vent settings. no s/s distress. jones draining yellow urine. t&p q2h as tolerated. vss. no s/s pain/discomfort. pending bed, daughter at bedside

## 2019-02-05 NOTE — ED ADULT NURSE REASSESSMENT NOTE - NS ED NURSE REASSESS COMMENT FT1
Md made aware that peg tube is not working at this time. Unable to adminster scheduled medications. MD Smith asked to have medicine PA paged. Paged at this time, awaiting call back.

## 2019-02-05 NOTE — ED ADULT NURSE REASSESSMENT NOTE - NS ED NURSE REASSESS COMMENT FT1
Pt changed and linens cleaned, pt repositioned in stretcher. Daughter remains at bedside. Pt changed and linens cleaned, pt has a stage 4 ulcer to the sacral region, wound care consult was ordered. Clean Mepilex applied.  pt repositioned in stretcher. Daughter remains at bedside.

## 2019-02-05 NOTE — ED ADULT NURSE REASSESSMENT NOTE - NS ED NURSE REASSESS COMMENT FT1
Spoke to Dr Smith, made him aware that were unable to obtain morning labs, two phlebotomists from lab attempted, RN attempted,  paged the medicine PA waiting for a return call back.

## 2019-02-05 NOTE — ED ADULT NURSE REASSESSMENT NOTE - NS ED NURSE REASSESS COMMENT FT1
Received pt from night RN. Pt is sleeping at this time, family at bedside. In no apparent distress. Pt with chronic vent. Baugh in place draining deena urine. #20 L upper arm patent. Will continue to closely monitor. Received pt from night RN. Pt is sleeping at this time, family at bedside. In no apparent distress. Pt with chronic vent. Baugh in place draining cloudy, deena urine. Peg tube in place, colostomy bag present. #20 L upper arm patent. Will continue to closely monitor.

## 2019-02-05 NOTE — CONSULT NOTE ADULT - ASSESSMENT
Dysfunctional replacement G tube.  Tube is at the end of its useable life.    Old G tube removed by deflation of internal balloon.  A new Halyard 18 Maltese replacement G tube was inserted into the old G Tube site and internal balloon inflated with 10 cc water.  Pulled into apposition with internal gastric wall and external bumper placed at 4 cm.  Atraumatically replaced.  Gastric secretions noted in the G Tube immediately.    G tube gastrograffin study requested to confirm propper placement before use.   For routine nursing care of the G Tube. Dysfunctional replacement G tube.  Tube is at the end of its useable life.    Old G tube removed by deflation of internal balloon.  A new Halyard 18 Macedonian replacement G tube was inserted into the old G Tube site and internal balloon inflated with 10 cc water.  Pulled into apposition with internal gastric wall and external bumper placed at 4 cm.  Atraumatically replaced.  Gastric secretions noted in the G Tube immediately.    G tube gastrograffin study requested to confirm propper placement before use.   For routine nursing care of the G Tube.      Addendum:  G tube gastrograffin study shows that the G Tube is in place in the stomach;  no extravasation.  Therefore Replacement G Tube is ready for use.

## 2019-02-06 LAB
ANION GAP SERPL CALC-SCNC: 13 MMOL/L — SIGNIFICANT CHANGE UP (ref 5–17)
ANISOCYTOSIS BLD QL: SLIGHT — SIGNIFICANT CHANGE UP
BASOPHILS # BLD AUTO: 0 K/UL — SIGNIFICANT CHANGE UP (ref 0–0.2)
BASOPHILS NFR BLD AUTO: 0.3 % — SIGNIFICANT CHANGE UP (ref 0–2)
BUN SERPL-MCNC: 24 MG/DL — HIGH (ref 8–20)
CALCIUM SERPL-MCNC: 8.6 MG/DL — SIGNIFICANT CHANGE UP (ref 8.6–10.2)
CHLORIDE SERPL-SCNC: 109 MMOL/L — HIGH (ref 98–107)
CO2 SERPL-SCNC: 25 MMOL/L — SIGNIFICANT CHANGE UP (ref 22–29)
CREAT SERPL-MCNC: 0.55 MG/DL — SIGNIFICANT CHANGE UP (ref 0.5–1.3)
EOSINOPHIL # BLD AUTO: 0.8 K/UL — HIGH (ref 0–0.5)
EOSINOPHIL NFR BLD AUTO: 13.7 % — HIGH (ref 0–5)
GLUCOSE SERPL-MCNC: 80 MG/DL — SIGNIFICANT CHANGE UP (ref 70–115)
HCT VFR BLD CALC: 32.5 % — LOW (ref 42–52)
HGB BLD-MCNC: 9.8 G/DL — LOW (ref 14–18)
HYPOCHROMIA BLD QL: SLIGHT — SIGNIFICANT CHANGE UP
LYMPHOCYTES # BLD AUTO: 0.6 K/UL — LOW (ref 1–4.8)
LYMPHOCYTES # BLD AUTO: 9.6 % — LOW (ref 20–55)
MACROCYTES BLD QL: SLIGHT — SIGNIFICANT CHANGE UP
MAGNESIUM SERPL-MCNC: 2.4 MG/DL — SIGNIFICANT CHANGE UP (ref 1.8–2.6)
MCHC RBC-ENTMCNC: 26.7 PG — LOW (ref 27–31)
MCHC RBC-ENTMCNC: 30.2 G/DL — LOW (ref 32–36)
MCV RBC AUTO: 88.6 FL — SIGNIFICANT CHANGE UP (ref 80–94)
MICROCYTES BLD QL: SLIGHT — SIGNIFICANT CHANGE UP
MONOCYTES # BLD AUTO: 0.6 K/UL — SIGNIFICANT CHANGE UP (ref 0–0.8)
MONOCYTES NFR BLD AUTO: 11.1 % — HIGH (ref 3–10)
NEUTROPHILS # BLD AUTO: 3.8 K/UL — SIGNIFICANT CHANGE UP (ref 1.8–8)
NEUTROPHILS NFR BLD AUTO: 65 % — SIGNIFICANT CHANGE UP (ref 37–73)
OVALOCYTES BLD QL SMEAR: SLIGHT — SIGNIFICANT CHANGE UP
PLAT MORPH BLD: NORMAL — SIGNIFICANT CHANGE UP
PLATELET # BLD AUTO: 207 K/UL — SIGNIFICANT CHANGE UP (ref 150–400)
POIKILOCYTOSIS BLD QL AUTO: SLIGHT — SIGNIFICANT CHANGE UP
POTASSIUM SERPL-MCNC: 4 MMOL/L — SIGNIFICANT CHANGE UP (ref 3.5–5.3)
POTASSIUM SERPL-SCNC: 4 MMOL/L — SIGNIFICANT CHANGE UP (ref 3.5–5.3)
RBC # BLD: 3.67 M/UL — LOW (ref 4.6–6.2)
RBC # FLD: 23.6 % — HIGH (ref 11–15.6)
RBC BLD AUTO: ABNORMAL
SODIUM SERPL-SCNC: 147 MMOL/L — HIGH (ref 135–145)
WBC # BLD: 5.8 K/UL — SIGNIFICANT CHANGE UP (ref 4.8–10.8)
WBC # FLD AUTO: 5.8 K/UL — SIGNIFICANT CHANGE UP (ref 4.8–10.8)

## 2019-02-06 PROCEDURE — 99233 SBSQ HOSP IP/OBS HIGH 50: CPT

## 2019-02-06 RX ORDER — HYDRALAZINE HCL 50 MG
10 TABLET ORAL ONCE
Qty: 0 | Refills: 0 | Status: COMPLETED | OUTPATIENT
Start: 2019-02-06 | End: 2019-02-06

## 2019-02-06 RX ORDER — LABETALOL HCL 100 MG
100 TABLET ORAL EVERY 8 HOURS
Qty: 0 | Refills: 0 | Status: DISCONTINUED | OUTPATIENT
Start: 2019-02-06 | End: 2019-02-13

## 2019-02-06 RX ORDER — DIPHENHYDRAMINE HCL 50 MG
25 CAPSULE ORAL EVERY 6 HOURS
Qty: 0 | Refills: 0 | Status: COMPLETED | OUTPATIENT
Start: 2019-02-06 | End: 2019-02-07

## 2019-02-06 RX ORDER — DIGOXIN 250 MCG
0.25 TABLET ORAL DAILY
Qty: 0 | Refills: 0 | Status: DISCONTINUED | OUTPATIENT
Start: 2019-02-06 | End: 2019-02-13

## 2019-02-06 RX ORDER — AMLODIPINE BESYLATE 2.5 MG/1
5 TABLET ORAL DAILY
Qty: 0 | Refills: 0 | Status: DISCONTINUED | OUTPATIENT
Start: 2019-02-07 | End: 2019-02-13

## 2019-02-06 RX ADMIN — Medication 1 TABLET(S): at 12:36

## 2019-02-06 RX ADMIN — FAMOTIDINE 20 MILLIGRAM(S): 10 INJECTION INTRAVENOUS at 12:38

## 2019-02-06 RX ADMIN — TAMSULOSIN HYDROCHLORIDE 0.4 MILLIGRAM(S): 0.4 CAPSULE ORAL at 22:20

## 2019-02-06 RX ADMIN — Medication 40 MILLIGRAM(S): at 20:43

## 2019-02-06 RX ADMIN — Medication 81 MILLIGRAM(S): at 12:36

## 2019-02-06 RX ADMIN — FOSPHENYTOIN 116 MILLIGRAM(S) PE: 50 INJECTION INTRAMUSCULAR; INTRAVENOUS at 21:28

## 2019-02-06 RX ADMIN — LEVETIRACETAM 400 MILLIGRAM(S): 250 TABLET, FILM COATED ORAL at 07:19

## 2019-02-06 RX ADMIN — Medication 1 APPLICATION(S): at 12:38

## 2019-02-06 RX ADMIN — Medication 300 MILLIGRAM(S): at 18:04

## 2019-02-06 RX ADMIN — FOSPHENYTOIN 116 MILLIGRAM(S) PE: 50 INJECTION INTRAMUSCULAR; INTRAVENOUS at 06:28

## 2019-02-06 RX ADMIN — Medication 0.25 MILLIGRAM(S): at 21:18

## 2019-02-06 RX ADMIN — Medication 300 MILLIGRAM(S): at 12:36

## 2019-02-06 RX ADMIN — CHLORHEXIDINE GLUCONATE 15 MILLILITER(S): 213 SOLUTION TOPICAL at 06:28

## 2019-02-06 RX ADMIN — Medication 3 MILLILITER(S): at 09:05

## 2019-02-06 RX ADMIN — Medication 0.25 MILLIGRAM(S): at 09:05

## 2019-02-06 RX ADMIN — TAMSULOSIN HYDROCHLORIDE 0.4 MILLIGRAM(S): 0.4 CAPSULE ORAL at 01:34

## 2019-02-06 RX ADMIN — Medication 20 MILLIEQUIVALENT(S): at 12:36

## 2019-02-06 RX ADMIN — CHLORHEXIDINE GLUCONATE 15 MILLILITER(S): 213 SOLUTION TOPICAL at 18:04

## 2019-02-06 RX ADMIN — Medication 40 MILLIGRAM(S): at 05:46

## 2019-02-06 RX ADMIN — Medication 20 MILLIGRAM(S): at 06:28

## 2019-02-06 RX ADMIN — Medication 1 TABLET(S): at 12:37

## 2019-02-06 RX ADMIN — Medication 3 MILLILITER(S): at 21:18

## 2019-02-06 RX ADMIN — Medication 3 MILLILITER(S): at 15:53

## 2019-02-06 RX ADMIN — Medication 100 MILLIGRAM(S): at 22:20

## 2019-02-06 RX ADMIN — ERTAPENEM SODIUM 120 MILLIGRAM(S): 1 INJECTION, POWDER, LYOPHILIZED, FOR SOLUTION INTRAMUSCULAR; INTRAVENOUS at 22:20

## 2019-02-06 RX ADMIN — Medication 0.25 MILLIGRAM(S): at 12:37

## 2019-02-06 RX ADMIN — Medication 10 MILLIGRAM(S): at 06:28

## 2019-02-06 RX ADMIN — Medication 1 TABLET(S): at 18:04

## 2019-02-06 RX ADMIN — Medication 250 MILLIGRAM(S): at 06:27

## 2019-02-06 RX ADMIN — Medication 10 MILLIGRAM(S): at 00:00

## 2019-02-06 RX ADMIN — Medication 3 MILLILITER(S): at 02:50

## 2019-02-06 RX ADMIN — Medication 500 MILLIGRAM(S): at 12:37

## 2019-02-06 RX ADMIN — Medication 40 MILLIGRAM(S): at 12:37

## 2019-02-06 RX ADMIN — Medication 10 MILLIGRAM(S): at 18:45

## 2019-02-06 RX ADMIN — LEVETIRACETAM 400 MILLIGRAM(S): 250 TABLET, FILM COATED ORAL at 22:20

## 2019-02-06 NOTE — PROGRESS NOTE ADULT - ASSESSMENT
87 years old male with PMH of Respiratory Failure s/p Trach + PEG, Sacral Ulcer, Osteomyelitis, Colostomy, Prostate Cancer, Chronic Baugh's Catheter, Dementia, Seizure Disorder, CAD, CHF, HTN and HLD sent from [facility] with increased temperature, clogged PICC and body rash secondary to Unasyn.   Patient's daughter at bedside, as per her, patient developed rash while on Unasyn. Benadryl was started however Unasyn was continued. His rash was persistent and this morning he developed lip swelling so he was sent to ER.   Patient has a history of Multiple Hospitalizations and he was discharged from [facility] on 1/29/19 after being treated for Sepsis secondary to Sacral Osteomyelitis.     1) Allergic Reaction  - Hold Unasyn  - Solumedrol 40 mg q 8 hours   - Benadryl 25 mg q 6 hours  - Pepcid 20 mg daily  - Will consult Derm as there is no improvement with current treatment.  2) Sacral Osteomyelitis  - Hold Unasyn  - Continue Invanz as per ID until 2/15/19  - Probiotics  - Wound Care  - Blood culture --- Coag Neg Staph, likely contaminant   3) CAD / HTN  - Continue Aspirin and Labetalol  - Add Amlodipine  4) Chronic Diastolic Heart Failure  - Continue Lasix and Labetalol  5) Seizure Disorder  - Continue Keppra and Phenytoin  6) Chronic Respiratory Failure  - Continue Vent Support  7) Clogged PEG  - GI Consult appreciated. A new Halyard 18 Lithuanian replacement G tube was placed.  8) Clogged PICC Line  - CATHFLO administered and it is working.   DVT Prophylaxis -- Lovenox 40 mg

## 2019-02-06 NOTE — ED ADULT NURSE REASSESSMENT NOTE - NS ED NURSE REASSESS COMMENT FT1
alert, tolerating vent settings. no s/s resp distress. t&p q 2h as tolerated. meds as rxd. pending bed. daughter at bedside. updated on plan of care, verbalizes understanding. call bell in reach

## 2019-02-06 NOTE — PROGRESS NOTE ADULT - SUBJECTIVE AND OBJECTIVE BOX
Generalized skin eruption due to medication taken internally      HPI:  87 years old male with PMH of Respiratory Failure s/p Trach + PEG, Sacral Ulcer, Osteomyelitis, Colostomy, Prostate Cancer, Chronic Baugh's Catheter, Dementia, Seizure Disorder, CAD, CHF, HTN and HLD sent from [facility] with increased temperature, clogged PICC and body rash secondary to Unasyn.   Patient's daughter at bedside, as per her, patient developed rash while on Unasyn. Benadryl was started however Unasyn was continued. His rash was persistent and this morning he developed lip swelling so he was sent to ER.   Patient has a history of Multiple Hospitalizations and he was discharged from [facility] on 1/29/19 after being treated for Sepsis secondary to Sacral Osteomyelitis. (04 Feb 2019 19:11)    Interval History:  Patient was seen and examined at bedside around 9 am. Daughter at bedside, sleeping.   As per Nurse, he is doing well. No issues overnight.      ROS:  As per interval history otherwise unremarkable.    PHYSICAL EXAM:  Vital Signs   T(C): 36.8 (06 Feb 2019 18:35), Max: 36.8 (06 Feb 2019 18:35)  T(F): 98.2 (06 Feb 2019 18:35), Max: 98.2 (06 Feb 2019 18:35)  HR: 57 (06 Feb 2019 18:35) (57 - 69)  BP: 182/82 (06 Feb 2019 18:35) (132/60 - 182/82)  RR: 18 (06 Feb 2019 18:35) (16 - 21)  SpO2: 100% (06 Feb 2019 18:35) (97% - 100%)  General: Elderly male lying in bed comfortably. No acute distress  HEENT: PERRLA. EOMI. Clear conjunctivae. Moist mucus membrane. Lips swollen.   Neck: Trach in place.  Chest: CTA bilaterally - no wheezing, rales or rhonchi.   Heart: Normal S1 & S2 with RRR.   Abdomen: Soft. Non-tender. Non-distended. + BS. PEG and Colostomy in place.   Ext: 2+ pedal edema. No calf tenderness   Neuro: Sleeping  Skin: Warm and Dry. Rash on extremities and trunk (more on right arm)  Psychiatry: Sleeping    MEDICATIONS  (STANDING):  ALBUTerol/ipratropium for Nebulization 3 milliLiter(s) Nebulizer every 6 hours  ascorbic acid 500 milliGRAM(s) Oral daily  aspirin  chewable 81 milliGRAM(s) Oral daily  buDESOnide   0.25 milliGRAM(s) Respule 0.25 milliGRAM(s) Inhalation two times a day  chlorhexidine 0.12% Liquid 15 milliLiter(s) Oral Mucosa two times a day  collagenase Ointment 1 Application(s) Topical daily  digoxin     Tablet 0.125 milliGRAM(s) Oral daily  diphenhydrAMINE   Injectable 25 milliGRAM(s) IV Push every 6 hours  enoxaparin Injectable 40 milliGRAM(s) SubCutaneous daily  epoetin giorgio Injectable 50827 Unit(s) SubCutaneous <User Schedule>  ertapenem  IVPB 1000 milliGRAM(s) IV Intermittent every 24 hours  famotidine Injectable 20 milliGRAM(s) IV Push daily  ferrous    sulfate Liquid 300 milliGRAM(s) Oral three times a day with meals  furosemide    Tablet 20 milliGRAM(s) Oral daily  labetalol Injectable 10 milliGRAM(s) IV Push every 8 hours  lactobacillus acidophilus 1 Tablet(s) Oral two times a day with meals  levETIRAcetam  IVPB 1000 milliGRAM(s) IV Intermittent every 12 hours  methylPREDNISolone sodium succinate Injectable 40 milliGRAM(s) IV Push every 8 hours  multivitamin/minerals 1 Tablet(s) Oral daily  phenytoin   Suspension 400 milliGRAM(s) Oral every 12 hours  potassium chloride    Tablet ER 20 milliEquivalent(s) Oral daily  tamsulosin 0.4 milliGRAM(s) Oral at bedtime    MEDICATIONS  (PRN):  acetaminophen   Tablet .. 650 milliGRAM(s) Oral every 6 hours PRN Temp greater or equal to 38C (100.4F), Mild Pain (1 - 3), Moderate Pain (4 - 6)  artificial  tears Solution 1 Drop(s) Both EYES every 6 hours PRN Dry Eyes  bisacodyl Suppository 10 milliGRAM(s) Rectal daily PRN Constipation  oxyCODONE    IR 5 milliGRAM(s) Oral every 6 hours PRN Severe Pain (7 - 10)  polyethylene glycol 3350 17 Gram(s) Oral daily PRN Constipation    LABS:                        9.8    5.8   )-----------( 207      ( 06 Feb 2019 06:56 )             32.5     02-06    147<H>  |  109<H>  |  24.0<H>  ----------------------------<  80  4.0   |  25.0  |  0.55    Ca    8.6      06 Feb 2019 06:56  Mg     2.4     02-06    Blood Culture: 02-04 @ 14:07  Organism Blood Culture PCR  Gram Stain Blood -- Gram Stain --  Specimen Source .Blood  Culture-Blood --    RADIOLOGY & ADDITIONAL STUDIES:  Reviewed

## 2019-02-06 NOTE — ED ADULT NURSE REASSESSMENT NOTE - NS ED NURSE REASSESS COMMENT FT1
pt asleep, easily arousable. ciwa as documented. tolerating po intake. pending further tx plan. updated on plan of care, verbalize understanding. call bell in reach

## 2019-02-06 NOTE — ED ADULT NURSE REASSESSMENT NOTE - NS ED NURSE REASSESS COMMENT FT1
bed bath provided to patient, gown and stretcher linens changed. wound care provided to coccyx wound, wet to dry dressing. right upper arm PICC dressing changed and dated.

## 2019-02-06 NOTE — ED ADULT NURSE REASSESSMENT NOTE - NURSING MUSC EXTREMITY LIMITED ROM
left upper extremity/all four extremeties contracted pillows in place to relieve pressure/right upper extremity/right lower extremity/left lower extremity

## 2019-02-07 LAB
ANION GAP SERPL CALC-SCNC: 14 MMOL/L — SIGNIFICANT CHANGE UP (ref 5–17)
ANISOCYTOSIS BLD QL: SIGNIFICANT CHANGE UP
BASOPHILS # BLD AUTO: 0 K/UL — SIGNIFICANT CHANGE UP (ref 0–0.2)
BASOPHILS NFR BLD AUTO: 0.4 % — SIGNIFICANT CHANGE UP (ref 0–2)
BUN SERPL-MCNC: 23 MG/DL — HIGH (ref 8–20)
CALCIUM SERPL-MCNC: 9 MG/DL — SIGNIFICANT CHANGE UP (ref 8.6–10.2)
CHLORIDE SERPL-SCNC: 107 MMOL/L — SIGNIFICANT CHANGE UP (ref 98–107)
CO2 SERPL-SCNC: 25 MMOL/L — SIGNIFICANT CHANGE UP (ref 22–29)
CREAT SERPL-MCNC: 0.53 MG/DL — SIGNIFICANT CHANGE UP (ref 0.5–1.3)
ELLIPTOCYTES BLD QL SMEAR: SLIGHT — SIGNIFICANT CHANGE UP
EOSINOPHIL # BLD AUTO: 0.3 K/UL — SIGNIFICANT CHANGE UP (ref 0–0.5)
EOSINOPHIL NFR BLD AUTO: 5.1 % — HIGH (ref 0–5)
GLUCOSE SERPL-MCNC: 122 MG/DL — HIGH (ref 70–115)
HCT VFR BLD CALC: 36.7 % — LOW (ref 42–52)
HGB BLD-MCNC: 10.8 G/DL — LOW (ref 14–18)
HYPOCHROMIA BLD QL: SLIGHT — SIGNIFICANT CHANGE UP
LYMPHOCYTES # BLD AUTO: 0.4 K/UL — LOW (ref 1–4.8)
LYMPHOCYTES # BLD AUTO: 6.9 % — LOW (ref 20–55)
MACROCYTES BLD QL: SLIGHT — SIGNIFICANT CHANGE UP
MAGNESIUM SERPL-MCNC: 2.5 MG/DL — SIGNIFICANT CHANGE UP (ref 1.8–2.6)
MCHC RBC-ENTMCNC: 26.3 PG — LOW (ref 27–31)
MCHC RBC-ENTMCNC: 29.4 G/DL — LOW (ref 32–36)
MCV RBC AUTO: 89.5 FL — SIGNIFICANT CHANGE UP (ref 80–94)
MICROCYTES BLD QL: SLIGHT — SIGNIFICANT CHANGE UP
MONOCYTES # BLD AUTO: 0.4 K/UL — SIGNIFICANT CHANGE UP (ref 0–0.8)
MONOCYTES NFR BLD AUTO: 8.1 % — SIGNIFICANT CHANGE UP (ref 3–10)
NEUTROPHILS # BLD AUTO: 4.4 K/UL — SIGNIFICANT CHANGE UP (ref 1.8–8)
NEUTROPHILS NFR BLD AUTO: 78.6 % — HIGH (ref 37–73)
OVALOCYTES BLD QL SMEAR: SLIGHT — SIGNIFICANT CHANGE UP
PLAT MORPH BLD: NORMAL — SIGNIFICANT CHANGE UP
PLATELET # BLD AUTO: 254 K/UL — SIGNIFICANT CHANGE UP (ref 150–400)
POIKILOCYTOSIS BLD QL AUTO: SLIGHT — SIGNIFICANT CHANGE UP
POLYCHROMASIA BLD QL SMEAR: SLIGHT — SIGNIFICANT CHANGE UP
POTASSIUM SERPL-MCNC: 4.4 MMOL/L — SIGNIFICANT CHANGE UP (ref 3.5–5.3)
POTASSIUM SERPL-SCNC: 4.4 MMOL/L — SIGNIFICANT CHANGE UP (ref 3.5–5.3)
RBC # BLD: 4.1 M/UL — LOW (ref 4.6–6.2)
RBC # FLD: 24.1 % — HIGH (ref 11–15.6)
RBC BLD AUTO: ABNORMAL
SODIUM SERPL-SCNC: 146 MMOL/L — HIGH (ref 135–145)
WBC # BLD: 5.5 K/UL — SIGNIFICANT CHANGE UP (ref 4.8–10.8)
WBC # FLD AUTO: 5.5 K/UL — SIGNIFICANT CHANGE UP (ref 4.8–10.8)

## 2019-02-07 PROCEDURE — 99232 SBSQ HOSP IP/OBS MODERATE 35: CPT

## 2019-02-07 PROCEDURE — 99222 1ST HOSP IP/OBS MODERATE 55: CPT

## 2019-02-07 RX ORDER — PETROLATUM,WHITE
1 JELLY (GRAM) TOPICAL DAILY
Qty: 0 | Refills: 0 | Status: DISCONTINUED | OUTPATIENT
Start: 2019-02-07 | End: 2019-02-13

## 2019-02-07 RX ADMIN — Medication 0.25 MILLIGRAM(S): at 20:56

## 2019-02-07 RX ADMIN — FAMOTIDINE 20 MILLIGRAM(S): 10 INJECTION INTRAVENOUS at 13:42

## 2019-02-07 RX ADMIN — ENOXAPARIN SODIUM 40 MILLIGRAM(S): 100 INJECTION SUBCUTANEOUS at 12:44

## 2019-02-07 RX ADMIN — TAMSULOSIN HYDROCHLORIDE 0.4 MILLIGRAM(S): 0.4 CAPSULE ORAL at 21:44

## 2019-02-07 RX ADMIN — Medication 100 MILLIGRAM(S): at 13:57

## 2019-02-07 RX ADMIN — Medication 300 MILLIGRAM(S): at 18:27

## 2019-02-07 RX ADMIN — Medication 25 MILLIGRAM(S): at 00:23

## 2019-02-07 RX ADMIN — Medication 40 MILLIGRAM(S): at 12:43

## 2019-02-07 RX ADMIN — Medication 500 MILLIGRAM(S): at 12:44

## 2019-02-07 RX ADMIN — CHLORHEXIDINE GLUCONATE 15 MILLILITER(S): 213 SOLUTION TOPICAL at 05:52

## 2019-02-07 RX ADMIN — Medication 3 MILLILITER(S): at 03:59

## 2019-02-07 RX ADMIN — Medication 25 MILLIGRAM(S): at 13:43

## 2019-02-07 RX ADMIN — Medication 25 MILLIGRAM(S): at 05:52

## 2019-02-07 RX ADMIN — Medication 20 MILLIEQUIVALENT(S): at 12:44

## 2019-02-07 RX ADMIN — Medication 0.25 MILLIGRAM(S): at 08:48

## 2019-02-07 RX ADMIN — Medication 100 MILLIGRAM(S): at 05:52

## 2019-02-07 RX ADMIN — Medication 1 TABLET(S): at 10:25

## 2019-02-07 RX ADMIN — FOSPHENYTOIN 116 MILLIGRAM(S) PE: 50 INJECTION INTRAMUSCULAR; INTRAVENOUS at 10:25

## 2019-02-07 RX ADMIN — Medication 100 MILLIGRAM(S): at 21:43

## 2019-02-07 RX ADMIN — FOSPHENYTOIN 116 MILLIGRAM(S) PE: 50 INJECTION INTRAMUSCULAR; INTRAVENOUS at 22:38

## 2019-02-07 RX ADMIN — Medication 1 APPLICATION(S): at 21:44

## 2019-02-07 RX ADMIN — Medication 40 MILLIGRAM(S): at 05:52

## 2019-02-07 RX ADMIN — LEVETIRACETAM 400 MILLIGRAM(S): 250 TABLET, FILM COATED ORAL at 12:10

## 2019-02-07 RX ADMIN — Medication 300 MILLIGRAM(S): at 10:26

## 2019-02-07 RX ADMIN — Medication 25 MILLIGRAM(S): at 18:28

## 2019-02-07 RX ADMIN — Medication 300 MILLIGRAM(S): at 12:43

## 2019-02-07 RX ADMIN — Medication 1 TABLET(S): at 18:27

## 2019-02-07 RX ADMIN — Medication 1 TABLET(S): at 12:44

## 2019-02-07 RX ADMIN — CHLORHEXIDINE GLUCONATE 15 MILLILITER(S): 213 SOLUTION TOPICAL at 18:58

## 2019-02-07 RX ADMIN — Medication 20 MILLIGRAM(S): at 05:52

## 2019-02-07 RX ADMIN — Medication 3 MILLILITER(S): at 20:56

## 2019-02-07 RX ADMIN — Medication 3 MILLILITER(S): at 08:48

## 2019-02-07 RX ADMIN — Medication 40 MILLIGRAM(S): at 18:28

## 2019-02-07 RX ADMIN — ERTAPENEM SODIUM 120 MILLIGRAM(S): 1 INJECTION, POWDER, LYOPHILIZED, FOR SOLUTION INTRAMUSCULAR; INTRAVENOUS at 18:28

## 2019-02-07 RX ADMIN — Medication 3 MILLILITER(S): at 16:28

## 2019-02-07 RX ADMIN — Medication 81 MILLIGRAM(S): at 12:43

## 2019-02-07 RX ADMIN — LEVETIRACETAM 400 MILLIGRAM(S): 250 TABLET, FILM COATED ORAL at 22:38

## 2019-02-07 RX ADMIN — Medication 0.25 MILLIGRAM(S): at 05:52

## 2019-02-07 RX ADMIN — AMLODIPINE BESYLATE 5 MILLIGRAM(S): 2.5 TABLET ORAL at 05:52

## 2019-02-07 NOTE — ADVANCED PRACTICE NURSE CONSULT - ASSESSMENT
Lethargic aroused to voice, trached on vent support, bedbound, diverting colostomy and urethral catheter in place.  Skin warm, dry with adequate turgor, scattered areas of hyperpigmentation and hypopigmentation, diffused maculopapular rash as well scattered areas of eccyhmosis on bilateral upper, blanchable erythema to bilateral heels.    Patient is noted to have a Stage 4 Sacral pressure injury which is mildly malodorous, measuring 9.0 x 8.0 x 1.5 cm, the wound bed is covered 100% red agranular tissue (-cobblestone), moderate amount of purulent yellow/tan drainage, undermining from 1 to 5 o’clock with the deepest point at 3 o'clock measuring 3.0 cm, no tunneling.  No erythema, warmth or induration noted to periwound.

## 2019-02-07 NOTE — CONSULT NOTE ADULT - SUBJECTIVE AND OBJECTIVE BOX
HPI:  87 years old male with PMH of Respiratory Failure s/p Trach + PEG, Sacral Ulcer, Osteomyelitis, Colostomy, Prostate Cancer, Chronic Baugh's Catheter, Dementia, Seizure Disorder, CAD, CHF, HTN and HLD sent from [facility] with increased temperature, clogged PICC and body rash secondary to Unasyn.   Patient's daughter at bedside, as per her, patient developed rash while on Unasyn. Benadryl was started however Unasyn was continued. His rash was persistent and on day of admission he developed lip swelling so he was sent to ER.   Patient has a history of Multiple Hospitalizations and he was discharged from [facility] on 1/29/19 after being treated for Sepsis secondary to Sacral Osteomyelitis.     Daughter at bedside:  she states that rash has been worsening despite the Unasyn being stopped 3-4 days ago and the use of Solumedrol and Benadryl      PAST MEDICAL & SURGICAL HISTORY:  Dysphagia  Fall: Multiple Mechanical falls  CAD (coronary artery disease)  Clostridium difficile diarrhea: in 2009  BPH (benign prostatic hypertrophy)  Dementia  HLD (hyperlipidemia)  CHF (congestive heart failure)  Prostate cancer: Treated w/ radiation  Stroke: (~5yrs ago)  Seizure: (last event &gt;1yr ago)  HTN (hypertension)  Colostomy in place  S/P percutaneous endoscopic gastrostomy (PEG) tube placement  H/O hemorrhoidectomy  Deviated septum  Abdominal hernia  Status post cardiac surgery: stents x 2      REVIEW OF SYSTEMS:    Unobtainable (dementia)        MEDICATIONS  (STANDING):  ALBUTerol/ipratropium for Nebulization 3 milliLiter(s) Nebulizer every 6 hours  amLODIPine   Tablet 5 milliGRAM(s) Oral daily  AQUAPHOR (petrolatum Ointment) 1 Application(s) Topical daily  ascorbic acid 500 milliGRAM(s) Oral daily  aspirin  chewable 81 milliGRAM(s) Oral daily  buDESOnide   0.25 milliGRAM(s) Respule 0.25 milliGRAM(s) Inhalation two times a day  chlorhexidine 0.12% Liquid 15 milliLiter(s) Oral Mucosa two times a day  collagenase Ointment 1 Application(s) Topical daily  digoxin     Tablet 0.25 milliGRAM(s) Oral daily  diphenhydrAMINE   Injectable 25 milliGRAM(s) IV Push every 6 hours  enoxaparin Injectable 40 milliGRAM(s) SubCutaneous daily  epoetin giorgio Injectable 28358 Unit(s) SubCutaneous <User Schedule>  ertapenem  IVPB 1000 milliGRAM(s) IV Intermittent every 24 hours  famotidine Injectable 20 milliGRAM(s) IV Push daily  ferrous    sulfate Liquid 300 milliGRAM(s) Oral three times a day with meals  fosphenytoin IVPB 400 milliGRAM(s) PE IV Intermittent every 12 hours  furosemide    Tablet 20 milliGRAM(s) Oral daily  labetalol 100 milliGRAM(s) Oral every 8 hours  lactobacillus acidophilus 1 Tablet(s) Oral two times a day with meals  levETIRAcetam  IVPB 1000 milliGRAM(s) IV Intermittent every 12 hours  methylPREDNISolone sodium succinate Injectable 40 milliGRAM(s) IV Push every 8 hours  multivitamin/minerals 1 Tablet(s) Oral daily  potassium chloride    Tablet ER 20 milliEquivalent(s) Oral daily  tamsulosin 0.4 milliGRAM(s) Oral at bedtime    MEDICATIONS  (PRN):  acetaminophen   Tablet .. 650 milliGRAM(s) Oral every 6 hours PRN Temp greater or equal to 38C (100.4F), Mild Pain (1 - 3), Moderate Pain (4 - 6)  artificial  tears Solution 1 Drop(s) Both EYES every 6 hours PRN Dry Eyes  bisacodyl Suppository 10 milliGRAM(s) Rectal daily PRN Constipation  oxyCODONE    IR 5 milliGRAM(s) Oral every 6 hours PRN Severe Pain (7 - 10)  polyethylene glycol 3350 17 Gram(s) Oral daily PRN Constipation      Allergies    clindamycin (Unknown)  Grapes (Rash)  Nuts (Hives; Rash)  PC Pen VK (Unknown)  shellfish (Angioedema; Rash; Hives)  strawberry (Short breath; Rash; Hives)  Xanax (Rash)    Intolerances    Ativan (Unknown)  Haldol (Dystonic RXN)  hydrALAZINE (Unknown)  Zyprexa (Unknown)        Vital Signs Last 24 Hrs  T(C): 37 (07 Feb 2019 08:00), Max: 37.1 (06 Feb 2019 23:21)  T(F): 98.6 (07 Feb 2019 08:00), Max: 98.8 (06 Feb 2019 23:21)  HR: 62 (07 Feb 2019 13:06) (52 - 71)  BP: 142/70 (07 Feb 2019 08:00) (141/68 - 182/82)  BP(mean): --  RR: 21 (07 Feb 2019 08:00) (18 - 21)  SpO2: 98% (07 Feb 2019 13:06) (97% - 100%)    PHYSICAL EXAM:    SKIN:  erythematous macular blanching 3-6 mm coalescing into large patches in a generalized distribution including his face where it is associated with lip edema.  No involvement of eyes, nose, lips, mouth with erosions or bullae and no bullae on skin and Nikolsky sign negative        LABS:                        9.8    5.8   )-----------( 207      ( 06 Feb 2019 06:56 )             32.5     02-06    147<H>  |  109<H>  |  24.0<H>  ----------------------------<  80  4.0   |  25.0  |  0.55    Ca    8.6      06 Feb 2019 06:56  Mg     2.4     02-06          Sedimentation Rate, Erythrocyte: 58 mm/hr (02-04 @ 14:08)    RADIOLOGY & ADDITIONAL STUDIES:

## 2019-02-07 NOTE — PROGRESS NOTE ADULT - SUBJECTIVE AND OBJECTIVE BOX
[facility]  INFECTIOUS DISEASES AND INTERNAL MEDICINE at [place]  =======================================================  Renzo Salazar MD  Diplomates American Board of Internal Medicine and Infectious Diseases  =======================================================    [place] [id number]    Follow up: fevers rash    Allergies:  Ativan (Unknown)  clindamycin (Unknown)  Grapes (Rash)  Haldol (Dystonic RXN)  hydrALAZINE (Unknown)  Nuts (Hives; Rash)  PC Pen VK (Unknown)  shellfish (Angioedema; Rash; Hives)  strawberry (Short breath; Rash; Hives)  Xanax (Rash)  Zyprexa (Unknown)      Medications:  acetaminophen   Tablet .. 650 milliGRAM(s) Oral every 6 hours PRN  ALBUTerol/ipratropium for Nebulization 3 milliLiter(s) Nebulizer every 6 hours  alteplase for catheter clearance 2 milliGRAM(s) Catheter once  artificial  tears Solution 1 Drop(s) Both EYES every 6 hours PRN  ascorbic acid 500 milliGRAM(s) Oral daily  aspirin  chewable 81 milliGRAM(s) Oral daily  bisacodyl Suppository 10 milliGRAM(s) Rectal daily PRN  buDESOnide   0.25 milliGRAM(s) Respule 0.25 milliGRAM(s) Inhalation two times a day  chlorhexidine 0.12% Liquid 15 milliLiter(s) Oral Mucosa two times a day  collagenase Ointment 1 Application(s) Topical daily  digoxin     Tablet 0.125 milliGRAM(s) Oral daily  diphenhydrAMINE   Injectable 25 milliGRAM(s) IV Push every 6 hours  enoxaparin Injectable 40 milliGRAM(s) SubCutaneous daily  epoetin giorgio Injectable 48350 Unit(s) SubCutaneous <User Schedule>  ertapenem  IVPB 1000 milliGRAM(s) IV Intermittent every 24 hours  famotidine    Tablet 20 milliGRAM(s) Oral daily  ferrous    sulfate Liquid 300 milliGRAM(s) Oral three times a day with meals  furosemide    Tablet 20 milliGRAM(s) Oral daily  labetalol 100 milliGRAM(s) Oral every 8 hours  lactobacillus acidophilus 1 Tablet(s) Oral two times a day with meals  levETIRAcetam  Solution 1000 milliGRAM(s) Oral two times a day  methylPREDNISolone sodium succinate Injectable 40 milliGRAM(s) IV Push every 8 hours  multivitamin/minerals 1 Tablet(s) Oral daily  oxyCODONE    IR 5 milliGRAM(s) Oral every 6 hours PRN  phenytoin   Suspension 400 milliGRAM(s) Oral every 12 hours  polyethylene glycol 3350 17 Gram(s) Oral daily PRN  potassium chloride    Tablet ER 20 milliEquivalent(s) Oral daily  tamsulosin 0.4 milliGRAM(s) Oral at bedtime    SOCIAL       FAMILY   FAMILY HISTORY:    REVIEW OF SYSTEMS:  OBTAINED FROM DAUGHTER UNABLE TO COMMUNICATE   CONSTITUTIONAL:   Fever   chills  RASH            Physical Exam:  I Vital Signs Last 24 Hrs  T(C): 37 (07 Feb 2019 08:00), Max: 37.1 (06 Feb 2019 23:21)  T(F): 98.6 (07 Feb 2019 08:00), Max: 98.8 (06 Feb 2019 23:21)  HR: 62 (07 Feb 2019 13:06) (52 - 71)  BP: 142/70 (07 Feb 2019 08:00) (141/68 - 182/82)  BP(mean): --  RR: 21 (07 Feb 2019 08:00) (18 - 21)  SpO2: 98% (07 Feb 2019 13:06) (97% - 100%)    GEN: NAD,    HEENT: normocephalic and atraumatic. EOMI. MIRIAM.    NECK: Supple. No carotid bruits.  No lymphadenopathy or thyromegaly.TRACH ON VENT   LUNGS: Clear to auscultation.  HEART: Regular rate and rhythm without murmur.  ABDOMEN: Soft, nontender, and nondistended.  Positive bowel sounds.    : No CVA tenderness  EXTREMITIES: CONTRACTED .  MSK: no joint swelling  NEUROLOGIC: AWAKE EYES OPEN .     SKIN: DIFFUSE MACULOPAPULAR RASH         Labs:     	                       9.8    5.8   )-----------( 207      ( 06 Feb 2019 06:56 )             32.5   02-06    147<H>  |  109<H>  |  24.0<H>  ----------------------------<  80  4.0   |  25.0  |  0.55    Ca    8.6      06 Feb 2019 06:56  Mg     2.4     02-06                 RECENT CULTURES:      Culture - Blood (collected 04 Feb 2019 22:21)  Source: .Blood  Preliminary Report (06 Feb 2019 23:00):    No growth at 48 hours    Culture - Blood (collected 04 Feb 2019 14:07)  Source: .Blood  Preliminary Report (05 Feb 2019 20:08):    Growth in anaerobic bottle: Gram Positive Cocci in Clusters    Anaerobic Bottle: 1.03.44 Hours to positivity    Aerobic Bottle: No growth to date    TYPE: (C=Critical, N=Notification, A=Abnormal) c    TESTS:  _ bld     DATE/TIME CALLED: _ 02/05/2019 20:06:07    CALLED TO: _ rn lombardi ashley    READ BACK (2 Patient Identifiers)(Y/N): _ y    READ BACK VALUES (Y/N): _ y    CALLED BY: _ kb    .    ***Blood Panel PCR results on this specimen are available    approximately 3 hours after the Gram stain result.***    Gram stain, PCR, and/or culture results may not always    correspond due to difference in methodologies.    ************************************************************    This PCR assay was performed using [employer].    The following targets are tested for: Enterococcus,    vancomycin resistant enterococci, Listeria monocytogenes,    coagulase negative staphylococci, S. aureus,    methicillin resistant S. aureus, Streptococcus agalactiae    (Group B), S. pneumoniae, S. pyogenes (Group A),    Acinetobacter baumannii, Enterobacter cloacae, E. coli,    Klebsiella oxytoca, K. pneumoniae, Proteus sp.,    Serratia marcescens, Haemophilus influenzae,    Neisseria meningitidis, Pseudomonas aeruginosa, Candida    albicans, C. glabrata, C krusei, C parapsilosis,    C. tropicalis and the KPC resistance gene.    "Due to technical problems, Proteus sp. will Not be reported as part of    the BCID panel until further notice"  Organism: Blood Culture PCR (05 Feb 2019 20:10)  Organism: Blood Culture PCR (05 Feb 2019 20:10)

## 2019-02-07 NOTE — ADVANCED PRACTICE NURSE CONSULT - RECOMMEDATIONS
Follow Nutritional suggestions as per Dietary consult.    Patient would benefit from a Clinatron bed.    Recommendation for Sacral Pressure Injury:  Daily cleanse wound and periwound with ¼ strength Dakins solution (until odor resolves then switch to normal saline), pack wound bed and dead space with Medihoney calcium alginate covered by foam dressing; treatment to do every other day.   Medihoney gel to be applied until Alginate is received.    Goal of Care:  Promote wound healing    Continue low air loss bed therapy, continue to turn & reposition q2h with Z-martin fluidized positioning device, Z-Martin Heel boots to bilateral feet and single breathable pad, continue measures to decrease friction/shear/pressure.     Plan discussed with patient’s primary nurse, Chen Sheppard and Dr. Becca Villalpando.  Please call wound care service line at [phone], if further assistance is needed.

## 2019-02-07 NOTE — PROGRESS NOTE ADULT - SUBJECTIVE AND OBJECTIVE BOX
KING MCKEON Patient is a 87y old  Male who presents with a chief complaint of Increased Temperature, PICC Clogged and Body Rash secondary to Unasyn (07 Feb 2019 13:36)     HPI:  87 years old male with PMH of Respiratory Failure s/p Trach + PEG, Sacral Ulcer, Osteomyelitis, Colostomy, Prostate Cancer, Chronic Baugh's Catheter, Dementia, Seizure Disorder, CAD, CHF, HTN and HLD sent from [facility] with increased temperature, clogged PICC and body rash secondary to Unasyn.   Patient's daughter at bedside, as per her, patient developed rash while on Unasyn. Benadryl was started however Unasyn was continued. His rash was persistent and this morning he developed lip swelling so he was sent to ER.   Patient has a history of Multiple Hospitalizations and he was discharged from [facility] on 1/29/19 after being treated for Sepsis secondary to Sacral Osteomyelitis. (04 Feb 2019 19:11)    The patient was seen and evaluated   The patient is in no acute distress.  f     Mode: AC/ CMV (Assist Control/ Continuous Mandatory Ventilation), RR (machine): 16, TV (machine): 400, FiO2: 40, PEEP: 6, MAP: 8, PIP: 17I&O's Summary    06 Feb 2019 07:01  -  07 Feb 2019 07:00  --------------------------------------------------------  IN: 860 mL / OUT: 1600 mL / NET: -740 mL    07 Feb 2019 07:01  -  07 Feb 2019 16:46  --------------------------------------------------------  IN: 610 mL / OUT: 0 mL / NET: 610 mL      Allergies    clindamycin (Unknown)  Grapes (Rash)  Nuts (Hives; Rash)  PC Pen VK (Unknown)  shellfish (Angioedema; Rash; Hives)  strawberry (Short breath; Rash; Hives)  Xanax (Rash)    Intolerances    Ativan (Unknown)  Haldol (Dystonic RXN)  hydrALAZINE (Unknown)  Zyprexa (Unknown)    HEALTH ISSUES - PROBLEM Dx:  Dependent on ventilator: Dependent on ventilator  Dementia without behavioral disturbance, unspecified dementia type: Dementia without behavioral disturbance, unspecified dementia type  Chronic osteomyelitis: Chronic osteomyelitis  Allergic drug rash: Allergic drug rash        PAST MEDICAL & SURGICAL HISTORY:  Dysphagia  Fall: Multiple Mechanical falls  CAD (coronary artery disease)  Clostridium difficile diarrhea: in 2009  BPH (benign prostatic hypertrophy)  Dementia  HLD (hyperlipidemia)  CHF (congestive heart failure)  Prostate cancer: Treated w/ radiation  Stroke: (~5yrs ago)  Seizure: (last event &gt;1yr ago)  HTN (hypertension)  Colostomy in place  S/P percutaneous endoscopic gastrostomy (PEG) tube placement  H/O hemorrhoidectomy  Deviated septum  Abdominal hernia  Status post cardiac surgery: stents x 2          Vital Signs Last 24 Hrs  T(C): 36.9 (07 Feb 2019 16:04), Max: 37.1 (06 Feb 2019 23:21)  T(F): 98.5 (07 Feb 2019 16:04), Max: 98.8 (06 Feb 2019 23:21)  HR: 52 (07 Feb 2019 16:36) (52 - 71)  BP: 147/73 (07 Feb 2019 16:04) (141/68 - 182/82)  BP(mean): --  RR: 21 (07 Feb 2019 08:00) (18 - 21)  SpO2: 100% (07 Feb 2019 16:36) (97% - 100%)T(C): 36.9 (02-07-19 @ 16:04), Max: 37.1 (02-06-19 @ 23:21)  HR: 52 (02-07-19 @ 16:36) (52 - 71)  BP: 147/73 (02-07-19 @ 16:04) (141/68 - 182/82)  RR: 21 (02-07-19 @ 08:00) (18 - 21)  SpO2: 100% (02-07-19 @ 16:36) (97% - 100%)  Wt(kg): --    PHYSICAL EXAM:  General: Elderly male lying in bed comfortably  HEENT: PERRLA. EOMI. Clear conjunctivae. Moist mucus membrane. Lips swollen.   Neck: Trach in place.  Chest: CTA bilaterally - no wheezing, rales or rhonchi.   Heart: Normal S1 & S2 with RRR.   Abdomen: Soft. Non-tender. Non-distended. + BS. PEG and Colostomy in place.   Ext: 2+ pedal edema. No calf tenderness   Neuro: Sleeping  Skin: Warm and Dry. Rash on extremities and trunk (more on right arm)  Psychiatry: Sleeping      acetaminophen   Tablet .. 650 milliGRAM(s) Oral every 6 hours PRN  ALBUTerol/ipratropium for Nebulization 3 milliLiter(s) Nebulizer every 6 hours  amLODIPine   Tablet 5 milliGRAM(s) Oral daily  AQUAPHOR (petrolatum Ointment) 1 Application(s) Topical daily  artificial  tears Solution 1 Drop(s) Both EYES every 6 hours PRN  ascorbic acid 500 milliGRAM(s) Oral daily  aspirin  chewable 81 milliGRAM(s) Oral daily  bisacodyl Suppository 10 milliGRAM(s) Rectal daily PRN  buDESOnide   0.25 milliGRAM(s) Respule 0.25 milliGRAM(s) Inhalation two times a day  chlorhexidine 0.12% Liquid 15 milliLiter(s) Oral Mucosa two times a day  collagenase Ointment 1 Application(s) Topical daily  digoxin     Tablet 0.25 milliGRAM(s) Oral daily  diphenhydrAMINE   Injectable 25 milliGRAM(s) IV Push every 6 hours  enoxaparin Injectable 40 milliGRAM(s) SubCutaneous daily  epoetin giorgio Injectable 83801 Unit(s) SubCutaneous <User Schedule>  ertapenem  IVPB 1000 milliGRAM(s) IV Intermittent every 24 hours  famotidine Injectable 20 milliGRAM(s) IV Push daily  ferrous    sulfate Liquid 300 milliGRAM(s) Oral three times a day with meals  fosphenytoin IVPB 400 milliGRAM(s) PE IV Intermittent every 12 hours  furosemide    Tablet 20 milliGRAM(s) Oral daily  labetalol 100 milliGRAM(s) Oral every 8 hours  lactobacillus acidophilus 1 Tablet(s) Oral two times a day with meals  levETIRAcetam  IVPB 1000 milliGRAM(s) IV Intermittent every 12 hours  methylPREDNISolone sodium succinate Injectable 40 milliGRAM(s) IV Push every 8 hours  multivitamin/minerals 1 Tablet(s) Oral daily  oxyCODONE    IR 5 milliGRAM(s) Oral every 6 hours PRN  polyethylene glycol 3350 17 Gram(s) Oral daily PRN  potassium chloride    Tablet ER 20 milliEquivalent(s) Oral daily  tamsulosin 0.4 milliGRAM(s) Oral at bedtime      LABS:                          10.8   5.5   )-----------( 254      ( 07 Feb 2019 14:06 )             36.7     02-07    146<H>  |  107  |  23.0<H>  ----------------------------<  122<H>  4.4   |  25.0  |  0.53    Ca    9.0      07 Feb 2019 14:06  Mg     2.5     02-07                CAPILLARY BLOOD GLUCOSE          RADIOLOGY & ADDITIONAL TESTS:      Consultant notes reviewed    Case discussed with consultant/provider/ family /patient

## 2019-02-07 NOTE — ADVANCED PRACTICE NURSE CONSULT - REASON FOR CONSULT
Patient seen on skin care rounds after wound care referral received for assessment of skin impairment and recommendations of topical management.  Chart reviewed:   Varun (9), Serum albumin (2.7 g/dL) and Total Protein (6.6 g/dL).  Patient H/O Respiratory Failure s/p Trach + PEG, Sacral Ulcer, Osteomyelitis, Colostomy, Prostate Cancer, Chronic Baugh's Catheter, Dementia, Seizure Disorder, CAD, CHF, HTN and HLD sent from [facility] with increased temperature, clogged PICC and body rash secondary to Unasyn.

## 2019-02-07 NOTE — CONSULT NOTE ADULT - ASSESSMENT
Morbilliform drug eruption with no significant serum evidence of hepatic or renal injury.  Thus it does not appear to be DRESS. Since this is not a life threatening eruption I would consider a rapid tapering of the systemic corticosteroids but continue diphenhydramine for antipruritic and soporific effect.    It is probably due to Unasyn in this penicillin allergic patient.  I would discontinue any less necessary medications if this does not begin to improve over the next 5 days.  I will follow him every 24-48 hours.

## 2019-02-07 NOTE — PROGRESS NOTE ADULT - ASSESSMENT
87 years old male with PMH of Respiratory Failure s/p Trach + PEG, Sacral Ulcer, Osteomyelitis, Colostomy, Prostate Cancer, Chronic Baugh's Catheter, Dementia, Seizure Disorder, CAD, CHF, HTN and HLD sent from [facility] with increased temperature, clogged PICC and body rash secondary to Unasyn.   Patient's daughter at bedside, as per her, patient developed rash while on Unasyn. Benadryl was started however Unasyn was continued. His rash was persistent and this morning he developed lip swelling so he was sent to ER.   Patient has a history of Multiple Hospitalizations and he was discharged from [facility] on 1/29/19 after being treated for Sepsis secondary to Sacral Osteomyelitis.     1) Allergic Reaction  - Hold Unasyn  - Solumedrol 40 mg q 8 hours - taper quick per derm  - Benadryl 25 mg q 6 hours  - Pepcid 20 mg daily  - Will consult Derm as there is no improvement with current treatment.  2) Sacral Osteomyelitis-Daily cleanse wound and periwound with ¼ strength Dakins solution (until odor resolves then switch to normal saline), pack wound bed and dead space with Medihoney calcium alginate covered by foam dressing; treatment to do every other day.   Medihoney gel to be applied until Alginate is received.  Goal of Care:  Promote wound healing    Continue low air loss bed therapy, continue to turn & reposition q2h with Z-martin fluidized positioning device, Z-Martin Heel boots to bilateral feet and single breathable pad, continue measures to decrease friction/shear/pressure.     - Hold Unasyn  - Continue Invanz as per ID until 2/15/19  - Probiotics  - Wound Care  - Blood culture --- Coag Neg Staph, likely contaminant   3) CAD / HTN  - Continue Aspirin and Labetalol  - Add Amlodipine  4) Chronic Diastolic Heart Failure  - Continue Lasix and Labetalol  5) Seizure Disorder  - Continue Keppra and Phenytoin  6) Chronic Respiratory Failure  - Continue Vent Support  7) Clogged PEG  - GI Consult appreciated. A new Halyard 18 [place] replacement G tube was placed.  8) Clogged PICC Line  - CATHFLO administered and it is working.   DVT Prophylaxis -- Lovenox 40 mg

## 2019-02-08 LAB
-  AMPICILLIN/SULBACTAM: SIGNIFICANT CHANGE UP
-  CEFAZOLIN: SIGNIFICANT CHANGE UP
-  CLINDAMYCIN: SIGNIFICANT CHANGE UP
-  ERYTHROMYCIN: SIGNIFICANT CHANGE UP
-  GENTAMICIN: SIGNIFICANT CHANGE UP
-  OXACILLIN: SIGNIFICANT CHANGE UP
-  PENICILLIN: SIGNIFICANT CHANGE UP
-  RIFAMPIN: SIGNIFICANT CHANGE UP
-  TETRACYCLINE: SIGNIFICANT CHANGE UP
-  TRIMETHOPRIM/SULFAMETHOXAZOLE: SIGNIFICANT CHANGE UP
-  VANCOMYCIN: SIGNIFICANT CHANGE UP
METHOD TYPE: SIGNIFICANT CHANGE UP

## 2019-02-08 PROCEDURE — 99231 SBSQ HOSP IP/OBS SF/LOW 25: CPT

## 2019-02-08 RX ORDER — CHLORHEXIDINE GLUCONATE 213 G/1000ML
1 SOLUTION TOPICAL
Qty: 0 | Refills: 0 | Status: DISCONTINUED | OUTPATIENT
Start: 2019-02-08 | End: 2019-02-13

## 2019-02-08 RX ORDER — LEVETIRACETAM 250 MG/1
1000 TABLET, FILM COATED ORAL
Qty: 0 | Refills: 0 | Status: DISCONTINUED | OUTPATIENT
Start: 2019-02-08 | End: 2019-02-13

## 2019-02-08 RX ORDER — SODIUM HYPOCHLORITE 0.125 %
1 SOLUTION, NON-ORAL MISCELLANEOUS DAILY
Qty: 0 | Refills: 0 | Status: DISCONTINUED | OUTPATIENT
Start: 2019-02-08 | End: 2019-02-13

## 2019-02-08 RX ADMIN — Medication 1 TABLET(S): at 05:21

## 2019-02-08 RX ADMIN — Medication 20 MILLIGRAM(S): at 05:21

## 2019-02-08 RX ADMIN — AMLODIPINE BESYLATE 5 MILLIGRAM(S): 2.5 TABLET ORAL at 05:21

## 2019-02-08 RX ADMIN — Medication 40 MILLIGRAM(S): at 05:21

## 2019-02-08 RX ADMIN — Medication 1 TABLET(S): at 18:12

## 2019-02-08 RX ADMIN — Medication 300 MILLIGRAM(S): at 13:34

## 2019-02-08 RX ADMIN — Medication 3 MILLILITER(S): at 21:57

## 2019-02-08 RX ADMIN — Medication 1 TABLET(S): at 13:35

## 2019-02-08 RX ADMIN — Medication 0.25 MILLIGRAM(S): at 08:11

## 2019-02-08 RX ADMIN — Medication 300 MILLIGRAM(S): at 09:41

## 2019-02-08 RX ADMIN — FAMOTIDINE 20 MILLIGRAM(S): 10 INJECTION INTRAVENOUS at 13:34

## 2019-02-08 RX ADMIN — Medication 3 MILLILITER(S): at 08:11

## 2019-02-08 RX ADMIN — Medication 100 MILLIGRAM(S): at 13:34

## 2019-02-08 RX ADMIN — Medication 20 MILLIEQUIVALENT(S): at 13:34

## 2019-02-08 RX ADMIN — Medication 3 MILLILITER(S): at 15:38

## 2019-02-08 RX ADMIN — Medication 500 MILLIGRAM(S): at 13:34

## 2019-02-08 RX ADMIN — CHLORHEXIDINE GLUCONATE 15 MILLILITER(S): 213 SOLUTION TOPICAL at 05:21

## 2019-02-08 RX ADMIN — Medication 400 MILLIGRAM(S): at 11:33

## 2019-02-08 RX ADMIN — CHLORHEXIDINE GLUCONATE 15 MILLILITER(S): 213 SOLUTION TOPICAL at 18:12

## 2019-02-08 RX ADMIN — TAMSULOSIN HYDROCHLORIDE 0.4 MILLIGRAM(S): 0.4 CAPSULE ORAL at 21:42

## 2019-02-08 RX ADMIN — Medication 0.25 MILLIGRAM(S): at 05:21

## 2019-02-08 RX ADMIN — Medication 100 MILLIGRAM(S): at 05:21

## 2019-02-08 RX ADMIN — LEVETIRACETAM 1000 MILLIGRAM(S): 250 TABLET, FILM COATED ORAL at 21:43

## 2019-02-08 RX ADMIN — Medication 3 MILLILITER(S): at 03:57

## 2019-02-08 RX ADMIN — Medication 300 MILLIGRAM(S): at 18:12

## 2019-02-08 RX ADMIN — Medication 400 MILLIGRAM(S): at 21:42

## 2019-02-08 RX ADMIN — CHLORHEXIDINE GLUCONATE 1 APPLICATION(S): 213 SOLUTION TOPICAL at 13:30

## 2019-02-08 RX ADMIN — Medication 1 APPLICATION(S): at 13:30

## 2019-02-08 RX ADMIN — LEVETIRACETAM 1000 MILLIGRAM(S): 250 TABLET, FILM COATED ORAL at 10:39

## 2019-02-08 RX ADMIN — Medication 100 MILLIGRAM(S): at 21:42

## 2019-02-08 RX ADMIN — ENOXAPARIN SODIUM 40 MILLIGRAM(S): 100 INJECTION SUBCUTANEOUS at 13:35

## 2019-02-08 RX ADMIN — Medication 1 APPLICATION(S): at 17:56

## 2019-02-08 RX ADMIN — Medication 0.25 MILLIGRAM(S): at 21:56

## 2019-02-08 RX ADMIN — ERTAPENEM SODIUM 120 MILLIGRAM(S): 1 INJECTION, POWDER, LYOPHILIZED, FOR SOLUTION INTRAMUSCULAR; INTRAVENOUS at 18:12

## 2019-02-08 RX ADMIN — Medication 81 MILLIGRAM(S): at 13:34

## 2019-02-08 NOTE — PHYSICAL THERAPY INITIAL EVALUATION ADULT - ADDITIONAL COMMENTS
as per medical chart: pt is non ambulatory, he was living at a long term care facility, was hoyered at times to and from hospital bed to W/C

## 2019-02-08 NOTE — DIETITIAN INITIAL EVALUATION ADULT. - PERTINENT LABORATORY DATA
02-07 Na146 mmol/L<H> Glu 122 mg/dL<H> K+ 4.4 mmol/L Cr  0.53 mg/dL BUN 23.0 mg/dL<H> Phos n/a   Alb n/a   PAB n/a

## 2019-02-08 NOTE — DIETITIAN INITIAL EVALUATION ADULT. - ETIOLOGY
related to inability to consume increased protein-energy needs in setting of dysphagia, sacral ulcer + OM

## 2019-02-08 NOTE — PROGRESS NOTE ADULT - ASSESSMENT
This is a 87y  Male  with dementia, h/o CAD s/p 2 stents, CHF, respiratory failure on chronic vent, tracheostomy for about 1 year, Chronic sacral decubitus with osteomyelitis, previously finished course of Iv Cefepime and vancomycin for osteomyelitis in 12/2018 per daughter, diverting colostomy and chronic Baugh's catheter. previously seen in march 2018 for  MRSA bacteremia, treated with long course of IV antibiotics.   patient was admitted in January to [facility] and found with Streptococcus septicemia;  SPUTUM Cx with Stenotrophomonas, MDR Acinetobacter, Proteus and  Pseudomonas Pneumonia.   He Completed 10 days of Levaquin for pneumonia, Completed Zosyn 7 days for pneumonia, and was on Unasyn 3gm IV q 6hours for septicemia osteomyelitis , due to  end on 2/15/19.    Patient was admitted for evaluation of urticaria  Per ER note,  he started having urticaria on his right upper extremity, Unasyn was continued, but also started on Benadryl. Urticaria persisted,   started getting urticaria on bilateral arms as well as swelling of his lower lip which caused the NH to send her to [facility] for evaluation.    Prior note from ID service reviewed.  Patient had previously tolerated Unasyn and Zosyn in the hospital.  PT PLACED ON INVANZ  BLOOD CX COAG NEG  STAPH PROBABLE CONTAMINANT  RASH  ? SLIGHTLY IMPROVED WILL CONTINUE PRESENT REGIMEN

## 2019-02-08 NOTE — PROGRESS NOTE ADULT - ASSESSMENT
87 years old male with PMH of Respiratory Failure s/p Trach + PEG, Sacral Ulcer, Osteomyelitis, Colostomy, Prostate Cancer, Chronic Baugh's Catheter, Dementia, Seizure Disorder, CAD, CHF, HTN and HLD sent from [facility] with increased temperature, clogged PICC and body rash secondary to Unasyn.   Patient's daughter at bedside, as per her, patient developed rash while on Unasyn. Benadryl was started however Unasyn was continued. His rash was persistent and this morning he developed lip swelling so he was sent to ER.  Patient has a history of Multiple Hospitalizations and he was discharged from [facility] on 1/29/19 after being treated for Sepsis secondary to Sacral Osteomyelitis.     1) Allergic Reaction-possible with unasyn - Hold Unasyn  - Solumedrol  - tapering  quick per derm  - Benadryl 25 mg q 6 hours  - Pepcid 20 mg daily  - Will consult Derm as there is no improvement with current treatment.    2) Sacral Osteomyelitis-Daily cleanse wound and periwound with ¼ strength Dakins solution (until odor resolves then switch to normal saline), pack wound bed and dead space with Medihoney calcium alginate covered by foam dressing; treatment to do every other day.   Medihoney gel to be applied until Alginate is received.  Goal of Care:  Promote wound healing    Continue low air loss bed therapy, continue to turn & reposition q2h with Z-martin fluidized positioning device, Z-Martin Heel boots to bilateral feet and single breathable pad, continue measures to decrease friction/shear/pressure.   - Continue Invanz as per ID until 2/15/19  - Probiotics  - Wound Care  - Blood culture --- Coag Neg Staph, likely contaminant   3) CAD / HTN  - Continue Aspirin and Labetalol  - Add Amlodipine  4) Chronic Diastolic Heart Failure  - Continue Lasix and Labetalol  5) Seizure Disorder  - Continue Keppra and Phenytoin  6) Chronic Respiratory Failure  - Continue Vent Support  7) Clogged PEG  - GI Consult appreciated. A new Halyard 18 [place] replacement G tube was placed.  8) Clogged PICC Line  - CATHFLO administered and it is working.   DVT Prophylaxis -- Lovenox 40 mg

## 2019-02-08 NOTE — DIETITIAN INITIAL EVALUATION ADULT. - PHYSICAL APPEARANCE
Unstageable to sacrum + OM;  3+generalized edema, 4+R/L foot contracted/Unstageable to sacrum + OM;  3+generalized edema, 4+R/L foot; unable to determine true height as Pt is contracted

## 2019-02-08 NOTE — DIETITIAN INITIAL EVALUATION ADULT. - OTHER INFO
Pt admitted with skin eruption, hx of dysphagia, prostate CA, unstageable wound to sacrum +OM, CHF, with trach and PEG replacement 2/5/19. Pt sleeping, unable to communicate, no family at bedside to obtain weight hx, per previous admission, wt documented 186lbs. Per RN, Pt tolerating TF's at goal rate 70mL/hr. Pt at risk for malnutrition, RD to follow up for NFPE. Pt admitted with skin eruption, hx of dysphagia, prostate CA, unstageable wound to sacrum +OM, CHF, with trach and PEG replacement 2/5/19. Pt sleeping, unable to communicate, no family at bedside to obtain weight hx, per previous admission, wt documented 186lbs, current bedscale weight 187lbs. Per RN, Pt tolerating TF's at goal rate 70mL/hr. Pt at risk for malnutrition, RD to follow up for NFPE.

## 2019-02-08 NOTE — DIETITIAN INITIAL EVALUATION ADULT. - NS AS NUTRI INTERV ENTERAL NUTRITION
Change TF formula to Pivot 1.5cal initiated at 30mL/hr increase by 10mL every 4 hours to goal rate 70mL/hr (x20 hrs) to provide 1400mL daily (2100cal, 131g protein)/Composition

## 2019-02-08 NOTE — PROGRESS NOTE ADULT - SUBJECTIVE AND OBJECTIVE BOX
[facility]  INFECTIOUS DISEASES AND INTERNAL MEDICINE at [place]  =======================================================  Renzo Salazar MD  Diplomates American Board of Internal Medicine and Infectious Diseases  =======================================================    KING MCKEON [id number]    Follow up: fevers rash    Allergies:  Ativan (Unknown)  clindamycin (Unknown)  Grapes (Rash)  Haldol (Dystonic RXN)  hydrALAZINE (Unknown)  Nuts (Hives; Rash)  PC Pen VK (Unknown)  shellfish (Angioedema; Rash; Hives)  strawberry (Short breath; Rash; Hives)  Xanax (Rash)  Zyprexa (Unknown)      Medications:  acetaminophen   Tablet .. 650 milliGRAM(s) Oral every 6 hours PRN  ALBUTerol/ipratropium for Nebulization 3 milliLiter(s) Nebulizer every 6 hours  alteplase for catheter clearance 2 milliGRAM(s) Catheter once  artificial  tears Solution 1 Drop(s) Both EYES every 6 hours PRN  ascorbic acid 500 milliGRAM(s) Oral daily  aspirin  chewable 81 milliGRAM(s) Oral daily  bisacodyl Suppository 10 milliGRAM(s) Rectal daily PRN  buDESOnide   0.25 milliGRAM(s) Respule 0.25 milliGRAM(s) Inhalation two times a day  chlorhexidine 0.12% Liquid 15 milliLiter(s) Oral Mucosa two times a day  collagenase Ointment 1 Application(s) Topical daily  digoxin     Tablet 0.125 milliGRAM(s) Oral daily  diphenhydrAMINE   Injectable 25 milliGRAM(s) IV Push every 6 hours  enoxaparin Injectable 40 milliGRAM(s) SubCutaneous daily  epoetin giorgio Injectable 99114 Unit(s) SubCutaneous <User Schedule>  ertapenem  IVPB 1000 milliGRAM(s) IV Intermittent every 24 hours  famotidine    Tablet 20 milliGRAM(s) Oral daily  ferrous    sulfate Liquid 300 milliGRAM(s) Oral three times a day with meals  furosemide    Tablet 20 milliGRAM(s) Oral daily  labetalol 100 milliGRAM(s) Oral every 8 hours  lactobacillus acidophilus 1 Tablet(s) Oral two times a day with meals  levETIRAcetam  Solution 1000 milliGRAM(s) Oral two times a day  methylPREDNISolone sodium succinate Injectable 40 milliGRAM(s) IV Push every 8 hours  multivitamin/minerals 1 Tablet(s) Oral daily  oxyCODONE    IR 5 milliGRAM(s) Oral every 6 hours PRN  phenytoin   Suspension 400 milliGRAM(s) Oral every 12 hours  polyethylene glycol 3350 17 Gram(s) Oral daily PRN  potassium chloride    Tablet ER 20 milliEquivalent(s) Oral daily  tamsulosin 0.4 milliGRAM(s) Oral at bedtime    SOCIAL       FAMILY   FAMILY HISTORY:    REVIEW OF SYSTEMS:  OBTAINED FROM DAUGHTER UNABLE TO COMMUNICATE   CONSTITUTIONAL:   Fever   chills  RASH            Physical Exam:  I V Vital Signs Last 24 Hrs  T(C): 36.8 (08 Feb 2019 08:08), Max: 36.9 (07 Feb 2019 16:04)  T(F): 98.2 (08 Feb 2019 08:08), Max: 98.5 (07 Feb 2019 16:04)  HR: 57 (08 Feb 2019 08:13) (52 - 63)  BP: 135/68 (08 Feb 2019 08:08) (131/47 - 159/72)  BP(mean): --  RR: 19 (08 Feb 2019 08:08) (18 - 19)  SpO2: 100% (08 Feb 2019 08:13) (98% - 100%)      GEN: NAD,    HEENT: normocephalic and atraumatic. EOMI. MIRIAM.    NECK: Supple. No carotid bruits.  No lymphadenopathy or thyromegaly.TRACH ON VENT   LUNGS: Clear to auscultation.  HEART: Regular rate and rhythm without murmur.  ABDOMEN: Soft, nontender, and nondistended.  Positive bowel sounds.    : No CVA tenderness  EXTREMITIES: CONTRACTED .  MSK: no joint swelling  NEUROLOGIC: AWAKE EYES OPEN .     SKIN: DIFFUSE MACULOPAPULAR RASH         Labs:     	                                 10.8   5.5   )-----------( 254      ( 07 Feb 2019 14:06 )             36.7   02-07    146<H>  |  107  |  23.0<H>  ----------------------------<  122<H>  4.4   |  25.0  |  0.53    Ca    9.0      07 Feb 2019 14:06  Mg     2.5     02-07                   RECENT CULTURES:      Culture - Blood (collected 04 Feb 2019 22:21)  Source: .Blood  Preliminary Report (06 Feb 2019 23:00):    No growth at 48 hours    Culture - Blood (collected 04 Feb 2019 14:07)  Source: .Blood  Preliminary Report (05 Feb 2019 20:08):    Growth in anaerobic bottle: Gram Positive Cocci in Clusters    Anaerobic Bottle: 1.03.44 Hours to positivity    Aerobic Bottle: No growth to date    TYPE: (C=Critical, N=Notification, A=Abnormal) c    TESTS:  _ bld     DATE/TIME CALLED: _ 02/05/2019 20:06:07    CALLED TO: _ rn lombardi ashley    READ BACK (2 Patient Identifiers)(Y/N): _ y    READ BACK VALUES (Y/N): _ y    CALLED BY: _ kb    .    ***Blood Panel PCR results on this specimen are available    approximately 3 hours after the Gram stain result.***    Gram stain, PCR, and/or culture results may not always    correspond due to difference in methodologies.    ************************************************************    This PCR assay was performed using [employer].    The following targets are tested for: Enterococcus,    vancomycin resistant enterococci, Listeria monocytogenes,    coagulase negative staphylococci, S. aureus,    methicillin resistant S. aureus, Streptococcus agalactiae    (Group B), S. pneumoniae, S. pyogenes (Group A),    Acinetobacter baumannii, Enterobacter cloacae, E. coli,    Klebsiella oxytoca, K. pneumoniae, Proteus sp.,    Serratia marcescens, Haemophilus influenzae,    Neisseria meningitidis, Pseudomonas aeruginosa, Candida    albicans, C. glabrata, C krusei, C parapsilosis,    C. tropicalis and the KPC resistance gene.    "Due to technical problems, Proteus sp. will Not be reported as part of    the BCID panel until further notice"  Organism: Blood Culture PCR (05 Feb 2019 20:10)  Organism: Blood Culture PCR (05 Feb 2019 20:10)

## 2019-02-08 NOTE — DIETITIAN INITIAL EVALUATION ADULT. - PERTINENT MEDS FT
Norvasc, Solumedrol, Pepcid, Vit C, Ferrous Sulfate, Lasix, Lactobacillus acidophillus, MVI oxycodone, KCl (4) no limitation

## 2019-02-08 NOTE — CHART NOTE - NSCHARTNOTEFT_GEN_A_CORE
Upon Nutritional Assessment by the Registered Dietitian your patient was determined to meet criteria / has evidence of the following diagnosis/diagnoses:          [X]  Moderate Chronic Protein Calorie Malnutrition    Findings as based on:  •  Comprehensive nutrition assessment and consultation  •  Calorie counts (nutrient intake analysis)  •  Food acceptance and intake status from observations by staff  •  Follow up  •  Patient education  •  Intervention secondary to interdisciplinary rounds  •   concerns    Pt at high nutrition risk secondary to malnutrition (moderate chronic) related to inability to consume increased protein-energy needs in setting of dysphagia, sacral ulcer + OM as evidenced by mild muscle wasting (temples), fluid accumulation(3+generalized edema, 4+R/L foot).     Treatment:    The following diet has been recommended:  1) Change TF formula to Pivot 1.5cal initiated at 30mL/hr increase by 10mL every 4 hours to goal rate 70mL/hr (x20 hrs) to provide 1400mL daily (2100cal, 131g protein)    PROVIDER Section:     By signing this assessment you are acknowledging and agree with the diagnosis/diagnoses assigned by the Registered Dietitian    Comments:

## 2019-02-08 NOTE — PROGRESS NOTE ADULT - SUBJECTIVE AND OBJECTIVE BOX
KING MCKEON Patient is a 87y old  Male who presents with a chief complaint of Increased Temperature, PICC Clogged and Body Rash secondary to Unasyn (08 Feb 2019 13:06)     HPI:  87 years old male with PMH of Respiratory Failure s/p Trach + PEG, Sacral Ulcer, Osteomyelitis, Colostomy, Prostate Cancer, Chronic Baugh's Catheter, Dementia, Seizure Disorder, CAD, CHF, HTN and HLD sent from [facility] with increased temperature, clogged PICC and body rash secondary to Unasyn.   Patient's daughter at bedside, as per her, patient developed rash while on Unasyn. Benadryl was started however Unasyn was continued. His rash was persistent and this morning he developed lip swelling so he was sent to ER.   Patient has a history of Multiple Hospitalizations and he was discharged from [facility] on 1/29/19 after being treated for Sepsis secondary to Sacral Osteomyelitis. (04 Feb 2019 19:11)    The patient was seen and evaluated   The patient is in no acute distress.      Mode: AC/ CMV (Assist Control/ Continuous Mandatory Ventilation), RR (machine): 16, TV (machine): 400, FiO2: 40, PEEP: 6, MAP: 9, PIP: 20I&O's Summary    07 Feb 2019 07:01  -  08 Feb 2019 07:00  --------------------------------------------------------  IN: 1810 mL / OUT: 900 mL / NET: 910 mL      Allergies    clindamycin (Unknown)  Grapes (Rash)  Nuts (Hives; Rash)  PC Pen VK (Unknown)  shellfish (Angioedema; Rash; Hives)  strawberry (Short breath; Rash; Hives)  Xanax (Rash)    Intolerances    Ativan (Unknown)  Haldol (Dystonic RXN)  hydrALAZINE (Unknown)  Zyprexa (Unknown)    HEALTH ISSUES - PROBLEM Dx:  Dependent on ventilator: Dependent on ventilator  Dementia without behavioral disturbance, unspecified dementia type: Dementia without behavioral disturbance, unspecified dementia type  Chronic osteomyelitis: Chronic osteomyelitis  Allergic drug rash: Allergic drug rash        PAST MEDICAL & SURGICAL HISTORY:  Dysphagia  Fall: Multiple Mechanical falls  CAD (coronary artery disease)  Clostridium difficile diarrhea: in 2009  BPH (benign prostatic hypertrophy)  Dementia  HLD (hyperlipidemia)  CHF (congestive heart failure)  Prostate cancer: Treated w/ radiation  Stroke: (~5yrs ago)  Seizure: (last event &gt;1yr ago)  HTN (hypertension)  Colostomy in place  S/P percutaneous endoscopic gastrostomy (PEG) tube placement  H/O hemorrhoidectomy  Deviated septum  Abdominal hernia  Status post cardiac surgery: stents x 2          Vital Signs Last 24 Hrs  T(C): 36.8 (08 Feb 2019 08:08), Max: 36.9 (07 Feb 2019 16:04)  T(F): 98.2 (08 Feb 2019 08:08), Max: 98.5 (07 Feb 2019 16:04)  HR: 59 (08 Feb 2019 13:29) (52 - 63)  BP: 151/81 (08 Feb 2019 13:29) (131/47 - 151/81)  BP(mean): --  RR: 19 (08 Feb 2019 08:08) (18 - 19)  SpO2: 99% (08 Feb 2019 13:13) (99% - 100%)T(C): 36.8 (02-08-19 @ 08:08), Max: 36.9 (02-07-19 @ 16:04)  HR: 59 (02-08-19 @ 13:29) (52 - 63)  BP: 151/81 (02-08-19 @ 13:29) (131/47 - 151/81)  RR: 19 (02-08-19 @ 08:08) (18 - 19)  SpO2: 99% (02-08-19 @ 13:13) (99% - 100%)  Wt(kg): --    PHYSICAL EXAM:  General: Elderly male lying in bed   HEENT: PERRLA. EOMI. Clear conjunctivae. Moist mucus membrane. Lips swollen.   Neck: Trach in place.  Chest: CTA bilaterally - no wheezing, rales or rhonchi.   Heart: Normal S1 & S2 with RRR.   Abdomen: Soft. Non-tender. Non-distended. + BS. PEG and Colostomy in place.   Ext: 2+ pedal edema. No calf tenderness   Neuro: Sleeping  Skin: Warm and Dry. Rash on extremities and trunk (more on right arm)and neck red   Psychiatry: Sleeping    acetaminophen   Tablet .. 650 milliGRAM(s) Oral every 6 hours PRN  ALBUTerol/ipratropium for Nebulization 3 milliLiter(s) Nebulizer every 6 hours  amLODIPine   Tablet 5 milliGRAM(s) Oral daily  AQUAPHOR (petrolatum Ointment) 1 Application(s) Topical daily  artificial  tears Solution 1 Drop(s) Both EYES every 6 hours PRN  ascorbic acid 500 milliGRAM(s) Oral daily  aspirin  chewable 81 milliGRAM(s) Oral daily  bisacodyl Suppository 10 milliGRAM(s) Rectal daily PRN  buDESOnide   0.25 milliGRAM(s) Respule 0.25 milliGRAM(s) Inhalation two times a day  chlorhexidine 0.12% Liquid 15 milliLiter(s) Oral Mucosa two times a day  chlorhexidine 2% Cloths 1 Application(s) Topical <User Schedule>  collagenase Ointment 1 Application(s) Topical daily  Dakins Solution - 1/4 Strength 1 Application(s) Topical daily  digoxin     Tablet 0.25 milliGRAM(s) Oral daily  enoxaparin Injectable 40 milliGRAM(s) SubCutaneous daily  epoetin giorgio Injectable 90652 Unit(s) SubCutaneous <User Schedule>  ertapenem  IVPB 1000 milliGRAM(s) IV Intermittent every 24 hours  famotidine Injectable 20 milliGRAM(s) IV Push daily  ferrous    sulfate Liquid 300 milliGRAM(s) Oral three times a day with meals  furosemide    Tablet 20 milliGRAM(s) Oral daily  labetalol 100 milliGRAM(s) Oral every 8 hours  lactobacillus acidophilus 1 Tablet(s) Oral two times a day with meals  levETIRAcetam 1000 milliGRAM(s) Oral two times a day  methylPREDNISolone sodium succinate Injectable 40 milliGRAM(s) IV Push every 12 hours  multivitamin/minerals 1 Tablet(s) Oral daily  oxyCODONE    IR 5 milliGRAM(s) Oral every 6 hours PRN  phenytoin   Suspension 400 milliGRAM(s) Oral every 12 hours  polyethylene glycol 3350 17 Gram(s) Oral daily PRN  potassium chloride    Tablet ER 20 milliEquivalent(s) Oral daily  tamsulosin 0.4 milliGRAM(s) Oral at bedtime      LABS:                          10.8   5.5   )-----------( 254      ( 07 Feb 2019 14:06 )             36.7     02-07    146<H>  |  107  |  23.0<H>  ----------------------------<  122<H>  4.4   |  25.0  |  0.53    Ca    9.0      07 Feb 2019 14:06  Mg     2.5     02-07                CAPILLARY BLOOD GLUCOSE          RADIOLOGY & ADDITIONAL TESTS:      Consultant notes reviewed    Case discussed with consultant/provider/ family /patient

## 2019-02-08 NOTE — DIETITIAN INITIAL EVALUATION ADULT. - PT NOT SOURCE
Pt asleep, not able to perform NFPE at this time. Pt asleep, not able to perform NFPE at this time. temporal wasting noted

## 2019-02-08 NOTE — DIETITIAN INITIAL EVALUATION ADULT. - MD RECOMMEND
Change TF formula to Pivot 1.5cal initiated at 30mL/hr increase by 10mL every 4 hours to goal rate 70mL/hr (x20 hrs) to provide 1400mL daily (2100cal, 131g protein)

## 2019-02-08 NOTE — PROGRESS NOTE ADULT - SUBJECTIVE AND OBJECTIVE BOX
Patient in bed in no apparent distress but though alert he is not responsive to questions due to dementia--daughter at bedside sound asleep      Examination:    Afebrile    Skin is approximately the same with extensive but stable erythematous macular eruption.  No mucosal involvement and no blistering and negative Nikolsky sign  Significant pitting edema of both lower legs as well as lips    Assessment:  Morbilliform drug eruption stable--probably due to Unasyn which has been stopped      Advise relatively quick corticosteroid taper since no systemic involvement;  this should slowly resolve over the next 5-7 days.

## 2019-02-09 LAB
ANION GAP SERPL CALC-SCNC: 10 MMOL/L — SIGNIFICANT CHANGE UP (ref 5–17)
BUN SERPL-MCNC: 21 MG/DL — HIGH (ref 8–20)
CALCIUM SERPL-MCNC: 8.8 MG/DL — SIGNIFICANT CHANGE UP (ref 8.6–10.2)
CHLORIDE SERPL-SCNC: 106 MMOL/L — SIGNIFICANT CHANGE UP (ref 98–107)
CO2 SERPL-SCNC: 30 MMOL/L — HIGH (ref 22–29)
CREAT SERPL-MCNC: 0.46 MG/DL — LOW (ref 0.5–1.3)
CULTURE RESULTS: SIGNIFICANT CHANGE UP
CULTURE RESULTS: SIGNIFICANT CHANGE UP
GLUCOSE SERPL-MCNC: 127 MG/DL — HIGH (ref 70–115)
HCT VFR BLD CALC: 35.9 % — LOW (ref 42–52)
HGB BLD-MCNC: 10.3 G/DL — LOW (ref 14–18)
MAGNESIUM SERPL-MCNC: 2.2 MG/DL — SIGNIFICANT CHANGE UP (ref 1.6–2.6)
MCHC RBC-ENTMCNC: 26.3 PG — LOW (ref 27–31)
MCHC RBC-ENTMCNC: 28.7 G/DL — LOW (ref 32–36)
MCV RBC AUTO: 91.6 FL — SIGNIFICANT CHANGE UP (ref 80–94)
ORGANISM # SPEC MICROSCOPIC CNT: SIGNIFICANT CHANGE UP
PHOSPHATE SERPL-MCNC: 1.9 MG/DL — LOW (ref 2.4–4.7)
PLATELET # BLD AUTO: 244 K/UL — SIGNIFICANT CHANGE UP (ref 150–400)
POTASSIUM SERPL-MCNC: 4.6 MMOL/L — SIGNIFICANT CHANGE UP (ref 3.5–5.3)
POTASSIUM SERPL-SCNC: 4.6 MMOL/L — SIGNIFICANT CHANGE UP (ref 3.5–5.3)
RBC # BLD: 3.92 M/UL — LOW (ref 4.6–6.2)
RBC # FLD: 24.7 % — HIGH (ref 11–15.6)
SODIUM SERPL-SCNC: 146 MMOL/L — HIGH (ref 135–145)
SPECIMEN SOURCE: SIGNIFICANT CHANGE UP
SPECIMEN SOURCE: SIGNIFICANT CHANGE UP
WBC # BLD: 7 K/UL — SIGNIFICANT CHANGE UP (ref 4.8–10.8)
WBC # FLD AUTO: 7 K/UL — SIGNIFICANT CHANGE UP (ref 4.8–10.8)

## 2019-02-09 PROCEDURE — 99233 SBSQ HOSP IP/OBS HIGH 50: CPT

## 2019-02-09 PROCEDURE — 99232 SBSQ HOSP IP/OBS MODERATE 35: CPT

## 2019-02-09 RX ADMIN — Medication 650 MILLIGRAM(S): at 16:21

## 2019-02-09 RX ADMIN — Medication 400 MILLIGRAM(S): at 21:53

## 2019-02-09 RX ADMIN — Medication 0.25 MILLIGRAM(S): at 08:08

## 2019-02-09 RX ADMIN — Medication 300 MILLIGRAM(S): at 17:30

## 2019-02-09 RX ADMIN — Medication 100 MILLIGRAM(S): at 17:30

## 2019-02-09 RX ADMIN — Medication 40 MILLIGRAM(S): at 05:04

## 2019-02-09 RX ADMIN — Medication 300 MILLIGRAM(S): at 09:03

## 2019-02-09 RX ADMIN — LEVETIRACETAM 1000 MILLIGRAM(S): 250 TABLET, FILM COATED ORAL at 09:03

## 2019-02-09 RX ADMIN — Medication 1 TABLET(S): at 17:30

## 2019-02-09 RX ADMIN — Medication 81 MILLIGRAM(S): at 11:09

## 2019-02-09 RX ADMIN — Medication 100 MILLIGRAM(S): at 05:03

## 2019-02-09 RX ADMIN — Medication 3 MILLILITER(S): at 21:06

## 2019-02-09 RX ADMIN — Medication 400 MILLIGRAM(S): at 17:29

## 2019-02-09 RX ADMIN — ERTAPENEM SODIUM 120 MILLIGRAM(S): 1 INJECTION, POWDER, LYOPHILIZED, FOR SOLUTION INTRAMUSCULAR; INTRAVENOUS at 18:25

## 2019-02-09 RX ADMIN — Medication 1 TABLET(S): at 09:03

## 2019-02-09 RX ADMIN — FAMOTIDINE 20 MILLIGRAM(S): 10 INJECTION INTRAVENOUS at 12:00

## 2019-02-09 RX ADMIN — Medication 500 MILLIGRAM(S): at 11:08

## 2019-02-09 RX ADMIN — TAMSULOSIN HYDROCHLORIDE 0.4 MILLIGRAM(S): 0.4 CAPSULE ORAL at 21:54

## 2019-02-09 RX ADMIN — Medication 20 MILLIGRAM(S): at 05:03

## 2019-02-09 RX ADMIN — AMLODIPINE BESYLATE 5 MILLIGRAM(S): 2.5 TABLET ORAL at 05:03

## 2019-02-09 RX ADMIN — LEVETIRACETAM 1000 MILLIGRAM(S): 250 TABLET, FILM COATED ORAL at 21:54

## 2019-02-09 RX ADMIN — Medication 0.25 MILLIGRAM(S): at 05:04

## 2019-02-09 RX ADMIN — Medication 0.25 MILLIGRAM(S): at 21:06

## 2019-02-09 RX ADMIN — Medication 650 MILLIGRAM(S): at 11:08

## 2019-02-09 RX ADMIN — Medication 100 MILLIGRAM(S): at 21:54

## 2019-02-09 RX ADMIN — Medication 3 MILLILITER(S): at 16:02

## 2019-02-09 RX ADMIN — Medication 1 TABLET(S): at 11:08

## 2019-02-09 RX ADMIN — Medication 1 APPLICATION(S): at 11:05

## 2019-02-09 RX ADMIN — POLYETHYLENE GLYCOL 3350 17 GRAM(S): 17 POWDER, FOR SOLUTION ORAL at 17:30

## 2019-02-09 RX ADMIN — Medication 3 MILLILITER(S): at 03:32

## 2019-02-09 RX ADMIN — Medication 3 MILLILITER(S): at 08:08

## 2019-02-09 RX ADMIN — CHLORHEXIDINE GLUCONATE 1 APPLICATION(S): 213 SOLUTION TOPICAL at 05:07

## 2019-02-09 RX ADMIN — ENOXAPARIN SODIUM 40 MILLIGRAM(S): 100 INJECTION SUBCUTANEOUS at 11:09

## 2019-02-09 RX ADMIN — Medication 1 APPLICATION(S): at 11:04

## 2019-02-09 RX ADMIN — CHLORHEXIDINE GLUCONATE 15 MILLILITER(S): 213 SOLUTION TOPICAL at 05:05

## 2019-02-09 RX ADMIN — Medication 1 APPLICATION(S): at 11:03

## 2019-02-09 RX ADMIN — CHLORHEXIDINE GLUCONATE 15 MILLILITER(S): 213 SOLUTION TOPICAL at 17:30

## 2019-02-09 NOTE — PROGRESS NOTE ADULT - SUBJECTIVE AND OBJECTIVE BOX
CHIEF COMPLAINT/INTERVAL HISTORY:    Patient is a 87y old  Male who presents with a chief complaint of Increased Temperature, PICC Clogged and Body Rash secondary to Unasyn (09 Feb 2019 09:05)    SUBJECTIVE & OBJECTIVE: Pt seen and examined at bedside. No overnight events reported. Patient trach/peg. Per family, rash is slightly improved. On IV steroids.    ROS: Unobtainable    ICU Vital Signs Last 24 Hrs  T(C): 38.8 (10 Feb 2019 09:07), Max: 38.8 (10 Feb 2019 09:07)  T(F): 101.8 (10 Feb 2019 09:07), Max: 101.8 (10 Feb 2019 09:07)  HR: 71 (10 Feb 2019 09:32) (57 - 86)  BP: 114/48 (10 Feb 2019 09:07) (114/48 - 158/71)  RR: 18 (09 Feb 2019 15:54) (18 - 18)  SpO2: 97% (10 Feb 2019 09:32) (97% - 100%)    MEDICATIONS  (STANDING):  ALBUTerol/ipratropium for Nebulization 3 milliLiter(s) Nebulizer every 6 hours  amLODIPine   Tablet 5 milliGRAM(s) Oral daily  AQUAPHOR (petrolatum Ointment) 1 Application(s) Topical daily  ascorbic acid 500 milliGRAM(s) Oral daily  aspirin  chewable 81 milliGRAM(s) Oral daily  buDESOnide   0.25 milliGRAM(s) Respule 0.25 milliGRAM(s) Inhalation two times a day  chlorhexidine 0.12% Liquid 15 milliLiter(s) Oral Mucosa two times a day  chlorhexidine 2% Cloths 1 Application(s) Topical <User Schedule>  collagenase Ointment 1 Application(s) Topical daily  Dakins Solution - 1/4 Strength 1 Application(s) Topical daily  digoxin     Tablet 0.25 milliGRAM(s) Oral daily  enoxaparin Injectable 40 milliGRAM(s) SubCutaneous daily  epoetin giorgio Injectable 22197 Unit(s) SubCutaneous <User Schedule>  ertapenem  IVPB 1000 milliGRAM(s) IV Intermittent every 24 hours  famotidine Injectable 20 milliGRAM(s) IV Push daily  ferrous    sulfate Liquid 300 milliGRAM(s) Oral three times a day with meals  furosemide    Tablet 20 milliGRAM(s) Oral daily  labetalol 100 milliGRAM(s) Oral every 8 hours  lactobacillus acidophilus 1 Tablet(s) Oral two times a day with meals  levETIRAcetam 1000 milliGRAM(s) Oral two times a day  methylPREDNISolone sodium succinate Injectable 40 milliGRAM(s) IV Push daily  multivitamin/minerals 1 Tablet(s) Oral daily  phenytoin   Suspension 400 milliGRAM(s) Oral every 12 hours  tamsulosin 0.4 milliGRAM(s) Oral at bedtime    MEDICATIONS  (PRN):  acetaminophen   Tablet .. 650 milliGRAM(s) Oral every 6 hours PRN Temp greater or equal to 38C (100.4F), Mild Pain (1 - 3), Moderate Pain (4 - 6)  artificial  tears Solution 1 Drop(s) Both EYES every 6 hours PRN Dry Eyes  bisacodyl Suppository 10 milliGRAM(s) Rectal daily PRN Constipation  oxyCODONE    IR 5 milliGRAM(s) Oral every 6 hours PRN Severe Pain (7 - 10)  polyethylene glycol 3350 17 Gram(s) Oral daily PRN Constipation      LABS:                        10.3   7.0   )-----------( 244      ( 09 Feb 2019 12:21 )             35.9     02-09    146<H>  |  106  |  21.0<H>  ----------------------------<  127<H>  4.6   |  30.0<H>  |  0.46<L>    Ca    8.8      09 Feb 2019 12:21  Phos  1.9     02-09  Mg     2.2     02-09      PHYSICAL EXAM:    GENERAL: elderly male, chronically ill appearing, vent dependent  HEAD:  Atraumatic, Normocephalic  EYES: EOMI, PERRLA, conjunctiva and sclera clear  ENMT: Moist mucous membranes  NECK: trach in situ  NERVOUS SYSTEM:  opens eyes, nonverbal  CHEST/LUNG: coarse breath sounds  HEART: Regular rate and rhythm; + S1/S2  ABDOMEN: Soft, Nontender, Nondistended, + PEG  EXTREMITIES:  + pedal edema CHIEF COMPLAINT/INTERVAL HISTORY:    Patient is a 87y old  Male who presents with a chief complaint of Increased Temperature, PICC Clogged and Body Rash secondary to Unasyn (09 Feb 2019 09:05)    SUBJECTIVE & OBJECTIVE: Pt seen and examined at bedside. No overnight events reported. Patient trach/peg. Per family, rash is slightly improved. On IV steroids.    ROS: Unobtainable    ICU Vital Signs Last 24 Hrs  T(C): 38.8 (10 Feb 2019 09:07), Max: 38.8 (10 Feb 2019 09:07)  T(F): 101.8 (10 Feb 2019 09:07), Max: 101.8 (10 Feb 2019 09:07)  HR: 71 (10 Feb 2019 09:32) (57 - 86)  BP: 114/48 (10 Feb 2019 09:07) (114/48 - 158/71)  RR: 18 (09 Feb 2019 15:54) (18 - 18)  SpO2: 97% (10 Feb 2019 09:32) (97% - 100%)    MEDICATIONS  (STANDING):  ALBUTerol/ipratropium for Nebulization 3 milliLiter(s) Nebulizer every 6 hours  amLODIPine   Tablet 5 milliGRAM(s) Oral daily  AQUAPHOR (petrolatum Ointment) 1 Application(s) Topical daily  ascorbic acid 500 milliGRAM(s) Oral daily  aspirin  chewable 81 milliGRAM(s) Oral daily  buDESOnide   0.25 milliGRAM(s) Respule 0.25 milliGRAM(s) Inhalation two times a day  chlorhexidine 0.12% Liquid 15 milliLiter(s) Oral Mucosa two times a day  chlorhexidine 2% Cloths 1 Application(s) Topical <User Schedule>  collagenase Ointment 1 Application(s) Topical daily  Dakins Solution - 1/4 Strength 1 Application(s) Topical daily  digoxin     Tablet 0.25 milliGRAM(s) Oral daily  enoxaparin Injectable 40 milliGRAM(s) SubCutaneous daily  epoetin giorgio Injectable 61612 Unit(s) SubCutaneous <User Schedule>  ertapenem  IVPB 1000 milliGRAM(s) IV Intermittent every 24 hours  famotidine Injectable 20 milliGRAM(s) IV Push daily  ferrous    sulfate Liquid 300 milliGRAM(s) Oral three times a day with meals  furosemide    Tablet 20 milliGRAM(s) Oral daily  labetalol 100 milliGRAM(s) Oral every 8 hours  lactobacillus acidophilus 1 Tablet(s) Oral two times a day with meals  levETIRAcetam 1000 milliGRAM(s) Oral two times a day  methylPREDNISolone sodium succinate Injectable 40 milliGRAM(s) IV Push daily  multivitamin/minerals 1 Tablet(s) Oral daily  phenytoin   Suspension 400 milliGRAM(s) Oral every 12 hours  tamsulosin 0.4 milliGRAM(s) Oral at bedtime    MEDICATIONS  (PRN):  acetaminophen   Tablet .. 650 milliGRAM(s) Oral every 6 hours PRN Temp greater or equal to 38C (100.4F), Mild Pain (1 - 3), Moderate Pain (4 - 6)  artificial  tears Solution 1 Drop(s) Both EYES every 6 hours PRN Dry Eyes  bisacodyl Suppository 10 milliGRAM(s) Rectal daily PRN Constipation  oxyCODONE    IR 5 milliGRAM(s) Oral every 6 hours PRN Severe Pain (7 - 10)  polyethylene glycol 3350 17 Gram(s) Oral daily PRN Constipation      LABS:                        10.3   7.0   )-----------( 244      ( 09 Feb 2019 12:21 )             35.9     02-09    146<H>  |  106  |  21.0<H>  ----------------------------<  127<H>  4.6   |  30.0<H>  |  0.46<L>    Ca    8.8      09 Feb 2019 12:21  Phos  1.9     02-09  Mg     2.2     02-09      PHYSICAL EXAM:    GENERAL: elderly male, chronically ill appearing, vent dependent  HEAD:  Atraumatic, Normocephalic  EYES: EOMI, PERRLA, conjunctiva and sclera clear  ENMT: Moist mucous membranes  NECK: trach in situ  NERVOUS SYSTEM:  opens eyes, nonverbal  CHEST/LUNG: coarse breath sounds  HEART: Regular rate and rhythm; + S1/S2  ABDOMEN: Soft, Nontender, Nondistended, + PEG, + ostomy  EXTREMITIES:  + pedal edema

## 2019-02-09 NOTE — PROGRESS NOTE ADULT - ASSESSMENT
87 years old male with PMH of Respiratory Failure s/p Trach + PEG, Sacral Ulcer, Osteomyelitis, Colostomy, Prostate Cancer, Chronic Baugh's Catheter, Dementia, Seizure Disorder, CAD, CHF, HTN and HLD sent from [facility] with increased temperature, clogged PICC and body rash secondary to Unasyn.   Patient's daughter at bedside, as per her, patient developed rash while on Unasyn. Benadryl was started however Unasyn was continued. His rash was persistent and this morning he developed lip swelling so he was sent to ER.  Patient has a history of Multiple Hospitalizations and he was discharged from [facility] on 1/29/19 after being treated for Sepsis secondary to Sacral Osteomyelitis.     1) Allergic Reaction-possible with unasyn - Hold Unasyn  - Solumedrol  - tapering  quick per derm  - Benadryl 25 mg q 6 hours  - Pepcid 20 mg daily  - Will consult Derm as there is no improvement with current treatment.    2) Sacral Osteomyelitis-Daily cleanse wound and periwound with ¼ strength Dakins solution (until odor resolves then switch to normal saline), pack wound bed and dead space with Medihoney calcium alginate covered by foam dressing; treatment to do every other day.   Medihoney gel to be applied until Alginate is received.  Goal of Care:  Promote wound healing    Continue low air loss bed therapy, continue to turn & reposition q2h with Z-martin fluidized positioning device, Z-Martin Heel boots to bilateral feet and single breathable pad, continue measures to decrease friction/shear/pressure.   - Continue Invanz as per ID until 2/15/19  - Probiotics  - Wound Care  - Blood culture --- Coag Neg Staph, likely contaminant     3) CAD / HTN  - Continue Aspirin and Labetalol  - Add Amlodipine    4) Chronic Diastolic Heart Failure  - Continue Lasix and Labetalol    5) Seizure Disorder  - Continue Keppra and Phenytoin    6) Chronic Respiratory Failure  - Continue Vent Support  - trach care/suctioning     7) Clogged PEG - resolved  - a new Halyard 18 Turkish replacement G tube was placed  - GI consult appreciated    8) Clogged PICC Line - resolved  - s/p CATHFLO with good blood return    DVT Prophylaxis -- Lovenox 40 mg 87 years old male with PMH of Respiratory Failure s/p Trach + PEG, Sacral Ulcer, Osteomyelitis, Colostomy, Prostate Cancer, Chronic Baguh's Catheter, Dementia, Seizure Disorder, CAD, CHF, HTN and HLD sent from [facility] with increased temperature, clogged PICC and body rash secondary to Unasyn.   Patient's daughter at bedside, as per her, patient developed rash while on Unasyn. Benadryl was started however Unasyn was continued. His rash was persistent and this morning he developed lip swelling so he was sent to ER.  Patient has a history of Multiple Hospitalizations and he was discharged from [facility] on 1/29/19 after being treated for Sepsis secondary to Sacral Osteomyelitis.     1) Allergic Reaction-possible with unasyn   - Unasyn discontinued  - Continue IV solumedrol per dermatology  - Benadryl 25 mg q 6 hours  - Pepcid 20 mg daily  - Dermatology follow up appreciated    2) Sacral Osteomyelitis  -Continue daily wound care with ¼ strength Dakins solution and Medihoney gel   -pressure alleviating bed ordered  -frequent repositioning  -Continue Invanz as per ID until 2/15/19 via right PICC line  -Continue Probiotics  -Wound Care  -Blood culture - Coag Neg Staph, likely contaminant   -Ostomy for stool diversion given large wound  -ID recommendations appreciated    3) CAD  - continue aspirin; not on statin  - continue labetalol      4) HTN  - Continue Labetalol, Norvasc, Lasix    5) Chronic Diastolic Heart Failure  - Continue Lasix and Labetalol  - Strict I/os, daily weights    6) Seizure Disorder  - Continue Keppra and Phenytoin    7) Chronic Respiratory Failure  - Continue Vent Support  - trach care/suctioning   - bronchodilators PRN    8) Clogged PEG - resolved  - a new Halyard 18 Japanese replacement G tube was placed  - GI consult appreciated    9) Clogged PICC Line - resolved  - s/p CATHFLO with good blood return    10) Anemia  -Hb stable  -continue EPO and ferrous sulfate    DVT Prophylaxis -- Lovenox 40 mg

## 2019-02-10 LAB
ANION GAP SERPL CALC-SCNC: 11 MMOL/L — SIGNIFICANT CHANGE UP (ref 5–17)
BASOPHILS # BLD AUTO: 0 K/UL — SIGNIFICANT CHANGE UP (ref 0–0.2)
BASOPHILS NFR BLD AUTO: 0.2 % — SIGNIFICANT CHANGE UP (ref 0–2)
BUN SERPL-MCNC: 25 MG/DL — HIGH (ref 8–20)
CALCIUM SERPL-MCNC: 8.5 MG/DL — LOW (ref 8.6–10.2)
CHLORIDE SERPL-SCNC: 103 MMOL/L — SIGNIFICANT CHANGE UP (ref 98–107)
CO2 SERPL-SCNC: 30 MMOL/L — HIGH (ref 22–29)
CREAT SERPL-MCNC: 0.48 MG/DL — LOW (ref 0.5–1.3)
EOSINOPHIL # BLD AUTO: 1.2 K/UL — HIGH (ref 0–0.5)
EOSINOPHIL NFR BLD AUTO: 14.3 % — HIGH (ref 0–5)
GLUCOSE SERPL-MCNC: 112 MG/DL — SIGNIFICANT CHANGE UP (ref 70–115)
HCT VFR BLD CALC: 34.2 % — LOW (ref 42–52)
HGB BLD-MCNC: 10.2 G/DL — LOW (ref 14–18)
LYMPHOCYTES # BLD AUTO: 0.6 K/UL — LOW (ref 1–4.8)
LYMPHOCYTES # BLD AUTO: 6.6 % — LOW (ref 20–55)
MAGNESIUM SERPL-MCNC: 2.1 MG/DL — SIGNIFICANT CHANGE UP (ref 1.8–2.6)
MCHC RBC-ENTMCNC: 26.9 PG — LOW (ref 27–31)
MCHC RBC-ENTMCNC: 29.8 G/DL — LOW (ref 32–36)
MCV RBC AUTO: 90.2 FL — SIGNIFICANT CHANGE UP (ref 80–94)
MONOCYTES # BLD AUTO: 0.5 K/UL — SIGNIFICANT CHANGE UP (ref 0–0.8)
MONOCYTES NFR BLD AUTO: 6 % — SIGNIFICANT CHANGE UP (ref 3–10)
MRSA PCR RESULT.: SIGNIFICANT CHANGE UP
NEUTROPHILS # BLD AUTO: 6 K/UL — SIGNIFICANT CHANGE UP (ref 1.8–8)
NEUTROPHILS NFR BLD AUTO: 71.4 % — SIGNIFICANT CHANGE UP (ref 37–73)
PHOSPHATE SERPL-MCNC: 2.4 MG/DL — SIGNIFICANT CHANGE UP (ref 2.4–4.7)
PLATELET # BLD AUTO: 215 K/UL — SIGNIFICANT CHANGE UP (ref 150–400)
POTASSIUM SERPL-MCNC: 4.5 MMOL/L — SIGNIFICANT CHANGE UP (ref 3.5–5.3)
POTASSIUM SERPL-SCNC: 4.5 MMOL/L — SIGNIFICANT CHANGE UP (ref 3.5–5.3)
RBC # BLD: 3.79 M/UL — LOW (ref 4.6–6.2)
RBC # FLD: 24.6 % — HIGH (ref 11–15.6)
S AUREUS DNA NOSE QL NAA+PROBE: SIGNIFICANT CHANGE UP
SODIUM SERPL-SCNC: 144 MMOL/L — SIGNIFICANT CHANGE UP (ref 135–145)
WBC # BLD: 8.4 K/UL — SIGNIFICANT CHANGE UP (ref 4.8–10.8)
WBC # FLD AUTO: 8.4 K/UL — SIGNIFICANT CHANGE UP (ref 4.8–10.8)

## 2019-02-10 PROCEDURE — 71045 X-RAY EXAM CHEST 1 VIEW: CPT | Mod: 26

## 2019-02-10 PROCEDURE — 99232 SBSQ HOSP IP/OBS MODERATE 35: CPT

## 2019-02-10 RX ORDER — MEROPENEM 1 G/30ML
1000 INJECTION INTRAVENOUS EVERY 8 HOURS
Qty: 0 | Refills: 0 | Status: DISCONTINUED | OUTPATIENT
Start: 2019-02-10 | End: 2019-02-13

## 2019-02-10 RX ORDER — IBUPROFEN 200 MG
400 TABLET ORAL ONCE
Qty: 0 | Refills: 0 | Status: COMPLETED | OUTPATIENT
Start: 2019-02-10 | End: 2019-02-10

## 2019-02-10 RX ORDER — SODIUM CHLORIDE 9 MG/ML
500 INJECTION, SOLUTION INTRAVENOUS
Qty: 0 | Refills: 0 | Status: COMPLETED | OUTPATIENT
Start: 2019-02-10 | End: 2019-02-10

## 2019-02-10 RX ADMIN — Medication 100 MILLIGRAM(S): at 18:43

## 2019-02-10 RX ADMIN — AMLODIPINE BESYLATE 5 MILLIGRAM(S): 2.5 TABLET ORAL at 05:14

## 2019-02-10 RX ADMIN — CHLORHEXIDINE GLUCONATE 1 APPLICATION(S): 213 SOLUTION TOPICAL at 05:20

## 2019-02-10 RX ADMIN — LEVETIRACETAM 1000 MILLIGRAM(S): 250 TABLET, FILM COATED ORAL at 22:50

## 2019-02-10 RX ADMIN — Medication 0.25 MILLIGRAM(S): at 05:14

## 2019-02-10 RX ADMIN — Medication 1 APPLICATION(S): at 18:42

## 2019-02-10 RX ADMIN — Medication 0.25 MILLIGRAM(S): at 09:28

## 2019-02-10 RX ADMIN — Medication 20 MILLIGRAM(S): at 05:14

## 2019-02-10 RX ADMIN — Medication 62.5 MILLIMOLE(S): at 05:13

## 2019-02-10 RX ADMIN — LEVETIRACETAM 1000 MILLIGRAM(S): 250 TABLET, FILM COATED ORAL at 13:08

## 2019-02-10 RX ADMIN — Medication 1 TABLET(S): at 12:59

## 2019-02-10 RX ADMIN — Medication 400 MILLIGRAM(S): at 12:58

## 2019-02-10 RX ADMIN — Medication 3 MILLILITER(S): at 09:29

## 2019-02-10 RX ADMIN — Medication 1 APPLICATION(S): at 18:41

## 2019-02-10 RX ADMIN — Medication 81 MILLIGRAM(S): at 12:59

## 2019-02-10 RX ADMIN — MEROPENEM 100 MILLIGRAM(S): 1 INJECTION INTRAVENOUS at 22:50

## 2019-02-10 RX ADMIN — FAMOTIDINE 20 MILLIGRAM(S): 10 INJECTION INTRAVENOUS at 18:42

## 2019-02-10 RX ADMIN — Medication 400 MILLIGRAM(S): at 18:42

## 2019-02-10 RX ADMIN — Medication 3 MILLILITER(S): at 13:49

## 2019-02-10 RX ADMIN — CHLORHEXIDINE GLUCONATE 15 MILLILITER(S): 213 SOLUTION TOPICAL at 18:53

## 2019-02-10 RX ADMIN — Medication 500 MILLIGRAM(S): at 12:59

## 2019-02-10 RX ADMIN — Medication 300 MILLIGRAM(S): at 18:42

## 2019-02-10 RX ADMIN — Medication 1 APPLICATION(S): at 18:39

## 2019-02-10 RX ADMIN — Medication 40 MILLIGRAM(S): at 05:13

## 2019-02-10 RX ADMIN — Medication 3 MILLILITER(S): at 03:07

## 2019-02-10 RX ADMIN — Medication 3 MILLILITER(S): at 20:57

## 2019-02-10 RX ADMIN — TAMSULOSIN HYDROCHLORIDE 0.4 MILLIGRAM(S): 0.4 CAPSULE ORAL at 22:50

## 2019-02-10 RX ADMIN — ENOXAPARIN SODIUM 40 MILLIGRAM(S): 100 INJECTION SUBCUTANEOUS at 13:00

## 2019-02-10 RX ADMIN — Medication 0.25 MILLIGRAM(S): at 20:57

## 2019-02-10 RX ADMIN — Medication 100 MILLIGRAM(S): at 05:14

## 2019-02-10 RX ADMIN — SODIUM CHLORIDE 75 MILLILITER(S): 9 INJECTION, SOLUTION INTRAVENOUS at 01:13

## 2019-02-10 RX ADMIN — CHLORHEXIDINE GLUCONATE 15 MILLILITER(S): 213 SOLUTION TOPICAL at 05:21

## 2019-02-10 RX ADMIN — Medication 300 MILLIGRAM(S): at 12:59

## 2019-02-10 NOTE — PROGRESS NOTE ADULT - SUBJECTIVE AND OBJECTIVE BOX
[facility]  INFECTIOUS DISEASES AND INTERNAL MEDICINE at [place]  =======================================================  Renzo Salazar MD  Diplomates American Board of Internal Medicine and Infectious Diseases  =======================================================    KING MCKEON [id number]    Follow up: fevers and rash    Developed fever again 101.8, today.     Allergies:  Ativan (Unknown)  clindamycin (Unknown)  Grapes (Rash)  Haldol (Dystonic RXN)  hydrALAZINE (Unknown)  Nuts (Hives; Rash)  PC Pen VK (Unknown)  shellfish (Angioedema; Rash; Hives)  strawberry (Short breath; Rash; Hives)  Xanax (Rash)  Zyprexa (Unknown)    Medications:  Ertapenem    REVIEW OF SYSTEMS:  Noncommunicative      Physical Exam:  Vital Signs Last 24 Hrs  T(C): 38.8 (10 Feb 2019 09:07), Max: 38.8 (10 Feb 2019 09:07)  T(F): 101.8 (10 Feb 2019 09:07), Max: 101.8 (10 Feb 2019 09:07)  HR: 77 (10 Feb 2019 12:08) (57 - 86)  BP: 114/48 (10 Feb 2019 09:07) (114/48 - 158/71)  RR: 18 (09 Feb 2019 15:54) (18 - 18)  SpO2: 98% (10 Feb 2019 13:50) (97% - 100%)  GEN: NAD,    HEENT: normocephalic and atraumatic. EOMI. MIRIAM.    NECK: Supple. No carotid bruits.  No lymphadenopathy or thyromegaly.TRACH ON VENT   LUNGS: Clear to auscultation.  HEART: Regular rate and rhythm without murmur.  ABDOMEN: Soft, nontender, and nondistended.  Positive bowel sounds.    : No CVA tenderness  EXTREMITIES: CONTRACTED .  MSK: no joint swelling  NEUROLOGIC: AWAKE EYES OPEN .  SKIN: DIFFUSE MACULOPAPULAR RASH     Labs:	                      02-09    146<H>  |  106  |  21.0<H>  ----------------------------<  127<H>  4.6   |  30.0<H>  |  0.46<L>    Ca    8.8      09 Feb 2019 12:21  Phos  1.9     02-09  Mg     2.2     02-09                        10.3   7.0   )-----------( 244      ( 09 Feb 2019 12:21 )             35.9     RECENT CULTURES:  02-07 @ 14:06 .Blood     No growth at 48 hours    02-04 @ 22:21 .Blood     No growth at 5 days.    02-04 @ 14:07 .Blood Coag Negative Staphylococcus  Blood Culture PCR    Growth in anaerobic bottle: Coag Negative Staphylococcus  Anaerobic Bottle: 1 day, 03:44 Hours to positivity  Aerobic Bottle: No growth at 5 days.

## 2019-02-10 NOTE — PROGRESS NOTE ADULT - SUBJECTIVE AND OBJECTIVE BOX
CHIEF COMPLAINT/INTERVAL HISTORY:    Patient is a 87y old  Male who presents with a chief complaint of Increased Temperature, PICC Clogged and Body Rash secondary to Unasyn (10 Feb 2019 23:47)    SUBJECTIVE & OBJECTIVE: Pt seen and examined at bedside. No overnight events reported. Diffuse rash remains the same, on IV steroids/benadry. Derm follow up.    ROS: Unobtainable due to trach    ICU Vital Signs Last 24 Hrs  T(C): 36.7 (10 Feb 2019 23:54), Max: 38.8 (10 Feb 2019 09:07)  T(F): 98 (10 Feb 2019 23:54), Max: 101.8 (10 Feb 2019 09:07)  HR: 58 (11 Feb 2019 04:19) (50 - 77)  BP: 125/56 (10 Feb 2019 23:54) (114/48 - 146/69)  BP(mean): --  ABP: --  ABP(mean): --  RR: 20 (10 Feb 2019 23:54) (20 - 26)  SpO2: 100% (11 Feb 2019 04:19) (97% - 100%)        MEDICATIONS  (STANDING):  ALBUTerol/ipratropium for Nebulization 3 milliLiter(s) Nebulizer every 6 hours  amLODIPine   Tablet 5 milliGRAM(s) Oral daily  AQUAPHOR (petrolatum Ointment) 1 Application(s) Topical daily  ascorbic acid 500 milliGRAM(s) Oral daily  aspirin  chewable 81 milliGRAM(s) Oral daily  buDESOnide   0.25 milliGRAM(s) Respule 0.25 milliGRAM(s) Inhalation two times a day  chlorhexidine 0.12% Liquid 15 milliLiter(s) Oral Mucosa two times a day  chlorhexidine 2% Cloths 1 Application(s) Topical <User Schedule>  collagenase Ointment 1 Application(s) Topical daily  Dakins Solution - 1/4 Strength 1 Application(s) Topical daily  digoxin     Tablet 0.25 milliGRAM(s) Oral daily  enoxaparin Injectable 40 milliGRAM(s) SubCutaneous daily  epoetin giorgio Injectable 68917 Unit(s) SubCutaneous <User Schedule>  famotidine Injectable 20 milliGRAM(s) IV Push daily  ferrous    sulfate Liquid 300 milliGRAM(s) Oral three times a day with meals  furosemide    Tablet 20 milliGRAM(s) Oral daily  labetalol 100 milliGRAM(s) Oral every 8 hours  levETIRAcetam 1000 milliGRAM(s) Oral two times a day  meropenem  IVPB 1000 milliGRAM(s) IV Intermittent every 8 hours  methylPREDNISolone sodium succinate Injectable 40 milliGRAM(s) IV Push daily  multivitamin/minerals 1 Tablet(s) Oral daily  phenytoin   Suspension 400 milliGRAM(s) Oral every 12 hours  tamsulosin 0.4 milliGRAM(s) Oral at bedtime    MEDICATIONS  (PRN):  acetaminophen   Tablet .. 650 milliGRAM(s) Oral every 6 hours PRN Temp greater or equal to 38C (100.4F), Mild Pain (1 - 3), Moderate Pain (4 - 6)  artificial  tears Solution 1 Drop(s) Both EYES every 6 hours PRN Dry Eyes  bisacodyl Suppository 10 milliGRAM(s) Rectal daily PRN Constipation  oxyCODONE    IR 5 milliGRAM(s) Oral every 6 hours PRN Severe Pain (7 - 10)  polyethylene glycol 3350 17 Gram(s) Oral daily PRN Constipation      LABS:                        10.2   8.4   )-----------( 215      ( 10 Feb 2019 14:56 )             34.2     02-10    144  |  103  |  25.0<H>  ----------------------------<  112  4.5   |  30.0<H>  |  0.48<L>    Ca    8.5<L>      10 Feb 2019 14:56  Phos  2.4     02-10  Mg     2.1     02-10    PHYSICAL EXAM:    GENERAL: elderly male, chronically ill appearing, vent dependent  HEAD:  Atraumatic, Normocephalic  EYES: EOMI, PERRLA, conjunctiva and sclera clear  ENMT: Moist mucous membranes  NECK: trach in situ  NERVOUS SYSTEM:  opens eyes, nonverbal  CHEST/LUNG: coarse breath sounds  HEART: Regular rate and rhythm; + S1/S2  ABDOMEN: Soft, Nontender, Nondistended, + PEG, + ostomy  EXTREMITIES:  + pedal edema

## 2019-02-10 NOTE — PROGRESS NOTE ADULT - ASSESSMENT
This is a 87y  Male  with dementia, h/o CAD s/p 2 stents, CHF, respiratory failure on chronic vent, tracheostomy for about 1 year, Chronic sacral decubitus with osteomyelitis, previously finished course of Iv Cefepime and vancomycin for osteomyelitis in 12/2018 per daughter, diverting colostomy and chronic Baugh's catheter. previously seen in march 2018 for  MRSA bacteremia, treated with long course of IV antibiotics.   patient was admitted in January to [facility] and found with Streptococcus septicemia;  Sputum Cx with Stenotrophomonas, MDR Acinetobacter, Proteus and  Pseudomonas Pneumonia.   He Completed 10 days of Levaquin for pneumonia, Completed Zosyn 7 days for pneumonia, and was on Unasyn 3gm IV q 6hours for septicemia, osteomyelitis , due to end on 2/15/19.    Patient was admitted for evaluation of urticaria  Per ER note,  he started having urticaria on his right upper extremity, Unasyn was continued, but also started on Benadryl. Urticaria persisted,   started getting urticaria on bilateral arms as well as swelling of his lower lip which caused the NH to send her to [facility] for evaluation.    Prior note from ID service reviewed.  Patient had previously tolerated Unasyn and Zosyn in the hospital but he was placed on Invaz.   Now today 2/10 on invanz he is spiking.     - Blood cutlures x 2 today  - UA and UC   - Sputum culture  - Switch Ertapenem to Meropenem 1gm q8h for pseudomonas coverage as well.  - Can hold on vancomycin for now.   - Trend WBC and Tm  - Will switch ABx regimen based on culture results.   - Will add vancomycin if becomes hemodynamically unstable.     Will follow.

## 2019-02-10 NOTE — PROGRESS NOTE ADULT - ASSESSMENT
87 years old male with PMH of Respiratory Failure s/p Trach + PEG, Sacral Ulcer, Osteomyelitis, Colostomy, Prostate Cancer, Chronic Baugh's Catheter, Dementia, Seizure Disorder, CAD, CHF, HTN and HLD sent from [facility] with increased temperature, clogged PICC and body rash secondary to Unasyn. Patient's daughter at bedside, as per her, patient developed rash while on Unasyn. Benadryl was started however Unasyn was continued. His rash was persistent and this morning he developed lip swelling so he was sent to ER.  Patient has a history of Multiple Hospitalizations and he was discharged from [facility] on 1/29/19 after being treated for Sepsis secondary to Sacral Osteomyelitis.     1) Allergic Reaction-possible with unasyn   - Unasyn discontinued  - Continue IV solumedrol per dermatology  - Benadryl 25 mg q 6 hours  - Pepcid 20 mg daily  - Dermatology follow up for further recommendations    2) Sacral Osteomyelitis  -Continue daily wound care with ¼ strength Dakins solution and Medihoney gel   -pressure alleviating bed ordered  -frequent repositioning  -Continue Invanz as per ID until 2/15/19 via right PICC line  -Continue Probiotics  -Wound Care  -Blood culture - Coag Neg Staph, likely contaminant   -Ostomy for stool diversion given large wound  -ID recommendations appreciated    3) CAD  - continue aspirin; not on statin  - continue labetalol      4) HTN  - Continue Labetalol, Norvasc, Lasix    5) Chronic Diastolic Heart Failure  - Continue Lasix and Labetalol  - Strict I/os, daily weights    6) Seizure Disorder  - Continue Keppra and Phenytoin    7) Chronic Respiratory Failure  - Continue Vent Support  - trach care/suctioning   - bronchodilators PRN    8) Clogged PEG - resolved  - a new Halyard 18 Slovak replacement G tube was placed  - GI consult appreciated    9) Clogged PICC Line - resolved  - s/p CATHFLO with good blood return    10) Anemia  -Hb stable  -continue EPO and ferrous sulfate    DVT Prophylaxis -- Lovenox 40 mg    Attending Attestation:   Plan discussed with patient, daughter, RN.

## 2019-02-11 LAB
ANION GAP SERPL CALC-SCNC: 11 MMOL/L — SIGNIFICANT CHANGE UP (ref 5–17)
ANISOCYTOSIS BLD QL: SLIGHT — SIGNIFICANT CHANGE UP
BACTERIA # UR AUTO: ABNORMAL
BASOPHILS # BLD AUTO: 0 K/UL — SIGNIFICANT CHANGE UP (ref 0–0.2)
BASOPHILS NFR BLD AUTO: 0.1 % — SIGNIFICANT CHANGE UP (ref 0–2)
BUN SERPL-MCNC: 24 MG/DL — HIGH (ref 8–20)
CALCIUM SERPL-MCNC: 8.5 MG/DL — LOW (ref 8.6–10.2)
CHLORIDE SERPL-SCNC: 103 MMOL/L — SIGNIFICANT CHANGE UP (ref 98–107)
CO2 SERPL-SCNC: 28 MMOL/L — SIGNIFICANT CHANGE UP (ref 22–29)
CREAT SERPL-MCNC: 0.49 MG/DL — LOW (ref 0.5–1.3)
ELLIPTOCYTES BLD QL SMEAR: SLIGHT — SIGNIFICANT CHANGE UP
EOSINOPHIL # BLD AUTO: 1.5 K/UL — HIGH (ref 0–0.5)
EOSINOPHIL NFR BLD AUTO: 15.1 % — HIGH (ref 0–5)
EPI CELLS # UR: SIGNIFICANT CHANGE UP
GLUCOSE SERPL-MCNC: 133 MG/DL — HIGH (ref 70–115)
GRAM STN FLD: SIGNIFICANT CHANGE UP
HCT VFR BLD CALC: 36.7 % — LOW (ref 42–52)
HGB BLD-MCNC: 11 G/DL — LOW (ref 14–18)
HYPOCHROMIA BLD QL: SLIGHT — SIGNIFICANT CHANGE UP
LYMPHOCYTES # BLD AUTO: 0.4 K/UL — LOW (ref 1–4.8)
LYMPHOCYTES # BLD AUTO: 3.7 % — LOW (ref 20–55)
MAGNESIUM SERPL-MCNC: 2.2 MG/DL — SIGNIFICANT CHANGE UP (ref 1.6–2.6)
MCHC RBC-ENTMCNC: 26.9 PG — LOW (ref 27–31)
MCHC RBC-ENTMCNC: 30 G/DL — LOW (ref 32–36)
MCV RBC AUTO: 89.7 FL — SIGNIFICANT CHANGE UP (ref 80–94)
MONOCYTES # BLD AUTO: 0.5 K/UL — SIGNIFICANT CHANGE UP (ref 0–0.8)
MONOCYTES NFR BLD AUTO: 4.9 % — SIGNIFICANT CHANGE UP (ref 3–10)
NEUTROPHILS # BLD AUTO: 7.3 K/UL — SIGNIFICANT CHANGE UP (ref 1.8–8)
NEUTROPHILS NFR BLD AUTO: 75.1 % — HIGH (ref 37–73)
OVALOCYTES BLD QL SMEAR: SLIGHT — SIGNIFICANT CHANGE UP
PHOSPHATE SERPL-MCNC: 2.5 MG/DL — SIGNIFICANT CHANGE UP (ref 2.4–4.7)
PLAT MORPH BLD: NORMAL — SIGNIFICANT CHANGE UP
PLATELET # BLD AUTO: 212 K/UL — SIGNIFICANT CHANGE UP (ref 150–400)
POIKILOCYTOSIS BLD QL AUTO: SIGNIFICANT CHANGE UP
POTASSIUM SERPL-MCNC: 4.6 MMOL/L — SIGNIFICANT CHANGE UP (ref 3.5–5.3)
POTASSIUM SERPL-SCNC: 4.6 MMOL/L — SIGNIFICANT CHANGE UP (ref 3.5–5.3)
RBC # BLD: 4.09 M/UL — LOW (ref 4.6–6.2)
RBC # FLD: 24.7 % — HIGH (ref 11–15.6)
RBC BLD AUTO: ABNORMAL
RBC CASTS # UR COMP ASSIST: SIGNIFICANT CHANGE UP /HPF (ref 0–4)
SODIUM SERPL-SCNC: 142 MMOL/L — SIGNIFICANT CHANGE UP (ref 135–145)
SPECIMEN SOURCE: SIGNIFICANT CHANGE UP
WBC # BLD: 9.7 K/UL — SIGNIFICANT CHANGE UP (ref 4.8–10.8)
WBC # FLD AUTO: 9.7 K/UL — SIGNIFICANT CHANGE UP (ref 4.8–10.8)
WBC UR QL: ABNORMAL

## 2019-02-11 PROCEDURE — 99232 SBSQ HOSP IP/OBS MODERATE 35: CPT

## 2019-02-11 PROCEDURE — 99231 SBSQ HOSP IP/OBS SF/LOW 25: CPT

## 2019-02-11 RX ORDER — FUROSEMIDE 40 MG
40 TABLET ORAL DAILY
Refills: 0 | Status: DISCONTINUED | OUTPATIENT
Start: 2019-02-11 | End: 2019-02-13

## 2019-02-11 RX ADMIN — AMLODIPINE BESYLATE 5 MILLIGRAM(S): 2.5 TABLET ORAL at 06:26

## 2019-02-11 RX ADMIN — Medication 100 MILLIGRAM(S): at 16:19

## 2019-02-11 RX ADMIN — Medication 400 MILLIGRAM(S): at 06:26

## 2019-02-11 RX ADMIN — Medication 500 MILLIGRAM(S): at 12:02

## 2019-02-11 RX ADMIN — Medication 0.25 MILLIGRAM(S): at 21:14

## 2019-02-11 RX ADMIN — MEROPENEM 100 MILLIGRAM(S): 1 INJECTION INTRAVENOUS at 06:26

## 2019-02-11 RX ADMIN — Medication 1 APPLICATION(S): at 12:02

## 2019-02-11 RX ADMIN — MEROPENEM 100 MILLIGRAM(S): 1 INJECTION INTRAVENOUS at 23:33

## 2019-02-11 RX ADMIN — Medication 300 MILLIGRAM(S): at 09:27

## 2019-02-11 RX ADMIN — Medication 3 MILLILITER(S): at 21:11

## 2019-02-11 RX ADMIN — Medication 3 MILLILITER(S): at 04:13

## 2019-02-11 RX ADMIN — FAMOTIDINE 20 MILLIGRAM(S): 10 INJECTION INTRAVENOUS at 14:18

## 2019-02-11 RX ADMIN — MEROPENEM 100 MILLIGRAM(S): 1 INJECTION INTRAVENOUS at 14:07

## 2019-02-11 RX ADMIN — ENOXAPARIN SODIUM 40 MILLIGRAM(S): 100 INJECTION SUBCUTANEOUS at 12:02

## 2019-02-11 RX ADMIN — Medication 40 MILLIGRAM(S): at 17:03

## 2019-02-11 RX ADMIN — LEVETIRACETAM 1000 MILLIGRAM(S): 250 TABLET, FILM COATED ORAL at 23:33

## 2019-02-11 RX ADMIN — Medication 1 APPLICATION(S): at 11:59

## 2019-02-11 RX ADMIN — CHLORHEXIDINE GLUCONATE 1 APPLICATION(S): 213 SOLUTION TOPICAL at 09:23

## 2019-02-11 RX ADMIN — Medication 1 APPLICATION(S): at 12:06

## 2019-02-11 RX ADMIN — Medication 400 MILLIGRAM(S): at 11:59

## 2019-02-11 RX ADMIN — Medication 1 TABLET(S): at 12:02

## 2019-02-11 RX ADMIN — Medication 30 MILLIGRAM(S): at 16:53

## 2019-02-11 RX ADMIN — Medication 0.25 MILLIGRAM(S): at 08:27

## 2019-02-11 RX ADMIN — Medication 300 MILLIGRAM(S): at 12:01

## 2019-02-11 RX ADMIN — Medication 81 MILLIGRAM(S): at 12:02

## 2019-02-11 RX ADMIN — Medication 40 MILLIGRAM(S): at 06:26

## 2019-02-11 RX ADMIN — Medication 100 MILLIGRAM(S): at 06:26

## 2019-02-11 RX ADMIN — CHLORHEXIDINE GLUCONATE 15 MILLILITER(S): 213 SOLUTION TOPICAL at 06:26

## 2019-02-11 RX ADMIN — Medication 400 MILLIGRAM(S): at 23:33

## 2019-02-11 RX ADMIN — Medication 20 MILLIGRAM(S): at 06:26

## 2019-02-11 RX ADMIN — LEVETIRACETAM 1000 MILLIGRAM(S): 250 TABLET, FILM COATED ORAL at 12:00

## 2019-02-11 RX ADMIN — TAMSULOSIN HYDROCHLORIDE 0.4 MILLIGRAM(S): 0.4 CAPSULE ORAL at 23:33

## 2019-02-11 RX ADMIN — Medication 3 MILLILITER(S): at 17:51

## 2019-02-11 RX ADMIN — Medication 3 MILLILITER(S): at 08:27

## 2019-02-11 RX ADMIN — Medication 0.25 MILLIGRAM(S): at 06:26

## 2019-02-11 NOTE — PROGRESS NOTE ADULT - ASSESSMENT
This is a 87y  Male  with dementia, h/o CAD s/p 2 stents, CHF, respiratory failure on chronic vent, tracheostomy for about 1 year, Chronic sacral decubitus with osteomyelitis, previously finished course of Iv Cefepime and vancomycin for osteomyelitis in 12/2018 per daughter, diverting colostomy and chronic Baugh's catheter. previously seen in march 2018 for  MRSA bacteremia, treated with long course of IV antibiotics.   patient was admitted in January to [facility] and found with Streptococcus septicemia;  Sputum Cx with Stenotrophomonas, MDR Acinetobacter, Proteus and  Pseudomonas Pneumonia.   He Completed 10 days of Levaquin for pneumonia, Completed Zosyn 7 days for pneumonia, and was on Unasyn 3gm IV q 6hours for septicemia, osteomyelitis , due to end on 2/15/19.    Patient was admitted for evaluation of urticaria  Per ER note,  he started having urticaria on his right upper extremity, Unasyn was continued, but also started on Benadryl. Urticaria persisted,     started getting urticaria on bilateral arms as well as swelling of his lower lip which caused the NH to send her to [facility] for evaluation.      Prior note from ID service reviewed.      Patient had previously tolerated Unasyn and Zosyn in the hospital but he was placed on Invaz.       developed fevers on 2/10 on invanz      - Blood cutlures x 2 ordered, will follow  - UA and UC  ordered  - Sputum culture ordered    - maintain Meropenem 1gm q8h for empiric pseudomonas coverage as well.  likely to maintain same end-date of 2/15/19 if no new infectious indications requiring antibiotics prolongation are indentified.     hold vanco      - follow up all outstanding cultures  - trend temperature and WBC curve  - repeat cultures from blood and all sources if febrile.

## 2019-02-11 NOTE — CHART NOTE - NSCHARTNOTEFT_GEN_A_CORE
Source: Patient [ ]  Family [ ]   other [X] EMR, staff and ID rounds     Current Diet:   Diet, NPO with Tube Feed:   Tube Feeding Modality: Gastrostomy  Jevity 1.5 [place]  Total Volume for 24 Hours (mL): 1680  Continuous  Starting Tube Feed Rate {mL per Hour}: 30  Increase Tube Feed Rate by (mL): 10     Every 4 hours  Until Goal Tube Feed Rate (mL per Hour): 70  Tube Feed Duration (in Hours): 24  Tube Feed Start Time: 20:00 (02-04-19 @ 19:11)    Enteral /Parenteral Nutrition:  current diet order provides Jevity 1.5cal @ goal rate 70mL/hr, 1400mL daily (2100cal, 89g protein)     Current Weight: No recent weight documented     % Weight Change: per previous assessment, RD bedscale weight 187lbs    Pertinent Medications: MEDICATIONS  (STANDING):  ALBUTerol/ipratropium for Nebulization 3 milliLiter(s) Nebulizer every 6 hours  amLODIPine   Tablet 5 milliGRAM(s) Oral daily  AQUAPHOR (petrolatum Ointment) 1 Application(s) Topical daily  ascorbic acid 500 milliGRAM(s) Oral daily  aspirin  chewable 81 milliGRAM(s) Oral daily  buDESOnide   0.25 milliGRAM(s) Respule 0.25 milliGRAM(s) Inhalation two times a day  chlorhexidine 0.12% Liquid 15 milliLiter(s) Oral Mucosa two times a day  chlorhexidine 2% Cloths 1 Application(s) Topical <User Schedule>  collagenase Ointment 1 Application(s) Topical daily  Dakins Solution - 1/4 Strength 1 Application(s) Topical daily  digoxin     Tablet 0.25 milliGRAM(s) Oral daily  enoxaparin Injectable 40 milliGRAM(s) SubCutaneous daily  epoetin giorgio Injectable 79508 Unit(s) SubCutaneous <User Schedule>  famotidine Injectable 20 milliGRAM(s) IV Push daily  ferrous    sulfate Liquid 300 milliGRAM(s) Oral three times a day with meals  furosemide    Tablet 20 milliGRAM(s) Oral daily  labetalol 100 milliGRAM(s) Oral every 8 hours  levETIRAcetam 1000 milliGRAM(s) Oral two times a day  meropenem  IVPB 1000 milliGRAM(s) IV Intermittent every 8 hours  multivitamin/minerals 1 Tablet(s) Oral daily  phenytoin   Suspension 400 milliGRAM(s) Oral every 12 hours  predniSONE   Tablet 30 milliGRAM(s) Oral daily  tamsulosin 0.4 milliGRAM(s) Oral at bedtime    MEDICATIONS  (PRN):  acetaminophen   Tablet .. 650 milliGRAM(s) Oral every 6 hours PRN Temp greater or equal to 38C (100.4F), Mild Pain (1 - 3), Moderate Pain (4 - 6)  artificial  tears Solution 1 Drop(s) Both EYES every 6 hours PRN Dry Eyes  bisacodyl Suppository 10 milliGRAM(s) Rectal daily PRN Constipation  oxyCODONE    IR 5 milliGRAM(s) Oral every 6 hours PRN Severe Pain (7 - 10)  polyethylene glycol 3350 17 Gram(s) Oral daily PRN Constipation    Pertinent Labs: CBC Full  -  ( 11 Feb 2019 09:35 )  WBC Count : 9.7 K/uL  Hemoglobin : 11.0 g/dL  Hematocrit : 36.7 %  Platelet Count - Automated : 212 K/uL  Mean Cell Volume : 89.7 fl  Mean Cell Hemoglobin : 26.9 pg  Mean Cell Hemoglobin Concentration : 30.0 g/dL  02-11 Na142 mmol/L Glu 133 mg/dL<H> K+ 4.6 mmol/L Cr  0.49 mg/dL<L> BUN 24.0 mg/dL<H> Phos 2.5 mg/dL Alb n/a   PAB n/a       Skin: Rash; unstageable to sacrum +OM  Edema: 4+ generalized    Nutrition focused physical exam conducted AT PREVIOUS ASSESSMENT - found signs of malnutrition [ ]absent [X]present    Subcutaneous fat loss: [ ] Orbital fat pads region, [ ]Buccal fat region, [ ]Triceps region,  [ ]Ribs region    Muscle wasting: [X]Temples region, [ ]Clavicle region, [ ]Shoulder region, [ ]Scapula region, [ ]Interosseous region,  [ ]thigh region, [ ]Calf region    Estimated Needs:   [X] no change since previous assessment  [ ] recalculated:     Current Nutrition Diagnosis: Pt remains at high nutrition risk secondary to malnutrition (moderate chronic) related to inability to consume increased protein-energy needs in setting of dysphagia, sacral ulcer + OM as evidenced by mild muscle wasting (temples), fluid accumulation(3+generalized edema, 4+R/L foot). Edema persists, now 4+ generalized per documentation. Pt tolerating TF's at goal rate, no complication at this time. Pt with intermittent fever, rash due to allergic reaction of unasyn, now resolving. RD to remain available.     Recommendations:   1) Change TF formula to Pivot 1.5cal initiated at 30mL/hr increase by 10mL every 4 hours to goal rate 70mL/hr (x20 hrs) to provide 1400mL daily (2100cal, 131g protein)  2) Instead, if wishing to continue with Jevity, consider adding Jesse BID via PEG to facilitate wound healing     Monitoring and Evaluation:   [ ] PO intake [X] Tolerance to diet prescription [X] Weights  [X] Follow up per protocol [X] Labs: Source: Patient [ ]  Family [ ]   other [X] EMR, staff and ID rounds     Current Diet:   Diet, NPO with Tube Feed:   Tube Feeding Modality: Gastrostomy  Jevity 1.5 [place]  Total Volume for 24 Hours (mL): 1680  Continuous  Starting Tube Feed Rate {mL per Hour}: 30  Increase Tube Feed Rate by (mL): 10     Every 4 hours  Until Goal Tube Feed Rate (mL per Hour): 70  Tube Feed Duration (in Hours): 24  Tube Feed Start Time: 20:00 (02-04-19 @ 19:11)    Enteral /Parenteral Nutrition:  current diet order provides Jevity 1.5cal @ goal rate 70mL/hr, 1400mL daily (2100cal, 89g protein)     Current Weight: No recent weight documented     % Weight Change: per previous assessment, RD bedscale weight 187lbs    Pertinent Medications: MEDICATIONS  (STANDING):  ALBUTerol/ipratropium for Nebulization 3 milliLiter(s) Nebulizer every 6 hours  amLODIPine   Tablet 5 milliGRAM(s) Oral daily  AQUAPHOR (petrolatum Ointment) 1 Application(s) Topical daily  ascorbic acid 500 milliGRAM(s) Oral daily  aspirin  chewable 81 milliGRAM(s) Oral daily  buDESOnide   0.25 milliGRAM(s) Respule 0.25 milliGRAM(s) Inhalation two times a day  chlorhexidine 0.12% Liquid 15 milliLiter(s) Oral Mucosa two times a day  chlorhexidine 2% Cloths 1 Application(s) Topical <User Schedule>  collagenase Ointment 1 Application(s) Topical daily  Dakins Solution - 1/4 Strength 1 Application(s) Topical daily  digoxin     Tablet 0.25 milliGRAM(s) Oral daily  enoxaparin Injectable 40 milliGRAM(s) SubCutaneous daily  epoetin giorgio Injectable 63874 Unit(s) SubCutaneous <User Schedule>  famotidine Injectable 20 milliGRAM(s) IV Push daily  ferrous    sulfate Liquid 300 milliGRAM(s) Oral three times a day with meals  furosemide    Tablet 20 milliGRAM(s) Oral daily  labetalol 100 milliGRAM(s) Oral every 8 hours  levETIRAcetam 1000 milliGRAM(s) Oral two times a day  meropenem  IVPB 1000 milliGRAM(s) IV Intermittent every 8 hours  multivitamin/minerals 1 Tablet(s) Oral daily  phenytoin   Suspension 400 milliGRAM(s) Oral every 12 hours  predniSONE   Tablet 30 milliGRAM(s) Oral daily  tamsulosin 0.4 milliGRAM(s) Oral at bedtime    MEDICATIONS  (PRN):  acetaminophen   Tablet .. 650 milliGRAM(s) Oral every 6 hours PRN Temp greater or equal to 38C (100.4F), Mild Pain (1 - 3), Moderate Pain (4 - 6)  artificial  tears Solution 1 Drop(s) Both EYES every 6 hours PRN Dry Eyes  bisacodyl Suppository 10 milliGRAM(s) Rectal daily PRN Constipation  oxyCODONE    IR 5 milliGRAM(s) Oral every 6 hours PRN Severe Pain (7 - 10)  polyethylene glycol 3350 17 Gram(s) Oral daily PRN Constipation    Pertinent Labs: CBC Full  -  ( 11 Feb 2019 09:35 )  WBC Count : 9.7 K/uL  Hemoglobin : 11.0 g/dL  Hematocrit : 36.7 %  Platelet Count - Automated : 212 K/uL  Mean Cell Volume : 89.7 fl  Mean Cell Hemoglobin : 26.9 pg  Mean Cell Hemoglobin Concentration : 30.0 g/dL  02-11 Na142 mmol/L Glu 133 mg/dL<H> K+ 4.6 mmol/L Cr  0.49 mg/dL<L> BUN 24.0 mg/dL<H> Phos 2.5 mg/dL Alb n/a   PAB n/a       Skin: Rash; unstageable to sacrum +OM  Edema: 4+ generalized    Nutrition focused physical exam conducted AT PREVIOUS ASSESSMENT - found signs of malnutrition [ ]absent [X]present    Subcutaneous fat loss: [ ] Orbital fat pads region, [ ]Buccal fat region, [ ]Triceps region,  [ ]Ribs region    Muscle wasting: [X]Temples region, [ ]Clavicle region, [ ]Shoulder region, [ ]Scapula region, [ ]Interosseous region,  [ ]thigh region, [ ]Calf region    Estimated Needs:   [X] no change since previous assessment  [ ] recalculated:     Current Nutrition Diagnosis: Pt remains at high nutrition risk secondary to malnutrition (moderate chronic) related to inability to consume increased protein-energy needs in setting of dysphagia, sacral ulcer + OM as evidenced by mild muscle wasting (temples), fluid accumulation(3+generalized edema, 4+R/L foot). Edema persists, now 4+ generalized per documentation. Pt tolerating TF's at goal rate, no complication at this time. Pt with intermittent fever, rash due to allergic reaction of unasyn, now resolving. RD to remain available.     Recommendations:   1) Change TF formula to Pivot 1.5cal initiated at 30mL/hr increase by 10mL every 4 hours to goal rate 70mL/hr (x20 hrs) to provide 1400mL daily (2100cal, 131g protein)  2) Instead, if wishing to continue with Jevity, consider adding Jesse BID via PEG to facilitate wound healing   3) Consider inc free fluids to 300mL every 6 hours to provide additional 1200mL (1960 total with TF formula)     Monitoring and Evaluation:   [ ] PO intake [X] Tolerance to diet prescription [X] Weights  [X] Follow up per protocol [X] Labs:

## 2019-02-11 NOTE — PROGRESS NOTE ADULT - SUBJECTIVE AND OBJECTIVE BOX
[facility]  INFECTIOUS DISEASES AND INTERNAL MEDICINE at [place]  =======================================================  Renzo Salazar MD  Diplomates American Board of Internal Medicine and Infectious Diseases  =======================================================    FRANKSALVADORISABELA KING [id number]    Follow up: fevers and rash  no fevers  rash improving.       REVIEW OF SYSTEMS:  Noncommunicative     =======================================================  Antibiotics:  meropenem  IVPB 1000 milliGRAM(s) IV Intermittent every 8 hours       =======================================================    Physical Exam:  T(F): 98.8 (11 Feb 2019 08:28), Max: 101.8 (10 Feb 2019 09:07)  HR: 71 (11 Feb 2019 08:28)  BP: 132/68 (11 Feb 2019 06:27)  RR: 20 (10 Feb 2019 23:54)  SpO2: 97% (11 Feb 2019 08:28) (97% - 100%)      GEN: NAD,    HEENT: normocephalic and atraumatic. EOMI. MIRIAM.   Some swelling of lower lip remains.   NECK: Supple. No carotid bruits.  No lymphadenopathy or thyromegaly. TRACH ON VENT   LUNGS: Clear to auscultation.  HEART: Regular rate and rhythm without murmur.  ABDOMEN: Soft, nontender, and nondistended.  Positive bowel sounds.    : No CVA tenderness  EXTREMITIES: CONTRACTED .  MSK: no joint swelling  NEUROLOGIC: AWAKE EYES OPEN .  SKIN: DIFFUSE MACULOPAPULAR RASH; ISLANDS OF white skin appearing among the red rash on legs and arms.        ========================  Labs:                        11.0   9.7   )-----------( 212      ( 11 Feb 2019 09:35 )             36.7     02-11    142  |  103  |  24.0<H>  ----------------------------<  133<H>  4.6   |  28.0  |  0.49<L>    Ca    8.5<L>      11 Feb 2019 09:35  Phos  2.5     02-11  Mg     2.2     02-11        Culture - Blood (collected 02-07-19 @ 14:06)  Source: .Blood    Culture - Blood (collected 02-07-19 @ 14:06)  Source: .Blood    Culture - Blood (collected 02-04-19 @ 22:21)  Source: .Blood  Final Report (02-09-19 @ 23:01):    No growth at 5 days.    Culture - Blood (collected 02-04-19 @ 14:07)  Source: .Blood  Final Report (02-09-19 @ 14:40):    Growth in anaerobic bottle: Coag Negative Staphylococcus    Anaerobic Bottle: 1 day, 03:44 Hours to positivity    Aerobic Bottle: No growth at 5 days.    .    ***Blood Panel PCR results on this specimen are available    approximately 3 hours after the Gram stain result.***    Gram stain, PCR, and/or culture results may not always    correspond due to difference in methodologies.    ************************************************************    This PCR assay was performed using [facility].    The followingtargets are tested for: Enterococcus,    vancomycin resistant enterococci, Listeria monocytogenes,    coagulase negative staphylococci, S. aureus,    methicillin resistant S. aureus, Streptococcus agalactiae    (Group B), S. pneumoniae, S. pyogenes (Group A),    Acinetobacter baumannii, Enterobacter cloacae, E. coli,    Klebsiella oxytoca, K. pneumoniae, Proteus sp.,    Serratia marcescens, Haemophilus influenzae,    Neisseria meningitidis, Pseudomonas aeruginosa, Candida    albicans, C. glabrata, C krusei, C parapsilosis,    C. tropicalis and the KPC resistance gene.    "Due to technical problems, Proteus sp. will Not be reported as part of    the BCID panel until further notice"    .    TYPE: (C=Critical, N=Notification, A=Abnormal) c    TESTS:  _ bld gs    DATE/TIME CALLED: _ 02/05/2019 20:06:07    CALLED TO: _ rn lombardi ashley    READ BACK (2 Patient Identifiers)(Y/N): _ y    READ BACK VALUES (Y/N): _ y    CALLED BY: _ kb  Organism: Coag Negative Staphylococcus  Blood Culture PCR (02-09-19 @ 14:40)  Organism: Blood Culture PCR (02-09-19 @ 14:40)    Sensitivities:      -  Coagulase negative Staphylococcus: Detec      Method Type: PCR  Organism: Coag Negative Staphylococcus (02-09-19 @ 14:40)    Sensitivities:      -  Ampicillin/Sulbactam: R <=8/4      -  Cefazolin: R <=4      -  Clindamycin: R >4      -  Erythromycin: R >4      -  Gentamicin: R >8 Should not be used as monotherapy      -  Oxacillin: R >2      -  Penicillin: R 8      -  RIF- Rifampin: S <=1 Should not be used as monotherapy      -  Tetra/Doxy: S <=4      -  Trimethoprim/Sulfamethoxazole: R >2/38      -  Vancomycin: S 2      Method Type: ERNESTINE

## 2019-02-11 NOTE — PROGRESS NOTE ADULT - REASON FOR ADMISSION
Increased Temperature, PICC Clogged and Body Rash secondary to Unasyn

## 2019-02-11 NOTE — PROGRESS NOTE ADULT - SUBJECTIVE AND OBJECTIVE BOX
CHIEF COMPLAINT/INTERVAL HISTORY:    Patient is a 87y old  Male who presents with a chief complaint of Increased Temperature, PICC Clogged and Body Rash secondary to Unasyn (10 Feb 2019 23:47)      HPI:  87 years old male with PMH of Respiratory Failure s/p Trach + PEG, Sacral Ulcer, Osteomyelitis, Colostomy, Prostate Cancer, Chronic Baugh's Catheter, Dementia, Seizure Disorder, CAD, CHF, HTN and HLD sent from [facility] with increased temperature, clogged PICC and body rash secondary to Unasyn.   Patient's daughter at bedside, as per her, patient developed rash while on Unasyn. Benadryl was started however Unasyn was continued. His rash was persistent and this morning he developed lip swelling so he was sent to ER.   Patient has a history of Multiple Hospitalizations and he was discharged from [facility] on 1/29/19 after being treated for Sepsis secondary to Sacral Osteomyelitis. (04 Feb 2019 19:11)      SUBJECTIVE & OBJECTIVE: Pt seen and examined at bedside.     ICU Vital Signs Last 24 Hrs  T(C): 37.1 (11 Feb 2019 08:28), Max: 37.1 (11 Feb 2019 08:28)  T(F): 98.8 (11 Feb 2019 08:28), Max: 98.8 (11 Feb 2019 08:28)  HR: 73 (11 Feb 2019 12:48) (50 - 73)  BP: 132/68 (11 Feb 2019 06:27) (125/56 - 146/69)  BP(mean): --  ABP: --  ABP(mean): --  RR: 20 (10 Feb 2019 23:54) (20 - 26)  SpO2: 97% (11 Feb 2019 12:48) (97% - 100%)        MEDICATIONS  (STANDING):  ALBUTerol/ipratropium for Nebulization 3 milliLiter(s) Nebulizer every 6 hours  amLODIPine   Tablet 5 milliGRAM(s) Oral daily  AQUAPHOR (petrolatum Ointment) 1 Application(s) Topical daily  ascorbic acid 500 milliGRAM(s) Oral daily  aspirin  chewable 81 milliGRAM(s) Oral daily  buDESOnide   0.25 milliGRAM(s) Respule 0.25 milliGRAM(s) Inhalation two times a day  chlorhexidine 0.12% Liquid 15 milliLiter(s) Oral Mucosa two times a day  chlorhexidine 2% Cloths 1 Application(s) Topical <User Schedule>  collagenase Ointment 1 Application(s) Topical daily  Dakins Solution - 1/4 Strength 1 Application(s) Topical daily  digoxin     Tablet 0.25 milliGRAM(s) Oral daily  enoxaparin Injectable 40 milliGRAM(s) SubCutaneous daily  epoetin giorgio Injectable 95340 Unit(s) SubCutaneous <User Schedule>  famotidine Injectable 20 milliGRAM(s) IV Push daily  ferrous    sulfate Liquid 300 milliGRAM(s) Oral three times a day with meals  furosemide    Tablet 20 milliGRAM(s) Oral daily  labetalol 100 milliGRAM(s) Oral every 8 hours  levETIRAcetam 1000 milliGRAM(s) Oral two times a day  meropenem  IVPB 1000 milliGRAM(s) IV Intermittent every 8 hours  methylPREDNISolone sodium succinate Injectable 40 milliGRAM(s) IV Push daily  multivitamin/minerals 1 Tablet(s) Oral daily  phenytoin   Suspension 400 milliGRAM(s) Oral every 12 hours  tamsulosin 0.4 milliGRAM(s) Oral at bedtime    MEDICATIONS  (PRN):  acetaminophen   Tablet .. 650 milliGRAM(s) Oral every 6 hours PRN Temp greater or equal to 38C (100.4F), Mild Pain (1 - 3), Moderate Pain (4 - 6)  artificial  tears Solution 1 Drop(s) Both EYES every 6 hours PRN Dry Eyes  bisacodyl Suppository 10 milliGRAM(s) Rectal daily PRN Constipation  oxyCODONE    IR 5 milliGRAM(s) Oral every 6 hours PRN Severe Pain (7 - 10)  polyethylene glycol 3350 17 Gram(s) Oral daily PRN Constipation      LABS:                        11.0   9.7   )-----------( 212      ( 11 Feb 2019 09:35 )             36.7     02-11    142  |  103  |  24.0<H>  ----------------------------<  133<H>  4.6   |  28.0  |  0.49<L>    Ca    8.5<L>      11 Feb 2019 09:35  Phos  2.5     02-11  Mg     2.2     02-11      PHYSICAL EXAM:    GENERAL: elderly male, chronically ill appearing, vent dependent  HEAD:  Atraumatic, Normocephalic  EYES: EOMI, PERRLA, conjunctiva and sclera clear  ENMT: Moist mucous membranes  NECK: trach in situ  NERVOUS SYSTEM:  opens eyes, nonverbal  CHEST/LUNG: coarse breath sounds, left pleurex catheter   HEART: Regular rate and rhythm; + S1/S2  ABDOMEN: Soft, Nontender, Nondistended, + PEG, + ostomy  EXTREMITIES:  + pedal edema

## 2019-02-11 NOTE — PROGRESS NOTE ADULT - ASSESSMENT
87 years old male with PMH of Respiratory Failure s/p Trach + PEG, Sacral Ulcer, Osteomyelitis, Colostomy, Prostate Cancer, Chronic Jones's Catheter, Dementia, Seizure Disorder, CAD, CHF, HTN and HLD sent from [facility] with increased temperature, clogged PICC and body rash secondary to Unasyn. Patient's daughter at bedside, as per her, patient developed rash while on Unasyn. Benadryl was started however Unasyn was continued. His rash was persistent and this morning he developed lip swelling so he was sent to ER.  Patient has a history of Multiple Hospitalizations and he was discharged from [facility] on 1/29/19 after being treated for Sepsis secondary to Sacral Osteomyelitis.     1) Allergic Reaction-possible with unasyn   - Unasyn discontinued  - Start medrol taper; switch to prednisone per derm  - Benadryl 25 mg q 6 hours  - Pepcid 20 mg daily  - Dermatology recommendations appreciated    2) Sacral Osteomyelitis  -Continue daily wound care with ¼ strength Dakins solution and Medihoney gel   -pressure alleviating bed ordered  -febrile yesterday; switched to Merrem per lD 2/15/19 via right PICC line  -Febrile yesterday, repeat blood cultures pending  -Check urine and sputum cultures  -Repeat CXR without PNA  -Ostomy and jones for urine/stool diversion given large wound  -ID recommendations appreciated    3) CAD  - continue aspirin; not on statin  - continue labetalol      4) HTN  - Continue Labetalol, Norvasc, Lasix    5) Chronic Diastolic Heart Failure  - Continue Lasix and Labetalol  - Strict I/os, daily weights    6) Seizure Disorder  - Continue Keppra and Phenytoin    7) Chronic Respiratory Failure  - Continue Vent Support  - trach care/suctioning   - bronchodilators PRN  -CXR reviewed; left lung clear (chronic pleurex in place)    8) Clogged PEG - resolved  - a new Halyard 18 Ugandan replacement G tube was placed  - GI consult appreciated    9) Clogged PICC Line - resolved  - s/p CATHFLO with good blood return    10) Anemia  -Hb stable  -continue EPO and ferrous sulfate    DVT Prophylaxis -- Lovenox 40 mg    Attending Attestation:   Plan discussed with patient, daughter, RN, Dr Jenkins, Dr. Espinoza

## 2019-02-11 NOTE — PROGRESS NOTE ADULT - ASSESSMENT
Morbilliform drug eruption.  Agree with prednisone taper.  Eruption is fading.  He has the sacral osteomyelitis on meropenem.  Still with elevated temps.     I will not continue to follow this patient--please call if you need my input

## 2019-02-11 NOTE — PROGRESS NOTE ADULT - SUBJECTIVE AND OBJECTIVE BOX
INTERVAL HPI/OVERNIGHT EVENTS:  Have reviewed medicine note.  Patients daughter at bedside.  Patient unresponsive    MEDICATIONS  (STANDING):  ALBUTerol/ipratropium for Nebulization 3 milliLiter(s) Nebulizer every 6 hours  amLODIPine   Tablet 5 milliGRAM(s) Oral daily  AQUAPHOR (petrolatum Ointment) 1 Application(s) Topical daily  ascorbic acid 500 milliGRAM(s) Oral daily  aspirin  chewable 81 milliGRAM(s) Oral daily  buDESOnide   0.25 milliGRAM(s) Respule 0.25 milliGRAM(s) Inhalation two times a day  chlorhexidine 0.12% Liquid 15 milliLiter(s) Oral Mucosa two times a day  chlorhexidine 2% Cloths 1 Application(s) Topical <User Schedule>  collagenase Ointment 1 Application(s) Topical daily  Dakins Solution - 1/4 Strength 1 Application(s) Topical daily  digoxin     Tablet 0.25 milliGRAM(s) Oral daily  enoxaparin Injectable 40 milliGRAM(s) SubCutaneous daily  epoetin giorgio Injectable 59757 Unit(s) SubCutaneous <User Schedule>  famotidine Injectable 20 milliGRAM(s) IV Push daily  ferrous    sulfate Liquid 300 milliGRAM(s) Oral three times a day with meals  furosemide    Tablet 40 milliGRAM(s) Oral daily  labetalol 100 milliGRAM(s) Oral every 8 hours  levETIRAcetam 1000 milliGRAM(s) Oral two times a day  meropenem  IVPB 1000 milliGRAM(s) IV Intermittent every 8 hours  multivitamin/minerals 1 Tablet(s) Oral daily  phenytoin   Suspension 400 milliGRAM(s) Oral every 12 hours  predniSONE   Tablet 30 milliGRAM(s) Oral daily  tamsulosin 0.4 milliGRAM(s) Oral at bedtime    MEDICATIONS  (PRN):  acetaminophen   Tablet .. 650 milliGRAM(s) Oral every 6 hours PRN Temp greater or equal to 38C (100.4F), Mild Pain (1 - 3), Moderate Pain (4 - 6)  artificial  tears Solution 1 Drop(s) Both EYES every 6 hours PRN Dry Eyes  bisacodyl Suppository 10 milliGRAM(s) Rectal daily PRN Constipation  oxyCODONE    IR 5 milliGRAM(s) Oral every 6 hours PRN Severe Pain (7 - 10)  polyethylene glycol 3350 17 Gram(s) Oral daily PRN Constipation      Allergies    clindamycin (Unknown)  Grapes (Rash)  Nuts (Hives; Rash)  PC Pen VK (Unknown)  shellfish (Angioedema; Rash; Hives)  strawberry (Short breath; Rash; Hives)  Xanax (Rash)    Intolerances    Ativan (Unknown)  Haldol (Dystonic RXN)  hydrALAZINE (Unknown)  Zyprexa (Unknown)      Vital Signs Last 24 Hrs  T(C): 38 (11 Feb 2019 15:50), Max: 38 (11 Feb 2019 15:50)  T(F): 100.4 (11 Feb 2019 15:50), Max: 100.4 (11 Feb 2019 15:50)  HR: 66 (11 Feb 2019 17:53) (50 - 73)  BP: 139/76 (11 Feb 2019 15:50) (125/56 - 139/76)  BP(mean): --  RR: 20 (10 Feb 2019 23:54) (20 - 20)  SpO2: 98% (11 Feb 2019 17:53) (97% - 100%)    PHYSICAL EXAM:  significant pitting edema--face, arms, legs (feet especially)    Skin: fading erythematous macular eruption in generalized distribution      LABS:                        11.0   9.7   )-----------( 212      ( 11 Feb 2019 09:35 )             36.7     02-11    142  |  103  |  24.0<H>  ----------------------------<  133<H>  4.6   |  28.0  |  0.49<L>    Ca    8.5<L>      11 Feb 2019 09:35  Phos  2.5     02-11  Mg     2.2     02-11

## 2019-02-12 ENCOUNTER — TRANSCRIPTION ENCOUNTER (OUTPATIENT)
Age: 84
End: 2019-02-12

## 2019-02-12 LAB
ANION GAP SERPL CALC-SCNC: 10 MMOL/L — SIGNIFICANT CHANGE UP (ref 5–17)
BASOPHILS # BLD AUTO: 0 K/UL — SIGNIFICANT CHANGE UP (ref 0–0.2)
BASOPHILS NFR BLD AUTO: 0.3 % — SIGNIFICANT CHANGE UP (ref 0–2)
BUN SERPL-MCNC: 25 MG/DL — HIGH (ref 8–20)
CALCIUM SERPL-MCNC: 8.5 MG/DL — LOW (ref 8.6–10.2)
CHLORIDE SERPL-SCNC: 103 MMOL/L — SIGNIFICANT CHANGE UP (ref 98–107)
CO2 SERPL-SCNC: 30 MMOL/L — HIGH (ref 22–29)
CREAT SERPL-MCNC: 0.51 MG/DL — SIGNIFICANT CHANGE UP (ref 0.5–1.3)
CULTURE RESULTS: SIGNIFICANT CHANGE UP
CULTURE RESULTS: SIGNIFICANT CHANGE UP
EOSINOPHIL # BLD AUTO: 1.8 K/UL — HIGH (ref 0–0.5)
EOSINOPHIL NFR BLD AUTO: 17.7 % — HIGH (ref 0–6)
GLUCOSE SERPL-MCNC: 119 MG/DL — HIGH (ref 70–115)
HCT VFR BLD CALC: 34 % — LOW (ref 42–52)
HGB BLD-MCNC: 10 G/DL — LOW (ref 14–18)
LYMPHOCYTES # BLD AUTO: 0.5 K/UL — LOW (ref 1–4.8)
LYMPHOCYTES # BLD AUTO: 5.1 % — LOW (ref 20–55)
MAGNESIUM SERPL-MCNC: 2.3 MG/DL — SIGNIFICANT CHANGE UP (ref 1.6–2.6)
MCHC RBC-ENTMCNC: 26.6 PG — LOW (ref 27–31)
MCHC RBC-ENTMCNC: 29.4 G/DL — LOW (ref 32–36)
MCV RBC AUTO: 90.4 FL — SIGNIFICANT CHANGE UP (ref 80–94)
MONOCYTES # BLD AUTO: 0.6 K/UL — SIGNIFICANT CHANGE UP (ref 0–0.8)
MONOCYTES NFR BLD AUTO: 6 % — SIGNIFICANT CHANGE UP (ref 3–10)
NEUTROPHILS # BLD AUTO: 7 K/UL — SIGNIFICANT CHANGE UP (ref 1.8–8)
NEUTROPHILS NFR BLD AUTO: 69.7 % — SIGNIFICANT CHANGE UP (ref 37–73)
PHOSPHATE SERPL-MCNC: 2.6 MG/DL — SIGNIFICANT CHANGE UP (ref 2.4–4.7)
PLATELET # BLD AUTO: 224 K/UL — SIGNIFICANT CHANGE UP (ref 150–400)
POTASSIUM SERPL-MCNC: 4.4 MMOL/L — SIGNIFICANT CHANGE UP (ref 3.5–5.3)
POTASSIUM SERPL-SCNC: 4.4 MMOL/L — SIGNIFICANT CHANGE UP (ref 3.5–5.3)
RBC # BLD: 3.76 M/UL — LOW (ref 4.6–6.2)
RBC # FLD: 24.7 % — HIGH (ref 11–15.6)
SODIUM SERPL-SCNC: 143 MMOL/L — SIGNIFICANT CHANGE UP (ref 135–145)
SPECIMEN SOURCE: SIGNIFICANT CHANGE UP
SPECIMEN SOURCE: SIGNIFICANT CHANGE UP
WBC # BLD: 10 K/UL — SIGNIFICANT CHANGE UP (ref 4.8–10.8)
WBC # FLD AUTO: 10 K/UL — SIGNIFICANT CHANGE UP (ref 4.8–10.8)

## 2019-02-12 PROCEDURE — 93971 EXTREMITY STUDY: CPT | Mod: 26,RT

## 2019-02-12 PROCEDURE — 99232 SBSQ HOSP IP/OBS MODERATE 35: CPT

## 2019-02-12 RX ORDER — AMLODIPINE BESYLATE 2.5 MG/1
1 TABLET ORAL
Qty: 0 | Refills: 0 | DISCHARGE
Start: 2019-02-12

## 2019-02-12 RX ORDER — PETROLATUM,WHITE
1 JELLY (GRAM) TOPICAL
Qty: 0 | Refills: 0 | DISCHARGE
Start: 2019-02-12

## 2019-02-12 RX ORDER — MEROPENEM 1 G/30ML
1000 INJECTION INTRAVENOUS
Qty: 0 | Refills: 0 | DISCHARGE
Start: 2019-02-12

## 2019-02-12 RX ORDER — LABETALOL HCL 100 MG
1 TABLET ORAL
Qty: 0 | Refills: 0 | DISCHARGE
Start: 2019-02-12

## 2019-02-12 RX ADMIN — Medication 3 MILLILITER(S): at 20:55

## 2019-02-12 RX ADMIN — Medication 500 MILLIGRAM(S): at 12:25

## 2019-02-12 RX ADMIN — Medication 1 APPLICATION(S): at 12:27

## 2019-02-12 RX ADMIN — Medication 100 MILLIGRAM(S): at 21:07

## 2019-02-12 RX ADMIN — MEROPENEM 100 MILLIGRAM(S): 1 INJECTION INTRAVENOUS at 05:32

## 2019-02-12 RX ADMIN — Medication 300 MILLIGRAM(S): at 08:52

## 2019-02-12 RX ADMIN — ENOXAPARIN SODIUM 40 MILLIGRAM(S): 100 INJECTION SUBCUTANEOUS at 12:26

## 2019-02-12 RX ADMIN — LEVETIRACETAM 1000 MILLIGRAM(S): 250 TABLET, FILM COATED ORAL at 12:26

## 2019-02-12 RX ADMIN — Medication 0.25 MILLIGRAM(S): at 05:32

## 2019-02-12 RX ADMIN — Medication 300 MILLIGRAM(S): at 16:15

## 2019-02-12 RX ADMIN — CHLORHEXIDINE GLUCONATE 15 MILLILITER(S): 213 SOLUTION TOPICAL at 16:14

## 2019-02-12 RX ADMIN — CHLORHEXIDINE GLUCONATE 1 APPLICATION(S): 213 SOLUTION TOPICAL at 05:29

## 2019-02-12 RX ADMIN — Medication 100 MILLIGRAM(S): at 12:25

## 2019-02-12 RX ADMIN — Medication 3 MILLILITER(S): at 09:37

## 2019-02-12 RX ADMIN — Medication 30 MILLIGRAM(S): at 05:32

## 2019-02-12 RX ADMIN — Medication 400 MILLIGRAM(S): at 22:00

## 2019-02-12 RX ADMIN — Medication 3 MILLILITER(S): at 16:00

## 2019-02-12 RX ADMIN — Medication 40 MILLIGRAM(S): at 05:32

## 2019-02-12 RX ADMIN — LEVETIRACETAM 1000 MILLIGRAM(S): 250 TABLET, FILM COATED ORAL at 21:07

## 2019-02-12 RX ADMIN — Medication 1 TABLET(S): at 12:26

## 2019-02-12 RX ADMIN — Medication 81 MILLIGRAM(S): at 12:26

## 2019-02-12 RX ADMIN — Medication 400 MILLIGRAM(S): at 12:24

## 2019-02-12 RX ADMIN — Medication 3 MILLILITER(S): at 03:27

## 2019-02-12 RX ADMIN — Medication 100 MILLIGRAM(S): at 05:32

## 2019-02-12 RX ADMIN — AMLODIPINE BESYLATE 5 MILLIGRAM(S): 2.5 TABLET ORAL at 05:32

## 2019-02-12 RX ADMIN — Medication 300 MILLIGRAM(S): at 12:26

## 2019-02-12 RX ADMIN — Medication 1 APPLICATION(S): at 12:26

## 2019-02-12 RX ADMIN — Medication 0.25 MILLIGRAM(S): at 09:44

## 2019-02-12 RX ADMIN — MEROPENEM 100 MILLIGRAM(S): 1 INJECTION INTRAVENOUS at 21:07

## 2019-02-12 RX ADMIN — FAMOTIDINE 20 MILLIGRAM(S): 10 INJECTION INTRAVENOUS at 12:26

## 2019-02-12 RX ADMIN — MEROPENEM 100 MILLIGRAM(S): 1 INJECTION INTRAVENOUS at 13:39

## 2019-02-12 RX ADMIN — Medication 0.25 MILLIGRAM(S): at 20:55

## 2019-02-12 RX ADMIN — CHLORHEXIDINE GLUCONATE 15 MILLILITER(S): 213 SOLUTION TOPICAL at 05:32

## 2019-02-12 NOTE — DISCHARGE NOTE ADULT - PROVIDER TOKENS
PROVIDER:[TOKEN:[[id number]]] PROVIDER:[TOKEN:[[id number]]],PROVIDER:[TOKEN:[[id number]]]

## 2019-02-12 NOTE — DISCHARGE NOTE ADULT - CARE PLAN
Principal Discharge DX:	Allergic drug rash  Goal:	Resolved  Assessment and plan of treatment:	Prednisone taper and benadryl as needed  Secondary Diagnosis:	Chronic osteomyelitis  Assessment and plan of treatment:	Meropenem until 2/15/19 as per ID via PICC line  Secondary Diagnosis:	Colostomy in place  Assessment and plan of treatment:	Chronic  Secondary Diagnosis:	Dementia  Secondary Diagnosis:	Dependent on ventilator  Assessment and plan of treatment:	Continue Vent Support, Trach care/suctioning  Secondary Diagnosis:	Dysphagia  Assessment and plan of treatment:	a new Halyard 18 french replacement G tube was placed due to clogged peg tube   resume tube feeds Principal Discharge DX:	Allergic drug rash  Goal:	Resolved  Assessment and plan of treatment:	Prednisone taper and benadryl as needed  Secondary Diagnosis:	Chronic osteomyelitis  Assessment and plan of treatment:	Meropenem until 2/15/19 as per ID via PICC line  Secondary Diagnosis:	Colostomy in place  Assessment and plan of treatment:	Chronic  Secondary Diagnosis:	Dementia  Secondary Diagnosis:	Dependent on ventilator  Assessment and plan of treatment:	Continue Vent Support, Trach care/suctioning  Secondary Diagnosis:	Dysphagia  Assessment and plan of treatment:	a new Halyard 18 french replacement G tube was placed due to clogged peg tube   resume tube feeds  Secondary Diagnosis:	HTN (hypertension)  Assessment and plan of treatment:	Resume blood pressure medications Principal Discharge DX:	Allergic drug rash  Goal:	Resolved  Assessment and plan of treatment:	Prednisone taper as prescribed  Benadry as needed  Secondary Diagnosis:	Chronic osteomyelitis  Assessment and plan of treatment:	Meropenem until 2/15/19 as per ID via PICC line  Secondary Diagnosis:	Colostomy in place  Assessment and plan of treatment:	Chronic  Secondary Diagnosis:	Dementia  Secondary Diagnosis:	Dependent on ventilator  Assessment and plan of treatment:	Continue Vent Support, Trach care/suctioning  Secondary Diagnosis:	Dysphagia  Assessment and plan of treatment:	a new Halyard 18 french replacement G tube was placed due to clogged peg tube   resume tube feeds  Secondary Diagnosis:	HTN (hypertension)  Assessment and plan of treatment:	Resume blood pressure medications Principal Discharge DX:	Allergic drug rash  Goal:	Resolved  Assessment and plan of treatment:	Prednisone taper as prescribed  Benadry as needed  Secondary Diagnosis:	Chronic osteomyelitis  Assessment and plan of treatment:	Meropenem until 2/15/19 as per ID via PICC line  Secondary Diagnosis:	Colostomy in place  Assessment and plan of treatment:	Chronic  Secondary Diagnosis:	Dementia  Assessment and plan of treatment:	supportive care  Secondary Diagnosis:	Dependent on ventilator  Assessment and plan of treatment:	Continue Vent Support, Trach care/suctioning  Secondary Diagnosis:	Dysphagia  Assessment and plan of treatment:	a new Halyard 18 french replacement G tube was placed due to clogged peg tube   resume tube feeds  Secondary Diagnosis:	HTN (hypertension)  Assessment and plan of treatment:	Resume blood pressure medications

## 2019-02-12 NOTE — DISCHARGE NOTE ADULT - HOSPITAL COURSE
87 years old male with PMH of Respiratory Failure s/p Trach + PEG, Sacral Ulcer, Osteomyelitis, Colostomy, Prostate Cancer, Chronic Baugh's Catheter, Dementia, Seizure Disorder, CAD, CHF, HTN and HLD sent from [facility] with increased temperature, clogged PICC and body rash secondary to Unasyn. Patient's daughter at bedside, as per her, patient developed rash while on Unasyn. Benadryl was started however Unasyn was continued. His rash was persistent and this morning he developed lip swelling so he was sent to ER.  Patient has a history of Multiple Hospitalizations and he was discharged from [facility] on 1/29/19 after being treated for Sepsis secondary to Sacral Osteomyelitis. Seen in consult by dermatology, steroid taper initiated rash improving/fading with bendryl and discontinuation of abx. Seen in consult by ID, plan is for meropenem until 2/15/19. During hospital stay, a new Halyard 18 Italian replacement G tube was placed due to clogged peg tube and picc line unclogged with cathflow    Vital Signs Last 24 Hrs  T(C): 36.8 (12 Feb 2019 07:55), Max: 38 (11 Feb 2019 15:50)  T(F): 98.2 (12 Feb 2019 07:55), Max: 100.4 (11 Feb 2019 15:50)  HR: 61 (12 Feb 2019 11:15) (55 - 72)  BP: 135/71 (12 Feb 2019 07:55) (135/71 - 148/67)  BP(mean): --  RR: 23 (11 Feb 2019 23:56) (23 - 23)  SpO2: 98% (12 Feb 2019 11:15) (96% - 100%)    PHYSICAL EXAM:    GENERAL: elderly male, chronically ill appearing, vent dependent  HEAD:  Atraumatic, Normocephalic  EYES: EOMI, PERRLA, conjunctiva and sclera clear  ENMT: Moist mucous membranes  NECK: trach in situ  NERVOUS SYSTEM:  opens eyes, nonverbal  CHEST/LUNG: coarse breath sounds, left pleurex catheter   HEART: Regular rate and rhythm; + S1/S2  ABDOMEN: Soft, Nontender, Nondistended, + PEG, + ostomy  EXTREMITIES:  + pedal edema 87 years old male with PMH of Respiratory Failure s/p Trach + PEG, Sacral Ulcer, Osteomyelitis, Colostomy, Prostate Cancer, Chronic Baugh's Catheter, Dementia, Seizure Disorder, CAD, CHF, HTN and HLD sent from [facility] with increased temperature, clogged PICC and body rash secondary to Unasyn. Patient's daughter at bedside, as per her, patient developed rash while on Unasyn. Benadryl was started however Unasyn was continued. His rash was persistent and this morning he developed lip swelling so he was sent to ER.  Patient has a history of Multiple Hospitalizations and he was discharged from [facility] on 1/29/19 after being treated for Sepsis secondary to Sacral Osteomyelitis. Seen in consult by dermatology, steroid taper initiated rash improving/fading with bendryl and discontinuation of abx. Seen in consult by ID, plan is for meropenem until 2/15/19. During hospital stay, a new Halyard 18 Djiboutian replacement G tube was placed due to clogged peg tube and picc line unclogged with cathflow    Vital Signs Last 24 Hrs  T(C): 36.8 (12 Feb 2019 07:55), Max: 38 (11 Feb 2019 15:50)  T(F): 98.2 (12 Feb 2019 07:55), Max: 100.4 (11 Feb 2019 15:50)  HR: 61 (12 Feb 2019 11:15) (55 - 72)  BP: 135/71 (12 Feb 2019 07:55) (135/71 - 148/67)  BP(mean): --  RR: 23 (11 Feb 2019 23:56) (23 - 23)  SpO2: 98% (12 Feb 2019 11:15) (96% - 100%)    PHYSICAL EXAM:    GENERAL: elderly male, chronically ill appearing, vent dependent  HEAD:  Atraumatic, Normocephalic  EYES: EOMI, PERRLA, conjunctiva and sclera clear  ENMT: Moist mucous membranes  NECK: trach in situ  NERVOUS SYSTEM:  opens eyes, nonverbal  CHEST/LUNG: coarse breath sounds, left pleurex catheter   HEART: Regular rate and rhythm; + S1/S2  ABDOMEN: Soft, Nontender, Nondistended, + PEG, + ostomy  EXTREMITIES:  + pedal edema    Dispo: dc back to nursing facility pending blood culture results and venous duplex 87 years old male with PMH of Respiratory Failure s/p Trach + PEG, Sacral Ulcer, Osteomyelitis, Colostomy, Prostate Cancer, Chronic Baugh's Catheter, Dementia, Seizure Disorder, CAD, CHF, HTN and HLD sent from [facility] with increased temperature, clogged PICC and body rash secondary to Unasyn. Patient's daughter at bedside, as per her, patient developed rash while on Unasyn. Benadryl was started however Unasyn was continued. His rash was persistent and this morning he developed lip swelling so he was sent to ER.  Patient has a history of Multiple Hospitalizations and he was discharged from [facility] on 1/29/19 after being treated for Sepsis secondary to Sacral Osteomyelitis. Seen in consult by dermatology, steroid taper initiated rash improving/fading with bendryl and discontinuation of abx. Seen in consult by ID, plan is for meropenem until 2/15/19. During hospital stay, a new Halyard 18 Bangladeshi replacement G tube was placed due to clogged peg tube and picc line unclogged with cathflow    Vital Signs Last 24 Hrs  T(C): 36.8 (12 Feb 2019 07:55), Max: 38 (11 Feb 2019 15:50)  T(F): 98.2 (12 Feb 2019 07:55), Max: 100.4 (11 Feb 2019 15:50)  HR: 61 (12 Feb 2019 11:15) (55 - 72)  BP: 135/71 (12 Feb 2019 07:55) (135/71 - 148/67)  BP(mean): --  RR: 23 (11 Feb 2019 23:56) (23 - 23)  SpO2: 98% (12 Feb 2019 11:15) (96% - 100%)    PHYSICAL EXAM:    GENERAL: elderly male, chronically ill appearing, vent dependent  HEAD:  Atraumatic, Normocephalic  EYES: EOMI, PERRLA, conjunctiva and sclera clear  ENMT: Moist mucous membranes  NECK: trach in situ  NERVOUS SYSTEM:  opens eyes, nonverbal  CHEST/LUNG: coarse breath sounds, left pleurex catheter   HEART: Regular rate and rhythm; + S1/S2  ABDOMEN: Soft, Nontender, Nondistended, + PEG, + ostomy  EXTREMITIES:  + pedal edema    Dispo: dc back to nursing facility pending blood culture results and venous duplex 87 years old male with PMH of Respiratory Failure s/p Trach + PEG, Sacral Ulcer, Osteomyelitis, Colostomy, Prostate Cancer, Chronic Baugh's Catheter, Dementia, Seizure Disorder, CAD, CHF, HTN and HLD sent from [facility] with increased temperature, clogged PICC and body rash secondary to Unasyn. Patient's daughter at bedside, as per her, patient developed rash while on Unasyn. Benadryl was started however Unasyn was continued. His rash was persistent and this morning he developed lip swelling so he was sent to ER.  Patient has a history of Multiple Hospitalizations and he was discharged from [facility] on 1/29/19 after being treated for Sepsis secondary to Sacral Osteomyelitis. Seen in consult by dermatology, steroid taper initiated rash improving/fading with bendryl and discontinuation of abx. Seen in consult by ID, plan is for meropenem until 2/15/19. During hospital stay, a new Halyard 18 Macedonian replacement G tube was placed due to clogged peg tube and picc line unclogged with cathflow. Pt is stable for DC back to nursing facility.     Vital Signs Last 24 Hrs  T(C): 36.8 (12 Feb 2019 07:55), Max: 38 (11 Feb 2019 15:50)  T(F): 98.2 (12 Feb 2019 07:55), Max: 100.4 (11 Feb 2019 15:50)  HR: 61 (12 Feb 2019 11:15) (55 - 72)  BP: 135/71 (12 Feb 2019 07:55) (135/71 - 148/67)  BP(mean): --  RR: 23 (11 Feb 2019 23:56) (23 - 23)  SpO2: 98% (12 Feb 2019 11:15) (96% - 100%)    PHYSICAL EXAM:  GENERAL: elderly male, chronically ill appearing, vent dependent  HEAD:  Atraumatic, Normocephalic  EYES: EOMI, PERRLA, conjunctiva and sclera clear  ENMT: Moist mucous membranes  NECK: trach in situ  NERVOUS SYSTEM:  opens eyes, nonverbal  CHEST/LUNG: coarse breath sounds, left pleurex catheter   HEART: Regular rate and rhythm; + S1/S2  ABDOMEN: Soft, Nontender, Nondistended, + PEG, + ostomy  EXTREMITIES:  + pedal edema    Dispo: dc back to nursing facility 87 years old male with PMH of Respiratory Failure s/p Trach + PEG, Sacral Ulcer, Osteomyelitis, Colostomy, Prostate Cancer, Chronic Baugh's Catheter, Dementia, Seizure Disorder, CAD, CHF, HTN and HLD sent from [facility] with increased temperature, clogged PICC and body rash secondary to Unasyn. Patient's daughter at bedside, as per her, patient developed rash while on Unasyn. Benadryl was started however Unasyn was continued. His rash was persistent and this morning he developed lip swelling so he was sent to ER.  Patient has a history of Multiple Hospitalizations and he was discharged from [facility] on 1/29/19 after being treated for Sepsis secondary to Sacral Osteomyelitis. Seen in consult by dermatology, steroid taper initiated rash improving/fading with bendryl and discontinuation of abx. Seen in consult by ID, plan is for meropenem until 2/15/19. During hospital stay, a new Halyard 18 Azerbaijani replacement G tube was placed due to clogged peg tube and picc line unclogged with cathflow. Pt is stable for DC back to nursing facility.     Vital Signs Last 24 Hrs  T(C): 36.8 (12 Feb 2019 07:55), Max: 38 (11 Feb 2019 15:50)  T(F): 98.2 (12 Feb 2019 07:55), Max: 100.4 (11 Feb 2019 15:50)  HR: 61 (12 Feb 2019 11:15) (55 - 72)  BP: 135/71 (12 Feb 2019 07:55) (135/71 - 148/67)  BP(mean): --  RR: 23 (11 Feb 2019 23:56) (23 - 23)  SpO2: 98% (12 Feb 2019 11:15) (96% - 100%)    PHYSICAL EXAM:  GENERAL: elderly male, chronically ill appearing, vent dependent  HEAD:  Atraumatic, Normocephalic  EYES: EOMI, PERRLA, conjunctiva and sclera clear  ENMT: Moist mucous membranes  NECK: trach in situ  NERVOUS SYSTEM:  opens eyes, nonverbal  CHEST/LUNG: coarse breath sounds, left pleurex catheter   HEART: Regular rate and rhythm; + S1/S2  ABDOMEN: Soft, Nontender, Nondistended, + PEG, + ostomy  EXTREMITIES:  + pedal edema    Dispo: Medically stable for discharge to rehab. Time spent on discharge 45 minutes. 87 years old male with PMH of Respiratory Failure s/p Trach + PEG, Sacral Ulcer, Osteomyelitis, Colostomy, Prostate Cancer, Chronic Baugh's Catheter, Dementia, Seizure Disorder, CAD, CHF, HTN and [facility], clogged PICC and body rash secondary to Unasyn. Patient's daughter at bedside, as per her, patient developed rash while on Unasyn. Benadryl was started however Unasyn was continued. His rash was persistent and this morning he developed lip swelling so he was sent to ER.  Patient has a history of Multiple Hospitalizations and he was discharged from [facility] on 1/29/19 after being treated for Sepsis secondary to Sacral Osteomyelitis. Seen in consult by dermatology, steroid taper initiated rash improving/fading with bendryl and discontinuation of abx. Seen in consult by ID, plan is for meropenem until 2/15/19. During hospital stay, a new Halyard 18 Solomon Islander replacement G tube was placed due to clogged peg tube and picc line unclogged with cathflow. Duplex obtained revealed nonocclusive thrombus in axillary vein. Started on Eliquis for 3 months. Pt is stable for DC back to nursing facility.     Vital Signs Last 24 Hrs  T(C): 36.8 (12 Feb 2019 07:55), Max: 38 (11 Feb 2019 15:50)  T(F): 98.2 (12 Feb 2019 07:55), Max: 100.4 (11 Feb 2019 15:50)  HR: 61 (12 Feb 2019 11:15) (55 - 72)  BP: 135/71 (12 Feb 2019 07:55) (135/71 - 148/67)  BP(mean): --  RR: 23 (11 Feb 2019 23:56) (23 - 23)  SpO2: 98% (12 Feb 2019 11:15) (96% - 100%)    PHYSICAL EXAM:  GENERAL: elderly male, chronically ill appearing, vent dependent  HEAD:  Atraumatic, Normocephalic  EYES: EOMI, PERRLA, conjunctiva and sclera clear  ENMT: Moist mucous membranes  NECK: trach in situ  NERVOUS SYSTEM:  opens eyes, nonverbal  CHEST/LUNG: coarse breath sounds, left pleurex catheter   HEART: Regular rate and rhythm; + S1/S2  ABDOMEN: Soft, Nontender, Nondistended, + PEG, + ostomy  EXTREMITIES:  + pedal edema    Dispo: Medically stable for discharge to rehab. Time spent on discharge 45 minutes.

## 2019-02-12 NOTE — DISCHARGE NOTE ADULT - CARE PROVIDER_API CALL
Miko Espionza)  Infectious Disease; Internal Medicine  [address] [place] [address], [place] [address]  Phone: [phone]  Fax: [phone]  Follow Up Time: Miko Espinoza)  Infectious Disease; Internal Medicine  [address] [facility], [address]  [place] [address]  Phone: [phone]  Fax: [phone]  Follow Up Time:     Kevin Velasquez)  Vascular Surgery  [address] [facility], [place] [address]  Phone: [phone]  Fax: [phone]  Follow Up Time:

## 2019-02-12 NOTE — DISCHARGE NOTE ADULT - SECONDARY DIAGNOSIS.
Chronic osteomyelitis Colostomy in place Dementia Dependent on ventilator Dysphagia HTN (hypertension)

## 2019-02-12 NOTE — PROGRESS NOTE ADULT - PROVIDER SPECIALTY LIST ADULT
Dermatology
Hospitalist
Infectious Disease

## 2019-02-12 NOTE — DISCHARGE NOTE ADULT - MEDICATION SUMMARY - MEDICATIONS TO TAKE
I will START or STAY ON the medications listed below when I get home from the hospital:    budesonide 0.5 mg/2 mL inhalation suspension  -- 2 milliliter(s) inhaled 2 times a day  -- Indication: For Asthma    predniSONE 20 mg oral tablet  -- 1 tab(s) by mouth once a day   -- It is very important that you take or use this exactly as directed.  Do not skip doses or discontinue unless directed by your doctor.  Obtain medical advice before taking any non-prescription drugs as some may affect the action of this medication.  Take with food or milk.    -- Indication: For Generalized skin eruption due to medication taken internally    predniSONE 10 mg oral tablet  -- 1 tab(s) by mouth once a day   -- It is very important that you take or use this exactly as directed.  Do not skip doses or discontinue unless directed by your doctor.  Obtain medical advice before taking any non-prescription drugs as some may affect the action of this medication.  Take with food or milk.    -- Indication: For Generalized skin eruption due to medication taken internally    oxyCODONE 5 mg oral tablet  -- 1 tab(s) by mouth 2 times a day, As Needed  -- Indication: For Chronic pain    acetaminophen 325 mg oral tablet  -- 2 tab(s) by mouth every 6 hours, As Needed  -- Indication: For Pain    aspirin 81 mg oral tablet  -- 1 tab(s) by mouth once a day  -- Indication: For CAD    Milk of Magnesia  -- 30 milliliter(s) by mouth prn, As Needed  -- Indication: For GeRD    tamsulosin 0.4 mg oral capsule  -- 1 cap(s) by mouth once a day  -- Indication: For BPH    phenytoin 25 mg/mL oral suspension  -- 16 milliliter(s) by mouth every 12 hours  -- Indication: For seizure    digoxin 125 mcg (0.125 mg) oral tablet  -- 1 tab(s) by mouth once a day  -- Indication: For seizure    enoxaparin 40 mg/0.4 mL injectable solution  -- 40 milligram(s) injectable once a day  -- Indication: For Anticoagulation    levETIRAcetam 100 mg/mL oral solution  -- 10 milliliter(s) by mouth 2 times a day  -- Indication: For seizure     diphenhydrAMINE 25 mg oral capsule  -- 2 cap(s) by mouth every 6 hours, As Needed  -- Indication: For Generalized skin eruption due to medication taken internally    chlorhexidine 0.12% mucous membrane liquid  -- 15 milliliter(s) mucous membrane 2 times a day  -- Indication: For skin    labetalol 100 mg oral tablet  -- 1 tab(s) by mouth every 8 hours  -- Indication: For HTN    DuoNeb 0.5 mg-2.5 mg/3 mL inhalation solution  -- 3 milliliter(s) inhaled every 4 hours, As Needed  -- Indication: For CoPD    amLODIPine 5 mg oral tablet  -- 1 tab(s) by mouth once a day  -- Indication: For HTN    meropenem 1000 mg intravenous injection  -- 1000 milligram(s) intravenous every 8 hours  -- Indication: For Chronic osteomyelitis    petrolatum topical ointment  -- 1 application on skin once a day  -- Indication: For Topical skin    collagenase 250 units/g topical ointment  -- Apply on skin to sacral wound once a day  -- Indication: For Topical skin    furosemide 40 mg oral tablet  -- 1 tab(s) by mouth every 48 hours  -- Indication: For HTN    epoetin giorgio 10,000 units/mL preservative-free injectable solution  -- 77143 unit(s) injectable once a week on Friday  -- Indication: For Anemia    famotidine 20 mg oral tablet  -- 1 tab(s) by mouth once a day  -- Indication: For GeRD    ferrous sulfate 300 mg/5 mL (60 mg elemental iron) oral liquid  -- 5 milliliter(s) by gastrostomy tube every 8 hours  -- Indication: For Anemia    Fleet Enema 7 g-19 g rectal enema  -- 1 application rectally prn, As Needed  -- Indication: For Constipation    bisacodyl 10 mg rectal suppository  -- 1 suppository(ies) rectally prn, As Needed  -- Indication: For Constipation     Dulcolax Stool Softener  -- 5 milligram(s) orally  -- Indication: For Constipation    potassium chloride 20 mEq oral granule, extended release  -- 20 milliequivalent(s) by mouth once a day  -- Indication: For vitamin supplement    Multiple Vitamins with Minerals oral tablet  -- 1 tab(s) by mouth once a day  -- Indication: For vitamin supplement    ascorbic acid 500 mg oral tablet  -- 1 tab(s) by mouth once a day  -- Indication: For vitamin supplement I will START or STAY ON the medications listed below when I get home from the hospital:    budesonide 0.5 mg/2 mL inhalation suspension  -- 2 milliliter(s) inhaled 2 times a day  -- Indication: For Asthma    predniSONE 20 mg oral tablet  -- 1 tab(s) by mouth once a day   -- It is very important that you take or use this exactly as directed.  Do not skip doses or discontinue unless directed by your doctor.  Obtain medical advice before taking any non-prescription drugs as some may affect the action of this medication.  Take with food or milk.    -- Indication: For Generalized skin eruption due to medication taken internally    predniSONE 10 mg oral tablet  -- 1 tab(s) by mouth once a day   -- It is very important that you take or use this exactly as directed.  Do not skip doses or discontinue unless directed by your doctor.  Obtain medical advice before taking any non-prescription drugs as some may affect the action of this medication.  Take with food or milk.    -- Indication: For Generalized skin eruption due to medication taken internally    oxyCODONE 5 mg oral tablet  -- 1 tab(s) by mouth 2 times a day, As Needed  -- Indication: For Chronic pain    acetaminophen 325 mg oral tablet  -- 2 tab(s) by mouth every 6 hours, As Needed  -- Indication: For Pain    aspirin 81 mg oral tablet  -- 1 tab(s) by mouth once a day  -- Indication: For CAD    Milk of Magnesia  -- 30 milliliter(s) by mouth prn, As Needed  -- Indication: For GeRD    tamsulosin 0.4 mg oral capsule  -- 1 cap(s) by mouth once a day  -- Indication: For BPH    phenytoin 25 mg/mL oral suspension  -- 16 milliliter(s) by mouth every 12 hours  -- Indication: For seizure    digoxin 125 mcg (0.125 mg) oral tablet  -- 1 tab(s) by mouth once a day  -- Indication: For seizure    enoxaparin 40 mg/0.4 mL injectable solution  -- 40 milligram(s) injectable once a day  -- Indication: For Anticoagulation    apixaban 5 mg oral tablet  -- 2 tab(s) by mouth every 12 hours x7 days days then 1 tab by mouth every 12 hours   -- Indication: For Right Axillary Vein DVT    levETIRAcetam 100 mg/mL oral solution  -- 10 milliliter(s) by mouth 2 times a day  -- Indication: For seizure     diphenhydrAMINE 25 mg oral capsule  -- 2 cap(s) by mouth every 6 hours, As Needed  -- Indication: For Generalized skin eruption due to medication taken internally    chlorhexidine 0.12% mucous membrane liquid  -- 15 milliliter(s) mucous membrane 2 times a day  -- Indication: For skin    labetalol 100 mg oral tablet  -- 1 tab(s) by mouth every 8 hours  -- Indication: For HTN    DuoNeb 0.5 mg-2.5 mg/3 mL inhalation solution  -- 3 milliliter(s) inhaled every 4 hours, As Needed  -- Indication: For CoPD    amLODIPine 5 mg oral tablet  -- 1 tab(s) by mouth once a day  -- Indication: For HTN    meropenem 1000 mg intravenous injection  -- 1000 milligram(s) intravenous every 8 hours  -- Indication: For Chronic osteomyelitis    petrolatum topical ointment  -- 1 application on skin once a day  -- Indication: For Topical skin    collagenase 250 units/g topical ointment  -- Apply on skin to sacral wound once a day  -- Indication: For Topical skin    furosemide 40 mg oral tablet  -- 1 tab(s) by mouth every 48 hours  -- Indication: For HTN    epoetin giorgio 10,000 units/mL preservative-free injectable solution  -- 22297 unit(s) injectable once a week on Friday  -- Indication: For Anemia    famotidine 20 mg oral tablet  -- 1 tab(s) by mouth once a day  -- Indication: For GeRD    ferrous sulfate 300 mg/5 mL (60 mg elemental iron) oral liquid  -- 5 milliliter(s) by gastrostomy tube every 8 hours  -- Indication: For Anemia    Fleet Enema 7 g-19 g rectal enema  -- 1 application rectally prn, As Needed  -- Indication: For Constipation    bisacodyl 10 mg rectal suppository  -- 1 suppository(ies) rectally prn, As Needed  -- Indication: For Constipation     Dulcolax Stool Softener  -- 5 milligram(s) orally  -- Indication: For Constipation    potassium chloride 20 mEq oral granule, extended release  -- 20 milliequivalent(s) by mouth once a day  -- Indication: For vitamin supplement    Multiple Vitamins with Minerals oral tablet  -- 1 tab(s) by mouth once a day  -- Indication: For vitamin supplement    ascorbic acid 500 mg oral tablet  -- 1 tab(s) by mouth once a day  -- Indication: For vitamin supplement

## 2019-02-12 NOTE — PROGRESS NOTE ADULT - SUBJECTIVE AND OBJECTIVE BOX
[facility]  INFECTIOUS DISEASES AND INTERNAL MEDICINE at [place]  =======================================================  Renzo Salazar MD  Diplomates American Board of Internal Medicine and Infectious Diseases  =======================================================    [place] [id number]    Follow up: fevers and rash  rash improving.   no new issues  still with low grade temps    REVIEW OF SYSTEMS:  Noncommunicative     =======================================================  Antibiotics:  meropenem  IVPB 1000 milliGRAM(s) IV Intermittent every 8 hours    =======================================================    Physical Exam:  T(F): 98.2 (12 Feb 2019 07:55), Max: 100.4 (11 Feb 2019 15:50)  HR: 61 (12 Feb 2019 09:45)  BP: 135/71 (12 Feb 2019 07:55)  RR: 23 (11 Feb 2019 23:56)  SpO2: 100% (12 Feb 2019 09:45) (96% - 100%)    GEN: NAD,    HEENT: normocephalic and atraumatic. EOMI. MIRIAM.   Some swelling of lower lip remains.   NECK: Supple. No carotid bruits.  No lymphadenopathy or thyromegaly. TRACH ON VENT   LUNGS: Clear to auscultation.  HEART: Regular rate and rhythm without murmur.  ABDOMEN: Soft, nontender, and nondistended.  Positive bowel sounds.    : No CVA tenderness  EXTREMITIES: CONTRACTED .  MSK: no joint swelling  NEUROLOGIC: AWAKE EYES OPEN .  SKIN: DIFFUSE MACULOPAPULAR RASH; ISLANDS OF white skin appearing among the red rash on legs and arms.        ========================  Labs:                        11.0   9.7   )-----------( 212      ( 11 Feb 2019 09:35 )             36.7     02-12    143  |  103  |  25.0<H>  ----------------------------<  119<H>  4.4   |  30.0<H>  |  0.51    Ca    8.5<L>      12 Feb 2019 08:40  Phos  2.6     02-12  Mg     2.3     02-12        Culture - Sputum (collected 02-11-19 @ 18:38)  Source: .Sputum  Gram Stain (02-11-19 @ 19:30):    Moderate White blood cells    Few Gram Negative Rods    Few Gram positive cocci in pairs    Culture - Blood (collected 02-07-19 @ 14:06)  Source: .Blood    Culture - Blood (collected 02-07-19 @ 14:06)  Source: .Blood    Culture - Blood (collected 02-04-19 @ 22:21)  Source: .Blood  Final Report (02-09-19 @ 23:01):    No growth at 5 days.    Culture - Blood (collected 02-04-19 @ 14:07)  Source: .Blood  Final Report (02-09-19 @ 14:40):    Growth in anaerobic bottle: Coag Negative Staphylococcus    Anaerobic Bottle: 1 day, 03:44 Hours to positivity    Aerobic Bottle: No growth at 5 days.    .    ***Blood Panel PCR results on this specimen are available    approximately 3 hours after the Gram stain result.***    Gram stain, PCR, and/or culture results may not always    correspond due to difference in methodologies.    ************************************************************    This PCR assay was performed using [employer].    The followingtargets are tested for: Enterococcus,    vancomycin resistant enterococci, Listeria monocytogenes,    coagulase negative staphylococci, S. aureus,    methicillin resistant S. aureus, Streptococcus agalactiae    (Group B), S. pneumoniae, S. pyogenes (Group A),    Acinetobacter baumannii, Enterobacter cloacae, E. coli,    Klebsiella oxytoca, K. pneumoniae, Proteus sp.,    Serratia marcescens, Haemophilus influenzae,    Neisseria meningitidis, Pseudomonas aeruginosa, Candida    albicans, C. glabrata, C krusei, C parapsilosis,    C. tropicalis and the KPC resistance gene.    "Due to technical problems, Proteus sp. will Not be reported as part of    the BCID panel until further notice"    .    TYPE: (C=Critical, N=Notification, A=Abnormal) c    TESTS:  _ bld     DATE/TIME CALLED: _ 02/05/2019 20:06:07    CALLED TO: _ rn lombardi ashley    READ BACK (2 Patient Identifiers)(Y/N): _ y    READ BACK VALUES (Y/N): _ y    CALLED BY: _ kb  Organism: Coag Negative Staphylococcus  Blood Culture PCR (02-09-19 @ 14:40)  Organism: Blood Culture PCR (02-09-19 @ 14:40)    Sensitivities:      -  Coagulase negative Staphylococcus: Detec      Method Type: PCR  Organism: Coag Negative Staphylococcus (02-09-19 @ 14:40)    Sensitivities:      -  Ampicillin/Sulbactam: R <=8/4      -  Cefazolin: R <=4      -  Clindamycin: R >4      -  Erythromycin: R >4      -  Gentamicin: R >8 Should not be used as monotherapy      -  Oxacillin: R >2      -  Penicillin: R 8      -  RIF- Rifampin: S <=1 Should not be used as monotherapy      -  Tetra/Doxy: S <=4      -  Trimethoprim/Sulfamethoxazole: R >2/38      -  Vancomycin: S 2      Method Type: ERNESTINE

## 2019-02-12 NOTE — PROGRESS NOTE ADULT - ASSESSMENT
This is a 87y  Male  with dementia, h/o CAD s/p 2 stents, CHF, respiratory failure on chronic vent, tracheostomy for about 1 year, Chronic sacral decubitus with osteomyelitis, previously finished course of Iv Cefepime and vancomycin for osteomyelitis in 12/2018 per daughter, diverting colostomy and chronic Baugh's catheter. previously seen in march 2018 for  MRSA bacteremia, treated with long course of IV antibiotics.   patient was admitted in January to [facility] and found with Streptococcus septicemia;  Sputum Cx with Stenotrophomonas, MDR Acinetobacter, Proteus and  Pseudomonas Pneumonia.   He Completed 10 days of Levaquin for pneumonia, Completed Zosyn 7 days for pneumonia, and was on Unasyn 3gm IV q 6hours for septicemia, osteomyelitis , due to end on 2/15/19.    Patient was admitted for evaluation of urticaria  Per ER note,  he started having urticaria on his right upper extremity, Unasyn was continued, but also started on Benadryl. Urticaria persisted,     started getting urticaria on bilateral arms as well as swelling of his lower lip which caused the NH to send her to [facility] for evaluation.      Prior note from ID service reviewed.      Patient had previously tolerated Unasyn and Zosyn in the hospital but he was placed on Invaz.   developed fevers on 2/10 on invanz    - Blood cutlures x 2 from 2/7/19 - NEGATIVE in process; 2/10/19 x 2 sets in process.   - UA and UC  ordered - not sent  - Sputum culture ordered and in process    - maintain Meropenem 1gm q8h for empiric pseudomonas coverage as well.  likely to maintain same end-date of 2/15/19 if no new infectious indications requiring antibiotics prolongation are identified     hold vanco    - follow up all outstanding cultures  - trend temperature and WBC curve  - repeat cultures from blood and all sources if febrile.

## 2019-02-12 NOTE — DISCHARGE NOTE ADULT - CARE PROVIDERS DIRECT ADDRESSES
,DirectAddress_Unknown ,DirectAddress_Unknown,[email or web address]

## 2019-02-12 NOTE — DISCHARGE NOTE ADULT - PLAN OF CARE
Resolved Prednisone taper and benadryl as needed Meropenem until 2/15/19 as per ID via PICC line Chronic Continue Vent Support, Trach care/suctioning a new H[place]ard 18 [place] replacement G tube was placed due to clogged peg tube   resume tube feeds Resume blood pressure medications Prednisone taper as prescribed  Benadry as needed supportive care

## 2019-02-12 NOTE — DISCHARGE NOTE ADULT - PATIENT PORTAL LINK FT
You can access the [email or web address] Portal, offered by [facility], by registering with the following website: [email or web address]

## 2019-02-12 NOTE — DISCHARGE NOTE ADULT - MEDICATION SUMMARY - MEDICATIONS TO STOP TAKING
I will STOP taking the medications listed below when I get home from the hospital:  None I will STOP taking the medications listed below when I get home from the hospital:    Fleet Enema 7 g-19 g rectal enema  -- 1 application rectally prn, As Needed    Unasyn 2 g-1 g injection  -- 3 gram(s) intravenously every 6 hours    enoxaparin 40 mg/0.4 mL injectable solution  -- 40 milligram(s) injectable once a day

## 2019-02-13 VITALS — OXYGEN SATURATION: 98 %

## 2019-02-13 PROCEDURE — 87150 DNA/RNA AMPLIFIED PROBE: CPT

## 2019-02-13 PROCEDURE — 94003 VENT MGMT INPAT SUBQ DAY: CPT

## 2019-02-13 PROCEDURE — 93971 EXTREMITY STUDY: CPT

## 2019-02-13 PROCEDURE — 94640 AIRWAY INHALATION TREATMENT: CPT

## 2019-02-13 PROCEDURE — 36415 COLL VENOUS BLD VENIPUNCTURE: CPT

## 2019-02-13 PROCEDURE — 94760 N-INVAS EAR/PLS OXIMETRY 1: CPT

## 2019-02-13 PROCEDURE — 74018 RADEX ABDOMEN 1 VIEW: CPT

## 2019-02-13 PROCEDURE — 87186 SC STD MICRODIL/AGAR DIL: CPT

## 2019-02-13 PROCEDURE — 86140 C-REACTIVE PROTEIN: CPT

## 2019-02-13 PROCEDURE — 99291 CRITICAL CARE FIRST HOUR: CPT | Mod: 25

## 2019-02-13 PROCEDURE — 85652 RBC SED RATE AUTOMATED: CPT

## 2019-02-13 PROCEDURE — 80162 ASSAY OF DIGOXIN TOTAL: CPT

## 2019-02-13 PROCEDURE — 80048 BASIC METABOLIC PNL TOTAL CA: CPT

## 2019-02-13 PROCEDURE — 80053 COMPREHEN METABOLIC PANEL: CPT

## 2019-02-13 PROCEDURE — 97163 PT EVAL HIGH COMPLEX 45 MIN: CPT

## 2019-02-13 PROCEDURE — 96374 THER/PROPH/DIAG INJ IV PUSH: CPT

## 2019-02-13 PROCEDURE — 85730 THROMBOPLASTIN TIME PARTIAL: CPT

## 2019-02-13 PROCEDURE — 36000 PLACE NEEDLE IN VEIN: CPT | Mod: LT,XU

## 2019-02-13 PROCEDURE — 71045 X-RAY EXAM CHEST 1 VIEW: CPT

## 2019-02-13 PROCEDURE — 85027 COMPLETE CBC AUTOMATED: CPT

## 2019-02-13 PROCEDURE — 87640 STAPH A DNA AMP PROBE: CPT

## 2019-02-13 PROCEDURE — 87641 MR-STAPH DNA AMP PROBE: CPT

## 2019-02-13 PROCEDURE — 83735 ASSAY OF MAGNESIUM: CPT

## 2019-02-13 PROCEDURE — 99239 HOSP IP/OBS DSCHRG MGMT >30: CPT

## 2019-02-13 PROCEDURE — 96375 TX/PRO/DX INJ NEW DRUG ADDON: CPT

## 2019-02-13 PROCEDURE — 87070 CULTURE OTHR SPECIMN AEROBIC: CPT

## 2019-02-13 PROCEDURE — 94002 VENT MGMT INPAT INIT DAY: CPT

## 2019-02-13 PROCEDURE — 85610 PROTHROMBIN TIME: CPT

## 2019-02-13 PROCEDURE — 94799 UNLISTED PULMONARY SVC/PX: CPT

## 2019-02-13 PROCEDURE — 87040 BLOOD CULTURE FOR BACTERIA: CPT

## 2019-02-13 PROCEDURE — 84100 ASSAY OF PHOSPHORUS: CPT

## 2019-02-13 PROCEDURE — 81001 URINALYSIS AUTO W/SCOPE: CPT

## 2019-02-13 RX ORDER — APIXABAN 2.5 MG/1
2 TABLET, FILM COATED ORAL
Qty: 0 | Refills: 0 | DISCHARGE
Start: 2019-02-13

## 2019-02-13 RX ORDER — APIXABAN 2.5 MG/1
10 TABLET, FILM COATED ORAL EVERY 12 HOURS
Qty: 0 | Refills: 0 | Status: DISCONTINUED | OUTPATIENT
Start: 2019-02-13 | End: 2019-02-13

## 2019-02-13 RX ADMIN — Medication 3 MILLILITER(S): at 03:24

## 2019-02-13 RX ADMIN — CHLORHEXIDINE GLUCONATE 1 APPLICATION(S): 213 SOLUTION TOPICAL at 05:02

## 2019-02-13 RX ADMIN — Medication 0.25 MILLIGRAM(S): at 05:01

## 2019-02-13 RX ADMIN — Medication 81 MILLIGRAM(S): at 08:59

## 2019-02-13 RX ADMIN — Medication 3 MILLILITER(S): at 09:48

## 2019-02-13 RX ADMIN — Medication 0.25 MILLIGRAM(S): at 09:48

## 2019-02-13 RX ADMIN — CHLORHEXIDINE GLUCONATE 15 MILLILITER(S): 213 SOLUTION TOPICAL at 05:02

## 2019-02-13 RX ADMIN — Medication 500 MILLIGRAM(S): at 08:59

## 2019-02-13 RX ADMIN — Medication 1 APPLICATION(S): at 09:00

## 2019-02-13 RX ADMIN — Medication 100 MILLIGRAM(S): at 05:01

## 2019-02-13 RX ADMIN — AMLODIPINE BESYLATE 5 MILLIGRAM(S): 2.5 TABLET ORAL at 05:01

## 2019-02-13 RX ADMIN — Medication 300 MILLIGRAM(S): at 09:00

## 2019-02-13 RX ADMIN — APIXABAN 10 MILLIGRAM(S): 2.5 TABLET, FILM COATED ORAL at 08:59

## 2019-02-13 RX ADMIN — MEROPENEM 100 MILLIGRAM(S): 1 INJECTION INTRAVENOUS at 05:01

## 2019-02-13 RX ADMIN — FAMOTIDINE 20 MILLIGRAM(S): 10 INJECTION INTRAVENOUS at 08:58

## 2019-02-13 RX ADMIN — LEVETIRACETAM 1000 MILLIGRAM(S): 250 TABLET, FILM COATED ORAL at 08:59

## 2019-02-13 RX ADMIN — Medication 1 TABLET(S): at 08:59

## 2019-02-13 RX ADMIN — Medication 40 MILLIGRAM(S): at 05:01

## 2019-02-13 RX ADMIN — Medication 30 MILLIGRAM(S): at 05:01

## 2019-02-13 NOTE — CONSULT NOTE ADULT - SUBJECTIVE AND OBJECTIVE BOX
Vascular Attending:  Dr Chaparro Azul      HPI:  87 years old male with PMH of Respiratory Failure s/p Trach + PEG, Sacral Ulcer, Osteomyelitis, Colostomy, Prostate Cancer, Chronic Baugh's Catheter, Dementia, Seizure Disorder, CAD, CHF, HTN and HLD sent from [facility] with increased temperature, clogged PICC and body rash secondary to Unasyn.   Patient's daughter at bedside, as per her, patient developed rash while on Unasyn. Benadryl was started however Unasyn was continued. His rash was persistent and this morning he developed lip swelling so he was sent to ER.   Patient has a history of Multiple Hospitalizations and he was discharged from [facility] on 19 after being treated for Sepsis secondary to Sacral Osteomyelitis. (2019 19:11)  Had R PICC line placed on 19 for access and abx therapy and was recently noted with RUE edema which prompted a duplex. Duplex revealed nonocclusive thrombus surrounding PICC in axillary vein.    PAST MEDICAL & SURGICAL HISTORY:  Dysphagia  Fall: Multiple Mechanical falls  CAD (coronary artery disease)  Clostridium difficile diarrhea: in   BPH (benign prostatic hypertrophy)  Dementia  HLD (hyperlipidemia)  CHF (congestive heart failure)  Prostate cancer: Treated w/ radiation  Stroke: (~5yrs ago)  Seizure: (last event &gt;1yr ago)  HTN (hypertension)  Colostomy in place  S/P percutaneous endoscopic gastrostomy (PEG) tube placement  H/O hemorrhoidectomy  Deviated septum  Abdominal hernia  Status post cardiac surgery: stents x 2      REVIEW OF SYSTEMS-unable to obtain    MEDICATIONS  (STANDING):  ALBUTerol/ipratropium for Nebulization 3 milliLiter(s) Nebulizer every 6 hours  amLODIPine   Tablet 5 milliGRAM(s) Oral daily  apixaban 10 milliGRAM(s) Oral every 12 hours  AQUAPHOR (petrolatum Ointment) 1 Application(s) Topical daily  ascorbic acid 500 milliGRAM(s) Oral daily  aspirin  chewable 81 milliGRAM(s) Oral daily  buDESOnide   0.25 milliGRAM(s) Respule 0.25 milliGRAM(s) Inhalation two times a day  chlorhexidine 0.12% Liquid 15 milliLiter(s) Oral Mucosa two times a day  chlorhexidine 2% Cloths 1 Application(s) Topical <User Schedule>  collagenase Ointment 1 Application(s) Topical daily  Dakins Solution - 1/4 Strength 1 Application(s) Topical daily  digoxin     Tablet 0.25 milliGRAM(s) Oral daily  epoetin giorgio Injectable 61900 Unit(s) SubCutaneous <User Schedule>  famotidine Injectable 20 milliGRAM(s) IV Push daily  ferrous    sulfate Liquid 300 milliGRAM(s) Oral three times a day with meals  furosemide    Tablet 40 milliGRAM(s) Oral daily  labetalol 100 milliGRAM(s) Oral every 8 hours  levETIRAcetam 1000 milliGRAM(s) Oral two times a day  meropenem  IVPB 1000 milliGRAM(s) IV Intermittent every 8 hours  multivitamin/minerals 1 Tablet(s) Oral daily  phenytoin   Suspension 400 milliGRAM(s) Oral every 12 hours  tamsulosin 0.4 milliGRAM(s) Oral at bedtime    MEDICATIONS  (PRN):  acetaminophen   Tablet .. 650 milliGRAM(s) Oral every 6 hours PRN Temp greater or equal to 38C (100.4F), Mild Pain (1 - 3), Moderate Pain (4 - 6)  artificial  tears Solution 1 Drop(s) Both EYES every 6 hours PRN Dry Eyes  bisacodyl Suppository 10 milliGRAM(s) Rectal daily PRN Constipation  polyethylene glycol 3350 17 Gram(s) Oral daily PRN Constipation      Allergies  clindamycin (Unknown)  Grapes (Rash)  Nuts (Hives; Rash)  PC Pen VK (Unknown)  shellfish (Angioedema; Rash; Hives)  strawberry (Short breath; Rash; Hives)  Xanax (Rash)    Intolerances  Ativan (Unknown)  Haldol (Dystonic RXN)  hydrALAZINE (Unknown)  Zyprexa (Unknown)      SOCIAL HISTORY:  Resident of [facility].      Vital Signs Last 24 Hrs  T(C): 36.9 (2019 16:38), Max: 36.9 (2019 16:38)  T(F): 98.4 (2019 16:38), Max: 98.4 (2019 16:38)  HR: 68 (2019 07:54) (57 - 82)  BP: 116/64 (2019 07:54) (116/64 - 139/70)  BP(mean): --  RR: 18 (2019 16:38) (18 - 18)  SpO2: 98% (2019 07:54) (98% - 100%)    PHYSICAL EXAM:  Constitutional: Obtunded, non verbal M in NAD  Neck: No JVD  Respiratory: Coarse BS bilat  Cardiovascular: normal S1, S2  Gastrointestinal: PEG in situ  Extremities: generalized edema. RUE with more pronounced edema, PICC line in place. Site CDI. R radial and brachial pulses 2+  Skin: generalized rash      LABS:                        10.0   10.0  )-----------( 224      ( 2019 08:40 )             34.0     02-12    143  |  103  |  25.0<H>  ----------------------------<  119<H>  4.4   |  30.0<H>  |  0.51    Ca    8.5<L>      2019 08:40  Phos  2.6     02  Mg     2.3     02-12        Urinalysis Basic - ( 2019 18:37 )    Color: Yellow / Appearance: Slightly Turbid / S.020 / pH: x  Gluc: x / Ketone: Trace  / Bili: Negative / Urobili: 1 mg/dL   Blood: x / Protein: 30 mg/dL / Nitrite: Negative   Leuk Esterase: Moderate / RBC: 0-2 /HPF / WBC 11-25   Sq Epi: x / Non Sq Epi: Occasional / Bacteria: Occasional        RADIOLOGY & ADDITIONAL STUDIES  < from: US Duplex Venous Upper Ext Ltd, Right (19 @ 14:31) >  EXAM:  US DPLX UPR EXT VEINS LTD RT                          PROCEDURE DATE:  2019          INTERPRETATION:  CLINICAL INFORMATION: Edema right PICC line in place    COMPARISON: Ultrasound dated 2019    TECHNIQUE: Duplex sonography of theRIGHT UPPER extremity with color and   spectral Doppler, with and without compression.      FINDINGS:  There is partial compression with diminished flow and thrombus   surrounding the PICC line in the right axillary vein.    The right internal jugular, subclavian, brachial, basilic and cephalic   veins are patent and compressible where applicable.     Doppler examination shows normal spontaneous and phasic flow.    IMPRESSION:     Nonocclusive right axillary vein thrombus surrounding the PICC line.    < end of copied text >    Impression and Plan: 87 years old male with PMH of Respiratory Failure s/p Trach + PEG, Sacral Ulcer, Osteomyelitis, Colostomy, Prostate Cancer, Chronic Baugh's Catheter, Dementia, Seizure Disorder, CAD, CHF, HTN and HLD sent from [facility] with increased temperature, clogged PICC now with catheter associated DVT  Pt requires cont abx therapy and has difficulty with access.  No contraindication to maintaining PICC line for duration of therapy, however recommend AC therapy if no contraindication  Duration of therapy based on length of time PICC remains + 3months after discontinuation  ACE and elevate RUE  Passive ROM/OT  Discussed with Dr Chaparro Azul and JUNE Levine Vascular Attending:  Dr Chaparro Azul      HPI:  87 years old male with PMH of Respiratory Failure s/p Trach + PEG, Sacral Ulcer, Osteomyelitis, Colostomy, Prostate Cancer, Chronic Baugh's Catheter, Dementia, Seizure Disorder, CAD, CHF, HTN and HLD sent from [facility] with increased temperature, clogged PICC and body rash secondary to Unasyn.   Patient's daughter at bedside, as per her, patient developed rash while on Unasyn. Benadryl was started however Unasyn was continued. His rash was persistent and this morning he developed lip swelling so he was sent to ER.   Patient has a history of Multiple Hospitalizations and he was discharged from [facility] on 19 after being treated for Sepsis secondary to Sacral Osteomyelitis. (2019 19:11)  Had R PICC line placed on 19 for access and abx therapy and was recently noted with RUE edema which prompted a duplex. Duplex revealed nonocclusive thrombus surrounding PICC in axillary vein.      PAST MEDICAL & SURGICAL HISTORY:  Dysphagia  Fall: Multiple Mechanical falls  CAD (coronary artery disease)  Clostridium difficile diarrhea: in   BPH (benign prostatic hypertrophy)  Dementia  HLD (hyperlipidemia)  CHF (congestive heart failure)  Prostate cancer: Treated w/ radiation  Stroke: (~5yrs ago)  Seizure: (last event &gt;1yr ago)  HTN (hypertension)  Colostomy in place  S/P percutaneous endoscopic gastrostomy (PEG) tube placement  H/O hemorrhoidectomy  Deviated septum  Abdominal hernia  Status post cardiac surgery: stents x 2      REVIEW OF SYSTEMS-unable to obtain    MEDICATIONS  (STANDING):  ALBUTerol/ipratropium for Nebulization 3 milliLiter(s) Nebulizer every 6 hours  amLODIPine   Tablet 5 milliGRAM(s) Oral daily  apixaban 10 milliGRAM(s) Oral every 12 hours  AQUAPHOR (petrolatum Ointment) 1 Application(s) Topical daily  ascorbic acid 500 milliGRAM(s) Oral daily  aspirin  chewable 81 milliGRAM(s) Oral daily  buDESOnide   0.25 milliGRAM(s) Respule 0.25 milliGRAM(s) Inhalation two times a day  chlorhexidine 0.12% Liquid 15 milliLiter(s) Oral Mucosa two times a day  chlorhexidine 2% Cloths 1 Application(s) Topical <User Schedule>  collagenase Ointment 1 Application(s) Topical daily  Dakins Solution - 1/4 Strength 1 Application(s) Topical daily  digoxin     Tablet 0.25 milliGRAM(s) Oral daily  epoetin giorgio Injectable 24851 Unit(s) SubCutaneous <User Schedule>  famotidine Injectable 20 milliGRAM(s) IV Push daily  ferrous    sulfate Liquid 300 milliGRAM(s) Oral three times a day with meals  furosemide    Tablet 40 milliGRAM(s) Oral daily  labetalol 100 milliGRAM(s) Oral every 8 hours  levETIRAcetam 1000 milliGRAM(s) Oral two times a day  meropenem  IVPB 1000 milliGRAM(s) IV Intermittent every 8 hours  multivitamin/minerals 1 Tablet(s) Oral daily  phenytoin   Suspension 400 milliGRAM(s) Oral every 12 hours  tamsulosin 0.4 milliGRAM(s) Oral at bedtime    MEDICATIONS  (PRN):  acetaminophen   Tablet .. 650 milliGRAM(s) Oral every 6 hours PRN Temp greater or equal to 38C (100.4F), Mild Pain (1 - 3), Moderate Pain (4 - 6)  artificial  tears Solution 1 Drop(s) Both EYES every 6 hours PRN Dry Eyes  bisacodyl Suppository 10 milliGRAM(s) Rectal daily PRN Constipation  polyethylene glycol 3350 17 Gram(s) Oral daily PRN Constipation      Allergies  clindamycin (Unknown)  Grapes (Rash)  Nuts (Hives; Rash)  PC Pen VK (Unknown)  shellfish (Angioedema; Rash; Hives)  strawberry (Short breath; Rash; Hives)  Xanax (Rash)    Intolerances  Ativan (Unknown)  Haldol (Dystonic RXN)  hydrALAZINE (Unknown)  Zyprexa (Unknown)      SOCIAL HISTORY:  Resident of [facility].      Vital Signs Last 24 Hrs  T(C): 36.9 (2019 16:38), Max: 36.9 (2019 16:38)  T(F): 98.4 (2019 16:38), Max: 98.4 (2019 16:38)  HR: 68 (2019 07:54) (57 - 82)  BP: 116/64 (2019 07:54) (116/64 - 139/70)  BP(mean): --  RR: 18 (2019 16:38) (18 - 18)  SpO2: 98% (2019 07:54) (98% - 100%)    PHYSICAL EXAM:  Constitutional: Obtunded, non verbal M in NAD  Neck: No JVD  Respiratory: Coarse BS bilat  Cardiovascular: normal S1, S2  Gastrointestinal: PEG in situ  Extremities: generalized edema. RUE with more pronounced edema, PICC line in place. Site CDI. R radial and brachial pulses 2+  Skin: generalized rash      LABS:                        10.0   10.0  )-----------( 224      ( 2019 08:40 )             34.0     02-12    143  |  103  |  25.0<H>  ----------------------------<  119<H>  4.4   |  30.0<H>  |  0.51    Ca    8.5<L>      2019 08:40  Phos  2.6     02  Mg     2.3     02-12        Urinalysis Basic - ( 2019 18:37 )    Color: Yellow / Appearance: Slightly Turbid / S.020 / pH: x  Gluc: x / Ketone: Trace  / Bili: Negative / Urobili: 1 mg/dL   Blood: x / Protein: 30 mg/dL / Nitrite: Negative   Leuk Esterase: Moderate / RBC: 0-2 /HPF / WBC 11-25   Sq Epi: x / Non Sq Epi: Occasional / Bacteria: Occasional        RADIOLOGY & ADDITIONAL STUDIES  < from: US Duplex Venous Upper Ext Ltd, Right (19 @ 14:31) >  EXAM:  US DPLX UPR EXT VEINS LTD RT                          PROCEDURE DATE:  2019          INTERPRETATION:  CLINICAL INFORMATION: Edema right PICC line in place    COMPARISON: Ultrasound dated 2019    TECHNIQUE: Duplex sonography of theRIGHT UPPER extremity with color and   spectral Doppler, with and without compression.      FINDINGS:  There is partial compression with diminished flow and thrombus   surrounding the PICC line in the right axillary vein.    The right internal jugular, subclavian, brachial, basilic and cephalic   veins are patent and compressible where applicable.     Doppler examination shows normal spontaneous and phasic flow.    IMPRESSION:     Nonocclusive right axillary vein thrombus surrounding the PICC line.    < end of copied text >    Impression and Plan: 87 years old male with PMH of Respiratory Failure s/p Trach + PEG, Sacral Ulcer, Osteomyelitis, Colostomy, Prostate Cancer, Chronic Baugh's Catheter, Dementia, Seizure Disorder, CAD, CHF, HTN and HLD sent from [facility] with increased temperature, clogged PICC now with catheter associated DVT  Pt requires cont abx therapy and has difficulty with access.  No contraindication to maintaining PICC line for duration of therapy, however recommend AC therapy if no contraindication  Duration of therapy based on length of time PICC remains + 3months after discontinuation  ACE and elevate RUE  Passive ROM/OT  Discussed with Dr Montalvo and JUNE Levine

## 2019-02-13 NOTE — CONSULT NOTE ADULT - REASON FOR ADMISSION
Increased Temperature, PICC Clogged and Body Rash secondary to Unasyn

## 2019-02-14 LAB
-  AMIKACIN: SIGNIFICANT CHANGE UP
-  AZTREONAM: SIGNIFICANT CHANGE UP
-  CEFEPIME: SIGNIFICANT CHANGE UP
-  CEFTAZIDIME: SIGNIFICANT CHANGE UP
-  CIPROFLOXACIN: SIGNIFICANT CHANGE UP
-  GENTAMICIN: SIGNIFICANT CHANGE UP
-  IMIPENEM: SIGNIFICANT CHANGE UP
-  LEVOFLOXACIN: SIGNIFICANT CHANGE UP
-  MEROPENEM: SIGNIFICANT CHANGE UP
-  PIPERACILLIN/TAZOBACTAM: SIGNIFICANT CHANGE UP
-  TOBRAMYCIN: SIGNIFICANT CHANGE UP
METHOD TYPE: SIGNIFICANT CHANGE UP

## 2019-02-15 LAB
-  LEVOFLOXACIN: SIGNIFICANT CHANGE UP
-  TRIMETHOPRIM/SULFAMETHOXAZOLE: SIGNIFICANT CHANGE UP
CULTURE RESULTS: SIGNIFICANT CHANGE UP
METHOD TYPE: SIGNIFICANT CHANGE UP
ORGANISM # SPEC MICROSCOPIC CNT: SIGNIFICANT CHANGE UP
SPECIMEN SOURCE: SIGNIFICANT CHANGE UP

## 2019-06-15 NOTE — PROGRESS NOTE ADULT - SUBJECTIVE AND OBJECTIVE BOX
[facility]  INFECTIOUS DISEASES AND INTERNAL MEDICINE at [place]  =======================================================  Renzo Salazar MD  Diplomates American Board of Internal Medicine and Infectious Diseases  =======================================================    KING MCKEON [id number]    Follow up: Streptococcus sepsis / MDR bacteria Pneumonia      No fevers      Allergies:  Ativan (Unknown)  clindamycin (Unknown)  Grapes (Rash)  Haldol (Dystonic RXN)  hydrALAZINE (Unknown)  Nuts (Hives; Rash)  PC Pen VK (Unknown)  shellfish (Angioedema; Rash; Hives)  strawberry (Short breath; Rash; Hives)  Xanax (Rash)  Zyprexa (Unknown)      Antibiotics:  ampicillin/sulbactam  IVPB 3 Gram(s) IV Intermittent every 6 hours       REVIEW OF SYSTEMS:  Unable to obtain, patient not able to provide history       Physical Exam:  Vital Signs Last 24 Hrs  T(C): 37.2 (17 Jan 2019 04:00), Max: 37.2 (17 Jan 2019 04:00)  T(F): 98.9 (17 Jan 2019 04:00), Max: 98.9 (17 Jan 2019 04:00)  HR: 66 (17 Jan 2019 08:33) (64 - 100)  BP: 118/58 (17 Jan 2019 08:00) (116/56 - 147/81)  BP(mean): 83 (17 Jan 2019 08:00) (80 - 109)  RR: 28 (17 Jan 2019 08:00) (18 - 28)  SpO2: 100% (17 Jan 2019 08:33) (100% - 100%)      GEN: NAD  HEENT: normocephalic and atraumatic. Anicteric. + Trach  NECK: Supple.   LUNGS: Course BS B/L  HEART: Regular rate and rhythm   ABDOMEN: Soft, nontender, and nondistended.  Positive bowel sounds.  + PEG  EXTREMITIES: + edema.  MSK: no joint swelling  NEUROLOGIC: Awake, no meaningful communication   PSYCHIATRIC:  unable to assess  SKIN: No Rash      Labs:  01-16    140  |  101  |  32.0<H>  ----------------------------<  109  4.0   |  29.0  |  0.68    Ca    9.3      16 Jan 2019 04:31               9.7    7.8   )-----------( 339      ( 16 Jan 2019 04:31 )             33.2     RECENT CULTURES:  01-07 @ 18:20 .Sputum Acinetobacter baumannii/haemolyticus (Carbapenem Resistant)  Pseudomonas aeruginosa    Moderate Acinetobacter baumannii/haemolyticus (Carbapenem Resistant)  ***KNOWN***  Moderate Pseudomonas aeruginosa (Carbapenem Resistant)  Moderate White blood cells  Few Gram Negative Rods  Few Gram Positive Cocci in Pairs and Chains      01-07 @ 14:01 .Urine     No growth      01-07 @ 07:30 .Sputum Stenotrophomonas maltophilia  Proteus mirabilis  Acinetobacter baumannii/haemolyticus (Carbapenem Resistant)  Pseudomonas aeruginosa (Carbapenem Resistant)    Moderate Acinetobacter baumannii (Carbapenem Resistant)  Moderate Stenotrophomonas maltophilia  Moderate Pseudomonas aeruginosa (Carbapenem Resistant)  Moderate Proteus mirabilis  Few Routine respiratory bonnie present  Numerous White blood cells  Moderate Gram Negative Rods  Few Gram positive cocci in pairs      01-07 @ 05:46 .Blood     No growth at 5 days.      01-05 @ 10:49 .Blood     No growth at 5 days.      01-03 @ 20:12 .Urine Pseudomonas aeruginosa (Carbapenem Resistant)    10,000 - 49,000 CFU/mL Pseudomonas aeruginosa (Carbapenem Resistant)      01-03 @ 20:04    [facility]      01-03 @ 18:47 .Blood Beta Hemolytic Streptococci, Group G    Growth in anaerobic bottle: Beta Hemolytic Streptococci, Group G  Anaerobic Bottle: 10:36 Hours to positivity  Aerobic Bottle: No growth at 5 days.      01-03 @ 18:46 .Blood Beta Hemolytic Streptococci, Group G  Blood Culture PCR    Growth in aerobic bottle: Beta Hemolytic Streptococci, Group G  Aerobic Bottle: 10:54 Hours to positivity  Anaerobic Bottle: No growth at 5 days.  ***Blood Panel PCR results on this specimen are available  approximately 3 hours after the Gram stain result.***  Gram stain, PCR, and/or culture results may not always  correspond due to difference in methodologies.  ************************************************************  This PCR assay was performed using [employer].  The following targets are tested for: Enterococcus,  vancomycin resistant enterococci, Listeria monocytogenes,  coagulase negative staphylococci, S. aureus,  methicillin resistant S. aureus, Streptococcus agalactiae  (Group B), S. pneumoniae, S. pyogenes (Group A),  Acinetobacter baumannii, Enterobacter cloacae, E. coli,  Klebsiella oxytoca, K. pneumoniae, Proteus sp.,  Serratia marcescens, Haemophilus influenzae,  Neisseria meningitidis, Pseudomonas aeruginosa, Candida  albicans, C. glabrata, C krusei, C parapsilosis,  C. tropicalis and the KPC resistance gene.  "Due to technical problems, Proteus sp. will Not be reported as part of  the BCID panel until further notice" individual instruction

## 2019-10-08 ENCOUNTER — INPATIENT (INPATIENT)
Facility: HOSPITAL | Age: 84
LOS: 22 days | DRG: 870 | End: 2019-10-31
Attending: HOSPITALIST | Admitting: INTERNAL MEDICINE
Payer: MEDICARE

## 2019-10-08 VITALS
HEIGHT: 70 IN | DIASTOLIC BLOOD PRESSURE: 72 MMHG | OXYGEN SATURATION: 98 % | TEMPERATURE: 103 F | SYSTOLIC BLOOD PRESSURE: 105 MMHG | WEIGHT: 199.96 LBS

## 2019-10-08 DIAGNOSIS — K46.9 UNSPECIFIED ABDOMINAL HERNIA WITHOUT OBSTRUCTION OR GANGRENE: Chronic | ICD-10-CM

## 2019-10-08 DIAGNOSIS — Z98.89 OTHER SPECIFIED POSTPROCEDURAL STATES: Chronic | ICD-10-CM

## 2019-10-08 DIAGNOSIS — J34.2 DEVIATED NASAL SEPTUM: Chronic | ICD-10-CM

## 2019-10-08 DIAGNOSIS — Z93.3 COLOSTOMY STATUS: Chronic | ICD-10-CM

## 2019-10-08 DIAGNOSIS — Z93.1 GASTROSTOMY STATUS: Chronic | ICD-10-CM

## 2019-10-08 LAB
ALBUMIN SERPL ELPH-MCNC: 2.3 G/DL — LOW (ref 3.3–5.2)
ALP SERPL-CCNC: 427 U/L — HIGH (ref 40–120)
ALT FLD-CCNC: 20 U/L — SIGNIFICANT CHANGE UP
ANION GAP SERPL CALC-SCNC: 18 MMOL/L — HIGH (ref 5–17)
APPEARANCE UR: SIGNIFICANT CHANGE UP
APTT BLD: 30.3 SEC — SIGNIFICANT CHANGE UP (ref 27.5–36.3)
AST SERPL-CCNC: 36 U/L — SIGNIFICANT CHANGE UP
BACTERIA # UR AUTO: ABNORMAL
BASOPHILS # BLD AUTO: 0.03 K/UL — SIGNIFICANT CHANGE UP (ref 0–0.2)
BASOPHILS NFR BLD AUTO: 0.2 % — SIGNIFICANT CHANGE UP (ref 0–2)
BILIRUB SERPL-MCNC: 0.3 MG/DL — LOW (ref 0.4–2)
BILIRUB UR-MCNC: NEGATIVE — SIGNIFICANT CHANGE UP
BLD GP AB SCN SERPL QL: SIGNIFICANT CHANGE UP
BUN SERPL-MCNC: 126 MG/DL — HIGH (ref 8–20)
CALCIUM SERPL-MCNC: 9 MG/DL — SIGNIFICANT CHANGE UP (ref 8.6–10.2)
CHLORIDE SERPL-SCNC: 93 MMOL/L — LOW (ref 98–107)
CO2 SERPL-SCNC: 27 MMOL/L — SIGNIFICANT CHANGE UP (ref 22–29)
COLOR SPEC: ABNORMAL
CREAT SERPL-MCNC: 4.58 MG/DL — HIGH (ref 0.5–1.3)
DIFF PNL FLD: ABNORMAL
EOSINOPHIL # BLD AUTO: 0.78 K/UL — HIGH (ref 0–0.5)
EOSINOPHIL NFR BLD AUTO: 5.6 % — SIGNIFICANT CHANGE UP (ref 0–6)
EPI CELLS # UR: SIGNIFICANT CHANGE UP
GLUCOSE SERPL-MCNC: 115 MG/DL — SIGNIFICANT CHANGE UP (ref 70–115)
GLUCOSE UR QL: NEGATIVE — SIGNIFICANT CHANGE UP
HCT VFR BLD CALC: 24.2 % — LOW (ref 39–50)
HGB BLD-MCNC: 7.2 G/DL — LOW (ref 13–17)
IMM GRANULOCYTES NFR BLD AUTO: 1.6 % — HIGH (ref 0–1.5)
INR BLD: 1.15 RATIO — SIGNIFICANT CHANGE UP (ref 0.88–1.16)
KETONES UR-MCNC: NEGATIVE — SIGNIFICANT CHANGE UP
LACTATE BLDV-MCNC: 1.5 MMOL/L — SIGNIFICANT CHANGE UP (ref 0.5–2)
LEUKOCYTE ESTERASE UR-ACNC: ABNORMAL
LYMPHOCYTES # BLD AUTO: 1.12 K/UL — SIGNIFICANT CHANGE UP (ref 1–3.3)
LYMPHOCYTES # BLD AUTO: 8 % — LOW (ref 13–44)
MCHC RBC-ENTMCNC: 29.8 GM/DL — LOW (ref 32–36)
MCHC RBC-ENTMCNC: 30.9 PG — SIGNIFICANT CHANGE UP (ref 27–34)
MCV RBC AUTO: 103.9 FL — HIGH (ref 80–100)
MONOCYTES # BLD AUTO: 1.43 K/UL — HIGH (ref 0–0.9)
MONOCYTES NFR BLD AUTO: 10.2 % — SIGNIFICANT CHANGE UP (ref 2–14)
NEUTROPHILS # BLD AUTO: 10.46 K/UL — HIGH (ref 1.8–7.4)
NEUTROPHILS NFR BLD AUTO: 74.4 % — SIGNIFICANT CHANGE UP (ref 43–77)
NITRITE UR-MCNC: NEGATIVE — SIGNIFICANT CHANGE UP
PH UR: 8 — SIGNIFICANT CHANGE UP (ref 5–8)
PLATELET # BLD AUTO: 343 K/UL — SIGNIFICANT CHANGE UP (ref 150–400)
POTASSIUM SERPL-MCNC: 5.6 MMOL/L — HIGH (ref 3.5–5.3)
POTASSIUM SERPL-SCNC: 5.6 MMOL/L — HIGH (ref 3.5–5.3)
PROT SERPL-MCNC: 6.1 G/DL — LOW (ref 6.6–8.7)
PROT UR-MCNC: 100
PROTHROM AB SERPL-ACNC: 13.3 SEC — HIGH (ref 10–12.9)
RBC # BLD: 2.33 M/UL — LOW (ref 4.2–5.8)
RBC # FLD: 17.6 % — HIGH (ref 10.3–14.5)
RBC CASTS # UR COMP ASSIST: >50 /HPF (ref 0–4)
SODIUM SERPL-SCNC: 138 MMOL/L — SIGNIFICANT CHANGE UP (ref 135–145)
SP GR SPEC: 1.01 — SIGNIFICANT CHANGE UP (ref 1.01–1.02)
UROBILINOGEN FLD QL: NEGATIVE — SIGNIFICANT CHANGE UP
WBC # BLD: 14.05 K/UL — HIGH (ref 3.8–10.5)
WBC # FLD AUTO: 14.05 K/UL — HIGH (ref 3.8–10.5)
WBC UR QL: ABNORMAL

## 2019-10-08 PROCEDURE — 71045 X-RAY EXAM CHEST 1 VIEW: CPT | Mod: 26

## 2019-10-08 PROCEDURE — 99291 CRITICAL CARE FIRST HOUR: CPT

## 2019-10-08 RX ORDER — MEROPENEM 1 G/30ML
1000 INJECTION INTRAVENOUS ONCE
Refills: 0 | Status: COMPLETED | OUTPATIENT
Start: 2019-10-08 | End: 2019-10-08

## 2019-10-08 RX ORDER — SODIUM CHLORIDE 9 MG/ML
2800 INJECTION, SOLUTION INTRAVENOUS ONCE
Refills: 0 | Status: COMPLETED | OUTPATIENT
Start: 2019-10-08 | End: 2019-10-08

## 2019-10-08 RX ORDER — ACETAMINOPHEN 500 MG
650 TABLET ORAL ONCE
Refills: 0 | Status: COMPLETED | OUTPATIENT
Start: 2019-10-08 | End: 2019-10-08

## 2019-10-08 RX ADMIN — MEROPENEM 1000 MILLIGRAM(S): 1 INJECTION INTRAVENOUS at 23:00

## 2019-10-08 RX ADMIN — Medication 650 MILLIGRAM(S): at 22:15

## 2019-10-08 RX ADMIN — SODIUM CHLORIDE 2800 MILLILITER(S): 9 INJECTION, SOLUTION INTRAVENOUS at 22:30

## 2019-10-08 RX ADMIN — Medication 650 MILLIGRAM(S): at 22:45

## 2019-10-08 RX ADMIN — MEROPENEM 100 MILLIGRAM(S): 1 INJECTION INTRAVENOUS at 22:20

## 2019-10-08 NOTE — ED PROVIDER NOTE - CRITICAL CARE PROVIDED
direct patient care (not related to procedure)/interpretation of diagnostic studies/documentation/consult w/ pt's family directly relating to pts condition/additional history taking

## 2019-10-08 NOTE — ED PROVIDER NOTE - CARE PLAN
Principal Discharge DX:	Sepsis with acute renal failure without septic shock, due to unspecified organism, unspecified acute renal failure type  Secondary Diagnosis:	Anemia, unspecified type  Secondary Diagnosis:	Dementia  Secondary Diagnosis:	Colostomy in place

## 2019-10-08 NOTE — ED PROVIDER NOTE - PRINCIPAL DIAGNOSIS
Sepsis with acute renal failure without septic shock, due to unspecified organism, unspecified acute renal failure type

## 2019-10-08 NOTE — ED PROVIDER NOTE - OBJECTIVE STATEMENT
87 year old male with a Hx of Colostomy, Ulcers, Bed Sores, Foot Infection presenting to the ED c/o fever. Per daughter, patient was recently at [facility] for infections and allergic reactions. States patient was prescribed antibiotics that he was allergic due resulting in an allergic reaction. States he was being treated at [facility] 1 week ago but was sent to the ED because his nephrologist said his hemoglobin levels were 6.9 and that he might need a transfusion. States his creatinine may have been going up, which could be significant blood loss. Patient's body temperature was taken today underneath the arm that was 102.6. HPI is limited due to patient's current condition.

## 2019-10-08 NOTE — ED ADULT NURSE NOTE - CHIEF COMPLAINT QUOTE
Patient presents to ER complaining of fever, shortness of breath, abnormal labs, (low H&H) patient with tracheostomy, jones, daughter at the bedside, resp called to bedside. Patient with 8x10 cm unstageable pressure ulcer on coccix, pressure ulcer noted on bilateral heels with dressings.

## 2019-10-08 NOTE — ED ADULT TRIAGE NOTE - CHIEF COMPLAINT QUOTE
Patient presents to ER complaining of fever, shortness of breath, abnormal labs, (low H&H) patient with tracheostomy, jones, daughter at the bedside, resp called to bedside. Patient presents to ER complaining of fever, shortness of breath, abnormal labs, (low H&H) patient with tracheostomy, jones, daughter at the bedside, resp called to bedside. Patient with 8x10 cm unstageable pressure ulcer on coccix. Patient presents to ER complaining of fever, shortness of breath, abnormal labs, (low H&H) patient with tracheostomy, jones, daughter at the bedside, resp called to bedside. Patient with 8x10 cm unstageable pressure ulcer on coccix, pressure ulcer noted on bilateral heels with dressings.

## 2019-10-08 NOTE — ED ADULT NURSE NOTE - OBJECTIVE STATEMENT
Pt received at baseline mental status, trached on ventilator, NSR noted on monitor, with daughter at bedside. Per daughter, patient was recently at [facility] for infections and allergic reactions. States patient was prescribed antibiotics that he was allergic due resulting in an allergic reaction. States he was being treated at [facility] 1 week ago but was sent to the ED because his nephrologist said his hemoglobin levels were 6.9 and that he might need a transfusion.  Patient's body temperature was taken today underneath the arm that was 102.6. Code Sepsis called, Dr. Hilliard at bedside to evaluate pt. Daughter updated on plan of care, will continue to reeducate.

## 2019-10-08 NOTE — ED PROVIDER NOTE - ENMT, MLM
Airway patent, Nasal mucosa clear. Mouth with normal mucosa. Throat has no vesicles, no oropharyngeal exudates and uvula is midline. Airway patent, Nasal mucosa clear. Mouth with normal mucosa. Throat has no vesicles, no oropharyngeal exudates and uvula is midline. trach site clean and dry

## 2019-10-08 NOTE — ED ADULT NURSE NOTE - PMH
Telephone Encounter by Brandee Valles RN, BSN at 01/31/18 08:55 AM     Author:  Brandee Valles RN, BSN Service:  (none) Author Type:  Registered Nurse     Filed:  01/31/18 09:05 AM Encounter Date:  1/31/2018 Status:  Signed     :  Brandee Valles RN, BSN (Registered Nurse)            Spoke with patient.  Advised patient of message below.  Advised patient she will need to call her insurance to see who her preferred vendor is for ostomy supplies since our [facility] no longer provides supplies for PPO patients (only HMO patients).  Patient was not happy about this on the phone.  Patient will call her insurance to see who her preferred vendor is and will call us back to let us know so an order can be sent.[LH1.1M]  Kymberly verbalized understanding of information given.[LH1.1T]      Revision History        User Key Date/Time User Provider Type Action    > LH1.1 01/31/18 09:05 AM Brandee Valles RN, BSN Registered Nurse Sign    M - Manual, T - Template            
Telephone Encounter by Brittanie Mccormick at 01/31/18 08:40 AM     Author:  Brittanie Mccormick Service:  (none) Author Type:       Filed:  01/31/18 08:43 AM Encounter Date:  1/31/2018 Status:  Signed     :  Brittanie Mccormick ()            FYI - [facility] does not supply ostomy supplies for PPO patients.  Please fax this order to a vendor that your department uses (i.e., [employer]?, [employer]?)[DT1.1M]      Revision History        User Key Date/Time User Provider Type Action    > DT1.1 01/31/18 08:43 AM Brittanie Mccormick  Sign    M - Manual            
BPH (benign prostatic hypertrophy)    CAD (coronary artery disease)    CHF (congestive heart failure)    Clostridium difficile diarrhea  in 2009  Dementia    Dysphagia    Fall  Multiple Mechanical falls  HLD (hyperlipidemia)    HTN (hypertension)    Prostate cancer  Treated w/ radiation  Seizure  (last event >1yr ago)  Stroke  (~5yrs ago)

## 2019-10-09 DIAGNOSIS — N17.9 ACUTE KIDNEY FAILURE, UNSPECIFIED: ICD-10-CM

## 2019-10-09 DIAGNOSIS — D64.9 ANEMIA, UNSPECIFIED: ICD-10-CM

## 2019-10-09 DIAGNOSIS — A41.9 SEPSIS, UNSPECIFIED ORGANISM: ICD-10-CM

## 2019-10-09 DIAGNOSIS — E87.5 HYPERKALEMIA: ICD-10-CM

## 2019-10-09 DIAGNOSIS — J96.10 CHRONIC RESPIRATORY FAILURE, UNSPECIFIED WHETHER WITH HYPOXIA OR HYPERCAPNIA: ICD-10-CM

## 2019-10-09 LAB
ALBUMIN SERPL ELPH-MCNC: 2.3 G/DL — LOW (ref 3.3–5.2)
ALP SERPL-CCNC: 397 U/L — HIGH (ref 40–120)
ALT FLD-CCNC: 20 U/L — SIGNIFICANT CHANGE UP
ANION GAP SERPL CALC-SCNC: 16 MMOL/L — SIGNIFICANT CHANGE UP (ref 5–17)
AST SERPL-CCNC: 31 U/L — SIGNIFICANT CHANGE UP
BILIRUB SERPL-MCNC: 0.5 MG/DL — SIGNIFICANT CHANGE UP (ref 0.4–2)
BUN SERPL-MCNC: 121 MG/DL — HIGH (ref 8–20)
CALCIUM SERPL-MCNC: 9.1 MG/DL — SIGNIFICANT CHANGE UP (ref 8.6–10.2)
CHLORIDE SERPL-SCNC: 94 MMOL/L — LOW (ref 98–107)
CO2 SERPL-SCNC: 28 MMOL/L — SIGNIFICANT CHANGE UP (ref 22–29)
CREAT SERPL-MCNC: 4.67 MG/DL — HIGH (ref 0.5–1.3)
DIGOXIN SERPL-MCNC: 0.8 NG/ML — SIGNIFICANT CHANGE UP (ref 0.8–2)
GAS PNL BLDA: SIGNIFICANT CHANGE UP
GLUCOSE SERPL-MCNC: 102 MG/DL — SIGNIFICANT CHANGE UP (ref 70–115)
GRAM STN FLD: SIGNIFICANT CHANGE UP
HCT VFR BLD CALC: 23.7 % — LOW (ref 39–50)
HCT VFR BLD CALC: 26.2 % — LOW (ref 39–50)
HGB BLD-MCNC: 7.3 G/DL — LOW (ref 13–17)
HGB BLD-MCNC: 8.2 G/DL — LOW (ref 13–17)
LACTATE SERPL-SCNC: 1 MMOL/L — SIGNIFICANT CHANGE UP (ref 0.5–2)
MAGNESIUM SERPL-MCNC: 3.1 MG/DL — HIGH (ref 1.6–2.6)
MCHC RBC-ENTMCNC: 30.8 GM/DL — LOW (ref 32–36)
MCHC RBC-ENTMCNC: 30.8 PG — SIGNIFICANT CHANGE UP (ref 27–34)
MCHC RBC-ENTMCNC: 31.2 PG — SIGNIFICANT CHANGE UP (ref 27–34)
MCHC RBC-ENTMCNC: 31.3 GM/DL — LOW (ref 32–36)
MCV RBC AUTO: 100 FL — SIGNIFICANT CHANGE UP (ref 80–100)
MCV RBC AUTO: 99.6 FL — SIGNIFICANT CHANGE UP (ref 80–100)
OSMOLALITY UR: 340 MOSM/KG — SIGNIFICANT CHANGE UP (ref 300–1000)
PHENYTOIN FREE SERPL-MCNC: <2.9 UG/ML — LOW (ref 10–20)
PHOSPHATE SERPL-MCNC: 2.9 MG/DL — SIGNIFICANT CHANGE UP (ref 2.4–4.7)
PLATELET # BLD AUTO: 272 K/UL — SIGNIFICANT CHANGE UP (ref 150–400)
PLATELET # BLD AUTO: 312 K/UL — SIGNIFICANT CHANGE UP (ref 150–400)
POTASSIUM SERPL-MCNC: 5.6 MMOL/L — HIGH (ref 3.5–5.3)
POTASSIUM SERPL-SCNC: 5.6 MMOL/L — HIGH (ref 3.5–5.3)
PROCALCITONIN SERPL-MCNC: 1.19 NG/ML — HIGH (ref 0.02–0.1)
PROT SERPL-MCNC: 6.3 G/DL — LOW (ref 6.6–8.7)
RAPID RVP RESULT: SIGNIFICANT CHANGE UP
RBC # BLD: 2.37 M/UL — LOW (ref 4.2–5.8)
RBC # BLD: 2.63 M/UL — LOW (ref 4.2–5.8)
RBC # FLD: 17.6 % — HIGH (ref 10.3–14.5)
RBC # FLD: 17.9 % — HIGH (ref 10.3–14.5)
SODIUM SERPL-SCNC: 138 MMOL/L — SIGNIFICANT CHANGE UP (ref 135–145)
SODIUM UR-SCNC: 95 MMOL/L — SIGNIFICANT CHANGE UP
SPECIMEN SOURCE: SIGNIFICANT CHANGE UP
WBC # BLD: 11.41 K/UL — HIGH (ref 3.8–10.5)
WBC # BLD: 14.96 K/UL — HIGH (ref 3.8–10.5)
WBC # FLD AUTO: 11.41 K/UL — HIGH (ref 3.8–10.5)
WBC # FLD AUTO: 14.96 K/UL — HIGH (ref 3.8–10.5)

## 2019-10-09 PROCEDURE — 99232 SBSQ HOSP IP/OBS MODERATE 35: CPT

## 2019-10-09 PROCEDURE — 99222 1ST HOSP IP/OBS MODERATE 55: CPT

## 2019-10-09 PROCEDURE — 74176 CT ABD & PELVIS W/O CONTRAST: CPT | Mod: 26

## 2019-10-09 PROCEDURE — 76705 ECHO EXAM OF ABDOMEN: CPT | Mod: 26

## 2019-10-09 PROCEDURE — 93010 ELECTROCARDIOGRAM REPORT: CPT

## 2019-10-09 PROCEDURE — 71250 CT THORAX DX C-: CPT | Mod: 26

## 2019-10-09 RX ORDER — DIPHENHYDRAMINE HCL 50 MG
2 CAPSULE ORAL
Qty: 0 | Refills: 0 | DISCHARGE

## 2019-10-09 RX ORDER — ERYTHROPOIETIN 10000 [IU]/ML
20000 INJECTION, SOLUTION INTRAVENOUS; SUBCUTANEOUS
Qty: 0 | Refills: 0 | DISCHARGE

## 2019-10-09 RX ORDER — MAGNESIUM HYDROXIDE 400 MG/1
30 TABLET, CHEWABLE ORAL
Qty: 0 | Refills: 0 | DISCHARGE

## 2019-10-09 RX ORDER — MULTIVIT-MIN/FERROUS GLUCONATE 9 MG/15 ML
1 LIQUID (ML) ORAL
Qty: 0 | Refills: 0 | DISCHARGE

## 2019-10-09 RX ORDER — IPRATROPIUM/ALBUTEROL SULFATE 18-103MCG
3 AEROSOL WITH ADAPTER (GRAM) INHALATION EVERY 4 HOURS
Refills: 0 | Status: DISCONTINUED | OUTPATIENT
Start: 2019-10-09 | End: 2019-10-31

## 2019-10-09 RX ORDER — FAMOTIDINE 10 MG/ML
1 INJECTION INTRAVENOUS
Qty: 0 | Refills: 0 | DISCHARGE

## 2019-10-09 RX ORDER — BUDESONIDE, MICRONIZED 100 %
2 POWDER (GRAM) MISCELLANEOUS
Qty: 0 | Refills: 0 | DISCHARGE

## 2019-10-09 RX ORDER — SODIUM BICARBONATE 1 MEQ/ML
650 SYRINGE (ML) INTRAVENOUS EVERY 8 HOURS
Refills: 0 | Status: DISCONTINUED | OUTPATIENT
Start: 2019-10-09 | End: 2019-10-29

## 2019-10-09 RX ORDER — PANTOPRAZOLE SODIUM 20 MG/1
40 TABLET, DELAYED RELEASE ORAL EVERY 12 HOURS
Refills: 0 | Status: DISCONTINUED | OUTPATIENT
Start: 2019-10-09 | End: 2019-10-30

## 2019-10-09 RX ORDER — CHLORHEXIDINE GLUCONATE 213 G/1000ML
15 SOLUTION TOPICAL EVERY 12 HOURS
Refills: 0 | Status: DISCONTINUED | OUTPATIENT
Start: 2019-10-09 | End: 2019-10-31

## 2019-10-09 RX ORDER — OXYCODONE HYDROCHLORIDE 5 MG/1
1 TABLET ORAL
Qty: 0 | Refills: 0 | DISCHARGE

## 2019-10-09 RX ORDER — FERROUS SULFATE 325(65) MG
5 TABLET ORAL
Qty: 0 | Refills: 0 | DISCHARGE

## 2019-10-09 RX ORDER — FUROSEMIDE 40 MG
1 TABLET ORAL
Qty: 0 | Refills: 0 | DISCHARGE

## 2019-10-09 RX ORDER — CHLORHEXIDINE GLUCONATE 213 G/1000ML
1 SOLUTION TOPICAL
Refills: 0 | Status: DISCONTINUED | OUTPATIENT
Start: 2019-10-09 | End: 2019-10-09

## 2019-10-09 RX ORDER — SODIUM CHLORIDE 9 MG/ML
1000 INJECTION, SOLUTION INTRAVENOUS
Refills: 0 | Status: DISCONTINUED | OUTPATIENT
Start: 2019-10-09 | End: 2019-10-09

## 2019-10-09 RX ORDER — ENOXAPARIN SODIUM 100 MG/ML
40 INJECTION SUBCUTANEOUS
Qty: 0 | Refills: 0 | DISCHARGE

## 2019-10-09 RX ORDER — CHLORHEXIDINE GLUCONATE 213 G/1000ML
15 SOLUTION TOPICAL
Qty: 0 | Refills: 0 | DISCHARGE

## 2019-10-09 RX ORDER — SODIUM CHLORIDE 9 MG/ML
1000 INJECTION INTRAMUSCULAR; INTRAVENOUS; SUBCUTANEOUS
Refills: 0 | Status: DISCONTINUED | OUTPATIENT
Start: 2019-10-09 | End: 2019-10-14

## 2019-10-09 RX ORDER — LEVETIRACETAM 250 MG/1
1000 TABLET, FILM COATED ORAL EVERY 12 HOURS
Refills: 0 | Status: DISCONTINUED | OUTPATIENT
Start: 2019-10-09 | End: 2019-10-31

## 2019-10-09 RX ORDER — DIGOXIN 250 MCG
1 TABLET ORAL
Qty: 0 | Refills: 0 | DISCHARGE

## 2019-10-09 RX ORDER — TAMSULOSIN HYDROCHLORIDE 0.4 MG/1
1 CAPSULE ORAL
Qty: 0 | Refills: 0 | DISCHARGE

## 2019-10-09 RX ORDER — ACETAMINOPHEN 500 MG
2 TABLET ORAL
Qty: 0 | Refills: 0 | DISCHARGE

## 2019-10-09 RX ORDER — VANCOMYCIN HCL 1 G
1800 VIAL (EA) INTRAVENOUS ONCE
Refills: 0 | Status: COMPLETED | OUTPATIENT
Start: 2019-10-09 | End: 2019-10-09

## 2019-10-09 RX ORDER — ASCORBIC ACID 60 MG
1 TABLET,CHEWABLE ORAL
Qty: 0 | Refills: 0 | DISCHARGE

## 2019-10-09 RX ORDER — ASPIRIN/CALCIUM CARB/MAGNESIUM 324 MG
1 TABLET ORAL
Qty: 0 | Refills: 0 | DISCHARGE

## 2019-10-09 RX ORDER — CEFEPIME 1 G/1
1000 INJECTION, POWDER, FOR SOLUTION INTRAMUSCULAR; INTRAVENOUS EVERY 12 HOURS
Refills: 0 | Status: DISCONTINUED | OUTPATIENT
Start: 2019-10-09 | End: 2019-10-17

## 2019-10-09 RX ORDER — IPRATROPIUM/ALBUTEROL SULFATE 18-103MCG
3 AEROSOL WITH ADAPTER (GRAM) INHALATION
Qty: 0 | Refills: 0 | DISCHARGE

## 2019-10-09 RX ORDER — POTASSIUM CHLORIDE 20 MEQ
20 PACKET (EA) ORAL
Qty: 0 | Refills: 0 | DISCHARGE

## 2019-10-09 RX ORDER — METOPROLOL TARTRATE 50 MG
25 TABLET ORAL
Refills: 0 | Status: DISCONTINUED | OUTPATIENT
Start: 2019-10-09 | End: 2019-10-30

## 2019-10-09 RX ORDER — DIGOXIN 250 MCG
0.12 TABLET ORAL DAILY
Refills: 0 | Status: DISCONTINUED | OUTPATIENT
Start: 2019-10-09 | End: 2019-10-31

## 2019-10-09 RX ORDER — AMLODIPINE BESYLATE 2.5 MG/1
5 TABLET ORAL DAILY
Refills: 0 | Status: DISCONTINUED | OUTPATIENT
Start: 2019-10-09 | End: 2019-10-30

## 2019-10-09 RX ORDER — FOSPHENYTOIN 50 MG/ML
400 INJECTION INTRAMUSCULAR; INTRAVENOUS
Refills: 0 | Status: DISCONTINUED | OUTPATIENT
Start: 2019-10-09 | End: 2019-10-10

## 2019-10-09 RX ORDER — DOCUSATE SODIUM 100 MG
5 CAPSULE ORAL
Qty: 0 | Refills: 0 | DISCHARGE

## 2019-10-09 RX ORDER — SODIUM POLYSTYRENE SULFONATE 4.1 MEQ/G
15 POWDER, FOR SUSPENSION ORAL ONCE
Refills: 0 | Status: COMPLETED | OUTPATIENT
Start: 2019-10-09 | End: 2019-10-09

## 2019-10-09 RX ORDER — COLLAGENASE CLOSTRIDIUM HIST. 250 UNIT/G
1 OINTMENT (GRAM) TOPICAL
Qty: 0 | Refills: 0 | DISCHARGE

## 2019-10-09 RX ORDER — ACETAMINOPHEN 500 MG
650 TABLET ORAL EVERY 6 HOURS
Refills: 0 | Status: DISCONTINUED | OUTPATIENT
Start: 2019-10-09 | End: 2019-10-31

## 2019-10-09 RX ORDER — LEVETIRACETAM 250 MG/1
10 TABLET, FILM COATED ORAL
Qty: 0 | Refills: 0 | DISCHARGE

## 2019-10-09 RX ADMIN — SODIUM CHLORIDE 100 MILLILITER(S): 9 INJECTION, SOLUTION INTRAVENOUS at 08:54

## 2019-10-09 RX ADMIN — Medication 30 MILLIGRAM(S): at 06:47

## 2019-10-09 RX ADMIN — CEFEPIME 100 MILLIGRAM(S): 1 INJECTION, POWDER, FOR SOLUTION INTRAMUSCULAR; INTRAVENOUS at 07:03

## 2019-10-09 RX ADMIN — FOSPHENYTOIN 116 MILLIGRAM(S) PE: 50 INJECTION INTRAMUSCULAR; INTRAVENOUS at 08:54

## 2019-10-09 RX ADMIN — Medication 25 MILLIGRAM(S): at 07:02

## 2019-10-09 RX ADMIN — LEVETIRACETAM 400 MILLIGRAM(S): 250 TABLET, FILM COATED ORAL at 06:47

## 2019-10-09 RX ADMIN — CHLORHEXIDINE GLUCONATE 15 MILLILITER(S): 213 SOLUTION TOPICAL at 07:02

## 2019-10-09 RX ADMIN — Medication 0.12 MILLIGRAM(S): at 11:31

## 2019-10-09 RX ADMIN — AMLODIPINE BESYLATE 5 MILLIGRAM(S): 2.5 TABLET ORAL at 06:47

## 2019-10-09 RX ADMIN — CHLORHEXIDINE GLUCONATE 15 MILLILITER(S): 213 SOLUTION TOPICAL at 17:54

## 2019-10-09 RX ADMIN — Medication 25 MILLIGRAM(S): at 17:54

## 2019-10-09 RX ADMIN — Medication 650 MILLIGRAM(S): at 14:11

## 2019-10-09 RX ADMIN — Medication 650 MILLIGRAM(S): at 21:24

## 2019-10-09 RX ADMIN — PANTOPRAZOLE SODIUM 40 MILLIGRAM(S): 20 TABLET, DELAYED RELEASE ORAL at 17:54

## 2019-10-09 RX ADMIN — FOSPHENYTOIN 116 MILLIGRAM(S) PE: 50 INJECTION INTRAMUSCULAR; INTRAVENOUS at 18:27

## 2019-10-09 RX ADMIN — SODIUM POLYSTYRENE SULFONATE 15 GRAM(S): 4.1 POWDER, FOR SUSPENSION ORAL at 17:55

## 2019-10-09 RX ADMIN — PANTOPRAZOLE SODIUM 40 MILLIGRAM(S): 20 TABLET, DELAYED RELEASE ORAL at 07:02

## 2019-10-09 RX ADMIN — LEVETIRACETAM 400 MILLIGRAM(S): 250 TABLET, FILM COATED ORAL at 17:56

## 2019-10-09 RX ADMIN — Medication 250 MILLIGRAM(S): at 06:48

## 2019-10-09 RX ADMIN — CEFEPIME 100 MILLIGRAM(S): 1 INJECTION, POWDER, FOR SOLUTION INTRAMUSCULAR; INTRAVENOUS at 19:39

## 2019-10-09 RX ADMIN — SODIUM CHLORIDE 100 MILLILITER(S): 9 INJECTION INTRAMUSCULAR; INTRAVENOUS; SUBCUTANEOUS at 21:24

## 2019-10-09 NOTE — CONSULT NOTE ADULT - SUBJECTIVE AND OBJECTIVE BOX
[facility]  INFECTIOUS DISEASES AND INTERNAL MEDICINE at [place]  =======================================================  Renzo Stephen MD  Diplomates American Board of Internal Medicine and Infectious Diseases  Telephone [phone]  Fax            [phone]  =======================================================    [id number]  [place]   HPI:  This is a 87y  Male  with h/o CAD,  s/p 2 stents, CHF, HLD, pAFIB, seizure disorder, prior CVA's with residual deficits and dementia, prostate cancer, respiratory failure on chronic vent, tracheostomy, Chronic sacral decubitus with osteomyelitis s/p diverting colostomy , multiple prior hospitalizations, prior PNA's/Sepsis (Streptococcus bacteremia, MRSA bacteremia, sputum Cx's with Stenotrophomonas, MDR Acinetobacter, Proteus and CRE Pseudomonas), and chronic dial, sent from [facility] after in-house nephrologist noticed low HgB (7.2) and elevated Cr (4.58).     patient was last admitted to [facility] in Feb 2019.  Daughter at bedside who provided history states father, at baseline, is minimally functional and responsive. Also states pt has been spiking fevers on and off with multiple hospitalizations. He was at [facility] over past few months for PNA/Sepsis/Fevers. Pt recently s/p endoscopy with multiple clippings/cauterizations for bleeding in colostomy ~1 month ago. Daughter endorses no bleeding since, but can't endorse further specifics. In ED here, pt found to have ROSALIA with cr 4.58, HgB 7.2, given 1u PRBC.  he was found with fever to 102.7 in the ER.  Tmax in the last few hours had been 101.     Dial was changed in the ER.  per daughter, dial is normally changed monthly. HE was Merrem x 1 dose, vancomycin x 1 dose, then continued on Cefepime.  UA showed moderate LE, negative Nitrite, bacteria, WBC.  Urine culture and blood cultures have been sent.    He remains on the Vent.  currently at 30% with pulse ox of 100%.    patient cannot give any history.  CT scan of chest abd pelvis shown below.    I have personally reviewed the labs and data; pertinent labs and data are listed in this note; please see below.   =======================================================  Past Medical & Surgical Hx:  =====================  PAST MEDICAL & SURGICAL HISTORY:  Dysphagia  Fall: Multiple Mechanical falls  CAD (coronary artery disease)  Clostridium difficile diarrhea: in 2009  BPH (benign prostatic hypertrophy)  Dementia  HLD (hyperlipidemia)  CHF (congestive heart failure)  Prostate cancer: Treated w/ radiation  Stroke: (~5yrs ago)  Seizure: (last event &gt;1yr ago)  HTN (hypertension)  Colostomy in place  S/P percutaneous endoscopic gastrostomy (PEG) tube placement  H/O hemorrhoidectomy  Deviated septum  Abdominal hernia  Status post cardiac surgery: stents x 2    Problem List:  ==========  HEALTH ISSUES - PROBLEM Dx:  Hyperkalemia: Hyperkalemia  Anemia: Anemia  Chronic respiratory failure: Chronic respiratory failure  ROSALIA (acute kidney injury): ROSALIA (acute kidney injury)  Sepsis: Sepsis    Social Hx:  =======  no toxic habits currently    FAMILY HISTORY:  no significant family history of immunosuppressive disorders in mother or father   =======================================================  REVIEW OF SYSTEMS:  Limited due to medical condition  =======================================================  Allergies  clindamycin (Unknown)  Grapes (Rash)  Nuts (Hives; Rash)  PC Pen VK (Unknown)  shellfish (Angioedema; Rash; Hives)  strawberry (Short breath; Rash; Hives)  Xanax (Rash)    Intolerances  Ativan (Unknown)  Haldol (Dystonic RXN)  hydrALAZINE (Unknown)  Zyprexa (Unknown)    Antibiotics:  cefepime   IVPB 1000 milliGRAM(s) IV Intermittent every 12 hours  levoFLOXacin IVPB 500 milliGRAM(s) IV Intermittent once    Other medications:  amLODIPine   Tablet 5 milliGRAM(s) Oral daily  chlorhexidine 0.12% Liquid 15 milliLiter(s) Oral Mucosa every 12 hours  digoxin     Tablet 0.125 milliGRAM(s) Oral daily  fosphenytoin IVPB 400 milliGRAM(s) PE IV Intermittent two times a day  lactated ringers. 1000 milliLiter(s) IV Continuous <Continuous>  levETIRAcetam  IVPB 1000 milliGRAM(s) IV Intermittent every 12 hours  metoprolol tartrate 25 milliGRAM(s) Oral two times a day  pantoprazole  Injectable 40 milliGRAM(s) IV Push every 12 hours  predniSONE   Tablet 30 milliGRAM(s) Oral daily  sodium bicarbonate 650 milliGRAM(s) Oral every 8 hours      ======================================================  Physical Exam:  ============  T(F): 99.7 (09 Oct 2019 12:00), Max: 102.7 (08 Oct 2019 21:12)  HR: 74 (09 Oct 2019 12:39)  BP: 99/52 (09 Oct 2019 12:00)  RR: 19 (09 Oct 2019 12:00)  SpO2: 100% (09 Oct 2019 12:39) (90% - 100%)  temp max in last 48H T(F): , Max: 102.7 (10-08-19 @ 21:12)Height (cm): 177.8 (10-09-19 @ 00:00)  Weight (kg): 95 (10-09-19 @ 04:14)  BMI (kg/m2): 30.1 (10-09-19 @ 04:14)  BSA (m2): 2.13 (10-09-19 @ 04:14)    General:  No acute distress. DIAPHORETIC  Eye: Pupils are equal, round and reactive to light, Extraocular movements are intact, Normal conjunctiva.  HENT: Normocephalic, Oral mucosa is DRY; ET tube in place; light color sputum in TRAP  Neck: Supple, No lymphadenopathy.  Respiratory: Lungs with fair air entry  Cardiovascular: Normal rate, Regular rhythm,   Gastrointestinal: Soft, Non-distended, Normal bowel sounds. + G Tube in place  Genitourinary: + DIAL with dilute clear urine  Lymphatics: No lymphadenopathy neck,   Musculoskeletal: contracted extremities  Integumentary: No rash.  Neurologic: minimally responsive, non-verbal      =======================================================  Labs:                        8.2    14.96 )-----------( 312      ( 09 Oct 2019 07:15 )             26.2       WBC Count: 14.96 K/uL (10-09-19 @ 07:15)  WBC Count: 14.05 K/uL (10-08-19 @ 22:21)      10-09    138  |  94<L>  |  121.0<H>  ----------------------------<  102  5.6<H>   |  28.0  |  4.67<H>    Ca    9.1      09 Oct 2019 07:15  Phos  2.9     10-09  Mg     3.1     10-09    TPro  6.3<L>  /  Alb  2.3<L>  /  TBili  0.5  /  DBili  x   /  AST  31  /  ALT  20  /  AlkPhos  397<H>  10-09      Creatinine, Serum: 4.67 mg/dL (10-09-19 @ 07:15)  Creatinine, Serum: 4.58 mg/dL (10-08-19 @ 22:21)       EXAM:  CT ABDOMEN AND PELVIS                         EXAM:  CT CHEST                          PROCEDURE DATE:  10/09/2019          INTERPRETATION:  [facility] RADIOLOGIST PRELIMINARY REPORT  Reviewed by ROSS Crenshaw M.D.  PROCEDURE INFORMATION:   Exam: CT Chest Without Contrast   Exam date and time: 10/9/2019 3:48 AM   Clinical history: 87 years old, male; Abdominal pain; Generalized; Chest   pain;   Type not specified     TECHNIQUE:   Imaging protocol: Computed tomography of the chest without contrast.   3D rendering: MIP reconstructed images were created and reviewed.     COMPARISON:   CT CHEST 1/4/2019 6:25 AM     FINDINGS:   Tubes, catheters and devices: Tracheostomy tube will with tip in thoracic   trachea 4 cm above jorge alberto. Monitor leads. Right PICC line with tip in the   axilla, #31, series 4.   Lungs: Bibasilar consolidations. Peribronchial cuffing is present.   Pleural space: Small bilateral pleural effusions. No pneumothorax.   Heart: Cardiomegaly with coronary artery calcifications.   Mediastinum: Distended upper esophagus. Air-fluid level in middle   esophagus,   #58, series 3. Largely nondistended esophagus with wall thickening   distally.   Aorta: Ectatic atherosclerotic aorta PA cannot assess for dissection   without IV   contrast.   Lymph nodes: Indeterminate lymph nodes in the mediastinum brien. Bilateral   axillary enlarged lymph nodes, left greater than right.   Bones/joints: Scoliosis. No destructive bony lesion. Mild compression   deformities of the mid thoracic spine, likely osteoporotic.   Soft tissues: Gynecomastia. Subcutaneous edema. Asymmetric subcutaneous   edema/edema in/left lateral abdominal wall.   Other findings: See CT Abdomen and Pelvis for additional information on   abdomen.     IMPRESSION:   1. Bibasilar consolidations or pleural effusions, possible aspiration pneumonia.   2. Cardiomegaly with dense coronary artery calcifications.   3. Small air-fluid level in the esophagus slightly above carinal level and wall thickening of distal esophagus   4. See report body for additional findings.       =========================  PROCEDURE INFORMATION:   Exam: CT Abdomen And Pelvis Without Contrast   Exam date and time: 10/9/2019 3:48 AM   Clinical history: 87 years old, male; Abdominal pain; Generalized; Chest pain;   Type not specified     TECHNIQUE:   Imaging protocol: Computed tomography of the abdomen and pelvis without contrast.   3D rendering: MIP reconstructed images were created and reviewed.     COMPARISON:   CT CHEST 1/4/2019 6:25 AM     FINDINGS:   Tubes, catheters and devices: Percutaneous gastrostomy tube.     Liver: Lobular liver contour. No discrete liver lesion.   Gallbladder and bile ducts: Distended gallbladder. No gallstone.   Pancreas: Normal  Spleen: Splenomegaly.   Adrenals: 13 mm left adrenal nodule  Kidneys and ureters: Polycystic kidneys with some atypical cysts including coarse calcifications of a 2 cm cyst right kidney, #191, series 3.   Largest right renal cyst measured 6 cm in size. Largest left renal cyst measures 5.5 cm. Hounsfield units of some of the left renal cysts measure up to 22. No ureteral dilatation or stone.   Stomach and bowel: Wall thickening of incompletely distended stomach.   Appendix: No inflamed appendix.  Intraperitoneal space: Abdominal pelvic ascites.   Vasculature: Atherosclerotic aorta.   Lymph nodes: Retroperitoneal and mesenteric adenopathy. Multiple pelvic and inguinal lymph nodes. Multilevel small node deformities.     Bladder: Contracted bladder with Dial catheter. Thickened bladder wall.   Bladder gas. Small focus of extraluminal gas by bladder, #285, series 3.   Reproductive: Normally sized prostate.  Bones/joints: Complex fluid and gas are noted by the greater trochanter of the left femur, #[id number], series 3. Large ulceration extends to the posterior right ischium. Ulceration and gas extend to the coccyx. Patchy sclerosis and lucencies of the bony skeleton are noted. Soft tissue gas is adjacent to the posterior left sacrum, #165-168, series 4. Degenerative changes of each hip are noted. Straightening of the thoracolumbar spine.   Soft tissues: Diffuse subcutaneous edema or anasarca. Left ventral pelvic peristomal herniation of colon and fluid through 4.5 cm wall defect. Soft tissue gas is associated with a posterior left musculature and adjacent bursa of the left hip, #22-26, series 4.   Other findings: Lower thorax: See CT chest.     IMPRESSION:   1. Large decubitus ulcer with exposed right ischium, likely osteomyelitis.   2. Infected soft tissues of lateral and posterior left hip suspicious for septic bursitis/pyositis, possible osteomyelitis.   3. Left paracentral sacral/coccygeal decubitus ulcer with possible osteomyelitis and soft tissues.   4. Large peristomal hernia with colonic diverticulosis. Possible low-grade diverticulitis.   5. Extraluminal collection of gas adjacent to the bladder etiology uncertain.   6. Polycystic kidneys, some atypical.   7. Percutaneous gastrostomy tube. Dial catheter in bladder.  8. Distended gallbladder. No gallstone.   9. Anasarca with subcutaneous edema and fluid, greater on the left lateral abdomen, asymmetric left edematous abdominal musculature as well as abdominal and pelvic ascites.   10. Retroperitoneal, mesenteric, and pelvic adenopathy.      RONNI CRENSHAW M.D., ATTENDING RADIOLOGIST  This document has been electronically signed. Oct  9 2019  7:50AM

## 2019-10-09 NOTE — H&P ADULT - ASSESSMENT
Pt is an 88 y/o male with PMHx CAD w/ 2x stents, HTN, HLD, seizure d/o, prostate cancer, CHF, chronic respiratory failure s/p trach/peg on vent due to PNA in 2018, multiple prior hospitalizations for MDR PNA's/Sepsis (Streptococcus bacteremia, MRSA bacteremia, sputum Cx's with Stenotrophomonas, MDR Acinetobacter, Proteus and CRE Pseudomonas), chronic sacral decubitus w/ osteomyelitis s/p diverting colostomy and chronic jones, prior CVA's and dementia with residual deficits here with sepsis likely secondary to recurrent HAP/VAP vs UTI. Also with ROSALIA and anemia. Admit to MICU. Pt is an 88 y/o male with PMHx CAD w/ 2x stents, HTN, HLD, seizure d/o, prostate cancer, CHF, chronic respiratory failure s/p trach/peg on vent due to PNA in 2018, multiple prior hospitalizations for MDR PNA's/Sepsis (Streptococcus bacteremia, MRSA bacteremia, sputum Cx's with Stenotrophomonas, MDR Acinetobacter, Proteus and CRE Pseudomonas), chronic sacral decubitus w/ osteomyelitis s/p diverting colostomy and chronic jones, prior CVA's and dementia with residual deficits here with sepsis likely secondary to recurrent HAP/VAP vs UTI. Also with ROSALIA and anemia.     Admit to MICU  D/w eICU attending

## 2019-10-09 NOTE — PROGRESS NOTE ADULT - SUBJECTIVE AND OBJECTIVE BOX
HPI: Pt is a 88yo  Male with a pmh/o CAD s/p PCI, CHF, HLD, paroxysmal atrial fribrillation, seizure disorder, CVA with residual deficits, dementia, prostate cancer,  with respiratory failure on chronic vent and who is s/p tracheostomy. Of note, pt has a h/o OM with colostomy (complicated by bleeding) due to a sacral decubitus, who has required readmissions due to pneumonia complicated by sepsis. Pt was refered to ED after nephrologist noted decreased h/h and worsening renal failure on chronic jones. Pt in ED found to meet sepsis criteria and admitted to MICU as pt placed on vent and required monitoring on tele. Pt jones changed, abx started, ID following.     ROS: Cannot obtain at this time    ALLERGIES:  clindamycin (Unknown)  Grapes (Rash)  Nuts (Hives; Rash)  PC Pen VK (Unknown)  shellfish (Angioedema; Rash; Hives)  strawberry (Short breath; Rash; Hives)  Xanax (Rash)  Ativan (Unknown)  Haldol (Dystonic RXN)  hydrALAZINE (Unknown)  Zyprexa (Unknown)    SOCIAL HISTORY: cannot obtain at this time.     FAMILY HISTORY: unknown medical history in first degree relatives.     PAST MEDICAL & SURGICAL HISTORY:  Dysphagia  Fall: Multiple Mechanical falls  CAD (coronary artery disease)  Clostridium difficile diarrhea: in 2009  BPH (benign prostatic hypertrophy)  Dementia  HLD (hyperlipidemia)  CHF (congestive heart failure)  Prostate cancer: Treated w/ radiation  Stroke: (~5yrs ago)  Seizure: (last event &gt;1yr ago)  HTN (hypertension)  Colostomy in place  S/P percutaneous endoscopic gastrostomy (PEG) tube placement  H/O hemorrhoidectomy  Deviated septum  Abdominal hernia  Status post cardiac surgery: stents x 2    MEDICATIONS  (STANDING):  amLODIPine   Tablet 5 milliGRAM(s) Oral daily  cefepime   IVPB 1000 milliGRAM(s) IV Intermittent every 12 hours  chlorhexidine 0.12% Liquid 15 milliLiter(s) Oral Mucosa every 12 hours  digoxin     Tablet 0.125 milliGRAM(s) Oral daily  fosphenytoin IVPB 400 milliGRAM(s) PE IV Intermittent two times a day  lactated ringers. 1000 milliLiter(s) (100 mL/Hr) IV Continuous <Continuous>  levETIRAcetam  IVPB 1000 milliGRAM(s) IV Intermittent every 12 hours  metoprolol tartrate 25 milliGRAM(s) Oral two times a day  pantoprazole  Injectable 40 milliGRAM(s) IV Push every 12 hours  predniSONE   Tablet 30 milliGRAM(s) Oral daily  sodium bicarbonate 650 milliGRAM(s) Oral every 8 hours    MEDICATIONS  (PRN):  acetaminophen    Suspension .. 650 milliGRAM(s) Oral every 6 hours PRN Temp greater or equal to 38.5C (101.3F)  ALBUTerol/ipratropium for Nebulization. 3 milliLiter(s) Nebulizer every 4 hours PRN Bronchospasm    Vital Signs Last 24 Hrs  T(C): 36.7 (09 Oct 2019 14:00), Max: 39.3 (08 Oct 2019 21:12)  T(F): 98.1 (09 Oct 2019 14:00), Max: 102.7 (08 Oct 2019 21:12)  HR: 67 (09 Oct 2019 14:00) (67 - 98)  BP: 100/54 (09 Oct 2019 14:00) (96/52 - 163/72)  BP(mean): 75 (09 Oct 2019 14:00) (71 - 103)  RR: 19 (09 Oct 2019 14:00) (12 - 39)  SpO2: 100% (09 Oct 2019 14:00) (90% - 100%)    PHYSICAL EXAM:  General:  No acute distress.  HEENT: dry mucosa, ET in place.   Neck: Supple  Respiratory: decreased air entry at b/l air bases  Cardiovascular: RRR, S1/S2  Gastrointestinal: Soft, ND, non tender, Gtube  Musculoskeletal: contracted extremities  Neurologic: minimally responsive, non-verbal, unable to follow commands, cannot assess gait/CN/reflexes                          8.2    14.96 )-----------( 312      ( 09 Oct 2019 07:15 )             26.2       138  |  94<L>  |  121.0<H>  ----------------------------<  102  5.6<H>   |  28.0  |  4.67<H>    Ca    9.1      09 Oct 2019 07:15  Phos  2.9     10-09  Mg     3.1     10-09    TPro  6.3<L>  /  Alb  2.3<L>  /  TBili  0.5  /  DBili  x   /  AST  31  /  ALT  20  /  AlkPhos  397<H>  10-09        Assessment and plan:     88yo  Male with extensive past medical history admitted for multiple infectious sources complicated by sepsis :    1) Respiratory  concern for PNA  ID on consult and recs appreciated  cont nebs  cont abx  f/u cultures  cont vent  transfer to T from MICU    2) Cardiac  cont amlodipine  cont digoxin/toprol for rate control  monitor BP, consider IVF boluses in small increments or midodrine if worsens    3) Gastrointestinal  Unlikely diverticulitis  monitor output  no acute abdomen  G tube in place    4) /Nephrology  Renal consulted- Dr. Mittal  monitor renal function  avoid nephrotoxic medications   cont jones  gentle hydration    5) Musculoskeletal system  Osteomyelitis-most likely source of sepsis  f/u ESR/CRP  wound care consult placed  turn and reposition q 3 hrs  off loading boots HPI: Pt is a 86yo  Male with a pmh/o CAD s/p PCI, CHF, HLD, paroxysmal atrial fribrillation, seizure disorder, CVA with residual deficits, dementia, prostate cancer,  with respiratory failure on chronic vent and who is s/p tracheostomy. Of note, pt has a h/o OM with colostomy (complicated by bleeding) due to a sacral decubitus, who has required readmissions due to pneumonia complicated by sepsis. Pt was refered to ED after nephrologist noted decreased h/h and worsening renal failure on chronic jones. Pt in ED found to meet sepsis criteria and admitted to MICU as pt placed on vent and required monitoring on tele. Pt jones changed, abx started, ID following.     ROS: Cannot obtain at this time    ALLERGIES:  clindamycin (Unknown)  Grapes (Rash)  Nuts (Hives; Rash)  PC Pen VK (Unknown)  shellfish (Angioedema; Rash; Hives)  strawberry (Short breath; Rash; Hives)  Xanax (Rash)  Ativan (Unknown)  Haldol (Dystonic RXN)  hydrALAZINE (Unknown)  Zyprexa (Unknown)    SOCIAL HISTORY: cannot obtain at this time.     FAMILY HISTORY: unknown medical history in first degree relatives.     PAST MEDICAL & SURGICAL HISTORY:  Dysphagia  Fall: Multiple Mechanical falls  CAD (coronary artery disease)  Clostridium difficile diarrhea: in 2009  BPH (benign prostatic hypertrophy)  Dementia  HLD (hyperlipidemia)  CHF (congestive heart failure)  Prostate cancer: Treated w/ radiation  Stroke: (~5yrs ago)  Seizure: (last event &gt;1yr ago)  HTN (hypertension)  Colostomy in place  S/P percutaneous endoscopic gastrostomy (PEG) tube placement  H/O hemorrhoidectomy  Deviated septum  Abdominal hernia  Status post cardiac surgery: stents x 2    MEDICATIONS  (STANDING):  amLODIPine   Tablet 5 milliGRAM(s) Oral daily  cefepime   IVPB 1000 milliGRAM(s) IV Intermittent every 12 hours  chlorhexidine 0.12% Liquid 15 milliLiter(s) Oral Mucosa every 12 hours  digoxin     Tablet 0.125 milliGRAM(s) Oral daily  fosphenytoin IVPB 400 milliGRAM(s) PE IV Intermittent two times a day  lactated ringers. 1000 milliLiter(s) (100 mL/Hr) IV Continuous <Continuous>  levETIRAcetam  IVPB 1000 milliGRAM(s) IV Intermittent every 12 hours  metoprolol tartrate 25 milliGRAM(s) Oral two times a day  pantoprazole  Injectable 40 milliGRAM(s) IV Push every 12 hours  predniSONE   Tablet 30 milliGRAM(s) Oral daily  sodium bicarbonate 650 milliGRAM(s) Oral every 8 hours    MEDICATIONS  (PRN):  acetaminophen    Suspension .. 650 milliGRAM(s) Oral every 6 hours PRN Temp greater or equal to 38.5C (101.3F)  ALBUTerol/ipratropium for Nebulization. 3 milliLiter(s) Nebulizer every 4 hours PRN Bronchospasm    Vital Signs Last 24 Hrs  T(C): 36.7 (09 Oct 2019 14:00), Max: 39.3 (08 Oct 2019 21:12)  T(F): 98.1 (09 Oct 2019 14:00), Max: 102.7 (08 Oct 2019 21:12)  HR: 67 (09 Oct 2019 14:00) (67 - 98)  BP: 100/54 (09 Oct 2019 14:00) (96/52 - 163/72)  BP(mean): 75 (09 Oct 2019 14:00) (71 - 103)  RR: 19 (09 Oct 2019 14:00) (12 - 39)  SpO2: 100% (09 Oct 2019 14:00) (90% - 100%)    PHYSICAL EXAM:  General:  No acute distress.  HEENT: dry mucosa, ET in place.   Neck: Supple  Respiratory: decreased air entry at b/l air bases  Cardiovascular: RRR, S1/S2  Gastrointestinal: Soft, ND, non tender, Gtube  Musculoskeletal: contracted extremities  Neurologic: minimally responsive, non-verbal, unable to follow commands, cannot assess gait/CN/reflexes                          8.2    14.96 )-----------( 312      ( 09 Oct 2019 07:15 )             26.2       138  |  94<L>  |  121.0<H>  ----------------------------<  102  5.6<H>   |  28.0  |  4.67<H>    Ca    9.1      09 Oct 2019 07:15  Phos  2.9     10-09  Mg     3.1     10-09    TPro  6.3<L>  /  Alb  2.3<L>  /  TBili  0.5  /  DBili  x   /  AST  31  /  ALT  20  /  AlkPhos  397<H>  10-09        Assessment and plan:     86yo  Male with extensive past medical history admitted for multiple infectious sources complicated by sepsis :    1) Respiratory  concern for PNA  ID on consult and recs appreciated  cont nebs  cont abx  f/u cultures  cont vent  transfer to 6T from MICU    2) Cardiac  cont amlodipine  cont digoxin/toprol for rate control  monitor BP, consider IVF boluses in small increments or midodrine if worsens    3) Gastrointestinal  Unlikely diverticulitis  monitor output  no acute abdomen  G tube in place    4) /Nephrology  Renal consulted- Dr. Mittal  monitor renal function  avoid nephrotoxic medications   cont Jones  gentle hydration  Kayexalate + nebs for hyperK    5) Musculoskeletal system  Osteomyelitis-most likely source of sepsis  f/u ESR/CRP  wound care consult placed  turn and reposition q 3 hrs  off loading boots    6) Hematology   Anemia of unknown etiology in the setting of chronic disease and recent blood loss anemia  recent multiple GIB treated with clip at [facility]  monitor h/h  On apixaban at home, will hold for now until we can rule out GI bleed- on for right axillary DVT diagnosed in 2/2019  Restart once GIB r/o, f/u fecal occult blood  DVT ppx with SCDs. Will attempt to get records of procedures from [facility]. BID protonix.   BID protonix

## 2019-10-09 NOTE — H&P ADULT - PROBLEM SELECTOR PLAN 6
Repeat BMP now. likely dilutional, if remains elevated s/p IVF resuscitation/increased UOP will give K Cocktail.

## 2019-10-09 NOTE — H&P ADULT - ATTENDING COMMENTS
87M w/ PMHx HTN, HLD, CAD w/ 2x stents, pAF, seizure d/o, prostate CA, CHF, chronic respiratory failure s/p trach/PEG, multiple prior hospitalizations for MDR PNA's (Streptococcus and MRSA bacteremia, SCx's with Stenotrophomonas, MDR Acinetobacter, Proteus and CRE Pseudomonas), chronic sacral DUs w/ OM s/p diverting colostomy and chronic jones, vascular dementia sec to CVAs was sent in from [facility] for abnormal labs/ ROSALIA and fever     Pt was transfuse w/ 1U pRBC  Jones changed. Started on empiric Abx cover but given extensive MDR cultures in the past would consult ID as well  Aggressive hydration in addition to free water via GT. Renal consult  Can be downgraded to floor

## 2019-10-09 NOTE — H&P ADULT - PROBLEM SELECTOR PLAN 1
Likely secondary to recurrence of frequent VAP/HAP. Questionable UTI. Hx of multiple MDRs in past (see HPI). Pt HD stable, normotensive, NSR but febrile with leukocytosis (also on home steroids). S/p 30cc/kg protocol in ED. Received 1g Merro in ED. Prior sputum cx in Feb 2019 w/ resistance to Merro, indeterminate to zosyn. As per daughter, prior drug rash to unasyn (?SJS). Prior cx sens to Cefepime, will start @ 1g Q12h. Will also cover w/ Vanco (prior hx of MRSA bacteremia, multiple skin wounds) 1800mg now. Further dose by level given CrCl of 13. Lactate normal. Will repeat now s/p 30cc/kg protocol. Start LR @100cc/hr. Procal/RVP/Pan cultures sent. CT Chest pending. ID consulted. Likely secondary to recurrence of frequent VAP/HAP. Questionable UTI. Hx of multiple MDRs in past (see HPI). Pt HD stable, normotensive, NSR but febrile with leukocytosis (also on home steroids). S/p 30cc/kg protocol in ED. Received 1g Merro in ED. Prior sputum cx in Feb 2019 w/ resistance to Merro, indeterminate to zosyn. As per daughter, prior drug rash to unasyn (?SJS). Prior cx sens to Cefepime, will start @ 1g Q12h. Will also cover w/ Vanco (prior hx of MRSA bacteremia, multiple skin wounds) 1800mg now. Further dose by level given CrCl of 13. Lactate normal. Will repeat now s/p 30cc/kg protocol. Start LR @100cc/hr. Procal/RVP/Pan cultures sent. CT Chest pending. RUQ ultrasound, r/o GBD given sepsis + elevated alk phos. ID consulted.

## 2019-10-09 NOTE — H&P ADULT - NSHPPHYSICALEXAM_GEN_ALL_CORE
GENERAL: Chronically ill appearing, non verbal, minimally interactive  HEENT: PERRL, symmetric, trach in place, no erythema, oozing/mucus from site, no scleral icterus, ?cataract  CV: RRR, +s1/s2, no murmus  PULM: Diffuse crackles/rhonchi b/l lung fields, no wheezing, significant sputum from in line suctioning  GI: peg in place no erythema/mucus from site, colostomy in place, no blood/oozing/erythema in/around site, non distended, soft abdomen, no facial grimmace on palpation, normoactive bowel sounds  MSK: contracted/contorted, no joint swelling or erythema  EXT: large unstageable sacral decubitus ucler, ulcer right heel w/ dressing in place, no oozing, no drainage. 2+ pedal edema  SKIN: warm, dry  NEURO:  Awake and alert, not following commands, as per daughter pt at baseline function GENERAL: Chronically ill appearing, non verbal, minimally interactive  HEENT: PERRL, symmetric, trach in place, no erythema, oozing/mucus from site, no scleral icterus, ?cataract  CV: RRR, +s1/s2, no murmus  PULM: Diffuse crackles/rhonchi b/l lung fields, no wheezing, significant sputum from in line suctioning  GI: peg in place no erythema/mucus from site, colostomy in place, no blood/oozing/erythema in/around site, non distended, soft abdomen, no facial grimmace on palpation, normoactive bowel sounds  MSK: contracted/contorted, no joint swelling or erythema  EXT: 3x large unstageable sacral decubitus ucler, ulcer right heel w/ dressing in place, no oozing, no drainage. 2+ pedal edema  SKIN: warm, dry  NEURO:  Awake and alert, not following commands, as per daughter pt at baseline function GENERAL: Chronically ill appearing, non verbal, minimally interactive  HEENT: PERRL, symmetric, trach in place, no erythema, oozing/mucus from site, no scleral icterus, ?cataract  CV: RRR, +s1/s2, no murmus  PULM: Diffuse crackles/rhonchi b/l lung fields, no wheezing, significant sputum from in line suctioning  GI: peg in place no erythema/mucus from site, colostomy in place, no blood/oozing/erythema in/around site, non distended, soft abdomen, no facial grimmace on palpation, normoactive bowel sounds  MSK: contracted/contorted, no joint swelling or erythema  EXT: 3x large unstageable sacral decubitus ucler, ulcer right heel w/ dressing in place, multiple LE/pedal ulcers, no oozing, no drainage. 2+ pedal edema  SKIN: warm, dry  NEURO:  Awake and alert, not following commands, as per daughter pt at baseline function

## 2019-10-09 NOTE — H&P ADULT - PROBLEM SELECTOR PLAN 2
Cr 4.58, baseline ~0.5. Likely pre-renal but difficult to assess as BUN could also be elevated secondary to possible GI bleed, also concern given UTI. Urine in chronic jones bag. Will send for CT abd/pelvis now to r/o pyelo/obstructive component. S/p 30cc/kg in ED. Will start on maintenance LR @100cc/hr. As per daughter, pt was transiently on HD (<10 sessions) which she attributed to drug related toxicity (vanco), but is unclear. Pt no longer receiving HD. Unfortunately in setting of sepsis/MDR will have to give 1x loading dose of Vanco, will dose by level thereafter and renally adjust other medications. Repeat BMP now. Trend Cr. Avoid nephrotoxic agents. Renal consulted.

## 2019-10-09 NOTE — H&P ADULT - NSHPLABSRESULTS_GEN_ALL_CORE
Allergies  clindamycin (Unknown)  Grapes (Rash)  Nuts (Hives; Rash)  PC Pen VK (Unknown)  shellfish (Angioedema; Rash; Hives)  strawberry (Short breath; Rash; Hives)  Xanax (Rash)    Intolerances  Ativan (Unknown)  Haldol (Dystonic RXN)  hydrALAZINE (Unknown)  Zyprexa (Unknown)    Medications:  cefepime   IVPB 1000 milliGRAM(s) IV Intermittent every 12 hours  vancomycin  IVPB 1800 milliGRAM(s) IV Intermittent once  levETIRAcetam  IVPB 1000 milliGRAM(s) IV Intermittent every 12 hours  chlorhexidine 0.12% Liquid 15 milliLiter(s) Oral Mucosa every 12 hours    Mode: AC/ CMV (Assist Control/ Continuous Mandatory Ventilation)  RR (machine): 16  TV (machine): 500  FiO2: 30  PEEP: 5  MAP: 15  PIP: 31    ICU Vital Signs Last 24 Hrs  T(C): 37.8 (09 Oct 2019 01:45), Max: 39.3 (08 Oct 2019 21:12)  T(F): 100 (09 Oct 2019 01:45), Max: 102.7 (08 Oct 2019 21:12)  HR: 81 (09 Oct 2019 01:45) (81 - 98)  BP: 112/59 (09 Oct 2019 01:45) (105/72 - 127/61)  BP(mean): 77 (09 Oct 2019 01:45) (77 - 77)  ABP: --  ABP(mean): --  RR: 16 (09 Oct 2019 01:45) (16 - 20)  SpO2: 97% (09 Oct 2019 01:45) (94% - 100%)    Vital Signs Last 24 Hrs  T(C): 37.8 (09 Oct 2019 01:45), Max: 39.3 (08 Oct 2019 21:12)  T(F): 100 (09 Oct 2019 01:45), Max: 102.7 (08 Oct 2019 21:12)  HR: 81 (09 Oct 2019 01:45) (81 - 98)  BP: 112/59 (09 Oct 2019 01:45) (105/72 - 127/61)  BP(mean): 77 (09 Oct 2019 01:45) (77 - 77)  RR: 16 (09 Oct 2019 01:45) (16 - 20)  SpO2: 97% (09 Oct 2019 01:45) (94% - 100%)    I&O's Detail    LABS:                        7.2    14.05 )-----------( 343      ( 08 Oct 2019 22:21 )             24.2     10-08    138  |  93<L>  |  126.0<H>  ----------------------------<  115  5.6<H>   |  27.0  |  4.58<H>    Ca    9.0      08 Oct 2019 22:21    TPro  6.1<L>  /  Alb  2.3<L>  /  TBili  0.3<L>  /  DBili  x   /  AST  36  /  ALT  20  /  AlkPhos  427<H>  10    PT/INR - ( 08 Oct 2019 22:21 )   PT: 13.3 sec;   INR: 1.15 ratio    PTT - ( 08 Oct 2019 22:21 )  PTT:30.3 sec    Urinalysis Basic - ( 08 Oct 2019 22:56 )    Color: Red / Appearance: Turbid / S.010 / pH: x  Gluc: x / Ketone: Negative  / Bili: Negative / Urobili: Negative   Blood: x / Protein: 100 / Nitrite: Negative   Leuk Esterase: Moderate / RBC: >50 /HPF / WBC 11-25   Sq Epi: x / Non Sq Epi: Occasional / Bacteria: Few    RADIOLOGY: CXR/CT abd/pelvis/Chest pending  CXR with decreased but remaining b/l opacties/infiltrates compared to CXR in feb

## 2019-10-09 NOTE — H&P ADULT - PROBLEM SELECTOR PLAN 4
Unclear source, but likely related to acute blood loss anemia given HgB baseline back in Feb ~10 and especially concerning given recent 1 month hx of GI bleed w/ multiple clippings. No blood per colostomy but will sent FOBT. Trend CBC q6h. Pt well perfused with good capillary refill and HD stable. R/o GI bleed as below. Unclear source, but likely related to acute blood loss anemia given HgB baseline back in Feb ~10 and especially concerning given recent 1 month hx of GI bleed w/ multiple clippings. No blood per colostomy but will sent FOBT. Receiving 1u PRBC now. Repeat CBC after and trend CBC q6h thereafter. Pt well perfused with good capillary refill and HD stable. R/o GI bleed as below.

## 2019-10-09 NOTE — CONSULT NOTE ADULT - SUBJECTIVE AND OBJECTIVE BOX
Patient is a 87y old  Male who presents with a chief complaint of fever      HPI:  Pt is an 86 y/o male with PMHx CAD w/ 2x stents, HTN, . pAF HLD, afib, seizure d/o, prostate cancer, CHF, chronic respiratory failure s/p trach/peg on vent due to PNA in 2018, multiple prior hospitalizations for MDR PNA's/Sepsis, chronic sacral decubitus w/ osteomyelitis s/p diverting colostomy and chronic jones, prior CVA's and dementia who was sent from [facility] with fever, progressive renal failure and anemia.  Pt sent to the ICU for monitoring and has received IVF and PRBCs.  According to pt's daughter, he required transient dialysis during a past admission to [facility] but ultimately recovered renal function.      PAST MEDICAL & SURGICAL HISTORY:  Dysphagia  Fall: Multiple Mechanical falls  CAD (coronary artery disease)  Clostridium difficile diarrhea: in   BPH (benign prostatic hypertrophy)  Dementia  HLD (hyperlipidemia)  CHF (congestive heart failure)  Prostate cancer: Treated w/ radiation  Stroke: (~5yrs ago)  Seizure: (last event &gt;1yr ago)  HTN (hypertension)  Colostomy in place  S/P percutaneous endoscopic gastrostomy (PEG) tube placement  H/O hemorrhoidectomy  Deviated septum  Abdominal hernia  Status post cardiac surgery: stents x 2      FAMILY HISTORY:  No renal disease noted      Social History:  No current tobacco; no etoh nor drug abuse    MEDICATIONS  (STANDING):  amLODIPine   Tablet 5 milliGRAM(s) Oral daily  cefepime   IVPB 1000 milliGRAM(s) IV Intermittent every 12 hours  chlorhexidine 0.12% Liquid 15 milliLiter(s) Oral Mucosa every 12 hours  digoxin     Tablet 0.125 milliGRAM(s) Oral daily  fosphenytoin IVPB 400 milliGRAM(s) PE IV Intermittent two times a day  lactated ringers. 1000 milliLiter(s) (100 mL/Hr) IV Continuous <Continuous>  levETIRAcetam  IVPB 1000 milliGRAM(s) IV Intermittent every 12 hours  metoprolol tartrate 25 milliGRAM(s) Oral two times a day  pantoprazole  Injectable 40 milliGRAM(s) IV Push every 12 hours  predniSONE   Tablet 30 milliGRAM(s) Oral daily  sodium bicarbonate 650 milliGRAM(s) Oral every 8 hours    MEDICATIONS  (PRN):  acetaminophen    Suspension .. 650 milliGRAM(s) Oral every 6 hours PRN Temp greater or equal to 38.5C (101.3F)  ALBUTerol/ipratropium for Nebulization. 3 milliLiter(s) Nebulizer every 4 hours PRN Bronchospasm      Allergies    clindamycin (Unknown)  Grapes (Rash)  Nuts (Hives; Rash)  PC Pen VK (Unknown)  shellfish (Angioedema; Rash; Hives)  strawberry (Short breath; Rash; Hives)  Xanax (Rash)    Intolerances    Ativan (Unknown)  Haldol (Dystonic RXN)  hydrALAZINE (Unknown)  Zyprexa (Unknown)      REVIEW OF SYSTEMS: cannot assess due to mental status      Vital Signs Last 24 Hrs  T(C): 36.7 (09 Oct 2019 14:00), Max: 39.3 (08 Oct 2019 21:12)  T(F): 98.1 (09 Oct 2019 14:00), Max: 102.7 (08 Oct 2019 21:12)  HR: 67 (09 Oct 2019 14:00) (67 - 98)  BP: 100/54 (09 Oct 2019 14:00) (96/52 - 163/72)  BP(mean): 75 (09 Oct 2019 14:00) (71 - 103)  RR: 19 (09 Oct 2019 14:00) (12 - 39)  SpO2: 100% (09 Oct 2019 14:00) (90% - 100%)    PHYSICAL EXAM:    GENERAL: Appears chronically ill and debilitated  HEAD:  No periorbital edema  EYES: EOMI, PERRLA, conjunctiva and sclera clear  ENMT: Dry mucous membranes, poor dentition  NECK: Supple, No JVD  NERVOUS SYSTEM:  Obtunded; profound dementia  CHEST/LUNG: Clear anteriorly with diminished BS  HEART: No rub  ABDOMEN: Soft, Nontender, Nondistended; Bowel sounds present; + PEG tube  EXTREMITIES:  2+ Peripheral Pulses, No edema  SKIN: No rashes or lesions      LABS:                        8.2    14.96 )-----------( 312      ( 09 Oct 2019 07:15 )             26.2     10-09    138  |  94<L>  |  121.0<H>  ----------------------------<  102  5.6<H>   |  28.0  |  4.67<H>    Ca    9.1      09 Oct 2019 07:15  Phos  2.9     10-09  Mg     3.1     10-09    TPro  6.3<L>  /  Alb  2.3<L>  /  TBili  0.5  /  DBili  x   /  AST  31  /  ALT  20  /  AlkPhos  397<H>  10-09    PT/INR - ( 08 Oct 2019 22:21 )   PT: 13.3 sec;   INR: 1.15 ratio         PTT - ( 08 Oct 2019 22:21 )  PTT:30.3 sec  Urinalysis Basic - ( 08 Oct 2019 22:56 )    Color: Red / Appearance: Turbid / S.010 / pH: x  Gluc: x / Ketone: Negative  / Bili: Negative / Urobili: Negative   Blood: x / Protein: 100 / Nitrite: Negative   Leuk Esterase: Moderate / RBC: >50 /HPF / WBC 11-25   Sq Epi: x / Non Sq Epi: Occasional / Bacteria: Few      Magnesium, Serum: 3.1 mg/dL (10-09 @ 07:15)  Phosphorus Level, Serum: 2.9 mg/dL (10-09 @ 07:15)      RADIOLOGY & ADDITIONAL TESTS:  < from: US Abdomen Limited (10.09.19 @ 07:44) >   EXAM:  US ABDOMEN LIMITED                          PROCEDURE DATE:  10/09/2019          INTERPRETATION:  Right upper quadrant gallbladder and common bile duct   ultrasound.    Comparison: None.    Clinical Indication: Right upper quadrant pain.. Assess for   cholelithiasis or acute cholecystitis.    FINDINGS:   There is mild abdominal ascites.  The gallbladder  is distended containing stones and sludge.    Gallbladder wall measures 3.2 mm. No pericholecystic fluid.. The common   duct measures 7 mm in maximum diameter.    No tenderness was elicited with direct compression by the transducer; no   sonographic Brown's sign identified.      IMPRESSION:     Cholelithiasis.    < end of copied text >

## 2019-10-09 NOTE — H&P ADULT - PROBLEM SELECTOR PLAN 3
Secondary to prior complicated hospital course related to PNA. On VAC 16/500/5/30. SPO2 Maintaining mid 90's. Will titrate to maintain SPO2 92-96%. Vent bundle in place. Treatment of acute illnesses as above. Secondary to prior complicated hospital course related to PNA. On VAC 16/500/5/30. SPO2 Maintaining mid 90's. Will titrate to maintain SPO2 92-96%. Vent bundle in place. PRN nebs and steroids ordered.

## 2019-10-09 NOTE — PROVIDER CONTACT NOTE (EICU) - RECOMMENDATIONS
broad spectrum abx, had cr pseudomonas in sputum previously.  will give cefepime, lfuid for juan jose. admit to [facility] for monitoring

## 2019-10-09 NOTE — H&P ADULT - HISTORY OF PRESENT ILLNESS
Pt is an 88 y/o male with PMHx CAD w/ 2x stents, HTN, HLD, seizure d/o, prostate cancer, CHF, chronic respiratory failure s/p trach/peg on vent due to PNA in 2018, multiple prior hospitalizations for MDR PNA's/Sepsis (Streptococcus bacteremia, MRSA bacteremia, sputum Cx's with Stenotrophomonas, MDR Acinetobacter, Proteus and CRE Pseudomonas), chronic sacral decubitus w/ osteomyelitis s/p diverting colostomy and chronic jones, prior CVA's and dementia with residual deficits sent from [facility] after in-house nephrologist noticed low HgB (7.2) and elevated Cr (4.58). As per patient records, in Feb pt HgB ~10 and baseline Cr of ~0.5). Daughter at bedside who provided history states father, at baseline, is minimally functional and responsive. Also states pt has been spiking fevers on and off with multiple hospitalizations  @ [facility] over past few months for PNA/Sepsis/Fevers. Pt recently s/p endoscopy with multiple clippings/cauterizations for bleeding in colostomy ~1 month ago. Daughter endorses no bleeding since, but can't endorse further specifics. In ED here, pt found to have ROSALIA with cr 4.58, HgB 7.2, receiving 1u PRBC now. Also febrile to 102 via axillary, HD stable otherwise, normal lactate. ICU consult called for sepsis and chronic respiratory failure. Pt is an 86 y/o male with PMHx CAD w/ 2x stents, HTN, HLD, afib, seizure d/o, prostate cancer, CHF, chronic respiratory failure s/p trach/peg on vent due to PNA in 2018, multiple prior hospitalizations for MDR PNA's/Sepsis (Streptococcus bacteremia, MRSA bacteremia, sputum Cx's with Stenotrophomonas, MDR Acinetobacter, Proteus and CRE Pseudomonas), chronic sacral decubitus w/ osteomyelitis s/p diverting colostomy and chronic jones, prior CVA's and dementia with residual deficits sent from [facility] after in-house nephrologist noticed low HgB (7.2) and elevated Cr (4.58). As per patient records, in Feb pt HgB ~10 and baseline Cr of ~0.5). Daughter at bedside who provided history states father, at baseline, is minimally functional and responsive. Also states pt has been spiking fevers on and off with multiple hospitalizations  @ [facility] over past few months for PNA/Sepsis/Fevers. Pt recently s/p endoscopy with multiple clippings/cauterizations for bleeding in colostomy ~1 month ago. Daughter endorses no bleeding since, but can't endorse further specifics. In ED here, pt found to have ROSALIA with cr 4.58, HgB 7.2, receiving 1u PRBC now. Also febrile to 102 via axillary, HD stable otherwise, normal lactate. ICU consult called for sepsis and chronic respiratory failure. Pt is an 86 y/o male with PMHx CAD w/ 2x stents, HTN, . pAF HLD, afib, seizure d/o, prostate cancer, CHF, chronic respiratory failure s/p trach/peg on vent due to PNA in 2018, multiple prior hospitalizations for MDR PNA's/Sepsis (Streptococcus bacteremia, MRSA bacteremia, sputum Cx's with Stenotrophomonas, MDR Acinetobacter, Proteus and CRE Pseudomonas), chronic sacral decubitus w/ osteomyelitis s/p diverting colostomy and chronic jones, prior CVA's and dementia with residual deficits sent from [facility] after in-house nephrologist noticed low HgB (7.2) and elevated Cr (4.58). As per patient records, in Feb pt HgB ~10 and baseline Cr of ~0.5). Daughter at bedside who provided history states father, at baseline, is minimally functional and responsive. Also states pt has been spiking fevers on and off with multiple hospitalizations  @ [facility] over past few months for PNA/Sepsis/Fevers. Pt recently s/p endoscopy with multiple clippings/cauterizations for bleeding in colostomy ~1 month ago. Daughter endorses no bleeding since, but can't endorse further specifics. In ED here, pt found to have ROSALIA with cr 4.58, HgB 7.2, receiving 1u PRBC now. Also febrile to 102 via axillary, HD stable otherwise, normal lactate. ICU consult called for sepsis and chronic respiratory failure.

## 2019-10-09 NOTE — H&P ADULT - NSICDXPASTMEDICALHX_GEN_ALL_CORE_FT
PAST MEDICAL HISTORY:  BPH (benign prostatic hypertrophy)     CAD (coronary artery disease)     CHF (congestive heart failure)     Clostridium difficile diarrhea in 2009    Dementia     Dysphagia     Fall Multiple Mechanical falls    HLD (hyperlipidemia)     HTN (hypertension)     Prostate cancer Treated w/ radiation    Seizure (last event >1yr ago)    Stroke (~5yrs ago)

## 2019-10-09 NOTE — H&P ADULT - NSHPSOCIALHISTORY_GEN_ALL_CORE
As per daughter pt w/o history of tobacco/alcohol/illicit drug use, [facility], non independent @ baseline, non verbal, minimally interactive

## 2019-10-10 DIAGNOSIS — K94.23 GASTROSTOMY MALFUNCTION: ICD-10-CM

## 2019-10-10 LAB
ALBUMIN SERPL ELPH-MCNC: 1.9 G/DL — LOW (ref 3.3–5.2)
ALP SERPL-CCNC: 320 U/L — HIGH (ref 40–120)
ALT FLD-CCNC: 18 U/L — SIGNIFICANT CHANGE UP
ANION GAP SERPL CALC-SCNC: 19 MMOL/L — HIGH (ref 5–17)
AST SERPL-CCNC: 29 U/L — SIGNIFICANT CHANGE UP
BILIRUB SERPL-MCNC: 0.5 MG/DL — SIGNIFICANT CHANGE UP (ref 0.4–2)
BUN SERPL-MCNC: 110 MG/DL — HIGH (ref 8–20)
CALCIUM SERPL-MCNC: 8.7 MG/DL — SIGNIFICANT CHANGE UP (ref 8.6–10.2)
CHLORIDE SERPL-SCNC: 99 MMOL/L — SIGNIFICANT CHANGE UP (ref 98–107)
CO2 SERPL-SCNC: 24 MMOL/L — SIGNIFICANT CHANGE UP (ref 22–29)
CREAT SERPL-MCNC: 4.69 MG/DL — HIGH (ref 0.5–1.3)
CRP SERPL-MCNC: 26.94 MG/DL — HIGH (ref 0–0.4)
CULTURE RESULTS: SIGNIFICANT CHANGE UP
ERYTHROCYTE [SEDIMENTATION RATE] IN BLOOD: 55 MM/HR — HIGH (ref 0–20)
GLUCOSE BLDC GLUCOMTR-MCNC: 107 MG/DL — HIGH (ref 70–99)
GLUCOSE BLDC GLUCOMTR-MCNC: 109 MG/DL — HIGH (ref 70–99)
GLUCOSE BLDC GLUCOMTR-MCNC: 114 MG/DL — HIGH (ref 70–99)
GLUCOSE BLDC GLUCOMTR-MCNC: 88 MG/DL — SIGNIFICANT CHANGE UP (ref 70–99)
GLUCOSE SERPL-MCNC: 97 MG/DL — SIGNIFICANT CHANGE UP (ref 70–115)
HCT VFR BLD CALC: 26.9 % — LOW (ref 39–50)
HGB BLD-MCNC: 8.3 G/DL — LOW (ref 13–17)
MCHC RBC-ENTMCNC: 30.5 PG — SIGNIFICANT CHANGE UP (ref 27–34)
MCHC RBC-ENTMCNC: 30.9 GM/DL — LOW (ref 32–36)
MCV RBC AUTO: 98.9 FL — SIGNIFICANT CHANGE UP (ref 80–100)
OB PNL STL: NEGATIVE — SIGNIFICANT CHANGE UP
PLATELET # BLD AUTO: 319 K/UL — SIGNIFICANT CHANGE UP (ref 150–400)
POTASSIUM SERPL-MCNC: 5.4 MMOL/L — HIGH (ref 3.5–5.3)
POTASSIUM SERPL-SCNC: 5.4 MMOL/L — HIGH (ref 3.5–5.3)
PROT SERPL-MCNC: 5.8 G/DL — LOW (ref 6.6–8.7)
RBC # BLD: 2.72 M/UL — LOW (ref 4.2–5.8)
RBC # FLD: 17.7 % — HIGH (ref 10.3–14.5)
SODIUM SERPL-SCNC: 142 MMOL/L — SIGNIFICANT CHANGE UP (ref 135–145)
SPECIMEN SOURCE: SIGNIFICANT CHANGE UP
WBC # BLD: 13.05 K/UL — HIGH (ref 3.8–10.5)
WBC # FLD AUTO: 13.05 K/UL — HIGH (ref 3.8–10.5)

## 2019-10-10 PROCEDURE — 99233 SBSQ HOSP IP/OBS HIGH 50: CPT

## 2019-10-10 PROCEDURE — 99222 1ST HOSP IP/OBS MODERATE 55: CPT

## 2019-10-10 RX ORDER — SODIUM POLYSTYRENE SULFONATE 4.1 MEQ/G
15 POWDER, FOR SUSPENSION ORAL ONCE
Refills: 0 | Status: COMPLETED | OUTPATIENT
Start: 2019-10-10 | End: 2019-10-10

## 2019-10-10 RX ORDER — SACCHAROMYCES BOULARDII 250 MG
250 POWDER IN PACKET (EA) ORAL
Refills: 0 | Status: DISCONTINUED | OUTPATIENT
Start: 2019-10-10 | End: 2019-10-14

## 2019-10-10 RX ORDER — SODIUM POLYSTYRENE SULFONATE 4.1 MEQ/G
30 POWDER, FOR SUSPENSION ORAL ONCE
Refills: 0 | Status: COMPLETED | OUTPATIENT
Start: 2019-10-10 | End: 2019-10-10

## 2019-10-10 RX ORDER — ZINC SULFATE TAB 220 MG (50 MG ZINC EQUIVALENT) 220 (50 ZN) MG
220 TAB ORAL DAILY
Refills: 0 | Status: COMPLETED | OUTPATIENT
Start: 2019-10-10 | End: 2019-10-20

## 2019-10-10 RX ORDER — ASCORBIC ACID 60 MG
500 TABLET,CHEWABLE ORAL DAILY
Refills: 0 | Status: DISCONTINUED | OUTPATIENT
Start: 2019-10-10 | End: 2019-10-23

## 2019-10-10 RX ADMIN — CHLORHEXIDINE GLUCONATE 15 MILLILITER(S): 213 SOLUTION TOPICAL at 17:07

## 2019-10-10 RX ADMIN — Medication 0.12 MILLIGRAM(S): at 06:11

## 2019-10-10 RX ADMIN — Medication 30 MILLIGRAM(S): at 06:09

## 2019-10-10 RX ADMIN — SODIUM POLYSTYRENE SULFONATE 30 GRAM(S): 4.1 POWDER, FOR SUSPENSION ORAL at 17:02

## 2019-10-10 RX ADMIN — LEVETIRACETAM 400 MILLIGRAM(S): 250 TABLET, FILM COATED ORAL at 06:10

## 2019-10-10 RX ADMIN — FOSPHENYTOIN 116 MILLIGRAM(S) PE: 50 INJECTION INTRAMUSCULAR; INTRAVENOUS at 06:09

## 2019-10-10 RX ADMIN — CHLORHEXIDINE GLUCONATE 15 MILLILITER(S): 213 SOLUTION TOPICAL at 06:10

## 2019-10-10 RX ADMIN — CEFEPIME 100 MILLIGRAM(S): 1 INJECTION, POWDER, FOR SOLUTION INTRAMUSCULAR; INTRAVENOUS at 17:02

## 2019-10-10 RX ADMIN — Medication 650 MILLIGRAM(S): at 06:10

## 2019-10-10 RX ADMIN — PANTOPRAZOLE SODIUM 40 MILLIGRAM(S): 20 TABLET, DELAYED RELEASE ORAL at 06:08

## 2019-10-10 RX ADMIN — Medication 400 MILLIGRAM(S): at 17:06

## 2019-10-10 RX ADMIN — CEFEPIME 100 MILLIGRAM(S): 1 INJECTION, POWDER, FOR SOLUTION INTRAMUSCULAR; INTRAVENOUS at 06:10

## 2019-10-10 RX ADMIN — Medication 25 MILLIGRAM(S): at 17:07

## 2019-10-10 RX ADMIN — LEVETIRACETAM 400 MILLIGRAM(S): 250 TABLET, FILM COATED ORAL at 17:02

## 2019-10-10 RX ADMIN — PANTOPRAZOLE SODIUM 40 MILLIGRAM(S): 20 TABLET, DELAYED RELEASE ORAL at 17:07

## 2019-10-10 RX ADMIN — Medication 650 MILLIGRAM(S): at 21:21

## 2019-10-10 RX ADMIN — Medication 250 MILLIGRAM(S): at 17:07

## 2019-10-10 RX ADMIN — SODIUM POLYSTYRENE SULFONATE 15 GRAM(S): 4.1 POWDER, FOR SUSPENSION ORAL at 13:15

## 2019-10-10 RX ADMIN — SODIUM CHLORIDE 100 MILLILITER(S): 9 INJECTION INTRAMUSCULAR; INTRAVENOUS; SUBCUTANEOUS at 17:07

## 2019-10-10 RX ADMIN — Medication 650 MILLIGRAM(S): at 13:15

## 2019-10-10 RX ADMIN — Medication 25 MILLIGRAM(S): at 06:10

## 2019-10-10 RX ADMIN — AMLODIPINE BESYLATE 5 MILLIGRAM(S): 2.5 TABLET ORAL at 06:09

## 2019-10-10 NOTE — DIETITIAN INITIAL EVALUATION ADULT. - PROBLEM SELECTOR PLAN 3
Secondary to prior complicated hospital course related to PNA. On VAC 16/500/5/30. SPO2 Maintaining mid 90's. Will titrate to maintain SPO2 92-96%. Vent bundle in place. PRN nebs and steroids ordered.

## 2019-10-10 NOTE — DIETITIAN INITIAL EVALUATION ADULT. - PHYSICAL APPEARANCE
BMI 26.5; brief NFPE conducted, physical findings include severe muscle wasting (temples); 3+ generalized edema, 4+ R/L foot/ankle edema BMI 26.5; brief NFPE conducted, physical findings include severe muscle wasting (temples); 3+ generalized edema, 4+ R/L foot/ankle edema; Stage IV sacrum, unstageable to sacrum, unstageable to R/L heels, unstageable to coccyx, skin tear to posterior scrotum and R. forearm

## 2019-10-10 NOTE — DIETITIAN INITIAL EVALUATION ADULT. - PROBLEM SELECTOR PLAN 1
Likely secondary to recurrence of frequent VAP/HAP. Questionable UTI. Hx of multiple MDRs in past (see HPI). Pt HD stable, normotensive, NSR but febrile with leukocytosis (also on home steroids). S/p 30cc/kg protocol in ED. Received 1g Merro in ED. Prior sputum cx in Feb 2019 w/ resistance to Merro, indeterminate to zosyn. As per daughter, prior drug rash to unasyn (?SJS). Prior cx sens to Cefepime, will start @ 1g Q12h. Will also cover w/ Vanco (prior hx of MRSA bacteremia, multiple skin wounds) 1800mg now. Further dose by level given CrCl of 13. Lactate normal. Will repeat now s/p 30cc/kg protocol. Start LR @100cc/hr. Procal/RVP/Pan cultures sent. CT Chest pending. RUQ ultrasound, r/o GBD given sepsis + elevated alk phos. ID consulted.

## 2019-10-10 NOTE — CHART NOTE - NSCHARTNOTEFT_GEN_A_CORE
Upon Nutritional Assessment by the Registered Dietitian your patient was determined to meet criteria / has evidence of the following diagnosis/diagnoses:          [x]  Moderate Protein Calorie Malnutrition      Findings as based on:  •  Comprehensive nutrition assessment and consultation  •  Calorie counts (nutrient intake analysis)  •  Food acceptance and intake status from observations by staff  •  Follow up  •  Patient education  •  Intervention secondary to interdisciplinary rounds  •   concerns    Pt presents at high nutrition risk secondary to malnutrition (moderate chronic) related to insufficient protein-energy intake in setting of sepsis, chronic resp failure +trach, impaired skin integrity as evidenced by likely meeting <75% EER >7 days, fluid accumulation (3+ generalized, 4+ R/L feet and ankles), likely mask wt loss.     Treatment:    The following diet has been recommended:  1) Aware of renal insufficiency, (renal labs trending downwards, per nephrology stable). would recommend Pivot 1.5 initiate @ 10mL/hr, increase by 10mL q8 hrs to goal rate 70mL/hr (x 20hrs) to provide 1400mL daily (2100cal, 131g protein).   2) Rx MVI and vit C 500mg daily, zinc sulfate 220mg/day x 10 days to facilitate wound healing   3) Monitor renal function/electrolytes closely, RD to remain available as needed   4) Monitor weights daily     PROVIDER Section:     By signing this assessment you are acknowledging and agree with the diagnosis/diagnoses assigned by the Registered Dietitian    Comments: Upon Nutritional Assessment by the Registered Dietitian your patient was determined to meet criteria / has evidence of the following diagnosis/diagnoses:          [x]  Moderate Protein Calorie Malnutrition      Findings as based on:  •  Comprehensive nutrition assessment and consultation  •  Calorie counts (nutrient intake analysis)  •  Food acceptance and intake status from observations by staff  •  Follow up  •  Patient education  •  Intervention secondary to interdisciplinary rounds  •   concerns    Pt presents at high nutrition risk secondary to malnutrition (moderate chronic) related to insufficient protein-energy intake in setting of sepsis, chronic resp failure +trach, impaired skin integrity as evidenced by likely meeting <75% EER >7 days, fluid accumulation (3+ generalized, 4+ R/L feet and ankles), likely mask wt loss.     Treatment:    The following diet has been recommended:  1) Aware of renal insufficiency (renal labs trending downwards, per nephrology renal function is stable). would recommend Pivot 1.5 initiate @ 10mL/hr, increase by 10mL q8 hrs to goal rate 70mL/hr (x 20hrs) to provide 1400mL daily (2100cal, 131g protein).   2) Rx MVI and vit C 500mg daily, zinc sulfate 220mg/day x 10 days to facilitate wound healing   3) Monitor renal function/electrolytes closely, RD to remain available as needed   4) Monitor weights daily     PROVIDER Section:     By signing this assessment you are acknowledging and agree with the diagnosis/diagnoses assigned by the Registered Dietitian    Comments:

## 2019-10-10 NOTE — DIETITIAN INITIAL EVALUATION ADULT. - OTHER INFO
Pt admitted with sepsis likely 2/2 OM, chronic resp failure +trach, CHF, dementia, dysphagia with PEG, hx prostate CA. Daughter at bedside completed interview, reports TFs have been on hold x 3 days. PTA daughter reports Pt was on Nepro formula due to impaired renal function, received ~10 session of HD at [facility]. Per nephrology renal function stable at this time, BUN/creat trending down. Discussed with daughter recommendations for TF at this time, ensured that we would monitor renal function and alter recommendations as needed. Unclear accuracy of weights. Daughter reports Pts weight stable ~185lbs, consistent with previous admissions. RD bedscale weight at this time ~185lbs, noted with 3+ generalized and 4+ R/L feet/ankle edema likely masking weight loss. Will continue to monitor weights for accuracy/trend/track fluid.

## 2019-10-10 NOTE — DIETITIAN INITIAL EVALUATION ADULT. - ETIOLOGY
related to insufficient protein-energy intake in setting of sepsis, chronic resp failure +trach, impaired skin integrity

## 2019-10-10 NOTE — DIETITIAN INITIAL EVALUATION ADULT. - PERTINENT LABORATORY DATA
10-10 Na142 mmol/L Glu 97 mg/dL K+ 5.4 mmol/L<H> Cr  4.69 mg/dL<H> .0 mg/dL<H> Phos n/a   Alb 1.9 g/dL<L> PAB n/a

## 2019-10-10 NOTE — CONSULT NOTE ADULT - SUBJECTIVE AND OBJECTIVE BOX
HISTORY OF PRESENT ILLNESS:  This is a 87y old Male with a past medical history significant for CAD w/ 2x stents, HTN, . pAF HLD, afib, seizure d/o, prostate cancer, CHF, chronic respiratory failure s/p trach/peg on vent due to PNA in 2018, multiple prior hospitalizations for MDR PNA's/Sepsis (Streptococcus bacteremia, MRSA bacteremia, sputum Cx's with Stenotrophomonas, MDR Acinetobacter, Proteus and CRE Pseudomonas), chronic sacral decubitus w/ osteomyelitis s/p diverting colostomy and chronic jones, prior CVA's and dementia with residual deficits sent from [facility] after in-house nephrologist noticed low HgB (7.2) and elevated Cr (4.58). Pt has been spiking fevers on and off with multiple hospitalizations @ [facility] over past few months for PNA/Sepsis/Fevers. Pt had treatment of bleeding in colostomy ~1 month ago. GI called to evaluate PEG. Daughter states the PEG was recently changed at [place] 1-2 months ago and has been functioning well.      REVIEW OF SYSTEMS:  Constitutional:  No unintentional weight loss, fevers, chills or night sweats	  Eyes: No eye pain, redness, discharge, or proptosis  ENMT: No sore throat, ear pain, mouth sores, or swollen glands in the neck  Respiratory: No dyspnea, cough or wheezing  Cardiovascular: No chest pain, dyspnea on exertion, or orthopnea  Gastrointestinal:	Please see HPI  Genitourinary: No dysuria or hematuria  Neurological:	 No changes in sleep/wake cycle, convulsions, confusion, dizziness or lightheadedness  Psychiatric: No changes in personality or emotional problems   Hematology: No easy bruising   Endocrine: No hot or cold flashes or deepening of voice	  All other review of systems were completed and were otherwise negative save what is reported in the HPI.    PAST MEDICAL/SURGICAL HISTORY:  Dysphagia  Fall: Multiple Mechanical falls  CAD (coronary artery disease)  Clostridium difficile diarrhea: in   BPH (benign prostatic hypertrophy)  Dementia  HLD (hyperlipidemia)  CHF (congestive heart failure)  Prostate cancer: Treated w/ radiation  Stroke: (~5yrs ago)  Seizure: (last event &gt;1yr ago)  HTN (hypertension)  Colostomy in place  S/P percutaneous endoscopic gastrostomy (PEG) tube placement  H/O hemorrhoidectomy  Deviated septum  Abdominal hernia  Status post cardiac surgery: stents x 2    SOCIAL HISTORY:  - ILLICIT DRUG USE: Denies    FAMILY HISTORY:  No known history of gastrointestinal or liver disease;      HOME MEDICATIONS:  acetaminophen 325 mg oral tablet: 2 tab(s) orally every 6 hours, As Needed (09 Oct 2019 04:)  amLODIPine 5 mg oral tablet: 1 tab(s) orally once a day (09 Oct 2019 04:31)  ascorbic acid 500 mg oral tablet: 1 tab(s) orally once a day (09 Oct 2019 04:)  aspirin 81 mg oral tablet: 1 tab(s) orally once a day (09 Oct 2019 04:)  bisacodyl 10 mg rectal suppository: 1 suppository(ies) rectal prn, As Needed (09 Oct 2019 04:)  budesonide 0.5 mg/2 mL inhalation suspension: 2 milliliter(s) inhaled 2 times a day (09 Oct 2019 04:)  chlorhexidine 0.12% mucous membrane liquid: 15 milliliter(s) mucous membrane 2 times a day (09 Oct 2019 04:)  collagenase 250 units/g topical ointment: Apply topically to sacral wound once a day (09 Oct 2019 04:)  digoxin 125 mcg (0.125 mg) oral tablet: 1 tab(s) orally once a day (09 Oct 2019 04:)  diphenhydrAMINE 25 mg oral capsule: 2 cap(s) orally every 6 hours, As Needed (09 Oct 2019 04:)  Dulcolax Stool Softener: 5 milligram(s) orally (09 Oct 2019 04:)  DuoNeb 0.5 mg-2.5 mg/3 mL inhalation solution: 3 milliliter(s) inhaled every 4 hours, As Needed (09 Oct 2019 04:)  epoetin giorgio 10,000 units/mL preservative-free injectable solution: 25359 unit(s) injectable once a week on Friday (09 Oct 2019 04:)  famotidine 20 mg oral tablet: 1 tab(s) orally once a day (09 Oct 2019 04:)  ferrous sulfate 300 mg/5 mL (60 mg elemental iron) oral liquid: 5 milliliter(s) by gastrostomy tube every 8 hours (09 Oct 2019 04:)  Fleet Enema 7 g-19 g rectal enema: 1 application rectal prn, As Needed (09 Oct 2019 04:)  furosemide 40 mg oral tablet: 1 tab(s) orally every 48 hours (09 Oct 2019 04:31)  labetalol 100 mg oral tablet: 1 tab(s) orally every 8 hours (09 Oct 2019 04:31)  levETIRAcetam 100 mg/mL oral solution: 10 milliliter(s) orally 2 times a day (09 Oct 2019 04:31)  meropenem 1000 mg intravenous injection: 1000 milligram(s) intravenous every 8 hours (09 Oct 2019 04:31)  Milk of Magnesia: 30 milliliter(s) orally prn, As Needed (09 Oct 2019 04:31)  Multiple Vitamins with Minerals oral tablet: 1 tab(s) orally once a day (09 Oct 2019 04:31)  oxyCODONE 5 mg oral tablet: 1 tab(s) orally 2 times a day, As Needed (09 Oct 2019 04:31)  petrolatum topical ointment: 1 application topically once a day (09 Oct 2019 04:31)  phenytoin 25 mg/mL oral suspension: 16 milliliter(s) orally every 12 hours (09 Oct 2019 04:31)  potassium chloride 20 mEq oral granule, extended release: 20 milliequivalent(s) orally once a day (09 Oct 2019 04:31)  tamsulosin 0.4 mg oral capsule: 1 cap(s) orally once a day (09 Oct 2019 04:31)    INPATIENT MEDICATIONS:  MEDICATIONS  (STANDING):  amLODIPine   Tablet 5 milliGRAM(s) Oral daily  cefepime   IVPB 1000 milliGRAM(s) IV Intermittent every 12 hours  chlorhexidine 0.12% Liquid 15 milliLiter(s) Oral Mucosa every 12 hours  digoxin     Tablet 0.125 milliGRAM(s) Oral daily  levETIRAcetam  IVPB 1000 milliGRAM(s) IV Intermittent every 12 hours  metoprolol tartrate 25 milliGRAM(s) Oral two times a day  pantoprazole  Injectable 40 milliGRAM(s) IV Push every 12 hours  phenytoin   Suspension 400 milliGRAM(s) Enteral Tube every 12 hours  predniSONE   Tablet 30 milliGRAM(s) Oral daily  saccharomyces boulardii 250 milliGRAM(s) Oral two times a day  sodium bicarbonate 650 milliGRAM(s) Oral every 8 hours  sodium chloride 0.9%. 1000 milliLiter(s) (100 mL/Hr) IV Continuous <Continuous>    MEDICATIONS  (PRN):  acetaminophen    Suspension .. 650 milliGRAM(s) Oral every 6 hours PRN Temp greater or equal to 38.5C (101.3F)  ALBUTerol/ipratropium for Nebulization. 3 milliLiter(s) Nebulizer every 4 hours PRN Bronchospasm    ALLERGIES:  clindamycin (Unknown)  Grapes (Rash)  Nuts (Hives; Rash)  PC Pen VK (Unknown)  shellfish (Angioedema; Rash; Hives)  strawberry (Short breath; Rash; Hives)  Xanax (Rash)    VITAL SIGNS LAST 24 HOURS:  T(C): 36.8 (10 Oct 2019 07:51), Max: 36.8 (10 Oct 2019 07:51)  T(F): 98.2 (10 Oct 2019 07:51), Max: 98.2 (10 Oct 2019 07:51)  HR: 68 (10 Oct 2019 07:51) (58 - 89)  BP: 122/58 (10 Oct 2019 07:51) (89/52 - 123/54)  BP(mean): 77 (09 Oct 2019 22:08) (66 - 81)  RR: 14 (09 Oct 2019 23:30) (12 - 21)  SpO2: 99% (10 Oct 2019 07:51) (99% - 100%)  Mode: AC/ CMV (Assist Control/ Continuous Mandatory Ventilation), RR (machine): 16, TV (machine): 550, FiO2: 30, PEEP: 5, MAP: 10, PIP: 24    10-09-19 @ 07:  -  10-10-19 @ 07:00  --------------------------------------------------------  IN: 1550 mL / OUT: 1920 mL / NET: -370 mL        10-09-19 @ 07:01  -  10-10-19 @ 07:00  --------------------------------------------------------  IN: 1550 mL / OUT: 1920 mL / NET: -370 mL      Mode: AC/ CMV (Assist Control/ Continuous Mandatory Ventilation), RR (machine): 16, TV (machine): 550, FiO2: 30, PEEP: 5, MAP: 10, PIP: 24    PHYSICAL EXAM:  Constitutional: + trach, + PEG   Eyes: Sclerae anicteric, conjunctivae normal  ENMT: Mucus membranes moist, no oropharyngeal thrush noted  Neck: No thyroid nodules appreciated, no significant cervical or supraclavicular lymphadenopathy  Respiratory: decreased air entry B/L  Cardiovascular: Regular rate and rhythm  Gastrointestinal: Soft, nontender, + PEG, nondistended, normoactive bowel sounds  Rectal: Perianal exam within normal limits; normal sphincter tone; brown stool on glove  Extremities: No clubbing, cyanosis or edema  Neurological: nonverbal opens eyes  Skin: No jaundice  Lymph Nodes: No significant lymphadenopathy        LABS:                        8.3    13.05 )-----------( 319      ( 10 Oct 2019 08:42 )             26.9     PT/INR - ( 08 Oct 2019 22:21 )   PT: 13.3 sec;   INR: 1.15 ratio         PTT - ( 08 Oct 2019 22:21 )  PTT:30.3 sec  10-10    142  |  99  |  110.0<H>  ----------------------------<  97  5.4<H>   |  24.0  |  4.69<H>    Ca    8.7      10 Oct 2019 08:42  Phos  2.9     10-  Mg     3.1     10-    TPro  5.8<L>  /  Alb  1.9<L>  /  TBili  0.5  /  DBili  x   /  AST  29  /  ALT  18  /  AlkPhos  320<H>  10-10    LIVER FUNCTIONS - ( 10 Oct 2019 08:42 )  Alb: 1.9 g/dL / Pro: 5.8 g/dL / ALK PHOS: 320 U/L / ALT: 18 U/L / AST: 29 U/L / GGT: x           Urinalysis Basic - ( 08 Oct 2019 22:56 )    Color: Red / Appearance: Turbid / S.010 / pH: x  Gluc: x / Ketone: Negative  / Bili: Negative / Urobili: Negative   Blood: x / Protein: 100 / Nitrite: Negative   Leuk Esterase: Moderate / RBC: >50 /HPF / WBC 11-25   Sq Epi: x / Non Sq Epi: Occasional / Bacteria: Few        Culture - Sputum (collected 09 Oct 2019 17:09)  Source: .Sputum  Gram Stain (09 Oct 2019 22:59):    Few WBC's    Numerous Gram Negative Rods    Culture - Urine (collected 08 Oct 2019 22:56)  Source: .Urine  Final Report (10 Oct 2019 11:39):    Culture grew 3 or more types of organisms which indicate    collection contamination; consider recollection only if clinically    indicated.    .    TYPE: (C=Critical, N=Notification, A=Abnormal) N    TESTS:  _ Contaminated urine.    DATE/TIME CALLED: _ 10/10/2019 11:38:33    CALLED TO: Amanda Marquez RN    READ BACK (2 Patient Identifiers)(Y/N): _ Y    READ BACK VALUES (Y/N): _ Y    CALLED BY: Amanda Barrera      IMAGING:  < from: US Abdomen Limited (10.09.19 @ 07:44) >  IMPRESSION:     Cholelithiasis.    < end of copied text >  < from: CT Abdomen and Pelvis No Cont (10.09.19 @ 04:00) >  FINDINGS:   Tubes, catheters and devices: Percutaneous gastrostomy tube.     Liver: Lobular liver contour. No discrete liver lesion.   Gallbladder and bile ducts: Distended gallbladder. No gallstone.   Pancreas: Normal  Spleen: Splenomegaly.   Adrenals: 13 mm left adrenal nodule  Kidneys and ureters: Polycystic kidneys with some atypical cysts   including   coarse calcifications of a 2 cm cyst right kidney, #191, series 3.   Largest   right renal cyst measured 6 cm in size. Largestleft renal cyst measures   5.5   cm. Hounsfield units of some of the left renal cysts measure up to 22. No   ureteral dilatation or stone.   Stomach and bowel: Wall thickening of incompletely distended stomach.   Appendix: No inflamed appendix.  Intraperitoneal space: Abdominal pelvic ascites.   Vasculature: Atherosclerotic aorta.   Lymph nodes: Retroperitoneal and mesenteric adenopathy. Multiple pelvic   and   inguinal lymph nodes. Multilevel small node deformities.     Bladder: Contracted bladder with Jones catheter. Thickened bladder wall.   Bladder gas. Small focus of extraluminal gas by bladder, #285, series 3.   Reproductive: Normally sized prostate.  Bones/joints: Complex fluid and gas are noted by the greater trochanter   of the   left femur, #287-295, series 3. Large ulceration extends to the posterior   right   ischium. Ulceration and gas extend to the coccyx. Patchy sclerosis and   lucencies of the bony skeleton are noted. Soft tissue gas is adjacent to   the   posterior left sacrum, #165-168, series 4. Degenerative changes of each   hip are   noted. Straightening of the thoracolumbar spine.   Soft tissues: Diffuse subcutaneous edema or anasarca. Left ventral pelvic   peristomal herniation of colon and fluid through 4.5 cm wall defect. Soft   tissue gas is associated with a posterior left musculature and adjacent   bursa   of the left hip, #22-26, series 4.   Other findings: Lower thorax: See CT chest.     IMPRESSION:   1. Large decubitus ulcer with exposed right ischium, likely   osteomyelitis.   2. Infected soft tissues of lateral and posterior left hip suspicious for   septic bursitis/pyositis, possible osteomyelitis.   3. Left paracentral sacral/coccygeal decubitus ulcer with possible   osteomyelitis and soft tissues.   4. Large peristomal hernia with colonic diverticulosis. Possible   low-grade   diverticulitis.   5. Extraluminal collection of gas adjacent to the bladder etiology   uncertain.   6. Polycystic kidneys, some atypical.   7. Percutaneous gastrostomy tube. Jones catheter in bladder.  8. Distended gallbladder. No gallstone.   9. Anasarca with subcutaneous edema and fluid, greater on the left   lateral   abdomen, asymmetric left edematous abdominal musculature as well as   abdominal   and pelvic ascites.   10. Retroperitoneal, mesenteric, and pelvic adenopathy.    < end of copied text >

## 2019-10-10 NOTE — DIETITIAN INITIAL EVALUATION ADULT. - PROBLEM SELECTOR PLAN 4
Unclear source, but likely related to acute blood loss anemia given HgB baseline back in Feb ~10 and especially concerning given recent 1 month hx of GI bleed w/ multiple clippings. No blood per colostomy but will sent FOBT. Receiving 1u PRBC now. Repeat CBC after and trend CBC q6h thereafter. Pt well perfused with good capillary refill and HD stable. R/o GI bleed as below.

## 2019-10-10 NOTE — PROGRESS NOTE ADULT - ASSESSMENT
ROSALIA, hyperkalemia r/o ATN in setting of possible sepsis  End stage dementia with chronic respiratory failure  Anemia s/p PRBCs  Renal function stable and seems to be producing more urine- (?) signs of recovery  - cont current Rx and monitor  - no current indication for HD

## 2019-10-10 NOTE — PROGRESS NOTE ADULT - SUBJECTIVE AND OBJECTIVE BOX
Patient is a 87y old  Male who presents with a chief complaint of Sepsis (10 Oct 2019 15:06)    Patient seen and examined at bedside.    ALLERGIES:  Ativan (Unknown)  clindamycin (Unknown)  Grapes (Rash)  Haldol (Dystonic RXN)  hydrALAZINE (Unknown)  Nuts (Hives; Rash)  PC Pen VK (Unknown)  shellfish (Angioedema; Rash; Hives)  strawberry (Short breath; Rash; Hives)  Xanax (Rash)  Zyprexa (Unknown)    MEDICATIONS  (STANDING):  amLODIPine   Tablet 5 milliGRAM(s) Oral daily  cefepime   IVPB 1000 milliGRAM(s) IV Intermittent every 12 hours  chlorhexidine 0.12% Liquid 15 milliLiter(s) Oral Mucosa every 12 hours  digoxin     Tablet 0.125 milliGRAM(s) Oral daily  levETIRAcetam  IVPB 1000 milliGRAM(s) IV Intermittent every 12 hours  metoprolol tartrate 25 milliGRAM(s) Oral two times a day  pantoprazole  Injectable 40 milliGRAM(s) IV Push every 12 hours  phenytoin   Suspension 400 milliGRAM(s) Enteral Tube every 12 hours  predniSONE   Tablet 30 milliGRAM(s) Oral daily  saccharomyces boulardii 250 milliGRAM(s) Oral two times a day  sodium bicarbonate 650 milliGRAM(s) Oral every 8 hours  sodium chloride 0.9%. 1000 milliLiter(s) (100 mL/Hr) IV Continuous <Continuous>  sodium polystyrene sulfonate Suspension 30 Gram(s) Enteral Tube once    MEDICATIONS  (PRN):  acetaminophen    Suspension .. 650 milliGRAM(s) Oral every 6 hours PRN Temp greater or equal to 38.5C (101.3F)  ALBUTerol/ipratropium for Nebulization. 3 milliLiter(s) Nebulizer every 4 hours PRN Bronchospasm      Vital Signs Last 24 Hrs  T(F): 98.2 (10 Oct 2019 07:51), Max: 98.2 (10 Oct 2019 07:51)  HR: 68 (10 Oct 2019 07:51) (58 - 89)  BP: 122/58 (10 Oct 2019 07:51) (89/52 - 123/54)  RR: 14 (09 Oct 2019 23:30) (12 - 21)  SpO2: 99% (10 Oct 2019 07:51) (99% - 100%)  I&O's Summary    09 Oct 2019 07:01  -  10 Oct 2019 07:00  --------------------------------------------------------  IN: 1550 mL / OUT: 1920 mL / NET: -370 mL      PHYSICAL EXAM:  General: NAD, opens eyes   HEENT: dry mucosea, ET tube in place  Neck: Supple, No JVD  Lungs: decreaed breath sounds b/l bases  Cardio: +s1/s2  Abdomen: Soft, Nontender, Nondistended; Bowel sounds present; gtube present  Extremities: Contracts  Neurologic: minimally responsive, non-verbal, unable to follow commands, cannot assess gait/CN/reflexes  : Baugh in place     LABS:                        8.3    13.05 )-----------( 319      ( 10 Oct 2019 08:42 )             26.9     10-10    142  |  99  |  110.0  ----------------------------<  97  5.4   |  24.0  |  4.69    Ca    8.7      10 Oct 2019 08:42  Phos  2.9     10-09  Mg     3.1     10-09    TPro  5.8  /  Alb  1.9  /  TBili  0.5  /  DBili  x   /  AST  29  /  ALT  18  /  AlkPhos  320  1010      eGFR if African American: 12 mL/min/1.73M2 (10-10-19 @ 08:42)  eGFR if Non African American: 10 mL/min/1.73M2 (10-10-19 @ 08:42)    PT/INR - ( 08 Oct 2019 22:21 )   PT: 13.3 sec;   INR: 1.15 ratio    PTT - ( 08 Oct 2019 22:21 )  PTT:30.3 sec  Lactate, Blood: 1.0 mmol/L (10-09 @ 07:13)    Procalcitonin, Serum: 1.19 ng/mL (10-09-19 @ 07:15)            22:21 - VBG - pH:       | pCO2:       | pO2:       | Lactate: 1.5      ABG - ( 09 Oct 2019 05:14 )  pH, Arterial: 7.48  pH, Blood: x     /  pCO2: 39    /  pO2: 122   / HCO3: 29    / Base Excess: 5.4   /  SaO2: 100                     POCT Blood Glucose.: 88 mg/dL (10 Oct 2019 06:49)  POCT Blood Glucose.: 107 mg/dL (10 Oct 2019 00:32)      Urinalysis Basic - ( 08 Oct 2019 22:56 )    Color: Red / Appearance: Turbid / S.010 / pH: x  Gluc: x / Ketone: Negative  / Bili: Negative / Urobili: Negative   Blood: x / Protein: 100 / Nitrite: Negative   Leuk Esterase: Moderate / RBC: >50 /HPF / WBC 11-25   Sq Epi: x / Non Sq Epi: Occasional / Bacteria: Few        Culture - Sputum (collected 09 Oct 2019 17:09)  Source: .Sputum  Gram Stain (09 Oct 2019 22:59):    Few WBC's    Numerous Gram Negative Rods    Culture - Urine (collected 08 Oct 2019 22:56)  Source: .Urine  Final Report (10 Oct 2019 11:39):    Culture grew 3 or more types of organisms which indicate    collection contamination; consider recollection only if clinically    indicated.    .    TYPE: (C=Critical, N=Notification, A=Abnormal) N    TESTS:  _ Contaminated urine.    DATE/TIME CALLED: _ 10/10/2019 11:38:33    CALLED TO: Amanda Marquez RN    READ BACK (2 Patient Identifiers)(Y/N): _ Y    READ BACK VALUES (Y/N): _ Y    CALLED BY: Amanda Barrera      RADIOLOGY & ADDITIONAL TESTS:    Care Discussed with Consultants/Other Providers:

## 2019-10-10 NOTE — DIETITIAN INITIAL EVALUATION ADULT. - PROBLEM SELECTOR PLAN 5
Recent cauterizations/clippings @ [facility] last month as per daughter after reported blood in colostomy. No blood in colostomy at present. Will sent FOBT. Repeat CBC q6h. On apixaban at home, will hold a/c and chemical DVT ppx for now until we can rule out GI bleed. Plan to restart ASAP. DVT ppx with SCDs. Will attempt to get records of procedures from [facility]. BID protonix.

## 2019-10-10 NOTE — PROGRESS NOTE ADULT - SUBJECTIVE AND OBJECTIVE BOX
NEPHROLOGY INTERVAL HPI/OVERNIGHT EVENTS:  pt remains obtunded  no acute distress noted  Hemodynamically stable  I spoke to daughter who was at bedside    MEDICATIONS  (STANDING):  amLODIPine   Tablet 5 milliGRAM(s) Oral daily  cefepime   IVPB 1000 milliGRAM(s) IV Intermittent every 12 hours  chlorhexidine 0.12% Liquid 15 milliLiter(s) Oral Mucosa every 12 hours  digoxin     Tablet 0.125 milliGRAM(s) Oral daily  levETIRAcetam  IVPB 1000 milliGRAM(s) IV Intermittent every 12 hours  metoprolol tartrate 25 milliGRAM(s) Oral two times a day  pantoprazole  Injectable 40 milliGRAM(s) IV Push every 12 hours  phenytoin   Suspension 400 milliGRAM(s) Enteral Tube every 12 hours  predniSONE   Tablet 30 milliGRAM(s) Oral daily  saccharomyces boulardii 250 milliGRAM(s) Oral two times a day  sodium bicarbonate 650 milliGRAM(s) Oral every 8 hours  sodium chloride 0.9%. 1000 milliLiter(s) (100 mL/Hr) IV Continuous <Continuous>    MEDICATIONS  (PRN):  acetaminophen    Suspension .. 650 milliGRAM(s) Oral every 6 hours PRN Temp greater or equal to 38.5C (101.3F)  ALBUTerol/ipratropium for Nebulization. 3 milliLiter(s) Nebulizer every 4 hours PRN Bronchospasm      Allergies    clindamycin (Unknown)  Grapes (Rash)  Nuts (Hives; Rash)  PC Pen VK (Unknown)  shellfish (Angioedema; Rash; Hives)  strawberry (Short breath; Rash; Hives)  Xanax (Rash)    Intolerances    Ativan (Unknown)  Haldol (Dystonic RXN)  hydrALAZINE (Unknown)  Zyprexa (Unknown)          Vital Signs Last 24 Hrs  T(C): 36.8 (10 Oct 2019 07:51), Max: 37.6 (09 Oct 2019 12:00)  T(F): 98.2 (10 Oct 2019 07:51), Max: 99.7 (09 Oct 2019 12:00)  HR: 68 (10 Oct 2019 07:51) (58 - 89)  BP: 122/58 (10 Oct 2019 07:51) (89/52 - 123/54)  BP(mean): 77 (09 Oct 2019 22:08) (66 - 81)  RR: 14 (09 Oct 2019 23:30) (12 - 21)  SpO2: 99% (10 Oct 2019 07:51) (99% - 100%)    PHYSICAL EXAM:  GENERAL: Chronically debilitated  HEAD:  No periorbital edema  EYES: EOMI, PERRLA, conjunctiva and sclera clear  ENMT: Dry mucous membranes, poor dentition  NECK: Supple, No JVD  NERVOUS SYSTEM:  Obtunded; profound dementia  CHEST/LUNG: Clear anteriorly with diminished BS  HEART: No rub  ABDOMEN: Soft, Nontender, Nondistended; Bowel sounds present; + PEG tube  EXTREMITIES:  2+ Peripheral Pulses, No edema  SKIN: No rashes or lesions    LABS:                        8.3    13.05 )-----------( 319      ( 10 Oct 2019 08:42 )             26.9     10-10    142  |  99  |  110.0<H>  ----------------------------<  97  5.4<H>   |  24.0  |  4.69<H>    Creatinine, Serum: 4.67 mg/dL (10.09.19 @ 07:15)        Ca    8.7      10 Oct 2019 08:42  Phos  2.9     10-  Mg     3.1     10-    TPro  5.8<L>  /  Alb  1.9<L>  /  TBili  0.5  /  DBili  x   /  AST  29  /  ALT  18  /  AlkPhos  320<H>  10-10    PT/INR - ( 08 Oct 2019 22:21 )   PT: 13.3 sec;   INR: 1.15 ratio         PTT - ( 08 Oct 2019 22:21 )  PTT:30.3 sec  Urinalysis Basic - ( 08 Oct 2019 22:56 )    Color: Red / Appearance: Turbid / S.010 / pH: x  Gluc: x / Ketone: Negative  / Bili: Negative / Urobili: Negative   Blood: x / Protein: 100 / Nitrite: Negative   Leuk Esterase: Moderate / RBC: >50 /HPF / WBC 11-25   Sq Epi: x / Non Sq Epi: Occasional / Bacteria: Few          RADIOLOGY & ADDITIONAL TESTS:  < from: CT Abdomen and Pelvis No Cont (10.09.19 @ 04:00) >     EXAM:  CT ABDOMEN AND PELVIS                         EXAM:  CT CHEST                          PROCEDURE DATE:  10/09/2019          INTERPRETATION:  [facility] PRELIMINARY REPORT  Reviewed by ROSS Crenshaw M.D.  PROCEDURE INFORMATION:   Exam: CT Chest Without Contrast   Exam date and time: 10/9/2019 3:48 AM   Clinical history: 87 years old, male; Abdominal pain; Generalized; Chest   pain;   Type not specified     TECHNIQUE:   Imaging protocol: Computed tomography of the chest without contrast.   3D rendering: MIP reconstructed images were created and reviewed.     COMPARISON:   CT CHEST 2019 6:25 AM     FINDINGS:   Tubes, catheters and devices: Tracheostomy tube will with tip in thoracic   trachea 4 cm above jorge alberto. Monitor leads. Right PICC line with tip in the   axilla, #31, series 4.   Lungs: Bibasilar consolidations. Peribronchial cuffing is present.   Pleural space: Small bilateral pleural effusions. No pneumothorax.   Heart: Cardiomegaly with coronary artery calcifications.   Mediastinum: Distended upper esophagus. Air-fluid level in middle   esophagus,   #58, series 3. Largely nondistended esophagus with wall thickening   distally.   Aorta: Ectatic atherosclerotic aorta PA cannot assess for dissection   without IV   contrast.   Lymph nodes: Indeterminate lymph nodes in the mediastinum brien. Bilateral   axillary enlarged lymph nodes, left greater than right.   Bones/joints: Scoliosis. No destructive bony lesion. Mild compression   deformities of the mid thoracic spine, likely osteoporotic.   Soft tissues: Gynecomastia. Subcutaneous edema. Asymmetric subcutaneous   edema/edema in/left lateral abdominal wall.   Other findings: See CT Abdomen and Pelvis for additional information on   abdomen.     IMPRESSION:   1.Bibasilar consolidations or pleural effusions, possible aspiration   pneumonia.   2. Cardiomegaly with dense coronary artery calcifications.   3. Small air-fluid level in the esophagus slightly above carinal level   and wall   thickening of distal esophagus   4. See report body for additional findings.       =========================  PROCEDURE INFORMATION:   Exam: CT Abdomen And Pelvis Without Contrast   Exam date and time: 10/9/2019 3:48 AM   Clinical history: 87 years old, male; Abdominal pain; Generalized; Chest   pain;   Type not specified     TECHNIQUE:   Imaging protocol: Computed tomography of the abdomen and pelvis without   contrast.   3D rendering: MIP reconstructed images were created and reviewed.     COMPARISON:   CT CHEST 2019 6:25 AM     FINDINGS:   Tubes, catheters and devices: Percutaneous gastrostomy tube.     Liver: Lobular liver contour. No discrete liver lesion.   Gallbladder and bile ducts: Distended gallbladder. No gallstone.   Pancreas: Normal  Spleen: Splenomegaly.   Adrenals: 13 mm left adrenal nodule  Kidneys and ureters: Polycystic kidneys with some atypical cysts   including   coarse calcifications of a 2 cm cyst right kidney, #191, series 3.   Largest   right renal cyst measured 6 cm in size. Largestleft renal cyst measures   5.5   cm. Hounsfield units of some of the left renal cysts measure up to 22. No   ureteral dilatation or stone.   Stomach and bowel: Wall thickening of incompletely distended stomach.   Appendix: No inflamed appendix.  Intraperitoneal space: Abdominal pelvic ascites.   Vasculature: Atherosclerotic aorta.   Lymph nodes: Retroperitoneal and mesenteric adenopathy. Multiple pelvic   and   inguinal lymph nodes. Multilevel small node deformities.     Bladder: Contracted bladder with Baugh catheter. Thickened bladder wall.   Bladder gas. Small focus of extraluminal gas by bladder, #285, series 3.   Reproductive: Normally sized prostate.  Bones/joints: Complex fluid and gas are noted by the greater trochanter   of the   left femur, #287-295, series 3. Large ulceration extends to the posterior   right   ischium. Ulceration and gas extend to the coccyx. Patchy sclerosis and   lucencies of the bony skeleton are noted. Soft tissue gas is adjacent to   the   posterior left sacrum, #165-168, series 4. Degenerative changes of each   hip are   noted. Straightening of the thoracolumbar spine.   Soft tissues: Diffuse subcutaneous edema or anasarca. Left ventral pelvic   peristomal herniation of colon and fluid through 4.5 cm wall defect. Soft   tissue gas is associated with a posterior left musculature and adjacent   bursa   of the left hip, #22-26, series 4.   Other findings: Lower thorax: See CT chest.     IMPRESSION:   1. Large decubitus ulcer with exposed right ischium, likely   osteomyelitis.   2. Infected soft tissues of lateral and posterior left hip suspicious for   septic bursitis/pyositis, possible osteomyelitis.   3. Left paracentral sacral/coccygeal decubitus ulcer with possible   osteomyelitis and soft tissues.   4. Large peristomal hernia with colonic diverticulosis. Possible   low-grade   diverticulitis.   5. Extraluminal collection of gas adjacent to the bladder etiology   uncertain.   6. Polycystic kidneys, some atypical.   7. Percutaneous gastrostomy tube. Baugh catheter in bladder.  8. Distended gallbladder. No gallstone.   9. Anasarca with subcutaneous edema and fluid, greater on the left   lateral   abdomen, asymmetric left edematous abdominal musculature as well as   abdominal   and pelvic ascites.   10. Retroperitoneal, mesenteric, and pelvic adenopathy.    < end of copied text >

## 2019-10-10 NOTE — PROGRESS NOTE ADULT - ASSESSMENT
88yo  Male with extensive past medical history admitted for multiple infectious sources complicated by sepsis :    #? aspiration pna  - ID consult appreciated  - abx per ID  - monitor culture  - vent  - nebs  - supportive mgmt     # HTN  - amlodipine  - monitor blood pressuire    # Afib- paroxysmal hx  - digoxin/toprol for rate control      #tube feeds  - restart tube feeds  - peg tube flushed by GI (consult appreciated)    #ROSALIA   - Creatinine baseline 02/2019- 0.55   - nephrology consult apprecaited  - jones  - ivf    #hyperkalemia  - kayexalate   - improving     #seizure hx   - levetiracetam and phenytoin  - stable    # Osteomyelitis-most likely source of sepsis  - esr and crp elevated  - abx per ID - consult appreciated  - wound care consult pending   - turn and reposition q 3 hrs  - off loading boots    #Anemia of unknown etiology in the setting of chronic disease and recent blood loss anemia  - recent multiple GIB treated with clip at [facility]  - monitor h/h- s/p one unit prbc on admit given low h and h   - fobt negative  - would trend h and h for one more day prior to restart eliquis  given recent transfusion  - protonix bid     # DVT prophylaxis  - venodynes

## 2019-10-10 NOTE — DIETITIAN INITIAL EVALUATION ADULT. - ADD RECOMMEND
Recommend Pivot 1.5 initiate @ 10mL/hr, increase by 10mL q8 hrs to goal rate 70mL/hr (x 20hrs) to provide 1400mL daily (2100cal, 131g protein). Monitor renal function/electrolytes closely, correct prn; Daily weights Recommend Pivot 1.5 initiate @ 10mL/hr, increase by 10mL q8 hrs to goal rate 70mL/hr (x 20hrs) to provide 1400mL daily (2100cal, 131g protein). Monitor renal function/electrolytes closely, correct prn; Daily weights; Rx MVI and vit C 500mg daily, zinc sulfate 220mg/day x 10 days to facilitate wound healing

## 2019-10-11 LAB
ANION GAP SERPL CALC-SCNC: 19 MMOL/L — HIGH (ref 5–17)
BUN SERPL-MCNC: 111 MG/DL — HIGH (ref 8–20)
CALCIUM SERPL-MCNC: 8.2 MG/DL — LOW (ref 8.6–10.2)
CHLORIDE SERPL-SCNC: 100 MMOL/L — SIGNIFICANT CHANGE UP (ref 98–107)
CO2 SERPL-SCNC: 24 MMOL/L — SIGNIFICANT CHANGE UP (ref 22–29)
CREAT SERPL-MCNC: 4.85 MG/DL — HIGH (ref 0.5–1.3)
GLUCOSE BLDC GLUCOMTR-MCNC: 131 MG/DL — HIGH (ref 70–99)
GLUCOSE BLDC GLUCOMTR-MCNC: 136 MG/DL — HIGH (ref 70–99)
GLUCOSE SERPL-MCNC: 108 MG/DL — SIGNIFICANT CHANGE UP (ref 70–115)
HCT VFR BLD CALC: 27.4 % — LOW (ref 39–50)
HGB BLD-MCNC: 8.4 G/DL — LOW (ref 13–17)
MCHC RBC-ENTMCNC: 30.7 GM/DL — LOW (ref 32–36)
MCHC RBC-ENTMCNC: 30.9 PG — SIGNIFICANT CHANGE UP (ref 27–34)
MCV RBC AUTO: 100.7 FL — HIGH (ref 80–100)
PLATELET # BLD AUTO: 340 K/UL — SIGNIFICANT CHANGE UP (ref 150–400)
POTASSIUM SERPL-MCNC: 4.6 MMOL/L — SIGNIFICANT CHANGE UP (ref 3.5–5.3)
POTASSIUM SERPL-SCNC: 4.6 MMOL/L — SIGNIFICANT CHANGE UP (ref 3.5–5.3)
RBC # BLD: 2.72 M/UL — LOW (ref 4.2–5.8)
RBC # FLD: 17.8 % — HIGH (ref 10.3–14.5)
SODIUM SERPL-SCNC: 143 MMOL/L — SIGNIFICANT CHANGE UP (ref 135–145)
WBC # BLD: 10.64 K/UL — HIGH (ref 3.8–10.5)
WBC # FLD AUTO: 10.64 K/UL — HIGH (ref 3.8–10.5)

## 2019-10-11 PROCEDURE — 99233 SBSQ HOSP IP/OBS HIGH 50: CPT

## 2019-10-11 PROCEDURE — 99232 SBSQ HOSP IP/OBS MODERATE 35: CPT

## 2019-10-11 RX ADMIN — SODIUM CHLORIDE 100 MILLILITER(S): 9 INJECTION INTRAMUSCULAR; INTRAVENOUS; SUBCUTANEOUS at 21:04

## 2019-10-11 RX ADMIN — AMLODIPINE BESYLATE 5 MILLIGRAM(S): 2.5 TABLET ORAL at 05:07

## 2019-10-11 RX ADMIN — Medication 400 MILLIGRAM(S): at 05:06

## 2019-10-11 RX ADMIN — Medication 0.12 MILLIGRAM(S): at 05:07

## 2019-10-11 RX ADMIN — Medication 500 MILLIGRAM(S): at 13:14

## 2019-10-11 RX ADMIN — Medication 250 MILLIGRAM(S): at 05:07

## 2019-10-11 RX ADMIN — CEFEPIME 100 MILLIGRAM(S): 1 INJECTION, POWDER, FOR SOLUTION INTRAMUSCULAR; INTRAVENOUS at 18:01

## 2019-10-11 RX ADMIN — LEVETIRACETAM 400 MILLIGRAM(S): 250 TABLET, FILM COATED ORAL at 18:01

## 2019-10-11 RX ADMIN — Medication 400 MILLIGRAM(S): at 18:02

## 2019-10-11 RX ADMIN — ZINC SULFATE TAB 220 MG (50 MG ZINC EQUIVALENT) 220 MILLIGRAM(S): 220 (50 ZN) TAB at 13:10

## 2019-10-11 RX ADMIN — Medication 30 MILLIGRAM(S): at 05:07

## 2019-10-11 RX ADMIN — Medication 650 MILLIGRAM(S): at 13:11

## 2019-10-11 RX ADMIN — Medication 650 MILLIGRAM(S): at 21:04

## 2019-10-11 RX ADMIN — PANTOPRAZOLE SODIUM 40 MILLIGRAM(S): 20 TABLET, DELAYED RELEASE ORAL at 05:07

## 2019-10-11 RX ADMIN — Medication 250 MILLIGRAM(S): at 18:02

## 2019-10-11 RX ADMIN — Medication 25 MILLIGRAM(S): at 05:07

## 2019-10-11 RX ADMIN — CHLORHEXIDINE GLUCONATE 15 MILLILITER(S): 213 SOLUTION TOPICAL at 05:07

## 2019-10-11 RX ADMIN — CEFEPIME 100 MILLIGRAM(S): 1 INJECTION, POWDER, FOR SOLUTION INTRAMUSCULAR; INTRAVENOUS at 05:06

## 2019-10-11 RX ADMIN — PANTOPRAZOLE SODIUM 40 MILLIGRAM(S): 20 TABLET, DELAYED RELEASE ORAL at 18:02

## 2019-10-11 RX ADMIN — SODIUM CHLORIDE 100 MILLILITER(S): 9 INJECTION INTRAMUSCULAR; INTRAVENOUS; SUBCUTANEOUS at 05:07

## 2019-10-11 RX ADMIN — LEVETIRACETAM 400 MILLIGRAM(S): 250 TABLET, FILM COATED ORAL at 05:06

## 2019-10-11 RX ADMIN — CHLORHEXIDINE GLUCONATE 15 MILLILITER(S): 213 SOLUTION TOPICAL at 18:02

## 2019-10-11 RX ADMIN — Medication 1 TABLET(S): at 13:11

## 2019-10-11 RX ADMIN — Medication 650 MILLIGRAM(S): at 05:07

## 2019-10-11 NOTE — PROGRESS NOTE ADULT - ASSESSMENT
1. sacral osteomyelitis   on antibiotics .  follow up cultures .  diverting colostomy in place .  ID on board .    2. Aspiration PNA   follow up cultures .  antibiotics in place .    3. Acute on chronic renal failure   nephro on board .  on fluids , no dialysis for now .  would recommend against dialysis .    4. Chronic respiratory failure   trach to vent .  pulmonary on board .    5. Seizure disorder   c/w home regimen     6. HTN '  c/w home regimen .    7. Afib   on rate controlling agents .  no AC as had bleeding issues in past .    8. Anemia   likely multifactorial , and renal insufficiency   transfuse as needed .     9. DVT prophylaxis   boots , no chemical prophylaxis due to anemia .     patient has a legal guardian , daughter at bedside was updated at bedside .

## 2019-10-11 NOTE — PROGRESS NOTE ADULT - ASSESSMENT
This is a 87y  Male  with h/o CAD,  s/p 2 stents, CHF, HLD, pAFIB, seizure disorder, prior CVA's with residual deficits and dementia, prostate cancer, respiratory failure on chronic vent, tracheostomy, Chronic sacral decubitus with osteomyelitis s/p diverting colostomy , multiple prior hospitalizations, prior PNA's/Sepsis (Streptococcus bacteremia, MRSA bacteremia, sputum Cx's with Stenotrophomonas, MDR Acinetobacter, Proteus and CRE Pseudomonas), and chronic jones, sent from [facility] after in-house nephrologist noticed low HgB (7.2) and elevated Cr (4.58).   here for fevers  T max of 102.7    Fever  - workup in process  - CT scan with multiple areas of concern:  	1. Bibasilar consolidations or pleural effusions, possible aspiration pneumonia.   	1. Large decubitus ulcer with exposed right ischium, likely osteomyelitis.   	2. Infected soft tissues of lateral and posterior left hip suspicious for septic bursitis/pyositis, possible osteomyelitis.   	3. Left paracentral sacral/coccygeal decubitus ulcer with possible osteomyelitis and soft tissues.   	4. Large peristomal hernia with colonic diverticulosis. Possible low-grade diverticulitis.   	5. Extraluminal collection of gas adjacent to the bladder etiology uncertain.   - source may be urinary given positive UA; low requirement for supplemental oxygen tends to go against PNA  - continue Cefepime 1 gram Q 12H - adjusted for ACUTE RENAL FAILURE  - continue Levaquin 250mg Iv Q48H  - if still with fevers, will likely start ZERBAXA or AVYCAZ in view of prior CRE, and other.     Regarding Acute renal failure,.   - baseline Cr of 0.5 at last admission.   - watch renal function closely.   - consider RENAL CONSULTATION    Osteomyelitis of right Ischium, suspected Left hip, sacral osteomyelitis  - ESR and CRP  Sedimentation Rate, Erythrocyte: 55 mm/hr (10-10-19 @ 08:42)  C-Reactive Protein, Serum: 26.94 mg/dL (10-10-19 @ 08:42)    - s/p prior diverting colostomy  - continue wound care  - consider wound care consult.     Chronic vent dependent respiratory failure  - continue vent.     - follow up all outstanding cultures  - trend temperature and WBC curve  - repeat cultures from blood and all sources if febrile.

## 2019-10-11 NOTE — PROGRESS NOTE ADULT - SUBJECTIVE AND OBJECTIVE BOX
NEPHROLOGY INTERVAL HPI/OVERNIGHT EVENTS:    Examined   dtr at bedside  no acute distress   Hemodynamically stable    MEDICATIONS  (STANDING):  amLODIPine   Tablet 5 milliGRAM(s) Oral daily  ascorbic acid 500 milliGRAM(s) Oral daily  cefepime   IVPB 1000 milliGRAM(s) IV Intermittent every 12 hours  chlorhexidine 0.12% Liquid 15 milliLiter(s) Oral Mucosa every 12 hours  digoxin     Tablet 0.125 milliGRAM(s) Oral daily  levETIRAcetam  IVPB 1000 milliGRAM(s) IV Intermittent every 12 hours  levoFLOXacin IVPB 250 milliGRAM(s) IV Intermittent every 48 hours  metoprolol tartrate 25 milliGRAM(s) Oral two times a day  multivitamin 1 Tablet(s) Oral daily  pantoprazole  Injectable 40 milliGRAM(s) IV Push every 12 hours  phenytoin   Suspension 400 milliGRAM(s) Enteral Tube every 12 hours  predniSONE   Tablet 30 milliGRAM(s) Oral daily  saccharomyces boulardii 250 milliGRAM(s) Oral two times a day  sodium bicarbonate 650 milliGRAM(s) Oral every 8 hours  sodium chloride 0.9%. 1000 milliLiter(s) (100 mL/Hr) IV Continuous <Continuous>  zinc sulfate 220 milliGRAM(s) Oral daily    MEDICATIONS  (PRN):  acetaminophen    Suspension .. 650 milliGRAM(s) Oral every 6 hours PRN Temp greater or equal to 38.5C (101.3F)  ALBUTerol/ipratropium for Nebulization. 3 milliLiter(s) Nebulizer every 4 hours PRN Bronchospasm      Allergies    clindamycin (Unknown)  Grapes (Rash)  Nuts (Hives; Rash)  PC Pen VK (Unknown)  shellfish (Angioedema; Rash; Hives)  strawberry (Short breath; Rash; Hives)  Xanax (Rash)    Intolerances    Ativan (Unknown)  Haldol (Dystonic RXN)  hydrALAZINE (Unknown)  Zyprexa (Unknown)      Vital Signs Last 24 Hrs  T(C): 35.9 (11 Oct 2019 16:03), Max: 36.8 (11 Oct 2019 00:53)  T(F): 96.6 (11 Oct 2019 16:03), Max: 98.3 (11 Oct 2019 00:53)  HR: 76 (11 Oct 2019 16:03) (64 - 85)  BP: 103/50 (11 Oct 2019 16:03) (103/50 - 115/59)  BP(mean): 81 (11 Oct 2019 08:02) (81 - 81)  RR: 19 (11 Oct 2019 16:03) (19 - 19)  SpO2: 98% (11 Oct 2019 16:03) (96% - 100%)  Daily     Daily     PHYSICAL EXAM:  GENERAL: Chronically debilitated  HEAD:  No periorbital edema  EYES: EOMI  NECK: Supple, No JVD  NERVOUS SYSTEM:  profound dementia  CHEST/LUNG: Clear anteriorly with diminished BS  HEART: No rub  ABDOMEN: Soft, Nontender, Nondistended; Bowel sounds present; + PEG tube  EXTREMITIES: No edema      LABS:                        8.4    10.64 )-----------( 340      ( 11 Oct 2019 08:28 )             27.4     10-11    143  |  100  |  111.0<H>  ----------------------------<  108  4.6   |  24.0  |  4.85<H>    Ca    8.2<L>      11 Oct 2019 08:28    TPro  5.8<L>  /  Alb  1.9<L>  /  TBili  0.5  /  DBili  x   /  AST  29  /  ALT  18  /  AlkPhos  320<H>  10-10                RADIOLOGY & ADDITIONAL TESTS:

## 2019-10-11 NOTE — PROGRESS NOTE ADULT - SUBJECTIVE AND OBJECTIVE BOX
CC: PNA / osteomyelitis     INTERVAL HPI/OVERNIGHT EVENTS: seen and examined , no overnight events .    REVIEW OF SYSTEMS:    unable to obtain as patient is maintained on vent     Vital Signs Last 24 Hrs  T(C): 35.9 (11 Oct 2019 16:03), Max: 36.8 (11 Oct 2019 00:53)  T(F): 96.6 (11 Oct 2019 16:03), Max: 98.3 (11 Oct 2019 00:53)  HR: 76 (11 Oct 2019 16:03) (64 - 85)  BP: 103/50 (11 Oct 2019 16:03) (103/50 - 115/59)  BP(mean): 81 (11 Oct 2019 08:02) (81 - 81)  RR: 19 (11 Oct 2019 16:03) (19 - 19)  SpO2: 98% (11 Oct 2019 16:03) (96% - 100%)    PHYSICAL EXAM:    GENERAL: chronically ill , trach to vent   CHEST/LUNG: coarse breath sounds   HEART: S1S2+, Regular rate and rhythm;2/6 systolic murmur   ABDOMEN: Soft, Nontender, Nondistended; Bowel sounds present. G tube present   EXTREMITIES:  no pitting edema , pulses palpated BL.      LABS:                        8.4    10.64 )-----------( 340      ( 11 Oct 2019 08:28 )             27.4     10-11    143  |  100  |  111.0<H>  ----------------------------<  108  4.6   |  24.0  |  4.85<H>    Ca    8.2<L>      11 Oct 2019 08:28    TPro  5.8<L>  /  Alb  1.9<L>  /  TBili  0.5  /  DBili  x   /  AST  29  /  ALT  18  /  AlkPhos  320<H>  10-10            MEDICATIONS  (STANDING):  amLODIPine   Tablet 5 milliGRAM(s) Oral daily  ascorbic acid 500 milliGRAM(s) Oral daily  cefepime   IVPB 1000 milliGRAM(s) IV Intermittent every 12 hours  chlorhexidine 0.12% Liquid 15 milliLiter(s) Oral Mucosa every 12 hours  digoxin     Tablet 0.125 milliGRAM(s) Oral daily  levETIRAcetam  IVPB 1000 milliGRAM(s) IV Intermittent every 12 hours  levoFLOXacin IVPB 250 milliGRAM(s) IV Intermittent every 48 hours  metoprolol tartrate 25 milliGRAM(s) Oral two times a day  multivitamin 1 Tablet(s) Oral daily  pantoprazole  Injectable 40 milliGRAM(s) IV Push every 12 hours  phenytoin   Suspension 400 milliGRAM(s) Enteral Tube every 12 hours  predniSONE   Tablet 30 milliGRAM(s) Oral daily  saccharomyces boulardii 250 milliGRAM(s) Oral two times a day  sodium bicarbonate 650 milliGRAM(s) Oral every 8 hours  sodium chloride 0.9%. 1000 milliLiter(s) (100 mL/Hr) IV Continuous <Continuous>  zinc sulfate 220 milliGRAM(s) Oral daily    MEDICATIONS  (PRN):  acetaminophen    Suspension .. 650 milliGRAM(s) Oral every 6 hours PRN Temp greater or equal to 38.5C (101.3F)  ALBUTerol/ipratropium for Nebulization. 3 milliLiter(s) Nebulizer every 4 hours PRN Bronchospasm      RADIOLOGY & ADDITIONAL TESTS:

## 2019-10-11 NOTE — PROGRESS NOTE ADULT - SUBJECTIVE AND OBJECTIVE BOX
[facility]  INFECTIOUS DISEASES AND INTERNAL MEDICINE at [place]  =======================================================  Renzo Stephen MD  Diplomates American Board of Internal Medicine and Infectious Diseases  Telephone [phone]  Fax            [phone]  =======================================================    [facility]-[id number]  [place]   follow up for:  fevers  patient seen and examined.   sputum cx in process  WBC downtrending.  More awake.   LESS sputum production per daugheter    I have personally reviewed the labs and data; pertinent labs and data are listed in this note; please see below.   ===================================================  REVIEW OF SYSTEMS:  CONSTITUTIONAL:  No Fever or chills  HEENT:  No diplopia or blurred vision.  No earache, sore throat or runny nose.  CARDIOVASCULAR:  No pressure, squeezing, strangling, tightness, heaviness or aching about the chest, neck, axilla or epigastrium.  RESPIRATORY:  No cough, shortness of breath  GASTROINTESTINAL:  No nausea, vomiting or diarrhea.  GENITOURINARY:  No dysuria, frequency or urgency. No Blood in urine  MUSCULOSKELETAL:  no joint aches, no muscle pain  SKIN:  No change in skin, hair or nails.  NEUROLOGIC:  No Headaches, seizures or weakness.  PSYCHIATRIC:  No disorder of thought or mood.  ENDOCRINE:  No heat or cold intolerance  HEMATOLOGICAL:  No easy bruising or bleeding.   =======================================================  Allergies  clindamycin (Unknown)  Grapes (Rash)  Nuts (Hives; Rash)  PC Pen VK (Unknown)  shellfish (Angioedema; Rash; Hives)  strawberry (Short breath; Rash; Hives)  Xanax (Rash)    Ativan (Unknown)  Haldol (Dystonic RXN)  hydrALAZINE (Unknown)  Zyprexa (Unknown)  Antibiotics:  cefepime   IVPB 1000 milliGRAM(s) IV Intermittent every 12 hours  levoFLOXacin IVPB 250 milliGRAM(s) IV Intermittent every 48 hours    Other medications:  amLODIPine   Tablet 5 milliGRAM(s) Oral daily  ascorbic acid 500 milliGRAM(s) Oral daily  chlorhexidine 0.12% Liquid 15 milliLiter(s) Oral Mucosa every 12 hours  digoxin     Tablet 0.125 milliGRAM(s) Oral daily  levETIRAcetam  IVPB 1000 milliGRAM(s) IV Intermittent every 12 hours  metoprolol tartrate 25 milliGRAM(s) Oral two times a day  multivitamin 1 Tablet(s) Oral daily  pantoprazole  Injectable 40 milliGRAM(s) IV Push every 12 hours  phenytoin   Suspension 400 milliGRAM(s) Enteral Tube every 12 hours  predniSONE   Tablet 30 milliGRAM(s) Oral daily  saccharomyces boulardii 250 milliGRAM(s) Oral two times a day  sodium bicarbonate 650 milliGRAM(s) Oral every 8 hours  sodium chloride 0.9%. 1000 milliLiter(s) IV Continuous <Continuous>  zinc sulfate 220 milliGRAM(s) Oral daily    ======================================================  Physical Exam:  ============  T(F): 96.6 (11 Oct 2019 16:03), Max: 98.3 (11 Oct 2019 00:53)  HR: 76 (11 Oct 2019 16:03)  BP: 103/50 (11 Oct 2019 16:03)  RR: 19 (11 Oct 2019 16:03)  SpO2: 98% (11 Oct 2019 16:03) (96% - 100%)  temp max in last 48H T(F): , Max: 98.3 (10-11-19 @ 00:53)    General:  No acute distress. more awake  Eye: Pupils are equal, round and reactive to light, Extraocular movements are intact, Normal conjunctiva.  HENT: Normocephalic, Oral mucosa is DRY; ET Tube in place  Neck: Supple, No lymphadenopathy.  Respiratory: Lungs with fair air entry  Cardiovascular: Normal rate, Regular rhythm,   Gastrointestinal: Soft, Non-distended, Normal bowel sounds. + G Tube in place  Genitourinary: + DIAL with dilute clear urine  Lymphatics: No lymphadenopathy neck,   Musculoskeletal: contracted extremities  Integumentary: No rash.  Neurologic: minimally responsive, non-verbal    =======================================================  Labs:                        8.4    10.64 )-----------( 340      ( 11 Oct 2019 08:28 )             27.4       WBC Count: 10.64 K/uL (10-11-19 @ 08:28)  WBC Count: 13.05 K/uL (10-10-19 @ 08:42)  WBC Count: 11.41 K/uL (10-09-19 @ 20:42)  WBC Count: 14.96 K/uL (10-09-19 @ 07:15)  WBC Count: 14.05 K/uL (10-08-19 @ 22:21)      10-11    143  |  100  |  111.0<H>  ----------------------------<  108  4.6   |  24.0  |  4.85<H>    Ca    8.2<L>      11 Oct 2019 08:28    TPro  5.8<L>  /  Alb  1.9<L>  /  TBili  0.5  /  DBili  x   /  AST  29  /  ALT  18  /  AlkPhos  320<H>  10-10

## 2019-10-11 NOTE — PROGRESS NOTE ADULT - ASSESSMENT
ROSALIA, hyperkalemia r/o ATN in setting of possible sepsis  End stage dementia with chronic respiratory failure  Renal function stable electrolytes acceptable cont to monitor UOP closely  Anemia s/p PRBCs  Renal function stable and seems to be producing more urine- (?) signs of recovery  - cont current Rx and monitor  - no current indication for HD    Discussed plans w dtr at bedside    Will follow

## 2019-10-12 LAB
-  AMIKACIN: SIGNIFICANT CHANGE UP
-  AZTREONAM: SIGNIFICANT CHANGE UP
-  CEFEPIME: SIGNIFICANT CHANGE UP
-  CEFTAZIDIME: SIGNIFICANT CHANGE UP
-  CIPROFLOXACIN: SIGNIFICANT CHANGE UP
-  GENTAMICIN: SIGNIFICANT CHANGE UP
-  IMIPENEM: SIGNIFICANT CHANGE UP
-  LEVOFLOXACIN: SIGNIFICANT CHANGE UP
-  MEROPENEM: SIGNIFICANT CHANGE UP
-  PIPERACILLIN/TAZOBACTAM: SIGNIFICANT CHANGE UP
-  TOBRAMYCIN: SIGNIFICANT CHANGE UP
ANION GAP SERPL CALC-SCNC: 18 MMOL/L — HIGH (ref 5–17)
BUN SERPL-MCNC: 108 MG/DL — HIGH (ref 8–20)
CALCIUM SERPL-MCNC: 7.7 MG/DL — LOW (ref 8.6–10.2)
CHLORIDE SERPL-SCNC: 101 MMOL/L — SIGNIFICANT CHANGE UP (ref 98–107)
CO2 SERPL-SCNC: 20 MMOL/L — LOW (ref 22–29)
CREAT SERPL-MCNC: 4.59 MG/DL — HIGH (ref 0.5–1.3)
CULTURE RESULTS: SIGNIFICANT CHANGE UP
GLUCOSE BLDC GLUCOMTR-MCNC: 131 MG/DL — HIGH (ref 70–99)
GLUCOSE SERPL-MCNC: 140 MG/DL — HIGH (ref 70–115)
HCT VFR BLD CALC: 26.6 % — LOW (ref 39–50)
HGB BLD-MCNC: 8.2 G/DL — LOW (ref 13–17)
MCHC RBC-ENTMCNC: 30.8 GM/DL — LOW (ref 32–36)
MCHC RBC-ENTMCNC: 31.5 PG — SIGNIFICANT CHANGE UP (ref 27–34)
MCV RBC AUTO: 102.3 FL — HIGH (ref 80–100)
METHOD TYPE: SIGNIFICANT CHANGE UP
ORGANISM # SPEC MICROSCOPIC CNT: SIGNIFICANT CHANGE UP
ORGANISM # SPEC MICROSCOPIC CNT: SIGNIFICANT CHANGE UP
PLATELET # BLD AUTO: 257 K/UL — SIGNIFICANT CHANGE UP (ref 150–400)
POTASSIUM SERPL-MCNC: 4.8 MMOL/L — SIGNIFICANT CHANGE UP (ref 3.5–5.3)
POTASSIUM SERPL-SCNC: 4.8 MMOL/L — SIGNIFICANT CHANGE UP (ref 3.5–5.3)
RBC # BLD: 2.6 M/UL — LOW (ref 4.2–5.8)
RBC # FLD: 17.4 % — HIGH (ref 10.3–14.5)
SODIUM SERPL-SCNC: 139 MMOL/L — SIGNIFICANT CHANGE UP (ref 135–145)
SPECIMEN SOURCE: SIGNIFICANT CHANGE UP
WBC # BLD: 10.72 K/UL — HIGH (ref 3.8–10.5)
WBC # FLD AUTO: 10.72 K/UL — HIGH (ref 3.8–10.5)

## 2019-10-12 PROCEDURE — 99233 SBSQ HOSP IP/OBS HIGH 50: CPT

## 2019-10-12 RX ADMIN — Medication 650 MILLIGRAM(S): at 21:03

## 2019-10-12 RX ADMIN — LEVETIRACETAM 400 MILLIGRAM(S): 250 TABLET, FILM COATED ORAL at 05:09

## 2019-10-12 RX ADMIN — ZINC SULFATE TAB 220 MG (50 MG ZINC EQUIVALENT) 220 MILLIGRAM(S): 220 (50 ZN) TAB at 11:50

## 2019-10-12 RX ADMIN — LEVETIRACETAM 400 MILLIGRAM(S): 250 TABLET, FILM COATED ORAL at 17:58

## 2019-10-12 RX ADMIN — PANTOPRAZOLE SODIUM 40 MILLIGRAM(S): 20 TABLET, DELAYED RELEASE ORAL at 17:57

## 2019-10-12 RX ADMIN — Medication 25 MILLIGRAM(S): at 05:09

## 2019-10-12 RX ADMIN — Medication 400 MILLIGRAM(S): at 17:58

## 2019-10-12 RX ADMIN — Medication 250 MILLIGRAM(S): at 05:09

## 2019-10-12 RX ADMIN — Medication 650 MILLIGRAM(S): at 05:10

## 2019-10-12 RX ADMIN — SODIUM CHLORIDE 100 MILLILITER(S): 9 INJECTION INTRAMUSCULAR; INTRAVENOUS; SUBCUTANEOUS at 21:03

## 2019-10-12 RX ADMIN — SODIUM CHLORIDE 100 MILLILITER(S): 9 INJECTION INTRAMUSCULAR; INTRAVENOUS; SUBCUTANEOUS at 05:08

## 2019-10-12 RX ADMIN — AMLODIPINE BESYLATE 5 MILLIGRAM(S): 2.5 TABLET ORAL at 05:09

## 2019-10-12 RX ADMIN — Medication 650 MILLIGRAM(S): at 11:50

## 2019-10-12 RX ADMIN — PANTOPRAZOLE SODIUM 40 MILLIGRAM(S): 20 TABLET, DELAYED RELEASE ORAL at 05:09

## 2019-10-12 RX ADMIN — CEFEPIME 100 MILLIGRAM(S): 1 INJECTION, POWDER, FOR SOLUTION INTRAMUSCULAR; INTRAVENOUS at 05:08

## 2019-10-12 RX ADMIN — CHLORHEXIDINE GLUCONATE 15 MILLILITER(S): 213 SOLUTION TOPICAL at 05:09

## 2019-10-12 RX ADMIN — Medication 500 MILLIGRAM(S): at 11:50

## 2019-10-12 RX ADMIN — Medication 30 MILLIGRAM(S): at 05:09

## 2019-10-12 RX ADMIN — Medication 1 TABLET(S): at 11:49

## 2019-10-12 RX ADMIN — Medication 0.12 MILLIGRAM(S): at 05:09

## 2019-10-12 RX ADMIN — CHLORHEXIDINE GLUCONATE 15 MILLILITER(S): 213 SOLUTION TOPICAL at 17:58

## 2019-10-12 RX ADMIN — Medication 25 MILLIGRAM(S): at 17:57

## 2019-10-12 RX ADMIN — Medication 400 MILLIGRAM(S): at 05:09

## 2019-10-12 RX ADMIN — CEFEPIME 100 MILLIGRAM(S): 1 INJECTION, POWDER, FOR SOLUTION INTRAMUSCULAR; INTRAVENOUS at 17:58

## 2019-10-12 NOTE — PROGRESS NOTE ADULT - SUBJECTIVE AND OBJECTIVE BOX
CC: PNA / osteomyelitis     INTERVAL HPI/OVERNIGHT EVENTS: seen and examined , no overnight events . Sputum culture reported pseudomonas pan sensitive. daughter at bedside.     REVIEW OF SYSTEMS:    unable to obtain as patient is maintained on vent     Vital Signs Last 24 Hrs  T(C): 36.7 (12 Oct 2019 08:31), Max: 36.9 (12 Oct 2019 05:16)  T(F): 98 (12 Oct 2019 08:31), Max: 98.4 (12 Oct 2019 05:16)  HR: 65 (12 Oct 2019 09:51) (65 - 82)  BP: 100/50 (12 Oct 2019 08:31) (98/68 - 111/60)  BP(mean): --  RR: 19 (12 Oct 2019 08:31) (16 - 19)  SpO2: 100% (12 Oct 2019 09:51) (96% - 100%)    I&O's Detail    11 Oct 2019 07:01  -  12 Oct 2019 07:00  --------------------------------------------------------  IN:    Free Water: 500 mL    Pivot 1.5: 290 mL    sodium chloride 0.9%.: 900 mL    Solution: 50 mL    Solution: 100 mL  Total IN: 1840 mL    OUT:  Total OUT: 0 mL    Total NET: 1840 mL      12 Oct 2019 07:01  -  12 Oct 2019 12:27  --------------------------------------------------------  IN:    Free Water: 250 mL    Pivot 1.5: 300 mL    sodium chloride 0.9%.: 600 mL  Total IN: 1150 mL    OUT:  Total OUT: 0 mL    Total NET: 1150 mL    CAPILLARY BLOOD GLUCOSE      POCT Blood Glucose.: 131 mg/dL (12 Oct 2019 00:57)  POCT Blood Glucose.: 131 mg/dL (11 Oct 2019 19:07)  POCT Blood Glucose.: 136 mg/dL (11 Oct 2019 13:09)    GENERAL: Not in distress. on trach. awake  HEENT:  Normocephalic and atraumatic.   NECK: Supple.   CARDIOVASCULAR: RRR S1, S2. DM+.  No rubs/gallop  LUNGS: BLAE+, course breath sound  ABDOMEN: ND. Soft,  NT, no guarding / rebound / rigidity. BS normoactive. colostomy bag with green stool. PEG  EXTREMITIES: no cyanosis, no clubbing, +edema.   SKIN: no rash. anasarca  NEUROLOGIC: not communicating, non-verbal. not following command  PSYCHIATRIC: Calm.  No agitation.      LABS:                                   8.2    10.72 )-----------( 257      ( 12 Oct 2019 10:08 )             26.6       10-12    139  |  101  |  108.0<H>  ----------------------------<  140<H>  4.8   |  20.0<L>  |  4.59<H>    Ca    7.7<L>      12 Oct 2019 07:23      MEDICATIONS  (STANDING):  amLODIPine   Tablet 5 milliGRAM(s) Oral daily  ascorbic acid 500 milliGRAM(s) Oral daily  cefepime   IVPB 1000 milliGRAM(s) IV Intermittent every 12 hours  chlorhexidine 0.12% Liquid 15 milliLiter(s) Oral Mucosa every 12 hours  digoxin     Tablet 0.125 milliGRAM(s) Oral daily  levETIRAcetam  IVPB 1000 milliGRAM(s) IV Intermittent every 12 hours  levoFLOXacin IVPB 250 milliGRAM(s) IV Intermittent every 48 hours  metoprolol tartrate 25 milliGRAM(s) Oral two times a day  multivitamin 1 Tablet(s) Oral daily  pantoprazole  Injectable 40 milliGRAM(s) IV Push every 12 hours  phenytoin   Suspension 400 milliGRAM(s) Enteral Tube every 12 hours  predniSONE   Tablet 30 milliGRAM(s) Oral daily  saccharomyces boulardii 250 milliGRAM(s) Oral two times a day  sodium bicarbonate 650 milliGRAM(s) Oral every 8 hours  sodium chloride 0.9%. 1000 milliLiter(s) (100 mL/Hr) IV Continuous <Continuous>  zinc sulfate 220 milliGRAM(s) Oral daily    MEDICATIONS  (PRN):  acetaminophen    Suspension .. 650 milliGRAM(s) Oral every 6 hours PRN Temp greater or equal to 38.5C (101.3F)  ALBUTerol/ipratropium for Nebulization. 3 milliLiter(s) Nebulizer every 4 hours PRN Bronchospasm        RADIOLOGY & ADDITIONAL TESTS:

## 2019-10-12 NOTE — PROGRESS NOTE ADULT - ASSESSMENT
1. sacral osteomyelitis   on antibiotics .  follow up cultures . sputum + for pseudomonus  diverting colostomy in place .  ID on board .    2. Aspiration PNA   follow up cultures .  antibiotics in place .  aspiration precautions  on PEG feeding    3. Acute on chronic renal failure   creatinine stable  nephro on board .  on fluids , no dialysis for now .    4. Chronic respiratory failure   trach to vent .  pulmonary on board .    5. Seizure disorder   c/w home regimen   seizure precautions    6. HTN '  c/w home regimen .    7. Afib   on rate controlling agents .  no AC as had bleeding issues in past .    8. Anemia   likely multifactorial , and renal insufficiency   h/h stable  transfuse as needed .     9. DVT prophylaxis   boots , no chemical prophylaxis due to anemia .     patient has a legal guardian , daughter at bedside

## 2019-10-12 NOTE — PROGRESS NOTE ADULT - SUBJECTIVE AND OBJECTIVE BOX
NEPHROLOGY INTERVAL HPI/OVERNIGHT EVENTS:    Examined  Dtr asleep in room on chairs  no acute distress   Hemodynamically stable  ok UOP    MEDICATIONS  (STANDING):  amLODIPine   Tablet 5 milliGRAM(s) Oral daily  ascorbic acid 500 milliGRAM(s) Oral daily  cefepime   IVPB 1000 milliGRAM(s) IV Intermittent every 12 hours  chlorhexidine 0.12% Liquid 15 milliLiter(s) Oral Mucosa every 12 hours  digoxin     Tablet 0.125 milliGRAM(s) Oral daily  levETIRAcetam  IVPB 1000 milliGRAM(s) IV Intermittent every 12 hours  levoFLOXacin IVPB 250 milliGRAM(s) IV Intermittent every 48 hours  metoprolol tartrate 25 milliGRAM(s) Oral two times a day  multivitamin 1 Tablet(s) Oral daily  pantoprazole  Injectable 40 milliGRAM(s) IV Push every 12 hours  phenytoin   Suspension 400 milliGRAM(s) Enteral Tube every 12 hours  predniSONE   Tablet 30 milliGRAM(s) Oral daily  saccharomyces boulardii 250 milliGRAM(s) Oral two times a day  sodium bicarbonate 650 milliGRAM(s) Oral every 8 hours  sodium chloride 0.9%. 1000 milliLiter(s) (100 mL/Hr) IV Continuous <Continuous>  zinc sulfate 220 milliGRAM(s) Oral daily    MEDICATIONS  (PRN):  acetaminophen    Suspension .. 650 milliGRAM(s) Oral every 6 hours PRN Temp greater or equal to 38.5C (101.3F)  ALBUTerol/ipratropium for Nebulization. 3 milliLiter(s) Nebulizer every 4 hours PRN Bronchospasm      Allergies    clindamycin (Unknown)  Grapes (Rash)  Nuts (Hives; Rash)  PC Pen VK (Unknown)  shellfish (Angioedema; Rash; Hives)  strawberry (Short breath; Rash; Hives)  Xanax (Rash)    Intolerances    Ativan (Unknown)  Haldol (Dystonic RXN)  hydrALAZINE (Unknown)  Zyprexa (Unknown)      Vital Signs Last 24 Hrs  T(C): 36.7 (12 Oct 2019 08:31), Max: 36.9 (12 Oct 2019 05:16)  T(F): 98 (12 Oct 2019 08:31), Max: 98.4 (12 Oct 2019 05:16)  HR: 65 (12 Oct 2019 09:51) (65 - 82)  BP: 100/50 (12 Oct 2019 08:31) (98/68 - 111/60)  BP(mean): --  RR: 19 (12 Oct 2019 08:31) (16 - 19)  SpO2: 100% (12 Oct 2019 09:51) (96% - 100%)  Daily     Daily     PHYSICAL EXAM:  GENERAL: Chronically debilitated  HEAD:  No periorbital edema  EYES: EOMI  NECK: Supple, No JVD  NERVOUS SYSTEM:  profound dementia  CHEST/LUNG: Clear anteriorly with diminished BS  HEART: No rub  ABDOMEN: Soft, Nontender, Nondistended; Bowel sounds present; + PEG tube  EXTREMITIES: + LE edema trace, +LE wounds wrapped     LABS:                        8.2    10.72 )-----------( 257      ( 12 Oct 2019 10:08 )             26.6     10-12    139  |  101  |  108.0<H>  ----------------------------<  140<H>  4.8   |  20.0<L>  |  4.59<H>    Ca    7.7<L>      12 Oct 2019 07:23                  RADIOLOGY & ADDITIONAL TESTS:

## 2019-10-12 NOTE — PROGRESS NOTE ADULT - ASSESSMENT
ROSALIA, hyperkalemia r/o ATN in setting of sepsis  End stage dementia with chronic respiratory failure on vent via trach  Renal function stable electrolytes acceptable cont to monitor UOP closely  Anemia s/p PRBCs watch h/h  Renal function stable and seems to be producing more urine- (?) signs of recovery  - cont current Rx and monitor  - no current indication for HD    Will follow

## 2019-10-13 LAB
CULTURE RESULTS: SIGNIFICANT CHANGE UP
GLUCOSE BLDC GLUCOMTR-MCNC: 109 MG/DL — HIGH (ref 70–99)
GLUCOSE BLDC GLUCOMTR-MCNC: 138 MG/DL — HIGH (ref 70–99)
GLUCOSE BLDC GLUCOMTR-MCNC: 198 MG/DL — HIGH (ref 70–99)
SPECIMEN SOURCE: SIGNIFICANT CHANGE UP
SPECIMEN SOURCE: SIGNIFICANT CHANGE UP

## 2019-10-13 PROCEDURE — 99233 SBSQ HOSP IP/OBS HIGH 50: CPT

## 2019-10-13 RX ADMIN — Medication 650 MILLIGRAM(S): at 13:28

## 2019-10-13 RX ADMIN — ZINC SULFATE TAB 220 MG (50 MG ZINC EQUIVALENT) 220 MILLIGRAM(S): 220 (50 ZN) TAB at 13:28

## 2019-10-13 RX ADMIN — PANTOPRAZOLE SODIUM 40 MILLIGRAM(S): 20 TABLET, DELAYED RELEASE ORAL at 17:07

## 2019-10-13 RX ADMIN — Medication 650 MILLIGRAM(S): at 21:19

## 2019-10-13 RX ADMIN — PANTOPRAZOLE SODIUM 40 MILLIGRAM(S): 20 TABLET, DELAYED RELEASE ORAL at 05:08

## 2019-10-13 RX ADMIN — CHLORHEXIDINE GLUCONATE 15 MILLILITER(S): 213 SOLUTION TOPICAL at 17:05

## 2019-10-13 RX ADMIN — Medication 25 MILLIGRAM(S): at 05:07

## 2019-10-13 RX ADMIN — CEFEPIME 100 MILLIGRAM(S): 1 INJECTION, POWDER, FOR SOLUTION INTRAMUSCULAR; INTRAVENOUS at 05:07

## 2019-10-13 RX ADMIN — Medication 0.12 MILLIGRAM(S): at 05:07

## 2019-10-13 RX ADMIN — AMLODIPINE BESYLATE 5 MILLIGRAM(S): 2.5 TABLET ORAL at 05:07

## 2019-10-13 RX ADMIN — LEVETIRACETAM 400 MILLIGRAM(S): 250 TABLET, FILM COATED ORAL at 05:07

## 2019-10-13 RX ADMIN — Medication 500 MILLIGRAM(S): at 13:27

## 2019-10-13 RX ADMIN — Medication 400 MILLIGRAM(S): at 17:04

## 2019-10-13 RX ADMIN — CHLORHEXIDINE GLUCONATE 15 MILLILITER(S): 213 SOLUTION TOPICAL at 05:07

## 2019-10-13 RX ADMIN — Medication 650 MILLIGRAM(S): at 05:07

## 2019-10-13 RX ADMIN — Medication 400 MILLIGRAM(S): at 05:09

## 2019-10-13 RX ADMIN — Medication 1 TABLET(S): at 13:27

## 2019-10-13 RX ADMIN — LEVETIRACETAM 400 MILLIGRAM(S): 250 TABLET, FILM COATED ORAL at 17:07

## 2019-10-13 RX ADMIN — Medication 25 MILLIGRAM(S): at 17:04

## 2019-10-13 RX ADMIN — Medication 30 MILLIGRAM(S): at 05:07

## 2019-10-13 RX ADMIN — CEFEPIME 100 MILLIGRAM(S): 1 INJECTION, POWDER, FOR SOLUTION INTRAMUSCULAR; INTRAVENOUS at 17:07

## 2019-10-13 NOTE — PROGRESS NOTE ADULT - SUBJECTIVE AND OBJECTIVE BOX
NEPHROLOGY INTERVAL HPI/OVERNIGHT EVENTS:    Examined  Dtr asleep in room on chairs  no acute distress   Hemodynamically stable  ok UOP    MEDICATIONS  (STANDING):  amLODIPine   Tablet 5 milliGRAM(s) Oral daily  ascorbic acid 500 milliGRAM(s) Oral daily  cefepime   IVPB 1000 milliGRAM(s) IV Intermittent every 12 hours  chlorhexidine 0.12% Liquid 15 milliLiter(s) Oral Mucosa every 12 hours  digoxin     Tablet 0.125 milliGRAM(s) Oral daily  levETIRAcetam  IVPB 1000 milliGRAM(s) IV Intermittent every 12 hours  levoFLOXacin IVPB 250 milliGRAM(s) IV Intermittent every 48 hours  metoprolol tartrate 25 milliGRAM(s) Oral two times a day  multivitamin 1 Tablet(s) Oral daily  pantoprazole  Injectable 40 milliGRAM(s) IV Push every 12 hours  phenytoin   Suspension 400 milliGRAM(s) Enteral Tube every 12 hours  predniSONE   Tablet 30 milliGRAM(s) Oral daily  saccharomyces boulardii 250 milliGRAM(s) Oral two times a day  sodium bicarbonate 650 milliGRAM(s) Oral every 8 hours  sodium chloride 0.9%. 1000 milliLiter(s) (100 mL/Hr) IV Continuous <Continuous>  zinc sulfate 220 milliGRAM(s) Oral daily    MEDICATIONS  (PRN):  acetaminophen    Suspension .. 650 milliGRAM(s) Oral every 6 hours PRN Temp greater or equal to 38.5C (101.3F)  ALBUTerol/ipratropium for Nebulization. 3 milliLiter(s) Nebulizer every 4 hours PRN Bronchospasm      Allergies    clindamycin (Unknown)  Grapes (Rash)  Nuts (Hives; Rash)  PC Pen VK (Unknown)  shellfish (Angioedema; Rash; Hives)  strawberry (Short breath; Rash; Hives)  Xanax (Rash)    Intolerances    Ativan (Unknown)  Haldol (Dystonic RXN)  hydrALAZINE (Unknown)  Zyprexa (Unknown)      Vital Signs Last 24 Hrs  T(C): 36.4 (12 Oct 2019 23:35), Max: 36.5 (12 Oct 2019 15:31)  T(F): 97.6 (12 Oct 2019 23:35), Max: 97.7 (12 Oct 2019 15:31)  HR: 68 (13 Oct 2019 09:39) (67 - 78)  BP: 108/58 (13 Oct 2019 04:51) (108/58 - 133/69)  BP(mean): --  RR: 21 (12 Oct 2019 23:35) (20 - 21)  SpO2: 99% (13 Oct 2019 10:05) (96% - 100%)  Daily     Daily     PHYSICAL EXAM:  GENERAL: Chronically debilitated  HEAD:  No periorbital edema  EYES: EOMI  NECK: Supple, No JVD  NERVOUS SYSTEM:  profound dementia  CHEST/LUNG: Clear anteriorly with diminished BS  HEART: No rub  ABDOMEN: Soft, Nontender, Nondistended; Bowel sounds present; + PEG tube  EXTREMITIES: + LE edema trace, +LE wounds wrapped       LABS:                        8.2    10.72 )-----------( 257      ( 12 Oct 2019 10:08 )             26.6     10-12    139  |  101  |  108.0<H>  ----------------------------<  140<H>  4.8   |  20.0<L>  |  4.59<H>    Ca    7.7<L>      12 Oct 2019 07:23                  RADIOLOGY & ADDITIONAL TESTS:

## 2019-10-13 NOTE — PROGRESS NOTE ADULT - ASSESSMENT
ROSALIA, hyperkalemia r/o ATN in setting of sepsis  End stage dementia with chronic respiratory failure on vent via trach  awaiting today labs  Renal function stable electrolytes acceptable- as of yest  Will cont to monitor UOP closely  Anemia s/p PRBCs watch h/h  Renal function stable and seems to be producing more urine- (?) signs of recovery  - cont current Rx and monitor  - no current indication for HD    Will follow

## 2019-10-13 NOTE — PROGRESS NOTE ADULT - SUBJECTIVE AND OBJECTIVE BOX
CC: PNA / osteomyelitis     INTERVAL HPI/OVERNIGHT EVENTS: seen and examined , no overnight events . daughter at bedside. reported slowly improving    REVIEW OF SYSTEMS:    unable to obtain as patient is maintained on vent     Vital Signs Last 24 Hrs  T(C): 37.2 (13 Oct 2019 17:46), Max: 37.7 (13 Oct 2019 15:46)  T(F): 99 (13 Oct 2019 17:46), Max: 99.9 (13 Oct 2019 15:46)  HR: 66 (13 Oct 2019 17:46) (63 - 78)  BP: 118/64 (13 Oct 2019 17:46) (108/58 - 121/64)  BP(mean): --  RR: 18 (13 Oct 2019 17:46) (18 - 21)  SpO2: 100% (13 Oct 2019 17:46) (99% - 100%)    CAPILLARY BLOOD GLUCOSE      POCT Blood Glucose.: 138 mg/dL (13 Oct 2019 16:43)  POCT Blood Glucose.: 198 mg/dL (13 Oct 2019 11:36)    I&O's Detail    12 Oct 2019 07:01  -  13 Oct 2019 07:00  --------------------------------------------------------  IN:    Free Water: 1000 mL    Pivot 1.5: 1150 mL    sodium chloride 0.9%.: 2300 mL  Total IN: 4450 mL    OUT:    Colostomy: 50 mL    Indwelling Catheter - Urethral: 1550 mL  Total OUT: 1600 mL    Total NET: 2850 mL      13 Oct 2019 07:01  -  13 Oct 2019 21:23  --------------------------------------------------------  IN:    Free Water: 500 mL    Pivot 1.5: 840 mL    sodium chloride 0.9%.: 1200 mL    Solution: 50 mL    Solution: 100 mL  Total IN: 2690 mL    OUT:    Colostomy: 200 mL    Indwelling Catheter - Urethral: 1150 mL  Total OUT: 1350 mL    Total NET: 1340 mL      GENERAL: Not in distress. on trach. awake  HEENT:  Normocephalic and atraumatic.   NECK: Supple.   CARDIOVASCULAR: RRR S1, S2. SM+.  No rubs/gallop  LUNGS: BLAE+, course breath sound  ABDOMEN: ND. Soft,  NT, no guarding / rebound / rigidity. BS normoactive. colostomy bag with green stool. PEG  EXTREMITIES: no cyanosis, no clubbing, +edema.   SKIN: no rash. anasarca  NEUROLOGIC: not communicating, non-verbal. not following command  PSYCHIATRIC: Calm.  No agitation.      LABS:                                   8.2    10.72 )-----------( 257      ( 12 Oct 2019 10:08 )             26.6     10-12    139  |  101  |  108.0<H>  ----------------------------<  140<H>  4.8   |  20.0<L>  |  4.59<H>    Ca    7.7<L>      12 Oct 2019 07:23      MEDICATIONS  (STANDING):  amLODIPine   Tablet 5 milliGRAM(s) Oral daily  ascorbic acid 500 milliGRAM(s) Oral daily  cefepime   IVPB 1000 milliGRAM(s) IV Intermittent every 12 hours  chlorhexidine 0.12% Liquid 15 milliLiter(s) Oral Mucosa every 12 hours  digoxin     Tablet 0.125 milliGRAM(s) Oral daily  levETIRAcetam  IVPB 1000 milliGRAM(s) IV Intermittent every 12 hours  levoFLOXacin IVPB 250 milliGRAM(s) IV Intermittent every 48 hours  metoprolol tartrate 25 milliGRAM(s) Oral two times a day  multivitamin 1 Tablet(s) Oral daily  pantoprazole  Injectable 40 milliGRAM(s) IV Push every 12 hours  phenytoin   Suspension 400 milliGRAM(s) Enteral Tube every 12 hours  predniSONE   Tablet 30 milliGRAM(s) Oral daily  saccharomyces boulardii 250 milliGRAM(s) Oral two times a day  sodium bicarbonate 650 milliGRAM(s) Oral every 8 hours  sodium chloride 0.9%. 1000 milliLiter(s) (100 mL/Hr) IV Continuous <Continuous>  zinc sulfate 220 milliGRAM(s) Oral daily    MEDICATIONS  (PRN):  acetaminophen    Suspension .. 650 milliGRAM(s) Oral every 6 hours PRN Temp greater or equal to 38.5C (101.3F)  ALBUTerol/ipratropium for Nebulization. 3 milliLiter(s) Nebulizer every 4 hours PRN Bronchospasm        RADIOLOGY & ADDITIONAL TESTS:

## 2019-10-13 NOTE — PROGRESS NOTE ADULT - ASSESSMENT
1. sacral osteomyelitis   on antibiotics .  follow up cultures . sputum + for pseudomonus  diverting colostomy in place .  ID on board .    2. Aspiration PNA   follow up cultures .  antibiotics in place .  aspiration precautions  on PEG feeding    3. Acute on chronic renal failure   creatinine stable  nephro on board .  on fluids , no dialysis for now as per renal .    4. Chronic respiratory failure   trach to vent .  pulmonary on board .    5. Seizure disorder   c/w home regimen   seizure precautions    6. HTN '  c/w home regimen .    7. Afib   on rate controlling agents .  no AC as had bleeding issues in past .    8. Anemia   likely multifactorial , and renal insufficiency   h/h stable  transfuse as needed .     9. DVT prophylaxis   boots , no chemical prophylaxis due to anemia .     patient has a legal guardian , daughter at bedside

## 2019-10-14 DIAGNOSIS — F03.90 UNSPECIFIED DEMENTIA WITHOUT BEHAVIORAL DISTURBANCE: ICD-10-CM

## 2019-10-14 DIAGNOSIS — Z51.5 ENCOUNTER FOR PALLIATIVE CARE: ICD-10-CM

## 2019-10-14 DIAGNOSIS — R53.81 OTHER MALAISE: ICD-10-CM

## 2019-10-14 LAB
ALBUMIN SERPL ELPH-MCNC: 2.2 G/DL — LOW (ref 3.3–5.2)
ANION GAP SERPL CALC-SCNC: 20 MMOL/L — HIGH (ref 5–17)
BUN SERPL-MCNC: 131 MG/DL — HIGH (ref 8–20)
CALCIUM SERPL-MCNC: 7.8 MG/DL — LOW (ref 8.6–10.2)
CHLORIDE SERPL-SCNC: 106 MMOL/L — SIGNIFICANT CHANGE UP (ref 98–107)
CO2 SERPL-SCNC: 22 MMOL/L — SIGNIFICANT CHANGE UP (ref 22–29)
CREAT SERPL-MCNC: 4.38 MG/DL — HIGH (ref 0.5–1.3)
DIGITOXIN SERPL-MCNC: SIGNIFICANT CHANGE UP NG/ML
GLUCOSE BLDC GLUCOMTR-MCNC: 129 MG/DL — HIGH (ref 70–99)
GLUCOSE BLDC GLUCOMTR-MCNC: 139 MG/DL — HIGH (ref 70–99)
GLUCOSE BLDC GLUCOMTR-MCNC: 185 MG/DL — HIGH (ref 70–99)
GLUCOSE SERPL-MCNC: 141 MG/DL — HIGH (ref 70–115)
MRSA PCR RESULT.: SIGNIFICANT CHANGE UP
PHOSPHATE SERPL-MCNC: 3.4 MG/DL — SIGNIFICANT CHANGE UP (ref 2.4–4.7)
POTASSIUM SERPL-MCNC: 4.7 MMOL/L — SIGNIFICANT CHANGE UP (ref 3.5–5.3)
POTASSIUM SERPL-SCNC: 4.7 MMOL/L — SIGNIFICANT CHANGE UP (ref 3.5–5.3)
S AUREUS DNA NOSE QL NAA+PROBE: SIGNIFICANT CHANGE UP
SODIUM SERPL-SCNC: 148 MMOL/L — HIGH (ref 135–145)

## 2019-10-14 PROCEDURE — 99232 SBSQ HOSP IP/OBS MODERATE 35: CPT

## 2019-10-14 PROCEDURE — 99223 1ST HOSP IP/OBS HIGH 75: CPT

## 2019-10-14 RX ORDER — CHLORHEXIDINE GLUCONATE 213 G/1000ML
1 SOLUTION TOPICAL
Refills: 0 | Status: DISCONTINUED | OUTPATIENT
Start: 2019-10-14 | End: 2019-10-31

## 2019-10-14 RX ORDER — LACTOBACILLUS ACIDOPHILUS 100MM CELL
1 CAPSULE ORAL DAILY
Refills: 0 | Status: DISCONTINUED | OUTPATIENT
Start: 2019-10-14 | End: 2019-10-28

## 2019-10-14 RX ORDER — BUMETANIDE 0.25 MG/ML
2 INJECTION INTRAMUSCULAR; INTRAVENOUS DAILY
Refills: 0 | Status: DISCONTINUED | OUTPATIENT
Start: 2019-10-14 | End: 2019-10-30

## 2019-10-14 RX ORDER — FUROSEMIDE 40 MG
40 TABLET ORAL DAILY
Refills: 0 | Status: DISCONTINUED | OUTPATIENT
Start: 2019-10-14 | End: 2019-10-14

## 2019-10-14 RX ORDER — NYSTATIN CREAM 100000 [USP'U]/G
1 CREAM TOPICAL
Refills: 0 | Status: DISCONTINUED | OUTPATIENT
Start: 2019-10-14 | End: 2019-10-31

## 2019-10-14 RX ADMIN — Medication 25 MILLIGRAM(S): at 04:55

## 2019-10-14 RX ADMIN — CEFEPIME 100 MILLIGRAM(S): 1 INJECTION, POWDER, FOR SOLUTION INTRAMUSCULAR; INTRAVENOUS at 16:41

## 2019-10-14 RX ADMIN — CHLORHEXIDINE GLUCONATE 1 APPLICATION(S): 213 SOLUTION TOPICAL at 13:04

## 2019-10-14 RX ADMIN — PANTOPRAZOLE SODIUM 40 MILLIGRAM(S): 20 TABLET, DELAYED RELEASE ORAL at 16:44

## 2019-10-14 RX ADMIN — Medication 500 MILLIGRAM(S): at 10:06

## 2019-10-14 RX ADMIN — Medication 400 MILLIGRAM(S): at 04:55

## 2019-10-14 RX ADMIN — Medication 25 MILLIGRAM(S): at 16:44

## 2019-10-14 RX ADMIN — Medication 400 MILLIGRAM(S): at 16:43

## 2019-10-14 RX ADMIN — ZINC SULFATE TAB 220 MG (50 MG ZINC EQUIVALENT) 220 MILLIGRAM(S): 220 (50 ZN) TAB at 10:06

## 2019-10-14 RX ADMIN — LEVETIRACETAM 400 MILLIGRAM(S): 250 TABLET, FILM COATED ORAL at 16:46

## 2019-10-14 RX ADMIN — Medication 0.12 MILLIGRAM(S): at 04:55

## 2019-10-14 RX ADMIN — CHLORHEXIDINE GLUCONATE 15 MILLILITER(S): 213 SOLUTION TOPICAL at 16:43

## 2019-10-14 RX ADMIN — Medication 650 MILLIGRAM(S): at 04:54

## 2019-10-14 RX ADMIN — LEVETIRACETAM 400 MILLIGRAM(S): 250 TABLET, FILM COATED ORAL at 04:56

## 2019-10-14 RX ADMIN — NYSTATIN CREAM 1 APPLICATION(S): 100000 CREAM TOPICAL at 21:31

## 2019-10-14 RX ADMIN — Medication 30 MILLIGRAM(S): at 04:55

## 2019-10-14 RX ADMIN — Medication 650 MILLIGRAM(S): at 13:05

## 2019-10-14 RX ADMIN — AMLODIPINE BESYLATE 5 MILLIGRAM(S): 2.5 TABLET ORAL at 04:55

## 2019-10-14 RX ADMIN — PANTOPRAZOLE SODIUM 40 MILLIGRAM(S): 20 TABLET, DELAYED RELEASE ORAL at 04:55

## 2019-10-14 RX ADMIN — CEFEPIME 100 MILLIGRAM(S): 1 INJECTION, POWDER, FOR SOLUTION INTRAMUSCULAR; INTRAVENOUS at 04:56

## 2019-10-14 RX ADMIN — Medication 1 TABLET(S): at 10:06

## 2019-10-14 RX ADMIN — Medication 1 TABLET(S): at 16:42

## 2019-10-14 RX ADMIN — Medication 650 MILLIGRAM(S): at 21:31

## 2019-10-14 RX ADMIN — CHLORHEXIDINE GLUCONATE 15 MILLILITER(S): 213 SOLUTION TOPICAL at 04:55

## 2019-10-14 NOTE — CHART NOTE - NSCHARTNOTEFT_GEN_A_CORE
Source: Patient [ ]  Family [ ]   other [x] EMR, RN    Current Diet:   Diet, NPO with Tube Feed:   Tube Feeding Modality: Gastrostomy  Pivot 1.5 [place]  Continuous  Starting Tube Feed Rate {mL per Hour}: 10  Increase Tube Feed Rate by (mL): 10     Every 8 hours  Until Goal Tube Feed Rate (mL per Hour): 70  Tube Feed Duration (in Hours): 24  Tube Feed Start Time: 18:00 (10-10-19 @ 17:32)    Enteral /Parenteral Nutrition: Current TF order provides Pivot 1.5 @ goal rate 70mL/hr (x20 hrs) to provide 1400mL daily (2100cal, 131g protein).     Current Weight:   (10/9)   209.4 lbs    % Weight Change: No recent weight documented     Pertinent Medications: MEDICATIONS  (STANDING):  amLODIPine   Tablet 5 milliGRAM(s) Oral daily  ascorbic acid 500 milliGRAM(s) Oral daily  cefepime   IVPB 1000 milliGRAM(s) IV Intermittent every 12 hours  chlorhexidine 0.12% Liquid 15 milliLiter(s) Oral Mucosa every 12 hours  digoxin     Tablet 0.125 milliGRAM(s) Oral daily  levETIRAcetam  IVPB 1000 milliGRAM(s) IV Intermittent every 12 hours  levoFLOXacin IVPB 250 milliGRAM(s) IV Intermittent every 48 hours  metoprolol tartrate 25 milliGRAM(s) Oral two times a day  multivitamin 1 Tablet(s) Oral daily  pantoprazole  Injectable 40 milliGRAM(s) IV Push every 12 hours  phenytoin   Suspension 400 milliGRAM(s) Enteral Tube every 12 hours  predniSONE   Tablet 30 milliGRAM(s) Oral daily  saccharomyces boulardii 250 milliGRAM(s) Oral two times a day  sodium bicarbonate 650 milliGRAM(s) Oral every 8 hours  sodium chloride 0.9%. 1000 milliLiter(s) (100 mL/Hr) IV Continuous <Continuous>  zinc sulfate 220 milliGRAM(s) Oral daily    MEDICATIONS  (PRN):  acetaminophen    Suspension .. 650 milliGRAM(s) Oral every 6 hours PRN Temp greater or equal to 38.5C (101.3F)  ALBUTerol/ipratropium for Nebulization. 3 milliLiter(s) Nebulizer every 4 hours PRN Bronchospasm    Pertinent Labs:     10-14 Na148 mmol/L<H> Glu 141 mg/dL<H> K+ 4.7 mmol/L Cr  4.38 mg/dL<H> .0 mg/dL<H> Phos 3.4 mg/dL Alb 2.2 g/dL<L> PAB n/a       Skin: 1+ generalized; 4+ r/L foot edema    Nutrition focused physical exam conducted - found signs of malnutrition [ ]absent [x]present    Subcutaneous fat loss: [ ] Orbital fat pads region, [ ]Buccal fat region, [ ]Triceps region,  [ ]Ribs region    Muscle wasting: [x]Temples region, [ ]Clavicle region, [ ]Shoulder region, [ ]Scapula region, [ ]Interosseous region,  [ ]thigh region, [ ]Calf region    Estimated Needs:   [x] no change since previous assessment  [ ] recalculated:     Current Nutrition Diagnosis: Pt presents at high nutrition risk secondary to malnutrition (moderate chronic) related to insufficient protein-energy intake in setting of sepsis, chronic resp failure +trach, impaired skin integrity as evidenced by likely meeting <75% EER >7 days, fluid accumulation (3+ generalized, 4+ R/L feet and ankles), likely mask wt loss.   Renal labs relatively stable, BUN trending high. Ensure adequate hydration.     Recommendations:   1) Continue with current nutrition plan   2) Continue MVI and vit C. Zinc sulfate, stop date 10/20  3) Continue to monitor renal function, GI tolerance    Monitoring and Evaluation:   [ ] PO intake [x] Tolerance to diet prescription [X] Weights  [X] Follow up per protocol [X] Labs:

## 2019-10-14 NOTE — PROGRESS NOTE ADULT - ASSESSMENT
ROSALIA, hyperkalemia, ATN in setting of sepsis  End stage dementia with chronic respiratory failure on vent via trach    Renal function stable electrolytes acceptable  Will cont to monitor UOP closely  Anemia s/p PRBCs watch h/h  Renal function stable and seems to be producing more urine- (?) signs of recovery  Edema B/L lE will change lasix to bumex  - cont current Rx and monitor  - no current indication for HD    Will follow

## 2019-10-14 NOTE — PROGRESS NOTE ADULT - SUBJECTIVE AND OBJECTIVE BOX
NEPHROLOGY INTERVAL HPI/OVERNIGHT EVENTS:    Examined  no acute distress   Hemodynamically stable  ok UOP  Dtr at bedside discussed plans    MEDICATIONS  (STANDING):  amLODIPine   Tablet 5 milliGRAM(s) Oral daily  ascorbic acid 500 milliGRAM(s) Oral daily  cefepime   IVPB 1000 milliGRAM(s) IV Intermittent every 12 hours  chlorhexidine 0.12% Liquid 15 milliLiter(s) Oral Mucosa every 12 hours  chlorhexidine 2% Cloths 1 Application(s) Topical <User Schedule>  digoxin     Tablet 0.125 milliGRAM(s) Oral daily  furosemide    Tablet 40 milliGRAM(s) Oral daily  lactobacillus acidophilus 1 Tablet(s) Oral daily  levETIRAcetam  IVPB 1000 milliGRAM(s) IV Intermittent every 12 hours  metoprolol tartrate 25 milliGRAM(s) Oral two times a day  multivitamin 1 Tablet(s) Oral daily  pantoprazole  Injectable 40 milliGRAM(s) IV Push every 12 hours  phenytoin   Suspension 400 milliGRAM(s) Enteral Tube every 12 hours  predniSONE   Tablet 30 milliGRAM(s) Oral daily  sodium bicarbonate 650 milliGRAM(s) Oral every 8 hours  zinc sulfate 220 milliGRAM(s) Oral daily    MEDICATIONS  (PRN):  acetaminophen    Suspension .. 650 milliGRAM(s) Oral every 6 hours PRN Temp greater or equal to 38.5C (101.3F)  ALBUTerol/ipratropium for Nebulization. 3 milliLiter(s) Nebulizer every 4 hours PRN Bronchospasm      Allergies    clindamycin (Unknown)  Grapes (Rash)  Nuts (Hives; Rash)  PC Pen VK (Unknown)  shellfish (Angioedema; Rash; Hives)  strawberry (Short breath; Rash; Hives)  Xanax (Rash)    Intolerances    Ativan (Unknown)  Haldol (Dystonic RXN)  hydrALAZINE (Unknown)  Zyprexa (Unknown)      Vital Signs Last 24 Hrs  T(C): 36.7 (14 Oct 2019 08:04), Max: 37.7 (13 Oct 2019 15:46)  T(F): 98 (14 Oct 2019 08:04), Max: 99.9 (13 Oct 2019 15:46)  HR: 69 (14 Oct 2019 09:01) (63 - 74)  BP: 130/72 (14 Oct 2019 08:04) (118/64 - 130/72)  BP(mean): --  RR: 18 (14 Oct 2019 08:04) (18 - 18)  SpO2: 100% (14 Oct 2019 09:01) (95% - 100%)  Daily     Daily     PHYSICAL EXAM:  GENERAL: Chronically debilitated  HEAD:  No periorbital edema  EYES: EOMI  NECK: Supple, No JVD  NERVOUS SYSTEM:  profound dementia  CHEST/LUNG: Clear anteriorly with diminished BS  HEART: No rub  ABDOMEN: Soft, Nontender, Nondistended; Bowel sounds present; + PEG tube  EXTREMITIES: + LE edema trace, +LE wounds wrapped     LABS:    10-14    148<H>  |  106  |  131.0<H>  ----------------------------<  141<H>  4.7   |  22.0  |  4.38<H>    Ca    7.8<L>      14 Oct 2019 08:07  Phos  3.4     10-14    TPro  x   /  Alb  2.2<L>  /  TBili  x   /  DBili  x   /  AST  x   /  ALT  x   /  AlkPhos  x   10-14        Phosphorus Level, Serum: 3.4 mg/dL (10-14 @ 08:07)          RADIOLOGY & ADDITIONAL TESTS:

## 2019-10-14 NOTE — PROGRESS NOTE ADULT - SUBJECTIVE AND OBJECTIVE BOX
[facility]  INFECTIOUS DISEASES AND INTERNAL MEDICINE at [place]  =======================================================  Renzo Setphen MD  Diplomates American Board of Internal Medicine and Infectious Diseases  =======================================================    KING [place]KEON [id number]    Follow up: fever     No more fever, sputum showed pseudomonas S to cefepime.   More awake, o2 requirement has not increased.     Allergies:  Ativan (Unknown)  clindamycin (Unknown)  Grapes (Rash)  Haldol (Dystonic RXN)  hydrALAZINE (Unknown)  Nuts (Hives; Rash)  PC Pen VK (Unknown)  shellfish (Angioedema; Rash; Hives)  strawberry (Short breath; Rash; Hives)  Xanax (Rash)  Zyprexa (Unknown)    Antibiotics:  cefepime   IVPB 1000 milliGRAM(s) IV Intermittent every 12 hours  levoFLOXacin IVPB 250 milliGRAM(s) IV Intermittent every 48 hours    REVIEW OF SYSTEMS:  Has trach, unable to answer.      Physical Exam:  ICU Vital Signs Last 24 Hrs  T(C): 36.7 (14 Oct 2019 08:04), Max: 37.7 (13 Oct 2019 15:46)  T(F): 98 (14 Oct 2019 08:04), Max: 99.9 (13 Oct 2019 15:46)  HR: 69 (14 Oct 2019 09:01) (63 - 74)  BP: 130/72 (14 Oct 2019 08:04) (118/64 - 130/72)  RR: 18 (14 Oct 2019 08:04) (18 - 18)  SpO2: 100% (14 Oct 2019 09:01) (95% - 100%)  General:  No acute distress. more awake  Eye: Pupils are equal, round and reactive to light, Extraocular movements are intact, Normal conjunctiva.  HENT: Normocephalic, Oral mucosa is DRY; ET Tube in place  Neck: Supple, No lymphadenopathy.  Respiratory: Lungs with fair air entry  Cardiovascular: Normal rate, Regular rhythm,   Gastrointestinal: Soft, Non-distended, Normal bowel sounds. + G Tube in place, colostomy bag+   Genitourinary: + JONES with dilute clear urine  Lymphatics: No lymphadenopathy neck,   Musculoskeletal: contracted extremities  Integumentary: No rash.  Neurologic: minimally responsive, non-verbal    Labs:  10-14    148<H>  |  106  |  131.0<H>  ----------------------------<  141<H>  4.7   |  22.0  |  4.38<H>    Ca    7.8<L>      14 Oct 2019 08:07  Phos  3.4     10-14    TPro  x   /  Alb  2.2<L>  /  TBili  x   /  DBili  x   /  AST  x   /  ALT  x   /  AlkPhos  x   10-14    LIVER FUNCTIONS - ( 14 Oct 2019 08:07 )  Alb: 2.2 g/dL / Pro: x     / ALK PHOS: x     / ALT: x     / AST: x     / GGT: x           RECENT CULTURES:  10-09 @ 17:09 .Sputum Pseudomonas aeruginosa (Carbapenem Resistant)    Numerous Pseudomonas aeruginosa (Carbapenem Resistant)  Few Routine respiratory bonnie present  Few WBC's  Numerous Gram Negative Rods    10-09 @ 05:26      NotDetec    10-08 @ 22:56 .Urine     Culture grew 3 or more types of organisms which indicate  collection contamination; consider recollection only if clinically  indicated.    10-08 @ 22:24 .Blood     No growth at 5 days.    10-08 @ 22:23 .Blood     No growth at 5 days.    All imaging and data are reviewed.   - CT scan with multiple areas of concern:  	1. Bibasilar consolidations or pleural effusions, possible aspiration pneumonia.   	1. Large decubitus ulcer with exposed right ischium, likely osteomyelitis.   	2. Infected soft tissues of lateral and posterior left hip suspicious for septic bursitis/pyositis, possible osteomyelitis.   	3. Left paracentral sacral/coccygeal decubitus ulcer with possible osteomyelitis and soft tissues.   	4. Large peristomal hernia with colonic diverticulosis. Possible low-grade diverticulitis.   	5. Extraluminal collection of gas adjacent to the bladder etiology uncertain.     Assessment and plan:   This is a 87y  Male  with h/o CAD,  s/p 2 stents, CHF, HLD, pAFIB, seizure disorder, prior CVA's with residual deficits and dementia, prostate cancer, respiratory failure on chronic vent, tracheostomy, Chronic sacral decubitus with osteomyelitis s/p diverting colostomy , multiple prior hospitalizations, prior PNA's/Sepsis (Streptococcus bacteremia, MRSA bacteremia, sputum Cx's with Stenotrophomonas, MDR Acinetobacter, Proteus and CRE Pseudomonas), and chronic jones, sent from [facility] after in-house nephrologist noticed low HgB (7.2) and elevated Cr (4.58).   here for fevers    Fever  ROSALIA   Pneumonia   Osteomyelitis   Chronic vent and trach    - Blood culture neg  - Urine possible contaminated   - Sputum with pseudomonas R to Carbapenems but S to cefepime, could be colonization.   - Continue Cefepime 1gm q12 to complete at least 7 days.   - Will stop Levaquin  - Osteomyelitis of right Ischium, suspected Left hip, sacral osteomyelitis  - ESR=55 and CRP=26.94  - s/p diverting colostomy  - continue wound care  - follow up all outstanding cultures  - trend temperature and WBC curve, WBC from 14k now 10k, on streoid  - repeat cultures from blood and all sources if febrile.     Will follow.     Discussed with Daughter.

## 2019-10-14 NOTE — CONSULT NOTE ADULT - PROBLEM SELECTOR RECOMMENDATION 4
-patient with legal guardian who makes health care decisions. MOLST on chart from Van - who is guardian, stating DNR. Daughter is NOT decision maker. Will reach out to guardian in nest couple of days to further discuss goals. Grave prognosis, high risk for readmissions and unlikely patient will have meaningful recovery.

## 2019-10-14 NOTE — PROGRESS NOTE ADULT - SUBJECTIVE AND OBJECTIVE BOX
CC: PNA / osteomyelitis     INTERVAL HPI/OVERNIGHT EVENTS: seen and examined , no overnight events . daughter at bedside. reports more awake today however legs and arms swelling has been worsening. on IVF. on IV Abx for CRE: pseudomonus    REVIEW OF SYSTEMS:    unable to obtain as patient is maintained on vent. not communicating    Vital Signs Last 24 Hrs  T(C): 36.5 (14 Oct 2019 15:30), Max: 37.2 (13 Oct 2019 17:46)  T(F): 97.7 (14 Oct 2019 15:30), Max: 99 (13 Oct 2019 17:46)  HR: 73 (14 Oct 2019 15:30) (63 - 74)  BP: 130/70 (14 Oct 2019 15:30) (118/64 - 130/72)  BP(mean): --  RR: 18 (14 Oct 2019 08:04) (18 - 18)  SpO2: 98% (14 Oct 2019 15:30) (95% - 100%)    I&O's Detail    13 Oct 2019 07:01  -  14 Oct 2019 07:00  --------------------------------------------------------  IN:    Free Water: 1000 mL    Pivot 1.5: 1470 mL    sodium chloride 0.9%: 2100 mL    Solution: 50 mL    Solution: 100 mL  Total IN: 4720 mL    OUT:    Colostomy: 260 mL    Indwelling Catheter - Urethral: 1650 mL    Voided: 500 mL  Total OUT: 2410 mL    Total NET: 2310 mL      14 Oct 2019 07:01  -  14 Oct 2019 15:49  --------------------------------------------------------  IN:    Free Water: 250 mL    Pivot 1.5: 560 mL    sodium chloride 0.9%: 500 mL  Total IN: 1310 mL    OUT:  Total OUT: 0 mL    Total NET: 1310 mL    CAPILLARY BLOOD GLUCOSE      POCT Blood Glucose.: 185 mg/dL (14 Oct 2019 11:31)  POCT Blood Glucose.: 139 mg/dL (14 Oct 2019 05:27)  POCT Blood Glucose.: 109 mg/dL (13 Oct 2019 23:09)  POCT Blood Glucose.: 138 mg/dL (13 Oct 2019 16:43)        GENERAL: Not in distress. on trach. awake  HEENT:  Normocephalic and atraumatic.   NECK: Supple.   CARDIOVASCULAR: RRR S1, S2. SM+.  No rubs/gallop  LUNGS: BLAE+, course breath sound  ABDOMEN: ND. Soft,  NT, no guarding / rebound / rigidity. BS normoactive. colostomy bag with green stool. PEG  EXTREMITIES: no cyanosis, no clubbing, +edema.   SKIN: no rash. anasarca, worsening  NEUROLOGIC: not communicating, non-verbal. not following command  PSYCHIATRIC: Calm.  No agitation.      LABS:             10-14    148<H>  |  106  |  131.0<H>  ----------------------------<  141<H>  4.7   |  22.0  |  4.38<H>    Ca    7.8<L>      14 Oct 2019 08:07  Phos  3.4     10-14    TPro  x   /  Alb  2.2<L>  /  TBili  x   /  DBili  x   /  AST  x   /  ALT  x   /  AlkPhos  x   10-14              MEDICATIONS  (STANDING):  amLODIPine   Tablet 5 milliGRAM(s) Oral daily  ascorbic acid 500 milliGRAM(s) Oral daily  buMETAnide Injectable 2 milliGRAM(s) IV Push daily  cefepime   IVPB 1000 milliGRAM(s) IV Intermittent every 12 hours  chlorhexidine 0.12% Liquid 15 milliLiter(s) Oral Mucosa every 12 hours  chlorhexidine 2% Cloths 1 Application(s) Topical <User Schedule>  digoxin     Tablet 0.125 milliGRAM(s) Oral daily  lactobacillus acidophilus 1 Tablet(s) Oral daily  levETIRAcetam  IVPB 1000 milliGRAM(s) IV Intermittent every 12 hours  metoprolol tartrate 25 milliGRAM(s) Oral two times a day  multivitamin 1 Tablet(s) Oral daily  pantoprazole  Injectable 40 milliGRAM(s) IV Push every 12 hours  phenytoin   Suspension 400 milliGRAM(s) Enteral Tube every 12 hours  predniSONE   Tablet 30 milliGRAM(s) Oral daily  sodium bicarbonate 650 milliGRAM(s) Oral every 8 hours  zinc sulfate 220 milliGRAM(s) Oral daily    MEDICATIONS  (PRN):  acetaminophen    Suspension .. 650 milliGRAM(s) Oral every 6 hours PRN Temp greater or equal to 38.5C (101.3F)  ALBUTerol/ipratropium for Nebulization. 3 milliLiter(s) Nebulizer every 4 hours PRN Bronchospasm          RADIOLOGY & ADDITIONAL TESTS: CC: PNA / osteomyelitis     INTERVAL HPI/OVERNIGHT EVENTS: seen and examined , no overnight events . daughter at bedside. reports more awake today however legs and arms swelling has been worsening. on IVF. on IV Abx for CRE: pseudomonus. RN reported skin rash over left thigh.     REVIEW OF SYSTEMS:    unable to obtain as patient is maintained on vent. not communicating    Vital Signs Last 24 Hrs  T(C): 36.5 (14 Oct 2019 15:30), Max: 37.2 (13 Oct 2019 17:46)  T(F): 97.7 (14 Oct 2019 15:30), Max: 99 (13 Oct 2019 17:46)  HR: 73 (14 Oct 2019 15:30) (63 - 74)  BP: 130/70 (14 Oct 2019 15:30) (118/64 - 130/72)  BP(mean): --  RR: 18 (14 Oct 2019 08:04) (18 - 18)  SpO2: 98% (14 Oct 2019 15:30) (95% - 100%)    I&O's Detail    13 Oct 2019 07:01  -  14 Oct 2019 07:00  --------------------------------------------------------  IN:    Free Water: 1000 mL    Pivot 1.5: 1470 mL    sodium chloride 0.9%: 2100 mL    Solution: 50 mL    Solution: 100 mL  Total IN: 4720 mL    OUT:    Colostomy: 260 mL    Indwelling Catheter - Urethral: 1650 mL    Voided: 500 mL  Total OUT: 2410 mL    Total NET: 2310 mL      14 Oct 2019 07:01  -  14 Oct 2019 15:49  --------------------------------------------------------  IN:    Free Water: 250 mL    Pivot 1.5: 560 mL    sodium chloride 0.9%: 500 mL  Total IN: 1310 mL    OUT:  Total OUT: 0 mL    Total NET: 1310 mL    CAPILLARY BLOOD GLUCOSE      POCT Blood Glucose.: 185 mg/dL (14 Oct 2019 11:31)  POCT Blood Glucose.: 139 mg/dL (14 Oct 2019 05:27)  POCT Blood Glucose.: 109 mg/dL (13 Oct 2019 23:09)  POCT Blood Glucose.: 138 mg/dL (13 Oct 2019 16:43)        GENERAL: Not in distress. on trach. awake  HEENT:  Normocephalic and atraumatic.   NECK: Supple.   CARDIOVASCULAR: RRR S1, S2. SM+.  No rubs/gallop  LUNGS: BLAE+, course breath sound  ABDOMEN: ND. Soft,  NT, no guarding / rebound / rigidity. BS normoactive. colostomy bag with green stool. PEG  EXTREMITIES: no cyanosis, no clubbing, +edema.   SKIN: anasarca, worsening. skin rash over left like looks like fungal  NEUROLOGIC: not communicating, non-verbal. not following command  PSYCHIATRIC: Calm.  No agitation.      LABS:             10-14    148<H>  |  106  |  131.0<H>  ----------------------------<  141<H>  4.7   |  22.0  |  4.38<H>    Ca    7.8<L>      14 Oct 2019 08:07  Phos  3.4     10-14    TPro  x   /  Alb  2.2<L>  /  TBili  x   /  DBili  x   /  AST  x   /  ALT  x   /  AlkPhos  x   10-14              MEDICATIONS  (STANDING):  amLODIPine   Tablet 5 milliGRAM(s) Oral daily  ascorbic acid 500 milliGRAM(s) Oral daily  buMETAnide Injectable 2 milliGRAM(s) IV Push daily  cefepime   IVPB 1000 milliGRAM(s) IV Intermittent every 12 hours  chlorhexidine 0.12% Liquid 15 milliLiter(s) Oral Mucosa every 12 hours  chlorhexidine 2% Cloths 1 Application(s) Topical <User Schedule>  digoxin     Tablet 0.125 milliGRAM(s) Oral daily  lactobacillus acidophilus 1 Tablet(s) Oral daily  levETIRAcetam  IVPB 1000 milliGRAM(s) IV Intermittent every 12 hours  metoprolol tartrate 25 milliGRAM(s) Oral two times a day  multivitamin 1 Tablet(s) Oral daily  pantoprazole  Injectable 40 milliGRAM(s) IV Push every 12 hours  phenytoin   Suspension 400 milliGRAM(s) Enteral Tube every 12 hours  predniSONE   Tablet 30 milliGRAM(s) Oral daily  sodium bicarbonate 650 milliGRAM(s) Oral every 8 hours  zinc sulfate 220 milliGRAM(s) Oral daily    MEDICATIONS  (PRN):  acetaminophen    Suspension .. 650 milliGRAM(s) Oral every 6 hours PRN Temp greater or equal to 38.5C (101.3F)  ALBUTerol/ipratropium for Nebulization. 3 milliLiter(s) Nebulizer every 4 hours PRN Bronchospasm          RADIOLOGY & ADDITIONAL TESTS:

## 2019-10-14 NOTE — CONSULT NOTE ADULT - SUBJECTIVE AND OBJECTIVE BOX
HPI: 87M with PMH as listed admitted 10/9 from [facility] with low hemoglobin and increased creatinine, admitted to MICU initially. Also admitted with fevers, s/p CT scan, found to have bibasilar PNA, possible aspiration, decubitus ulcer - right ischium, possible OM.     Patient has had numerous hospitalizations at this institution, [facility], and [facility].       PERTINENT PMH REVIEWED: Yes     PAST MEDICAL & SURGICAL HISTORY:  Dysphagia  Fall: Multiple Mechanical falls  CAD (coronary artery disease)  Clostridium difficile diarrhea: in 2009  BPH (benign prostatic hypertrophy)  Dementia  HLD (hyperlipidemia)  CHF (congestive heart failure)  Prostate cancer: Treated w/ radiation  Stroke: (~5yrs ago)  Seizure: (last event &gt;1yr ago)  HTN (hypertension)  Colostomy in place  S/P percutaneous endoscopic gastrostomy (PEG) tube placement  H/O hemorrhoidectomy  Deviated septum  Abdominal hernia  Status post cardiac surgery: stents x 2    SOCIAL HISTORY:  no EtOH                                    Admitted from: [facility]     Guardian - Van Monroy     FAMILY HISTORY: no related medical history     Baseline ADLs (prior to admission):  Dependent      Allergies    clindamycin (Unknown)  Grapes (Rash)  Nuts (Hives; Rash)  PC Pen VK (Unknown)  shellfish (Angioedema; Rash; Hives)  strawberry (Short breath; Rash; Hives)  Xanax (Rash)    Intolerances    Ativan (Unknown)  Haldol (Dystonic RXN)  hydrALAZINE (Unknown)  Zyprexa (Unknown)    Present Symptoms:     Dyspnea: vented   Nausea/Vomiting: Yes No  Anxiety:  Yes No  Depression: Yes No  Fatigue: Yes No  Loss of appetite: Yes No    Pain:             Character-            Duration-            Effect-            Factors-            Frequency-            Location-            Severity-    Review of Systems: Reviewed                     Negative:                     Positive:  Unable to obtain due to poor mentation   All others negative    MEDICATIONS  (STANDING):  amLODIPine   Tablet 5 milliGRAM(s) Oral daily  ascorbic acid 500 milliGRAM(s) Oral daily  cefepime   IVPB 1000 milliGRAM(s) IV Intermittent every 12 hours  chlorhexidine 0.12% Liquid 15 milliLiter(s) Oral Mucosa every 12 hours  chlorhexidine 2% Cloths 1 Application(s) Topical <User Schedule>  digoxin     Tablet 0.125 milliGRAM(s) Oral daily  furosemide    Tablet 40 milliGRAM(s) Oral daily  lactobacillus acidophilus 1 Tablet(s) Oral daily  levETIRAcetam  IVPB 1000 milliGRAM(s) IV Intermittent every 12 hours  metoprolol tartrate 25 milliGRAM(s) Oral two times a day  multivitamin 1 Tablet(s) Oral daily  pantoprazole  Injectable 40 milliGRAM(s) IV Push every 12 hours  phenytoin   Suspension 400 milliGRAM(s) Enteral Tube every 12 hours  predniSONE   Tablet 30 milliGRAM(s) Oral daily  sodium bicarbonate 650 milliGRAM(s) Oral every 8 hours  zinc sulfate 220 milliGRAM(s) Oral daily    MEDICATIONS  (PRN):  acetaminophen    Suspension .. 650 milliGRAM(s) Oral every 6 hours PRN Temp greater or equal to 38.5C (101.3F)  ALBUTerol/ipratropium for Nebulization. 3 milliLiter(s) Nebulizer every 4 hours PRN Bronchospasm    PHYSICAL EXAM:    Vital Signs Last 24 Hrs  T(C): 36.7 (14 Oct 2019 08:04), Max: 37.7 (13 Oct 2019 15:46)  T(F): 98 (14 Oct 2019 08:04), Max: 99.9 (13 Oct 2019 15:46)  HR: 69 (14 Oct 2019 09:01) (63 - 74)  BP: 130/72 (14 Oct 2019 08:04) (118/64 - 130/72)  BP(mean): --  RR: 18 (14 Oct 2019 08:04) (18 - 18)  SpO2: 100% (14 Oct 2019 09:01) (95% - 100%)    General: alert  oriented x ____ lethargic agitated                  cachexia  nonverbal  coma    Karnofsky:  %    HEENT: normal  dry mouth  ET tube/trach    Lungs: comfortable tachypnea/labored breathing  excessive secretions    CV: normal  tachycardia    GI: normal  distended  tender  no BS               PEG/NG/OG tube  constipation  last BM:     : normal  incontinent  oliguria/anuria  jones    MSK: normal  weakness  edema             ambulatory  bedbound/wheelchair bound    Skin: normal  pressure ulcers- Stage_____  no rash    LABS:  10-14    148<H>  |  106  |  131.0<H>  ----------------------------<  141<H>  4.7   |  22.0  |  4.38<H>    Ca    7.8<L>      14 Oct 2019 08:07  Phos  3.4     10-14    TPro  x   /  Alb  2.2<L>  /  TBili  x   /  DBili  x   /  AST  x   /  ALT  x   /  AlkPhos  x   10-14    I&O's Summary    13 Oct 2019 07:01  -  14 Oct 2019 07:00  --------------------------------------------------------  IN: 4720 mL / OUT: 2410 mL / NET: 2310 mL    14 Oct 2019 07:01  -  14 Oct 2019 14:00  --------------------------------------------------------  IN: 1170 mL / OUT: 0 mL / NET: 1170 mL        RADIOLOGY & ADDITIONAL STUDIES:    ADVANCE DIRECTIVES:   DNR YES NO  Completed on:                     MOLST  YES NO   Completed on:  Living Will  YES NO   Completed on: HPI: 87M with PMH as listed admitted 10/9 from [facility] with low hemoglobin and increased creatinine, admitted to MICU initially. Also admitted with fevers, s/p CT scan, found to have bibasilar PNA, possible aspiration, decubitus ulcer - right ischium, possible OM.     Patient has had numerous hospitalizations at this institution, [facility], and [facility].       PERTINENT PMH REVIEWED: Yes     PAST MEDICAL & SURGICAL HISTORY:  Dysphagia  Fall: Multiple Mechanical falls  CAD (coronary artery disease)  Clostridium difficile diarrhea: in 2009  BPH (benign prostatic hypertrophy)  Dementia  HLD (hyperlipidemia)  CHF (congestive heart failure)  Prostate cancer: Treated w/ radiation  Stroke: (~5yrs ago)  Seizure: (last event &gt;1yr ago)  HTN (hypertension)  Colostomy in place  S/P percutaneous endoscopic gastrostomy (PEG) tube placement  H/O hemorrhoidectomy  Deviated septum  Abdominal hernia  Status post cardiac surgery: stents x 2    SOCIAL HISTORY:  no EtOH                                    Admitted from: [facility]     Guardian - Van Monroy     FAMILY HISTORY: no related medical history     Baseline ADLs (prior to admission):  Dependent      Allergies    clindamycin (Unknown)  Grapes (Rash)  Nuts (Hives; Rash)  PC Pen VK (Unknown)  shellfish (Angioedema; Rash; Hives)  strawberry (Short breath; Rash; Hives)  Xanax (Rash)    Intolerances    Ativan (Unknown)  Haldol (Dystonic RXN)  hydrALAZINE (Unknown)  Zyprexa (Unknown)    Present Symptoms:     Dyspnea: vented   Nausea/Vomiting: No  Anxiety:  No  Depression: unable   Fatigue: Yes   Loss of appetite: unable     Pain: none             Character-            Duration-            Effect-            Factors-            Frequency-            Location-            Severity-    Review of Systems: Reviewed               Unable to obtain due to poor mentation   All others negative    MEDICATIONS  (STANDING):  amLODIPine   Tablet 5 milliGRAM(s) Oral daily  ascorbic acid 500 milliGRAM(s) Oral daily  cefepime   IVPB 1000 milliGRAM(s) IV Intermittent every 12 hours  chlorhexidine 0.12% Liquid 15 milliLiter(s) Oral Mucosa every 12 hours  chlorhexidine 2% Cloths 1 Application(s) Topical <User Schedule>  digoxin     Tablet 0.125 milliGRAM(s) Oral daily  furosemide    Tablet 40 milliGRAM(s) Oral daily  lactobacillus acidophilus 1 Tablet(s) Oral daily  levETIRAcetam  IVPB 1000 milliGRAM(s) IV Intermittent every 12 hours  metoprolol tartrate 25 milliGRAM(s) Oral two times a day  multivitamin 1 Tablet(s) Oral daily  pantoprazole  Injectable 40 milliGRAM(s) IV Push every 12 hours  phenytoin   Suspension 400 milliGRAM(s) Enteral Tube every 12 hours  predniSONE   Tablet 30 milliGRAM(s) Oral daily  sodium bicarbonate 650 milliGRAM(s) Oral every 8 hours  zinc sulfate 220 milliGRAM(s) Oral daily    MEDICATIONS  (PRN):  acetaminophen    Suspension .. 650 milliGRAM(s) Oral every 6 hours PRN Temp greater or equal to 38.5C (101.3F)  ALBUTerol/ipratropium for Nebulization. 3 milliLiter(s) Nebulizer every 4 hours PRN Bronchospasm    PHYSICAL EXAM:    Vital Signs Last 24 Hrs  T(C): 36.7 (14 Oct 2019 08:04), Max: 37.7 (13 Oct 2019 15:46)  T(F): 98 (14 Oct 2019 08:04), Max: 99.9 (13 Oct 2019 15:46)  HR: 69 (14 Oct 2019 09:01) (63 - 74)  BP: 130/72 (14 Oct 2019 08:04) (118/64 - 130/72)  BP(mean): --  RR: 18 (14 Oct 2019 08:04) (18 - 18)  SpO2: 100% (14 Oct 2019 09:01) (95% - 100%)    General: opens eyes randomly.     Karnofsky: 20 %    HEENT: trach    Lungs: comfortable    CV: normal      GI: normal PEg colostomy     :  jones     MSK: bedbound/wheelchair bound    Skin: no rash    LABS:  10-14    148<H>  |  106  |  131.0<H>  ----------------------------<  141<H>  4.7   |  22.0  |  4.38<H>    Ca    7.8<L>      14 Oct 2019 08:07  Phos  3.4     10-14    TPro  x   /  Alb  2.2<L>  /  TBili  x   /  DBili  x   /  AST  x   /  ALT  x   /  AlkPhos  x   10-14    I&O's Summary    13 Oct 2019 07:01  -  14 Oct 2019 07:00  --------------------------------------------------------  IN: 4720 mL / OUT: 2410 mL / NET: 2310 mL    14 Oct 2019 07:01  -  14 Oct 2019 14:00  --------------------------------------------------------  IN: 1170 mL / OUT: 0 mL / NET: 1170 mL    RADIOLOGY & ADDITIONAL STUDIES:    ADVANCE DIRECTIVES: DNR - MOLST - legal guardian Van Monroy

## 2019-10-14 NOTE — PROGRESS NOTE ADULT - ASSESSMENT
1. sacral osteomyelitis   on IV antibiotics .  follow up cultures . sputum + for pseudomonus  diverting colostomy in place .  ID on board .    2. Aspiration PNA   follow up cultures .  antibiotics in place .  aspiration precautions  on PEG feeding    3. Acute on chronic renal failure   creatinine stable  nephro on board .  on fluids , no dialysis for now as per renal .  DC fluid today for worsening anasarca. increase free water through PEG tube as needed  renal started IV bumex today  monitor RF    4. Chronic respiratory failure   trach to vent .  pulmonary on board .    5. Seizure disorder   c/w home regimen   seizure precautions    6. HTN '  c/w home regimen .    7. Afib   on rate controlling agents .  no AC as had bleeding issues in past .    8. Anemia   likely multifactorial , and renal insufficiency   h/h stable  transfuse as needed .     9. DVT prophylaxis   boots , no chemical prophylaxis due to anemia .     patient has a legal guardian , daughter at bedside.    GOC discussed with the daughter at bedside. she wants to c/w everything. she is expecting her father will get rid of Vent one day. as per daughter, patient has a legal guardian who is not acting for the best interest of the patient and her family. palliative consult was called for further GOC discussion with daughter and legal guardian. I will also contact with his legal guardian as soon as convenient to update about his current condition. Daughter Greta Aldrich reported she is the only child of the [place] couple. her mother passed away from throat cancer. her father is not aware of it. follow up palliative consult 1. sacral osteomyelitis   on IV antibiotics .  follow up cultures . sputum + for pseudomonus  diverting colostomy in place .  ID on board .    2. Aspiration PNA   follow up cultures .  antibiotics in place .  aspiration precautions  on PEG feeding    3. Acute on chronic renal failure   creatinine stable  nephro on board .  on fluids , no dialysis for now as per renal .  DC fluid today for worsening anasarca. increase free water through PEG tube as needed  renal started IV bumex today  monitor RF    4. Chronic respiratory failure   trach to vent .  pulmonary on board .    5. Seizure disorder   c/w home regimen   seizure precautions    6. HTN '  c/w home regimen .    7. Afib   on rate controlling agents .  no AC as had bleeding issues in past .    8. Anemia   likely multifactorial , and renal insufficiency   h/h stable  transfuse as needed .     9. DVT prophylaxis   boots , no chemical prophylaxis due to anemia .     10. fungal rash: nystatin powder. monitor    patient has a legal guardian , daughter at bedside.    GOC discussed with the daughter at bedside. she wants to c/w everything. she is expecting her father will get rid of Vent one day. as per daughter, patient has a legal guardian who is not acting for the best interest of the patient and her family. palliative consult was called for further GOC discussion with daughter and legal guardian. I will also contact with his legal guardian as soon as convenient to update about his current condition. Daughter Greta Aldrich reported she is the only child of the [place] couple. her mother passed away from throat cancer. her father is not aware of it. follow up palliative consult

## 2019-10-15 LAB
ANION GAP SERPL CALC-SCNC: 18 MMOL/L — HIGH (ref 5–17)
BUN SERPL-MCNC: 141 MG/DL — HIGH (ref 8–20)
CALCIUM SERPL-MCNC: 8.3 MG/DL — LOW (ref 8.6–10.2)
CHLORIDE SERPL-SCNC: 105 MMOL/L — SIGNIFICANT CHANGE UP (ref 98–107)
CO2 SERPL-SCNC: 22 MMOL/L — SIGNIFICANT CHANGE UP (ref 22–29)
CREAT SERPL-MCNC: 4.35 MG/DL — HIGH (ref 0.5–1.3)
GLUCOSE BLDC GLUCOMTR-MCNC: 125 MG/DL — HIGH (ref 70–99)
GLUCOSE SERPL-MCNC: 182 MG/DL — HIGH (ref 70–115)
POTASSIUM SERPL-MCNC: 5.2 MMOL/L — SIGNIFICANT CHANGE UP (ref 3.5–5.3)
POTASSIUM SERPL-SCNC: 5.2 MMOL/L — SIGNIFICANT CHANGE UP (ref 3.5–5.3)
SODIUM SERPL-SCNC: 145 MMOL/L — SIGNIFICANT CHANGE UP (ref 135–145)

## 2019-10-15 PROCEDURE — 99232 SBSQ HOSP IP/OBS MODERATE 35: CPT

## 2019-10-15 RX ADMIN — NYSTATIN CREAM 1 APPLICATION(S): 100000 CREAM TOPICAL at 16:54

## 2019-10-15 RX ADMIN — CHLORHEXIDINE GLUCONATE 1 APPLICATION(S): 213 SOLUTION TOPICAL at 05:59

## 2019-10-15 RX ADMIN — Medication 650 MILLIGRAM(S): at 10:55

## 2019-10-15 RX ADMIN — AMLODIPINE BESYLATE 5 MILLIGRAM(S): 2.5 TABLET ORAL at 06:01

## 2019-10-15 RX ADMIN — Medication 400 MILLIGRAM(S): at 05:59

## 2019-10-15 RX ADMIN — LEVETIRACETAM 400 MILLIGRAM(S): 250 TABLET, FILM COATED ORAL at 06:00

## 2019-10-15 RX ADMIN — Medication 25 MILLIGRAM(S): at 16:55

## 2019-10-15 RX ADMIN — PANTOPRAZOLE SODIUM 40 MILLIGRAM(S): 20 TABLET, DELAYED RELEASE ORAL at 06:01

## 2019-10-15 RX ADMIN — ZINC SULFATE TAB 220 MG (50 MG ZINC EQUIVALENT) 220 MILLIGRAM(S): 220 (50 ZN) TAB at 10:54

## 2019-10-15 RX ADMIN — NYSTATIN CREAM 1 APPLICATION(S): 100000 CREAM TOPICAL at 06:01

## 2019-10-15 RX ADMIN — PANTOPRAZOLE SODIUM 40 MILLIGRAM(S): 20 TABLET, DELAYED RELEASE ORAL at 16:55

## 2019-10-15 RX ADMIN — Medication 1 TABLET(S): at 10:54

## 2019-10-15 RX ADMIN — Medication 400 MILLIGRAM(S): at 16:55

## 2019-10-15 RX ADMIN — BUMETANIDE 2 MILLIGRAM(S): 0.25 INJECTION INTRAMUSCULAR; INTRAVENOUS at 06:00

## 2019-10-15 RX ADMIN — LEVETIRACETAM 400 MILLIGRAM(S): 250 TABLET, FILM COATED ORAL at 16:54

## 2019-10-15 RX ADMIN — Medication 25 MILLIGRAM(S): at 06:01

## 2019-10-15 RX ADMIN — CHLORHEXIDINE GLUCONATE 15 MILLILITER(S): 213 SOLUTION TOPICAL at 16:54

## 2019-10-15 RX ADMIN — CEFEPIME 100 MILLIGRAM(S): 1 INJECTION, POWDER, FOR SOLUTION INTRAMUSCULAR; INTRAVENOUS at 16:54

## 2019-10-15 RX ADMIN — Medication 500 MILLIGRAM(S): at 10:54

## 2019-10-15 RX ADMIN — Medication 0.12 MILLIGRAM(S): at 06:01

## 2019-10-15 RX ADMIN — Medication 30 MILLIGRAM(S): at 06:01

## 2019-10-15 RX ADMIN — CHLORHEXIDINE GLUCONATE 15 MILLILITER(S): 213 SOLUTION TOPICAL at 05:59

## 2019-10-15 RX ADMIN — Medication 650 MILLIGRAM(S): at 06:01

## 2019-10-15 RX ADMIN — CEFEPIME 100 MILLIGRAM(S): 1 INJECTION, POWDER, FOR SOLUTION INTRAMUSCULAR; INTRAVENOUS at 06:02

## 2019-10-15 RX ADMIN — Medication 650 MILLIGRAM(S): at 22:53

## 2019-10-15 NOTE — PROGRESS NOTE ADULT - SUBJECTIVE AND OBJECTIVE BOX
CC: PNA / osteomyelitis     INTERVAL HPI/OVERNIGHT EVENTS: seen and examined , no overnight events . daughter at bedside. reports swelling slightly better compared to yesterday. poor prognosis. palliative on board    REVIEW OF SYSTEMS:    unable to obtain as patient is maintained on vent. not communicating    Vital Signs Last 24 Hrs  T(C): 37.3 (15 Oct 2019 08:10), Max: 37.3 (15 Oct 2019 08:10)  T(F): 99.1 (15 Oct 2019 08:10), Max: 99.1 (15 Oct 2019 08:10)  HR: 72 (15 Oct 2019 09:33) (70 - 77)  BP: 136/71 (15 Oct 2019 08:10) (130/70 - 165/79)  BP(mean): --  RR: 22 (15 Oct 2019 08:10) (22 - 22)  SpO2: 98% (15 Oct 2019 09:33) (94% - 99%)    CAPILLARY BLOOD GLUCOSE      POCT Blood Glucose.: 125 mg/dL (15 Oct 2019 06:11)  POCT Blood Glucose.: 129 mg/dL (14 Oct 2019 16:40)    I&O's Summary    14 Oct 2019 07:01  -  15 Oct 2019 07:00  --------------------------------------------------------  IN: 2080 mL / OUT: 900 mL / NET: 1180 mL    15 Oct 2019 07:01  -  15 Oct 2019 12:01  --------------------------------------------------------  IN: 140 mL / OUT: 0 mL / NET: 140 mL      GENERAL: Not in distress. on trach. awake  HEENT:  Normocephalic and atraumatic.   NECK: Supple.   CARDIOVASCULAR: RRR S1, S2. SM+.  No rubs/gallop  LUNGS: BLAE+, course breath sound  ABDOMEN: ND. Soft,  NT, no guarding / rebound / rigidity. BS normoactive. colostomy bag with green stool. PEG  EXTREMITIES: no cyanosis, no clubbing, +edema.   SKIN: anasarca, worsening. skin rash over left like looks like fungal  NEUROLOGIC: not communicating, non-verbal. not following command  PSYCHIATRIC: Calm.  No agitation.      10-14    148<H>  |  106  |  131.0<H>  ----------------------------<  141<H>  4.7   |  22.0  |  4.38<H>    Ca    7.8<L>      14 Oct 2019 08:07  Phos  3.4     10-14    TPro  x   /  Alb  2.2<L>  /  TBili  x   /  DBili  x   /  AST  x   /  ALT  x   /  AlkPhos  x   10-14              MEDICATIONS  (STANDING):  amLODIPine   Tablet 5 milliGRAM(s) Oral daily  ascorbic acid 500 milliGRAM(s) Oral daily  buMETAnide Injectable 2 milliGRAM(s) IV Push daily  cefepime   IVPB 1000 milliGRAM(s) IV Intermittent every 12 hours  chlorhexidine 0.12% Liquid 15 milliLiter(s) Oral Mucosa every 12 hours  chlorhexidine 2% Cloths 1 Application(s) Topical <User Schedule>  digoxin     Tablet 0.125 milliGRAM(s) Oral daily  lactobacillus acidophilus 1 Tablet(s) Oral daily  levETIRAcetam  IVPB 1000 milliGRAM(s) IV Intermittent every 12 hours  metoprolol tartrate 25 milliGRAM(s) Oral two times a day  multivitamin 1 Tablet(s) Oral daily  nystatin Cream 1 Application(s) Topical two times a day  pantoprazole  Injectable 40 milliGRAM(s) IV Push every 12 hours  phenytoin   Suspension 400 milliGRAM(s) Enteral Tube every 12 hours  predniSONE   Tablet 30 milliGRAM(s) Oral daily  sodium bicarbonate 650 milliGRAM(s) Oral every 8 hours  zinc sulfate 220 milliGRAM(s) Oral daily    MEDICATIONS  (PRN):  acetaminophen    Suspension .. 650 milliGRAM(s) Oral every 6 hours PRN Temp greater or equal to 38.5C (101.3F)  ALBUTerol/ipratropium for Nebulization. 3 milliLiter(s) Nebulizer every 4 hours PRN Bronchospasm            RADIOLOGY & ADDITIONAL TESTS:

## 2019-10-15 NOTE — PROGRESS NOTE ADULT - ASSESSMENT
ROSALIA, hyperkalemia, ATN in setting of sepsis  End stage dementia with chronic respiratory failure on vent via trach    Renal function stable electrolytes acceptable  Will cont to monitor UOP closely  Anemia s/p PRBCs watch h/h  Renal function stable and seems to be producing more urine- (?) signs of recovery  Edema B/L LE cont bumex  - cont current Rx and monitor  - no current indication for HD    Will follow

## 2019-10-15 NOTE — PROGRESS NOTE ADULT - ASSESSMENT
1. sacral osteomyelitis   on IV antibiotics .  follow up cultures . sputum + for pseudomonus  diverting colostomy in place .  ID on board .    2. Aspiration PNA   follow up cultures .  antibiotics in place .  aspiration precautions  on PEG feeding    3. Acute on chronic renal failure   creatinine stable  nephro on board .  fluids , no dialysis for now as per renal .  DCed IVf 10/15  for worsening anasarca. increase free water through PEG tube as needed  renal started IV bumex today  monitor RF  strict I and O    4. Chronic respiratory failure   trach to vent .  pulmonary on board .    5. Seizure disorder   c/w home regimen   seizure precautions    6. HTN '  c/w home regimen .    7. Afib   on rate controlling agents .  no AC as had bleeding issues in past .    8. Anemia   likely multifactorial , and renal insufficiency   h/h stable  transfuse as needed .     9. DVT prophylaxis   boots , no chemical prophylaxis due to anemia .     10. fungal rash: nystatin powder. monitor    patient has a legal guardian , daughter at bedside.    GOC discussed with the daughter at bedside. she wants to c/w everything. she is expecting her father will get rid of Vent one day. as per daughter, patient has a legal guardian who is not acting for the best interest of the patient and her family. palliative consult was called for further GOC discussion with daughter and legal guardian. I will also contact with his legal guardian as soon as convenient to update about his current condition. Daughter Greta Aldrich reported she is the only child of the [place] couple. her mother passed away from throat cancer. her father is not aware of it. follow up palliative consult

## 2019-10-15 NOTE — PROGRESS NOTE ADULT - SUBJECTIVE AND OBJECTIVE BOX
[facility]  INFECTIOUS DISEASES AND INTERNAL MEDICINE at [place]  =======================================================  Renzo Stephen MD  Diplomates American Board of Internal Medicine and Infectious Diseases  =======================================================    [place] [id number]    Follow up: fever , aspiration pneumonia     No more fever, sputum showed pseudomonas S to cefepime.   Opens his eyes.     Allergies:  Ativan (Unknown)  clindamycin (Unknown)  Grapes (Rash)  Haldol (Dystonic RXN)  hydrALAZINE (Unknown)  Nuts (Hives; Rash)  PC Pen VK (Unknown)  shellfish (Angioedema; Rash; Hives)  strawberry (Short breath; Rash; Hives)  Xanax (Rash)  Zyprexa (Unknown)    Antibiotics:  cefepime   IVPB 1000 milliGRAM(s) IV Intermittent every 12 hours    REVIEW OF SYSTEMS:  Has trach, unable to answer.      Physical Exam:  Vital Signs Last 24 Hrs  T(C): 37.3 (15 Oct 2019 08:10), Max: 37.3 (15 Oct 2019 08:10)  T(F): 99.1 (15 Oct 2019 08:10), Max: 99.1 (15 Oct 2019 08:10)  HR: 74 (15 Oct 2019 12:14) (70 - 77)  BP: 136/71 (15 Oct 2019 08:10) (130/70 - 165/79)  RR: 22 (15 Oct 2019 08:10) (22 - 22)  SpO2: 98% (15 Oct 2019 12:14) (94% - 99%)  General:  No acute distress. more awake  Eye: Pupils are equal, round and reactive to light, Extraocular movements are intact, Normal conjunctiva.  HENT: Normocephalic, Oral mucosa is DRY; ET Tube in place  Neck: Supple, No lymphadenopathy.  Respiratory: Lungs with fair air entry  Cardiovascular: Normal rate, Regular rhythm,   Gastrointestinal: Soft, Non-distended, Normal bowel sounds. + G Tube in place, colostomy bag+   Genitourinary: + JONES with dilute clear urine  Lymphatics: No lymphadenopathy neck,   Musculoskeletal: contracted extremities  Integumentary: No rash.  Neurologic: minimally responsive, non-verbal    Labs:  10-15    145  |  105  |  141.0<H>  ----------------------------<  182<H>  5.2   |  22.0  |  4.35<H>    Ca    8.3<L>      15 Oct 2019 11:52  Phos  3.4     10-14    TPro  x   /  Alb  2.2<L>  /  TBili  x   /  DBili  x   /  AST  x   /  ALT  x   /  AlkPhos  x   10-14    LIVER FUNCTIONS - ( 14 Oct 2019 08:07 )  Alb: 2.2 g/dL / Pro: x     / ALK PHOS: x     / ALT: x     / AST: x     / GGT: x           RECENT CULTURES:  10-09 @ 17:09 .Sputum Pseudomonas aeruginosa (Carbapenem Resistant)    Numerous Pseudomonas aeruginosa (Carbapenem Resistant)  Few Routine respiratory bonnie present    Few WBC's  Numerous Gram Negative Rods    10-09 @ 05:26      NotDetec    10-08 @ 22:56 .Urine     Culture grew 3 or more types of organisms which indicate  collection contamination; consider recollection only if clinically  indicated.    10-08 @ 22:24 .Blood     No growth at 5 days.    10-08 @ 22:23 .Blood     No growth at 5 days.    All imaging and data are reviewed.   - CT scan with multiple areas of concern:  	1. Bibasilar consolidations or pleural effusions, possible aspiration pneumonia.   	1. Large decubitus ulcer with exposed right ischium, likely osteomyelitis.   	2. Infected soft tissues of lateral and posterior left hip suspicious for septic bursitis/pyositis, possible osteomyelitis.   	3. Left paracentral sacral/coccygeal decubitus ulcer with possible osteomyelitis and soft tissues.   	4. Large peristomal hernia with colonic diverticulosis. Possible low-grade diverticulitis.   	5. Extraluminal collection of gas adjacent to the bladder etiology uncertain.     Assessment and plan:   This is a 87y  Male  with h/o CAD,  s/p 2 stents, CHF, HLD, pAFIB, seizure disorder, prior CVA's with residual deficits and dementia, prostate cancer, respiratory failure on chronic vent, tracheostomy, Chronic sacral decubitus with osteomyelitis s/p diverting colostomy , multiple prior hospitalizations, prior PNA's/Sepsis (Streptococcus bacteremia, MRSA bacteremia, sputum Cx's with Stenotrophomonas, MDR Acinetobacter, Proteus and CRE Pseudomonas), and chronic jones, sent from [facility] after in-house nephrologist noticed low HgB (7.2) and elevated Cr (4.58).   here for fevers    Fever  ROSALIA   Pneumonia   Osteomyelitis   Chronic vent and trach    - Blood culture neg  - Urine possible contaminated   - Sputum with pseudomonas R to Carbapenems but S to cefepime, could be colonization.   - Continue Cefepime 1gm q12 to complete at least 7 days. today is the last day please stop it tomorrow.   - Osteomyelitis of right Ischium, suspected Left hip, sacral osteomyelitis  - s/p diverting colostomy  - continue wound care  - follow up all outstanding cultures  - trend temperature and WBC curve, WBC from 14k now 10k, on steroid  - repeat cultures from blood and all sources if febrile.     Will sign off please call with any question.

## 2019-10-15 NOTE — PROGRESS NOTE ADULT - SUBJECTIVE AND OBJECTIVE BOX
NEPHROLOGY INTERVAL HPI/OVERNIGHT EVENTS:    Examined  no acute distress   Hemodynamically stable  ok UOP      MEDICATIONS  (STANDING):  amLODIPine   Tablet 5 milliGRAM(s) Oral daily  ascorbic acid 500 milliGRAM(s) Oral daily  buMETAnide Injectable 2 milliGRAM(s) IV Push daily  cefepime   IVPB 1000 milliGRAM(s) IV Intermittent every 12 hours  chlorhexidine 0.12% Liquid 15 milliLiter(s) Oral Mucosa every 12 hours  chlorhexidine 2% Cloths 1 Application(s) Topical <User Schedule>  digoxin     Tablet 0.125 milliGRAM(s) Oral daily  lactobacillus acidophilus 1 Tablet(s) Oral daily  levETIRAcetam  IVPB 1000 milliGRAM(s) IV Intermittent every 12 hours  metoprolol tartrate 25 milliGRAM(s) Oral two times a day  multivitamin 1 Tablet(s) Oral daily  nystatin Cream 1 Application(s) Topical two times a day  pantoprazole  Injectable 40 milliGRAM(s) IV Push every 12 hours  phenytoin   Suspension 400 milliGRAM(s) Enteral Tube every 12 hours  predniSONE   Tablet 30 milliGRAM(s) Oral daily  sodium bicarbonate 650 milliGRAM(s) Oral every 8 hours  zinc sulfate 220 milliGRAM(s) Oral daily    MEDICATIONS  (PRN):  acetaminophen    Suspension .. 650 milliGRAM(s) Oral every 6 hours PRN Temp greater or equal to 38.5C (101.3F)  ALBUTerol/ipratropium for Nebulization. 3 milliLiter(s) Nebulizer every 4 hours PRN Bronchospasm      Allergies    clindamycin (Unknown)  Grapes (Rash)  Nuts (Hives; Rash)  PC Pen VK (Unknown)  shellfish (Angioedema; Rash; Hives)  strawberry (Short breath; Rash; Hives)  Xanax (Rash)    Intolerances    Ativan (Unknown)  Haldol (Dystonic RXN)  hydrALAZINE (Unknown)  Zyprexa (Unknown)      Vital Signs Last 24 Hrs  T(C): 36.5 (15 Oct 2019 15:35), Max: 37.3 (15 Oct 2019 08:10)  T(F): 97.7 (15 Oct 2019 15:35), Max: 99.1 (15 Oct 2019 08:10)  HR: 73 (15 Oct 2019 16:34) (70 - 77)  BP: 137/73 (15 Oct 2019 15:35) (136/71 - 165/79)  BP(mean): --  RR: 22 (15 Oct 2019 08:10) (22 - 22)  SpO2: 98% (15 Oct 2019 16:34) (94% - 99%)  Daily     Daily     PHYSICAL EXAM:  GENERAL: Chronically debilitated  HEAD:  No periorbital edema  EYES: EOMI  NECK: Supple, No JVD  NERVOUS SYSTEM:  profound dementia  CHEST/LUNG: Clear anteriorly with diminished BS  HEART: No rub  ABDOMEN: Soft, Nontender, Nondistended; Bowel sounds present; + PEG tube  EXTREMITIES: + LE edema trace, +LE wounds wrapped         LABS:    10-15    145  |  105  |  141.0<H>  ----------------------------<  182<H>  5.2   |  22.0  |  4.35<H>    Ca    8.3<L>      15 Oct 2019 11:52  Phos  3.4     10-14    TPro  x   /  Alb  2.2<L>  /  TBili  x   /  DBili  x   /  AST  x   /  ALT  x   /  AlkPhos  x   10-14                RADIOLOGY & ADDITIONAL TESTS:

## 2019-10-16 LAB
ALBUMIN SERPL ELPH-MCNC: 1.9 G/DL — LOW (ref 3.3–5.2)
ANION GAP SERPL CALC-SCNC: 19 MMOL/L — HIGH (ref 5–17)
BLD GP AB SCN SERPL QL: SIGNIFICANT CHANGE UP
BUN SERPL-MCNC: 157 MG/DL — HIGH (ref 8–20)
CALCIUM SERPL-MCNC: 8.4 MG/DL — LOW (ref 8.6–10.2)
CHLORIDE SERPL-SCNC: 105 MMOL/L — SIGNIFICANT CHANGE UP (ref 98–107)
CO2 SERPL-SCNC: 20 MMOL/L — LOW (ref 22–29)
CREAT SERPL-MCNC: 4.24 MG/DL — HIGH (ref 0.5–1.3)
GLUCOSE SERPL-MCNC: 157 MG/DL — HIGH (ref 70–115)
HCT VFR BLD CALC: 27.5 % — LOW (ref 39–50)
HGB BLD-MCNC: 8.3 G/DL — LOW (ref 13–17)
MCHC RBC-ENTMCNC: 30.2 GM/DL — LOW (ref 32–36)
MCHC RBC-ENTMCNC: 31.6 PG — SIGNIFICANT CHANGE UP (ref 27–34)
MCV RBC AUTO: 104.6 FL — HIGH (ref 80–100)
PHOSPHATE SERPL-MCNC: 2.6 MG/DL — SIGNIFICANT CHANGE UP (ref 2.4–4.7)
PLATELET # BLD AUTO: 251 K/UL — SIGNIFICANT CHANGE UP (ref 150–400)
POTASSIUM SERPL-MCNC: 5.8 MMOL/L — HIGH (ref 3.5–5.3)
POTASSIUM SERPL-SCNC: 5.8 MMOL/L — HIGH (ref 3.5–5.3)
RBC # BLD: 2.63 M/UL — LOW (ref 4.2–5.8)
RBC # FLD: 17.8 % — HIGH (ref 10.3–14.5)
SODIUM SERPL-SCNC: 144 MMOL/L — SIGNIFICANT CHANGE UP (ref 135–145)
WBC # BLD: 15.61 K/UL — HIGH (ref 3.8–10.5)
WBC # FLD AUTO: 15.61 K/UL — HIGH (ref 3.8–10.5)

## 2019-10-16 PROCEDURE — 99233 SBSQ HOSP IP/OBS HIGH 50: CPT

## 2019-10-16 RX ORDER — SODIUM POLYSTYRENE SULFONATE 4.1 MEQ/G
15 POWDER, FOR SUSPENSION ORAL ONCE
Refills: 0 | Status: COMPLETED | OUTPATIENT
Start: 2019-10-16 | End: 2019-10-16

## 2019-10-16 RX ORDER — ALBUMIN HUMAN 25 %
50 VIAL (ML) INTRAVENOUS ONCE
Refills: 0 | Status: COMPLETED | OUTPATIENT
Start: 2019-10-16 | End: 2019-10-16

## 2019-10-16 RX ORDER — COLLAGENASE CLOSTRIDIUM HIST. 250 UNIT/G
1 OINTMENT (GRAM) TOPICAL
Refills: 0 | Status: DISCONTINUED | OUTPATIENT
Start: 2019-10-16 | End: 2019-10-31

## 2019-10-16 RX ADMIN — SODIUM POLYSTYRENE SULFONATE 15 GRAM(S): 4.1 POWDER, FOR SUSPENSION ORAL at 17:32

## 2019-10-16 RX ADMIN — Medication 0.12 MILLIGRAM(S): at 04:54

## 2019-10-16 RX ADMIN — CEFEPIME 100 MILLIGRAM(S): 1 INJECTION, POWDER, FOR SOLUTION INTRAMUSCULAR; INTRAVENOUS at 04:57

## 2019-10-16 RX ADMIN — CHLORHEXIDINE GLUCONATE 15 MILLILITER(S): 213 SOLUTION TOPICAL at 04:55

## 2019-10-16 RX ADMIN — Medication 650 MILLIGRAM(S): at 04:54

## 2019-10-16 RX ADMIN — Medication 25 MILLIGRAM(S): at 17:33

## 2019-10-16 RX ADMIN — Medication 30 MILLIGRAM(S): at 04:55

## 2019-10-16 RX ADMIN — CEFEPIME 100 MILLIGRAM(S): 1 INJECTION, POWDER, FOR SOLUTION INTRAMUSCULAR; INTRAVENOUS at 17:36

## 2019-10-16 RX ADMIN — ZINC SULFATE TAB 220 MG (50 MG ZINC EQUIVALENT) 220 MILLIGRAM(S): 220 (50 ZN) TAB at 11:27

## 2019-10-16 RX ADMIN — BUMETANIDE 2 MILLIGRAM(S): 0.25 INJECTION INTRAMUSCULAR; INTRAVENOUS at 04:56

## 2019-10-16 RX ADMIN — CHLORHEXIDINE GLUCONATE 1 APPLICATION(S): 213 SOLUTION TOPICAL at 05:39

## 2019-10-16 RX ADMIN — Medication 400 MILLIGRAM(S): at 04:55

## 2019-10-16 RX ADMIN — Medication 25 MILLIGRAM(S): at 04:56

## 2019-10-16 RX ADMIN — CHLORHEXIDINE GLUCONATE 15 MILLILITER(S): 213 SOLUTION TOPICAL at 17:32

## 2019-10-16 RX ADMIN — NYSTATIN CREAM 1 APPLICATION(S): 100000 CREAM TOPICAL at 04:55

## 2019-10-16 RX ADMIN — LEVETIRACETAM 400 MILLIGRAM(S): 250 TABLET, FILM COATED ORAL at 04:57

## 2019-10-16 RX ADMIN — LEVETIRACETAM 400 MILLIGRAM(S): 250 TABLET, FILM COATED ORAL at 17:36

## 2019-10-16 RX ADMIN — NYSTATIN CREAM 1 APPLICATION(S): 100000 CREAM TOPICAL at 17:33

## 2019-10-16 RX ADMIN — PANTOPRAZOLE SODIUM 40 MILLIGRAM(S): 20 TABLET, DELAYED RELEASE ORAL at 04:56

## 2019-10-16 RX ADMIN — Medication 650 MILLIGRAM(S): at 11:28

## 2019-10-16 RX ADMIN — Medication 1 TABLET(S): at 11:27

## 2019-10-16 RX ADMIN — Medication 500 MILLIGRAM(S): at 11:27

## 2019-10-16 RX ADMIN — PANTOPRAZOLE SODIUM 40 MILLIGRAM(S): 20 TABLET, DELAYED RELEASE ORAL at 17:33

## 2019-10-16 RX ADMIN — Medication 50 MILLILITER(S): at 17:31

## 2019-10-16 RX ADMIN — Medication 650 MILLIGRAM(S): at 21:53

## 2019-10-16 RX ADMIN — AMLODIPINE BESYLATE 5 MILLIGRAM(S): 2.5 TABLET ORAL at 04:54

## 2019-10-16 RX ADMIN — Medication 400 MILLIGRAM(S): at 17:32

## 2019-10-16 NOTE — PROGRESS NOTE ADULT - ASSESSMENT
1. sacral osteomyelitis   on IV antibiotics .  follow up cultures . sputum + for pseudomonus  diverting colostomy in place .  ID on board .    2. Aspiration PNA   follow up cultures .  antibiotics in place .  aspiration precautions  on PEG feeding    3. Acute on chronic renal failure   creatinine stable  nephro on board .  fluids , no dialysis for now as per renal .  DCed IVf 10/15  for worsening anasarca. increase free water through PEG tube as needed  renal started IV bumex  monitor RF  strict I and O    4. Chronic respiratory failure   trach to vent .  pulmonary on board .    5. Seizure disorder   c/w home regimen   seizure precautions    6. HTN '  c/w home regimen .    7. Afib   on rate controlling agents .  no AC as had bleeding issues in past .    8. Anemia   likely multifactorial , and renal insufficiency   h/h stable  transfuse as needed .     9. DVT prophylaxis   boots , no chemical prophylaxis due to anemia .     10. fungal rash: nystatin powder. monitor    11. sacral decubitus: wound care. bleeding today. ACS consulted. monitor h/h. unable to reach legal guardian Mr. Monroy for transfusion conse        patient has a legal guardian , daughter at bedside.    GOC: palliative on board, trying to reach Legal guardian. left message to associates, awaiting call back

## 2019-10-16 NOTE — CONSULT NOTE ADULT - ATTENDING COMMENTS
Pt seen and examined, agree with above resident note.  Palliative wound care recommended.  Possible serial debridements as needed.  Will follow along while in house.
Thank you for the opportunity to assist with the care of this patient.   [facility] [phone].

## 2019-10-16 NOTE — PROGRESS NOTE ADULT - PROBLEM SELECTOR PLAN 4
-patient with grave prognosis with meaningful recovery. Left message for legal guardian Van Monroy, await call back to discuss end of life care.

## 2019-10-16 NOTE — CONSULT NOTE ADULT - SUBJECTIVE AND OBJECTIVE BOX
General Surgery Consult Note    HPI:   87y Male w/ extensive PMH including  CAD w/ 2x stents, HTN, . 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  Pt sent from [facility] on 10-09-19 after in-house nephrologist noticed pt to have worsening anemia and renal function. Pt w/ extensive history of decubitus ulcers given his bedbound status and multiple MDR infections as well as osteomyelitis. Per RN report, pt had episode of bleeding from his sacral decubitus ulcer having saturated an abd pad which prompted surgical evaluation.  Pt has history of multiple prior debridements, w/ several having been performed at [facility] in 2018.       PMH:  Dysphagia [Active]  Fall [Active]: Multiple Mechanical falls  CAD (coronary artery disease) [Active]  Clostridium difficile diarrhea [Active]: in 2009  BPH (benign prostatic hypertrophy) [Active]  Dementia [Active]  HLD (hyperlipidemia) [Active]  CHF (congestive heart failure) [Active]  Prostate cancer [Active]: Treated w/ radiation  Stroke [Active]: (~5yrs ago)  Seizure [Active]: (last event &gt;1yr ago)  HTN (hypertension) [Active]    PSH:  Diverting Colostomy in place  Known parastomal hernia  tracheostomy  Multiple prior sacral debridements.   S/P percutaneous endoscopic gastrostomy (PEG) tube placement  H/O hemorrhoidectomy  Status post cardiac surgery: stents x 2      Home Medications:  acetaminophen 325 mg oral tablet: 2 tab(s) orally every 6 hours, As Needed  amLODIPine 5 mg oral tablet: 1 tab(s) orally once a day  ascorbic acid 500 mg oral tablet: 1 tab(s) orally once a day  aspirin 81 mg oral tablet: 1 tab(s) orally once a day  bisacodyl 10 mg rectal suppository: 1 suppository(ies) rectal prn, As Needed  budesonide 0.5 mg/2 mL inhalation suspension: 2 milliliter(s) inhaled 2 times a day  chlorhexidine 0.12% mucous membrane liquid: 15 milliliter(s) mucous membrane 2 times a day  collagenase 250 units/g topical ointment: Apply topically to sacral wound once a day  digoxin 125 mcg (0.125 mg) oral tablet: 1 tab(s) orally once a day  diphenhydrAMINE 25 mg oral capsule: 2 cap(s) orally every 6 hours, As Needed  Dulcolax Stool Softener: 5 milligram(s) orally  DuoNeb 0.5 mg-2.5 mg/3 mL inhalation solution: 3 milliliter(s) inhaled every 4 hours, As Needed  epoetin giorgio 10,000 units/mL preservative-free injectable solution: 23302 unit(s) injectable once a week on Friday  famotidine 20 mg oral tablet: 1 tab(s) orally once a day  ferrous sulfate 300 mg/5 mL (60 mg elemental iron) oral liquid: 5 milliliter(s) by gastrostomy tube every 8 hours  Fleet Enema 7 g-19 g rectal enema: 1 application rectal prn, As Needed  furosemide 40 mg oral tablet: 1 tab(s) orally every 48 hours  labetalol 100 mg oral tablet: 1 tab(s) orally every 8 hours  levETIRAcetam 100 mg/mL oral solution: 10 milliliter(s) orally 2 times a day  meropenem 1000 mg intravenous injection: 1000 milligram(s) intravenous every 8 hours  Milk of Magnesia: 30 milliliter(s) orally prn, As Needed  Multiple Vitamins with Minerals oral tablet: 1 tab(s) orally once a day  oxyCODONE 5 mg oral tablet: 1 tab(s) orally 2 times a day, As Needed  petrolatum topical ointment: 1 application topically once a day  phenytoin 25 mg/mL oral suspension: 16 milliliter(s) orally every 12 hours  potassium chloride 20 mEq oral granule, extended release: 20 milliequivalent(s) orally once a day  predniSONE 10 mg oral tablet: 1 tab(s) orally once a day   predniSONE 20 mg oral tablet: 1 tab(s) orally once a day   tamsulosin 0.4 mg oral capsule: 1 cap(s) orally once a day      ALL:  Ativan (Unknown)  clindamycin (Unknown)  Grapes (Rash)  Haldol (Dystonic RXN)  hydrALAZINE (Unknown)  Nuts (Hives; Rash)  PC Pen VK (Unknown)  shellfish (Angioedema; Rash; Hives)  strawberry (Short breath; Rash; Hives)  Xanax (Rash)  Zyprexa (Unknown)      FMH:  unable to assess due to pt condition    SOC Hx:   unable to assess due to pt condition  Pt's legal guardian is  Van Monroy,    Physical Exam:   Gen: chronically ill appearing frail bedbound elderly male  Neuro: Nonverbal at baseline and w/ extensive deficits s/p multiple CVA's  RESP: Tracheostomy in place, on ventillator w/ coarse bilateral BS  CVS: RRR,   GI: abd soft, NT, moderately distended, no rebound/guarding. 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   Extremities: bilateral unstageable decubitus ulcers of patient's ankles. Extensive contractions of all 4 extremities.   Skin: Bilateral trochanteric decubitus ulcers, both stage 4 and left ulcer tracks 5 inch deep. Both covered w/ necrotic fibrinous exudate. No purulent discharge. Left trochanteric ulcer w/ small skin bleed that was controlled w/ surgicel. Sacral ulcer w/ both necrotic fibrinous exudate and granulation tissue. 2 unstageable ulcers of upper back covered w/ eschar.

## 2019-10-16 NOTE — CONSULT NOTE ADULT - CONSULT REASON
"GOC discussion. daughter unrealistic"
PEG malfunction
ROSALIA with hyperkalemia
sepsis
bleeding decubitus ulcers

## 2019-10-16 NOTE — PROGRESS NOTE ADULT - SUBJECTIVE AND OBJECTIVE BOX
NEPHROLOGY INTERVAL HPI/OVERNIGHT EVENTS:  pt essentially the same  no acute distress noted  daughter at bedside    MEDICATIONS  (STANDING):  amLODIPine   Tablet 5 milliGRAM(s) Oral daily  ascorbic acid 500 milliGRAM(s) Oral daily  buMETAnide Injectable 2 milliGRAM(s) IV Push daily  cefepime   IVPB 1000 milliGRAM(s) IV Intermittent every 12 hours  chlorhexidine 0.12% Liquid 15 milliLiter(s) Oral Mucosa every 12 hours  chlorhexidine 2% Cloths 1 Application(s) Topical <User Schedule>  digoxin     Tablet 0.125 milliGRAM(s) Oral daily  lactobacillus acidophilus 1 Tablet(s) Oral daily  levETIRAcetam  IVPB 1000 milliGRAM(s) IV Intermittent every 12 hours  metoprolol tartrate 25 milliGRAM(s) Oral two times a day  multivitamin 1 Tablet(s) Oral daily  nystatin Cream 1 Application(s) Topical two times a day  pantoprazole  Injectable 40 milliGRAM(s) IV Push every 12 hours  phenytoin   Suspension 400 milliGRAM(s) Enteral Tube every 12 hours  predniSONE   Tablet 30 milliGRAM(s) Oral daily  sodium bicarbonate 650 milliGRAM(s) Oral every 8 hours  zinc sulfate 220 milliGRAM(s) Oral daily    MEDICATIONS  (PRN):  acetaminophen    Suspension .. 650 milliGRAM(s) Oral every 6 hours PRN Temp greater or equal to 38.5C (101.3F)  ALBUTerol/ipratropium for Nebulization. 3 milliLiter(s) Nebulizer every 4 hours PRN Bronchospasm      Allergies    clindamycin (Unknown)  Grapes (Rash)  Nuts (Hives; Rash)  PC Pen VK (Unknown)  shellfish (Angioedema; Rash; Hives)  strawberry (Short breath; Rash; Hives)  Xanax (Rash)    Intolerances    Ativan (Unknown)  Haldol (Dystonic RXN)  hydrALAZINE (Unknown)  Zyprexa (Unknown)          Vital Signs Last 24 Hrs  T(C): 36.8 (16 Oct 2019 08:02), Max: 37 (16 Oct 2019 00:23)  T(F): 98.2 (16 Oct 2019 08:02), Max: 98.6 (16 Oct 2019 00:23)  HR: 72 (16 Oct 2019 08:22) (72 - 78)  BP: 137/69 (16 Oct 2019 08:02) (137/69 - 153/79)  BP(mean): --  RR: 19 (16 Oct 2019 08:02) (19 - 20)  SpO2: 96% (16 Oct 2019 08:22) (96% - 99%)    PHYSICAL EXAM:  GENERAL: Chronically ill; bed bound  HEAD:  No periorbital edema  EYES: EOMI, PERRLA, conjunctiva and sclera clear  ENMT: Dry mucous membranes, poor dentition  NECK: Supple, No JVD; trach  NERVOUS SYSTEM:  Obtunded; profound dementia  CHEST/LUNG: Clear anteriorly with diminished BS  HEART: No rub  ABDOMEN: Soft, Nontender, Nondistended; Bowel sounds present; + PEG tube  EXTREMITIES:  2+ Peripheral Pulses, ++ LE edema  SKIN: No rashes or lesions    LABS:    10-16    144  |  105  |  157.0<H>  ----------------------------<  157<H>  5.8<H>   |  20.0<L>  |  4.24<H>    Creatinine, Serum: 4.35 mg/dL (10.15.19 @ 11:52)        Ca    8.4<L>      16 Oct 2019 08:53  Phos  2.6     10-16    TPro  x   /  Alb  1.9<L>  /  TBili  x   /  DBili  x   /  AST  x   /  ALT  x   /  AlkPhos  x   10-16        Phosphorus Level, Serum: 2.6 mg/dL (10-16 @ 08:53)      RADIOLOGY & ADDITIONAL TESTS:

## 2019-10-16 NOTE — PROGRESS NOTE ADULT - ASSESSMENT
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

## 2019-10-16 NOTE — CONSULT NOTE ADULT - ASSESSMENT
86yo male w/ extensive medical comorbidies and chronic sacral decubitus ulcers. No significant hemorrhage identified from ulcers at this time. Pt would likely benefit from enzymatic debridement as well as serial bedside debridements. Given medical condition, pt would be very poor surgical candiate and any attempts at coverage w/ flaps or grafts would almost surely fail. No evidence of necrotizing or acute infection necessitating more aggressive intervention at this time. Will continue to follow and assist w/ wound management, however even w/ aggressive wound care the patient's condition presents significant barriers to healing.     - BID Santyl and W2D packing of decubitus ulcers (Surgery team to do am dressing changes, nursing staff to do PM changes)  - rec aggressive bowel regimen, high risk for constipation. Will order KUB given distension.   - Nutrition consult w/ aggressive TF and vitamin supplementation via PEG tube  - cont offloading and use of air fluidized bed.   - Surgery to continue to follow.     Discussed w/ Attending Dr. Pierre
Pt is an 88 y/o male with PMHx CAD w/ 2x stents, HTN, HLD, seizure d/o, prostate cancer, CHF, chronic respiratory failure s/p trach/peg on vent due to PNA in 2018, multiple prior hospitalizations for MDR PNA's/Sepsis, chronic sacral decubitus w/ osteomyelitis s/p diverting colostomy and chronic jones, prior CVA's and dementia with residual deficits here with sepsis and ARF. GI called to evaluate PEG.     The PEG was flushed with 40 ccsof water and gastric contents aspirated. It was easily flushed and there was no leaking.   Ok to use PEG.   Will sign off please call with questions. Thanks
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
This is a 87y  Male  with h/o CAD,  s/p 2 stents, CHF, HLD, pAFIB, seizure disorder, prior CVA's with residual deficits and dementia, prostate cancer, respiratory failure on chronic vent, tracheostomy, Chronic sacral decubitus with osteomyelitis s/p diverting colostomy , multiple prior hospitalizations, prior PNA's/Sepsis (Streptococcus bacteremia, MRSA bacteremia, sputum Cx's with Stenotrophomonas, MDR Acinetobacter, Proteus and CRE Pseudomonas), and chronic jones, sent from [facility] after in-house nephrologist noticed low HgB (7.2) and elevated Cr (4.58).   here for fevers  T max of 102.7    Fever  - workup in process  - CT scan with multiple areas of concern:  	1. Bibasilar consolidations or pleural effusions, possible aspiration pneumonia.   	1. Large decubitus ulcer with exposed right ischium, likely osteomyelitis.   	2. Infected soft tissues of lateral and posterior left hip suspicious for septic bursitis/pyositis, possible osteomyelitis.   	3. Left paracentral sacral/coccygeal decubitus ulcer with possible osteomyelitis and soft tissues.   	4. Large peristomal hernia with colonic diverticulosis. Possible low-grade diverticulitis.   	5. Extraluminal collection of gas adjacent to the bladder etiology uncertain.    - source may be urinary given positive UA; low requirement for supplemental oxygen tends to go against PNA  - continue Cefepime 1 gram Q 12H - adjusted for ACUTE RENAL FAILURE  - add Levaquin 500mg x1 dose, then in 48H, start Levaquin 250mg Iv Q48H  - if still with fevers, will likely start ZERBAXA or AVYCAZ in view of prior CRE, and other.     Regarding Acute renal failure,.   - baseline Cr of 0.5 at last admission.   - watch renal function closely.   - consider RENAL CONSULTATION    Osteomyelitis of right Ischium, suspected Left hip, sacral osteomyelitis  - will check ESR and CRP  - s/p prior diverting colostomy  - continue wound care  - consider wound care consult.       Chronic vent dependent respiratory failure  - continue vent.       - follow up all outstanding cultures  - trend temperature and WBC curve  - repeat cultures from blood and all sources if febrile.
87M with CVA, dementia, Trach/PEG from [facility], multiple rehospitalizations here with fevers.

## 2019-10-16 NOTE — PROGRESS NOTE ADULT - SUBJECTIVE AND OBJECTIVE BOX
OVERNIGHT EVENTS: no acute events at this time.     Present Symptoms:     Dyspnea: vented   Nausea/Vomiting: No  Anxiety:  unable   Depression: unable   Fatigue: unable   Loss of appetite: unable     Pain: none             Character-            Duration-            Effect-            Factors-            Frequency-            Location-            Severity-    Review of Systems: Reviewed           Unable to obtain due to poor mentation   All others negative    MEDICATIONS  (STANDING):  albumin human 25% IVPB 50 milliLiter(s) IV Intermittent once  amLODIPine   Tablet 5 milliGRAM(s) Oral daily  ascorbic acid 500 milliGRAM(s) Oral daily  buMETAnide Injectable 2 milliGRAM(s) IV Push daily  cefepime   IVPB 1000 milliGRAM(s) IV Intermittent every 12 hours  chlorhexidine 0.12% Liquid 15 milliLiter(s) Oral Mucosa every 12 hours  chlorhexidine 2% Cloths 1 Application(s) Topical <User Schedule>  digoxin     Tablet 0.125 milliGRAM(s) Oral daily  lactobacillus acidophilus 1 Tablet(s) Oral daily  levETIRAcetam  IVPB 1000 milliGRAM(s) IV Intermittent every 12 hours  metoprolol tartrate 25 milliGRAM(s) Oral two times a day  multivitamin 1 Tablet(s) Oral daily  nystatin Cream 1 Application(s) Topical two times a day  pantoprazole  Injectable 40 milliGRAM(s) IV Push every 12 hours  phenytoin   Suspension 400 milliGRAM(s) Enteral Tube every 12 hours  predniSONE   Tablet 30 milliGRAM(s) Oral daily  sodium bicarbonate 650 milliGRAM(s) Oral every 8 hours  sodium polystyrene sulfonate Suspension 15 Gram(s) Enteral Tube once  zinc sulfate 220 milliGRAM(s) Oral daily    MEDICATIONS  (PRN):  acetaminophen    Suspension .. 650 milliGRAM(s) Oral every 6 hours PRN Temp greater or equal to 38.5C (101.3F)  ALBUTerol/ipratropium for Nebulization. 3 milliLiter(s) Nebulizer every 4 hours PRN Bronchospasm    PHYSICAL EXAM:    Vital Signs Last 24 Hrs  T(C): 36.8 (16 Oct 2019 08:02), Max: 37 (16 Oct 2019 00:23)  T(F): 98.2 (16 Oct 2019 08:02), Max: 98.6 (16 Oct 2019 00:23)  HR: 72 (16 Oct 2019 11:57) (72 - 78)  BP: 137/69 (16 Oct 2019 08:02) (137/69 - 153/79)  BP(mean): --  RR: 19 (16 Oct 2019 08:02) (19 - 20)  SpO2: 98% (16 Oct 2019 11:57) (96% - 99%)    General: vented through trach     Karnofsky: 10 %    HEENT: trach     Lungs: comfortable     CV: normal      GI: PEG colostomy     : jones     MSK: bedbound/wheelchair bound    Skin: unstageable ulcer     LABS:                      8.3    15.61 )-----------( 251      ( 16 Oct 2019 12:39 )             27.5     10-16    144  |  105  |  157.0<H>  ----------------------------<  157<H>  5.8<H>   |  20.0<L>  |  4.24<H>    Ca    8.4<L>      16 Oct 2019 08:53  Phos  2.6     10-16    TPro  x   /  Alb  1.9<L>  /  TBili  x   /  DBili  x   /  AST  x   /  ALT  x   /  AlkPhos  x   10-16    I&O's Summary    15 Oct 2019 07:01  -  16 Oct 2019 07:00  --------------------------------------------------------  IN: 1340 mL / OUT: 1200 mL / NET: 140 mL    16 Oct 2019 07:01  -  16 Oct 2019 15:04  --------------------------------------------------------  IN: 530 mL / OUT: 0 mL / NET: 530 mL    RADIOLOGY & ADDITIONAL STUDIES:    ADVANCE DIRECTIVES: DNR

## 2019-10-16 NOTE — PROGRESS NOTE ADULT - ATTENDING COMMENTS
Thank you for the opportunity to assist with the care of this patient.   [facility] [phone].

## 2019-10-16 NOTE — PROGRESS NOTE ADULT - ASSESSMENT
87M with CVA, dementia[facility] multiple rehospitalizations here with fevers.

## 2019-10-17 LAB
ALBUMIN SERPL ELPH-MCNC: 2 G/DL — LOW (ref 3.3–5.2)
ANION GAP SERPL CALC-SCNC: 15 MMOL/L — SIGNIFICANT CHANGE UP (ref 5–17)
BUN SERPL-MCNC: 148 MG/DL — HIGH (ref 8–20)
CALCIUM SERPL-MCNC: 8.7 MG/DL — SIGNIFICANT CHANGE UP (ref 8.6–10.2)
CHLORIDE SERPL-SCNC: 108 MMOL/L — HIGH (ref 98–107)
CO2 SERPL-SCNC: 22 MMOL/L — SIGNIFICANT CHANGE UP (ref 22–29)
CREAT SERPL-MCNC: 4.37 MG/DL — HIGH (ref 0.5–1.3)
GLUCOSE BLDC GLUCOMTR-MCNC: 141 MG/DL — HIGH (ref 70–99)
GLUCOSE SERPL-MCNC: 153 MG/DL — HIGH (ref 70–115)
HCT VFR BLD CALC: 23.7 % — LOW (ref 39–50)
HCT VFR BLD CALC: 25.5 % — LOW (ref 39–50)
HGB BLD-MCNC: 7.1 G/DL — LOW (ref 13–17)
HGB BLD-MCNC: 7.7 G/DL — LOW (ref 13–17)
MCHC RBC-ENTMCNC: 30 GM/DL — LOW (ref 32–36)
MCHC RBC-ENTMCNC: 31.6 PG — SIGNIFICANT CHANGE UP (ref 27–34)
MCV RBC AUTO: 105.3 FL — HIGH (ref 80–100)
PHOSPHATE SERPL-MCNC: 3.1 MG/DL — SIGNIFICANT CHANGE UP (ref 2.4–4.7)
PLATELET # BLD AUTO: 205 K/UL — SIGNIFICANT CHANGE UP (ref 150–400)
POTASSIUM SERPL-MCNC: 5.7 MMOL/L — HIGH (ref 3.5–5.3)
POTASSIUM SERPL-SCNC: 5.7 MMOL/L — HIGH (ref 3.5–5.3)
RBC # BLD: 2.25 M/UL — LOW (ref 4.2–5.8)
RBC # FLD: 18.1 % — HIGH (ref 10.3–14.5)
SODIUM SERPL-SCNC: 145 MMOL/L — SIGNIFICANT CHANGE UP (ref 135–145)
WBC # BLD: 11.64 K/UL — HIGH (ref 3.8–10.5)
WBC # FLD AUTO: 11.64 K/UL — HIGH (ref 3.8–10.5)

## 2019-10-17 PROCEDURE — 99232 SBSQ HOSP IP/OBS MODERATE 35: CPT

## 2019-10-17 PROCEDURE — 74018 RADEX ABDOMEN 1 VIEW: CPT | Mod: 26

## 2019-10-17 RX ORDER — SODIUM POLYSTYRENE SULFONATE 4.1 MEQ/G
30 POWDER, FOR SUSPENSION ORAL ONCE
Refills: 0 | Status: COMPLETED | OUTPATIENT
Start: 2019-10-17 | End: 2019-10-17

## 2019-10-17 RX ADMIN — PANTOPRAZOLE SODIUM 40 MILLIGRAM(S): 20 TABLET, DELAYED RELEASE ORAL at 16:36

## 2019-10-17 RX ADMIN — NYSTATIN CREAM 1 APPLICATION(S): 100000 CREAM TOPICAL at 16:37

## 2019-10-17 RX ADMIN — CEFEPIME 100 MILLIGRAM(S): 1 INJECTION, POWDER, FOR SOLUTION INTRAMUSCULAR; INTRAVENOUS at 05:24

## 2019-10-17 RX ADMIN — LEVETIRACETAM 400 MILLIGRAM(S): 250 TABLET, FILM COATED ORAL at 16:35

## 2019-10-17 RX ADMIN — CHLORHEXIDINE GLUCONATE 1 APPLICATION(S): 213 SOLUTION TOPICAL at 05:28

## 2019-10-17 RX ADMIN — Medication 650 MILLIGRAM(S): at 22:42

## 2019-10-17 RX ADMIN — Medication 1 APPLICATION(S): at 16:36

## 2019-10-17 RX ADMIN — Medication 650 MILLIGRAM(S): at 11:39

## 2019-10-17 RX ADMIN — Medication 650 MILLIGRAM(S): at 05:26

## 2019-10-17 RX ADMIN — Medication 1 APPLICATION(S): at 05:25

## 2019-10-17 RX ADMIN — SODIUM POLYSTYRENE SULFONATE 30 GRAM(S): 4.1 POWDER, FOR SUSPENSION ORAL at 12:17

## 2019-10-17 RX ADMIN — BUMETANIDE 2 MILLIGRAM(S): 0.25 INJECTION INTRAMUSCULAR; INTRAVENOUS at 05:45

## 2019-10-17 RX ADMIN — Medication 1 TABLET(S): at 11:39

## 2019-10-17 RX ADMIN — Medication 25 MILLIGRAM(S): at 05:26

## 2019-10-17 RX ADMIN — ZINC SULFATE TAB 220 MG (50 MG ZINC EQUIVALENT) 220 MILLIGRAM(S): 220 (50 ZN) TAB at 11:39

## 2019-10-17 RX ADMIN — AMLODIPINE BESYLATE 5 MILLIGRAM(S): 2.5 TABLET ORAL at 05:26

## 2019-10-17 RX ADMIN — Medication 400 MILLIGRAM(S): at 05:27

## 2019-10-17 RX ADMIN — NYSTATIN CREAM 1 APPLICATION(S): 100000 CREAM TOPICAL at 05:26

## 2019-10-17 RX ADMIN — CHLORHEXIDINE GLUCONATE 15 MILLILITER(S): 213 SOLUTION TOPICAL at 05:26

## 2019-10-17 RX ADMIN — Medication 400 MILLIGRAM(S): at 16:36

## 2019-10-17 RX ADMIN — Medication 500 MILLIGRAM(S): at 11:39

## 2019-10-17 RX ADMIN — Medication 0.12 MILLIGRAM(S): at 05:26

## 2019-10-17 RX ADMIN — LEVETIRACETAM 400 MILLIGRAM(S): 250 TABLET, FILM COATED ORAL at 05:24

## 2019-10-17 RX ADMIN — CHLORHEXIDINE GLUCONATE 15 MILLILITER(S): 213 SOLUTION TOPICAL at 16:35

## 2019-10-17 RX ADMIN — Medication 30 MILLIGRAM(S): at 05:25

## 2019-10-17 RX ADMIN — PANTOPRAZOLE SODIUM 40 MILLIGRAM(S): 20 TABLET, DELAYED RELEASE ORAL at 05:25

## 2019-10-17 RX ADMIN — Medication 25 MILLIGRAM(S): at 16:36

## 2019-10-17 NOTE — PROGRESS NOTE ADULT - SUBJECTIVE AND OBJECTIVE BOX
NEPHROLOGY INTERVAL HPI/OVERNIGHT EVENTS:  pt remains clinically unchanged  no acute distress  jones with clear urine  appears more awake    MEDICATIONS  (STANDING):  amLODIPine   Tablet 5 milliGRAM(s) Oral daily  ascorbic acid 500 milliGRAM(s) Oral daily  buMETAnide Injectable 2 milliGRAM(s) IV Push daily  cefepime   IVPB 1000 milliGRAM(s) IV Intermittent every 12 hours  chlorhexidine 0.12% Liquid 15 milliLiter(s) Oral Mucosa every 12 hours  chlorhexidine 2% Cloths 1 Application(s) Topical <User Schedule>  collagenase Ointment 1 Application(s) Topical two times a day  digoxin     Tablet 0.125 milliGRAM(s) Oral daily  lactobacillus acidophilus 1 Tablet(s) Oral daily  levETIRAcetam  IVPB 1000 milliGRAM(s) IV Intermittent every 12 hours  metoprolol tartrate 25 milliGRAM(s) Oral two times a day  multivitamin 1 Tablet(s) Oral daily  nystatin Cream 1 Application(s) Topical two times a day  pantoprazole  Injectable 40 milliGRAM(s) IV Push every 12 hours  phenytoin   Suspension 400 milliGRAM(s) Enteral Tube every 12 hours  predniSONE   Tablet 30 milliGRAM(s) Oral daily  sodium bicarbonate 650 milliGRAM(s) Oral every 8 hours  sodium polystyrene sulfonate Enema 30 Gram(s) Rectal once  zinc sulfate 220 milliGRAM(s) Oral daily    MEDICATIONS  (PRN):  acetaminophen    Suspension .. 650 milliGRAM(s) Oral every 6 hours PRN Temp greater or equal to 38.5C (101.3F)  ALBUTerol/ipratropium for Nebulization. 3 milliLiter(s) Nebulizer every 4 hours PRN Bronchospasm      Allergies    clindamycin (Unknown)  Grapes (Rash)  Nuts (Hives; Rash)  PC Pen VK (Unknown)  shellfish (Angioedema; Rash; Hives)  strawberry (Short breath; Rash; Hives)  Xanax (Rash)    Intolerances    Ativan (Unknown)  Haldol (Dystonic RXN)  hydrALAZINE (Unknown)  Zyprexa (Unknown)          Vital Signs Last 24 Hrs  T(C): 37.6 (17 Oct 2019 08:38), Max: 37.6 (17 Oct 2019 08:38)  T(F): 99.7 (17 Oct 2019 08:38), Max: 99.7 (17 Oct 2019 08:38)  HR: 74 (17 Oct 2019 08:38) (66 - 78)  BP: 123/61 (17 Oct 2019 08:38) (123/61 - 145/55)  BP(mean): --  RR: 22 (17 Oct 2019 08:38) (22 - 25)  SpO2: 94% (17 Oct 2019 08:38) (94% - 100%)    PHYSICAL EXAM:  GENERAL: Chronically ill and debilitated  HEAD:  No periorbital edema  EYES: EOMI, PERRLA, conjunctiva and sclera clear  ENMT: Dry mucous membranes, poor dentition  NECK: Supple, No JVD; trach  NERVOUS SYSTEM: Lethargic  CHEST/LUNG: Clear anteriorly with diminished BS  HEART: No rub  ABDOMEN: Soft, Nontender, Nondistended; Bowel sounds present; + PEG tube  EXTREMITIES:  2+ Peripheral Pulses, ++ LE edema  SKIN: No rashes or lesions    LABS:                        7.1    11.64 )-----------( 205      ( 17 Oct 2019 08:14 )             23.7     10-17    145  |  108<H>  |  148.0<H>  ----------------------------<  153<H>  5.7<H>   |  22.0  |  4.37<H>    Ca    8.7      17 Oct 2019 08:14  Phos  3.1     10-17    TPro  x   /  Alb  2.0<L>  /  TBili  x   /  DBili  x   /  AST  x   /  ALT  x   /  AlkPhos  x   10-17        Phosphorus Level, Serum: 3.1 mg/dL (10-17 @ 08:14)      RADIOLOGY & ADDITIONAL TESTS:

## 2019-10-17 NOTE — PROGRESS NOTE ADULT - SUBJECTIVE AND OBJECTIVE BOX
CC: PNA / osteomyelitis     INTERVAL HPI/OVERNIGHT EVENTS: seen and examined , no overnight events . daughter at bedside. poor prognosis. palliative on board. no further bleeding from sacral wound. dispo pending [facility] discussion with legal guardian Van Villarreal. h/h stable. on IV Bumex for anasarca    REVIEW OF SYSTEMS:    unable to obtain as patient is maintained on vent. not communicating      Vital Signs Last 24 Hrs  T(C): 36.8 (17 Oct 2019 15:42), Max: 37.6 (17 Oct 2019 08:38)  T(F): 98.2 (17 Oct 2019 15:42), Max: 99.7 (17 Oct 2019 08:38)  HR: 78 (17 Oct 2019 20:47) (72 - 81)  BP: 159/83 (17 Oct 2019 15:42) (123/61 - 159/83)  BP(mean): --  RR: 22 (17 Oct 2019 08:38) (22 - 22)  SpO2: 100% (17 Oct 2019 20:47) (94% - 100%)    CAPILLARY BLOOD GLUCOSE      POCT Blood Glucose.: 141 mg/dL (17 Oct 2019 06:15)    I&O's Detail    16 Oct 2019 07:01  -  17 Oct 2019 07:00  --------------------------------------------------------  IN:    Free Water: 1150 mL    Pivot 1.5: 1610 mL  Total IN: 2760 mL    OUT:    Colostomy: 150 mL    Indwelling Catheter - Urethral: 1400 mL  Total OUT: 1550 mL    Total NET: 1210 mL      17 Oct 2019 07:01  -  17 Oct 2019 21:09  --------------------------------------------------------  IN:    Free Water: 300 mL    Pivot 1.5: 840 mL  Total IN: 1140 mL    OUT:    Colostomy: 150 mL  Total OUT: 150 mL    Total NET: 990 mL      GENERAL: Not in distress. on trach. awake  HEENT:  Normocephalic and atraumatic.   NECK: Supple.   CARDIOVASCULAR: RRR S1, S2. SM+.  No rubs/gallop  LUNGS: BLAE+, course breath sound  ABDOMEN: ND. Soft,  NT, no guarding / rebound / rigidity. BS normoactive. colostomy bag with green stool. PEG  EXTREMITIES: no cyanosis, no clubbing, +edema.   SKIN: anasarca, worsening. skin rash over left like looks like fungal  NEUROLOGIC: not communicating, non-verbal. not following command  PSYCHIATRIC: Calm.  No agitation.                            7.7    x     )-----------( x        ( 17 Oct 2019 16:20 )             25.5     10-17    145  |  108<H>  |  148.0<H>  ----------------------------<  153<H>  5.7<H>   |  22.0  |  4.37<H>    Ca    8.7      17 Oct 2019 08:14  Phos  3.1     10-17    TPro  x   /  Alb  2.0<L>  /  TBili  x   /  DBili  x   /  AST  x   /  ALT  x   /  AlkPhos  x   10-17                sodium bicarbonate 650 milliGRAM(s) Oral every 8 hours  zinc sulfate 220 milliGRAM(s) Oral daily    MEDICATIONS  (PRN):  acetaminophen    Suspension .. 650 milliGRAM(s) Oral every 6 hours PRN Temp greater or equal to 38.5C (101.3F)  ALBUTerol/ipratropium for Nebulization. 3 milliLiter(s) Nebulizer every 4 hours PRN Bronchospasm      MEDICATIONS  (STANDING):  amLODIPine   Tablet 5 milliGRAM(s) Oral daily  ascorbic acid 500 milliGRAM(s) Oral daily  buMETAnide Injectable 2 milliGRAM(s) IV Push daily  chlorhexidine 0.12% Liquid 15 milliLiter(s) Oral Mucosa every 12 hours  chlorhexidine 2% Cloths 1 Application(s) Topical <User Schedule>  collagenase Ointment 1 Application(s) Topical two times a day  digoxin     Tablet 0.125 milliGRAM(s) Oral daily  lactobacillus acidophilus 1 Tablet(s) Oral daily  levETIRAcetam  IVPB 1000 milliGRAM(s) IV Intermittent every 12 hours  metoprolol tartrate 25 milliGRAM(s) Oral two times a day  multivitamin 1 Tablet(s) Oral daily  nystatin Cream 1 Application(s) Topical two times a day  pantoprazole  Injectable 40 milliGRAM(s) IV Push every 12 hours  phenytoin   Suspension 400 milliGRAM(s) Enteral Tube every 12 hours  predniSONE   Tablet 30 milliGRAM(s) Oral daily  sodium bicarbonate 650 milliGRAM(s) Oral every 8 hours  zinc sulfate 220 milliGRAM(s) Oral daily    MEDICATIONS  (PRN):  acetaminophen    Suspension .. 650 milliGRAM(s) Oral every 6 hours PRN Temp greater or equal to 38.5C (101.3F)  ALBUTerol/ipratropium for Nebulization. 3 milliLiter(s) Nebulizer every 4 hours PRN Bronchospasm        RADIOLOGY & ADDITIONAL TESTS:

## 2019-10-17 NOTE — PROGRESS NOTE ADULT - ASSESSMENT
1. sacral osteomyelitis   on IV antibiotics .  follow up cultures . sputum + for pseudomonus  diverting colostomy in place .  ID on board .    2. Aspiration PNA   follow up cultures .  antibiotics in place .  aspiration precautions  on PEG feeding    3. Acute on chronic renal failure hyperkalemia  creatinine stable  nephro on board .  no dialysis for now as per renal .  DCed IVf 10/15  for worsening anasarca. increase free water through PEG tube as needed  renal started IV bumex  monitor RF  strict I and O  Kayexalate  monitor electrolytes    4. Chronic respiratory failure   trach to vent .  pulmonary on board .    5. Seizure disorder   c/w home regimen   seizure precautions    6. HTN '  c/w home regimen .    7. Afib   on rate controlling agents .  no AC as had bleeding issues in past .    8. Anemia   likely multifactorial , and renal insufficiency   h/h stable  transfuse as needed .     9. DVT prophylaxis   boots , no chemical prophylaxis due to anemia .     10. fungal rash: nystatin powder. monitor    11. sacral decubitus: wound care. bleeding stopped. ACS consulted. daily dressing am and PM as per AC. monitor h/h.     12. anemia: acute on chronic due to bleeding from sacral decubitus which is currently stoppeed  Spoke to Legal guardian Mr. Van Villarreal, transfusion consent obtained. suggested to transfuse if Hb <6. reported patient tolerated low hb in the past and had multiple transfusion in the past      GOC: discussed briefly over phone and updated about current condition, plan of care prognosis.discusssed about comfort care/hospice. he is available to meeting with palliative team in person any time tomorrow after 9:30 am. Palliative MD Dr. Terri Bonner was informed. Van verbalized understanding that stopping trach/vent can cause death. he will discuss GOC with palliative team tomorrow. Van reports patient daughter should not be aware of GOC discussion.    patient has a legal guardian , daughter at bedside.

## 2019-10-17 NOTE — PROGRESS NOTE ADULT - ASSESSMENT
86yo male w/ extensive medical comorbidities and chronic sacral decubitus ulcers. Pt would likely benefit from enzymatic debridement as well as serial bedside debridements. Given medical condition, pt would be very poor surgical candidate. Will continue to follow and assist w/ wound management, however even w/ aggressive wound care the patient's condition presents significant barriers to healing.     - BID Santyl and W2D packing of decubitus ulcers (Surgery team to do am dressing changes, nursing staff to do PM changes)  - rec aggressive bowel regimen, high risk for constipation. recommend KUB for continued distension  - Nutrition consult w/ aggressive TF and vitamin supplementation via PEG tube  - cont offloading and use of air fluidized bed.   - Surgery to continue to follow.

## 2019-10-17 NOTE — PROGRESS NOTE ADULT - SUBJECTIVE AND OBJECTIVE BOX
INTERVAL HPI/OVERNIGHT EVENTS:    Patient seen this AM at bedside. No new acute issues. ostomy in place, pink and well perfused, no stool in ostomy bag at at time of evaluation.     MEDICATIONS  (STANDING):  amLODIPine   Tablet 5 milliGRAM(s) Oral daily  ascorbic acid 500 milliGRAM(s) Oral daily  buMETAnide Injectable 2 milliGRAM(s) IV Push daily  cefepime   IVPB 1000 milliGRAM(s) IV Intermittent every 12 hours  chlorhexidine 0.12% Liquid 15 milliLiter(s) Oral Mucosa every 12 hours  chlorhexidine 2% Cloths 1 Application(s) Topical <User Schedule>  collagenase Ointment 1 Application(s) Topical two times a day  digoxin     Tablet 0.125 milliGRAM(s) Oral daily  lactobacillus acidophilus 1 Tablet(s) Oral daily  levETIRAcetam  IVPB 1000 milliGRAM(s) IV Intermittent every 12 hours  metoprolol tartrate 25 milliGRAM(s) Oral two times a day  multivitamin 1 Tablet(s) Oral daily  nystatin Cream 1 Application(s) Topical two times a day  pantoprazole  Injectable 40 milliGRAM(s) IV Push every 12 hours  phenytoin   Suspension 400 milliGRAM(s) Enteral Tube every 12 hours  predniSONE   Tablet 30 milliGRAM(s) Oral daily  sodium bicarbonate 650 milliGRAM(s) Oral every 8 hours  zinc sulfate 220 milliGRAM(s) Oral daily    MEDICATIONS  (PRN):  acetaminophen    Suspension .. 650 milliGRAM(s) Oral every 6 hours PRN Temp greater or equal to 38.5C (101.3F)  ALBUTerol/ipratropium for Nebulization. 3 milliLiter(s) Nebulizer every 4 hours PRN Bronchospasm      Vital Signs Last 24 Hrs  T(C): 37.1 (17 Oct 2019 00:05), Max: 37.1 (17 Oct 2019 00:05)  T(F): 98.7 (17 Oct 2019 00:05), Max: 98.7 (17 Oct 2019 00:05)  HR: 78 (17 Oct 2019 00:05) (66 - 78)  BP: 145/55 (17 Oct 2019 00:05) (137/69 - 145/55)  BP(mean): --  RR: 22 (17 Oct 2019 00:05) (19 - 25)  SpO2: 98% (17 Oct 2019 00:05) (96% - 100%)    Physical Exam:    Gen: chronically ill appearing frail bedbound elderly male  Neuro: Nonverbal at baseline and w/ extensive deficits s/p multiple CVA's  RESP: Tracheostomy in place, on ventillator w/ coarse bilateral BS  CVS: RRR,   GI: abd soft, NT, moderately distended, no rebound/guarding. 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   Extremities: bilateral unstageable decubitus ulcers of patient's ankles. Extensive contractions of all 4 extremities.   Skin: Bilateral trochanteric decubitus ulcers, both stage 4 and left ulcer tracks 5 inch deep. Both covered w/ necrotic fibrinous exudate. No purulent discharge. Left trochanteric ulcer w/ small skin bleed that was controlled w/ surgicel. Sacral ulcer w/ both necrotic fibrinous exudate and granulation tissue. 2 unstageable ulcers of upper back covered w/ eschar.           I&O's Detail    15 Oct 2019 07:01  -  16 Oct 2019 07:00  --------------------------------------------------------  IN:    Free Water: 500 mL    Pivot 1.5: 840 mL  Total IN: 1340 mL    OUT:    Colostomy: 100 mL    Indwelling Catheter - Urethral: 1100 mL  Total OUT: 1200 mL    Total NET: 140 mL      16 Oct 2019 07:01  -  17 Oct 2019 00:23  --------------------------------------------------------  IN:    Free Water: 550 mL    Pivot 1.5: 1120 mL  Total IN: 1670 mL    OUT:  Total OUT: 0 mL    Total NET: 1670 mL          LABS:                        8.3    15.61 )-----------( 251      ( 16 Oct 2019 12:39 )             27.5     10-16    144  |  105  |  157.0<H>  ----------------------------<  157<H>  5.8<H>   |  20.0<L>  |  4.24<H>    Ca    8.4<L>      16 Oct 2019 08:53  Phos  2.6     10-16    TPro  x   /  Alb  1.9<L>  /  TBili  x   /  DBili  x   /  AST  x   /  ALT  x   /  AlkPhos  x   10-16          RADIOLOGY & ADDITIONAL STUDIES:

## 2019-10-17 NOTE — CHART NOTE - NSCHARTNOTEFT_GEN_A_CORE
Patient has a legal guardian named Van Monroy - phone numbers on provider handoff. Mr. Monroy is the medical decision maker and all information and decisions should be discussed with him. No information should be shared with daughter who is at bedside as she has no legal rights to information per court orders that have been in place for years.

## 2019-10-17 NOTE — GOALS OF CARE CONVERSATION - ADVANCED CARE PLANNING - CONVERSATION DETAILS
[facility] . SW left message regarding request to arrange meeting to discuss patients medical issues and Long term planning with palliative care team.

## 2019-10-17 NOTE — CHART NOTE - NSCHARTNOTEFT_GEN_A_CORE
Source: Patient [ ]  Family [ ]   other [x] RN    Current Diet:   Diet, NPO with Tube Feed:   Tube Feeding Modality: Gastrostomy  Pivot 1.5 [place]  Continuous  Starting Tube Feed Rate {mL per Hour}: 10  Increase Tube Feed Rate by (mL): 10     Every 8 hours  Until Goal Tube Feed Rate (mL per Hour): 70  Tube Feed Duration (in Hours): 24  Tube Feed Start Time: 18:00 (10-10-19 @ 17:32)    Enteral /Parenteral Nutrition:  Current TF order provides Pivot 1.5 @ goal rate 70mL/hr (x20 hrs) to provide 1400mL daily (2100cal, 131g protein).     Current Weight:   (10/9)   209.4 lbs    % Weight Change: No recent weight documented     Pertinent Medications: MEDICATIONS  (STANDING):  amLODIPine   Tablet 5 milliGRAM(s) Oral daily  ascorbic acid 500 milliGRAM(s) Oral daily  buMETAnide Injectable 2 milliGRAM(s) IV Push daily  cefepime   IVPB 1000 milliGRAM(s) IV Intermittent every 12 hours  chlorhexidine 0.12% Liquid 15 milliLiter(s) Oral Mucosa every 12 hours  chlorhexidine 2% Cloths 1 Application(s) Topical <User Schedule>  collagenase Ointment 1 Application(s) Topical two times a day  digoxin     Tablet 0.125 milliGRAM(s) Oral daily  lactobacillus acidophilus 1 Tablet(s) Oral daily  levETIRAcetam  IVPB 1000 milliGRAM(s) IV Intermittent every 12 hours  metoprolol tartrate 25 milliGRAM(s) Oral two times a day  multivitamin 1 Tablet(s) Oral daily  nystatin Cream 1 Application(s) Topical two times a day  pantoprazole  Injectable 40 milliGRAM(s) IV Push every 12 hours  phenytoin   Suspension 400 milliGRAM(s) Enteral Tube every 12 hours  predniSONE   Tablet 30 milliGRAM(s) Oral daily  sodium bicarbonate 650 milliGRAM(s) Oral every 8 hours  sodium polystyrene sulfonate Enema 30 Gram(s) Rectal once  zinc sulfate 220 milliGRAM(s) Oral daily    MEDICATIONS  (PRN):  acetaminophen    Suspension .. 650 milliGRAM(s) Oral every 6 hours PRN Temp greater or equal to 38.5C (101.3F)  ALBUTerol/ipratropium for Nebulization. 3 milliLiter(s) Nebulizer every 4 hours PRN Bronchospasm    Pertinent Labs: CBC Full  -  ( 17 Oct 2019 08:14 )  WBC Count : 11.64 K/uL  RBC Count : 2.25 M/uL  Hemoglobin : 7.1 g/dL  Hematocrit : 23.7 %  Platelet Count - Automated : 205 K/uL  Mean Cell Volume : 105.3 fl  Mean Cell Hemoglobin : 31.6 pg  Mean Cell Hemoglobin Concentration : 30.0 gm/dL  10-17 Na145 mmol/L Glu 153 mg/dL<H> K+ 5.7 mmol/L<H> Cr  4.37 mg/dL<H> .0 mg/dL<H> Phos 3.1 mg/dL Alb 2.0 g/dL<L> PAB n/a       Skin: Stage IV sacrum, unstageable to sacrum, unstageable to R/L heels, unstageable to coccyx, skin tear to posterior scrotum and R. forearm  Edema: 1+ generalized, 3+ B/L ankles and feet    Nutrition focused physical exam conducted - found signs of malnutrition [ ]absent [x]present    Subcutaneous fat loss: [ ] Orbital fat pads region, [ ]Buccal fat region, [ ]Triceps region,  [ ]Ribs region    Muscle wasting: [ xTemples region, [ ]Clavicle region, [ ]Shoulder region, [ ]Scapula region, [ ]Interosseous region,  [ ]thigh region, [ ]Calf region    Estimated Needs:   [ ] no change since previous assessment  [ ] recalculated:     Current Nutrition Diagnosis: Pt presents at high nutrition risk secondary to malnutrition (moderate chronic) related to insufficient protein-energy intake in setting of sepsis, chronic resp failure +trach, impaired skin integrity as evidenced by likely meeting <75% EER >7 days, fluid accumulation (3+ generalized, 4+ R/L feet and ankles), likely mask wt loss.     Recommendations:     Monitoring and Evaluation:   [ ] PO intake [ ] Tolerance to diet prescription [X] Weights  [X] Follow up per protocol [X] Labs: Source: Patient [ ]  Family [ ]   other [x] RN    Current Diet:   Diet, NPO with Tube Feed:   Tube Feeding Modality: Gastrostomy  Pivot 1.5 [place]  Continuous  Starting Tube Feed Rate {mL per Hour}: 10  Increase Tube Feed Rate by (mL): 10     Every 8 hours  Until Goal Tube Feed Rate (mL per Hour): 70  Tube Feed Duration (in Hours): 24  Tube Feed Start Time: 18:00 (10-10-19 @ 17:32)    Enteral /Parenteral Nutrition:  Current TF order provides Pivot 1.5 @ goal rate 70mL/hr (x20 hrs) to provide 1400mL daily (2100cal, 131g protein).     Current Weight:   (10/9)   209.4 lbs    % Weight Change: No recent weight documented     Pertinent Medications: MEDICATIONS  (STANDING):  amLODIPine   Tablet 5 milliGRAM(s) Oral daily  ascorbic acid 500 milliGRAM(s) Oral daily  buMETAnide Injectable 2 milliGRAM(s) IV Push daily  cefepime   IVPB 1000 milliGRAM(s) IV Intermittent every 12 hours  chlorhexidine 0.12% Liquid 15 milliLiter(s) Oral Mucosa every 12 hours  chlorhexidine 2% Cloths 1 Application(s) Topical <User Schedule>  collagenase Ointment 1 Application(s) Topical two times a day  digoxin     Tablet 0.125 milliGRAM(s) Oral daily  lactobacillus acidophilus 1 Tablet(s) Oral daily  levETIRAcetam  IVPB 1000 milliGRAM(s) IV Intermittent every 12 hours  metoprolol tartrate 25 milliGRAM(s) Oral two times a day  multivitamin 1 Tablet(s) Oral daily  nystatin Cream 1 Application(s) Topical two times a day  pantoprazole  Injectable 40 milliGRAM(s) IV Push every 12 hours  phenytoin   Suspension 400 milliGRAM(s) Enteral Tube every 12 hours  predniSONE   Tablet 30 milliGRAM(s) Oral daily  sodium bicarbonate 650 milliGRAM(s) Oral every 8 hours  sodium polystyrene sulfonate Enema 30 Gram(s) Rectal once  zinc sulfate 220 milliGRAM(s) Oral daily    MEDICATIONS  (PRN):  acetaminophen    Suspension .. 650 milliGRAM(s) Oral every 6 hours PRN Temp greater or equal to 38.5C (101.3F)  ALBUTerol/ipratropium for Nebulization. 3 milliLiter(s) Nebulizer every 4 hours PRN Bronchospasm    Pertinent Labs: CBC Full  -  ( 17 Oct 2019 08:14 )  WBC Count : 11.64 K/uL  RBC Count : 2.25 M/uL  Hemoglobin : 7.1 g/dL  Hematocrit : 23.7 %  Platelet Count - Automated : 205 K/uL  Mean Cell Volume : 105.3 fl  Mean Cell Hemoglobin : 31.6 pg  Mean Cell Hemoglobin Concentration : 30.0 gm/dL  10-17 Na145 mmol/L Glu 153 mg/dL<H> K+ 5.7 mmol/L<H> Cr  4.37 mg/dL<H> .0 mg/dL<H> Phos 3.1 mg/dL Alb 2.0 g/dL<L> PAB n/a       Skin: Stage IV sacrum, unstageable to sacrum, unstageable to R/L heels, unstageable to coccyx, skin tear to posterior scrotum and R. forearm  Edema: 1+ generalized, 3+ B/L ankles and feet    Nutrition focused physical exam conducted - found signs of malnutrition [ ]absent [x]present    Subcutaneous fat loss: [ ] Orbital fat pads region, [ ]Buccal fat region, [ ]Triceps region,  [ ]Ribs region    Muscle wasting: [ x] Temples region, [ ]Clavicle region, [ ]Shoulder region, [ ]Scapula region, [ ]Interosseous region,  [ ]thigh region, [ ]Calf region    Estimated Needs:   [x] no change since previous assessment  [ ] recalculated:     Current Nutrition Diagnosis: Pt presents at high nutrition risk secondary to malnutrition (moderate chronic) related to insufficient protein-energy intake in setting of sepsis, chronic resp failure +trach, impaired skin integrity as evidenced by likely meeting <75% EER >7 days, fluid accumulation (3+ generalized, 4+ R/L feet and ankles), likely mask wt loss.   Aware worsening renal function and worsening edema 2/2 CHF, renal insufficiency. Per RN, Nephrology requesting to change formula to Nepro.     Recommendations:   1) Change formula to Nepro, can initiate at goal rate 50mL/hr (x 20hrs) to provide 1000mL daily (1800cal, 81g protein), add Prostat TID to facilitate wound healing to additional 300cal, 45g protein. Daily total: 2100cal, 141g protein. Additional free water per MD discretion   2) Continue to monitor renal function   3) Weight daily in setting of CHF to monitor fluid shifts/trend weight    Monitoring and Evaluation:   [ ] PO intake [x] Tolerance to diet prescription [X] Weights  [X] Follow up per protocol [X] Labs: Source: Patient [ ]  Family [ ]   other [x] RN    Current Diet:   Diet, NPO with Tube Feed:   Tube Feeding Modality: Gastrostomy  Pivot 1.5 [place]  Continuous  Starting Tube Feed Rate {mL per Hour}: 10  Increase Tube Feed Rate by (mL): 10     Every 8 hours  Until Goal Tube Feed Rate (mL per Hour): 70  Tube Feed Duration (in Hours): 24  Tube Feed Start Time: 18:00 (10-10-19 @ 17:32)    Enteral /Parenteral Nutrition:  Current TF order provides Pivot 1.5 @ goal rate 70mL/hr (x20 hrs) to provide 1400mL daily (2100cal, 131g protein).     Current Weight:   (10/9)   209.4 lbs    % Weight Change: No recent weight documented     Pertinent Medications: MEDICATIONS  (STANDING):  amLODIPine   Tablet 5 milliGRAM(s) Oral daily  ascorbic acid 500 milliGRAM(s) Oral daily  buMETAnide Injectable 2 milliGRAM(s) IV Push daily  cefepime   IVPB 1000 milliGRAM(s) IV Intermittent every 12 hours  chlorhexidine 0.12% Liquid 15 milliLiter(s) Oral Mucosa every 12 hours  chlorhexidine 2% Cloths 1 Application(s) Topical <User Schedule>  collagenase Ointment 1 Application(s) Topical two times a day  digoxin     Tablet 0.125 milliGRAM(s) Oral daily  lactobacillus acidophilus 1 Tablet(s) Oral daily  levETIRAcetam  IVPB 1000 milliGRAM(s) IV Intermittent every 12 hours  metoprolol tartrate 25 milliGRAM(s) Oral two times a day  multivitamin 1 Tablet(s) Oral daily  nystatin Cream 1 Application(s) Topical two times a day  pantoprazole  Injectable 40 milliGRAM(s) IV Push every 12 hours  phenytoin   Suspension 400 milliGRAM(s) Enteral Tube every 12 hours  predniSONE   Tablet 30 milliGRAM(s) Oral daily  sodium bicarbonate 650 milliGRAM(s) Oral every 8 hours  sodium polystyrene sulfonate Enema 30 Gram(s) Rectal once  zinc sulfate 220 milliGRAM(s) Oral daily    MEDICATIONS  (PRN):  acetaminophen    Suspension .. 650 milliGRAM(s) Oral every 6 hours PRN Temp greater or equal to 38.5C (101.3F)  ALBUTerol/ipratropium for Nebulization. 3 milliLiter(s) Nebulizer every 4 hours PRN Bronchospasm    Pertinent Labs: CBC Full  -  ( 17 Oct 2019 08:14 )  WBC Count : 11.64 K/uL  RBC Count : 2.25 M/uL  Hemoglobin : 7.1 g/dL  Hematocrit : 23.7 %  Platelet Count - Automated : 205 K/uL  Mean Cell Volume : 105.3 fl  Mean Cell Hemoglobin : 31.6 pg  Mean Cell Hemoglobin Concentration : 30.0 gm/dL  10-17 Na145 mmol/L Glu 153 mg/dL<H> K+ 5.7 mmol/L<H> Cr  4.37 mg/dL<H> .0 mg/dL<H> Phos 3.1 mg/dL Alb 2.0 g/dL<L> PAB n/a       Skin: Stage IV sacrum, unstageable to sacrum, unstageable to R/L heels, unstageable to coccyx, skin tear to posterior scrotum and R. forearm  Edema: 1+ generalized, 3+ B/L ankles and feet    Nutrition focused physical exam conducted - found signs of malnutrition [ ]absent [x]present    Subcutaneous fat loss: [ ] Orbital fat pads region, [ ]Buccal fat region, [ ]Triceps region,  [ ]Ribs region    Muscle wasting: [ x] Temples region, [ ]Clavicle region, [ ]Shoulder region, [ ]Scapula region, [ ]Interosseous region,  [ ]thigh region, [ ]Calf region    Estimated Needs:   [x] no change since previous assessment  [ ] recalculated:     Current Nutrition Diagnosis: Pt presents at high nutrition risk secondary to malnutrition (moderate chronic) related to insufficient protein-energy intake in setting of sepsis, chronic resp failure +trach, impaired skin integrity as evidenced by likely meeting <75% EER >7 days, fluid accumulation (3+ generalized, 4+ R/L feet and ankles), likely mask wt loss.   Aware worsening renal function and worsening edema 2/2 CHF, renal insufficiency. Per RN, Nephrology requesting to change formula to Nepro.     Recommendations:   1) Change formula to Nepro, can initiate at goal rate 50mL/hr (x 20hrs) to provide 1000mL daily (1800cal, 81g protein), add Jesse BID for additional 180cal, 28gAA to facilitate wound healing.   2) Continue to monitor renal function   3) Weight daily in setting of CHF to monitor fluid shifts/trend weight    Monitoring and Evaluation:   [ ] PO intake [x] Tolerance to diet prescription [X] Weights  [X] Follow up per protocol [X] Labs: Source: Patient [ ]  Family [ ]   other [x] RN    Current Diet:   Diet, NPO with Tube Feed:   Tube Feeding Modality: Gastrostomy  Pivot 1.5 [place]  Continuous  Starting Tube Feed Rate {mL per Hour}: 10  Increase Tube Feed Rate by (mL): 10     Every 8 hours  Until Goal Tube Feed Rate (mL per Hour): 70  Tube Feed Duration (in Hours): 24  Tube Feed Start Time: 18:00 (10-10-19 @ 17:32)    Enteral /Parenteral Nutrition:  Current TF order provides Pivot 1.5 @ goal rate 70mL/hr (x20 hrs) to provide 1400mL daily (2100cal, 131g protein).     Current Weight:   (10/9)   209.4 lbs    % Weight Change: No recent weight documented     Pertinent Medications: MEDICATIONS  (STANDING):  amLODIPine   Tablet 5 milliGRAM(s) Oral daily  ascorbic acid 500 milliGRAM(s) Oral daily  buMETAnide Injectable 2 milliGRAM(s) IV Push daily  cefepime   IVPB 1000 milliGRAM(s) IV Intermittent every 12 hours  chlorhexidine 0.12% Liquid 15 milliLiter(s) Oral Mucosa every 12 hours  chlorhexidine 2% Cloths 1 Application(s) Topical <User Schedule>  collagenase Ointment 1 Application(s) Topical two times a day  digoxin     Tablet 0.125 milliGRAM(s) Oral daily  lactobacillus acidophilus 1 Tablet(s) Oral daily  levETIRAcetam  IVPB 1000 milliGRAM(s) IV Intermittent every 12 hours  metoprolol tartrate 25 milliGRAM(s) Oral two times a day  multivitamin 1 Tablet(s) Oral daily  nystatin Cream 1 Application(s) Topical two times a day  pantoprazole  Injectable 40 milliGRAM(s) IV Push every 12 hours  phenytoin   Suspension 400 milliGRAM(s) Enteral Tube every 12 hours  predniSONE   Tablet 30 milliGRAM(s) Oral daily  sodium bicarbonate 650 milliGRAM(s) Oral every 8 hours  sodium polystyrene sulfonate Enema 30 Gram(s) Rectal once  zinc sulfate 220 milliGRAM(s) Oral daily    MEDICATIONS  (PRN):  acetaminophen    Suspension .. 650 milliGRAM(s) Oral every 6 hours PRN Temp greater or equal to 38.5C (101.3F)  ALBUTerol/ipratropium for Nebulization. 3 milliLiter(s) Nebulizer every 4 hours PRN Bronchospasm    Pertinent Labs: CBC Full  -  ( 17 Oct 2019 08:14 )  WBC Count : 11.64 K/uL  RBC Count : 2.25 M/uL  Hemoglobin : 7.1 g/dL  Hematocrit : 23.7 %  Platelet Count - Automated : 205 K/uL  Mean Cell Volume : 105.3 fl  Mean Cell Hemoglobin : 31.6 pg  Mean Cell Hemoglobin Concentration : 30.0 gm/dL  10-17 Na145 mmol/L Glu 153 mg/dL<H> K+ 5.7 mmol/L<H> Cr  4.37 mg/dL<H> .0 mg/dL<H> Phos 3.1 mg/dL Alb 2.0 g/dL<L> PAB n/a       Skin: Stage IV sacrum, unstageable to sacrum, unstageable to R/L heels, unstageable to coccyx, skin tear to posterior scrotum and R. forearm  Edema: 1+ generalized, 3+ B/L ankles and feet    Nutrition focused physical exam conducted - found signs of malnutrition [ ]absent [x]present    Subcutaneous fat loss: [ ] Orbital fat pads region, [ ]Buccal fat region, [ ]Triceps region,  [ ]Ribs region    Muscle wasting: [ x] Temples region, [ ]Clavicle region, [ ]Shoulder region, [ ]Scapula region, [ ]Interosseous region,  [ ]thigh region, [ ]Calf region    Estimated Needs:   [x] no change since previous assessment  [ ] recalculated:     Current Nutrition Diagnosis: Pt presents at high nutrition risk secondary to malnutrition (moderate chronic) related to insufficient protein-energy intake in setting of sepsis, chronic resp failure +trach, impaired skin integrity as evidenced by likely meeting <75% EER >7 days, fluid accumulation (3+ generalized, 4+ R/L feet and ankles), likely mask wt loss.   Aware worsening renal function and worsening edema 2/2 CHF, renal insufficiency. Per RN, Nephrology requesting to change formula to Nepro.     Recommendations:   1) Change formula to Nepro, can initiate at goal rate 50mL/hr (x 20hrs) to provide 1000mL daily (1800cal, 81g protein), add Jesse BID for additional 180cal, 28gAA to facilitate wound healing. (Order pending MD approval)  2) Continue to monitor renal function   3) Weight daily in setting of CHF to monitor fluid shifts/trend weight    Monitoring and Evaluation:   [ ] PO intake [x] Tolerance to diet prescription [X] Weights  [X] Follow up per protocol [X] Labs:

## 2019-10-17 NOTE — PHARMACOTHERAPY INTERVENTION NOTE - COMMENTS
Cefepime was being given for 8 days. Verified with ID physician for stop date and they confirmed to discontinue order.

## 2019-10-17 NOTE — PROGRESS NOTE ADULT - ASSESSMENT
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

## 2019-10-18 LAB
ANION GAP SERPL CALC-SCNC: 14 MMOL/L — SIGNIFICANT CHANGE UP (ref 5–17)
ANION GAP SERPL CALC-SCNC: 17 MMOL/L — SIGNIFICANT CHANGE UP (ref 5–17)
BUN SERPL-MCNC: 175 MG/DL — HIGH (ref 8–20)
BUN SERPL-MCNC: 185 MG/DL — HIGH (ref 8–20)
CALCIUM SERPL-MCNC: 9 MG/DL — SIGNIFICANT CHANGE UP (ref 8.6–10.2)
CALCIUM SERPL-MCNC: 9.3 MG/DL — SIGNIFICANT CHANGE UP (ref 8.6–10.2)
CHLORIDE SERPL-SCNC: 107 MMOL/L — SIGNIFICANT CHANGE UP (ref 98–107)
CHLORIDE SERPL-SCNC: 111 MMOL/L — HIGH (ref 98–107)
CO2 SERPL-SCNC: 21 MMOL/L — LOW (ref 22–29)
CO2 SERPL-SCNC: 22 MMOL/L — SIGNIFICANT CHANGE UP (ref 22–29)
CREAT SERPL-MCNC: 4.32 MG/DL — HIGH (ref 0.5–1.3)
CREAT SERPL-MCNC: 4.37 MG/DL — HIGH (ref 0.5–1.3)
GLUCOSE BLDC GLUCOMTR-MCNC: 117 MG/DL — HIGH (ref 70–99)
GLUCOSE BLDC GLUCOMTR-MCNC: 179 MG/DL — HIGH (ref 70–99)
GLUCOSE SERPL-MCNC: 135 MG/DL — HIGH (ref 70–115)
GLUCOSE SERPL-MCNC: 141 MG/DL — HIGH (ref 70–115)
HCT VFR BLD CALC: 24 % — LOW (ref 39–50)
HCT VFR BLD CALC: 24.5 % — LOW (ref 39–50)
HGB BLD-MCNC: 7.1 G/DL — LOW (ref 13–17)
HGB BLD-MCNC: 7.2 G/DL — LOW (ref 13–17)
MAGNESIUM SERPL-MCNC: 3 MG/DL — HIGH (ref 1.6–2.6)
MCHC RBC-ENTMCNC: 30 GM/DL — LOW (ref 32–36)
MCHC RBC-ENTMCNC: 31.3 PG — SIGNIFICANT CHANGE UP (ref 27–34)
MCV RBC AUTO: 104.3 FL — HIGH (ref 80–100)
PLATELET # BLD AUTO: 190 K/UL — SIGNIFICANT CHANGE UP (ref 150–400)
POTASSIUM SERPL-MCNC: 6.2 MMOL/L — CRITICAL HIGH (ref 3.5–5.3)
POTASSIUM SERPL-MCNC: 6.3 MMOL/L — CRITICAL HIGH (ref 3.5–5.3)
POTASSIUM SERPL-SCNC: 6.2 MMOL/L — CRITICAL HIGH (ref 3.5–5.3)
POTASSIUM SERPL-SCNC: 6.3 MMOL/L — CRITICAL HIGH (ref 3.5–5.3)
RBC # BLD: 2.3 M/UL — LOW (ref 4.2–5.8)
RBC # FLD: 18.3 % — HIGH (ref 10.3–14.5)
SODIUM SERPL-SCNC: 145 MMOL/L — SIGNIFICANT CHANGE UP (ref 135–145)
SODIUM SERPL-SCNC: 147 MMOL/L — HIGH (ref 135–145)
WBC # BLD: 11.54 K/UL — HIGH (ref 3.8–10.5)
WBC # FLD AUTO: 11.54 K/UL — HIGH (ref 3.8–10.5)

## 2019-10-18 PROCEDURE — 99232 SBSQ HOSP IP/OBS MODERATE 35: CPT

## 2019-10-18 RX ORDER — SODIUM POLYSTYRENE SULFONATE 4.1 MEQ/G
30 POWDER, FOR SUSPENSION ORAL ONCE
Refills: 0 | Status: COMPLETED | OUTPATIENT
Start: 2019-10-18 | End: 2019-10-18

## 2019-10-18 RX ORDER — ALBUTEROL 90 UG/1
2.5 AEROSOL, METERED ORAL ONCE
Refills: 0 | Status: DISCONTINUED | OUTPATIENT
Start: 2019-10-18 | End: 2019-10-31

## 2019-10-18 RX ORDER — SODIUM POLYSTYRENE SULFONATE 4.1 MEQ/G
15 POWDER, FOR SUSPENSION ORAL DAILY
Refills: 0 | Status: COMPLETED | OUTPATIENT
Start: 2019-10-18 | End: 2019-10-20

## 2019-10-18 RX ADMIN — Medication 1 APPLICATION(S): at 05:38

## 2019-10-18 RX ADMIN — CHLORHEXIDINE GLUCONATE 1 APPLICATION(S): 213 SOLUTION TOPICAL at 05:39

## 2019-10-18 RX ADMIN — AMLODIPINE BESYLATE 5 MILLIGRAM(S): 2.5 TABLET ORAL at 05:38

## 2019-10-18 RX ADMIN — Medication 1 TABLET(S): at 11:06

## 2019-10-18 RX ADMIN — Medication 400 MILLIGRAM(S): at 05:38

## 2019-10-18 RX ADMIN — Medication 650 MILLIGRAM(S): at 16:52

## 2019-10-18 RX ADMIN — ZINC SULFATE TAB 220 MG (50 MG ZINC EQUIVALENT) 220 MILLIGRAM(S): 220 (50 ZN) TAB at 11:06

## 2019-10-18 RX ADMIN — SODIUM POLYSTYRENE SULFONATE 30 GRAM(S): 4.1 POWDER, FOR SUSPENSION ORAL at 19:44

## 2019-10-18 RX ADMIN — PANTOPRAZOLE SODIUM 40 MILLIGRAM(S): 20 TABLET, DELAYED RELEASE ORAL at 05:39

## 2019-10-18 RX ADMIN — Medication 0.12 MILLIGRAM(S): at 05:38

## 2019-10-18 RX ADMIN — Medication 650 MILLIGRAM(S): at 21:12

## 2019-10-18 RX ADMIN — Medication 650 MILLIGRAM(S): at 05:38

## 2019-10-18 RX ADMIN — NYSTATIN CREAM 1 APPLICATION(S): 100000 CREAM TOPICAL at 05:39

## 2019-10-18 RX ADMIN — Medication 500 MILLIGRAM(S): at 11:06

## 2019-10-18 RX ADMIN — CHLORHEXIDINE GLUCONATE 15 MILLILITER(S): 213 SOLUTION TOPICAL at 16:56

## 2019-10-18 RX ADMIN — Medication 25 MILLIGRAM(S): at 16:54

## 2019-10-18 RX ADMIN — Medication 25 MILLIGRAM(S): at 05:38

## 2019-10-18 RX ADMIN — Medication 400 MILLIGRAM(S): at 16:53

## 2019-10-18 RX ADMIN — SODIUM POLYSTYRENE SULFONATE 30 GRAM(S): 4.1 POWDER, FOR SUSPENSION ORAL at 09:58

## 2019-10-18 RX ADMIN — LEVETIRACETAM 400 MILLIGRAM(S): 250 TABLET, FILM COATED ORAL at 16:55

## 2019-10-18 RX ADMIN — Medication 1 APPLICATION(S): at 16:54

## 2019-10-18 RX ADMIN — CHLORHEXIDINE GLUCONATE 15 MILLILITER(S): 213 SOLUTION TOPICAL at 05:38

## 2019-10-18 RX ADMIN — BUMETANIDE 2 MILLIGRAM(S): 0.25 INJECTION INTRAMUSCULAR; INTRAVENOUS at 05:37

## 2019-10-18 RX ADMIN — PANTOPRAZOLE SODIUM 40 MILLIGRAM(S): 20 TABLET, DELAYED RELEASE ORAL at 16:53

## 2019-10-18 RX ADMIN — LEVETIRACETAM 400 MILLIGRAM(S): 250 TABLET, FILM COATED ORAL at 05:39

## 2019-10-18 RX ADMIN — NYSTATIN CREAM 1 APPLICATION(S): 100000 CREAM TOPICAL at 16:54

## 2019-10-18 RX ADMIN — Medication 30 MILLIGRAM(S): at 05:38

## 2019-10-18 NOTE — PROGRESS NOTE ADULT - SUBJECTIVE AND OBJECTIVE BOX
NEPHROLOGY INTERVAL HPI/OVERNIGHT EVENTS:    Interim noted   palliative follow up noted   UO 2.2 L with jones and IV Bumex   + digoxin     MEDICATIONS  (STANDING):  amLODIPine   Tablet 5 milliGRAM(s) Oral daily  ascorbic acid 500 milliGRAM(s) Oral daily  buMETAnide Injectable 2 milliGRAM(s) IV Push daily  chlorhexidine 0.12% Liquid 15 milliLiter(s) Oral Mucosa every 12 hours  chlorhexidine 2% Cloths 1 Application(s) Topical <User Schedule>  collagenase Ointment 1 Application(s) Topical two times a day  digoxin     Tablet 0.125 milliGRAM(s) Oral daily  lactobacillus acidophilus 1 Tablet(s) Oral daily  levETIRAcetam  IVPB 1000 milliGRAM(s) IV Intermittent every 12 hours  metoprolol tartrate 25 milliGRAM(s) Oral two times a day  multivitamin 1 Tablet(s) Oral daily  nystatin Cream 1 Application(s) Topical two times a day  pantoprazole  Injectable 40 milliGRAM(s) IV Push every 12 hours  phenytoin   Suspension 400 milliGRAM(s) Enteral Tube every 12 hours  predniSONE   Tablet 30 milliGRAM(s) Oral daily  sodium bicarbonate 650 milliGRAM(s) Oral every 8 hours  zinc sulfate 220 milliGRAM(s) Oral daily    MEDICATIONS  (PRN):  acetaminophen    Suspension .. 650 milliGRAM(s) Oral every 6 hours PRN Temp greater or equal to 38.5C (101.3F)  ALBUTerol/ipratropium for Nebulization. 3 milliLiter(s) Nebulizer every 4 hours PRN Bronchospasm      Allergies    clindamycin (Unknown)  Grapes (Rash)  Nuts (Hives; Rash)  PC Pen VK (Unknown)  shellfish (Angioedema; Rash; Hives)  strawberry (Short breath; Rash; Hives)  Xanax (Rash)    Intolerances    Ativan (Unknown)  Haldol (Dystonic RXN)  hydrALAZINE (Unknown)  Zyprexa (Unknown)          Vital Signs Last 24 Hrs  T(C): 36.9 (18 Oct 2019 07:26), Max: 37.1 (17 Oct 2019 23:39)  T(F): 98.4 (18 Oct 2019 07:26), Max: 98.7 (17 Oct 2019 23:39)  HR: 76 (18 Oct 2019 11:28) (70 - 81)  BP: 127/71 (18 Oct 2019 07:26) (127/71 - 159/83)  BP(mean): --  RR: 18 (18 Oct 2019 07:26) (18 - 20)  SpO2: 98% (18 Oct 2019 11:28) (97% - 100%)  Daily     Daily   I&O's Detail    17 Oct 2019 07:01  -  18 Oct 2019 07:00  --------------------------------------------------------  IN:    Free Water: 300 mL    Pivot 1.5: 840 mL  Total IN: 1140 mL    OUT:    Colostomy: 150 mL    Indwelling Catheter - Urethral: 2250 mL  Total OUT: 2400 mL    Total NET: -1260 mL        I&O's Summary    17 Oct 2019 07:01  -  18 Oct 2019 07:00  --------------------------------------------------------  IN: 1140 mL / OUT: 2400 mL / NET: -1260 mL        PHYSICAL EXAM:  GENERAL: Chronically ill and debilitated  HEAD:  No periorbital edema  EYES: EOMI, PERRLA, conjunctiva and sclera clear  ENMT: Dry mucous membranes, poor dentition  NECK: Supple, No JVD; trach  NERVOUS SYSTEM: Lethargic  CHEST/LUNG: Clear anteriorly with diminished BS  HEART: No rub  ABDOMEN: Soft, Nontender, Nondistended; Bowel sounds present; + PEG tube  EXTREMITIES:  2+ Peripheral Pulses, ++ LE edema    LABS:                        7.2    11.54 )-----------( 190      ( 18 Oct 2019 08:15 )             24.0     10-18    147<H>  |  111<H>  |  175.0<H>  ----------------------------<  135<H>  6.3<HH>   |  22.0  |  4.32<H>    Ca    9.0      18 Oct 2019 08:15  Phos  3.1     10-17  Mg     3.0     10-18    TPro  x   /  Alb  2.0<L>  /  TBili  x   /  DBili  x   /  AST  x   /  ALT  x   /  AlkPhos  x   10-17        Magnesium, Serum: 3.0 mg/dL (10-18 @ 08:15)           EXAM:  CT ABDOMEN AND PELVIS                         EXAM:  CT CHEST                          PROCEDURE DATE:  10/09/2019          INTERPRETATION:  [facility] PRELIMINARY REPORT  Reviewed by ROSS Crenshaw M.D.  PROCEDURE INFORMATION:   Exam: CT Chest Without Contrast   Exam date and time: 10/9/2019 3:48 AM   Clinical history: 87 years old, male; Abdominal pain; Generalized; Chest   pain;   Type not specified     TECHNIQUE:   Imaging protocol: Computed tomography of the chest without contrast.   3D rendering: MIP reconstructed images were created and reviewed.     COMPARISON:   CT CHEST 1/4/2019 6:25 AM     FINDINGS:   Tubes, catheters and devices: Tracheostomy tube will with tip in thoracic   trachea 4 cm above jorge alberto. Monitor leads. Right PICC line with tip in the   axilla, #31, series 4.   Lungs: Bibasilar consolidations. Peribronchial cuffing is present.   Pleural space: Small bilateral pleural effusions. No pneumothorax.   Heart: Cardiomegaly with coronary artery calcifications.   Mediastinum: Distended upper esophagus. Air-fluid level in middle   esophagus,   #58, series 3. Largely nondistended esophagus with wall thickening   distally.   Aorta: Ectatic atherosclerotic aorta PA cannot assess for dissection   without IV   contrast.   Lymph nodes: Indeterminate lymph nodes in the mediastinum brien. Bilateral   axillary enlarged lymph nodes, left greater than right.   Bones/joints: Scoliosis. No destructive bony lesion. Mild compression   deformities of the mid thoracic spine, likely osteoporotic.   Soft tissues: Gynecomastia. Subcutaneous edema. Asymmetric subcutaneous   edema/edema in/left lateral abdominal wall.   Other findings: See CT Abdomen and Pelvis for additional information on   abdomen.     IMPRESSION:   1.Bibasilar consolidations or pleural effusions, possible aspiration   pneumonia.   2. Cardiomegaly with dense coronary artery calcifications.   3. Small air-fluid level in the esophagus slightly above carinal level   and wall   thickening of distal esophagus   4. See report body for additional findings.       =========================  PROCEDURE INFORMATION:   Exam: CT Abdomen And Pelvis Without Contrast   Exam date and time: 10/9/2019 3:48 AM   Clinical history: 87 years old, male; Abdominal pain; Generalized; Chest   pain;   Type not specified     TECHNIQUE:   Imaging protocol: Computed tomography of the abdomen and pelvis without   contrast.   3D rendering: MIP reconstructed images were created and reviewed.     COMPARISON:   CT CHEST 1/4/2019 6:25 AM     FINDINGS:   Tubes, catheters and devices: Percutaneous gastrostomy tube.     Liver: Lobular liver contour. No discrete liver lesion.   Gallbladder and bile ducts: Distended gallbladder. No gallstone.   Pancreas: Normal  Spleen: Splenomegaly.   Adrenals: 13 mm left adrenal nodule  Kidneys and ureters: Polycystic kidneys with some atypical cysts   including   coarse calcifications of a 2 cm cyst right kidney, #191, series 3.   Largest   right renal cyst measured 6 cm in size. Largestleft renal cyst measures   5.5   cm. Hounsfield units of some of the left renal cysts measure up to 22. No   ureteral dilatation or stone.   Stomach and bowel: Wall thickening of incompletely distended stomach.   Appendix: No inflamed appendix.  Intraperitoneal space: Abdominal pelvic ascites.   Vasculature: Atherosclerotic aorta.   Lymph nodes: Retroperitoneal and mesenteric adenopathy. Multiple pelvic   and   inguinal lymph nodes. Multilevel small node deformities.     Bladder: Contracted bladder with Jones catheter. Thickened bladder wall.   Bladder gas. Small focus of extraluminal gas by bladder, #285, series 3.   Reproductive: Normally sized prostate.  Bones/joints: Complex fluid and gas are noted by the greater trochanter   of the   left femur, #287-295, series 3. Large ulceration extends to the posterior   right   ischium. Ulceration and gas extend to the coccyx. Patchy sclerosis and   lucencies of the bony skeleton are noted. Soft tissue gas is adjacent to   the   posterior left sacrum, #165-168, series 4. Degenerative changes of each   hip are   noted. Straightening of the thoracolumbar spine.   Soft tissues: Diffuse subcutaneous edema or anasarca. Left ventral pelvic   peristomal herniation of colon and fluid through 4.5 cm wall defect. Soft   tissue gas is associated with a posterior left musculature and adjacent   bursa   of the left hip, #22-26, series 4.   Other findings: Lower thorax: See CT chest.     IMPRESSION:   1. Large decubitus ulcer with exposed right ischium, likely   osteomyelitis.   2. Infected soft tissues of lateral and posterior left hip suspicious for   septic bursitis/pyositis, possible osteomyelitis.   3. Left paracentral sacral/coccygeal decubitus ulcer with possible   osteomyelitis and soft tissues.   4. Large peristomal hernia with colonic diverticulosis. Possible   low-grade   diverticulitis.   5. Extraluminal collection of gas adjacent to the bladder etiology   uncertain.   6. Polycystic kidneys, some atypical.   7. Percutaneous gastrostomy tube. Jones catheter in bladder.  8. Distended gallbladder. No gallstone.   9. Anasarca with subcutaneous edema and fluid, greater on the left   lateral   abdomen, asymmetric left edematous abdominal musculature as well as   abdominal   and pelvic ascites.   10. Retroperitoneal, mesenteric, and pelvic adenopathy.

## 2019-10-18 NOTE — PROGRESS NOTE ADULT - SUBJECTIVE AND OBJECTIVE BOX
HPI/OVERNIGHT EVENTS: Patient seen this AM at bedside. No new acute issues. ostomy in place, pink and well perfused, no stool in ostomy bag at at time of evaluation.     Vital Signs Last 24 Hrs  T(C): 37.1 (17 Oct 2019 23:39), Max: 37.6 (17 Oct 2019 08:38)  T(F): 98.7 (17 Oct 2019 23:39), Max: 99.7 (17 Oct 2019 08:38)  HR: 72 (18 Oct 2019 02:57) (72 - 81)  BP: 130/67 (17 Oct 2019 23:39) (123/61 - 159/83)  BP(mean): --  RR: 20 (17 Oct 2019 23:39) (20 - 22)  SpO2: 100% (18 Oct 2019 02:57) (94% - 100%)    I&O's Detail    17 Oct 2019 07:01  -  18 Oct 2019 07:00  --------------------------------------------------------  IN:    Free Water: 300 mL    Pivot 1.5: 840 mL  Total IN: 1140 mL    OUT:    Colostomy: 150 mL    Indwelling Catheter - Urethral: 2250 mL  Total OUT: 2400 mL    Total NET: -1260 mL              Gen: chronically ill appearing frail bedbound elderly male  Neuro: Nonverbal at baseline and w/ extensive deficits s/p multiple CVA's  RESP: Tracheostomy in place, on ventillator w/ coarse bilateral BS  CVS: RRR,   GI: abd soft, NT, moderately distended, no rebound/guarding. 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   Extremities: bilateral unstageable decubitus ulcers of patient's ankles. Extensive contractions of all 4 extremities.   Skin: Bilateral trochanteric decubitus ulcers, both stage 4 and left ulcer tracks 5 inch deep. Both covered w/ necrotic fibrinous exudate. No purulent discharge. Left trochanteric ulcer w/ small skin bleed that was controlled w/ surgicel. Sacral ulcer w/ both necrotic fibrinous exudate and granulation tissue. 2 unstageable ulcers of upper back covered w/ eschar.     LABS:                        7.7    x     )-----------( x        ( 17 Oct 2019 16:20 )             25.5     10-17    145  |  108<H>  |  148.0<H>  ----------------------------<  153<H>  5.7<H>   |  22.0  |  4.37<H>    Ca    8.7      17 Oct 2019 08:14  Phos  3.1     10-17    TPro  x   /  Alb  2.0<L>  /  TBili  x   /  DBili  x   /  AST  x   /  ALT  x   /  AlkPhos  x   10-17          MEDICATIONS  (STANDING):  amLODIPine   Tablet 5 milliGRAM(s) Oral daily  ascorbic acid 500 milliGRAM(s) Oral daily  buMETAnide Injectable 2 milliGRAM(s) IV Push daily  chlorhexidine 0.12% Liquid 15 milliLiter(s) Oral Mucosa every 12 hours  chlorhexidine 2% Cloths 1 Application(s) Topical <User Schedule>  collagenase Ointment 1 Application(s) Topical two times a day  digoxin     Tablet 0.125 milliGRAM(s) Oral daily  lactobacillus acidophilus 1 Tablet(s) Oral daily  levETIRAcetam  IVPB 1000 milliGRAM(s) IV Intermittent every 12 hours  metoprolol tartrate 25 milliGRAM(s) Oral two times a day  multivitamin 1 Tablet(s) Oral daily  nystatin Cream 1 Application(s) Topical two times a day  pantoprazole  Injectable 40 milliGRAM(s) IV Push every 12 hours  phenytoin   Suspension 400 milliGRAM(s) Enteral Tube every 12 hours  predniSONE   Tablet 30 milliGRAM(s) Oral daily  sodium bicarbonate 650 milliGRAM(s) Oral every 8 hours  zinc sulfate 220 milliGRAM(s) Oral daily    MEDICATIONS  (PRN):  acetaminophen    Suspension .. 650 milliGRAM(s) Oral every 6 hours PRN Temp greater or equal to 38.5C (101.3F)  ALBUTerol/ipratropium for Nebulization. 3 milliLiter(s) Nebulizer every 4 hours PRN Bronchospasm

## 2019-10-18 NOTE — PROGRESS NOTE ADULT - SUBJECTIVE AND OBJECTIVE BOX
CC: PNA / osteomyelitis     INTERVAL HPI/OVERNIGHT EVENTS: seen and examined , no overnight events . daughter at bedside. poor prognosis. palliative on board. no further bleeding from sacral wound. dispo pending [facility] discussion with legal guardian Van Villarreal. h/h stable. on IV Bumex for anasarca    REVIEW OF SYSTEMS:    unable to obtain as patient is maintained on vent. not communicating      Vital Signs Last 24 Hrs  T(C): 36.8 (18 Oct 2019 15:44), Max: 37.1 (17 Oct 2019 23:39)  T(F): 98.3 (18 Oct 2019 15:44), Max: 98.7 (17 Oct 2019 23:39)  HR: 96 (18 Oct 2019 21:05) (70 - 96)  BP: 144/74 (18 Oct 2019 15:44) (127/71 - 144/74)  BP(mean): --  RR: 18 (18 Oct 2019 07:26) (18 - 20)  SpO2: 100% (18 Oct 2019 21:05) (97% - 100%)      I&O's Summary    17 Oct 2019 07:01  -  18 Oct 2019 07:00  --------------------------------------------------------  IN: 1140 mL / OUT: 2400 mL / NET: -1260 mL    18 Oct 2019 07:01  -  18 Oct 2019 21:08  --------------------------------------------------------  IN: 300 mL / OUT: 1400 mL / NET: -1100 mL      POCT Blood Glucose.: 179 mg/dL (18 Oct 2019 12:42)  POCT Blood Glucose.: 117 mg/dL (18 Oct 2019 05:54)    GENERAL: Not in distress. on trach. awake  HEENT:  Normocephalic and atraumatic.   NECK: Supple.   CAPILLARY BLOOD GLUCOSE  CARDIOVASCULAR: RRR S1, S2. SM+.  No rubs/gallop  LUNGS: BLAE+, course breath sound  ABDOMEN: ND. Soft,  NT, no guarding / rebound / rigidity. BS normoactive. colostomy bag with green stool. PEG  EXTREMITIES: no cyanosis, no clubbing, +edema.   SKIN: anasarca, worsening. skin rash over left like looks like fungal  NEUROLOGIC: not communicating, non-verbal. not following command  PSYCHIATRIC: Calm.  No agitation.                            7.1    x     )-----------( x        ( 18 Oct 2019 18:19 )             24.5       10-18    145  |  107  |  185.0<H>  ----------------------------<  141<H>  6.2<HH>   |  21.0<L>  |  4.37<H>    Ca    9.3      18 Oct 2019 18:19  Phos  3.1     10-17  Mg     3.0     10-18    TPro  x   /  Alb  2.0<L>  /  TBili  x   /  DBili  x   /  AST  x   /  ALT  x   /  AlkPhos  x   10-17                    MEDICATIONS  (STANDING):  amLODIPine   Tablet 5 milliGRAM(s) Oral daily  ascorbic acid 500 milliGRAM(s) Oral daily  buMETAnide Injectable 2 milliGRAM(s) IV Push daily  chlorhexidine 0.12% Liquid 15 milliLiter(s) Oral Mucosa every 12 hours  chlorhexidine 2% Cloths 1 Application(s) Topical <User Schedule>  collagenase Ointment 1 Application(s) Topical two times a day  digoxin     Tablet 0.125 milliGRAM(s) Oral daily  lactobacillus acidophilus 1 Tablet(s) Oral daily  levETIRAcetam  IVPB 1000 milliGRAM(s) IV Intermittent every 12 hours  metoprolol tartrate 25 milliGRAM(s) Oral two times a day  multivitamin 1 Tablet(s) Oral daily  nystatin Cream 1 Application(s) Topical two times a day  pantoprazole  Injectable 40 milliGRAM(s) IV Push every 12 hours  phenytoin   Suspension 400 milliGRAM(s) Enteral Tube every 12 hours  predniSONE   Tablet 30 milliGRAM(s) Oral daily  sodium bicarbonate 650 milliGRAM(s) Oral every 8 hours  zinc sulfate 220 milliGRAM(s) Oral daily    MEDICATIONS  (PRN):  acetaminophen    Suspension .. 650 milliGRAM(s) Oral every 6 hours PRN Temp greater or equal to 38.5C (101.3F)  ALBUTerol/ipratropium for Nebulization. 3 milliLiter(s) Nebulizer every 4 hours PRN Bronchospasm        RADIOLOGY & ADDITIONAL TESTS: CC: PNA / osteomyelitis     INTERVAL HPI/OVERNIGHT EVENTS: seen and examined , no overnight events . daughter at bedside. poor prognosis. palliative on board. no further bleeding from sacral wound. dispo pending [facility] discussion with legal guardian Van Villarreal. h/h stable. on IV Bumex for anasarca. hyperK. not HD candidate    REVIEW OF SYSTEMS:    unable to obtain as patient is maintained on vent. not communicating      Vital Signs Last 24 Hrs  T(C): 36.8 (19 Oct 2019 00:16), Max: 36.9 (18 Oct 2019 07:26)  T(F): 98.3 (19 Oct 2019 00:16), Max: 98.4 (18 Oct 2019 07:26)  HR: 75 (19 Oct 2019 05:13) (70 - 96)  BP: 142/62 (19 Oct 2019 05:13) (127/71 - 144/74)  BP(mean): --  RR: 22 (19 Oct 2019 00:16) (18 - 22)  SpO2: 100% (19 Oct 2019 03:17) (97% - 100%)    CAPILLARY BLOOD GLUCOSE    POCT Blood Glucose.: 179 mg/dL (18 Oct 2019 12:42)      GENERAL: Not in distress. on trach. awake  HEENT:  Normocephalic and atraumatic.   NECK: Supple.   CARDIOVASCULAR: RRR S1, S2. SM+.  No rubs/gallop  LUNGS: BLAE+, course breath sound  ABDOMEN: ND. Soft,  NT, no guarding / rebound / rigidity. BS normoactive. colostomy bag with green stool. PEG  EXTREMITIES: no cyanosis, no clubbing, +edema.   SKIN: anasarca, worsening. skin rash over left like looks like fungal  NEUROLOGIC: not communicating, non-verbal. not following command  PSYCHIATRIC: Calm.  No agitation.                             7.1    x     )-----------( x        ( 18 Oct 2019 18:19 )             24.5     10-18    145  |  107  |  185.0<H>  ----------------------------<  141<H>  6.2<HH>   |  21.0<L>  |  4.37<H>    Ca    9.3      18 Oct 2019 18:19  Phos  3.1     10-17  Mg     3.0     10-18    TPro  x   /  Alb  2.0<L>  /  TBili  x   /  DBili  x   /  AST  x   /  ALT  x   /  AlkPhos  x   10-17            MEDICATIONS  (STANDING):  ALBUTerol    0.083%. 2.5 milliGRAM(s) Nebulizer once  amLODIPine   Tablet 5 milliGRAM(s) Oral daily  ascorbic acid 500 milliGRAM(s) Oral daily  buMETAnide Injectable 2 milliGRAM(s) IV Push daily  chlorhexidine 0.12% Liquid 15 milliLiter(s) Oral Mucosa every 12 hours  chlorhexidine 2% Cloths 1 Application(s) Topical <User Schedule>  collagenase Ointment 1 Application(s) Topical two times a day  digoxin     Tablet 0.125 milliGRAM(s) Oral daily  lactobacillus acidophilus 1 Tablet(s) Oral daily  levETIRAcetam  IVPB 1000 milliGRAM(s) IV Intermittent every 12 hours  metoprolol tartrate 25 milliGRAM(s) Oral two times a day  multivitamin 1 Tablet(s) Oral daily  nystatin Cream 1 Application(s) Topical two times a day  pantoprazole  Injectable 40 milliGRAM(s) IV Push every 12 hours  phenytoin   Suspension 400 milliGRAM(s) Enteral Tube every 12 hours  predniSONE   Tablet 30 milliGRAM(s) Oral daily  sodium bicarbonate 650 milliGRAM(s) Oral every 8 hours  sodium polystyrene sulfonate Suspension 15 Gram(s) Oral daily  zinc sulfate 220 milliGRAM(s) Oral daily    MEDICATIONS  (PRN):  acetaminophen    Suspension .. 650 milliGRAM(s) Oral every 6 hours PRN Temp greater or equal to 38.5C (101.3F)  ALBUTerol/ipratropium for Nebulization. 3 milliLiter(s) Nebulizer every 4 hours PRN Bronchospasm          RADIOLOGY & ADDITIONAL TESTS:

## 2019-10-18 NOTE — PROGRESS NOTE ADULT - ASSESSMENT
1. sacral osteomyelitis   on IV antibiotics .  follow up cultures . sputum + for pseudomonus  diverting colostomy in place .  ID on board .    2. Aspiration PNA   follow up cultures .  antibiotics in place .  aspiration precautions  on PEG feeding    3. Acute on chronic renal failure hyperkalemia  creatinine stable  nephro on board .  no dialysis for now as per renal .  DCed IVf 10/15  for worsening anasarca. increase free water through PEG tube as needed  renal started IV bumex  monitor RF  strict I and O  Kayexalate  monitor electrolytes    4. Chronic respiratory failure   trach to vent .  pulmonary on board .    5. Seizure disorder   c/w home regimen   seizure precautions    6. HTN '  c/w home regimen .    7. Afib   on rate controlling agents .  no AC as had bleeding issues in past .    8. Anemia   likely multifactorial , and renal insufficiency   h/h stable  transfuse as needed .     9. DVT prophylaxis   boots , no chemical prophylaxis due to anemia .     10. fungal rash: nystatin powder. monitor    11. sacral decubitus: wound care. bleeding stopped. ACS consulted. daily dressing am and PM as per AC. monitor h/h.     12. anemia: acute on chronic due to bleeding from sacral decubitus which is currently stoppeed  Spoke to Legal guardian Mr. Van Villarreal, transfusion consent obtained. suggested to transfuse if Hb <6. reported patient tolerated low hb in the past and had multiple transfusion in the past      GOC: discussed briefly over phone and updated about current condition, plan of care prognosis.discusssed about comfort care/hospice. he is available to meeting with palliative team in person any time tomorrow after 9:30 am. Palliative MD Dr. Terri Bonner was informed. Van verbalized understanding that stopping trach/vent can cause death. he will discuss GOC with palliative team tomorrow. Van reports patient daughter should not be aware of GOC discussion.    patient has a legal guardian , daughter at bedside. 1. sacral/ischial osteomyelitis   - Blood culture neg  - Urine possible contaminated   - Sputum with pseudomonas R to Carbapenems but S to cefepime, could be colonization.   - completed Cefepime 1gm q12   - diverting colostomy in place .  - ID signed off .    2. Aspiration PNA   off Abx .  aspiration precautions  on PEG feeding    3. Acute on chronic renal failure with hyperkalemia  nephro on board .  not HD candidate as per renal . Renal left message to HCP for further discussion  DCed IVf 10/15  for worsening anasarca. increase free water through PEG tube as needed  renal started IV bumex  monitor RF  strict I and O  Kayexalate daily for 2-3 days  on digoxin and sodi-bicarb that will help to reduce L as well  monitor electrolytes    4. Chronic respiratory failure   trach to vent .  pulmonary on board .    5. Seizure disorder   c/w home regimen   seizure precautions    6. HTN '  c/w home regimen .    7. Afib   on rate controlling agents .  no AC as had bleeding issues in past .    8. Anemia   likely multifactorial , and renal insufficiency   h/h stable  transfuse as needed .     9. DVT prophylaxis   boots , no chemical prophylaxis due to anemia .     10. fungal rash: nystatin powder. monitor    11. sacral decubitus: wound care. bleeding stopped. ACS consulted. daily dressing am and PM as per ACS. monitor h/h.     12. anemia: acute on chronic due to bleeding from sacral decubitus which is currently stopped  Spoke to Legal guardian Mr. Van Villarreal, transfusion consent obtained. suggested to transfuse if Hb <6 though i offered below 7. reported patient tolerated low hb in the past and had multiple transfusion in the past      GOC: discussed briefly over phone and updated about current condition on 10/17, plan of care prognosis.discusssed about comfort care/hospice. he told me he was available to meeting with palliative team in person any time today 10/18 after 9:30 am. Palliative MD Dr. Terri Bonner was informed. She tried to reach him and left VM*2. did not get any call back yet. Van verbalized understanding that stopping trach/vent can cause death. he will discuss GOC with palliative team. Van requesting patient daughter should not be aware of GOC discussion.    Van Monroy [ ] is his legal guardian , daughter Ms. Monet Aldrich remains at bedside. as per Van and palliative team, as per court order, we should not discuss patient conditions with his daughter and she has no right to make any health care decision for her father.

## 2019-10-18 NOTE — PROGRESS NOTE ADULT - ASSESSMENT
HPI:   87y Male w/ extensive PMH including  CAD w/ 2x stents, HTN, . 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  Pt sent from [facility] on 10-09-19 after in-house nephrologist noticed pt to have worsening anemia and renal function. Pt w/ extensive history of decubitus ulcers given his bedbound status and multiple MDR infections as well as osteomyelitis. Per RN report, pt had episode of bleeding from his sacral decubitus ulcer having saturated an abd pad which prompted surgical evaluation.  Pt has history of multiple prior debridements, w/ several having been performed at [facility] in 2018.     ROSALIA r/o ATN in setting of probable sepsis==> renal function stable  PCKD no hydro on NCCT abdomen 10-9  Hyperkalemia persists  End stage dementia with chronic respiratory failure  Anemia s/p PRBCs  Will cont to treat K+ medically , team has changed tube feeds to nepro and given Kayexalate   Pt on Digoxin , which allows some protective effect ( check level in am )   Continue IV Bumex for now with enteral bicarb   -Avoid potential nephrotoxins  I left message at the law office of legal guardian Van to discuss case   Given overall poor quality of life , comorbid medical issues , and very poor prognosis ( which pre-dated his renal issues )            HD would not be of much benefit . I will discuss this with his legal guardian

## 2019-10-18 NOTE — CHART NOTE - NSCHARTNOTEFT_GEN_A_CORE
Unfortunately, I attempted to call Mr. Monroy ( HCP/legal guardian) yesterday around 4:30 pm - left a message; and again this morning around 11am - left a message for call back to discuss goals of care. I have not received a call back. Discussed with Dr. العراقي who will sign out to covering hospitalist over the weekend.

## 2019-10-19 LAB
ANION GAP SERPL CALC-SCNC: 20 MMOL/L — HIGH (ref 5–17)
BUN SERPL-MCNC: 189 MG/DL — HIGH (ref 8–20)
CALCIUM SERPL-MCNC: 9.1 MG/DL — SIGNIFICANT CHANGE UP (ref 8.6–10.2)
CHLORIDE SERPL-SCNC: 108 MMOL/L — HIGH (ref 98–107)
CO2 SERPL-SCNC: 19 MMOL/L — LOW (ref 22–29)
CREAT SERPL-MCNC: 4.5 MG/DL — HIGH (ref 0.5–1.3)
DIGOXIN SERPL-MCNC: 1.7 NG/ML — SIGNIFICANT CHANGE UP (ref 0.8–2)
GLUCOSE BLDC GLUCOMTR-MCNC: 114 MG/DL — HIGH (ref 70–99)
GLUCOSE SERPL-MCNC: 141 MG/DL — HIGH (ref 70–115)
HCT VFR BLD CALC: 24.5 % — LOW (ref 39–50)
HGB BLD-MCNC: 7.1 G/DL — LOW (ref 13–17)
MCHC RBC-ENTMCNC: 29 GM/DL — LOW (ref 32–36)
MCHC RBC-ENTMCNC: 31.4 PG — SIGNIFICANT CHANGE UP (ref 27–34)
MCV RBC AUTO: 108.4 FL — HIGH (ref 80–100)
PLATELET # BLD AUTO: 175 K/UL — SIGNIFICANT CHANGE UP (ref 150–400)
POTASSIUM SERPL-MCNC: 5.6 MMOL/L — HIGH (ref 3.5–5.3)
POTASSIUM SERPL-SCNC: 5.6 MMOL/L — HIGH (ref 3.5–5.3)
RBC # BLD: 2.26 M/UL — LOW (ref 4.2–5.8)
RBC # FLD: 18.6 % — HIGH (ref 10.3–14.5)
SODIUM SERPL-SCNC: 147 MMOL/L — HIGH (ref 135–145)
WBC # BLD: 11.43 K/UL — HIGH (ref 3.8–10.5)
WBC # FLD AUTO: 11.43 K/UL — HIGH (ref 3.8–10.5)

## 2019-10-19 PROCEDURE — 99232 SBSQ HOSP IP/OBS MODERATE 35: CPT

## 2019-10-19 RX ADMIN — CHLORHEXIDINE GLUCONATE 1 APPLICATION(S): 213 SOLUTION TOPICAL at 05:16

## 2019-10-19 RX ADMIN — Medication 650 MILLIGRAM(S): at 05:14

## 2019-10-19 RX ADMIN — Medication 650 MILLIGRAM(S): at 11:18

## 2019-10-19 RX ADMIN — Medication 25 MILLIGRAM(S): at 05:14

## 2019-10-19 RX ADMIN — PANTOPRAZOLE SODIUM 40 MILLIGRAM(S): 20 TABLET, DELAYED RELEASE ORAL at 16:59

## 2019-10-19 RX ADMIN — Medication 1 TABLET(S): at 11:18

## 2019-10-19 RX ADMIN — Medication 0.12 MILLIGRAM(S): at 05:15

## 2019-10-19 RX ADMIN — SODIUM POLYSTYRENE SULFONATE 15 GRAM(S): 4.1 POWDER, FOR SUSPENSION ORAL at 11:18

## 2019-10-19 RX ADMIN — CHLORHEXIDINE GLUCONATE 15 MILLILITER(S): 213 SOLUTION TOPICAL at 05:14

## 2019-10-19 RX ADMIN — ZINC SULFATE TAB 220 MG (50 MG ZINC EQUIVALENT) 220 MILLIGRAM(S): 220 (50 ZN) TAB at 11:18

## 2019-10-19 RX ADMIN — Medication 400 MILLIGRAM(S): at 16:58

## 2019-10-19 RX ADMIN — Medication 25 MILLIGRAM(S): at 16:59

## 2019-10-19 RX ADMIN — AMLODIPINE BESYLATE 5 MILLIGRAM(S): 2.5 TABLET ORAL at 05:15

## 2019-10-19 RX ADMIN — Medication 650 MILLIGRAM(S): at 23:22

## 2019-10-19 RX ADMIN — NYSTATIN CREAM 1 APPLICATION(S): 100000 CREAM TOPICAL at 16:57

## 2019-10-19 RX ADMIN — BUMETANIDE 2 MILLIGRAM(S): 0.25 INJECTION INTRAMUSCULAR; INTRAVENOUS at 05:14

## 2019-10-19 RX ADMIN — PANTOPRAZOLE SODIUM 40 MILLIGRAM(S): 20 TABLET, DELAYED RELEASE ORAL at 05:15

## 2019-10-19 RX ADMIN — CHLORHEXIDINE GLUCONATE 15 MILLILITER(S): 213 SOLUTION TOPICAL at 16:58

## 2019-10-19 RX ADMIN — Medication 400 MILLIGRAM(S): at 05:15

## 2019-10-19 RX ADMIN — Medication 500 MILLIGRAM(S): at 11:18

## 2019-10-19 RX ADMIN — LEVETIRACETAM 400 MILLIGRAM(S): 250 TABLET, FILM COATED ORAL at 05:14

## 2019-10-19 RX ADMIN — NYSTATIN CREAM 1 APPLICATION(S): 100000 CREAM TOPICAL at 05:15

## 2019-10-19 RX ADMIN — Medication 1 APPLICATION(S): at 05:16

## 2019-10-19 RX ADMIN — Medication 30 MILLIGRAM(S): at 05:14

## 2019-10-19 RX ADMIN — Medication 1 APPLICATION(S): at 16:57

## 2019-10-19 RX ADMIN — LEVETIRACETAM 400 MILLIGRAM(S): 250 TABLET, FILM COATED ORAL at 16:58

## 2019-10-19 NOTE — PROGRESS NOTE ADULT - SUBJECTIVE AND OBJECTIVE BOX
HOSPITALIST PROGRESS NOTE    KING MCKEON  [id number]  87yMale    Patient is a 87y old  Male who presents with a chief complaint of Sepsis (19 Oct 2019 00:39)      SUBJECTIVE:   Chart reviewed since last visit.  Patient seen and examined at bedside.      OBJECTIVE:  Vital Signs Last 24 Hrs  T(C): 36.8 (19 Oct 2019 00:16), Max: 36.8 (18 Oct 2019 15:44)  T(F): 98.3 (19 Oct 2019 00:16), Max: 98.3 (18 Oct 2019 15:44)  HR: 74 (19 Oct 2019 12:28) (66 - 96)  BP: 142/62 (19 Oct 2019 05:13) (139/63 - 144/74)  BP(mean): --  RR: 22 (19 Oct 2019 00:16) (22 - 22)  SpO2: 97% (19 Oct 2019 12:28) (97% - 100%)    PHYSICAL EXAMINATION  General: Obese male, lying on right side  HEENT:   NECK:  Trach[+]  CVS: regular rate and rhythm S1 S2  RESP:  Fair air entry bilaterally, Pleurx left chest  GI:  G-tube, Soft NT  : Condom catheter - patient removed  MS:  increased tone  CNS:  Encephalopathic  INTEG:  Decubitus sacral  PSYCH:  Unable to obtain    MONITOR:  CAPILLARY BLOOD GLUCOSE      POCT Blood Glucose.: 114 mg/dL (19 Oct 2019 05:37)        I&O's Summary    18 Oct 2019 07:01  -  19 Oct 2019 07:00  --------------------------------------------------------  IN: 300 mL / OUT: 2475 mL / NET: -2175 mL                            7.1    11.43 )-----------( 175      ( 19 Oct 2019 10:58 )             24.5       10-19    147<H>  |  108<H>  |  189.0<H>  ----------------------------<  141<H>  5.6<H>   |  19.0<L>  |  4.50<H>    Ca    9.1      19 Oct 2019 10:58  Mg     3.0     10-18              Culture:    TTE:    RADIOLOGY        MEDICATIONS  (STANDING):  ALBUTerol    0.083%. 2.5 milliGRAM(s) Nebulizer once  amLODIPine   Tablet 5 milliGRAM(s) Oral daily  ascorbic acid 500 milliGRAM(s) Oral daily  buMETAnide Injectable 2 milliGRAM(s) IV Push daily  chlorhexidine 0.12% Liquid 15 milliLiter(s) Oral Mucosa every 12 hours  chlorhexidine 2% Cloths 1 Application(s) Topical <User Schedule>  collagenase Ointment 1 Application(s) Topical two times a day  digoxin     Tablet 0.125 milliGRAM(s) Oral daily  lactobacillus acidophilus 1 Tablet(s) Oral daily  levETIRAcetam  IVPB 1000 milliGRAM(s) IV Intermittent every 12 hours  metoprolol tartrate 25 milliGRAM(s) Oral two times a day  multivitamin 1 Tablet(s) Oral daily  nystatin Cream 1 Application(s) Topical two times a day  pantoprazole  Injectable 40 milliGRAM(s) IV Push every 12 hours  phenytoin   Suspension 400 milliGRAM(s) Enteral Tube every 12 hours  predniSONE   Tablet 30 milliGRAM(s) Oral daily  sodium bicarbonate 650 milliGRAM(s) Oral every 8 hours  sodium polystyrene sulfonate Suspension 15 Gram(s) Oral daily  zinc sulfate 220 milliGRAM(s) Oral daily      MEDICATIONS  (PRN):  acetaminophen    Suspension .. 650 milliGRAM(s) Oral every 6 hours PRN Temp greater or equal to 38.5C (101.3F)  ALBUTerol/ipratropium for Nebulization. 3 milliLiter(s) Nebulizer every 4 hours PRN Bronchospasm HOSPITALIST PROGRESS NOTE    KING MCKEON  [id number]  87yMale    Patient is a 87y old  Male who presents with a chief complaint of Sepsis (19 Oct 2019 00:39)      SUBJECTIVE:   Chart reviewed since last visit.  Patient seen and examined at bedside.  Nonverbal -       OBJECTIVE:  Vital Signs Last 24 Hrs  T(C): 36.8 (19 Oct 2019 00:16), Max: 36.8 (18 Oct 2019 15:44)  T(F): 98.3 (19 Oct 2019 00:16), Max: 98.3 (18 Oct 2019 15:44)  HR: 74 (19 Oct 2019 12:28) (66 - 96)  BP: 142/62 (19 Oct 2019 05:13) (139/63 - 144/74)   RR: 22 (19 Oct 2019 00:16) (22 - 22)  SpO2: 97% (19 Oct 2019 12:28) (97% - 100%)    PHYSICAL EXAMINATION  General: Obese male, lying on right side  HEENT:   NECK:  Trach[+]  CVS: regular rate and rhythm S1 S2  RESP:  Fair air entry bilaterally, Pleurx left chest  GI:  G-tube, Soft NT  : Condom catheter - patient removed  MS:  increased tone  CNS:  Encephalopathic  INTEG:  Decubitus sacral  PSYCH:  Unable to obtain    MONITOR:  CAPILLARY BLOOD GLUCOSE      POCT Blood Glucose.: 114 mg/dL (19 Oct 2019 05:37)        I&O's Summary    18 Oct 2019 07:01  -  19 Oct 2019 07:00  --------------------------------------------------------  IN: 300 mL / OUT: 2475 mL / NET: -2175 mL                            7.1    11.43 )-----------( 175      ( 19 Oct 2019 10:58 )             24.5       10-19    147<H>  |  108<H>  |  189.0<H>  ----------------------------<  141<H>  5.6<H>   |  19.0<L>  |  4.50<H>    Ca    9.1      19 Oct 2019 10:58  Mg     3.0     10-18              Culture:    TTE:    RADIOLOGY        MEDICATIONS  (STANDING):  ALBUTerol    0.083%. 2.5 milliGRAM(s) Nebulizer once  amLODIPine   Tablet 5 milliGRAM(s) Oral daily  ascorbic acid 500 milliGRAM(s) Oral daily  buMETAnide Injectable 2 milliGRAM(s) IV Push daily  chlorhexidine 0.12% Liquid 15 milliLiter(s) Oral Mucosa every 12 hours  chlorhexidine 2% Cloths 1 Application(s) Topical <User Schedule>  collagenase Ointment 1 Application(s) Topical two times a day  digoxin     Tablet 0.125 milliGRAM(s) Oral daily  lactobacillus acidophilus 1 Tablet(s) Oral daily  levETIRAcetam  IVPB 1000 milliGRAM(s) IV Intermittent every 12 hours  metoprolol tartrate 25 milliGRAM(s) Oral two times a day  multivitamin 1 Tablet(s) Oral daily  nystatin Cream 1 Application(s) Topical two times a day  pantoprazole  Injectable 40 milliGRAM(s) IV Push every 12 hours  phenytoin   Suspension 400 milliGRAM(s) Enteral Tube every 12 hours  predniSONE   Tablet 30 milliGRAM(s) Oral daily  sodium bicarbonate 650 milliGRAM(s) Oral every 8 hours  sodium polystyrene sulfonate Suspension 15 Gram(s) Oral daily  zinc sulfate 220 milliGRAM(s) Oral daily      MEDICATIONS  (PRN):  acetaminophen    Suspension .. 650 milliGRAM(s) Oral every 6 hours PRN Temp greater or equal to 38.5C (101.3F)  ALBUTerol/ipratropium for Nebulization. 3 milliLiter(s) Nebulizer every 4 hours PRN Bronchospasm

## 2019-10-19 NOTE — PROGRESS NOTE ADULT - SUBJECTIVE AND OBJECTIVE BOX
NEPHROLOGY INTERVAL HPI/OVERNIGHT EVENTS:    Overall situation unchanged   Pt's HCP never returned call to me   Surgery follow up noted   Dig level 1.7     MEDICATIONS  (STANDING):  ALBUTerol    0.083%. 2.5 milliGRAM(s) Nebulizer once  amLODIPine   Tablet 5 milliGRAM(s) Oral daily  ascorbic acid 500 milliGRAM(s) Oral daily  buMETAnide Injectable 2 milliGRAM(s) IV Push daily  chlorhexidine 0.12% Liquid 15 milliLiter(s) Oral Mucosa every 12 hours  chlorhexidine 2% Cloths 1 Application(s) Topical <User Schedule>  collagenase Ointment 1 Application(s) Topical two times a day  digoxin     Tablet 0.125 milliGRAM(s) Oral daily  lactobacillus acidophilus 1 Tablet(s) Oral daily  levETIRAcetam  IVPB 1000 milliGRAM(s) IV Intermittent every 12 hours  metoprolol tartrate 25 milliGRAM(s) Oral two times a day  multivitamin 1 Tablet(s) Oral daily  nystatin Cream 1 Application(s) Topical two times a day  pantoprazole  Injectable 40 milliGRAM(s) IV Push every 12 hours  phenytoin   Suspension 400 milliGRAM(s) Enteral Tube every 12 hours  predniSONE   Tablet 30 milliGRAM(s) Oral daily  sodium bicarbonate 650 milliGRAM(s) Oral every 8 hours  sodium polystyrene sulfonate Suspension 15 Gram(s) Oral daily  zinc sulfate 220 milliGRAM(s) Oral daily    MEDICATIONS  (PRN):  acetaminophen    Suspension .. 650 milliGRAM(s) Oral every 6 hours PRN Temp greater or equal to 38.5C (101.3F)  ALBUTerol/ipratropium for Nebulization. 3 milliLiter(s) Nebulizer every 4 hours PRN Bronchospasm      Allergies    clindamycin (Unknown)  Grapes (Rash)  Nuts (Hives; Rash)  PC Pen VK (Unknown)  shellfish (Angioedema; Rash; Hives)  strawberry (Short breath; Rash; Hives)  Xanax (Rash)    Intolerances    Ativan (Unknown)  Haldol (Dystonic RXN)  hydrALAZINE (Unknown)  Zyprexa (Unknown)      Vital Signs Last 24 Hrs  T(C): 36.6 (19 Oct 2019 16:33), Max: 36.8 (19 Oct 2019 00:16)  T(F): 97.9 (19 Oct 2019 16:33), Max: 98.3 (19 Oct 2019 00:16)  HR: 69 (19 Oct 2019 16:33) (65 - 96)  BP: 117/66 (19 Oct 2019 16:33) (117/66 - 142/62)  BP(mean): --  RR: 22 (19 Oct 2019 00:16) (22 - 22)  SpO2: 99% (19 Oct 2019 16:33) (97% - 100%)  Daily     Daily   I&O's Detail    18 Oct 2019 07:01  -  19 Oct 2019 07:00  --------------------------------------------------------  IN:    Free Water: 300 mL  Total IN: 300 mL    OUT:    Colostomy: 750 mL    Indwelling Catheter - Urethral: 1725 mL  Total OUT: 2475 mL    Total NET: -2175 mL        I&O's Summary    18 Oct 2019 07:01  -  19 Oct 2019 07:00  --------------------------------------------------------  IN: 300 mL / OUT: 2475 mL / NET: -2175 mL        PHYSICAL EXAM:  GENERAL: Chronically ill and debilitated  HEAD:  No periorbital edema  EYES: EOMI, PERRLA, conjunctiva and sclera clear  ENMT: Dry mucous membranes, poor dentition  NECK: Supple, No JVD; trach  NERVOUS SYSTEM: Lethargic  CHEST/LUNG: Clear anteriorly with diminished BS  HEART: No rub  ABDOMEN: Soft, Nontender, Nondistended; Bowel sounds present; + PEG tube  EXTREMITIES:  2+ Peripheral Pulses, ++ LE edema    LABS:                        7.1    11.43 )-----------( 175      ( 19 Oct 2019 10:58 )             24.5     10-19    147<H>  |  108<H>  |  189.0<H>  ----------------------------<  141<H>  5.6<H>   |  19.0<L>  |  4.50<H>    Ca    9.1      19 Oct 2019 10:58  Mg     3.0     10-18                  RADIOLOGY & ADDITIONAL TESTS:

## 2019-10-19 NOTE — PROGRESS NOTE ADULT - ASSESSMENT
Assessment: 86yo male w/ extensive medical comorbidities and chronic sacral decubitus ulcers. Pt would likely benefit from enzymatic debridement as well as serial bedside debridements. Given medical condition, pt would be very poor surgical candidate. Will continue to follow and assist w/ wound management, however even w/ aggressive wound care the patient's condition presents significant barriers to healing.    Plan:  BID Santyl and W2D packing of decubitus ulcers (Surgery team to do am dressing changes, nursing staff to do PM changes)  rec aggressive bowel regimen, high risk for constipation.  Nutrition consult w/ aggressive TF and vitamin supplementation via PEG tube  cont offloading and use of air fluidized bed.   Surgery to continue to follow.

## 2019-10-19 NOTE — PROGRESS NOTE ADULT - ASSESSMENT
HPI:   87y Male w/ extensive PMH including  CAD w/ 2x stents, HTN, . 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  Pt sent from [facility] on 10-09-19 after in-house nephrologist noticed pt to have worsening anemia and renal function. Pt w/ extensive history of decubitus ulcers given his bedbound status and multiple MDR infections as well as osteomyelitis. Per RN report, pt had episode of bleeding from his sacral decubitus ulcer having saturated an abd pad which prompted surgical evaluation.  Pt has history of multiple prior debridements, w/ several having been performed at [facility] in 2018.     ROSALIA r/o ATN in setting of probable sepsis==> renal function stable  PCKD no hydro on NCCT abdomen 10-9  Hyperkalemia persists  End stage dementia with chronic respiratory failure  Anemia s/p PRBCs  Component of increased BUN related to steroids , so would taper as tolerated   Will cont to treat K+ medically , team has changed tube feeds to nepro and given Kayexalate   Pt on Digoxin , which allows some protective effect ( dig level 1.7 today  )   Continue IV Bumex for now with enteral bicarb   -Avoid potential nephrotoxins  I left message at the law office of legal guardian Van to discuss case 10-18 , spoke with  for Van Monroy , But he never returned my call .   I called again today ( # listed [phone]), but phone just rings and no answering machine or service   Given overall poor quality of life , comorbid medical issues , and very poor prognosis ( which pre-dated his renal issues )            HD would not be of much benefit . I will discuss this with his legal guardian , when able to communicate with him

## 2019-10-19 NOTE — PROGRESS NOTE ADULT - SUBJECTIVE AND OBJECTIVE BOX
Subjective: Patient was seen and examined. No new acute issues. Ostomy in place, pink and well perfused. Beginning to have ostomy output now.       MEDICATIONS  (STANDING):  ALBUTerol    0.083%. 2.5 milliGRAM(s) Nebulizer once  amLODIPine   Tablet 5 milliGRAM(s) Oral daily  ascorbic acid 500 milliGRAM(s) Oral daily  buMETAnide Injectable 2 milliGRAM(s) IV Push daily  chlorhexidine 0.12% Liquid 15 milliLiter(s) Oral Mucosa every 12 hours  chlorhexidine 2% Cloths 1 Application(s) Topical <User Schedule>  collagenase Ointment 1 Application(s) Topical two times a day  digoxin     Tablet 0.125 milliGRAM(s) Oral daily  lactobacillus acidophilus 1 Tablet(s) Oral daily  levETIRAcetam  IVPB 1000 milliGRAM(s) IV Intermittent every 12 hours  metoprolol tartrate 25 milliGRAM(s) Oral two times a day  multivitamin 1 Tablet(s) Oral daily  nystatin Cream 1 Application(s) Topical two times a day  pantoprazole  Injectable 40 milliGRAM(s) IV Push every 12 hours  phenytoin   Suspension 400 milliGRAM(s) Enteral Tube every 12 hours  predniSONE   Tablet 30 milliGRAM(s) Oral daily  sodium bicarbonate 650 milliGRAM(s) Oral every 8 hours  sodium polystyrene sulfonate Suspension 15 Gram(s) Oral daily  zinc sulfate 220 milliGRAM(s) Oral daily    MEDICATIONS  (PRN):  acetaminophen    Suspension .. 650 milliGRAM(s) Oral every 6 hours PRN Temp greater or equal to 38.5C (101.3F)  ALBUTerol/ipratropium for Nebulization. 3 milliLiter(s) Nebulizer every 4 hours PRN Bronchospasm      Vital Signs Last 24 Hrs  T(C): 36.8 (19 Oct 2019 00:16), Max: 36.9 (18 Oct 2019 07:26)  T(F): 98.3 (19 Oct 2019 00:16), Max: 98.4 (18 Oct 2019 07:26)  HR: 78 (19 Oct 2019 00:16) (70 - 96)  BP: 139/63 (19 Oct 2019 00:16) (127/71 - 144/74)  BP(mean): --  RR: 22 (19 Oct 2019 00:16) (18 - 22)  SpO2: 98% (19 Oct 2019 00:16) (97% - 100%)      10-17  -  10-18  --------------------------------------------------------  IN:    Free Water: 300 mL    Pivot 1.5: 840 mL  Total IN: 1140 mL    OUT:    Colostomy: 150 mL    Indwelling Catheter - Urethral: 2250 mL  Total OUT: 2400 mL    Total NET: -1260 mL      10-18  -  10-19  --------------------------------------------------------  IN:    Free Water: 300 mL  Total IN: 300 mL    OUT:    Colostomy: 500 mL    Indwelling Catheter - Urethral: 900 mL  Total OUT: 1400 mL    Total NET: -1100 mL    Physical Exam:    Gen: chronically ill appearing frail bedbound elderly male  Neuro: Nonverbal at baseline and w/ extensive deficits s/p multiple CVA's  RESP: Tracheostomy in place, on ventillator w/ coarse bilateral BS  CVS: RRR,   GI: Ostomy in place having output, abd soft, NT, moderately distended, no rebound/guarding. 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   Extremities: bilateral unstageable decubitus ulcers of patient's ankles. Extensive contractions of all 4 extremities.   Skin: Bilateral trochanteric decubitus ulcers, both stage 4 and left ulcer tracks 5 inch deep. Both covered w/ necrotic fibrinous exudate. No purulent discharge. Left trochanteric ulcer w/ small skin bleed that was controlled w/ surgicel. Sacral ulcer w/ both necrotic fibrinous exudate and granulation tissue. 2 unstageable ulcers of upper back covered w/ eschar.       LABS:                        7.1    x     )-----------( x        ( 18 Oct 2019 18:19 )             24.5     10-18    145  |  107  |  185.0<H>  ----------------------------<  141<H>  6.2<HH>   |  21.0<L>  |  4.37<H>    Ca    9.3      18 Oct 2019 18:19  Phos  3.1     10-17  Mg     3.0     10-18    TPro  x   /  Alb  2.0<L>  /  TBili  x   /  DBili  x   /  AST  x   /  ALT  x   /  AlkPhos  x   10-17 Subjective: Patient was seen and examined. No new acute issues. Ostomy in place, pink and well perfused. Beginning to have ostomy output now.       MEDICATIONS  (STANDING):  ALBUTerol    0.083%. 2.5 milliGRAM(s) Nebulizer once  amLODIPine   Tablet 5 milliGRAM(s) Oral daily  ascorbic acid 500 milliGRAM(s) Oral daily  buMETAnide Injectable 2 milliGRAM(s) IV Push daily  chlorhexidine 0.12% Liquid 15 milliLiter(s) Oral Mucosa every 12 hours  chlorhexidine 2% Cloths 1 Application(s) Topical <User Schedule>  collagenase Ointment 1 Application(s) Topical two times a day  digoxin     Tablet 0.125 milliGRAM(s) Oral daily  lactobacillus acidophilus 1 Tablet(s) Oral daily  levETIRAcetam  IVPB 1000 milliGRAM(s) IV Intermittent every 12 hours  metoprolol tartrate 25 milliGRAM(s) Oral two times a day  multivitamin 1 Tablet(s) Oral daily  nystatin Cream 1 Application(s) Topical two times a day  pantoprazole  Injectable 40 milliGRAM(s) IV Push every 12 hours  phenytoin   Suspension 400 milliGRAM(s) Enteral Tube every 12 hours  predniSONE   Tablet 30 milliGRAM(s) Oral daily  sodium bicarbonate 650 milliGRAM(s) Oral every 8 hours  sodium polystyrene sulfonate Suspension 15 Gram(s) Oral daily  zinc sulfate 220 milliGRAM(s) Oral daily    MEDICATIONS  (PRN):  acetaminophen    Suspension .. 650 milliGRAM(s) Oral every 6 hours PRN Temp greater or equal to 38.5C (101.3F)  ALBUTerol/ipratropium for Nebulization. 3 milliLiter(s) Nebulizer every 4 hours PRN Bronchospasm      Vital Signs Last 24 Hrs  T(C): 36.8 (19 Oct 2019 00:16), Max: 36.9 (18 Oct 2019 07:26)  T(F): 98.3 (19 Oct 2019 00:16), Max: 98.4 (18 Oct 2019 07:26)  HR: 78 (19 Oct 2019 00:16) (70 - 96)  BP: 139/63 (19 Oct 2019 00:16) (127/71 - 144/74)  BP(mean): --  RR: 22 (19 Oct 2019 00:16) (18 - 22)  SpO2: 98% (19 Oct 2019 00:16) (97% - 100%)      10-17  -  10-18  --------------------------------------------------------  IN:    Free Water: 300 mL    Pivot 1.5: 840 mL  Total IN: 1140 mL    OUT:    Colostomy: 150 mL    Indwelling Catheter - Urethral: 2250 mL  Total OUT: 2400 mL    Total NET: -1260 mL      10-18  -  10-19  --------------------------------------------------------  IN:    Free Water: 300 mL  Total IN: 300 mL    OUT:    Colostomy: 500 mL    Indwelling Catheter - Urethral: 900 mL  Total OUT: 1400 mL    Total NET: -1100 mL    Physical Exam:    Gen: chronically ill appearing frail bedbound elderly male  Neuro: Nonverbal at baseline and w/ extensive deficits s/p multiple CVA's  RESP: Tracheostomy in place, on ventillator w/ coarse bilateral BS  CVS: RRR,   GI: Ostomy in place having output, abd soft, NT, moderately distended, no rebound/guarding. 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   Extremities: bilateral unstageable decubitus ulcers of patient's ankles. Extensive contractions of all 4 extremities.   Skin: Bilateral trochanteric decubitus ulcers, both stage 4 and left ulcer tracks 5 inch deep. Both covered w/ necrotic fibrinous exudate. No purulent discharge. Left trochanteric ulcer w/ small skin bleed that was controlled w/ surgicel. Sacral ulcer w/ both necrotic fibrinous exudate and granulation tissue. 2 unstageable ulcers of upper back covered w/ eschar.     SUMMARY OF DECUBITUS ULCERS 10/19  -Thoracic midline back 3x3cm stage 3 with fibrinous exudate  -Sacral (2x) upper is 3x3, lower is 5x7cm; both stage 4  -L  trochanteric ulcer, [id number] tracks 5 inches deep with fibrinous exudate  -R Elbow 1x1 cm stage 2  -L foot 8x8cm stage 3  -R foot 5x12cm stage 4 ulcer with fibrinous exudate  -R lower shin 2x4cm stage 2    LABS:                        7.1    x     )-----------( x        ( 18 Oct 2019 18:19 )             24.5     10-18    145  |  107  |  185.0<H>  ----------------------------<  141<H>  6.2<HH>   |  21.0<L>  |  4.37<H>    Ca    9.3      18 Oct 2019 18:19  Phos  3.1     10-17  Mg     3.0     10-18    TPro  x   /  Alb  2.0<L>  /  TBili  x   /  DBili  x   /  AST  x   /  ALT  x   /  AlkPhos  x   10-17

## 2019-10-19 NOTE — PROGRESS NOTE ADULT - ASSESSMENT
Acute on chronic renal failure with hyperkalemia, hypernatremia  not HD candidate as per renal .  Continue Bumex as per Nephrology.  Continue NaHCO3. Hyperkalemia resolved with Kayexalate. TF changed to Nepro.  Awaiting to hear back from HCP      Sacral/ischial osteomyelitis  - Afebrile, but with leukocytosis.   Blood culture negative, urine culture with polymicrobial growht - contaminated.  Sputum with pseudomonas R to Carbapenems but S to cefepime, could be colonization. Completed Cefepime 1gm q12   - diverting colostomy in place .  - Collagenase, wound care, off loading.  - Nutrition supplements  - ID signed off .    Aspiration pneumonia. Completed antibiotics - see above  - Aspiration precautions.    VDRF  - Continue Vent  - Trach care    Seizure disorder   - Continue AED  - Seizure precautions    HTN  - Continue antihypertensives    AF - rate controlled  - Continue BB  - No anticoagulants    Anemia, acute on chronic. History of bleeding in ostomy as well as from Sacral decubitus. None currently.  Monitor Hgb, continue supplements. PPI     Prophylactic measure  - VCD for VTEp, no pharmacologic means as GIB  - Probiotic        [Spoke to Legal guardian Mr. Van Villarreal, transfusion consent obtained. suggested to transfuse if Hb <6 though i offered below 7. reported patient tolerated low hb in the past and had multiple transfusion in the past] as per prior Hospitalist note      GOC: (prior conversations) discussed briefly over phone and updated about current condition on 10/17, plan of care prognosis.discusssed about comfort care/hospice. he told me he was available to meeting with palliative team in person any time today 10/18 after 9:30 am. Palliative MD Dr. Terri Bonner was informed. She tried to reach him and left VM*2. did not get any call back yet. Van verbalized understanding that stopping trach/vent can cause death. he will discuss GOC with palliative team. Van requesting patient daughter should not be aware of GOC discussion.    Van Monroy [ ] is his legal guardian , daughter Ms. Monet Aldrich remains at bedside. as per Van and palliative team, as per court order, we should not discuss patient conditions with his daughter and she has no right to make any health care decision for her father.

## 2019-10-20 DIAGNOSIS — L89.90 PRESSURE ULCER OF UNSPECIFIED SITE, UNSPECIFIED STAGE: ICD-10-CM

## 2019-10-20 LAB
ANION GAP SERPL CALC-SCNC: 21 MMOL/L — HIGH (ref 5–17)
BUN SERPL-MCNC: 168 MG/DL — HIGH (ref 8–20)
CALCIUM SERPL-MCNC: 9.1 MG/DL — SIGNIFICANT CHANGE UP (ref 8.6–10.2)
CHLORIDE SERPL-SCNC: 108 MMOL/L — HIGH (ref 98–107)
CO2 SERPL-SCNC: 21 MMOL/L — LOW (ref 22–29)
CREAT SERPL-MCNC: 4.27 MG/DL — HIGH (ref 0.5–1.3)
GLUCOSE SERPL-MCNC: 130 MG/DL — HIGH (ref 70–115)
HCT VFR BLD CALC: 26.2 % — LOW (ref 39–50)
HGB BLD-MCNC: 7.8 G/DL — LOW (ref 13–17)
MCHC RBC-ENTMCNC: 29.8 GM/DL — LOW (ref 32–36)
MCHC RBC-ENTMCNC: 31 PG — SIGNIFICANT CHANGE UP (ref 27–34)
MCV RBC AUTO: 104 FL — HIGH (ref 80–100)
PLATELET # BLD AUTO: 168 K/UL — SIGNIFICANT CHANGE UP (ref 150–400)
POTASSIUM SERPL-MCNC: 4.9 MMOL/L — SIGNIFICANT CHANGE UP (ref 3.5–5.3)
POTASSIUM SERPL-SCNC: 4.9 MMOL/L — SIGNIFICANT CHANGE UP (ref 3.5–5.3)
RBC # BLD: 2.52 M/UL — LOW (ref 4.2–5.8)
RBC # FLD: 18.3 % — HIGH (ref 10.3–14.5)
SODIUM SERPL-SCNC: 150 MMOL/L — HIGH (ref 135–145)
WBC # BLD: 14.24 K/UL — HIGH (ref 3.8–10.5)
WBC # FLD AUTO: 14.24 K/UL — HIGH (ref 3.8–10.5)

## 2019-10-20 PROCEDURE — 99232 SBSQ HOSP IP/OBS MODERATE 35: CPT

## 2019-10-20 RX ADMIN — LEVETIRACETAM 400 MILLIGRAM(S): 250 TABLET, FILM COATED ORAL at 17:13

## 2019-10-20 RX ADMIN — Medication 1 TABLET(S): at 11:21

## 2019-10-20 RX ADMIN — Medication 650 MILLIGRAM(S): at 11:21

## 2019-10-20 RX ADMIN — Medication 650 MILLIGRAM(S): at 22:05

## 2019-10-20 RX ADMIN — Medication 1 APPLICATION(S): at 06:04

## 2019-10-20 RX ADMIN — ZINC SULFATE TAB 220 MG (50 MG ZINC EQUIVALENT) 220 MILLIGRAM(S): 220 (50 ZN) TAB at 11:21

## 2019-10-20 RX ADMIN — Medication 650 MILLIGRAM(S): at 06:04

## 2019-10-20 RX ADMIN — Medication 25 MILLIGRAM(S): at 06:03

## 2019-10-20 RX ADMIN — PANTOPRAZOLE SODIUM 40 MILLIGRAM(S): 20 TABLET, DELAYED RELEASE ORAL at 06:03

## 2019-10-20 RX ADMIN — Medication 30 MILLIGRAM(S): at 06:03

## 2019-10-20 RX ADMIN — SODIUM POLYSTYRENE SULFONATE 15 GRAM(S): 4.1 POWDER, FOR SUSPENSION ORAL at 11:21

## 2019-10-20 RX ADMIN — CHLORHEXIDINE GLUCONATE 15 MILLILITER(S): 213 SOLUTION TOPICAL at 06:03

## 2019-10-20 RX ADMIN — Medication 500 MILLIGRAM(S): at 11:21

## 2019-10-20 RX ADMIN — BUMETANIDE 2 MILLIGRAM(S): 0.25 INJECTION INTRAMUSCULAR; INTRAVENOUS at 06:02

## 2019-10-20 RX ADMIN — AMLODIPINE BESYLATE 5 MILLIGRAM(S): 2.5 TABLET ORAL at 06:03

## 2019-10-20 RX ADMIN — Medication 25 MILLIGRAM(S): at 17:12

## 2019-10-20 RX ADMIN — CHLORHEXIDINE GLUCONATE 15 MILLILITER(S): 213 SOLUTION TOPICAL at 17:11

## 2019-10-20 RX ADMIN — PANTOPRAZOLE SODIUM 40 MILLIGRAM(S): 20 TABLET, DELAYED RELEASE ORAL at 17:12

## 2019-10-20 RX ADMIN — NYSTATIN CREAM 1 APPLICATION(S): 100000 CREAM TOPICAL at 17:11

## 2019-10-20 RX ADMIN — Medication 400 MILLIGRAM(S): at 06:03

## 2019-10-20 RX ADMIN — CHLORHEXIDINE GLUCONATE 1 APPLICATION(S): 213 SOLUTION TOPICAL at 06:05

## 2019-10-20 RX ADMIN — Medication 0.12 MILLIGRAM(S): at 06:03

## 2019-10-20 RX ADMIN — Medication 1 APPLICATION(S): at 17:11

## 2019-10-20 RX ADMIN — Medication 400 MILLIGRAM(S): at 17:12

## 2019-10-20 RX ADMIN — LEVETIRACETAM 400 MILLIGRAM(S): 250 TABLET, FILM COATED ORAL at 06:04

## 2019-10-20 RX ADMIN — NYSTATIN CREAM 1 APPLICATION(S): 100000 CREAM TOPICAL at 06:04

## 2019-10-20 NOTE — PROGRESS NOTE ADULT - SUBJECTIVE AND OBJECTIVE BOX
INTERVAL HPI/OVERNIGHT EVENTS: Patient had no acute events overnight, remains on vent Assist control, colostomy with semi-formed brown stool w/gas. Dr. Taylor to have eventual talk with health care proxy regarding goals of care. Nurses continue to do evening dressing changes and surgery team to do morning changes      MEDICATIONS  (STANDING):  ALBUTerol    0.083%. 2.5 milliGRAM(s) Nebulizer once  amLODIPine   Tablet 5 milliGRAM(s) Oral daily  ascorbic acid 500 milliGRAM(s) Oral daily  buMETAnide Injectable 2 milliGRAM(s) IV Push daily  chlorhexidine 0.12% Liquid 15 milliLiter(s) Oral Mucosa every 12 hours  chlorhexidine 2% Cloths 1 Application(s) Topical <User Schedule>  collagenase Ointment 1 Application(s) Topical two times a day  digoxin     Tablet 0.125 milliGRAM(s) Oral daily  lactobacillus acidophilus 1 Tablet(s) Oral daily  levETIRAcetam  IVPB 1000 milliGRAM(s) IV Intermittent every 12 hours  metoprolol tartrate 25 milliGRAM(s) Oral two times a day  multivitamin 1 Tablet(s) Oral daily  nystatin Cream 1 Application(s) Topical two times a day  pantoprazole  Injectable 40 milliGRAM(s) IV Push every 12 hours  phenytoin   Suspension 400 milliGRAM(s) Enteral Tube every 12 hours  predniSONE   Tablet 30 milliGRAM(s) Oral daily  sodium bicarbonate 650 milliGRAM(s) Oral every 8 hours  sodium polystyrene sulfonate Suspension 15 Gram(s) Oral daily  zinc sulfate 220 milliGRAM(s) Oral daily    MEDICATIONS  (PRN):  acetaminophen    Suspension .. 650 milliGRAM(s) Oral every 6 hours PRN Temp greater or equal to 38.5C (101.3F)  ALBUTerol/ipratropium for Nebulization. 3 milliLiter(s) Nebulizer every 4 hours PRN Bronchospasm      Vital Signs Last 24 Hrs  T(C): 36.9 (20 Oct 2019 00:55), Max: 36.9 (20 Oct 2019 00:55)  T(F): 98.4 (20 Oct 2019 00:55), Max: 98.4 (20 Oct 2019 00:55)  HR: 69 (20 Oct 2019 00:55) (63 - 75)  BP: 134/60 (20 Oct 2019 00:55) (117/66 - 142/62)  BP(mean): --  RR: 21 (20 Oct 2019 00:55) (21 - 21)  SpO2: 98% (20 Oct 2019 00:55) (97% - 100%)    PHYSICAL EXAM:      Constitutional: at baseline    Respiratory: on ventilator assist control, trached    Cardiovascular:    Gastrointestinal: abdomen soft, stoma pink and viable with brown semi formed stool w/ gas    Genitourinary: jones in place with clear yellow urine    Extremities: contracted extremities     Skin:        I&O's Detail    18 Oct 2019 07:01  -  19 Oct 2019 07:00  --------------------------------------------------------  IN:    Free Water: 300 mL  Total IN: 300 mL    OUT:    Colostomy: 750 mL    Indwelling Catheter - Urethral: 1725 mL  Total OUT: 2475 mL    Total NET: -2175 mL          LABS:                        7.1    11.43 )-----------( 175      ( 19 Oct 2019 10:58 )             24.5     10-19    147<H>  |  108<H>  |  189.0<H>  ----------------------------<  141<H>  5.6<H>   |  19.0<L>  |  4.50<H>    Ca    9.1      19 Oct 2019 10:58  Mg     3.0     10-18            RADIOLOGY & ADDITIONAL STUDIES:

## 2019-10-20 NOTE — PROGRESS NOTE ADULT - SUBJECTIVE AND OBJECTIVE BOX
NEPHROLOGY INTERVAL HPI/OVERNIGHT EVENTS:    Interim noted   No new major events   Surgery follow up noted   I still have not received call back from HCP     MEDICATIONS  (STANDING):  ALBUTerol    0.083%. 2.5 milliGRAM(s) Nebulizer once  amLODIPine   Tablet 5 milliGRAM(s) Oral daily  ascorbic acid 500 milliGRAM(s) Oral daily  buMETAnide Injectable 2 milliGRAM(s) IV Push daily  chlorhexidine 0.12% Liquid 15 milliLiter(s) Oral Mucosa every 12 hours  chlorhexidine 2% Cloths 1 Application(s) Topical <User Schedule>  collagenase Ointment 1 Application(s) Topical two times a day  digoxin     Tablet 0.125 milliGRAM(s) Oral daily  lactobacillus acidophilus 1 Tablet(s) Oral daily  levETIRAcetam  IVPB 1000 milliGRAM(s) IV Intermittent every 12 hours  metoprolol tartrate 25 milliGRAM(s) Oral two times a day  multivitamin 1 Tablet(s) Oral daily  nystatin Cream 1 Application(s) Topical two times a day  pantoprazole  Injectable 40 milliGRAM(s) IV Push every 12 hours  phenytoin   Suspension 400 milliGRAM(s) Enteral Tube every 12 hours  predniSONE   Tablet 30 milliGRAM(s) Oral daily  sodium bicarbonate 650 milliGRAM(s) Oral every 8 hours    MEDICATIONS  (PRN):  acetaminophen    Suspension .. 650 milliGRAM(s) Oral every 6 hours PRN Temp greater or equal to 38.5C (101.3F)  ALBUTerol/ipratropium for Nebulization. 3 milliLiter(s) Nebulizer every 4 hours PRN Bronchospasm      Allergies    clindamycin (Unknown)  Grapes (Rash)  Nuts (Hives; Rash)  PC Pen VK (Unknown)  shellfish (Angioedema; Rash; Hives)  strawberry (Short breath; Rash; Hives)  Xanax (Rash)    Intolerances    Ativan (Unknown)  Haldol (Dystonic RXN)  hydrALAZINE (Unknown)  Zyprexa (Unknown)        Vital Signs Last 24 Hrs  T(C): 36.7 (20 Oct 2019 08:20), Max: 36.9 (20 Oct 2019 00:55)  T(F): 98 (20 Oct 2019 08:20), Max: 98.4 (20 Oct 2019 00:55)  HR: 70 (20 Oct 2019 08:44) (63 - 74)  BP: 120/71 (20 Oct 2019 08:20) (117/66 - 134/60)  BP(mean): --  RR: 22 (20 Oct 2019 08:20) (21 - 22)  SpO2: 99% (20 Oct 2019 08:44) (97% - 99%)  Daily     Daily   I&O's Detail    19 Oct 2019 07:01  -  20 Oct 2019 07:00  --------------------------------------------------------  IN:  Total IN: 0 mL    OUT:    Indwelling Catheter - Urethral: 1150 mL  Total OUT: 1150 mL    Total NET: -1150 mL        I&O's Summary    19 Oct 2019 07:01  -  20 Oct 2019 07:00  --------------------------------------------------------  IN: 0 mL / OUT: 1150 mL / NET: -1150 mL        PHYSICAL EXAM:  GENERAL: Chronically ill and debilitated  HEAD:  No periorbital edema  EYES: EOMI, PERRLA, conjunctiva and sclera clear  ENMT: Dry mucous membranes, poor dentition  NECK: Supple, No JVD; trach  NERVOUS SYSTEM: Lethargic  CHEST/LUNG: Clear anteriorly with diminished BS  HEART: No rub  ABDOMEN: Soft, Nontender, Nondistended; Bowel sounds present; + PEG tube  EXTREMITIES:  2+ Peripheral Pulses, ++ LE edema    LABS:                        7.8    14.24 )-----------( 168      ( 20 Oct 2019 09:43 )             26.2     10-20    150<H>  |  108<H>  |  168.0<H>  ----------------------------<  130<H>  4.9   |  21.0<L>  |  4.27<H>    Ca    9.1      20 Oct 2019 09:43                  RADIOLOGY & ADDITIONAL TESTS:

## 2019-10-20 NOTE — PROGRESS NOTE ADULT - ASSESSMENT
HPI:   87y Male w/ extensive PMH including  CAD w/ 2x stents, HTN, . 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  Pt sent from [facility] on 10-09-19 after in-house nephrologist noticed pt to have worsening anemia and renal function. Pt w/ extensive history of decubitus ulcers given his bedbound status and multiple MDR infections as well as osteomyelitis. Per RN report, pt had episode of bleeding from his sacral decubitus ulcer having saturated an abd pad which prompted surgical evaluation.  Pt has history of multiple prior debridements, w/ several having been performed at [facility] in 2018.     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   I called again today ( # listed [phone]), but phone just rings and no answering machine or service   Given overall poor quality of life , comorbid medical issues , and very poor prognosis ( which pre-dated his renal issues )            HD would not be of much benefit . I will discuss this with his legal guardian , when able to communicate with him

## 2019-10-20 NOTE — PROGRESS NOTE ADULT - ASSESSMENT
1. sacral/ischial osteomyelitis   - Blood culture neg  - Urine possible contaminated   - Sputum with pseudomonas R to Carbapenems but S to cefepime, could be colonization.   - completed Cefepime 1gm q12   - diverting colostomy in place .  - ID signed off .    2. Aspiration PNA   off Abx .  aspiration precautions  on PEG feeding    3. Acute on chronic renal failure with hyperkalemia, hypernatremia  nephro on board .  not HD candidate as per renal . Renal left message to HCP for further discussion  DCed IVf 10/15  for worsening anasarca. increase free water through PEG tube as needed  renal started IV bumex  monitor RF  strict I and O  Kayexalate daily for 2-3 days  on digoxin and sodi-bicarb that will help to reduce L as well  monitor electrolytes    4. Chronic respiratory failure   trach to vent .  pulmonary on board .    5. Seizure disorder   c/w home regimen   seizure precautions    6. HTN '  c/w home regimen .    7. Afib   on rate controlling agents .  no AC as had bleeding issues in past .    8. Anemia   likely multifactorial , and renal insufficiency   h/h stable  transfuse as needed .     9. DVT prophylaxis   boots , no chemical prophylaxis due to anemia .     10. fungal rash: nystatin powder. monitor    11. sacral decubitus: wound care. bleeding stopped. ACS consulted. daily dressing am and PM as per ACS. monitor h/h.     12. anemia: acute on chronic due to bleeding from sacral decubitus which is currently stopped  Spoke to Legal guardian Mr. Van Villarreal, transfusion consent obtained. suggested to transfuse if Hb <6 though i offered below 7. reported patient tolerated low hb in the past and had multiple transfusion in the past      GOC: (prior conversations) discussed briefly over phone and updated about current condition on 10/17, plan of care prognosis.discusssed about comfort care/hospice. he told me he was available to meeting with palliative team in person any time today 10/18 after 9:30 am. Palliative MD Dr. Terri Bonner was informed. She tried to reach him and left VM*2. did not get any call back yet. Van verbalized understanding that stopping trach/vent can cause death. he will discuss GOC with palliative team. Van requesting patient daughter should not be aware of GOC discussion.    Van Monroy [ ] is his legal guardian , daughter Ms. Monet Tomasino remains at bedside. as per Van and palliative team, as per court order, we should not discuss patient conditions with his daughter and she has no right to make any health care decision for her father. Acute on chronic renal failure with hyperkalemia, hypernatremia  not HD candidate as per renal .  Continue Bumex as per Nephrology.  Continue NaHCO3. Hyperkalemia resolved with Kayexalate. TF changed to Nepro. Worsening hypernatremia   Awaiting to hear back from HCP      Sacral/ischial osteomyelitis  - Afebrile, but with interval worsening of leukocytosis.   Blood culture negative, urine culture with polymicrobial growht - contaminated.  Sputum with pseudomonas R to Carbapenems but S to cefepime, could be colonization. Completed Cefepime 1gm q12   - diverting colostomy in place .  - Collagenase, wound care, off loading.  - Nutrition supplements  - ID signed off .    Aspiration pneumonia. Completed antibiotics - see above  - Aspiration precautions.    VDRF  - Continue Vent  - Trach care    Seizure disorder   - Continue AED  - Seizure precautions    HTN  - Continue antihypertensives    AF - rate controlled  - Continue BB  - No anticoagulants    Anemia, acute on chronic. History of bleeding in ostomy as well as from Sacral decubitus. None currently.  Monitor Hgb, continue supplements. PPI     Prophylactic measure  - VCD for VTEp, no pharmacologic means as GIB  - Probiotic        [Spoke to Legal guardian Mr. Van Villarreal, transfusion consent obtained. suggested to transfuse if Hb <6 though i offered below 7. reported patient tolerated low hb in the past and had multiple transfusion in the past] as per prior Hospitalist note      GOC: (prior conversations) discussed briefly over phone and updated about current condition on 10/17, plan of care prognosis. discusssed about comfort care/hospice. he told me he was available to meeting with palliative team in person any time today 10/18 after 9:30 am. Palliative MD Dr. Terri Bonner was informed. She tried to reach him and left VM*2. did not get any call back yet. Van verbalized understanding that stopping trach/vent can cause death. he will discuss GOC with palliative team. Van requesting patient daughter should not be aware of GOC discussion.    Van Monroy [ ] is his legal guardian , daughter Ms. Monet Aldrich remains at bedside. as per Van and palliative team, as per court order, we should not discuss patient conditions with his daughter and she has no right to make any health care decision for her father.

## 2019-10-21 PROCEDURE — 99232 SBSQ HOSP IP/OBS MODERATE 35: CPT

## 2019-10-21 RX ORDER — ERYTHROPOIETIN 10000 [IU]/ML
20000 INJECTION, SOLUTION INTRAVENOUS; SUBCUTANEOUS
Refills: 0 | Status: DISCONTINUED | OUTPATIENT
Start: 2019-10-21 | End: 2019-10-23

## 2019-10-21 RX ORDER — FOLIC ACID 0.8 MG
1 TABLET ORAL DAILY
Refills: 0 | Status: DISCONTINUED | OUTPATIENT
Start: 2019-10-21 | End: 2019-10-23

## 2019-10-21 RX ADMIN — Medication 30 MILLIGRAM(S): at 06:37

## 2019-10-21 RX ADMIN — Medication 1 TABLET(S): at 11:18

## 2019-10-21 RX ADMIN — Medication 25 MILLIGRAM(S): at 17:06

## 2019-10-21 RX ADMIN — LEVETIRACETAM 400 MILLIGRAM(S): 250 TABLET, FILM COATED ORAL at 17:06

## 2019-10-21 RX ADMIN — Medication 500 MILLIGRAM(S): at 11:18

## 2019-10-21 RX ADMIN — Medication 650 MILLIGRAM(S): at 21:52

## 2019-10-21 RX ADMIN — BUMETANIDE 2 MILLIGRAM(S): 0.25 INJECTION INTRAMUSCULAR; INTRAVENOUS at 06:37

## 2019-10-21 RX ADMIN — PANTOPRAZOLE SODIUM 40 MILLIGRAM(S): 20 TABLET, DELAYED RELEASE ORAL at 06:37

## 2019-10-21 RX ADMIN — CHLORHEXIDINE GLUCONATE 15 MILLILITER(S): 213 SOLUTION TOPICAL at 17:06

## 2019-10-21 RX ADMIN — CHLORHEXIDINE GLUCONATE 15 MILLILITER(S): 213 SOLUTION TOPICAL at 06:37

## 2019-10-21 RX ADMIN — Medication 0.12 MILLIGRAM(S): at 06:37

## 2019-10-21 RX ADMIN — Medication 1 APPLICATION(S): at 06:38

## 2019-10-21 RX ADMIN — Medication 650 MILLIGRAM(S): at 11:18

## 2019-10-21 RX ADMIN — Medication 25 MILLIGRAM(S): at 06:37

## 2019-10-21 RX ADMIN — NYSTATIN CREAM 1 APPLICATION(S): 100000 CREAM TOPICAL at 17:07

## 2019-10-21 RX ADMIN — Medication 1 MILLIGRAM(S): at 17:09

## 2019-10-21 RX ADMIN — Medication 400 MILLIGRAM(S): at 17:06

## 2019-10-21 RX ADMIN — Medication 400 MILLIGRAM(S): at 06:38

## 2019-10-21 RX ADMIN — PANTOPRAZOLE SODIUM 40 MILLIGRAM(S): 20 TABLET, DELAYED RELEASE ORAL at 17:09

## 2019-10-21 RX ADMIN — AMLODIPINE BESYLATE 5 MILLIGRAM(S): 2.5 TABLET ORAL at 06:37

## 2019-10-21 RX ADMIN — Medication 1 APPLICATION(S): at 17:06

## 2019-10-21 RX ADMIN — LEVETIRACETAM 400 MILLIGRAM(S): 250 TABLET, FILM COATED ORAL at 06:37

## 2019-10-21 RX ADMIN — Medication 650 MILLIGRAM(S): at 06:37

## 2019-10-21 RX ADMIN — NYSTATIN CREAM 1 APPLICATION(S): 100000 CREAM TOPICAL at 06:38

## 2019-10-21 NOTE — PROGRESS NOTE ADULT - SUBJECTIVE AND OBJECTIVE BOX
HOSPITALIST PROGRESS NOTE    KING MCKEON  [id number]  87yMale    Patient is a 87y old  Male who presents with a chief complaint of Sepsis (21 Oct 2019 12:37)      SUBJECTIVE:   Chart reviewed since last visit.  Patient seen and examined at bedside for ROSALIA, VDRF, Encephalopathy.  Obtunded, non verbal.    Daughter Greta at bedside (Always present)        OBJECTIVE:  Vital Signs Last 24 Hrs  T(C): 36.3 (21 Oct 2019 15:46), Max: 37.1 (21 Oct 2019 01:09)  T(F): 97.4 (21 Oct 2019 15:46), Max: 98.8 (21 Oct 2019 01:09)  HR: 72 (21 Oct 2019 16:39) (60 - 76)  BP: 121/60 (21 Oct 2019 15:46) (121/60 - 150/70)   RR: 19 (21 Oct 2019 15:46) (19 - 21)  SpO2: 99% (21 Oct 2019 16:39) (97% - 100%)    PHYSICAL EXAMINATION  General: Obese male, lying on right side  HEENT: no facial asymmetry  NECK:  Trach[+]  CVS: regular rate and rhythm S1 S2  RESP:  Fair air entry bilaterally, Pleurx left chest  GI:  G-tube, Soft NT  : Condom catheter - patient removed  MS:  increased tone  CNS:  Encephalopathic  INTEG:  Decubitus sacral  PSYCH:  Unable to obtain    MONITOR:  CAPILLARY BLOOD GLUCOSE            I&O's Summary    20 Oct 2019 07:01  -  21 Oct 2019 07:00  --------------------------------------------------------  IN: 1390 mL / OUT: 1425 mL / NET: -35 mL                            7.8    14.24 )-----------( 168      ( 20 Oct 2019 09:43 )             26.2       10-20    150<H>  |  108<H>  |  168.0<H>  ----------------------------<  130<H>  4.9   |  21.0<L>  |  4.27<H>    Ca    9.1      20 Oct 2019 09:43              Culture:    TTE:    RADIOLOGY        MEDICATIONS  (STANDING):  ALBUTerol    0.083%. 2.5 milliGRAM(s) Nebulizer once  amLODIPine   Tablet 5 milliGRAM(s) Oral daily  ascorbic acid 500 milliGRAM(s) Oral daily  buMETAnide Injectable 2 milliGRAM(s) IV Push daily  chlorhexidine 0.12% Liquid 15 milliLiter(s) Oral Mucosa every 12 hours  chlorhexidine 2% Cloths 1 Application(s) Topical <User Schedule>  collagenase Ointment 1 Application(s) Topical two times a day  digoxin     Tablet 0.125 milliGRAM(s) Oral daily  epoetin giorgio Injectable 57873 Unit(s) SubCutaneous every 7 days  folic acid 1 milliGRAM(s) Oral daily  lactobacillus acidophilus 1 Tablet(s) Oral daily  levETIRAcetam  IVPB 1000 milliGRAM(s) IV Intermittent every 12 hours  metoprolol tartrate 25 milliGRAM(s) Oral two times a day  multivitamin 1 Tablet(s) Oral daily  nystatin Cream 1 Application(s) Topical two times a day  pantoprazole  Injectable 40 milliGRAM(s) IV Push every 12 hours  phenytoin   Suspension 400 milliGRAM(s) Enteral Tube every 12 hours  predniSONE   Tablet 30 milliGRAM(s) Oral daily  sodium bicarbonate 650 milliGRAM(s) Oral every 8 hours      MEDICATIONS  (PRN):  acetaminophen    Suspension .. 650 milliGRAM(s) Oral every 6 hours PRN Temp greater or equal to 38.5C (101.3F)  ALBUTerol/ipratropium for Nebulization. 3 milliLiter(s) Nebulizer every 4 hours PRN Bronchospasm

## 2019-10-21 NOTE — PROGRESS NOTE ADULT - SUBJECTIVE AND OBJECTIVE BOX
INTERVAL HPI/OVERNIGHT EVENTS:  No acute events overnight. Patient evaluated at bedside and found chronically ill on MV, hemodynamically stable. Patient with multiple sacral decubitus ulcers as well as lower extremity and back ulcers. Dressing changes performed by surgery team during the day and nursing performs evening dressing change.     SUBJECTIVE:      MEDICATIONS  (STANDING):  ALBUTerol    0.083%. 2.5 milliGRAM(s) Nebulizer once  amLODIPine   Tablet 5 milliGRAM(s) Oral daily  ascorbic acid 500 milliGRAM(s) Oral daily  buMETAnide Injectable 2 milliGRAM(s) IV Push daily  chlorhexidine 0.12% Liquid 15 milliLiter(s) Oral Mucosa every 12 hours  chlorhexidine 2% Cloths 1 Application(s) Topical <User Schedule>  collagenase Ointment 1 Application(s) Topical two times a day  digoxin     Tablet 0.125 milliGRAM(s) Oral daily  lactobacillus acidophilus 1 Tablet(s) Oral daily  levETIRAcetam  IVPB 1000 milliGRAM(s) IV Intermittent every 12 hours  metoprolol tartrate 25 milliGRAM(s) Oral two times a day  multivitamin 1 Tablet(s) Oral daily  nystatin Cream 1 Application(s) Topical two times a day  pantoprazole  Injectable 40 milliGRAM(s) IV Push every 12 hours  phenytoin   Suspension 400 milliGRAM(s) Enteral Tube every 12 hours  predniSONE   Tablet 30 milliGRAM(s) Oral daily  sodium bicarbonate 650 milliGRAM(s) Oral every 8 hours    MEDICATIONS  (PRN):  acetaminophen    Suspension .. 650 milliGRAM(s) Oral every 6 hours PRN Temp greater or equal to 38.5C (101.3F)  ALBUTerol/ipratropium for Nebulization. 3 milliLiter(s) Nebulizer every 4 hours PRN Bronchospasm      Vital Signs Last 24 Hrs  T(C): 37.1 (21 Oct 2019 01:09), Max: 37.1 (21 Oct 2019 01:09)  T(F): 98.8 (21 Oct 2019 01:09), Max: 98.8 (21 Oct 2019 01:09)  HR: 71 (21 Oct 2019 01:09) (64 - 76)  BP: 130/59 (21 Oct 2019 01:09) (120/71 - 137/68)  BP(mean): --  RR: 21 (21 Oct 2019 01:09) (21 - 22)  SpO2: 98% (21 Oct 2019 01:09) (98% - 99%)    PHYSICAL EXAM:    Gen: chronically ill appearing frail bedbound elderly male, non-verbal  RESP: Tracheostomy in place, on ventillator    GI: Ostomy in place having output, abd soft, NT, moderately distended, no rebound/guarding. 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   Extremities: bilateral unstageable decubitus ulcers of patient's ankles. Extensive contractions of all 4 extremities.   Skin: Bilateral trochanteric decubitus ulcers, both stage 4 and left ulcer tracks 5 inch deep. Both covered w/ necrotic fibrinous exudate. No purulent discharge.  Sacral ulcer w/ both necrotic fibrinous exudate and granulation tissue. 2 unstageable ulcers of upper back covered w/ eschar.       I&O's Detail    19 Oct 2019 07:01  -  20 Oct 2019 07:00  --------------------------------------------------------  IN:  Total IN: 0 mL    OUT:    Indwelling Catheter - Urethral: 1150 mL  Total OUT: 1150 mL    Total NET: -1150 mL      20 Oct 2019 07:01  -  21 Oct 2019 02:13  --------------------------------------------------------  IN:    Free Water: 800 mL    Nepro: 590 mL  Total IN: 1390 mL    OUT:    Colostomy: 250 mL  Total OUT: 250 mL    Total NET: 1140 mL          LABS:                        7.8    14.24 )-----------( 168      ( 20 Oct 2019 09:43 )             26.2     10-20    150<H>  |  108<H>  |  168.0<H>  ----------------------------<  130<H>  4.9   |  21.0<L>  |  4.27<H>    Ca    9.1      20 Oct 2019 09:43

## 2019-10-21 NOTE — PROGRESS NOTE ADULT - ASSESSMENT
87y Male w/ extensive PMH including  CAD w/ 2x stents, HTN, . 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  Pt sent from [facility] on 10-09-19 after in-house nephrologist noticed pt to have worsening anemia and renal function. Pt w/ extensive history of decubitus ulcers given his bedbound status and multiple MDR infections as well as osteomyelitis. Per RN report, pt had episode of bleeding from his sacral decubitus ulcer having saturated an abd pad which prompted surgical evaluation.  Pt has history of multiple prior debridements, w/ several having been performed at [facility] in 2018.     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   I called again today ( # listed [phone]), but phone just rings and no answering machine or service   Given overall poor quality of life , comorbid medical issues , and very poor prognosis ( which pre-dated his renal issues )            HD would not be of much benefit . I will discuss this with his legal guardian , when able to communicate with him --> will call again today     Anemia - Will add weekly Epogen and folate     Hypernatremia - free water added via FT     Edema - Somewhat better with IV Bumex     hyperkalemia - Better with above measures

## 2019-10-21 NOTE — PROGRESS NOTE ADULT - ASSESSMENT
Acute on chronic renal failure with hyperkalemia, hypernatremia  not HD candidate as per renal .  Continue Bumex as per Nephrology.  Continue NaHCO3. Hyperkalemia resolved with Kayexalate. TF changed to Nepro. Worsening hypernatremia         Sacral/ischial osteomyelitis  - Afebrile, but with interval worsening of leukocytosis.   Blood culture negative, urine culture with polymicrobial growht - contaminated.  Sputum with pseudomonas R to Carbapenems but S to cefepime, could be colonization. Completed Cefepime 1gm q12   - diverting colostomy in place .  - Collagenase, wound care, off loading.  - Nutrition supplements  - ID signed off .    Aspiration pneumonia. Completed antibiotics - see above  - Aspiration precautions.    VDRF  - Continue Vent  - Trach care    Seizure disorder   - Continue AED  - Seizure precautions    HTN  - Continue antihypertensives    AF - rate controlled  - Continue BB  - No anticoagulants    Anemia, acute on chronic. History of bleeding in ostomy as well as from Sacral decubitus. None currently.  Monitor Hgb, continue supplements. PPI     Prophylactic measure  - VCD for VTEp, no pharmacologic means as GIB  - Probiotic        [Spoke to Legal guardian Mr. Van Villarreal, transfusion consent obtained. suggested to transfuse if Hb <6 though i offered below 7. reported patient tolerated low hb in the past and had multiple transfusion in the past] as per prior Hospitalist note      GOC: (prior conversations) discussed briefly over phone and updated about current condition on 10/17, plan of care prognosis. discusssed about comfort care/hospice. he told me he was available to meeting with palliative team in person any time today 10/18 after 9:30 am. Palliative MD Dr. Terri Bonner was informed. She tried to reach him and left VM*2. did not get any call back yet. Van verbalized understanding that stopping trach/vent can cause death. he will discuss GOC with palliative team. Van requesting patient daughter should not be aware of GOC discussion.    Van Monroy [ ] is his legal guardian , daughter Ms. Monet Aldrich remains at bedside. as per Van and palliative team, as per court order, we should not discuss patient conditions with his daughter and she has no right to make any health care decision for her father.

## 2019-10-21 NOTE — CHART NOTE - NSCHARTNOTEFT_GEN_A_CORE
Source: Patient [ ]  Family [ ]   other [ ]    Current Diet:   Diet, NPO with Tube Feed:   Tube Feeding Modality: Gastrostomy  Nepro  Continuous  Starting Tube Feed Rate {mL per Hour}: 10  Increase Tube Feed Rate by (mL): 10     Every 8 hours  Until Goal Tube Feed Rate (mL per Hour): 70  Tube Feed Duration (in Hours): 24  Tube Feed Start Time: 18:00   Jesse (7 GM Arginine/ 7G Glut/ 1-2 GM HMB) Qty per Day: 2   Free water: 400 mL every 6 hours     Enteral /Parenteral Nutrition:  Current TF order provides Nepro @ goal rate 70mL/hr (x20 hrs) to provide 1400mL daily (2,520cal, 113g protein), Jesse BID provides pt an [id number] calories, 5g protein daily.     Current Weight:   (10/9)   209.4 lbs    % Weight Change: No recent weight documented       Pertinent Medications: MEDICATIONS  (STANDING):  ALBUTerol    0.083%. 2.5 milliGRAM(s) Nebulizer once  amLODIPine   Tablet 5 milliGRAM(s) Oral daily  ascorbic acid 500 milliGRAM(s) Oral daily  buMETAnide Injectable 2 milliGRAM(s) IV Push daily  chlorhexidine 0.12% Liquid 15 milliLiter(s) Oral Mucosa every 12 hours  chlorhexidine 2% Cloths 1 Application(s) Topical <User Schedule>  collagenase Ointment 1 Application(s) Topical two times a day  digoxin     Tablet 0.125 milliGRAM(s) Oral daily  lactobacillus acidophilus 1 Tablet(s) Oral daily  levETIRAcetam  IVPB 1000 milliGRAM(s) IV Intermittent every 12 hours  metoprolol tartrate 25 milliGRAM(s) Oral two times a day  multivitamin 1 Tablet(s) Oral daily  nystatin Cream 1 Application(s) Topical two times a day  pantoprazole  Injectable 40 milliGRAM(s) IV Push every 12 hours  phenytoin   Suspension 400 milliGRAM(s) Enteral Tube every 12 hours  predniSONE   Tablet 30 milliGRAM(s) Oral daily  sodium bicarbonate 650 milliGRAM(s) Oral every 8 hours    MEDICATIONS  (PRN):  acetaminophen    Suspension .. 650 milliGRAM(s) Oral every 6 hours PRN Temp greater or equal to 38.5C (101.3F)  ALBUTerol/ipratropium for Nebulization. 3 milliLiter(s) Nebulizer every 4 hours PRN Bronchospasm    Pertinent Labs: CBC Full  -  ( 20 Oct 2019 09:43 )  WBC Count : 14.24 K/uL  RBC Count : 2.52 M/uL  Hemoglobin : 7.8 g/dL  Hematocrit : 26.2 %  Platelet Count - Automated : 168 K/uL  Mean Cell Volume : 104.0 fl  Mean Cell Hemoglobin : 31.0 pg  Mean Cell Hemoglobin Concentration : 29.8 gm/dL        Skin: Stage IV sacrum, unstageable to sacrum, unstageable to R/L heels, unstageable to coccyx, skin tear to posterior scrotum and R. forearm  Edema: 2+ generalized, 4+ B/L ankles    Nutrition focused physical exam conducted - found signs of malnutrition [ ]absent [x]present    Subcutaneous fat loss: [ ] Orbital fat pads region, [ ]Buccal fat region, [ ]Triceps region,  [ ]Ribs region    Muscle wasting: [ x] Temples region, [ ]Clavicle region, [ ]Shoulder region, [ ]Scapula region, [ ]Interosseous region,  [ ]thigh region, [ ]Calf region    Estimated Needs:   [x ] no change since previous assessment  [ ] recalculated:     Current Nutrition Diagnosis: Pt presents at high nutrition risk secondary to malnutrition (moderate chronic) related to insufficient protein-energy intake in setting of sepsis, chronic resp failure +trach, impaired skin integrity as evidenced by severe fluid accumulation (3+ generalized, 4+ R/L feet and ankles), likely mask wt loss.   Aware worsening renal function and worsening edema 2/2 CHF, renal insufficiency.  As per EMR pt is at high risk for constipation, initiate aggressive bowel regimen per MD recommendations.     Recommendations:   1) Continue to monitor renal function   2) Weight daily in setting of CHF to monitor fluid shifts/trend weight  3) Initiate aggressive bowel regimen per MD recommendations.    Monitoring and Evaluation:   [ ] PO intake [ x] Tolerance to diet prescription [X] Weights  [X] Follow up per protocol [X] Labs: Source: Patient [ ]  Family [ ]   other [ ]    Current Diet:   Diet, NPO with Tube Feed:   Tube Feeding Modality: Gastrostomy  Nepro  Continuous  Starting Tube Feed Rate {mL per Hour}: 10  Increase Tube Feed Rate by (mL): 10     Every 8 hours  Until Goal Tube Feed Rate (mL per Hour): 70  Tube Feed Duration (in Hours): 24  Tube Feed Start Time: 18:00   Jesse (7 GM Arginine/ 7G Glut/ 1-2 GM HMB) Qty per Day: 2   Free water: 400 mL every 6 hours     Enteral /Parenteral Nutrition:  Current TF order provides Nepro @ goal rate 70mL/hr (x20 hrs) to provide 1400mL daily (2,520cal, 113g protein), Jesse BID provides pt an [id number] calories, 5g protein daily.     Current Weight:   (10/9)   209.4 lbs    % Weight Change: No recent weight documented       Pertinent Medications: MEDICATIONS  (STANDING):  ALBUTerol    0.083%. 2.5 milliGRAM(s) Nebulizer once  amLODIPine   Tablet 5 milliGRAM(s) Oral daily  ascorbic acid 500 milliGRAM(s) Oral daily  buMETAnide Injectable 2 milliGRAM(s) IV Push daily  chlorhexidine 0.12% Liquid 15 milliLiter(s) Oral Mucosa every 12 hours  chlorhexidine 2% Cloths 1 Application(s) Topical <User Schedule>  collagenase Ointment 1 Application(s) Topical two times a day  digoxin     Tablet 0.125 milliGRAM(s) Oral daily  lactobacillus acidophilus 1 Tablet(s) Oral daily  levETIRAcetam  IVPB 1000 milliGRAM(s) IV Intermittent every 12 hours  metoprolol tartrate 25 milliGRAM(s) Oral two times a day  multivitamin 1 Tablet(s) Oral daily  nystatin Cream 1 Application(s) Topical two times a day  pantoprazole  Injectable 40 milliGRAM(s) IV Push every 12 hours  phenytoin   Suspension 400 milliGRAM(s) Enteral Tube every 12 hours  predniSONE   Tablet 30 milliGRAM(s) Oral daily  sodium bicarbonate 650 milliGRAM(s) Oral every 8 hours    MEDICATIONS  (PRN):  acetaminophen    Suspension .. 650 milliGRAM(s) Oral every 6 hours PRN Temp greater or equal to 38.5C (101.3F)  ALBUTerol/ipratropium for Nebulization. 3 milliLiter(s) Nebulizer every 4 hours PRN Bronchospasm    Pertinent Labs: CBC Full  -  ( 20 Oct 2019 09:43 )  WBC Count : 14.24 K/uL  RBC Count : 2.52 M/uL  Hemoglobin : 7.8 g/dL  Hematocrit : 26.2 %  Platelet Count - Automated : 168 K/uL  Mean Cell Volume : 104.0 fl  Mean Cell Hemoglobin : 31.0 pg  Mean Cell Hemoglobin Concentration : 29.8 gm/dL        Skin: Stage IV sacrum, unstageable to sacrum, unstageable to R/L heels, unstageable to coccyx, skin tear to posterior scrotum and R. forearm  Edema: 2+ generalized, 4+ B/L ankles    Nutrition focused physical exam conducted - found signs of malnutrition [ ]absent [x]present    Subcutaneous fat loss: [ ] Orbital fat pads region, [ ]Buccal fat region, [ ]Triceps region,  [ ]Ribs region    Muscle wasting: [ x] Temples region, [ ]Clavicle region, [ ]Shoulder region, [ ]Scapula region, [ ]Interosseous region,  [ ]thigh region, [ ]Calf region    Estimated Needs:   [x ] no change since previous assessment  [ ] recalculated:     Current Nutrition Diagnosis: Pt presents at high nutrition risk secondary to malnutrition (moderate chronic) related to insufficient protein-energy intake in setting of sepsis, chronic resp failure +trach, impaired skin integrity as evidenced by severe fluid accumulation (3+ generalized, 4+ R/L feet and ankles), likely mask wt loss.   Aware worsening renal function and worsening edema 2/2 CHF, renal insufficiency.  As per EMR pt is at high risk for constipation, initiate aggressive bowel regimen per MD recommendations.     Recommendations:   1) Continue to monitor renal function   2) Weight daily in setting of CHF to monitor fluid shifts/trend weight  3) Initiate aggressive bowel regimen per MD recommendations.  4)Rx: Nephro-Manuel Daily   Monitoring and Evaluation:   [ ] PO intake [ x] Tolerance to diet prescription [X] Weights  [X] Follow up per protocol [X] Labs: Source: Patient [ ]  Family [ ]   other [ x]EMR     Current Diet:   Diet, NPO with Tube Feed:   Tube Feeding Modality: Gastrostomy  Nepro  Continuous  Starting Tube Feed Rate {mL per Hour}: 10  Increase Tube Feed Rate by (mL): 10     Every 8 hours  Until Goal Tube Feed Rate (mL per Hour): 70  Tube Feed Duration (in Hours): 24  Tube Feed Start Time: 18:00   Jesse (7 GM Arginine/ 7G Glut/ 1-2 GM HMB) Qty per Day: 2   Free water: 400 mL every 6 hours     Enteral /Parenteral Nutrition:  Current TF order provides Nepro @ goal rate 70mL/hr (x20 hrs) to provide 1400mL daily (2,520cal, 113g protein), Jesse BID provides pt an [id number] calories, 5g protein daily.     Current Weight:   (10/9)   209.4 lbs    % Weight Change: No recent weight documented       Pertinent Medications: MEDICATIONS  (STANDING):  ALBUTerol    0.083%. 2.5 milliGRAM(s) Nebulizer once  amLODIPine   Tablet 5 milliGRAM(s) Oral daily  ascorbic acid 500 milliGRAM(s) Oral daily  buMETAnide Injectable 2 milliGRAM(s) IV Push daily  chlorhexidine 0.12% Liquid 15 milliLiter(s) Oral Mucosa every 12 hours  chlorhexidine 2% Cloths 1 Application(s) Topical <User Schedule>  collagenase Ointment 1 Application(s) Topical two times a day  digoxin     Tablet 0.125 milliGRAM(s) Oral daily  lactobacillus acidophilus 1 Tablet(s) Oral daily  levETIRAcetam  IVPB 1000 milliGRAM(s) IV Intermittent every 12 hours  metoprolol tartrate 25 milliGRAM(s) Oral two times a day  multivitamin 1 Tablet(s) Oral daily  nystatin Cream 1 Application(s) Topical two times a day  pantoprazole  Injectable 40 milliGRAM(s) IV Push every 12 hours  phenytoin   Suspension 400 milliGRAM(s) Enteral Tube every 12 hours  predniSONE   Tablet 30 milliGRAM(s) Oral daily  sodium bicarbonate 650 milliGRAM(s) Oral every 8 hours    MEDICATIONS  (PRN):  acetaminophen    Suspension .. 650 milliGRAM(s) Oral every 6 hours PRN Temp greater or equal to 38.5C (101.3F)  ALBUTerol/ipratropium for Nebulization. 3 milliLiter(s) Nebulizer every 4 hours PRN Bronchospasm    Pertinent Labs: CBC Full  -  ( 20 Oct 2019 09:43 )  WBC Count : 14.24 K/uL  RBC Count : 2.52 M/uL  Hemoglobin : 7.8 g/dL  Hematocrit : 26.2 %  Platelet Count - Automated : 168 K/uL  Mean Cell Volume : 104.0 fl  Mean Cell Hemoglobin : 31.0 pg  Mean Cell Hemoglobin Concentration : 29.8 gm/dL        Skin: Stage IV sacrum, unstageable to sacrum, unstageable to R/L heels, unstageable to coccyx, skin tear to posterior scrotum and R. forearm  Edema: 2+ generalized, 4+ B/L ankles    Nutrition focused physical exam conducted - found signs of malnutrition [ ]absent [x]present    Subcutaneous fat loss: [ ] Orbital fat pads region, [ ]Buccal fat region, [ ]Triceps region,  [ ]Ribs region    Muscle wasting: [ x] Temples region, [ ]Clavicle region, [ ]Shoulder region, [ ]Scapula region, [ ]Interosseous region,  [ ]thigh region, [ ]Calf region    Estimated Needs:   [x ] no change since previous assessment  [ ] recalculated:     Current Nutrition Diagnosis: Pt presents at high nutrition risk secondary to malnutrition (moderate chronic) related to insufficient protein-energy intake in setting of sepsis, chronic resp failure +trach, impaired skin integrity as evidenced by severe fluid accumulation (3+ generalized, 4+ R/L feet and ankles), likely mask wt loss.   Aware worsening renal function and worsening edema 2/2 CHF, renal insufficiency.  As per EMR pt is at high risk for constipation, initiate aggressive bowel regimen per MD recommendations.     Recommendations:   1) Continue to monitor renal function   2) Weight daily in setting of CHF to monitor fluid shifts/trend weight  3) Initiate aggressive bowel regimen per MD recommendations.  4)Rx: Nephro-Manuel Daily   Monitoring and Evaluation:   [ ] PO intake [ x] Tolerance to diet prescription [X] Weights  [X] Follow up per protocol [X] Labs: Source: Patient [ ]  Family [ ]   other [ x]EMR     Current Diet:   Diet, NPO with Tube Feed:   Tube Feeding Modality: Gastrostomy  Nepro  Continuous  Starting Tube Feed Rate {mL per Hour}: 10  Increase Tube Feed Rate by (mL): 10     Every 8 hours  Until Goal Tube Feed Rate (mL per Hour): 70  Tube Feed Duration (in Hours): 24  Tube Feed Start Time: 18:00   Jesse (7 GM Arginine/ 7G Glut/ 1-2 GM HMB) Qty per Day: 2   Free water: 400 mL every 6 hours     Enteral /Parenteral Nutrition:  Current TF order provides Nepro @ goal rate 70mL/hr (x20 hrs) to provide 1400mL daily (2,520cal, 113g protein), Jesse BID provides pt an [id number] calories, 5g AA's    Current Weight:   (10/9)   209.4 lbs    % Weight Change: No recent weight documented       Pertinent Medications: MEDICATIONS  (STANDING):  ALBUTerol    0.083%. 2.5 milliGRAM(s) Nebulizer once  amLODIPine   Tablet 5 milliGRAM(s) Oral daily  ascorbic acid 500 milliGRAM(s) Oral daily  buMETAnide Injectable 2 milliGRAM(s) IV Push daily  chlorhexidine 0.12% Liquid 15 milliLiter(s) Oral Mucosa every 12 hours  chlorhexidine 2% Cloths 1 Application(s) Topical <User Schedule>  collagenase Ointment 1 Application(s) Topical two times a day  digoxin     Tablet 0.125 milliGRAM(s) Oral daily  lactobacillus acidophilus 1 Tablet(s) Oral daily  levETIRAcetam  IVPB 1000 milliGRAM(s) IV Intermittent every 12 hours  metoprolol tartrate 25 milliGRAM(s) Oral two times a day  multivitamin 1 Tablet(s) Oral daily  nystatin Cream 1 Application(s) Topical two times a day  pantoprazole  Injectable 40 milliGRAM(s) IV Push every 12 hours  phenytoin   Suspension 400 milliGRAM(s) Enteral Tube every 12 hours  predniSONE   Tablet 30 milliGRAM(s) Oral daily  sodium bicarbonate 650 milliGRAM(s) Oral every 8 hours    MEDICATIONS  (PRN):  acetaminophen    Suspension .. 650 milliGRAM(s) Oral every 6 hours PRN Temp greater or equal to 38.5C (101.3F)  ALBUTerol/ipratropium for Nebulization. 3 milliLiter(s) Nebulizer every 4 hours PRN Bronchospasm    Pertinent Labs: CBC Full  -  ( 20 Oct 2019 09:43 )  WBC Count : 14.24 K/uL  RBC Count : 2.52 M/uL  Hemoglobin : 7.8 g/dL  Hematocrit : 26.2 %  Platelet Count - Automated : 168 K/uL  Mean Cell Volume : 104.0 fl  Mean Cell Hemoglobin : 31.0 pg  Mean Cell Hemoglobin Concentration : 29.8 gm/dL    Skin: Stage IV sacrum, unstageable to sacrum, unstageable to R/L heels, unstageable to coccyx, skin tear to posterior scrotum and R. forearm  Edema: 2+ generalized, 4+ B/L ankles    Nutrition focused physical exam conducted - found signs of malnutrition [ ]absent [x]present    Subcutaneous fat loss: [ ] Orbital fat pads region, [ ]Buccal fat region, [ ]Triceps region,  [ ]Ribs region    Muscle wasting: [ x] Temples region, [ ]Clavicle region, [ ]Shoulder region, [ ]Scapula region, [ ]Interosseous region,  [ ]thigh region, [ ]Calf region    Estimated Needs:   [x ] no change since previous assessment  [ ] recalculated:     Current Nutrition Diagnosis: Pt presents at high nutrition risk secondary to malnutrition (moderate chronic) related to insufficient protein-energy intake in setting of sepsis, chronic resp failure +trach, impaired skin integrity as evidenced by severe fluid accumulation (3+ generalized, 4+ R/L feet and ankles), likely mask wt loss.   Aware worsening renal function and worsening edema 2/2 CHF, renal insufficiency.  As per EMR pt is at high risk for constipation, initiate aggressive bowel regimen per MD recommendations.     Recommendations:   1) Continue to monitor renal function   2) Weight daily in setting of CHF to monitor fluid shifts/trend weight  3) Initiate aggressive bowel regimen per MD recommendations.  4)Rx: Nephro-Manuel Daily   Monitoring and Evaluation:   [ ] PO intake [ x] Tolerance to diet prescription [X] Weights  [X] Follow up per protocol [X] Labs:

## 2019-10-21 NOTE — PROGRESS NOTE ADULT - ASSESSMENT
88yo male w/ extensive medical comorbidities and chronic sacral decubitus ulcers. Pt would likely benefit from enzymatic debridement as well as serial bedside debridements. Given medical condition, pt would be very poor surgical candidate. Will continue to follow and assist w/ wound management, however even w/ aggressive wound care the patient's condition presents significant barriers to healing.    -BID Santyl and W2D packing of decubitus ulcers (Surgery team to do am dressing changes, nursing staff to do PM changes)  -Aggressive bowel regimen, high risk for constipation.  -Continue offloading and use of air fluidized bed.   -Surgery to continue to follow.

## 2019-10-21 NOTE — PROGRESS NOTE ADULT - SUBJECTIVE AND OBJECTIVE BOX
NEPHROLOGY INTERVAL HPI/OVERNIGHT EVENTS:    No new events   No med changes   Surgery follow up noted   UO 1.2 L     MEDICATIONS  (STANDING):  ALBUTerol    0.083%. 2.5 milliGRAM(s) Nebulizer once  amLODIPine   Tablet 5 milliGRAM(s) Oral daily  ascorbic acid 500 milliGRAM(s) Oral daily  buMETAnide Injectable 2 milliGRAM(s) IV Push daily  chlorhexidine 0.12% Liquid 15 milliLiter(s) Oral Mucosa every 12 hours  chlorhexidine 2% Cloths 1 Application(s) Topical <User Schedule>  collagenase Ointment 1 Application(s) Topical two times a day  digoxin     Tablet 0.125 milliGRAM(s) Oral daily  lactobacillus acidophilus 1 Tablet(s) Oral daily  levETIRAcetam  IVPB 1000 milliGRAM(s) IV Intermittent every 12 hours  metoprolol tartrate 25 milliGRAM(s) Oral two times a day  multivitamin 1 Tablet(s) Oral daily  nystatin Cream 1 Application(s) Topical two times a day  pantoprazole  Injectable 40 milliGRAM(s) IV Push every 12 hours  phenytoin   Suspension 400 milliGRAM(s) Enteral Tube every 12 hours  predniSONE   Tablet 30 milliGRAM(s) Oral daily  sodium bicarbonate 650 milliGRAM(s) Oral every 8 hours    MEDICATIONS  (PRN):  acetaminophen    Suspension .. 650 milliGRAM(s) Oral every 6 hours PRN Temp greater or equal to 38.5C (101.3F)  ALBUTerol/ipratropium for Nebulization. 3 milliLiter(s) Nebulizer every 4 hours PRN Bronchospasm      Allergies    clindamycin (Unknown)  Grapes (Rash)  Nuts (Hives; Rash)  PC Pen VK (Unknown)  shellfish (Angioedema; Rash; Hives)  strawberry (Short breath; Rash; Hives)  Xanax (Rash)    Intolerances    Ativan (Unknown)  Haldol (Dystonic RXN)  hydrALAZINE (Unknown)  Zyprexa (Unknown)    Vital Signs Last 24 Hrs  T(C): 37 (21 Oct 2019 07:40), Max: 37.1 (21 Oct 2019 01:09)  T(F): 98.6 (21 Oct 2019 07:40), Max: 98.8 (21 Oct 2019 01:09)  HR: 76 (21 Oct 2019 08:42) (64 - 76)  BP: 150/70 (21 Oct 2019 07:40) (130/59 - 150/70)  BP(mean): --  RR: 21 (21 Oct 2019 01:09) (21 - 21)  SpO2: 98% (21 Oct 2019 08:42) (97% - 99%)  Daily     Daily   I&O's Detail    20 Oct 2019 07:01  -  21 Oct 2019 07:00  --------------------------------------------------------  IN:    Free Water: 800 mL    Nepro: 590 mL  Total IN: 1390 mL    OUT:    Colostomy: 250 mL    Indwelling Catheter - Urethral: 1175 mL  Total OUT: 1425 mL    Total NET: -35 mL        I&O's Summary    20 Oct 2019 07:01  -  21 Oct 2019 07:00  --------------------------------------------------------  IN: 1390 mL / OUT: 1425 mL / NET: -35 mL        PHYSICAL EXAM:    PHYSICAL EXAM:  GENERAL: Chronically ill and debilitated  HEAD:  No periorbital edema  EYES: EOMI, PERRLA, conjunctiva and sclera clear  ENMT: Dry mucous membranes, poor dentition  NECK: Supple, No JVD; trach  NERVOUS SYSTEM: Lethargic  CHEST/LUNG: Clear anteriorly with diminished BS  HEART: No rub  ABDOMEN: Soft, Nontender, Nondistended; Bowel sounds present; + PEG tube, + Ostomy   EXTREMITIES:  2+ Peripheral Pulses, ++ LE edema    LABS:                        7.8    14.24 )-----------( 168      ( 20 Oct 2019 09:43 )             26.2     10-20    150<H>  |  108<H>  |  168.0<H>  ----------------------------<  130<H>  4.9   |  21.0<L>  |  4.27<H>    Ca    9.1      20 Oct 2019 09:43                  RADIOLOGY & ADDITIONAL TESTS:

## 2019-10-22 LAB
ANION GAP SERPL CALC-SCNC: 19 MMOL/L — HIGH (ref 5–17)
BUN SERPL-MCNC: 173 MG/DL — HIGH (ref 8–20)
CALCIUM SERPL-MCNC: 9.2 MG/DL — SIGNIFICANT CHANGE UP (ref 8.6–10.2)
CHLORIDE SERPL-SCNC: 107 MMOL/L — SIGNIFICANT CHANGE UP (ref 98–107)
CO2 SERPL-SCNC: 21 MMOL/L — LOW (ref 22–29)
CREAT SERPL-MCNC: 4.3 MG/DL — HIGH (ref 0.5–1.3)
GLUCOSE SERPL-MCNC: 136 MG/DL — HIGH (ref 70–115)
HCT VFR BLD CALC: 25 % — LOW (ref 39–50)
HGB BLD-MCNC: 7.6 G/DL — LOW (ref 13–17)
MCHC RBC-ENTMCNC: 30.4 GM/DL — LOW (ref 32–36)
MCHC RBC-ENTMCNC: 31.9 PG — SIGNIFICANT CHANGE UP (ref 27–34)
MCV RBC AUTO: 105 FL — HIGH (ref 80–100)
PLATELET # BLD AUTO: 164 K/UL — SIGNIFICANT CHANGE UP (ref 150–400)
POTASSIUM SERPL-MCNC: 4.7 MMOL/L — SIGNIFICANT CHANGE UP (ref 3.5–5.3)
POTASSIUM SERPL-SCNC: 4.7 MMOL/L — SIGNIFICANT CHANGE UP (ref 3.5–5.3)
RBC # BLD: 2.38 M/UL — LOW (ref 4.2–5.8)
RBC # FLD: 18.1 % — HIGH (ref 10.3–14.5)
SODIUM SERPL-SCNC: 147 MMOL/L — HIGH (ref 135–145)
WBC # BLD: 16.26 K/UL — HIGH (ref 3.8–10.5)
WBC # FLD AUTO: 16.26 K/UL — HIGH (ref 3.8–10.5)

## 2019-10-22 PROCEDURE — 99232 SBSQ HOSP IP/OBS MODERATE 35: CPT

## 2019-10-22 RX ADMIN — Medication 30 MILLIGRAM(S): at 05:22

## 2019-10-22 RX ADMIN — NYSTATIN CREAM 1 APPLICATION(S): 100000 CREAM TOPICAL at 05:22

## 2019-10-22 RX ADMIN — CHLORHEXIDINE GLUCONATE 15 MILLILITER(S): 213 SOLUTION TOPICAL at 17:01

## 2019-10-22 RX ADMIN — Medication 1 APPLICATION(S): at 17:02

## 2019-10-22 RX ADMIN — Medication 650 MILLIGRAM(S): at 23:09

## 2019-10-22 RX ADMIN — Medication 1 TABLET(S): at 12:42

## 2019-10-22 RX ADMIN — Medication 500 MILLIGRAM(S): at 12:42

## 2019-10-22 RX ADMIN — LEVETIRACETAM 400 MILLIGRAM(S): 250 TABLET, FILM COATED ORAL at 17:19

## 2019-10-22 RX ADMIN — CHLORHEXIDINE GLUCONATE 15 MILLILITER(S): 213 SOLUTION TOPICAL at 05:21

## 2019-10-22 RX ADMIN — Medication 400 MILLIGRAM(S): at 05:22

## 2019-10-22 RX ADMIN — Medication 25 MILLIGRAM(S): at 17:20

## 2019-10-22 RX ADMIN — Medication 650 MILLIGRAM(S): at 05:22

## 2019-10-22 RX ADMIN — LEVETIRACETAM 400 MILLIGRAM(S): 250 TABLET, FILM COATED ORAL at 05:21

## 2019-10-22 RX ADMIN — PANTOPRAZOLE SODIUM 40 MILLIGRAM(S): 20 TABLET, DELAYED RELEASE ORAL at 17:20

## 2019-10-22 RX ADMIN — Medication 1 APPLICATION(S): at 05:22

## 2019-10-22 RX ADMIN — AMLODIPINE BESYLATE 5 MILLIGRAM(S): 2.5 TABLET ORAL at 05:22

## 2019-10-22 RX ADMIN — Medication 650 MILLIGRAM(S): at 13:13

## 2019-10-22 RX ADMIN — Medication 0.12 MILLIGRAM(S): at 05:22

## 2019-10-22 RX ADMIN — NYSTATIN CREAM 1 APPLICATION(S): 100000 CREAM TOPICAL at 17:02

## 2019-10-22 RX ADMIN — Medication 1 MILLIGRAM(S): at 12:42

## 2019-10-22 RX ADMIN — BUMETANIDE 2 MILLIGRAM(S): 0.25 INJECTION INTRAMUSCULAR; INTRAVENOUS at 05:21

## 2019-10-22 RX ADMIN — ERYTHROPOIETIN 20000 UNIT(S): 10000 INJECTION, SOLUTION INTRAVENOUS; SUBCUTANEOUS at 12:42

## 2019-10-22 RX ADMIN — Medication 400 MILLIGRAM(S): at 17:19

## 2019-10-22 RX ADMIN — PANTOPRAZOLE SODIUM 40 MILLIGRAM(S): 20 TABLET, DELAYED RELEASE ORAL at 05:22

## 2019-10-22 RX ADMIN — Medication 25 MILLIGRAM(S): at 05:22

## 2019-10-22 NOTE — PROGRESS NOTE ADULT - SUBJECTIVE AND OBJECTIVE BOX
INTERVAL HPI/OVERNIGHT EVENTS:  No acute events overnight. Patient with multiple decubitus ulcers, dressing changes in AM per surgical team and PM per nursing team.    Vital Signs Last 24 Hrs  T(C): 36.4 (21 Oct 2019 23:57), Max: 37.1 (21 Oct 2019 01:09)  T(F): 97.5 (21 Oct 2019 23:57), Max: 98.8 (21 Oct 2019 01:09)  HR: 59 (22 Oct 2019 00:23) (59 - 76)  BP: 143/68 (21 Oct 2019 23:57) (121/60 - 150/70)  BP(mean): --  RR: 23 (21 Oct 2019 23:57) (19 - 23)  SpO2: 99% (22 Oct 2019 00:23) (97% - 100%)    I&O's Detail    20 Oct 2019 07:01  -  21 Oct 2019 07:00  --------------------------------------------------------  IN:    Free Water: 800 mL    Nepro: 590 mL  Total IN: 1390 mL    OUT:    Colostomy: 250 mL    Indwelling Catheter - Urethral: 1175 mL  Total OUT: 1425 mL    Total NET: -35 mL              Gen: chronically ill appearing frail bedbound elderly male, non-verbal  RESP: Tracheostomy in place, on ventillator    GI: Ostomy in place having output, abd soft, NT, moderately distended, no rebound/guarding. 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   Extremities: bilateral unstageable decubitus ulcers of patient's ankles. Extensive contractions of all 4 extremities.   Skin: Bilateral trochanteric decubitus ulcers, both stage 4 and left ulcer tracks 5 inch deep. Both covered w/ necrotic fibrinous exudate. No purulent discharge.  Sacral ulcer w/ both necrotic fibrinous exudate and granulation tissue. 2 unstageable ulcers of upper back covered w/ eschar.     LABS:                        7.8    14.24 )-----------( 168      ( 20 Oct 2019 09:43 )             26.2     10-20    150<H>  |  108<H>  |  168.0<H>  ----------------------------<  130<H>  4.9   |  21.0<L>  |  4.27<H>    Ca    9.1      20 Oct 2019 09:43            MEDICATIONS  (STANDING):  ALBUTerol    0.083%. 2.5 milliGRAM(s) Nebulizer once  amLODIPine   Tablet 5 milliGRAM(s) Oral daily  ascorbic acid 500 milliGRAM(s) Oral daily  buMETAnide Injectable 2 milliGRAM(s) IV Push daily  chlorhexidine 0.12% Liquid 15 milliLiter(s) Oral Mucosa every 12 hours  chlorhexidine 2% Cloths 1 Application(s) Topical <User Schedule>  collagenase Ointment 1 Application(s) Topical two times a day  digoxin     Tablet 0.125 milliGRAM(s) Oral daily  epoetin giorgio Injectable 58499 Unit(s) SubCutaneous every 7 days  folic acid 1 milliGRAM(s) Oral daily  lactobacillus acidophilus 1 Tablet(s) Oral daily  levETIRAcetam  IVPB 1000 milliGRAM(s) IV Intermittent every 12 hours  metoprolol tartrate 25 milliGRAM(s) Oral two times a day  multivitamin 1 Tablet(s) Oral daily  nystatin Cream 1 Application(s) Topical two times a day  pantoprazole  Injectable 40 milliGRAM(s) IV Push every 12 hours  phenytoin   Suspension 400 milliGRAM(s) Enteral Tube every 12 hours  predniSONE   Tablet 30 milliGRAM(s) Oral daily  sodium bicarbonate 650 milliGRAM(s) Oral every 8 hours    MEDICATIONS  (PRN):  acetaminophen    Suspension .. 650 milliGRAM(s) Oral every 6 hours PRN Temp greater or equal to 38.5C (101.3F)  ALBUTerol/ipratropium for Nebulization. 3 milliLiter(s) Nebulizer every 4 hours PRN Bronchospasm

## 2019-10-22 NOTE — PROGRESS NOTE ADULT - SUBJECTIVE AND OBJECTIVE BOX
HOSPITALIST PROGRESS NOTE    KING MCKEON  [id number]  87yMale    Patient is a 87y old  Male who presents with a chief complaint of Sepsis (22 Oct 2019 13:20)      SUBJECTIVE:   Chart reviewed since last visit.  Patient seen and examined at bedside for ROSALIA, VDRF, Decubitus Ulcer.  Nonverbal      OBJECTIVE:  Vital Signs Last 24 Hrs  T(C): 36.9 (22 Oct 2019 08:07), Max: 36.9 (22 Oct 2019 08:07)  T(F): 98.4 (22 Oct 2019 08:07), Max: 98.4 (22 Oct 2019 08:07)  HR: 67 (22 Oct 2019 16:17) (59 - 73)  BP: 116/57 (22 Oct 2019 08:07) (116/57 - 143/68)   RR: 20 (22 Oct 2019 08:07) (20 - 23)  SpO2: 100% (22 Oct 2019 16:17) (99% - 100%)    PHYSICAL EXAMINATION  General: Obese male, lying on right side  HEENT: no facial asymmetry  NECK:  Trach[+]  CVS: regular rate and rhythm S1 S2  RESP:  Fair air entry bilaterally, Pleurx left chest  GI:  G-tube, Soft NT  : Condom catheter - patient removed  MS:  increased tone  CNS:  Encephalopathic  INTEG:  Decubitus sacral  PSYCH:  Unable to obtain    MONITOR:  CAPILLARY BLOOD GLUCOSE            I&O's Summary    21 Oct 2019 07:01  -  22 Oct 2019 07:00  --------------------------------------------------------  IN: 0 mL / OUT: 550 mL / NET: -550 mL    22 Oct 2019 07:01  -  22 Oct 2019 16:45  --------------------------------------------------------  IN: 960 mL / OUT: 0 mL / NET: 960 mL                            7.6    16.26 )-----------( 164      ( 22 Oct 2019 09:38 )             25.0       10-22    147<H>  |  107  |  173.0<H>  ----------------------------<  136<H>  4.7   |  21.0<L>  |  4.30<H>    Ca    9.2      22 Oct 2019 09:38              Culture:    TTE:    RADIOLOGY        MEDICATIONS  (STANDING):  ALBUTerol    0.083%. 2.5 milliGRAM(s) Nebulizer once  amLODIPine   Tablet 5 milliGRAM(s) Oral daily  ascorbic acid 500 milliGRAM(s) Oral daily  buMETAnide Injectable 2 milliGRAM(s) IV Push daily  chlorhexidine 0.12% Liquid 15 milliLiter(s) Oral Mucosa every 12 hours  chlorhexidine 2% Cloths 1 Application(s) Topical <User Schedule>  collagenase Ointment 1 Application(s) Topical two times a day  digoxin     Tablet 0.125 milliGRAM(s) Oral daily  epoetin giorgio Injectable 30446 Unit(s) SubCutaneous every 7 days  folic acid 1 milliGRAM(s) Oral daily  lactobacillus acidophilus 1 Tablet(s) Oral daily  levETIRAcetam  IVPB 1000 milliGRAM(s) IV Intermittent every 12 hours  metoprolol tartrate 25 milliGRAM(s) Oral two times a day  multivitamin 1 Tablet(s) Oral daily  nystatin Cream 1 Application(s) Topical two times a day  pantoprazole  Injectable 40 milliGRAM(s) IV Push every 12 hours  phenytoin   Suspension 400 milliGRAM(s) Enteral Tube every 12 hours  predniSONE   Tablet 30 milliGRAM(s) Oral daily  sodium bicarbonate 650 milliGRAM(s) Oral every 8 hours      MEDICATIONS  (PRN):  acetaminophen    Suspension .. 650 milliGRAM(s) Oral every 6 hours PRN Temp greater or equal to 38.5C (101.3F)  ALBUTerol/ipratropium for Nebulization. 3 milliLiter(s) Nebulizer every 4 hours PRN Bronchospasm

## 2019-10-22 NOTE — PROGRESS NOTE ADULT - ASSESSMENT
Acute on chronic renal failure with hyperkalemia, hypernatremia  not HD candidate as per renal .  Continue Bumex as per Nephrology.  Continue NaHCO3. Hyperkalemia resolved with Kayexalate. TF changed to Nepro. Worsening hypernatremia     Sacral/ischial osteomyelitis  - Afebrile, but with interval worsening of leukocytosis.   Blood culture negative, urine culture with polymicrobial growht - contaminated.  Sputum with pseudomonas R to Carbapenems but S to cefepime, could be colonization. Completed Cefepime 1gm q12   - diverting colostomy in place .  - Collagenase, wound care, off loading.  - Nutrition supplements  - ID signed off .    Aspiration pneumonia. Completed antibiotics - see above  - Aspiration precautions.    VDRF  - Continue Vent  - Trach care    Seizure disorder   - Continue AED  - Seizure precautions    HTN  - Continue antihypertensives    AF - rate controlled  - Continue BB  - No anticoagulants    Anemia, acute on chronic. History of bleeding in ostomy as well as from Sacral decubitus. None currently.  Monitor Hgb, continue supplements. PPI     Prophylactic measure  - VCD for VTEp, no pharmacologic means as GIB  - Probiotic      Meeting with HCP 10/23 at 230 PM. Palliative and renal aware          [Spoke to Legal guardian Mr. Van Villarreal, transfusion consent obtained. suggested to transfuse if Hb <6 though i offered below 7. reported patient tolerated low hb in the past and had multiple transfusion in the past] as per prior Hospitalist note      GOC: (prior conversations) discussed briefly over phone and updated about current condition on 10/17, plan of care prognosis. discusssed about comfort care/hospice. he told me he was available to meeting with palliative team in person any time today 10/18 after 9:30 am. Palliative MD Dr. Terri Bonner was informed. She tried to reach him and left VM*2. did not get any call back yet. Van verbalized understanding that stopping trach/vent can cause death. he will discuss GOC with palliative team. Van requesting patient daughter should not be aware of GOC discussion.    Van Monroy [ ] is his legal guardian , daughter Ms. Monet Aldrich remains at bedside. as per Van and palliative team, as per court order, we should not discuss patient conditions with his daughter and she has no right to make any health care decision for her father. Acute on chronic renal failure with hyperkalemia, hypernatremia  not HD candidate as per renal .  Continue Bumex as per Nephrology.  Continue NaHCO3. Hyperkalemia resolved with Kayexalate. TF changed to Nepro. Worsening hypernatremia     Functional quadriplegia with Sacral/ischial osteomyelitis  - Afebrile, but with interval worsening of leukocytosis.   Blood culture negative, urine culture with polymicrobial growht - contaminated.  Sputum with pseudomonas R to Carbapenems but S to cefepime, could be colonization. Completed Cefepime 1gm q12   - diverting colostomy in place .  - Collagenase, wound care, off loading.  - Nutrition supplements  - ID signed off .    Aspiration pneumonia. Completed antibiotics - see above  - Aspiration precautions.    VDRF  - Continue Vent  - Trach care    Seizure disorder   - Continue AED  - Seizure precautions    HTN  - Continue antihypertensives    AF - rate controlled  - Continue BB  - No anticoagulants    Anemia, acute on chronic. History of bleeding in ostomy as well as from Sacral decubitus. None currently.  Monitor Hgb, continue supplements. PPI     Prophylactic measure  - VCD for VTEp, no pharmacologic means as GIB  - Probiotic      Meeting with HCP 10/23 at 230 PM. Palliative and renal aware          [Spoke to Legal guardian Mr. Van Villarrael, transfusion consent obtained. suggested to transfuse if Hb <6 though i offered below 7. reported patient tolerated low hb in the past and had multiple transfusion in the past] as per prior Hospitalist note      GOC: (prior conversations) discussed briefly over phone and updated about current condition on 10/17, plan of care prognosis. discusssed about comfort care/hospice. he told me he was available to meeting with palliative team in person any time today 10/18 after 9:30 am. Palliative MD Dr. Terri Bonner was informed. She tried to reach him and left VM*2. did not get any call back yet. Van verbalized understanding that stopping trach/vent can cause death. he will discuss GOC with palliative team. Van requesting patient daughter should not be aware of GOC discussion.    Van Chowdhuryrusty [ ] is his legal guardian , daughter Ms. Monet Aldrich remains at bedside. as per Van and palliative team, as per court order, we should not discuss patient conditions with his daughter and she has no right to make any health care decision for her father.

## 2019-10-22 NOTE — PROGRESS NOTE ADULT - SUBJECTIVE AND OBJECTIVE BOX
NEPHROLOGY INTERVAL HPI/OVERNIGHT EVENTS:    Interim noted   HCP to come in for meeting tomorrow to discuss care   Situation w/o change   No improvement     MEDICATIONS  (STANDING):  ALBUTerol    0.083%. 2.5 milliGRAM(s) Nebulizer once  amLODIPine   Tablet 5 milliGRAM(s) Oral daily  ascorbic acid 500 milliGRAM(s) Oral daily  buMETAnide Injectable 2 milliGRAM(s) IV Push daily  chlorhexidine 0.12% Liquid 15 milliLiter(s) Oral Mucosa every 12 hours  chlorhexidine 2% Cloths 1 Application(s) Topical <User Schedule>  collagenase Ointment 1 Application(s) Topical two times a day  digoxin     Tablet 0.125 milliGRAM(s) Oral daily  epoetin giorgio Injectable 19763 Unit(s) SubCutaneous every 7 days  folic acid 1 milliGRAM(s) Oral daily  lactobacillus acidophilus 1 Tablet(s) Oral daily  levETIRAcetam  IVPB 1000 milliGRAM(s) IV Intermittent every 12 hours  metoprolol tartrate 25 milliGRAM(s) Oral two times a day  multivitamin 1 Tablet(s) Oral daily  nystatin Cream 1 Application(s) Topical two times a day  pantoprazole  Injectable 40 milliGRAM(s) IV Push every 12 hours  phenytoin   Suspension 400 milliGRAM(s) Enteral Tube every 12 hours  predniSONE   Tablet 30 milliGRAM(s) Oral daily  sodium bicarbonate 650 milliGRAM(s) Oral every 8 hours    MEDICATIONS  (PRN):  acetaminophen    Suspension .. 650 milliGRAM(s) Oral every 6 hours PRN Temp greater or equal to 38.5C (101.3F)  ALBUTerol/ipratropium for Nebulization. 3 milliLiter(s) Nebulizer every 4 hours PRN Bronchospasm      Allergies    clindamycin (Unknown)  Grapes (Rash)  Nuts (Hives; Rash)  PC Pen VK (Unknown)  shellfish (Angioedema; Rash; Hives)  strawberry (Short breath; Rash; Hives)  Xanax (Rash)    Intolerances    Ativan (Unknown)  Haldol (Dystonic RXN)  hydrALAZINE (Unknown)  Zyprexa (Unknown)          Vital Signs Last 24 Hrs  T(C): 36.9 (22 Oct 2019 08:07), Max: 36.9 (22 Oct 2019 08:07)  T(F): 98.4 (22 Oct 2019 08:07), Max: 98.4 (22 Oct 2019 08:07)  HR: 73 (22 Oct 2019 12:50) (59 - 73)  BP: 116/57 (22 Oct 2019 08:07) (116/57 - 143/68)  BP(mean): --  RR: 20 (22 Oct 2019 08:07) (19 - 23)  SpO2: 100% (22 Oct 2019 12:50) (99% - 100%)  Daily     Daily   I&O's Detail    21 Oct 2019 07:01  -  22 Oct 2019 07:00  --------------------------------------------------------  IN:  Total IN: 0 mL    OUT:    Indwelling Catheter - Urethral: 550 mL  Total OUT: 550 mL    Total NET: -550 mL      22 Oct 2019 07:01  -  22 Oct 2019 13:21  --------------------------------------------------------  IN:    Free Water: 400 mL    Nepro: 420 mL  Total IN: 820 mL    OUT:  Total OUT: 0 mL    Total NET: 820 mL        I&O's Summary    21 Oct 2019 07:01  -  22 Oct 2019 07:00  --------------------------------------------------------  IN: 0 mL / OUT: 550 mL / NET: -550 mL    22 Oct 2019 07:01  -  22 Oct 2019 13:21  --------------------------------------------------------  IN: 820 mL / OUT: 0 mL / NET: 820 mL        PHYSICAL EXAM:  GENERAL: Chronically ill and debilitated  HEAD:  No periorbital edema  EYES: EOMI, PERRLA, conjunctiva and sclera clear  ENMT: Dry mucous membranes, poor dentition  NECK: Supple, No JVD; trach  NERVOUS SYSTEM: Lethargic  CHEST/LUNG: Clear anteriorly with diminished BS  HEART: No rub  ABDOMEN: Soft, Nontender, Nondistended; Bowel sounds present; + PEG tube, + Ostomy   EXTREMITIES:  2+ Peripheral Pulses, ++ LE edema  LABS:                        7.6    16.26 )-----------( 164      ( 22 Oct 2019 09:38 )             25.0     10-22    147<H>  |  107  |  173.0<H>  ----------------------------<  136<H>  4.7   |  21.0<L>  |  4.30<H>    Ca    9.2      22 Oct 2019 09:38                  RADIOLOGY & ADDITIONAL TESTS:

## 2019-10-22 NOTE — PROGRESS NOTE ADULT - ASSESSMENT
87y Male w/ extensive PMH including  CAD w/ 2x stents, HTN, . 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  Pt sent from [facility] on 10-09-19 after in-house nephrologist noticed pt to have worsening anemia and renal function. Pt w/ extensive history of decubitus ulcers given his bedbound status and multiple MDR infections as well as osteomyelitis. Per RN report, pt had episode of bleeding from his sacral decubitus ulcer having saturated an abd pad which prompted surgical evaluation.  Pt has history of multiple prior debridements, w/ several having been performed at [facility] in 2018.     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   Given overall poor quality of life , comorbid medical issues , and very poor prognosis ( which pre-dated his renal issues )            HD would not be of much benefit . I will discuss this with his legal guardian , when able to communicate with him --> will call again today     Anemia - Added weekly Epogen and folate     Hypernatremia - free water added via FT     Edema - Somewhat better with IV Bumex     hyperkalemia - Better with above measures     Meeting to be set up tomorrow with HCP to discuss care

## 2019-10-22 NOTE — PROGRESS NOTE ADULT - ASSESSMENT
86yo male w/ extensive medical comorbidities and chronic sacral decubitus ulcers. Pt would likely benefit from enzymatic debridement as well as serial bedside debridements. Given medical condition, pt would be very poor surgical candidate. Will continue to follow and assist w/ wound management, however even w/ aggressive wound care the patient's condition presents significant barriers to healing.    -BID Santyl and W2D packing of decubitus ulcers (Surgery team to do AM dressing changes, nursing staff to do PM changes)  -Aggressive bowel regimen, high risk for constipation.  -Continue offloading and use of air fluidized bed.   -Surgery to continue to follow.

## 2019-10-23 DIAGNOSIS — L89.90 PRESSURE ULCER OF UNSPECIFIED SITE, UNSPECIFIED STAGE: ICD-10-CM

## 2019-10-23 PROCEDURE — 99498 ADVNCD CARE PLAN ADDL 30 MIN: CPT

## 2019-10-23 PROCEDURE — 99497 ADVNCD CARE PLAN 30 MIN: CPT

## 2019-10-23 PROCEDURE — 99233 SBSQ HOSP IP/OBS HIGH 50: CPT

## 2019-10-23 PROCEDURE — 99232 SBSQ HOSP IP/OBS MODERATE 35: CPT

## 2019-10-23 RX ORDER — HYDROMORPHONE HYDROCHLORIDE 2 MG/ML
1 INJECTION INTRAMUSCULAR; INTRAVENOUS; SUBCUTANEOUS EVERY 4 HOURS
Refills: 0 | Status: DISCONTINUED | OUTPATIENT
Start: 2019-10-23 | End: 2019-10-30

## 2019-10-23 RX ORDER — HYDROMORPHONE HYDROCHLORIDE 2 MG/ML
0.5 INJECTION INTRAMUSCULAR; INTRAVENOUS; SUBCUTANEOUS EVERY 8 HOURS
Refills: 0 | Status: DISCONTINUED | OUTPATIENT
Start: 2019-10-23 | End: 2019-10-29

## 2019-10-23 RX ADMIN — Medication 0.12 MILLIGRAM(S): at 06:39

## 2019-10-23 RX ADMIN — Medication 25 MILLIGRAM(S): at 06:39

## 2019-10-23 RX ADMIN — Medication 25 MILLIGRAM(S): at 18:27

## 2019-10-23 RX ADMIN — NYSTATIN CREAM 1 APPLICATION(S): 100000 CREAM TOPICAL at 18:26

## 2019-10-23 RX ADMIN — HYDROMORPHONE HYDROCHLORIDE 1 MILLIGRAM(S): 2 INJECTION INTRAMUSCULAR; INTRAVENOUS; SUBCUTANEOUS at 18:35

## 2019-10-23 RX ADMIN — LEVETIRACETAM 400 MILLIGRAM(S): 250 TABLET, FILM COATED ORAL at 18:31

## 2019-10-23 RX ADMIN — HYDROMORPHONE HYDROCHLORIDE 1 MILLIGRAM(S): 2 INJECTION INTRAMUSCULAR; INTRAVENOUS; SUBCUTANEOUS at 19:05

## 2019-10-23 RX ADMIN — Medication 30 MILLIGRAM(S): at 06:39

## 2019-10-23 RX ADMIN — Medication 650 MILLIGRAM(S): at 11:51

## 2019-10-23 RX ADMIN — Medication 400 MILLIGRAM(S): at 06:38

## 2019-10-23 RX ADMIN — Medication 1 TABLET(S): at 11:51

## 2019-10-23 RX ADMIN — Medication 500 MILLIGRAM(S): at 11:52

## 2019-10-23 RX ADMIN — LEVETIRACETAM 400 MILLIGRAM(S): 250 TABLET, FILM COATED ORAL at 06:45

## 2019-10-23 RX ADMIN — HYDROMORPHONE HYDROCHLORIDE 0.5 MILLIGRAM(S): 2 INJECTION INTRAMUSCULAR; INTRAVENOUS; SUBCUTANEOUS at 22:24

## 2019-10-23 RX ADMIN — HYDROMORPHONE HYDROCHLORIDE 0.5 MILLIGRAM(S): 2 INJECTION INTRAMUSCULAR; INTRAVENOUS; SUBCUTANEOUS at 21:54

## 2019-10-23 RX ADMIN — AMLODIPINE BESYLATE 5 MILLIGRAM(S): 2.5 TABLET ORAL at 06:46

## 2019-10-23 RX ADMIN — PANTOPRAZOLE SODIUM 40 MILLIGRAM(S): 20 TABLET, DELAYED RELEASE ORAL at 18:27

## 2019-10-23 RX ADMIN — CHLORHEXIDINE GLUCONATE 15 MILLILITER(S): 213 SOLUTION TOPICAL at 06:39

## 2019-10-23 RX ADMIN — Medication 1 APPLICATION(S): at 18:26

## 2019-10-23 RX ADMIN — CHLORHEXIDINE GLUCONATE 1 APPLICATION(S): 213 SOLUTION TOPICAL at 06:39

## 2019-10-23 RX ADMIN — CHLORHEXIDINE GLUCONATE 15 MILLILITER(S): 213 SOLUTION TOPICAL at 18:27

## 2019-10-23 RX ADMIN — Medication 1 MILLIGRAM(S): at 11:51

## 2019-10-23 RX ADMIN — PANTOPRAZOLE SODIUM 40 MILLIGRAM(S): 20 TABLET, DELAYED RELEASE ORAL at 06:41

## 2019-10-23 RX ADMIN — Medication 1 APPLICATION(S): at 06:37

## 2019-10-23 RX ADMIN — Medication 650 MILLIGRAM(S): at 21:54

## 2019-10-23 RX ADMIN — NYSTATIN CREAM 1 APPLICATION(S): 100000 CREAM TOPICAL at 06:38

## 2019-10-23 RX ADMIN — BUMETANIDE 2 MILLIGRAM(S): 0.25 INJECTION INTRAMUSCULAR; INTRAVENOUS at 06:37

## 2019-10-23 RX ADMIN — Medication 400 MILLIGRAM(S): at 18:27

## 2019-10-23 RX ADMIN — Medication 650 MILLIGRAM(S): at 06:38

## 2019-10-23 NOTE — PROGRESS NOTE ADULT - ASSESSMENT
Acute on chronic renal failure with hyperkalemia, hypernatremia  not HD candidate as per renal .  Continue Bumex as per Nephrology.  Continue NaHCO3. Hyperkalemia resolved with Kayexalate. TF changed to Nepro. Worsening hypernatremia     Functional quadriplegia with Sacral/ischial osteomyelitis  - Afebrile, but with interval worsening of leukocytosis.   Blood culture negative, urine culture with polymicrobial growht - contaminated.  Sputum with pseudomonas R to Carbapenems but S to cefepime, could be colonization. Completed Cefepime 1gm q12   - diverting colostomy in place .  - Collagenase, wound care, off loading.  - Nutrition supplements  - ID signed off .    Aspiration pneumonia. Completed antibiotics - see above  - Aspiration precautions.    VDRF  - Continue Vent  - Trach care    Seizure disorder   - Continue AED  - Seizure precautions    HTN  - Continue antihypertensives    AF - rate controlled  - Continue BB  - No anticoagulants    Anemia, acute on chronic. History of bleeding in ostomy as well as from Sacral decubitus. None currently.  Monitor Hgb, continue supplements. PPI     Prophylactic measure  - VCD for VTEp, no pharmacologic means as GIB  - Probiotic      GOC  - Meeting with HCP Van Monroy lasting about 40 minutes, attended by Palliative Care Dr Bonner and Nephrology Dr Kaur. Van would like to proceed with comfort care and possible terminal wean.

## 2019-10-23 NOTE — GOALS OF CARE CONVERSATION - ADVANCED CARE PLANNING - CONVERSATION DETAILS
comfort measures only. no blood draws. no transfusions, no aggressive measures. continue symptom control only. guardian Van Monroy to make decision on withdrawal of ventilator.

## 2019-10-23 NOTE — PROGRESS NOTE ADULT - SUBJECTIVE AND OBJECTIVE BOX
HOSPITALIST PROGRESS NOTE    KING MCKEON  [id number]  87yMale    Patient is a 87y old  Male who presents with a chief complaint of Sepsis (23 Oct 2019 16:51)      SUBJECTIVE:   Chart reviewed since last visit.  Patient seen and examined at bedside for ROSALIA, VDRF.  Nonverbal noncommunicative      OBJECTIVE:  Vital Signs Last 24 Hrs  T(C): 36.4 (23 Oct 2019 15:38), Max: 37.4 (23 Oct 2019 08:16)  T(F): 97.5 (23 Oct 2019 15:38), Max: 99.4 (23 Oct 2019 08:16)  HR: 67 (23 Oct 2019 15:38) (66 - 75)  BP: 132/78 (23 Oct 2019 15:38) (124/68 - 132/78)   RR: 19 (23 Oct 2019 08:16) (19 - 19)  SpO2: 99% (23 Oct 2019 15:38) (98% - 100%)    PHYSICAL EXAMINATION  General: Obese male, lying in bed, no distress noted  HEENT: no facial asymmetry  NECK:  Trach[+]  CVS: regular rate and rhythm S1 S2  RESP:  Fair air entry bilaterally   GI:  G-tube, Soft NT  : Condom catheter  MS:  increased tone  CNS:  Encephalopathic  INTEG:  Decubitus sacral  PSYCH:  Unable to obtain    MONITOR:  CAPILLARY BLOOD GLUCOSE            I&O's Summary    22 Oct 2019 07:01  -  23 Oct 2019 07:00  --------------------------------------------------------  IN: 2970 mL / OUT: 0 mL / NET: 2970 mL                            7.6    16.26 )-----------( 164      ( 22 Oct 2019 09:38 )             25.0       10-22    147<H>  |  107  |  173.0<H>  ----------------------------<  136<H>  4.7   |  21.0<L>  |  4.30<H>    Ca    9.2      22 Oct 2019 09:38              Culture:    TTE:    RADIOLOGY        MEDICATIONS  (STANDING):  ALBUTerol    0.083%. 2.5 milliGRAM(s) Nebulizer once  amLODIPine   Tablet 5 milliGRAM(s) Oral daily  buMETAnide Injectable 2 milliGRAM(s) IV Push daily  chlorhexidine 0.12% Liquid 15 milliLiter(s) Oral Mucosa every 12 hours  chlorhexidine 2% Cloths 1 Application(s) Topical <User Schedule>  collagenase Ointment 1 Application(s) Topical two times a day  digoxin     Tablet 0.125 milliGRAM(s) Oral daily  HYDROmorphone  Injectable 0.5 milliGRAM(s) IV Push every 8 hours  lactobacillus acidophilus 1 Tablet(s) Oral daily  levETIRAcetam  IVPB 1000 milliGRAM(s) IV Intermittent every 12 hours  metoprolol tartrate 25 milliGRAM(s) Oral two times a day  nystatin Cream 1 Application(s) Topical two times a day  pantoprazole  Injectable 40 milliGRAM(s) IV Push every 12 hours  phenytoin   Suspension 400 milliGRAM(s) Enteral Tube every 12 hours  predniSONE   Tablet 30 milliGRAM(s) Oral daily  sodium bicarbonate 650 milliGRAM(s) Oral every 8 hours      MEDICATIONS  (PRN):  acetaminophen    Suspension .. 650 milliGRAM(s) Oral every 6 hours PRN Temp greater or equal to 38.5C (101.3F)  ALBUTerol/ipratropium for Nebulization. 3 milliLiter(s) Nebulizer every 4 hours PRN Bronchospasm  HYDROmorphone  Injectable 1 milliGRAM(s) IV Push every 4 hours PRN pain or dyspnea

## 2019-10-23 NOTE — PROGRESS NOTE ADULT - ASSESSMENT
87y Male w/ extensive PMH including  CAD w/ 2x stents, HTN, . 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  Pt sent from [facility] on 10-09-19 after in-house nephrologist noticed pt to have worsening anemia and renal function. Pt w/ extensive history of decubitus ulcers given his bedbound status and multiple MDR infections as well as osteomyelitis. Per RN report, pt had episode of bleeding from his sacral decubitus ulcer having saturated an abd pad which prompted surgical evaluation.  Pt has history of multiple prior debridements, w/ several having been performed at [facility] in 2018.     ROSALIA r/o ATN in setting of probable sepsis==> renal function stable  PCKD no hydro on NCCT abdomen 10-9  Hyperkalemia better   End stage dementia with chronic respiratory failure  Anemia s/p PRBCs  Component of increased BUN related to steroids , so would taper as tolerated   Will cont to treat K+ medically , team has changed tube feeds to nepro and given Kayexalate   Pt on Digoxin , which allows some protective effect ( dig level 1.7  )   Continue IV Bumex for now with enteral bicarb   -Avoid potential nephrotoxins  I left message at the law office of legal guardian Van to discuss case [id number] , spoke with  for Van Monroy , But he never returned my call .   Given overall poor quality of life , comorbid medical issues , and very poor prognosis ( which pre-dated his renal issues )            HD would not be of much benefit . I will discuss this with his legal guardian    Anemia - Added weekly Epogen and folate     Hypernatremia - free water added via FT     Edema - Somewhat better with IV Bumex     hyperkalemia - Better with above measures     Meeting to be set up today with HCP to discuss care .

## 2019-10-23 NOTE — PROGRESS NOTE ADULT - SUBJECTIVE AND OBJECTIVE BOX
INTERVAL HPI/OVERNIGHT EVENTS:  No acute events overnight. Patient with multiple decubitus ulcers, dressing changes in AM per surgical team and PM per nursing team.    MEDICATIONS  (STANDING):  ALBUTerol    0.083%. 2.5 milliGRAM(s) Nebulizer once  amLODIPine   Tablet 5 milliGRAM(s) Oral daily  buMETAnide Injectable 2 milliGRAM(s) IV Push daily  chlorhexidine 0.12% Liquid 15 milliLiter(s) Oral Mucosa every 12 hours  chlorhexidine 2% Cloths 1 Application(s) Topical <User Schedule>  collagenase Ointment 1 Application(s) Topical two times a day  digoxin     Tablet 0.125 milliGRAM(s) Oral daily  HYDROmorphone  Injectable 0.5 milliGRAM(s) IV Push every 8 hours  lactobacillus acidophilus 1 Tablet(s) Oral daily  levETIRAcetam  IVPB 1000 milliGRAM(s) IV Intermittent every 12 hours  metoprolol tartrate 25 milliGRAM(s) Oral two times a day  nystatin Cream 1 Application(s) Topical two times a day  pantoprazole  Injectable 40 milliGRAM(s) IV Push every 12 hours  phenytoin   Suspension 400 milliGRAM(s) Enteral Tube every 12 hours  predniSONE   Tablet 30 milliGRAM(s) Oral daily  sodium bicarbonate 650 milliGRAM(s) Oral every 8 hours    MEDICATIONS  (PRN):  acetaminophen    Suspension .. 650 milliGRAM(s) Oral every 6 hours PRN Temp greater or equal to 38.5C (101.3F)  ALBUTerol/ipratropium for Nebulization. 3 milliLiter(s) Nebulizer every 4 hours PRN Bronchospasm  HYDROmorphone  Injectable 1 milliGRAM(s) IV Push every 4 hours PRN pain or dyspnea      Vital Signs Last 24 Hrs  T(C): 36.4 (23 Oct 2019 15:38), Max: 37.4 (23 Oct 2019 08:16)  T(F): 97.5 (23 Oct 2019 15:38), Max: 99.4 (23 Oct 2019 08:16)  HR: 67 (23 Oct 2019 15:38) (66 - 75)  BP: 132/78 (23 Oct 2019 15:38) (108/58 - 132/78)  BP(mean): --  RR: 19 (23 Oct 2019 08:16) (19 - 19)  SpO2: 99% (23 Oct 2019 15:38) (97% - 100%)    Physical Exam:  Gen: chronically ill appearing frail bedbound elderly male, non-verbal  RESP: Tracheostomy in place, on ventillator    GI: Ostomy in place having output, abd soft, NT, moderately distended, no rebound/guarding. 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   Extremities: bilateral unstageable decubitus ulcers of patient's ankles. Extensive contractions of all 4 extremities.   Skin: Bilateral trochanteric decubitus ulcers, both stage 4 and left ulcer tracks 5 inch deep. Both covered w/ necrotic fibrinous exudate. No purulent discharge.  Sacral ulcer w/ both necrotic fibrinous exudate and granulation tissue. 2 unstageable ulcers of upper back covered w/ eschar.       I&O's Detail    22 Oct 2019 07:01  -  23 Oct 2019 07:00  --------------------------------------------------------  IN:    Free Water: 1400 mL    Nepro: 1470 mL    Solution: 100 mL  Total IN: 2970 mL    OUT:  Total OUT: 0 mL    Total NET: 2970 mL          LABS:                        7.6    16.26 )-----------( 164      ( 22 Oct 2019 09:38 )             25.0     10-22    147<H>  |  107  |  173.0<H>  ----------------------------<  136<H>  4.7   |  21.0<L>  |  4.30<H>    Ca    9.2      22 Oct 2019 09:38            RADIOLOGY & ADDITIONAL STUDIES:

## 2019-10-23 NOTE — PROGRESS NOTE ADULT - ASSESSMENT
86yo male w/ extensive medical comorbidities and chronic sacral decubitus ulcers. Pt would likely benefit from enzymatic debridement as well as serial bedside debridements. Given medical condition, pt would be very poor surgical candidate. Will continue to follow and assist w/ wound management, however even w/ aggressive wound care the patient's condition presents significant barriers to healing.    - Patient will need debridement of wounds, requires  (power of ) to consent for sharp debridement of the wounds.  -BID Santyl and W2D packing of decubitus ulcers (Surgery team to do AM dressing changes, nursing staff to do PM changes)  -Aggressive bowel regimen, high risk for constipation.  -Continue offloading and use of air fluidized bed.   -Surgery to continue to follow.

## 2019-10-23 NOTE — PROGRESS NOTE ADULT - SUBJECTIVE AND OBJECTIVE BOX
NEPHROLOGY INTERVAL HPI/OVERNIGHT EVENTS:    Await meeting with HCP today   No effectual change   Meds the same   no new events     MEDICATIONS  (STANDING):  ALBUTerol    0.083%. 2.5 milliGRAM(s) Nebulizer once  amLODIPine   Tablet 5 milliGRAM(s) Oral daily  ascorbic acid 500 milliGRAM(s) Oral daily  buMETAnide Injectable 2 milliGRAM(s) IV Push daily  chlorhexidine 0.12% Liquid 15 milliLiter(s) Oral Mucosa every 12 hours  chlorhexidine 2% Cloths 1 Application(s) Topical <User Schedule>  collagenase Ointment 1 Application(s) Topical two times a day  digoxin     Tablet 0.125 milliGRAM(s) Oral daily  epoetin giorgio Injectable 55816 Unit(s) SubCutaneous every 7 days  folic acid 1 milliGRAM(s) Oral daily  lactobacillus acidophilus 1 Tablet(s) Oral daily  levETIRAcetam  IVPB 1000 milliGRAM(s) IV Intermittent every 12 hours  metoprolol tartrate 25 milliGRAM(s) Oral two times a day  multivitamin 1 Tablet(s) Oral daily  nystatin Cream 1 Application(s) Topical two times a day  pantoprazole  Injectable 40 milliGRAM(s) IV Push every 12 hours  phenytoin   Suspension 400 milliGRAM(s) Enteral Tube every 12 hours  predniSONE   Tablet 30 milliGRAM(s) Oral daily  sodium bicarbonate 650 milliGRAM(s) Oral every 8 hours    MEDICATIONS  (PRN):  acetaminophen    Suspension .. 650 milliGRAM(s) Oral every 6 hours PRN Temp greater or equal to 38.5C (101.3F)  ALBUTerol/ipratropium for Nebulization. 3 milliLiter(s) Nebulizer every 4 hours PRN Bronchospasm      Allergies    clindamycin (Unknown)  Grapes (Rash)  Nuts (Hives; Rash)  PC Pen VK (Unknown)  shellfish (Angioedema; Rash; Hives)  strawberry (Short breath; Rash; Hives)  Xanax (Rash)    Intolerances    Ativan (Unknown)  Haldol (Dystonic RXN)  hydrALAZINE (Unknown)  Zyprexa (Unknown)      ALLERY AND IMMUNOLOGIC: No hives or eczema      Vital Signs Last 24 Hrs  T(C): 37.4 (23 Oct 2019 08:16), Max: 37.4 (23 Oct 2019 08:16)  T(F): 99.4 (23 Oct 2019 08:16), Max: 99.4 (23 Oct 2019 08:16)  HR: 66 (23 Oct 2019 09:33) (66 - 75)  BP: 128/76 (23 Oct 2019 08:16) (108/58 - 132/74)  BP(mean): --  RR: 19 (23 Oct 2019 08:16) (19 - 19)  SpO2: 98% (23 Oct 2019 09:33) (97% - 100%)  Daily     Daily   I&O's Detail    22 Oct 2019 07:01  -  23 Oct 2019 07:00  --------------------------------------------------------  IN:    Free Water: 1400 mL    Nepro: 1470 mL    Solution: 100 mL  Total IN: 2970 mL    OUT:  Total OUT: 0 mL    Total NET: 2970 mL        I&O's Summary    22 Oct 2019 07:01  -  23 Oct 2019 07:00  --------------------------------------------------------  IN: 2970 mL / OUT: 0 mL / NET: 2970 mL        PHYSICAL EXAM:    GENERAL: Chronically ill and debilitated  HEAD:  No periorbital edema  EYES: EOMI, PERRLA, conjunctiva and sclera clear  ENMT: Dry mucous membranes, poor dentition  NECK: Supple, No JVD; trach  NERVOUS SYSTEM: Lethargic  CHEST/LUNG: Clear anteriorly with diminished BS  HEART: No rub  ABDOMEN: Soft, Nontender, Nondistended; Bowel sounds present; + PEG tube, + Ostomy   EXTREMITIES:  2+ Peripheral Pulses, ++ LE edema    LABS:                        7.6    16.26 )-----------( 164      ( 22 Oct 2019 09:38 )             25.0     10-22    147<H>  |  107  |  173.0<H>  ----------------------------<  136<H>  4.7   |  21.0<L>  |  4.30<H>    Ca    9.2      22 Oct 2019 09:38                  RADIOLOGY & ADDITIONAL TESTS:

## 2019-10-23 NOTE — PROGRESS NOTE ADULT - ASSESSMENT
87M with CVA, dementia, Trach/PEG from [facility], multiple rehospitalizations here with fevers.

## 2019-10-23 NOTE — PROGRESS NOTE ADULT - SUBJECTIVE AND OBJECTIVE BOX
OVERNIGHT EVENTS: remains the same.     Present Symptoms:     Dyspnea: vented   Nausea/Vomiting: No  Anxiety:  unable   Depression: unable   Fatigue: unable   Loss of appetite: unable     Pain: none             Character-            Duration-            Effect-            Factors-            Frequency-            Location-            Severity-    Review of Systems: Reviewed                    Unable to obtain due to poor mentation   All others negative    MEDICATIONS  (STANDING):  ALBUTerol    0.083%. 2.5 milliGRAM(s) Nebulizer once  amLODIPine   Tablet 5 milliGRAM(s) Oral daily  ascorbic acid 500 milliGRAM(s) Oral daily  buMETAnide Injectable 2 milliGRAM(s) IV Push daily  chlorhexidine 0.12% Liquid 15 milliLiter(s) Oral Mucosa every 12 hours  chlorhexidine 2% Cloths 1 Application(s) Topical <User Schedule>  collagenase Ointment 1 Application(s) Topical two times a day  digoxin     Tablet 0.125 milliGRAM(s) Oral daily  epoetin giorgio Injectable 38948 Unit(s) SubCutaneous every 7 days  folic acid 1 milliGRAM(s) Oral daily  lactobacillus acidophilus 1 Tablet(s) Oral daily  levETIRAcetam  IVPB 1000 milliGRAM(s) IV Intermittent every 12 hours  metoprolol tartrate 25 milliGRAM(s) Oral two times a day  multivitamin 1 Tablet(s) Oral daily  nystatin Cream 1 Application(s) Topical two times a day  pantoprazole  Injectable 40 milliGRAM(s) IV Push every 12 hours  phenytoin   Suspension 400 milliGRAM(s) Enteral Tube every 12 hours  predniSONE   Tablet 30 milliGRAM(s) Oral daily  sodium bicarbonate 650 milliGRAM(s) Oral every 8 hours    MEDICATIONS  (PRN):  acetaminophen    Suspension .. 650 milliGRAM(s) Oral every 6 hours PRN Temp greater or equal to 38.5C (101.3F)  ALBUTerol/ipratropium for Nebulization. 3 milliLiter(s) Nebulizer every 4 hours PRN Bronchospasm    PHYSICAL EXAM:    Vital Signs Last 24 Hrs  T(C): 37.4 (23 Oct 2019 08:16), Max: 37.4 (23 Oct 2019 08:16)  T(F): 99.4 (23 Oct 2019 08:16), Max: 99.4 (23 Oct 2019 08:16)  HR: 66 (23 Oct 2019 09:33) (66 - 75)  BP: 128/76 (23 Oct 2019 08:16) (108/58 - 132/74)  BP(mean): --  RR: 19 (23 Oct 2019 08:16) (19 - 19)  SpO2: 98% (23 Oct 2019 09:33) (97% - 100%)    General: vented through trach     Karnofsky:  20 %    HEENT: trach    Lungs: comfortable     CV: normal      GI: PEG colostomy     : jones     MSK: bedbound/wheelchair bound    Skin: unstageable ulcer     LABS:                      7.6    16.26 )-----------( 164      ( 22 Oct 2019 09:38 )             25.0     10-22    147<H>  |  107  |  173.0<H>  ----------------------------<  136<H>  4.7   |  21.0<L>  |  4.30<H>    Ca    9.2      22 Oct 2019 09:38    I&O's Summary    22 Oct 2019 07:01  -  23 Oct 2019 07:00  --------------------------------------------------------  IN: 2970 mL / OUT: 0 mL / NET: 2970 mL    RADIOLOGY & ADDITIONAL STUDIES:    ADVANCE DIRECTIVES: Full code

## 2019-10-23 NOTE — PROGRESS NOTE ADULT - ATTENDING COMMENTS
Thank you for the opportunity to assist with the care of this patient.   [facility] [phone].

## 2019-10-24 DIAGNOSIS — T14.90XA INJURY, UNSPECIFIED, INITIAL ENCOUNTER: ICD-10-CM

## 2019-10-24 DIAGNOSIS — J96.11 CHRONIC RESPIRATORY FAILURE WITH HYPOXIA: ICD-10-CM

## 2019-10-24 PROCEDURE — 99232 SBSQ HOSP IP/OBS MODERATE 35: CPT

## 2019-10-24 PROCEDURE — 99233 SBSQ HOSP IP/OBS HIGH 50: CPT

## 2019-10-24 RX ADMIN — PANTOPRAZOLE SODIUM 40 MILLIGRAM(S): 20 TABLET, DELAYED RELEASE ORAL at 05:01

## 2019-10-24 RX ADMIN — Medication 650 MILLIGRAM(S): at 13:20

## 2019-10-24 RX ADMIN — Medication 650 MILLIGRAM(S): at 23:34

## 2019-10-24 RX ADMIN — Medication 25 MILLIGRAM(S): at 05:01

## 2019-10-24 RX ADMIN — Medication 400 MILLIGRAM(S): at 18:12

## 2019-10-24 RX ADMIN — HYDROMORPHONE HYDROCHLORIDE 0.5 MILLIGRAM(S): 2 INJECTION INTRAMUSCULAR; INTRAVENOUS; SUBCUTANEOUS at 15:50

## 2019-10-24 RX ADMIN — LEVETIRACETAM 400 MILLIGRAM(S): 250 TABLET, FILM COATED ORAL at 18:11

## 2019-10-24 RX ADMIN — Medication 1 APPLICATION(S): at 18:12

## 2019-10-24 RX ADMIN — HYDROMORPHONE HYDROCHLORIDE 0.5 MILLIGRAM(S): 2 INJECTION INTRAMUSCULAR; INTRAVENOUS; SUBCUTANEOUS at 05:31

## 2019-10-24 RX ADMIN — BUMETANIDE 2 MILLIGRAM(S): 0.25 INJECTION INTRAMUSCULAR; INTRAVENOUS at 05:02

## 2019-10-24 RX ADMIN — HYDROMORPHONE HYDROCHLORIDE 0.5 MILLIGRAM(S): 2 INJECTION INTRAMUSCULAR; INTRAVENOUS; SUBCUTANEOUS at 00:04

## 2019-10-24 RX ADMIN — HYDROMORPHONE HYDROCHLORIDE 0.5 MILLIGRAM(S): 2 INJECTION INTRAMUSCULAR; INTRAVENOUS; SUBCUTANEOUS at 23:34

## 2019-10-24 RX ADMIN — NYSTATIN CREAM 1 APPLICATION(S): 100000 CREAM TOPICAL at 05:04

## 2019-10-24 RX ADMIN — CHLORHEXIDINE GLUCONATE 1 APPLICATION(S): 213 SOLUTION TOPICAL at 05:03

## 2019-10-24 RX ADMIN — CHLORHEXIDINE GLUCONATE 15 MILLILITER(S): 213 SOLUTION TOPICAL at 05:01

## 2019-10-24 RX ADMIN — HYDROMORPHONE HYDROCHLORIDE 0.5 MILLIGRAM(S): 2 INJECTION INTRAMUSCULAR; INTRAVENOUS; SUBCUTANEOUS at 05:01

## 2019-10-24 RX ADMIN — Medication 1 APPLICATION(S): at 05:04

## 2019-10-24 RX ADMIN — PANTOPRAZOLE SODIUM 40 MILLIGRAM(S): 20 TABLET, DELAYED RELEASE ORAL at 18:22

## 2019-10-24 RX ADMIN — Medication 25 MILLIGRAM(S): at 18:11

## 2019-10-24 RX ADMIN — NYSTATIN CREAM 1 APPLICATION(S): 100000 CREAM TOPICAL at 18:21

## 2019-10-24 RX ADMIN — Medication 1 TABLET(S): at 13:20

## 2019-10-24 RX ADMIN — Medication 650 MILLIGRAM(S): at 05:01

## 2019-10-24 RX ADMIN — CHLORHEXIDINE GLUCONATE 15 MILLILITER(S): 213 SOLUTION TOPICAL at 18:11

## 2019-10-24 RX ADMIN — HYDROMORPHONE HYDROCHLORIDE 0.5 MILLIGRAM(S): 2 INJECTION INTRAMUSCULAR; INTRAVENOUS; SUBCUTANEOUS at 15:21

## 2019-10-24 RX ADMIN — AMLODIPINE BESYLATE 5 MILLIGRAM(S): 2.5 TABLET ORAL at 05:01

## 2019-10-24 RX ADMIN — LEVETIRACETAM 400 MILLIGRAM(S): 250 TABLET, FILM COATED ORAL at 05:00

## 2019-10-24 RX ADMIN — Medication 30 MILLIGRAM(S): at 05:01

## 2019-10-24 RX ADMIN — Medication 0.12 MILLIGRAM(S): at 05:01

## 2019-10-24 RX ADMIN — Medication 400 MILLIGRAM(S): at 05:01

## 2019-10-24 NOTE — PROGRESS NOTE ADULT - SUBJECTIVE AND OBJECTIVE BOX
HOSPITALIST PROGRESS NOTE    KING MCKEON  [id number]  87yMale    Patient is a 87y old  Male who presents with a chief complaint of Sepsis (24 Oct 2019 14:19)      SUBJECTIVE:   Chart reviewed since last visit.  Patient seen and examined at bedside for ROSALIA, Encephalopathy.  Nonverbal, noncommunicative -          OBJECTIVE:  Vital Signs Last 24 Hrs  T(C): 36.7 (24 Oct 2019 16:24), Max: 38.3 (24 Oct 2019 00:19)  T(F): 98 (24 Oct 2019 16:24), Max: 100.9 (24 Oct 2019 00:19)  HR: 62 (24 Oct 2019 16:31) (62 - 86)  BP: 148/83 (24 Oct 2019 16:24) (123/57 - 153/68)   RR: 19 (24 Oct 2019 09:01) (18 - 27)  SpO2: 99% (24 Oct 2019 16:31) (96% - 100%)    PHYSICAL EXAMINATION  General: Obese male, lying in bed, no distress noted  HEENT: no facial asymmetry  NECK:  Trach[+]  CVS: regular rate and rhythm S1 S2  RESP:  Fair air entry bilaterally   GI:  G-tube, Soft NT  : Condom catheter  MS:  increased tone  CNS:  Encephalopathic  INTEG:  Decubitus sacral  PSYCH:  Unable to obtain    MONITOR:  CAPILLARY BLOOD GLUCOSE            I&O's Summary    23 Oct 2019 07:01  -  24 Oct 2019 07:00  --------------------------------------------------------  IN: 0 mL / OUT: 600 mL / NET: -600 mL                        Culture:    TTE:    RADIOLOGY        MEDICATIONS  (STANDING):  ALBUTerol    0.083%. 2.5 milliGRAM(s) Nebulizer once  amLODIPine   Tablet 5 milliGRAM(s) Oral daily  buMETAnide Injectable 2 milliGRAM(s) IV Push daily  chlorhexidine 0.12% Liquid 15 milliLiter(s) Oral Mucosa every 12 hours  chlorhexidine 2% Cloths 1 Application(s) Topical <User Schedule>  collagenase Ointment 1 Application(s) Topical two times a day  digoxin     Tablet 0.125 milliGRAM(s) Oral daily  HYDROmorphone  Injectable 0.5 milliGRAM(s) IV Push every 8 hours  lactobacillus acidophilus 1 Tablet(s) Oral daily  levETIRAcetam  IVPB 1000 milliGRAM(s) IV Intermittent every 12 hours  metoprolol tartrate 25 milliGRAM(s) Oral two times a day  nystatin Cream 1 Application(s) Topical two times a day  pantoprazole  Injectable 40 milliGRAM(s) IV Push every 12 hours  phenytoin   Suspension 400 milliGRAM(s) Enteral Tube every 12 hours  predniSONE   Tablet 30 milliGRAM(s) Oral daily  sodium bicarbonate 650 milliGRAM(s) Oral every 8 hours      MEDICATIONS  (PRN):  acetaminophen    Suspension .. 650 milliGRAM(s) Oral every 6 hours PRN Temp greater or equal to 38.5C (101.3F)  ALBUTerol/ipratropium for Nebulization. 3 milliLiter(s) Nebulizer every 4 hours PRN Bronchospasm  HYDROmorphone  Injectable 1 milliGRAM(s) IV Push every 4 hours PRN pain or dyspnea

## 2019-10-24 NOTE — PROGRESS NOTE ADULT - ASSESSMENT
Acute on chronic renal failure with hyperkalemia, hypernatremia  not HD candidate as per renal .  Continue Bumex as per Nephrology.  Continue NaHCO3. Hyperkalemia resolved with Kayexalate. TF changed to Nepro. Worsening hypernatremia     Functional quadriplegia with Sacral/ischial osteomyelitis  - Afebrile, but with interval worsening of leukocytosis.   Blood culture negative, urine culture with polymicrobial growht - contaminated.  Sputum with pseudomonas R to Carbapenems but S to cefepime, could be colonization. Completed Cefepime 1gm q12   - diverting colostomy in place .  - Collagenase, wound care, off loading.  - Nutrition supplements  - ID signed off .    Aspiration pneumonia. Completed antibiotics - see above  - Aspiration precautions.    VDRF  - Continue Vent  - Trach care    Seizure disorder   - Continue AED  - Seizure precautions    HTN  - Continue antihypertensives    AF - rate controlled  - Continue BB  - No anticoagulants    Anemia, acute on chronic. History of bleeding in ostomy as well as from Sacral decubitus. None currently.  Continue supplements. PPI     Prophylactic measure  - VCD for VTEp, no pharmacologic means as GIB  - Probiotic      [facility]  - Meeting with HCP Van Monroy 10/23 attended by Palliative Care Dr Bonner, Nephrology Dr Kaur and myself. . Van would like to proceed with comfort care and possible terminal wean.     Disposition - Comfort care - pending instructions for HCP regarding terminal wean
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
## 2019-10-24 NOTE — PROGRESS NOTE ADULT - ASSESSMENT
87M with CVA, dementia, Trach/PEG from [facility], multiple rehospitalizations now on comfort care, with poor prognosis.

## 2019-10-24 NOTE — PROGRESS NOTE ADULT - SUBJECTIVE AND OBJECTIVE BOX
INTERVAL HPI/OVERNIGHT EVENTS:    No acute events overnight. Patient with multiple decubitus ulcers, dressing changes in AM per surgical team and PM per nursing team. Pending consent for bedside debridement by legal guardian.     SUBJECTIVE:      MEDICATIONS  (STANDING):  ALBUTerol    0.083%. 2.5 milliGRAM(s) Nebulizer once  amLODIPine   Tablet 5 milliGRAM(s) Oral daily  buMETAnide Injectable 2 milliGRAM(s) IV Push daily  chlorhexidine 0.12% Liquid 15 milliLiter(s) Oral Mucosa every 12 hours  chlorhexidine 2% Cloths 1 Application(s) Topical <User Schedule>  collagenase Ointment 1 Application(s) Topical two times a day  digoxin     Tablet 0.125 milliGRAM(s) Oral daily  HYDROmorphone  Injectable 0.5 milliGRAM(s) IV Push every 8 hours  lactobacillus acidophilus 1 Tablet(s) Oral daily  levETIRAcetam  IVPB 1000 milliGRAM(s) IV Intermittent every 12 hours  metoprolol tartrate 25 milliGRAM(s) Oral two times a day  nystatin Cream 1 Application(s) Topical two times a day  pantoprazole  Injectable 40 milliGRAM(s) IV Push every 12 hours  phenytoin   Suspension 400 milliGRAM(s) Enteral Tube every 12 hours  predniSONE   Tablet 30 milliGRAM(s) Oral daily  sodium bicarbonate 650 milliGRAM(s) Oral every 8 hours    MEDICATIONS  (PRN):  acetaminophen    Suspension .. 650 milliGRAM(s) Oral every 6 hours PRN Temp greater or equal to 38.5C (101.3F)  ALBUTerol/ipratropium for Nebulization. 3 milliLiter(s) Nebulizer every 4 hours PRN Bronchospasm  HYDROmorphone  Injectable 1 milliGRAM(s) IV Push every 4 hours PRN pain or dyspnea      Vital Signs Last 24 Hrs  T(C): 37.6 (24 Oct 2019 04:39), Max: 38.3 (24 Oct 2019 00:19)  T(F): 99.7 (24 Oct 2019 04:39), Max: 100.9 (24 Oct 2019 00:19)  HR: 78 (24 Oct 2019 04:39) (61 - 86)  BP: 128/72 (24 Oct 2019 04:39) (128/72 - 153/68)  BP(mean): --  RR: 18 (24 Oct 2019 04:39) (18 - 27)  SpO2: 100% (24 Oct 2019 04:39) (96% - 100%)    Physical Exam:  Gen: chronically ill appearing frail bedbound elderly male, non-verbal  RESP: Tracheostomy in place, on ventillator    GI: Ostomy in place having output, abd soft, NT, moderately distended, no rebound/guarding. 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   Extremities: bilateral unstageable decubitus ulcers of patient's ankles. Extensive contractions of all 4 extremities.   Skin: Bilateral trochanteric decubitus ulcers, both stage 4 and left ulcer tracks 5 inch deep. Both covered w/ necrotic fibrinous exudate. No purulent discharge.  Sacral ulcer w/ both necrotic fibrinous exudate and granulation tissue. 2 unstageable ulcers of upper back covered w/ eschar.       I&O's Detail    22 Oct 2019 07:01  -  23 Oct 2019 07:00  --------------------------------------------------------  IN:    Free Water: 1400 mL    Nepro: 1470 mL    Solution: 100 mL  Total IN: 2970 mL    OUT:  Total OUT: 0 mL    Total NET: 2970 mL      23 Oct 2019 07:01  -  24 Oct 2019 05:17  --------------------------------------------------------  IN:  Total IN: 0 mL    OUT:    Voided: 400 mL  Total OUT: 400 mL    Total NET: -400 mL          LABS:                        7.6    16.26 )-----------( 164      ( 22 Oct 2019 09:38 )             25.0     10-22    147<H>  |  107  |  173.0<H>  ----------------------------<  136<H>  4.7   |  21.0<L>  |  4.30<H>    Ca    9.2      22 Oct 2019 09:38            RADIOLOGY & ADDITIONAL STUDIES:    ASSESSMENT AND PLAN:

## 2019-10-24 NOTE — PROGRESS NOTE ADULT - ATTENDING COMMENTS
Thank you for the opportunity to assist with the care of this patient.   [facility] [phone].

## 2019-10-24 NOTE — PROGRESS NOTE ADULT - SUBJECTIVE AND OBJECTIVE BOX
OVERNIGHT EVENTS:    BRIEF HOSPITAL COURSE:    Present Symptoms:     Dyspnea: 0 1 2 3   Nausea/Vomiting: Yes No  Anxiety:  Yes No  Depression: Yes No  Fatigue: Yes No  Loss of appetite: Yes No    Pain:             Character-            Duration-            Effect-            Factors-            Frequency-            Location-            Severity-    Review of Systems: Reviewed                     Negative:                     Positive:  Unable to obtain due to poor mentation   All others negative    MEDICATIONS  (STANDING):  ALBUTerol    0.083%. 2.5 milliGRAM(s) Nebulizer once  amLODIPine   Tablet 5 milliGRAM(s) Oral daily  buMETAnide Injectable 2 milliGRAM(s) IV Push daily  chlorhexidine 0.12% Liquid 15 milliLiter(s) Oral Mucosa every 12 hours  chlorhexidine 2% Cloths 1 Application(s) Topical <User Schedule>  collagenase Ointment 1 Application(s) Topical two times a day  digoxin     Tablet 0.125 milliGRAM(s) Oral daily  HYDROmorphone  Injectable 0.5 milliGRAM(s) IV Push every 8 hours  lactobacillus acidophilus 1 Tablet(s) Oral daily  levETIRAcetam  IVPB 1000 milliGRAM(s) IV Intermittent every 12 hours  metoprolol tartrate 25 milliGRAM(s) Oral two times a day  nystatin Cream 1 Application(s) Topical two times a day  pantoprazole  Injectable 40 milliGRAM(s) IV Push every 12 hours  phenytoin   Suspension 400 milliGRAM(s) Enteral Tube every 12 hours  predniSONE   Tablet 30 milliGRAM(s) Oral daily  sodium bicarbonate 650 milliGRAM(s) Oral every 8 hours    MEDICATIONS  (PRN):  acetaminophen    Suspension .. 650 milliGRAM(s) Oral every 6 hours PRN Temp greater or equal to 38.5C (101.3F)  ALBUTerol/ipratropium for Nebulization. 3 milliLiter(s) Nebulizer every 4 hours PRN Bronchospasm  HYDROmorphone  Injectable 1 milliGRAM(s) IV Push every 4 hours PRN pain or dyspnea      PHYSICAL EXAM:    Vital Signs Last 24 Hrs  T(C): 37.1 (24 Oct 2019 09:01), Max: 38.3 (24 Oct 2019 00:19)  T(F): 98.7 (24 Oct 2019 09:01), Max: 100.9 (24 Oct 2019 00:19)  HR: 68 (24 Oct 2019 12:20) (61 - 86)  BP: 123/57 (24 Oct 2019 09:01) (123/57 - 153/68)  BP(mean): --  RR: 19 (24 Oct 2019 09:01) (18 - 27)  SpO2: 98% (24 Oct 2019 12:20) (96% - 100%)    General: opens eyes but no meaningful interactions     Karnofsky:  10 %    HEENT: normal      Lungs: comfortable     CV: normal      GI: normal. PEg     : robert    MSK: bedbound     Skin: unstageables.     LABS:    I&O's Summary    23 Oct 2019 07:01  -  24 Oct 2019 07:00  --------------------------------------------------------  IN: 0 mL / OUT: 600 mL / NET: -600 mL    RADIOLOGY & ADDITIONAL STUDIES:    ADVANCE DIRECTIVES: DNR/I - comfort care. OVERNIGHT EVENTS: on comfort care     Present Symptoms:     Dyspnea: 0   Nausea/Vomiting: No  Anxiety:  unable   Depression: unable   Fatigue: unable   Loss of appetite: unable     Pain: none currently             Character-            Duration-            Effect-            Factors-            Frequency-            Location-            Severity-    Review of Systems: Reviewed                    Unable to obtain due to poor mentation   All others negative    MEDICATIONS  (STANDING):  ALBUTerol    0.083%. 2.5 milliGRAM(s) Nebulizer once  amLODIPine   Tablet 5 milliGRAM(s) Oral daily  buMETAnide Injectable 2 milliGRAM(s) IV Push daily  chlorhexidine 0.12% Liquid 15 milliLiter(s) Oral Mucosa every 12 hours  chlorhexidine 2% Cloths 1 Application(s) Topical <User Schedule>  collagenase Ointment 1 Application(s) Topical two times a day  digoxin     Tablet 0.125 milliGRAM(s) Oral daily  HYDROmorphone  Injectable 0.5 milliGRAM(s) IV Push every 8 hours  lactobacillus acidophilus 1 Tablet(s) Oral daily  levETIRAcetam  IVPB 1000 milliGRAM(s) IV Intermittent every 12 hours  metoprolol tartrate 25 milliGRAM(s) Oral two times a day  nystatin Cream 1 Application(s) Topical two times a day  pantoprazole  Injectable 40 milliGRAM(s) IV Push every 12 hours  phenytoin   Suspension 400 milliGRAM(s) Enteral Tube every 12 hours  predniSONE   Tablet 30 milliGRAM(s) Oral daily  sodium bicarbonate 650 milliGRAM(s) Oral every 8 hours    MEDICATIONS  (PRN):  acetaminophen    Suspension .. 650 milliGRAM(s) Oral every 6 hours PRN Temp greater or equal to 38.5C (101.3F)  ALBUTerol/ipratropium for Nebulization. 3 milliLiter(s) Nebulizer every 4 hours PRN Bronchospasm  HYDROmorphone  Injectable 1 milliGRAM(s) IV Push every 4 hours PRN pain or dyspnea      PHYSICAL EXAM:    Vital Signs Last 24 Hrs  T(C): 37.1 (24 Oct 2019 09:01), Max: 38.3 (24 Oct 2019 00:19)  T(F): 98.7 (24 Oct 2019 09:01), Max: 100.9 (24 Oct 2019 00:19)  HR: 68 (24 Oct 2019 12:20) (61 - 86)  BP: 123/57 (24 Oct 2019 09:01) (123/57 - 153/68)  BP(mean): --  RR: 19 (24 Oct 2019 09:01) (18 - 27)  SpO2: 98% (24 Oct 2019 12:20) (96% - 100%)    General: opens eyes but no meaningful interactions     Karnofsky:  10 %    HEENT: normal      Lungs: comfortable     CV: normal      GI: normal. PEg     : robert    MSK: bedbound     Skin: unstageables.     LABS:    I&O's Summary    23 Oct 2019 07:01  -  24 Oct 2019 07:00  --------------------------------------------------------  IN: 0 mL / OUT: 600 mL / NET: -600 mL    RADIOLOGY & ADDITIONAL STUDIES:    ADVANCE DIRECTIVES: DNR/I - comfort care.

## 2019-10-24 NOTE — PROGRESS NOTE ADULT - ASSESSMENT
88yo male w/ extensive medical comorbidities and chronic sacral decubitus ulcers. Pt would likely benefit from enzymatic debridement as well as serial bedside debridements. Given medical condition, pt would be very poor surgical candidate. Will continue to follow and assist w/ wound management, however even w/ aggressive wound care the patient's condition presents significant barriers to healing.    -Patient will need debridement of wounds, requires  (power of ) to consent for sharp debridement of the wounds.  -BID Santyl and W2D packing of decubitus ulcers (Surgery team to do AM dressing changes, nursing staff to do PM changes)  -Aggressive bowel regimen, high risk for constipation.  -Continue offloading and use of air fluidized bed.   -Surgery to continue to follow.

## 2019-10-25 PROCEDURE — 99233 SBSQ HOSP IP/OBS HIGH 50: CPT

## 2019-10-25 PROCEDURE — 99232 SBSQ HOSP IP/OBS MODERATE 35: CPT

## 2019-10-25 RX ADMIN — HYDROMORPHONE HYDROCHLORIDE 0.5 MILLIGRAM(S): 2 INJECTION INTRAMUSCULAR; INTRAVENOUS; SUBCUTANEOUS at 15:19

## 2019-10-25 RX ADMIN — Medication 650 MILLIGRAM(S): at 21:42

## 2019-10-25 RX ADMIN — Medication 30 MILLIGRAM(S): at 05:23

## 2019-10-25 RX ADMIN — HYDROMORPHONE HYDROCHLORIDE 0.5 MILLIGRAM(S): 2 INJECTION INTRAMUSCULAR; INTRAVENOUS; SUBCUTANEOUS at 22:12

## 2019-10-25 RX ADMIN — AMLODIPINE BESYLATE 5 MILLIGRAM(S): 2.5 TABLET ORAL at 05:22

## 2019-10-25 RX ADMIN — Medication 400 MILLIGRAM(S): at 17:48

## 2019-10-25 RX ADMIN — HYDROMORPHONE HYDROCHLORIDE 1 MILLIGRAM(S): 2 INJECTION INTRAMUSCULAR; INTRAVENOUS; SUBCUTANEOUS at 09:12

## 2019-10-25 RX ADMIN — HYDROMORPHONE HYDROCHLORIDE 0.5 MILLIGRAM(S): 2 INJECTION INTRAMUSCULAR; INTRAVENOUS; SUBCUTANEOUS at 21:42

## 2019-10-25 RX ADMIN — HYDROMORPHONE HYDROCHLORIDE 0.5 MILLIGRAM(S): 2 INJECTION INTRAMUSCULAR; INTRAVENOUS; SUBCUTANEOUS at 05:21

## 2019-10-25 RX ADMIN — Medication 0.12 MILLIGRAM(S): at 05:23

## 2019-10-25 RX ADMIN — Medication 650 MILLIGRAM(S): at 05:22

## 2019-10-25 RX ADMIN — NYSTATIN CREAM 1 APPLICATION(S): 100000 CREAM TOPICAL at 05:24

## 2019-10-25 RX ADMIN — LEVETIRACETAM 400 MILLIGRAM(S): 250 TABLET, FILM COATED ORAL at 05:22

## 2019-10-25 RX ADMIN — LEVETIRACETAM 400 MILLIGRAM(S): 250 TABLET, FILM COATED ORAL at 17:48

## 2019-10-25 RX ADMIN — CHLORHEXIDINE GLUCONATE 1 APPLICATION(S): 213 SOLUTION TOPICAL at 05:24

## 2019-10-25 RX ADMIN — HYDROMORPHONE HYDROCHLORIDE 1 MILLIGRAM(S): 2 INJECTION INTRAMUSCULAR; INTRAVENOUS; SUBCUTANEOUS at 16:44

## 2019-10-25 RX ADMIN — Medication 650 MILLIGRAM(S): at 17:48

## 2019-10-25 RX ADMIN — Medication 400 MILLIGRAM(S): at 05:22

## 2019-10-25 RX ADMIN — CHLORHEXIDINE GLUCONATE 15 MILLILITER(S): 213 SOLUTION TOPICAL at 17:48

## 2019-10-25 RX ADMIN — Medication 1 APPLICATION(S): at 05:23

## 2019-10-25 RX ADMIN — BUMETANIDE 2 MILLIGRAM(S): 0.25 INJECTION INTRAMUSCULAR; INTRAVENOUS at 05:21

## 2019-10-25 RX ADMIN — NYSTATIN CREAM 1 APPLICATION(S): 100000 CREAM TOPICAL at 17:50

## 2019-10-25 RX ADMIN — PANTOPRAZOLE SODIUM 40 MILLIGRAM(S): 20 TABLET, DELAYED RELEASE ORAL at 05:21

## 2019-10-25 RX ADMIN — CHLORHEXIDINE GLUCONATE 15 MILLILITER(S): 213 SOLUTION TOPICAL at 05:22

## 2019-10-25 RX ADMIN — Medication 25 MILLIGRAM(S): at 05:23

## 2019-10-25 RX ADMIN — PANTOPRAZOLE SODIUM 40 MILLIGRAM(S): 20 TABLET, DELAYED RELEASE ORAL at 17:48

## 2019-10-25 RX ADMIN — Medication 1 APPLICATION(S): at 17:49

## 2019-10-25 RX ADMIN — HYDROMORPHONE HYDROCHLORIDE 0.5 MILLIGRAM(S): 2 INJECTION INTRAMUSCULAR; INTRAVENOUS; SUBCUTANEOUS at 17:58

## 2019-10-25 RX ADMIN — HYDROMORPHONE HYDROCHLORIDE 1 MILLIGRAM(S): 2 INJECTION INTRAMUSCULAR; INTRAVENOUS; SUBCUTANEOUS at 17:43

## 2019-10-25 RX ADMIN — Medication 25 MILLIGRAM(S): at 17:48

## 2019-10-25 RX ADMIN — Medication 1 TABLET(S): at 17:49

## 2019-10-25 RX ADMIN — HYDROMORPHONE HYDROCHLORIDE 0.5 MILLIGRAM(S): 2 INJECTION INTRAMUSCULAR; INTRAVENOUS; SUBCUTANEOUS at 05:15

## 2019-10-25 RX ADMIN — HYDROMORPHONE HYDROCHLORIDE 1 MILLIGRAM(S): 2 INJECTION INTRAMUSCULAR; INTRAVENOUS; SUBCUTANEOUS at 07:57

## 2019-10-25 NOTE — PROGRESS NOTE ADULT - SUBJECTIVE AND OBJECTIVE BOX
OVERNIGHT EVENTS: remains vent dependent and unresponsive.     Present Symptoms:     Dyspnea: vented   Nausea/Vomiting: No  Anxiety:  unable   Depression: unable   Fatigue: unable   Loss of appetite: unable     Pain: none             Character-            Duration-            Effect-            Factors-            Frequency-            Location-            Severity-    Review of Systems: Reviewed                  Unable to obtain due to poor mentation   All others negative    MEDICATIONS  (STANDING):  ALBUTerol    0.083%. 2.5 milliGRAM(s) Nebulizer once  amLODIPine   Tablet 5 milliGRAM(s) Oral daily  buMETAnide Injectable 2 milliGRAM(s) IV Push daily  chlorhexidine 0.12% Liquid 15 milliLiter(s) Oral Mucosa every 12 hours  chlorhexidine 2% Cloths 1 Application(s) Topical <User Schedule>  collagenase Ointment 1 Application(s) Topical two times a day  digoxin     Tablet 0.125 milliGRAM(s) Oral daily  HYDROmorphone  Injectable 0.5 milliGRAM(s) IV Push every 8 hours  lactobacillus acidophilus 1 Tablet(s) Oral daily  levETIRAcetam  IVPB 1000 milliGRAM(s) IV Intermittent every 12 hours  metoprolol tartrate 25 milliGRAM(s) Oral two times a day  nystatin Cream 1 Application(s) Topical two times a day  pantoprazole  Injectable 40 milliGRAM(s) IV Push every 12 hours  phenytoin   Suspension 400 milliGRAM(s) Enteral Tube every 12 hours  predniSONE   Tablet 30 milliGRAM(s) Oral daily  sodium bicarbonate 650 milliGRAM(s) Oral every 8 hours    MEDICATIONS  (PRN):  acetaminophen    Suspension .. 650 milliGRAM(s) Oral every 6 hours PRN Temp greater or equal to 38.5C (101.3F)  ALBUTerol/ipratropium for Nebulization. 3 milliLiter(s) Nebulizer every 4 hours PRN Bronchospasm  HYDROmorphone  Injectable 1 milliGRAM(s) IV Push every 4 hours PRN pain or dyspnea    PHYSICAL EXAM:    Vital Signs Last 24 Hrs  T(C): 36.8 (25 Oct 2019 07:53), Max: 36.8 (25 Oct 2019 07:53)  T(F): 98.3 (25 Oct 2019 07:53), Max: 98.3 (25 Oct 2019 07:53)  HR: 67 (25 Oct 2019 12:27) (62 - 79)  BP: 127/64 (25 Oct 2019 07:53) (118/62 - 148/83)  BP(mean): --  RR: 20 (25 Oct 2019 07:53) (20 - 21)  SpO2: 96% (25 Oct 2019 12:27) (94% - 100%)      General: vented through trach     Karnofsky:  10 %    HEENT: trach    Lungs: comfortable     CV: normal      GI: PEG colostomy     : robert     MSK: bedbound/wheelchair bound    Skin: unstageable ulcer     LABS:  I&O's Summary    24 Oct 2019 07:01  -  25 Oct 2019 07:00  --------------------------------------------------------  IN: 0 mL / OUT: 650 mL / NET: -650 mL    25 Oct 2019 07:01  -  25 Oct 2019 15:02  --------------------------------------------------------  IN: 1550 mL / OUT: 0 mL / NET: 1550 mL    RADIOLOGY & ADDITIONAL STUDIES:    ADVANCE DIRECTIVES: DNR/I comfort care

## 2019-10-25 NOTE — PROGRESS NOTE ADULT - ATTENDING COMMENTS
Thank you for the opportunity to assist with the care of this patient.   [facility] [phone].

## 2019-10-25 NOTE — PROGRESS NOTE ADULT - ASSESSMENT
Acute on chronic renal failure with hyperkalemia, hypernatremia, not HD candidate as per renal .  Continue Bumex as per Nephrology.  Continue NaHCO3. Hyperkalemia resolved with Kayexalate. TF changed to Nepro. Worsening hypernatremia     Functional quadriplegia with Sacral/ischial osteomyelitis  - Afebrile, but with interval worsening of leukocytosis.   Blood culture negative, urine culture with polymicrobial growht - contaminated.  Sputum with pseudomonas R to Carbapenems but S to cefepime, could be colonization. Completed Cefepime 1gm q12 , diverting colostomy in place .  - Collagenase, wound care, off loading.  - Nutrition supplements  - ID signed off.     Aspiration pneumonia. Completed antibiotics - see above  - Aspiration precautions.    VDRF  - Continue Vent  - Trach care    Seizure disorder   - Continue AED  - Seizure precautions    HTN  - Continue antihypertensives    AF - rate controlled  - Continue BB  - No anticoagulants    Anemia, acute on chronic. History of bleeding in ostomy as well as from Sacral decubitus. None currently.  Continue supplements. PPI     Prophylactic measure  - VCD for VTEp, no pharmacologic means as GIB  - Probiotic      [facility]  - Meeting with HCP Van Monroy 10/23 attended by Palliative Care Dr Bonner, Nephrology Dr Kaur and myself. . Van would like to proceed with comfort care and possible terminal wean, Palliative team tried to reached Chaim, could not speak with him, will wait for final decision.     Disposition - Comfort care - pending instructions for HCP regarding terminal wean

## 2019-10-25 NOTE — PROGRESS NOTE ADULT - ASSESSMENT
87M with CVA, dementia, Trach/PEG from [facility], multiple rehospitalizations here with fevers, now on comfort care.

## 2019-10-25 NOTE — PROGRESS NOTE ADULT - SUBJECTIVE AND OBJECTIVE BOX
KING MCKEON    [id number]    87y      Male    Patient is a 87y old  Male who presents with a chief complaint of Sepsis (25 Oct 2019 15:02)      INTERVAL HPI/OVERNIGHT EVENTS:    Unable to get much info as patient is not responding    REVIEW OF SYSTEMS:    Unable to get much info     Vital Signs Last 24 Hrs  T(C): 36.8 (25 Oct 2019 15:42), Max: 36.8 (25 Oct 2019 07:53)  T(F): 98.2 (25 Oct 2019 15:42), Max: 98.3 (25 Oct 2019 07:53)  HR: 77 (25 Oct 2019 16:46) (62 - 79)  BP: 116/72 (25 Oct 2019 15:42) (116/72 - 127/64)  RR: 20 (25 Oct 2019 07:53) (20 - 21)  SpO2: 98% (25 Oct 2019 16:46) (94% - 100%)    PHYSICAL EXAM:    GENERAL: Elderly male looking comfortable   HEENT: trached and connected with Vent   NECK: No JVD,   CHEST/LUNG:  Decrease air entry b/l, No wheezing  HEART: S1S2+, Regular rate and rhythm; No murmurs  ABDOMEN: Soft, Nontender, Nondistended; Bowel sounds present  EXTREMITIES:  1+ Peripheral Pulses, has edema  SKIN: No rashes or lesions  NEURO: patient is not responding, unable to participate during exam         24 Oct 2019 07:01  -  25 Oct 2019 07:00  --------------------------------------------------------  IN: 0 mL / OUT: 650 mL / NET: -650 mL    25 Oct 2019 07:01  -  25 Oct 2019 18:58  --------------------------------------------------------  IN: 1030 mL / OUT: 0 mL / NET: 1030 mL        MEDICATIONS  (STANDING):  ALBUTerol    0.083%. 2.5 milliGRAM(s) Nebulizer once  amLODIPine   Tablet 5 milliGRAM(s) Oral daily  buMETAnide Injectable 2 milliGRAM(s) IV Push daily  chlorhexidine 0.12% Liquid 15 milliLiter(s) Oral Mucosa every 12 hours  chlorhexidine 2% Cloths 1 Application(s) Topical <User Schedule>  collagenase Ointment 1 Application(s) Topical two times a day  digoxin     Tablet 0.125 milliGRAM(s) Oral daily  HYDROmorphone  Injectable 0.5 milliGRAM(s) IV Push every 8 hours  lactobacillus acidophilus 1 Tablet(s) Oral daily  levETIRAcetam  IVPB 1000 milliGRAM(s) IV Intermittent every 12 hours  metoprolol tartrate 25 milliGRAM(s) Oral two times a day  nystatin Cream 1 Application(s) Topical two times a day  pantoprazole  Injectable 40 milliGRAM(s) IV Push every 12 hours  phenytoin   Suspension 400 milliGRAM(s) Enteral Tube every 12 hours  predniSONE   Tablet 30 milliGRAM(s) Oral daily  sodium bicarbonate 650 milliGRAM(s) Oral every 8 hours    MEDICATIONS  (PRN):  acetaminophen    Suspension .. 650 milliGRAM(s) Oral every 6 hours PRN Temp greater or equal to 38.5C (101.3F)  ALBUTerol/ipratropium for Nebulization. 3 milliLiter(s) Nebulizer every 4 hours PRN Bronchospasm  HYDROmorphone  Injectable 1 milliGRAM(s) IV Push every 4 hours PRN pain or dyspnea

## 2019-10-25 NOTE — PROGRESS NOTE ADULT - PROBLEM SELECTOR PLAN 4
- comfort care after meeting with guardian on 10/23. Attempted to reach him again today to discuss regarding ventilator withdrawal but unable - no ability to leave a voicemail.  Again, guardian having issues figuring out how to deal with mentally unstable daughter who is living at the bedside, should we opt to remove patient from life support ( which would be the most humane thing to do for this poor patient with grave prognosis).

## 2019-10-26 PROCEDURE — 99232 SBSQ HOSP IP/OBS MODERATE 35: CPT

## 2019-10-26 RX ADMIN — HYDROMORPHONE HYDROCHLORIDE 0.5 MILLIGRAM(S): 2 INJECTION INTRAMUSCULAR; INTRAVENOUS; SUBCUTANEOUS at 23:11

## 2019-10-26 RX ADMIN — Medication 25 MILLIGRAM(S): at 05:26

## 2019-10-26 RX ADMIN — Medication 1 APPLICATION(S): at 05:27

## 2019-10-26 RX ADMIN — CHLORHEXIDINE GLUCONATE 15 MILLILITER(S): 213 SOLUTION TOPICAL at 05:26

## 2019-10-26 RX ADMIN — Medication 1 TABLET(S): at 12:24

## 2019-10-26 RX ADMIN — HYDROMORPHONE HYDROCHLORIDE 0.5 MILLIGRAM(S): 2 INJECTION INTRAMUSCULAR; INTRAVENOUS; SUBCUTANEOUS at 23:40

## 2019-10-26 RX ADMIN — Medication 650 MILLIGRAM(S): at 05:26

## 2019-10-26 RX ADMIN — Medication 25 MILLIGRAM(S): at 18:35

## 2019-10-26 RX ADMIN — Medication 650 MILLIGRAM(S): at 23:12

## 2019-10-26 RX ADMIN — BUMETANIDE 2 MILLIGRAM(S): 0.25 INJECTION INTRAMUSCULAR; INTRAVENOUS at 05:26

## 2019-10-26 RX ADMIN — AMLODIPINE BESYLATE 5 MILLIGRAM(S): 2.5 TABLET ORAL at 05:26

## 2019-10-26 RX ADMIN — HYDROMORPHONE HYDROCHLORIDE 0.5 MILLIGRAM(S): 2 INJECTION INTRAMUSCULAR; INTRAVENOUS; SUBCUTANEOUS at 13:13

## 2019-10-26 RX ADMIN — CHLORHEXIDINE GLUCONATE 15 MILLILITER(S): 213 SOLUTION TOPICAL at 18:34

## 2019-10-26 RX ADMIN — Medication 1 APPLICATION(S): at 18:34

## 2019-10-26 RX ADMIN — Medication 400 MILLIGRAM(S): at 18:34

## 2019-10-26 RX ADMIN — PANTOPRAZOLE SODIUM 40 MILLIGRAM(S): 20 TABLET, DELAYED RELEASE ORAL at 18:34

## 2019-10-26 RX ADMIN — NYSTATIN CREAM 1 APPLICATION(S): 100000 CREAM TOPICAL at 18:35

## 2019-10-26 RX ADMIN — NYSTATIN CREAM 1 APPLICATION(S): 100000 CREAM TOPICAL at 05:29

## 2019-10-26 RX ADMIN — Medication 0.12 MILLIGRAM(S): at 05:26

## 2019-10-26 RX ADMIN — LEVETIRACETAM 400 MILLIGRAM(S): 250 TABLET, FILM COATED ORAL at 05:27

## 2019-10-26 RX ADMIN — LEVETIRACETAM 400 MILLIGRAM(S): 250 TABLET, FILM COATED ORAL at 18:34

## 2019-10-26 RX ADMIN — Medication 30 MILLIGRAM(S): at 05:26

## 2019-10-26 RX ADMIN — HYDROMORPHONE HYDROCHLORIDE 0.5 MILLIGRAM(S): 2 INJECTION INTRAMUSCULAR; INTRAVENOUS; SUBCUTANEOUS at 05:29

## 2019-10-26 RX ADMIN — Medication 400 MILLIGRAM(S): at 05:26

## 2019-10-26 RX ADMIN — HYDROMORPHONE HYDROCHLORIDE 0.5 MILLIGRAM(S): 2 INJECTION INTRAMUSCULAR; INTRAVENOUS; SUBCUTANEOUS at 06:00

## 2019-10-26 RX ADMIN — HYDROMORPHONE HYDROCHLORIDE 0.5 MILLIGRAM(S): 2 INJECTION INTRAMUSCULAR; INTRAVENOUS; SUBCUTANEOUS at 13:40

## 2019-10-26 RX ADMIN — PANTOPRAZOLE SODIUM 40 MILLIGRAM(S): 20 TABLET, DELAYED RELEASE ORAL at 05:27

## 2019-10-26 RX ADMIN — Medication 650 MILLIGRAM(S): at 13:04

## 2019-10-26 NOTE — PROGRESS NOTE ADULT - SUBJECTIVE AND OBJECTIVE BOX
KING MCKEON    [id number]    87y      Male    Patient is a 87y old  Male who presents with a chief complaint of Sepsis (25 Oct 2019 18:57)      INTERVAL HPI/OVERNIGHT EVENTS:    Unable to get much info as patient is not taking, he is spontaneously opening and closing his eyes     REVIEW OF SYSTEMS:    Unable to get much info       Vital Signs Last 24 Hrs  T(C): 36.7 (26 Oct 2019 15:19), Max: 36.7 (26 Oct 2019 08:41)  T(F): 98 (26 Oct 2019 15:19), Max: 98 (26 Oct 2019 08:41)  HR: 81 (26 Oct 2019 16:30) (67 - 92)  BP: 115/77 (26 Oct 2019 08:41) (115/77 - 115/77)  BP(mean): --  RR: 22 (26 Oct 2019 15:19) (18 - 22)  SpO2: 99% (26 Oct 2019 16:30) (98% - 100%)    PHYSICAL EXAM:    GENERAL: Elderly male looking comfortable   HEENT: trached and connected with Vent   NECK: No JVD,   CHEST/LUNG:  Decrease air entry b/l, No wheezing  HEART: S1S2+, Regular rate and rhythm; No murmurs  ABDOMEN: Soft, Nontender, Nondistended; Bowel sounds present  EXTREMITIES:  1+ Peripheral Pulses, has edema  SKIN: No rashes or lesions  NEURO: patient is not responding, unable to participate during exam       LABS:                  I&O's Summary    25 Oct 2019 07:01  -  26 Oct 2019 07:00  --------------------------------------------------------  IN: 1030 mL / OUT: 600 mL / NET: 430 mL    26 Oct 2019 07:01  -  26 Oct 2019 19:04  --------------------------------------------------------  IN: 1130 mL / OUT: 0 mL / NET: 1130 mL        MEDICATIONS  (STANDING):  ALBUTerol    0.083%. 2.5 milliGRAM(s) Nebulizer once  amLODIPine   Tablet 5 milliGRAM(s) Oral daily  buMETAnide Injectable 2 milliGRAM(s) IV Push daily  chlorhexidine 0.12% Liquid 15 milliLiter(s) Oral Mucosa every 12 hours  chlorhexidine 2% Cloths 1 Application(s) Topical <User Schedule>  collagenase Ointment 1 Application(s) Topical two times a day  digoxin     Tablet 0.125 milliGRAM(s) Oral daily  HYDROmorphone  Injectable 0.5 milliGRAM(s) IV Push every 8 hours  lactobacillus acidophilus 1 Tablet(s) Oral daily  levETIRAcetam  IVPB 1000 milliGRAM(s) IV Intermittent every 12 hours  metoprolol tartrate 25 milliGRAM(s) Oral two times a day  nystatin Cream 1 Application(s) Topical two times a day  pantoprazole  Injectable 40 milliGRAM(s) IV Push every 12 hours  phenytoin   Suspension 400 milliGRAM(s) Enteral Tube every 12 hours  predniSONE   Tablet 30 milliGRAM(s) Oral daily  sodium bicarbonate 650 milliGRAM(s) Oral every 8 hours    MEDICATIONS  (PRN):  acetaminophen    Suspension .. 650 milliGRAM(s) Oral every 6 hours PRN Temp greater or equal to 38.5C (101.3F)  ALBUTerol/ipratropium for Nebulization. 3 milliLiter(s) Nebulizer every 4 hours PRN Bronchospasm  HYDROmorphone  Injectable 1 milliGRAM(s) IV Push every 4 hours PRN pain or dyspnea

## 2019-10-26 NOTE — PROGRESS NOTE ADULT - ASSESSMENT
Acute on chronic renal failure with hyperkalemia, hypernatremia, not HD candidate as per renal .  Continue Bumex as per Nephrology.  Continue NaHCO3. Hyperkalemia resolved with Kayexalate. TF changed to Nepro. Worsening hypernatremia     Functional quadriplegia with Sacral/ischial osteomyelitis  - Afebrile, but with interval worsening of leukocytosis.   Blood culture negative, urine culture with polymicrobial growht - contaminated.  Sputum with pseudomonas R to Carbapenems but S to cefepime, could be colonization. Completed Cefepime 1gm q12 , diverting colostomy in place, Collagenase, wound care, off loading.  - Nutrition supplements, free water  - ID signed off, has no more fever, not much secretions.     Aspiration pneumonia. Completed antibiotics - see above  - Aspiration precautions.    VDRF  - Continue Vent  - Trach care    Seizure disorder   - Continue AED  - Seizure precautions    HTN  - Continue antihypertensives    AF - rate controlled  - Continue BB  - No anticoagulants    Anemia, acute on chronic. History of bleeding in ostomy as well as from Sacral decubitus. None currently.  Continue supplements. PPI     Prophylactic measure:   - VCD for VTEp, no pharmacologic means as GIB  - Probiotic      GOC: team Met with HCP Van Monroy 10/23 attended by Palliative Care Dr Bonner, Nephrology Dr Kaur and myself. . Van would like to proceed with comfort care and possible terminal wean, Palliative team tried to reached Chaim, could not speak with him, will wait for final decision from the legal guardian    Disposition - Comfort care - pending instructions for HCP regarding terminal wean

## 2019-10-27 PROCEDURE — 99232 SBSQ HOSP IP/OBS MODERATE 35: CPT

## 2019-10-27 RX ADMIN — Medication 400 MILLIGRAM(S): at 18:49

## 2019-10-27 RX ADMIN — HYDROMORPHONE HYDROCHLORIDE 0.5 MILLIGRAM(S): 2 INJECTION INTRAMUSCULAR; INTRAVENOUS; SUBCUTANEOUS at 22:00

## 2019-10-27 RX ADMIN — BUMETANIDE 2 MILLIGRAM(S): 0.25 INJECTION INTRAMUSCULAR; INTRAVENOUS at 05:28

## 2019-10-27 RX ADMIN — NYSTATIN CREAM 1 APPLICATION(S): 100000 CREAM TOPICAL at 18:50

## 2019-10-27 RX ADMIN — Medication 25 MILLIGRAM(S): at 18:49

## 2019-10-27 RX ADMIN — HYDROMORPHONE HYDROCHLORIDE 0.5 MILLIGRAM(S): 2 INJECTION INTRAMUSCULAR; INTRAVENOUS; SUBCUTANEOUS at 13:30

## 2019-10-27 RX ADMIN — Medication 650 MILLIGRAM(S): at 22:27

## 2019-10-27 RX ADMIN — HYDROMORPHONE HYDROCHLORIDE 0.5 MILLIGRAM(S): 2 INJECTION INTRAMUSCULAR; INTRAVENOUS; SUBCUTANEOUS at 13:05

## 2019-10-27 RX ADMIN — Medication 650 MILLIGRAM(S): at 05:28

## 2019-10-27 RX ADMIN — Medication 650 MILLIGRAM(S): at 13:05

## 2019-10-27 RX ADMIN — CHLORHEXIDINE GLUCONATE 1 APPLICATION(S): 213 SOLUTION TOPICAL at 05:29

## 2019-10-27 RX ADMIN — Medication 1 TABLET(S): at 12:41

## 2019-10-27 RX ADMIN — HYDROMORPHONE HYDROCHLORIDE 0.5 MILLIGRAM(S): 2 INJECTION INTRAMUSCULAR; INTRAVENOUS; SUBCUTANEOUS at 05:27

## 2019-10-27 RX ADMIN — CHLORHEXIDINE GLUCONATE 15 MILLILITER(S): 213 SOLUTION TOPICAL at 18:49

## 2019-10-27 RX ADMIN — Medication 0.12 MILLIGRAM(S): at 05:28

## 2019-10-27 RX ADMIN — Medication 400 MILLIGRAM(S): at 05:29

## 2019-10-27 RX ADMIN — PANTOPRAZOLE SODIUM 40 MILLIGRAM(S): 20 TABLET, DELAYED RELEASE ORAL at 18:50

## 2019-10-27 RX ADMIN — AMLODIPINE BESYLATE 5 MILLIGRAM(S): 2.5 TABLET ORAL at 05:28

## 2019-10-27 RX ADMIN — NYSTATIN CREAM 1 APPLICATION(S): 100000 CREAM TOPICAL at 05:28

## 2019-10-27 RX ADMIN — HYDROMORPHONE HYDROCHLORIDE 0.5 MILLIGRAM(S): 2 INJECTION INTRAMUSCULAR; INTRAVENOUS; SUBCUTANEOUS at 06:00

## 2019-10-27 RX ADMIN — PANTOPRAZOLE SODIUM 40 MILLIGRAM(S): 20 TABLET, DELAYED RELEASE ORAL at 05:27

## 2019-10-27 RX ADMIN — LEVETIRACETAM 400 MILLIGRAM(S): 250 TABLET, FILM COATED ORAL at 05:28

## 2019-10-27 RX ADMIN — Medication 25 MILLIGRAM(S): at 05:28

## 2019-10-27 RX ADMIN — LEVETIRACETAM 400 MILLIGRAM(S): 250 TABLET, FILM COATED ORAL at 18:49

## 2019-10-27 RX ADMIN — Medication 30 MILLIGRAM(S): at 05:28

## 2019-10-27 RX ADMIN — Medication 1 APPLICATION(S): at 05:28

## 2019-10-27 RX ADMIN — HYDROMORPHONE HYDROCHLORIDE 0.5 MILLIGRAM(S): 2 INJECTION INTRAMUSCULAR; INTRAVENOUS; SUBCUTANEOUS at 22:27

## 2019-10-27 RX ADMIN — Medication 1 APPLICATION(S): at 18:50

## 2019-10-27 RX ADMIN — CHLORHEXIDINE GLUCONATE 15 MILLILITER(S): 213 SOLUTION TOPICAL at 05:26

## 2019-10-27 NOTE — PROGRESS NOTE ADULT - ASSESSMENT
Acute on chronic renal failure with hyperkalemia, hypernatremia, not HD candidate as per renal .  Continue Bumex as per Nephrology.  Continue NaHCO3. Hyperkalemia resolved with Kayexalate. TF changed to Nepro. Worsening hypernatremia     Functional quadriplegia with Sacral/ischial osteomyelitis  - Afebrile, but with interval worsening of leukocytosis, Blood culture negative, urine culture with polymicrobial growth- contaminated.  Sputum with pseudomonas R to Carbapenems but S to cefepime, could be colonization. Completed Cefepime 1gm q12 , diverting colostomy in place, Collagenase, wound care, off loading.  - Nutrition supplements, free water  - ID signed off, has no more fever, not much secretions.     Aspiration pneumonia. Completed antibiotics - see above  - Aspiration precautions, on Vent, being managed by Pulmonary.     VDRF  - Continue Vent  - Trach care    Seizure disorder   - Continue AED  - Seizure precautions    HTN  - Continue antihypertensives    AF - rate controlled  - Continue BB  - No anticoagulants    Anemia, acute on chronic. History of bleeding in ostomy as well as from Sacral decubitus. None currently.  Continue supplements. PPI     Prophylactic measure:   - VCD for VTEp, no pharmacologic means as GIB  - Probiotic      GOC: team Met with HCP Van Monroy 10/23 attended by Palliative Care Dr Bonner, Nephrology Dr Kaur and myself. . Van would like to proceed with comfort care and possible terminal wean, Palliative team tried to reached Guardian could not speak with him, will wait for final decision from the legal guardian.     Disposition - Comfort care - pending instructions for HCP regarding terminal wean

## 2019-10-27 NOTE — PROGRESS NOTE ADULT - SUBJECTIVE AND OBJECTIVE BOX
KING MCKEON    [id number]    87y      Male    Patient is a 87y old  Male who presents with a chief complaint of Sepsis (26 Oct 2019 19:04)      INTERVAL HPI/OVERNIGHT EVENTS:    Unable to get much info as patient is not talking, he is spontaneously opening and closing his eyes, no fever as per nursing staff     REVIEW OF SYSTEMS:    Unable to get much info         Vital Signs Last 24 Hrs  T(C): 36.9 (27 Oct 2019 16:49), Max: 36.9 (27 Oct 2019 16:49)  T(F): 98.5 (27 Oct 2019 16:49), Max: 98.5 (27 Oct 2019 16:49)  HR: 73 (27 Oct 2019 16:58) (68 - 90)  BP: 140/78 (27 Oct 2019 16:49) (133/79 - 152/74)  RR: 21 (27 Oct 2019 16:49) (18 - 21)  SpO2: 100% (27 Oct 2019 16:58) (97% - 100%)    PHYSICAL EXAM:    GENERAL: Elderly male looking comfortable   HEENT: trached and connected with Vent   NECK: No JVD,   CHEST/LUNG:  Decrease air entry b/l, No wheezing  HEART: S1S2+, Regular rate and rhythm; No murmurs  ABDOMEN: Soft, Nontender, Nondistended; Bowel sounds present  EXTREMITIES:  1+ Peripheral Pulses, has edema  SKIN: No rashes or lesions  NEURO: patient is not responding, unable to participate during exam     LABS:                  I&O's Summary    26 Oct 2019 07:01  -  27 Oct 2019 07:00  --------------------------------------------------------  IN: 1130 mL / OUT: 0 mL / NET: 1130 mL    27 Oct 2019 07:01  -  27 Oct 2019 17:18  --------------------------------------------------------  IN: 1000 mL / OUT: 0 mL / NET: 1000 mL        MEDICATIONS  (STANDING):  ALBUTerol    0.083%. 2.5 milliGRAM(s) Nebulizer once  amLODIPine   Tablet 5 milliGRAM(s) Oral daily  buMETAnide Injectable 2 milliGRAM(s) IV Push daily  chlorhexidine 0.12% Liquid 15 milliLiter(s) Oral Mucosa every 12 hours  chlorhexidine 2% Cloths 1 Application(s) Topical <User Schedule>  collagenase Ointment 1 Application(s) Topical two times a day  digoxin     Tablet 0.125 milliGRAM(s) Oral daily  HYDROmorphone  Injectable 0.5 milliGRAM(s) IV Push every 8 hours  lactobacillus acidophilus 1 Tablet(s) Oral daily  levETIRAcetam  IVPB 1000 milliGRAM(s) IV Intermittent every 12 hours  metoprolol tartrate 25 milliGRAM(s) Oral two times a day  nystatin Cream 1 Application(s) Topical two times a day  pantoprazole  Injectable 40 milliGRAM(s) IV Push every 12 hours  phenytoin   Suspension 400 milliGRAM(s) Enteral Tube every 12 hours  predniSONE   Tablet 30 milliGRAM(s) Oral daily  sodium bicarbonate 650 milliGRAM(s) Oral every 8 hours    MEDICATIONS  (PRN):  acetaminophen    Suspension .. 650 milliGRAM(s) Oral every 6 hours PRN Temp greater or equal to 38.5C (101.3F)  ALBUTerol/ipratropium for Nebulization. 3 milliLiter(s) Nebulizer every 4 hours PRN Bronchospasm  HYDROmorphone  Injectable 1 milliGRAM(s) IV Push every 4 hours PRN pain or dyspnea

## 2019-10-28 PROCEDURE — 99232 SBSQ HOSP IP/OBS MODERATE 35: CPT

## 2019-10-28 RX ADMIN — HYDROMORPHONE HYDROCHLORIDE 0.5 MILLIGRAM(S): 2 INJECTION INTRAMUSCULAR; INTRAVENOUS; SUBCUTANEOUS at 21:36

## 2019-10-28 RX ADMIN — Medication 1 APPLICATION(S): at 17:54

## 2019-10-28 RX ADMIN — HYDROMORPHONE HYDROCHLORIDE 0.5 MILLIGRAM(S): 2 INJECTION INTRAMUSCULAR; INTRAVENOUS; SUBCUTANEOUS at 15:03

## 2019-10-28 RX ADMIN — HYDROMORPHONE HYDROCHLORIDE 0.5 MILLIGRAM(S): 2 INJECTION INTRAMUSCULAR; INTRAVENOUS; SUBCUTANEOUS at 15:32

## 2019-10-28 RX ADMIN — NYSTATIN CREAM 1 APPLICATION(S): 100000 CREAM TOPICAL at 06:02

## 2019-10-28 RX ADMIN — Medication 1 APPLICATION(S): at 06:01

## 2019-10-28 RX ADMIN — PANTOPRAZOLE SODIUM 40 MILLIGRAM(S): 20 TABLET, DELAYED RELEASE ORAL at 06:00

## 2019-10-28 RX ADMIN — PANTOPRAZOLE SODIUM 40 MILLIGRAM(S): 20 TABLET, DELAYED RELEASE ORAL at 18:03

## 2019-10-28 RX ADMIN — Medication 25 MILLIGRAM(S): at 06:00

## 2019-10-28 RX ADMIN — Medication 0.12 MILLIGRAM(S): at 06:00

## 2019-10-28 RX ADMIN — LEVETIRACETAM 400 MILLIGRAM(S): 250 TABLET, FILM COATED ORAL at 06:01

## 2019-10-28 RX ADMIN — CHLORHEXIDINE GLUCONATE 1 APPLICATION(S): 213 SOLUTION TOPICAL at 06:02

## 2019-10-28 RX ADMIN — HYDROMORPHONE HYDROCHLORIDE 0.5 MILLIGRAM(S): 2 INJECTION INTRAMUSCULAR; INTRAVENOUS; SUBCUTANEOUS at 06:00

## 2019-10-28 RX ADMIN — LEVETIRACETAM 400 MILLIGRAM(S): 250 TABLET, FILM COATED ORAL at 18:04

## 2019-10-28 RX ADMIN — Medication 650 MILLIGRAM(S): at 15:03

## 2019-10-28 RX ADMIN — Medication 650 MILLIGRAM(S): at 21:36

## 2019-10-28 RX ADMIN — CHLORHEXIDINE GLUCONATE 15 MILLILITER(S): 213 SOLUTION TOPICAL at 17:54

## 2019-10-28 RX ADMIN — AMLODIPINE BESYLATE 5 MILLIGRAM(S): 2.5 TABLET ORAL at 06:00

## 2019-10-28 RX ADMIN — Medication 650 MILLIGRAM(S): at 06:00

## 2019-10-28 RX ADMIN — BUMETANIDE 2 MILLIGRAM(S): 0.25 INJECTION INTRAMUSCULAR; INTRAVENOUS at 06:01

## 2019-10-28 RX ADMIN — HYDROMORPHONE HYDROCHLORIDE 0.5 MILLIGRAM(S): 2 INJECTION INTRAMUSCULAR; INTRAVENOUS; SUBCUTANEOUS at 22:06

## 2019-10-28 RX ADMIN — Medication 400 MILLIGRAM(S): at 06:01

## 2019-10-28 RX ADMIN — NYSTATIN CREAM 1 APPLICATION(S): 100000 CREAM TOPICAL at 18:05

## 2019-10-28 RX ADMIN — Medication 25 MILLIGRAM(S): at 18:04

## 2019-10-28 RX ADMIN — Medication 400 MILLIGRAM(S): at 18:04

## 2019-10-28 RX ADMIN — HYDROMORPHONE HYDROCHLORIDE 0.5 MILLIGRAM(S): 2 INJECTION INTRAMUSCULAR; INTRAVENOUS; SUBCUTANEOUS at 06:30

## 2019-10-28 RX ADMIN — CHLORHEXIDINE GLUCONATE 15 MILLILITER(S): 213 SOLUTION TOPICAL at 06:01

## 2019-10-28 RX ADMIN — Medication 30 MILLIGRAM(S): at 06:00

## 2019-10-28 NOTE — PROGRESS NOTE ADULT - SUBJECTIVE AND OBJECTIVE BOX
KING MCKEON    [id number]    87y      Male    Patient is a 87y old  Male who presents with a chief complaint of Sepsis (27 Oct 2019 17:18)      INTERVAL HPI/OVERNIGHT EVENTS:    Unable to get much info as patient is not talking, he is spontaneously opening and closing his eyes, no fever as per nursing staff     REVIEW OF SYSTEMS:    Unable to get much info        Vital Signs Last 24 Hrs  T(C): 36.8 (28 Oct 2019 15:47), Max: 37.6 (27 Oct 2019 23:57)  T(F): 98.3 (28 Oct 2019 15:47), Max: 99.7 (27 Oct 2019 23:57)  HR: 70 (28 Oct 2019 16:05) (64 - 86)  BP: 118/67 (28 Oct 2019 15:47) (118/67 - 147/71)  BP(mean): --  RR: 18 (28 Oct 2019 07:29) (18 - 18)  SpO2: 100% (28 Oct 2019 16:05) (91% - 100%)    PHYSICAL EXAM:    GENERAL: Elderly male looking comfortable   HEENT: trached and connected with Vent   NECK: No JVD,   CHEST/LUNG:  Decrease air entry b/l, No wheezing  HEART: S1S2+, Regular rate and rhythm; No murmurs  ABDOMEN: Soft, Nontender, Nondistended; Bowel sounds present  EXTREMITIES:  1+ Peripheral Pulses, has edema  SKIN: No rashes or lesions  NEURO: patient is not responding, unable to participate during exam       LABS:                  I&O's Summary    27 Oct 2019 07:01  -  28 Oct 2019 07:00  --------------------------------------------------------  IN: 1620 mL / OUT: 1300 mL / NET: 320 mL    28 Oct 2019 07:01  -  28 Oct 2019 18:00  --------------------------------------------------------  IN: 1270 mL / OUT: 0 mL / NET: 1270 mL        MEDICATIONS  (STANDING):  ALBUTerol    0.083%. 2.5 milliGRAM(s) Nebulizer once  amLODIPine   Tablet 5 milliGRAM(s) Oral daily  buMETAnide Injectable 2 milliGRAM(s) IV Push daily  chlorhexidine 0.12% Liquid 15 milliLiter(s) Oral Mucosa every 12 hours  chlorhexidine 2% Cloths 1 Application(s) Topical <User Schedule>  collagenase Ointment 1 Application(s) Topical two times a day  digoxin     Tablet 0.125 milliGRAM(s) Oral daily  HYDROmorphone  Injectable 0.5 milliGRAM(s) IV Push every 8 hours  levETIRAcetam  IVPB 1000 milliGRAM(s) IV Intermittent every 12 hours  metoprolol tartrate 25 milliGRAM(s) Oral two times a day  nystatin Cream 1 Application(s) Topical two times a day  pantoprazole  Injectable 40 milliGRAM(s) IV Push every 12 hours  phenytoin   Suspension 400 milliGRAM(s) Enteral Tube every 12 hours  predniSONE   Tablet 30 milliGRAM(s) Oral daily  sodium bicarbonate 650 milliGRAM(s) Oral every 8 hours    MEDICATIONS  (PRN):  acetaminophen    Suspension .. 650 milliGRAM(s) Oral every 6 hours PRN Temp greater or equal to 38.5C (101.3F)  ALBUTerol/ipratropium for Nebulization. 3 milliLiter(s) Nebulizer every 4 hours PRN Bronchospasm  HYDROmorphone  Injectable 1 milliGRAM(s) IV Push every 4 hours PRN pain or dyspnea KING MCKEON    [id number]    87y      Male    Patient is a 87y old  Male who presents with a chief complaint of Sepsis (27 Oct 2019 17:18)      INTERVAL HPI/OVERNIGHT EVENTS:    Unable to get much info as patient is not talking, daughter at bedside, no issues over night, he is spontaneously opening and closing his eyes, no fever as per nursing staff     REVIEW OF SYSTEMS:    Unable to get much info        Vital Signs Last 24 Hrs  T(C): 36.8 (28 Oct 2019 15:47), Max: 37.6 (27 Oct 2019 23:57)  T(F): 98.3 (28 Oct 2019 15:47), Max: 99.7 (27 Oct 2019 23:57)  HR: 70 (28 Oct 2019 16:05) (64 - 86)  BP: 118/67 (28 Oct 2019 15:47) (118/67 - 147/71)  BP(mean): --  RR: 18 (28 Oct 2019 07:29) (18 - 18)  SpO2: 100% (28 Oct 2019 16:05) (91% - 100%)    PHYSICAL EXAM:    GENERAL: Elderly male looking comfortable   HEENT: trached and connected with Vent   NECK: No JVD,   CHEST/LUNG:  Decrease air entry b/l, No wheezing  HEART: S1S2+, Regular rate and rhythm; No murmurs  ABDOMEN: Soft, Nontender, Nondistended; Bowel sounds present  EXTREMITIES:  1+ Peripheral Pulses, has edema  SKIN: decubitus ulcers un stageable   NEURO: patient is not responding, unable to participate during exam       LABS:                  I&O's Summary    27 Oct 2019 07:01  -  28 Oct 2019 07:00  --------------------------------------------------------  IN: 1620 mL / OUT: 1300 mL / NET: 320 mL    28 Oct 2019 07:01  -  28 Oct 2019 18:00  --------------------------------------------------------  IN: 1270 mL / OUT: 0 mL / NET: 1270 mL        MEDICATIONS  (STANDING):  ALBUTerol    0.083%. 2.5 milliGRAM(s) Nebulizer once  amLODIPine   Tablet 5 milliGRAM(s) Oral daily  buMETAnide Injectable 2 milliGRAM(s) IV Push daily  chlorhexidine 0.12% Liquid 15 milliLiter(s) Oral Mucosa every 12 hours  chlorhexidine 2% Cloths 1 Application(s) Topical <User Schedule>  collagenase Ointment 1 Application(s) Topical two times a day  digoxin     Tablet 0.125 milliGRAM(s) Oral daily  HYDROmorphone  Injectable 0.5 milliGRAM(s) IV Push every 8 hours  levETIRAcetam  IVPB 1000 milliGRAM(s) IV Intermittent every 12 hours  metoprolol tartrate 25 milliGRAM(s) Oral two times a day  nystatin Cream 1 Application(s) Topical two times a day  pantoprazole  Injectable 40 milliGRAM(s) IV Push every 12 hours  phenytoin   Suspension 400 milliGRAM(s) Enteral Tube every 12 hours  predniSONE   Tablet 30 milliGRAM(s) Oral daily  sodium bicarbonate 650 milliGRAM(s) Oral every 8 hours    MEDICATIONS  (PRN):  acetaminophen    Suspension .. 650 milliGRAM(s) Oral every 6 hours PRN Temp greater or equal to 38.5C (101.3F)  ALBUTerol/ipratropium for Nebulization. 3 milliLiter(s) Nebulizer every 4 hours PRN Bronchospasm  HYDROmorphone  Injectable 1 milliGRAM(s) IV Push every 4 hours PRN pain or dyspnea

## 2019-10-28 NOTE — CHART NOTE - NSCHARTNOTEFT_GEN_A_CORE
Left message for Van - the legal guardian to discuss goals. I had also called and sent text message on Friday but did not hear back.

## 2019-10-28 NOTE — CHART NOTE - NSCHARTNOTEFT_GEN_A_CORE
Source: Patient [ ]  Family [ ]   other [x] EMR    Current Diet:   Diet, NPO with Tube Feed:   Tube Feeding Modality: Gastrostomy  Nepro  Continuous  Starting Tube Feed Rate {mL per Hour}: 10  Increase Tube Feed Rate by (mL): 10     Every 8 hours  Until Goal Tube Feed Rate (mL per Hour): 70  Tube Feed Duration (in Hours): 24  Tube Feed Start Time: 18:00  Jesse(7 Gm Arginine/7 Gm Glut/1.2 Gm HMB     Qty per Day:  2 (10-19-19 @ 14:52)    Enteral /Parenteral Nutrition:  Current TF order provides Pivot 1.5 @ goal rate 70mL/hr (x20 hrs) to provide 1400mL daily (2100cal, 131g protein).     Current Weight:   (10/9)   205.4 lbs    % Weight Change: No recent weight documented    Pertinent Medications: MEDICATIONS  (STANDING):  ALBUTerol    0.083%. 2.5 milliGRAM(s) Nebulizer once  amLODIPine   Tablet 5 milliGRAM(s) Oral daily  buMETAnide Injectable 2 milliGRAM(s) IV Push daily  chlorhexidine 0.12% Liquid 15 milliLiter(s) Oral Mucosa every 12 hours  chlorhexidine 2% Cloths 1 Application(s) Topical <User Schedule>  collagenase Ointment 1 Application(s) Topical two times a day  digoxin     Tablet 0.125 milliGRAM(s) Oral daily  HYDROmorphone  Injectable 0.5 milliGRAM(s) IV Push every 8 hours  lactobacillus acidophilus 1 Tablet(s) Oral daily  levETIRAcetam  IVPB 1000 milliGRAM(s) IV Intermittent every 12 hours  metoprolol tartrate 25 milliGRAM(s) Oral two times a day  nystatin Cream 1 Application(s) Topical two times a day  pantoprazole  Injectable 40 milliGRAM(s) IV Push every 12 hours  phenytoin   Suspension 400 milliGRAM(s) Enteral Tube every 12 hours  predniSONE   Tablet 30 milliGRAM(s) Oral daily  sodium bicarbonate 650 milliGRAM(s) Oral every 8 hours    MEDICATIONS  (PRN):  acetaminophen    Suspension .. 650 milliGRAM(s) Oral every 6 hours PRN Temp greater or equal to 38.5C (101.3F)  ALBUTerol/ipratropium for Nebulization. 3 milliLiter(s) Nebulizer every 4 hours PRN Bronchospasm  HYDROmorphone  Injectable 1 milliGRAM(s) IV Push every 4 hours PRN pain or dyspnea    Pertinent Labs: No recent nutrition-related labs     Skin: Stage IV sacrum, unstageable to sacrum, unstageable to R/L heels, unstageable to coccyx, skin tear to posterior scrotum and R. forearm  Edema: 1+ generalized, 3+ B/L legs and feet    Nutrition focused physical exam previously conducted - found signs of malnutrition [ ]absent [x]present    Subcutaneous fat loss: [ ] Orbital fat pads region, [ ]Buccal fat region, [ ]Triceps region,  [ ]Ribs region    Muscle wasting: [ x] Temples region, [ ]Clavicle region, [ ]Shoulder region, [ ]Scapula region, [ ]Interosseous region,  [ ]thigh region, [ ]Calf region    Estimated Needs:   [x] no change since previous assessment  [ ] recalculated:     Current Nutrition Diagnosis: Pt presents at high nutrition risk secondary to malnutrition (moderate chronic) related to insufficient protein-energy intake in setting of sepsis, chronic resp failure +trach, impaired skin integrity as evidenced by severe fluid accumulation (3+ generalized, 4+ R/L feet and ankles), likely mask wt loss.   Aware GOC discussion Attending, Nephrology, Palliative MD with HCP decision to initiate comfort care, possible terminal wean pending decision from legal guardian     Recommendations:   RD to provide recommendations based on Pt/family wishes     Monitoring and Evaluation:   [ ] PO intake [ ] Tolerance to diet prescription [X] Weights  [X] Follow up per protocol [X] Labs:

## 2019-10-28 NOTE — PROGRESS NOTE ADULT - ASSESSMENT
Acute on chronic renal failure with hyperkalemia, hypernatremia, not HD candidate as per renal .  Continue Bumex as per Nephrology.  Continue NaHCO3. Hyperkalemia resolved with Kayexalate. TF changed to Nepro. Worsening hypernatremia     Functional quadriplegia with Sacral/ischial osteomyelitis  - Afebrile, but with interval worsening of leukocytosis, Blood culture negative, urine culture with polymicrobial growth- contaminated.  Sputum with pseudomonas R to Carbapenems but S to cefepime, could be colonization. Completed Cefepime 1gm q12 , diverting colostomy in place, Collagenase, wound care, off loading, Nutrition supplements, free water, ID signed off, has no more fever, not much secretions.     Aspiration pneumonia. Completed antibiotics - see above, Aspiration precautions, on Vent, being managed by Pulmonary.     VDRF: Continue Vent  - Trach care    Seizure disorder   - Continue AED, Seizure precautions    HTN  - Continue antihypertensives    AF - rate controlled  - Continue BB  - No anticoagulants    Anemia, acute on chronic. History of bleeding in ostomy as well as from Sacral decubitus. None currently.  Continue supplements. PPI     Prophylactic measure:   - VCD for VTEp, no pharmacologic means as GIB  - Probiotic      GOC: team Met with HCP Van Monroy 10/23 attended by Palliative Care Dr Bonner, Nephrology Dr Kaur and myself. . Van would like to proceed with comfort care and possible terminal wean, Palliative team tried to reached Guardian could not speak with him, will wait for final decision from the legal guardian.     Disposition - Comfort care - pending instructions for HCP regarding terminal wean Acute on chronic renal failure with hyperkalemia, hypernatremia, not HD candidate as per renal .  Continue Bumex as per Nephrology.  Continue NaHCO3. Hyperkalemia resolved with Kayexalate. TF changed to Nepro. Worsening hypernatremia.      Functional quadriplegia with Sacral/ischial osteomyelitis  - Afebrile, but with interval worsening of leukocytosis, Blood culture negative, urine culture with polymicrobial growth- contaminated.  Sputum with pseudomonas R to Carbapenems but S to cefepime, could be colonization. Completed Cefepime 1gm q12 , diverting colostomy in place, Collagenase, wound care, off loading, Nutrition supplements, free water, ID signed off, has no more fever, not much secretions, waiting for legal guardian to get back to us for final decision.     Aspiration pneumonia. Completed antibiotics - see above, Aspiration precautions, on Vent, being managed by Pulmonary.     VDRF: Continue Vent  - Trach care    Seizure disorder   - Continue AED, Seizure precautions    HTN  - Continue antihypertensives    AF - rate controlled  - Continue BB  - No anticoagulants    Anemia, acute on chronic. History of bleeding in ostomy as well as from Sacral decubitus. None currently.  Continue supplements. PPI     Prophylactic measure:   - VCD for VTEp, no pharmacologic means as GIB  - Probiotic    decubitus ulcers: wound care and [place] bed to prevent worsening.     GOC: meeting on HCP Van Monroy 10/23 attended by Palliative Care Dr Bonner, Nephrology Dr Kaur, Van would like to proceed with comfort care and possible terminal wean, Palliative team tried to reached Guardian again today, no response, wait for final decision from the legal guardian.     Disposition - Comfort care - pending instructions for HCP regarding terminal wean.

## 2019-10-29 LAB
ANION GAP SERPL CALC-SCNC: 21 MMOL/L — HIGH (ref 5–17)
BUN SERPL-MCNC: 209 MG/DL — HIGH (ref 8–20)
CALCIUM SERPL-MCNC: 9.1 MG/DL — SIGNIFICANT CHANGE UP (ref 8.6–10.2)
CHLORIDE SERPL-SCNC: 103 MMOL/L — SIGNIFICANT CHANGE UP (ref 98–107)
CO2 SERPL-SCNC: 19 MMOL/L — LOW (ref 22–29)
CREAT SERPL-MCNC: 4.46 MG/DL — HIGH (ref 0.5–1.3)
GLUCOSE BLDC GLUCOMTR-MCNC: 107 MG/DL — HIGH (ref 70–99)
GLUCOSE SERPL-MCNC: 129 MG/DL — HIGH (ref 70–115)
POTASSIUM SERPL-MCNC: 5.1 MMOL/L — SIGNIFICANT CHANGE UP (ref 3.5–5.3)
POTASSIUM SERPL-SCNC: 5.1 MMOL/L — SIGNIFICANT CHANGE UP (ref 3.5–5.3)
SODIUM SERPL-SCNC: 143 MMOL/L — SIGNIFICANT CHANGE UP (ref 135–145)

## 2019-10-29 PROCEDURE — 99232 SBSQ HOSP IP/OBS MODERATE 35: CPT

## 2019-10-29 PROCEDURE — 99233 SBSQ HOSP IP/OBS HIGH 50: CPT

## 2019-10-29 RX ORDER — HYDROMORPHONE HYDROCHLORIDE 2 MG/ML
0.5 INJECTION INTRAMUSCULAR; INTRAVENOUS; SUBCUTANEOUS EVERY 4 HOURS
Refills: 0 | Status: DISCONTINUED | OUTPATIENT
Start: 2019-10-29 | End: 2019-10-30

## 2019-10-29 RX ADMIN — BUMETANIDE 2 MILLIGRAM(S): 0.25 INJECTION INTRAMUSCULAR; INTRAVENOUS at 06:05

## 2019-10-29 RX ADMIN — HYDROMORPHONE HYDROCHLORIDE 0.5 MILLIGRAM(S): 2 INJECTION INTRAMUSCULAR; INTRAVENOUS; SUBCUTANEOUS at 16:40

## 2019-10-29 RX ADMIN — Medication 1 APPLICATION(S): at 16:42

## 2019-10-29 RX ADMIN — LEVETIRACETAM 400 MILLIGRAM(S): 250 TABLET, FILM COATED ORAL at 06:06

## 2019-10-29 RX ADMIN — HYDROMORPHONE HYDROCHLORIDE 0.5 MILLIGRAM(S): 2 INJECTION INTRAMUSCULAR; INTRAVENOUS; SUBCUTANEOUS at 22:09

## 2019-10-29 RX ADMIN — HYDROMORPHONE HYDROCHLORIDE 0.5 MILLIGRAM(S): 2 INJECTION INTRAMUSCULAR; INTRAVENOUS; SUBCUTANEOUS at 14:50

## 2019-10-29 RX ADMIN — NYSTATIN CREAM 1 APPLICATION(S): 100000 CREAM TOPICAL at 16:41

## 2019-10-29 RX ADMIN — HYDROMORPHONE HYDROCHLORIDE 0.5 MILLIGRAM(S): 2 INJECTION INTRAMUSCULAR; INTRAVENOUS; SUBCUTANEOUS at 14:35

## 2019-10-29 RX ADMIN — PANTOPRAZOLE SODIUM 40 MILLIGRAM(S): 20 TABLET, DELAYED RELEASE ORAL at 06:05

## 2019-10-29 RX ADMIN — PANTOPRAZOLE SODIUM 40 MILLIGRAM(S): 20 TABLET, DELAYED RELEASE ORAL at 16:41

## 2019-10-29 RX ADMIN — HYDROMORPHONE HYDROCHLORIDE 0.5 MILLIGRAM(S): 2 INJECTION INTRAMUSCULAR; INTRAVENOUS; SUBCUTANEOUS at 06:05

## 2019-10-29 RX ADMIN — Medication 400 MILLIGRAM(S): at 16:40

## 2019-10-29 RX ADMIN — Medication 30 MILLIGRAM(S): at 06:04

## 2019-10-29 RX ADMIN — Medication 400 MILLIGRAM(S): at 06:04

## 2019-10-29 RX ADMIN — Medication 650 MILLIGRAM(S): at 06:04

## 2019-10-29 RX ADMIN — NYSTATIN CREAM 1 APPLICATION(S): 100000 CREAM TOPICAL at 06:07

## 2019-10-29 RX ADMIN — Medication 650 MILLIGRAM(S): at 14:35

## 2019-10-29 RX ADMIN — AMLODIPINE BESYLATE 5 MILLIGRAM(S): 2.5 TABLET ORAL at 06:04

## 2019-10-29 RX ADMIN — Medication 25 MILLIGRAM(S): at 06:04

## 2019-10-29 RX ADMIN — CHLORHEXIDINE GLUCONATE 15 MILLILITER(S): 213 SOLUTION TOPICAL at 16:40

## 2019-10-29 RX ADMIN — LEVETIRACETAM 400 MILLIGRAM(S): 250 TABLET, FILM COATED ORAL at 16:39

## 2019-10-29 RX ADMIN — Medication 1 APPLICATION(S): at 06:06

## 2019-10-29 RX ADMIN — HYDROMORPHONE HYDROCHLORIDE 0.5 MILLIGRAM(S): 2 INJECTION INTRAMUSCULAR; INTRAVENOUS; SUBCUTANEOUS at 21:55

## 2019-10-29 RX ADMIN — CHLORHEXIDINE GLUCONATE 1 APPLICATION(S): 213 SOLUTION TOPICAL at 06:07

## 2019-10-29 RX ADMIN — Medication 0.12 MILLIGRAM(S): at 06:04

## 2019-10-29 RX ADMIN — CHLORHEXIDINE GLUCONATE 15 MILLILITER(S): 213 SOLUTION TOPICAL at 06:07

## 2019-10-29 RX ADMIN — Medication 25 MILLIGRAM(S): at 16:41

## 2019-10-29 NOTE — PROGRESS NOTE ADULT - ATTENDING COMMENTS
Thank you for the opportunity to assist with the care of this patient.   [facility] [phone].

## 2019-10-29 NOTE — PROGRESS NOTE ADULT - ASSESSMENT
87M with CVA, dementia, Trach/PEG from Replaced by [facility], multiple rehospitalizations here with fevers, now on comfort care.

## 2019-10-29 NOTE — PROGRESS NOTE ADULT - SUBJECTIVE AND OBJECTIVE BOX
KING MCKEON    [id number]    87y      Male    Patient is a 87y old  Male who presents with a chief complaint of Sepsis (29 Oct 2019 14:53)      INTERVAL HPI/OVERNIGHT EVENTS:    Unable to get much info as patient is not talking, daughter at bedside, no issues over night, he is spontaneously opening and closing his eyes, no fever as per nursing staff     REVIEW OF SYSTEMS:    Unable to get much info           Vital Signs Last 24 Hrs  T(C): 36.9 (29 Oct 2019 07:35), Max: 36.9 (29 Oct 2019 07:35)  T(F): 98.5 (29 Oct 2019 07:35), Max: 98.5 (29 Oct 2019 07:35)  HR: 83 (29 Oct 2019 16:27) (61 - 96)  BP: 114/51 (29 Oct 2019 07:35) (112/61 - 128/66)  RR: 24 (29 Oct 2019 07:35) (22 - 24)  SpO2: 97% (29 Oct 2019 16:27) (89% - 100%)    PHYSICAL EXAM:    GENERAL: Elderly male looking comfortable   HEENT: trached and connected with Vent   NECK: No JVD.  CHEST/LUNG:  Decrease air entry b/l, No wheezing  HEART: S1S2+, Regular rate and rhythm; No murmurs  ABDOMEN: Soft, Nontender, Nondistended; Bowel sounds present  EXTREMITIES:  1+ Peripheral Pulses, has edema  SKIN: decubitus ulcers un stageable.  NEURO: patient is not responding, unable to participate during exam.       LABS:    10-29    143  |  103  |  209.0<H>  ----------------------------<  129<H>  5.1   |  19.0<L>  |  4.46<H>    Ca    9.1      29 Oct 2019 07:50              I&O's Summary    28 Oct 2019 07:01  -  29 Oct 2019 07:00  --------------------------------------------------------  IN: 1270 mL / OUT: 950 mL / NET: 320 mL    29 Oct 2019 07:01  -  29 Oct 2019 17:04  --------------------------------------------------------  IN: 0 mL / OUT: 250 mL / NET: -250 mL        MEDICATIONS  (STANDING):  ALBUTerol    0.083%. 2.5 milliGRAM(s) Nebulizer once  amLODIPine   Tablet 5 milliGRAM(s) Oral daily  buMETAnide Injectable 2 milliGRAM(s) IV Push daily  chlorhexidine 0.12% Liquid 15 milliLiter(s) Oral Mucosa every 12 hours  chlorhexidine 2% Cloths 1 Application(s) Topical <User Schedule>  collagenase Ointment 1 Application(s) Topical two times a day  digoxin     Tablet 0.125 milliGRAM(s) Oral daily  HYDROmorphone  Injectable 0.5 milliGRAM(s) IV Push every 4 hours  levETIRAcetam  IVPB 1000 milliGRAM(s) IV Intermittent every 12 hours  metoprolol tartrate 25 milliGRAM(s) Oral two times a day  nystatin Cream 1 Application(s) Topical two times a day  pantoprazole  Injectable 40 milliGRAM(s) IV Push every 12 hours  phenytoin   Suspension 400 milliGRAM(s) Enteral Tube every 12 hours    MEDICATIONS  (PRN):  acetaminophen    Suspension .. 650 milliGRAM(s) Oral every 6 hours PRN Temp greater or equal to 38.5C (101.3F)  ALBUTerol/ipratropium for Nebulization. 3 milliLiter(s) Nebulizer every 4 hours PRN Bronchospasm  HYDROmorphone  Injectable 1 milliGRAM(s) IV Push every 4 hours PRN pain or dyspnea

## 2019-10-29 NOTE — GOALS OF CARE CONVERSATION - ADVANCED CARE PLANNING - CONVERSATION DETAILS
Late entry note.    SW was present during telephone conference with Unit DOYLE Francisco, palliative physician Dr Bonner and /guardian Van in discussing patients medical issues, reviewing guardians decisions related to comfort care and exploring end of life decisions further regarding elective withdrawal. Dgts mental instability also addressed and Van-guardian was able to review his concerns based upon prior experiences in preparation for planning withdrawal.

## 2019-10-29 NOTE — PROGRESS NOTE ADULT - PROBLEM SELECTOR PLAN 4
-extremely grave prognosis for meaningful recovery. At this time, further medical interventions or aggressive measures would not be medically therapeutic. IN discussion with patient's guardian who is medical decision maker - Van Monroy, withdrawal of ventilator is the best decision for this unfortunate gentleman.

## 2019-10-29 NOTE — PROGRESS NOTE ADULT - SUBJECTIVE AND OBJECTIVE BOX
OVERNIGHT EVENTS: no changes. remains unresponsive.     Present Symptoms:     Dyspnea: 0   Nausea/Vomiting: No  Anxiety:  unable   Depression: unable   Fatigue: Yes   Loss of appetite: unable     Pain: none             Character-            Duration-            Effect-            Factors-            Frequency-            Location-            Severity-    Review of Systems: Reviewed                 Unable to obtain due to poor mentation   All others negative    MEDICATIONS  (STANDING):  ALBUTerol    0.083%. 2.5 milliGRAM(s) Nebulizer once  amLODIPine   Tablet 5 milliGRAM(s) Oral daily  buMETAnide Injectable 2 milliGRAM(s) IV Push daily  chlorhexidine 0.12% Liquid 15 milliLiter(s) Oral Mucosa every 12 hours  chlorhexidine 2% Cloths 1 Application(s) Topical <User Schedule>  collagenase Ointment 1 Application(s) Topical two times a day  digoxin     Tablet 0.125 milliGRAM(s) Oral daily  HYDROmorphone  Injectable 0.5 milliGRAM(s) IV Push every 8 hours  levETIRAcetam  IVPB 1000 milliGRAM(s) IV Intermittent every 12 hours  metoprolol tartrate 25 milliGRAM(s) Oral two times a day  nystatin Cream 1 Application(s) Topical two times a day  pantoprazole  Injectable 40 milliGRAM(s) IV Push every 12 hours  phenytoin   Suspension 400 milliGRAM(s) Enteral Tube every 12 hours  predniSONE   Tablet 30 milliGRAM(s) Oral daily  sodium bicarbonate 650 milliGRAM(s) Oral every 8 hours    MEDICATIONS  (PRN):  acetaminophen    Suspension .. 650 milliGRAM(s) Oral every 6 hours PRN Temp greater or equal to 38.5C (101.3F)  ALBUTerol/ipratropium for Nebulization. 3 milliLiter(s) Nebulizer every 4 hours PRN Bronchospasm  HYDROmorphone  Injectable 1 milliGRAM(s) IV Push every 4 hours PRN pain or dyspnea    PHYSICAL EXAM:    Vital Signs Last 24 Hrs  T(C): 36.9 (29 Oct 2019 07:35), Max: 36.9 (29 Oct 2019 07:35)  T(F): 98.5 (29 Oct 2019 07:35), Max: 98.5 (29 Oct 2019 07:35)  HR: 96 (29 Oct 2019 13:10) (61 - 96)  BP: 114/51 (29 Oct 2019 07:35) (112/61 - 128/66)  BP(mean): --  RR: 24 (29 Oct 2019 07:35) (22 - 24)  SpO2: 89% (29 Oct 2019 13:10) (89% - 100%)    General: unresponsive, randomly opens eyes     Karnofsky:  %    HEENT: normal  dry mouth  ET tube/trach    Lungs: comfortable tachypnea/labored breathing  excessive secretions    CV: normal  tachycardia    GI: normal  distended  tender  no BS               PEG/NG/OG tube  constipation  last BM:     : normal  incontinent  oliguria/anuria  jones    MSK: normal  weakness  edema             ambulatory  bedbound/wheelchair bound    Skin: normal  pressure ulcers- Stage_____  no rash    LABS:    10-29    143  |  103  |  209.0<H>  ----------------------------<  129<H>  5.1   |  19.0<L>  |  4.46<H>    Ca    9.1      29 Oct 2019 07:50    I&O's Summary    28 Oct 2019 07:01  -  29 Oct 2019 07:00  --------------------------------------------------------  IN: 1270 mL / OUT: 950 mL / NET: 320 mL    29 Oct 2019 07:01  -  29 Oct 2019 14:54  --------------------------------------------------------  IN: 0 mL / OUT: 250 mL / NET: -250 mL    RADIOLOGY & ADDITIONAL STUDIES:    ADVANCE DIRECTIVES: DNR/I - comfort care OVERNIGHT EVENTS: no changes. remains unresponsive.     Present Symptoms:     Dyspnea: 0   Nausea/Vomiting: No  Anxiety:  unable   Depression: unable   Fatigue: Yes   Loss of appetite: unable     Pain: none             Character-            Duration-            Effect-            Factors-            Frequency-            Location-            Severity-    Review of Systems: Reviewed                 Unable to obtain due to poor mentation   All others negative    MEDICATIONS  (STANDING):  ALBUTerol    0.083%. 2.5 milliGRAM(s) Nebulizer once  amLODIPine   Tablet 5 milliGRAM(s) Oral daily  buMETAnide Injectable 2 milliGRAM(s) IV Push daily  chlorhexidine 0.12% Liquid 15 milliLiter(s) Oral Mucosa every 12 hours  chlorhexidine 2% Cloths 1 Application(s) Topical <User Schedule>  collagenase Ointment 1 Application(s) Topical two times a day  digoxin     Tablet 0.125 milliGRAM(s) Oral daily  HYDROmorphone  Injectable 0.5 milliGRAM(s) IV Push every 8 hours  levETIRAcetam  IVPB 1000 milliGRAM(s) IV Intermittent every 12 hours  metoprolol tartrate 25 milliGRAM(s) Oral two times a day  nystatin Cream 1 Application(s) Topical two times a day  pantoprazole  Injectable 40 milliGRAM(s) IV Push every 12 hours  phenytoin   Suspension 400 milliGRAM(s) Enteral Tube every 12 hours  predniSONE   Tablet 30 milliGRAM(s) Oral daily  sodium bicarbonate 650 milliGRAM(s) Oral every 8 hours    MEDICATIONS  (PRN):  acetaminophen    Suspension .. 650 milliGRAM(s) Oral every 6 hours PRN Temp greater or equal to 38.5C (101.3F)  ALBUTerol/ipratropium for Nebulization. 3 milliLiter(s) Nebulizer every 4 hours PRN Bronchospasm  HYDROmorphone  Injectable 1 milliGRAM(s) IV Push every 4 hours PRN pain or dyspnea    PHYSICAL EXAM:    Vital Signs Last 24 Hrs  T(C): 36.9 (29 Oct 2019 07:35), Max: 36.9 (29 Oct 2019 07:35)  T(F): 98.5 (29 Oct 2019 07:35), Max: 98.5 (29 Oct 2019 07:35)  HR: 96 (29 Oct 2019 13:10) (61 - 96)  BP: 114/51 (29 Oct 2019 07:35) (112/61 - 128/66)  BP(mean): --  RR: 24 (29 Oct 2019 07:35) (22 - 24)  SpO2: 89% (29 Oct 2019 13:10) (89% - 100%)    General: unresponsive, randomly opens eyes     Karnofsky:  10%    HEENT: ET tube    Lungs: comfortable     CV: normal      GI: normal      : jones    MSK: bedbound/wheelchair bound    Skin: no rash    LABS:    10-29    143  |  103  |  209.0<H>  ----------------------------<  129<H>  5.1   |  19.0<L>  |  4.46<H>    Ca    9.1      29 Oct 2019 07:50    I&O's Summary    28 Oct 2019 07:01  -  29 Oct 2019 07:00  --------------------------------------------------------  IN: 1270 mL / OUT: 950 mL / NET: 320 mL    29 Oct 2019 07:01  -  29 Oct 2019 14:54  --------------------------------------------------------  IN: 0 mL / OUT: 250 mL / NET: -250 mL    RADIOLOGY & ADDITIONAL STUDIES:    ADVANCE DIRECTIVES: DNR/I - comfort care

## 2019-10-29 NOTE — PROVIDER CONTACT NOTE (OTHER) - SITUATION
Cheli Echevarria reported that patients tube was clotted and lab results were not able to be obtained. MD informed and no further action taken at this time.

## 2019-10-29 NOTE — PROGRESS NOTE ADULT - ASSESSMENT
Acute on chronic renal failure with hyperkalemia, hypernatremia, not HD candidate as per renal .  Continue Bumex as per Nephrology.  Continue NaHCO3. Hyperkalemia resolved with Kayexalate. TF changed to Nepro. Worsening hypernatremia.      Functional quadriplegia with Sacral/ischial osteomyelitis  - Afebrile, but with interval worsening of leukocytosis, Blood culture negative, urine culture with polymicrobial growth- contaminated.  Sputum with pseudomonas R to Carbapenems but S to cefepime, could be colonization. Completed Cefepime 1gm q12 , diverting colostomy in place, Collagenase, wound care, off loading, Nutrition supplements, free water, ID signed off, has no more fever, not much secretions, waiting for legal guardian to get back to us for final decision.     Aspiration pneumonia. Completed antibiotics - see above, Aspiration precautions, on Vent, being managed by Pulmonary.     VDRF: Continue Vent, Trach care    Seizure disorder: Continue AED, Seizure precautions.     HTN  - Continue antihypertensives    AF - rate controlled  - Continue BB  - No anticoagulants    Anemia, acute on chronic. History of bleeding in ostomy as well as from Sacral decubitus. None currently.  Continue supplements. PPI     Prophylactic measure:   - VCD for VTEp, no pharmacologic means as GIB  - Probiotic    decubitus ulcers: wound care and Clinitron bed to prevent worsening.     GOC:  Palliative care team spoke with Guardian patient has extremely grave prognosis for meaningful recovery,  At this time, further medical interventions or aggressive measures would not be medically therapeutic, in discussion with patient's guardian who is medical decision maker - Van Monroy, withdrawal of ventilator is the best decision for this unfortunate gentleman.     Disposition: terminal weaning

## 2019-10-30 LAB — GLUCOSE BLDC GLUCOMTR-MCNC: 113 MG/DL — HIGH (ref 70–99)

## 2019-10-30 PROCEDURE — 99232 SBSQ HOSP IP/OBS MODERATE 35: CPT

## 2019-10-30 PROCEDURE — 99497 ADVNCD CARE PLAN 30 MIN: CPT

## 2019-10-30 PROCEDURE — 99233 SBSQ HOSP IP/OBS HIGH 50: CPT

## 2019-10-30 RX ORDER — ROBINUL 0.2 MG/ML
0.4 INJECTION INTRAMUSCULAR; INTRAVENOUS EVERY 6 HOURS
Refills: 0 | Status: DISCONTINUED | OUTPATIENT
Start: 2019-10-31 | End: 2019-10-31

## 2019-10-30 RX ORDER — SCOPALAMINE 1 MG/3D
1 PATCH, EXTENDED RELEASE TRANSDERMAL
Refills: 0 | Status: DISCONTINUED | OUTPATIENT
Start: 2019-10-30 | End: 2019-10-31

## 2019-10-30 RX ORDER — HYDROMORPHONE HYDROCHLORIDE 2 MG/ML
0.5 INJECTION INTRAMUSCULAR; INTRAVENOUS; SUBCUTANEOUS
Qty: 100 | Refills: 0 | Status: DISCONTINUED | OUTPATIENT
Start: 2019-10-30 | End: 2019-10-31

## 2019-10-30 RX ADMIN — NYSTATIN CREAM 1 APPLICATION(S): 100000 CREAM TOPICAL at 05:24

## 2019-10-30 RX ADMIN — CHLORHEXIDINE GLUCONATE 15 MILLILITER(S): 213 SOLUTION TOPICAL at 16:58

## 2019-10-30 RX ADMIN — HYDROMORPHONE HYDROCHLORIDE 0.5 MILLIGRAM(S): 2 INJECTION INTRAMUSCULAR; INTRAVENOUS; SUBCUTANEOUS at 05:23

## 2019-10-30 RX ADMIN — ROBINUL 0.4 MILLIGRAM(S): 0.2 INJECTION INTRAMUSCULAR; INTRAVENOUS at 23:11

## 2019-10-30 RX ADMIN — PANTOPRAZOLE SODIUM 40 MILLIGRAM(S): 20 TABLET, DELAYED RELEASE ORAL at 05:24

## 2019-10-30 RX ADMIN — Medication 1 MILLIGRAM(S): at 23:11

## 2019-10-30 RX ADMIN — CHLORHEXIDINE GLUCONATE 1 APPLICATION(S): 213 SOLUTION TOPICAL at 05:26

## 2019-10-30 RX ADMIN — CHLORHEXIDINE GLUCONATE 15 MILLILITER(S): 213 SOLUTION TOPICAL at 05:25

## 2019-10-30 RX ADMIN — HYDROMORPHONE HYDROCHLORIDE 0.5 MILLIGRAM(S): 2 INJECTION INTRAMUSCULAR; INTRAVENOUS; SUBCUTANEOUS at 02:28

## 2019-10-30 RX ADMIN — HYDROMORPHONE HYDROCHLORIDE 0.5 MG/HR: 2 INJECTION INTRAMUSCULAR; INTRAVENOUS; SUBCUTANEOUS at 17:20

## 2019-10-30 RX ADMIN — HYDROMORPHONE HYDROCHLORIDE 0.5 MILLIGRAM(S): 2 INJECTION INTRAMUSCULAR; INTRAVENOUS; SUBCUTANEOUS at 02:43

## 2019-10-30 RX ADMIN — NYSTATIN CREAM 1 APPLICATION(S): 100000 CREAM TOPICAL at 17:00

## 2019-10-30 RX ADMIN — HYDROMORPHONE HYDROCHLORIDE 0.5 MG/HR: 2 INJECTION INTRAMUSCULAR; INTRAVENOUS; SUBCUTANEOUS at 17:30

## 2019-10-30 RX ADMIN — SCOPALAMINE 1 PATCH: 1 PATCH, EXTENDED RELEASE TRANSDERMAL at 17:31

## 2019-10-30 RX ADMIN — LEVETIRACETAM 400 MILLIGRAM(S): 250 TABLET, FILM COATED ORAL at 05:23

## 2019-10-30 RX ADMIN — Medication 1 APPLICATION(S): at 16:57

## 2019-10-30 RX ADMIN — Medication 1 APPLICATION(S): at 05:25

## 2019-10-30 RX ADMIN — HYDROMORPHONE HYDROCHLORIDE 0.5 MILLIGRAM(S): 2 INJECTION INTRAMUSCULAR; INTRAVENOUS; SUBCUTANEOUS at 13:38

## 2019-10-30 RX ADMIN — LEVETIRACETAM 400 MILLIGRAM(S): 250 TABLET, FILM COATED ORAL at 16:55

## 2019-10-30 RX ADMIN — BUMETANIDE 2 MILLIGRAM(S): 0.25 INJECTION INTRAMUSCULAR; INTRAVENOUS at 05:23

## 2019-10-30 RX ADMIN — HYDROMORPHONE HYDROCHLORIDE 0.5 MILLIGRAM(S): 2 INJECTION INTRAMUSCULAR; INTRAVENOUS; SUBCUTANEOUS at 05:38

## 2019-10-30 RX ADMIN — HYDROMORPHONE HYDROCHLORIDE 0.5 MILLIGRAM(S): 2 INJECTION INTRAMUSCULAR; INTRAVENOUS; SUBCUTANEOUS at 14:00

## 2019-10-30 RX ADMIN — SCOPALAMINE 1 PATCH: 1 PATCH, EXTENDED RELEASE TRANSDERMAL at 17:02

## 2019-10-30 NOTE — PROGRESS NOTE ADULT - ATTENDING COMMENTS
Thank you for the opportunity to assist with the care of this patient.   [facility] Consult [facility] [phone].

## 2019-10-30 NOTE — PROGRESS NOTE ADULT - SUBJECTIVE AND OBJECTIVE BOX
KING MCKEON    [id number]    87y      Male    Patient is a 87y old  Male who presents with a chief complaint of Sepsis (29 Oct 2019 17:04)      INTERVAL HPI/OVERNIGHT EVENTS:      Unable to get much info as patient is not talking, daughter at bedside, no issues over night, he is spontaneously opening and closing his eyes, no fever as per nursing staff     REVIEW OF SYSTEMS:    Unable to get much info         Vital Signs Last 24 Hrs  T(C): 37.2 (30 Oct 2019 06:36), Max: 37.2 (30 Oct 2019 06:36)  T(F): 98.9 (30 Oct 2019 06:36), Max: 98.9 (30 Oct 2019 06:36)  HR: 101 (30 Oct 2019 15:43) (80 - 101)  BP: 115/62 (30 Oct 2019 06:36) (115/62 - 115/62)  RR: 18 (30 Oct 2019 06:36) (18 - 18)  SpO2: 99% (30 Oct 2019 15:43) (97% - 99%)    PHYSICAL EXAM:    GENERAL: Elderly male looking comfortable   HEENT: trached and connected with Vent   NECK: No JVD.  CHEST/LUNG:  Decrease air entry b/l, No wheezing  HEART: S1S2+, Regular rate and rhythm; No murmurs  ABDOMEN: Soft, Nontender, Nondistended; Bowel sounds present  EXTREMITIES:  1+ Peripheral Pulses, has edema  SKIN: decubitus ulcers un stageable.  NEURO: patient is not responding, unable to participate during exam.           LABS:    10-29    143  |  103  |  209.0<H>  ----------------------------<  129<H>  5.1   |  19.0<L>  |  4.46<H>    Ca    9.1      29 Oct 2019 07:50              I&O's Summary    29 Oct 2019 07:01  -  30 Oct 2019 07:00  --------------------------------------------------------  IN: 0 mL / OUT: 800 mL / NET: -800 mL    30 Oct 2019 07:01  -  30 Oct 2019 16:12  --------------------------------------------------------  IN: 0 mL / OUT: 250 mL / NET: -250 mL        MEDICATIONS  (STANDING):  ALBUTerol    0.083%. 2.5 milliGRAM(s) Nebulizer once  chlorhexidine 0.12% Liquid 15 milliLiter(s) Oral Mucosa every 12 hours  chlorhexidine 2% Cloths 1 Application(s) Topical <User Schedule>  collagenase Ointment 1 Application(s) Topical two times a day  digoxin     Tablet 0.125 milliGRAM(s) Oral daily  HYDROmorphone Infusion 0.5 mG/Hr (0.5 mL/Hr) IV Continuous <Continuous>  levETIRAcetam  IVPB 1000 milliGRAM(s) IV Intermittent every 12 hours  nystatin Cream 1 Application(s) Topical two times a day  phenytoin   Suspension 400 milliGRAM(s) Enteral Tube every 12 hours    MEDICATIONS  (PRN):  acetaminophen    Suspension .. 650 milliGRAM(s) Oral every 6 hours PRN Temp greater or equal to 38.5C (101.3F)  ALBUTerol/ipratropium for Nebulization. 3 milliLiter(s) Nebulizer every 4 hours PRN Bronchospasm

## 2019-10-30 NOTE — PROGRESS NOTE ADULT - SUBJECTIVE AND OBJECTIVE BOX
OVERNIGHT EVENTS: remains unresponsive and nonverbal     Present Symptoms:     Dyspnea: 0   Nausea/Vomiting: No  Anxiety:  no   Depression: unable   Fatigue: Yes   Loss of appetite: unable     Pain: none             Character-            Duration-            Effect-            Factors-            Frequency-            Location-            Severity-    Review of Systems: Reviewed                 Unable to obtain due to poor mentation   All others negative    MEDICATIONS  (STANDING):  ALBUTerol    0.083%. 2.5 milliGRAM(s) Nebulizer once  amLODIPine   Tablet 5 milliGRAM(s) Oral daily  buMETAnide Injectable 2 milliGRAM(s) IV Push daily  chlorhexidine 0.12% Liquid 15 milliLiter(s) Oral Mucosa every 12 hours  chlorhexidine 2% Cloths 1 Application(s) Topical <User Schedule>  collagenase Ointment 1 Application(s) Topical two times a day  digoxin     Tablet 0.125 milliGRAM(s) Oral daily  HYDROmorphone  Injectable 0.5 milliGRAM(s) IV Push every 4 hours  levETIRAcetam  IVPB 1000 milliGRAM(s) IV Intermittent every 12 hours  metoprolol tartrate 25 milliGRAM(s) Oral two times a day  nystatin Cream 1 Application(s) Topical two times a day  pantoprazole  Injectable 40 milliGRAM(s) IV Push every 12 hours  phenytoin   Suspension 400 milliGRAM(s) Enteral Tube every 12 hours    MEDICATIONS  (PRN):  acetaminophen    Suspension .. 650 milliGRAM(s) Oral every 6 hours PRN Temp greater or equal to 38.5C (101.3F)  ALBUTerol/ipratropium for Nebulization. 3 milliLiter(s) Nebulizer every 4 hours PRN Bronchospasm  HYDROmorphone  Injectable 1 milliGRAM(s) IV Push every 4 hours PRN pain or dyspnea    PHYSICAL EXAM:    Vital Signs Last 24 Hrs  T(C): 37.2 (30 Oct 2019 06:36), Max: 37.2 (30 Oct 2019 06:36)  T(F): 98.9 (30 Oct 2019 06:36), Max: 98.9 (30 Oct 2019 06:36)  HR: 101 (30 Oct 2019 15:43) (80 - 101)  BP: 115/62 (30 Oct 2019 06:36) (115/62 - 115/62)  BP(mean): --  RR: 18 (30 Oct 2019 06:36) (18 - 18)  SpO2: 99% (30 Oct 2019 15:43) (97% - 99%)    General: randomly opens eyes     Karnofsky:  10 %    HEENT: trach     Lungs: comfortable     CV: normal      GI: PEG     :  robert    MSK: bedbound/wheelchair bound    Skin: no rash    LABS:  10-29    143  |  103  |  209.0<H>  ----------------------------<  129<H>  5.1   |  19.0<L>  |  4.46<H>    Ca    9.1      29 Oct 2019 07:50    I&O's Summary    29 Oct 2019 07:01  -  30 Oct 2019 07:00  --------------------------------------------------------  IN: 0 mL / OUT: 800 mL / NET: -800 mL    30 Oct 2019 07:01  -  30 Oct 2019 15:59  --------------------------------------------------------  IN: 0 mL / OUT: 250 mL / NET: -250 mL    RADIOLOGY & ADDITIONAL STUDIES:    ADVANCE DIRECTIVES: DNR/I - comfort care

## 2019-10-30 NOTE — PROGRESS NOTE ADULT - ASSESSMENT
87M with CVA, dementia, Trach/PEG from [facility], multiple rehospitalizations here with fevers, now on comfort care to be removed from the ventilator tomorrow morning.

## 2019-10-30 NOTE — GOALS OF CARE CONVERSATION - ADVANCED CARE PLANNING - CONVERSATION DETAILS
Meeting held with dgt to advise of medical and legal guardian review and decision for elective withdrawal. Palliative physician Dr Montes , hospitalist DR Mc, palliative SW and 2 uit RN's met with patients dgt to provide information regarding medical situation and futility. Physicians explained in detail regarding medical reasons which warrant withdrawal and focus on comfort with relief of suffering. Dgt was angry regarding decision being made by other people and believes that patient needs a few more years to recover. dgt believes the wounds have improved and tat 2 years on vent is short amount of time. Dgt also states that she is only one who knows patients wishes and states he would want to remain like he is . Dgt unwilling to accept gravity and reality of situation as well as no meaningful recovery. Dgt wants to appeal decision made by legal guardian in coordination with medical staff. Dgt was advised of plan for withdrawal tomorrow at 10:30 and was encouraged to begin to bring closure to situation and prepare for loss of dad. Bereavement support encouraged but dgt was not open to support offered by staff members.

## 2019-10-30 NOTE — PROGRESS NOTE ADULT - ASSESSMENT
Acute on chronic renal failure with hyperkalemia, hypernatremia, not HD candidate as per renal .  Continue Bumex as per Nephrology.  Continue NaHCO3. Hyperkalemia resolved with Kayexalate. TF changed to Nepro. Worsening hypernatremia.      Functional quadriplegia with Sacral/ischial osteomyelitis  - Afebrile, but with interval worsening of leukocytosis, Blood culture negative, urine culture with polymicrobial growth- contaminated.  Sputum with pseudomonas R to Carbapenems but S to cefepime, could be colonization. Completed Cefepime 1gm q12 , diverting colostomy in place, Collagenase, wound care, off loading, Nutrition supplements, free water, ID signed off, has no more fever, not much secretions, legal guardian spoke with palliative care team to make him comfortable and terminal weaning.     Aspiration pneumonia. Completed antibiotics - see above, Aspiration precautions, on Vent, being managed by Pulmonary.     VDRF: Continue Vent, Trach care    Seizure disorder: Continue AED, Seizure precautions.     HTN  - Continue antihypertensives    AF - rate controlled  - Continue BB  - No anticoagulants    Anemia, acute on chronic. History of bleeding in ostomy as well as from Sacral decubitus. None currently.  Continue supplements. PPI     Prophylactic measure:   - VCD for VTEp, no pharmacologic means as GIB  - Probiotic    decubitus ulcers: wound care and Clinitron bed to prevent worsening.     GOC:  Palliative care team spoke with Guardian patient has extremely grave prognosis for meaningful recovery, at this time, further medical interventions or aggressive measures would not be medically therapeutic, in discussion with patient's guardian who is medical decision maker - Van Monroy, withdrawal of ventilator is the best decision for this unfortunate gentleman, today we had meeting with daughter at bedside after permission from  medical and legal guardian, Palliative physician Dr Montes , hospitalist DR Mc, palliative SW and 2 uit RN's met with patients dgt to provide information regarding medical situation and futility. Physicians explained in detail regarding medical reasons which warrant withdrawal and focus on comfort with relief of suffering. Dgt was angry regarding decision being made by other people and believes that patient needs a few more years to recover. dgt believes the wounds have improved and tat 2 years on vent is short amount of time. Dgt also states that she is only one who knows patients wishes and states he would want to remain like he is . Dgt unwilling to accept gravity and reality of situation as well as no meaningful recovery. Dgt wants to appeal decision made by legal guardian in coordination with medical staff. Dgt was advised of plan for withdrawal tomorrow at 10:30am and was encouraged to begin to bring closure to situation and prepare for loss of dad. Bereavement support encouraged but dgt was not open to support offered by staff members. total time spent 30 minutes.     Disposition: terminal weaning. Acute on chronic renal failure with hyperkalemia, hypernatremia, not HD candidate as per renal .  Continue Bumex as per Nephrology.  Continue NaHCO3. Hyperkalemia resolved with Kayexalate. TF changed to Nepro. Worsening hypernatremia.      Functional quadriplegia with Sacral/ischial osteomyelitis  - Afebrile, but with interval worsening of leukocytosis, Blood culture negative, urine culture with polymicrobial growth- contaminated.  Sputum with pseudomonas R to Carbapenems but S to cefepime, could be colonization. Completed Cefepime 1gm q12 , diverting colostomy in place, Collagenase, wound care, off loading, Nutrition supplements, free water, ID signed off, has no more fever, not much secretions, legal guardian spoke with palliative care team to make him comfortable and terminal weaning.     Aspiration pneumonia. Completed antibiotics - see above, Aspiration precautions, on Vent, being managed by Pulmonary.     VDRF: Continue Vent, Trach care    Seizure disorder: Continue AED, Seizure precautions.     HTN  - Continue antihypertensives    AF - rate controlled  - Continue BB  - No anticoagulants    Anemia, acute on chronic. History of bleeding in ostomy as well as from Sacral decubitus. None currently, H&H has been stable around 7.   Continue supplements. PPI     Prophylactic measure:   - VCD for VTEp, no pharmacologic means as GIB  - Probiotic    decubitus ulcers: wound care and Clinitron bed to prevent worsening.     GOC:  Palliative care team spoke with Guardian patient has extremely grave prognosis for meaningful recovery, at this time, further medical interventions or aggressive measures would not be medically therapeutic, patient has been on Ventilator for so long time with Hx of Ventilator associated pneumonia, recurrent hospitalization, Hx of bleeding, chronic non healing wounds, in discussion with patient's guardian who is medical decision maker - Van Monroy, withdrawal of ventilator is the best decision for this unfortunate gentleman, today we had meeting with daughter at bedside after permission from  medical and legal guardian, Palliative physician Dr Montes , hospitalist DR Mc, palliative SW and 2 uit RN's met with patients dgt to provide information regarding medical situation and futility. Physicians explained in detail regarding medical reasons which warrant withdrawal and focus on comfort with relief of suffering. Dgt was angry regarding decision being made by other people and believes that patient needs a few more years to recover. dgt believes the wounds have improved and tat 2 years on vent is short amount of time. Dgt also states that she is only one who knows patients wishes and states he would want to remain like he is . Dgt unwilling to accept gravity and reality of situation as well as no meaningful recovery. Dgt wants to appeal decision made by legal guardian in coordination with medical staff. Dgt was advised of plan for withdrawal tomorrow at 10:30am and was encouraged to begin to bring closure to situation and prepare for loss of dad. Bereavement support encouraged but dgt was not open to support offered by staff members, total time spent 30 minutes.     Disposition: terminal weaning.

## 2019-10-31 VITALS — OXYGEN SATURATION: 98 %

## 2019-10-31 DIAGNOSIS — R53.2 FUNCTIONAL QUADRIPLEGIA: ICD-10-CM

## 2019-10-31 PROCEDURE — 99358 PROLONG SERVICE W/O CONTACT: CPT | Mod: GC

## 2019-10-31 PROCEDURE — 99233 SBSQ HOSP IP/OBS HIGH 50: CPT | Mod: GC

## 2019-10-31 PROCEDURE — 99239 HOSP IP/OBS DSCHRG MGMT >30: CPT

## 2019-10-31 RX ORDER — HYDROMORPHONE HYDROCHLORIDE 2 MG/ML
2 INJECTION INTRAMUSCULAR; INTRAVENOUS; SUBCUTANEOUS
Qty: 100 | Refills: 0 | Status: DISCONTINUED | OUTPATIENT
Start: 2019-10-31 | End: 2019-10-31

## 2019-10-31 RX ORDER — HYDROMORPHONE HYDROCHLORIDE 2 MG/ML
10 INJECTION INTRAMUSCULAR; INTRAVENOUS; SUBCUTANEOUS
Qty: 100 | Refills: 0 | Status: DISCONTINUED | OUTPATIENT
Start: 2019-10-31 | End: 2019-10-31

## 2019-10-31 RX ADMIN — ROBINUL 0.4 MILLIGRAM(S): 0.2 INJECTION INTRAMUSCULAR; INTRAVENOUS at 05:44

## 2019-10-31 RX ADMIN — Medication 2 MILLIGRAM(S): at 10:33

## 2019-10-31 RX ADMIN — ROBINUL 0.4 MILLIGRAM(S): 0.2 INJECTION INTRAMUSCULAR; INTRAVENOUS at 12:25

## 2019-10-31 RX ADMIN — Medication 1 APPLICATION(S): at 05:44

## 2019-10-31 RX ADMIN — Medication 1 MILLIGRAM(S): at 12:26

## 2019-10-31 RX ADMIN — CHLORHEXIDINE GLUCONATE 15 MILLILITER(S): 213 SOLUTION TOPICAL at 05:43

## 2019-10-31 RX ADMIN — NYSTATIN CREAM 1 APPLICATION(S): 100000 CREAM TOPICAL at 05:45

## 2019-10-31 RX ADMIN — CHLORHEXIDINE GLUCONATE 1 APPLICATION(S): 213 SOLUTION TOPICAL at 05:45

## 2019-10-31 RX ADMIN — SCOPALAMINE 1 PATCH: 1 PATCH, EXTENDED RELEASE TRANSDERMAL at 12:33

## 2019-10-31 RX ADMIN — Medication 1 MILLIGRAM(S): at 05:44

## 2019-10-31 NOTE — DISCHARGE NOTE FOR THE EXPIRED PATIENT - HOSPITAL COURSE
Pt is a 86yo  Male with a pmh/o CAD s/p PCI, CHF, HLD, paroxysmal atrial fribrillation, seizure disorder, CVA with residual deficits, dementia, prostate cancer,  with respiratory failure on chronic vent and who is s/p tracheostomy, admitted for sepsis, anemia, and worsening renal failure. Pt later transferred to MICU, placed on vent for respiratory support, and on cardiac monitor. Pt noted with multiple infection sources, including large sacral decubitus ulcer. Pt noted with bacteremia, pneumonia, and sacral osteomyelitis, and other abnormal findings on CT chest and abdomen (10/09/19), as well as worsening chronic renal failure. ID, GI, nephrology consults inputs appreciated in patients management with IV antibiotics and supportive care, but pt did not improve. Pt also seen by Surgery consult for wound care input. Palliative medicine consulted for goals of care planning, due to patient's poor prognosis. Discussions held by palliative medicine with daughter, and Attending Hospitalist, regarding patient's prognosis and goals of care. Despite medical management patient's clinical staus continued to deteriorate.  Pt found unresponsive 10/31/19 at 1345 with absence of spontaneous breath sounds and pulse. Pt examined and pronounced  at 1347.     PHYSICAL EXAM  HEAD: atraumatic, normocephalic,  ENT: Pupils dilated and unresponsive light or noxious stimuli bilaterally  LUNGS: Absent spontaneous breath sounds, absent chest rise  HEART; Absent carotid pulse. no heart sounds on auscultation.  SKIN:  unresponsive to noxious or painful stimuli Pt is a 88yo  Male with a pmh/o CAD s/p PCI, CHF, HLD, paroxysmal atrial fribrillation, seizure disorder, CVA with residual deficits, dementia, prostate cancer,  with respiratory failure on chronic vent and who is s/p tracheostomy, admitted for sepsis, anemia, and worsening renal failure, patient has multiple infection sources,, he was Functional quadriplegic with Sacral/ischial osteomyelitis, aspiration pneumonia, his blood cultures done and was put on antibiotics, he was noted to have worsening of leukocytosis, Blood culture negative, urine culture with polymicrobial growth- contaminated.  Sputum with pseudomonas R to Carbapenems but S to cefepime, could be colonization, he completed Cefepime 1gm q12, patient was seen and followed by ID, diverting colostomy in place, Collagenase, wound care, off loading, Nutrition supplements, has no more fever, not much secretions, patient was seen by Surgical team over the course, patient was noted to have renal failure, seen and followed by Nephrology, not a candidate for HD, he was continued with Bumex as per Nephrology.  Continue NaHCO3, noted to have hyperkalemia resolved with Kayexalate.   patient was continue with regular meds he was on, he was having Anemia, acute on chronic. hstory of bleeding in ostomy as well as from Sacral decubitus, H  &H remained stable,   For the decubitus ulcers, wound care and Clinitron bed to prevent worsening.   Palliative care team spoke with Guardian patient has extremely grave prognosis for meaningful recovery, further medical interventions or aggressive measures would not be medically therapeutic, patient has been on Ventilator for so long time approximately 2 years, he has Hx of Ventilator associated pneumonias multiple times, stage 4 to stage 5 kidney disease, Hx of bleeding, chronic non healing wounds, in discussion with patient's guardian who is medical decision maker, Van Mazariegos withdrawal of ventilator is the best decision for this unfortunate gentleman, Palliative physician Dr Montes , hospitalist DR Mc, palliative SW and 2 uit RN's met with patients daughter to provide information regarding medical situation and futility. Physicians explained in detail regarding medical reasons which warrant withdrawal and focus on comfort with relief of suffering, daughter spoke with legal guardian   unwilling to accept gravity and reality of situation as well as no meaningful recovery.   SW ,palliative physician DR Bonner, hospitalist Dr Mc and 3 staff RN's, service excellence and respiratory were present for lengthy meeting with daughter at bedside in preparation for elective withdrawal as decided upon between medical staff and guardian Van Mazariegos, daughter continued to make attempts to appeal guardians decision and contacted other outside physician who had never treated patient, daughter was present at time of withdrawal and staff provided her support and time to spend with her father to grieve as well as spiritual support with  who gave last rights.   patient was given comfort meds before terminal weaning and was observed closely to make she is is comfortable, daughter was with him during the that period, he was found unresponsive 10/31/19 at 1345 with absence of spontaneous breath sounds and pulse. Pt examined and pronounced  at 1347.     PHYSICAL EXAM  HEAD: atraumatic   ENT: Pupils dilated and unresponsive light or noxious stimuli bilaterally  LUNGS: Absent spontaneous breath sounds, absent chest rise  HEART; Absent carotid pulse. no heart sounds on auscultation.  CNS nonresponsive to noxious or painful stimuli

## 2019-10-31 NOTE — PROGRESS NOTE ADULT - PROBLEM SELECTOR PROBLEM 1
Decubital ulcer
Chronic respiratory failure
Chronic respiratory failure with hypoxia
Chronic respiratory failure with hypoxia
Dementia
Chronic respiratory failure with hypoxia
Chronic respiratory failure with hypoxia
Chronic respiratory failure

## 2019-10-31 NOTE — PROGRESS NOTE ADULT - NSHPATTENDINGPLANDISCUSS_GEN_ALL_CORE
Dr. Daugherty
daughter
patient's RN
patient, RN, surgery, Palliative, SW
Dr. العراقي and Nolan KWON
Dr. Mc, Nolan KWON, Michelle KWON.
Dr. Mc
Nolan Rowan
Michelle Robles.

## 2019-10-31 NOTE — PROGRESS NOTE ADULT - ASSESSMENT
87M with CVA, dementia, Trach/PEG from [facility], multiple rehospitalizations here with fevers, now on comfort care to be removed from the ventilator today.

## 2019-10-31 NOTE — PROGRESS NOTE ADULT - PROBLEM SELECTOR PLAN 2
- end stage.
- end stage. late effect of CVA. FAST 7F - end stage
- guardian has not definitively decided on next steps regarding withdrawal of ventilator.
-end stage.
- end stage. post stroke years ago, late effect.
- pain management
- end stage - late effect post CVA, fast 7F.

## 2019-10-31 NOTE — PROGRESS NOTE ADULT - ATTENDING COMMENTS
Expect death in hours to days. During this whole situation, multiple team members present including, Dr. Mc, Michelle Ibarra, Elvin RN, Reji RN, Audrey RN, and well as service excellence.     Thank you for the opportunity to assist with the care of this patient.   [facility] [phone]. We waited for her to return so she can be with her father before we removed him from the ventilator. When she returned we removed him from the ventilator with her present. Expect death in hours to days. During this whole situation, multiple team members present including, Dr. Mc, Michelle Ibarra, Elvin RN, Reji RN, Audrey RN, and well as service excellence.     Thank you for the opportunity to assist with the care of this patient.   [facility] [phone].

## 2019-10-31 NOTE — PROGRESS NOTE ADULT - PROBLEM SELECTOR PROBLEM 2
Chronic respiratory failure with hypoxia
Dementia
Decubitus ulcer
Dementia
Dementia

## 2019-10-31 NOTE — PROGRESS NOTE ADULT - PROVIDER SPECIALTY LIST ADULT
Hospitalist
Infectious Disease
Nephrology
Palliative Care
Surgery
Hospitalist
Palliative Care
Nephrology
Surgery
Hospitalist

## 2019-10-31 NOTE — PROGRESS NOTE ADULT - SUBJECTIVE AND OBJECTIVE BOX
OVERNIGHT EVENTS: to be removed from ventilator today.     Present Symptoms:     Dyspnea: vented   Nausea/Vomiting: No  Anxiety:  No agitation   Depression: unable   Fatigue: unable   Loss of appetite: unable     Pain: none             Character-            Duration-            Effect-            Factors-            Frequency-            Location-            Severity-    Review of Systems: Reviewed                    Unable to obtain due to poor mentation   All others negative    MEDICATIONS  (STANDING):  ALBUTerol    0.083%. 2.5 milliGRAM(s) Nebulizer once  chlorhexidine 0.12% Liquid 15 milliLiter(s) Oral Mucosa every 12 hours  chlorhexidine 2% Cloths 1 Application(s) Topical <User Schedule>  collagenase Ointment 1 Application(s) Topical two times a day  digoxin     Tablet 0.125 milliGRAM(s) Oral daily  glycopyrrolate Injectable 0.4 milliGRAM(s) IV Push every 6 hours  HYDROmorphone Infusion 2 mG/Hr (2 mL/Hr) IV Continuous <Continuous>  levETIRAcetam  IVPB 1000 milliGRAM(s) IV Intermittent every 12 hours  LORazepam   Injectable 1 milliGRAM(s) IV Push every 6 hours  nystatin Cream 1 Application(s) Topical two times a day  phenytoin   Suspension 400 milliGRAM(s) Enteral Tube every 12 hours  scopolamine   Patch 1 Patch Transdermal every 72 hours    MEDICATIONS  (PRN):  acetaminophen    Suspension .. 650 milliGRAM(s) Oral every 6 hours PRN Temp greater or equal to 38.5C (101.3F)  ALBUTerol/ipratropium for Nebulization. 3 milliLiter(s) Nebulizer every 4 hours PRN Bronchospasm  LORazepam   Injectable 2 milliGRAM(s) IV Push every 2 hours PRN Agitation    PHYSICAL EXAM:    Vital Signs Last 24 Hrs  T(C): --  T(F): --  HR: 98 (30 Oct 2019 23:58) (80 - 102)  BP: --  BP(mean): --  RR: --  SpO2: 98% (31 Oct 2019 08:32) (98% - 99%)    General: unresponsive     Karnofsky:  10 %    HEENT: normal trach      Lungs: comfortable     CV: normal      GI: normal    : jones    MSK: bedbound/wheelchair bound    Skin: no rash    LABS:  I&O's Summary    30 Oct 2019 07:01  -  31 Oct 2019 07:00  --------------------------------------------------------  IN: 0 mL / OUT: 1200 mL / NET: -1200 mL    RADIOLOGY & ADDITIONAL STUDIES:    ADVANCE DIRECTIVES: DNR/I, comfort care, withdrawal of vent

## 2019-10-31 NOTE — PROGRESS NOTE ADULT - PROBLEM SELECTOR PLAN 1
continue BID dressing changes with santyl  and w2d, not a surgical candidate  Nutrition consult w/ aggressive TF and vitamin supplementation via PEG tube  cont offloading and use of air fluidized bed.
- end stage. grave prognosis
-grave prognosis for meaningful recovery. he is not weanable.
-tried to call guardian today to discuss further, again no response and no ability to leave voicemail.
-vent dependent grave prognosis for meaningful recovery
- now removed from the ventilator. comfort care. patient is exhibiting no signs of distress post removal of ventilator.
- ventilator withdrawal on Thursday at 10am
- vent dependent - grave prognosis for meaningful recovery

## 2019-10-31 NOTE — PROGRESS NOTE ADULT - PROBLEM SELECTOR PLAN 3
- will not have wound healing. continue pain management, wound care, no surgical interventions as it will not change outcomes and patient is on comfort care.
-full assist
-has not had any wound healing despite repositioning, clinitron bed, wound care, etc.
-wound care and pain control
- full assist with ADLs
- grave prognosis. despite maximal efforts, patient without proper wound healing.
- grave prognosis - will unlikely have any wound healing.

## 2019-10-31 NOTE — PROGRESS NOTE ADULT - PROBLEM SELECTOR PLAN 5
-prior to removal of life support, daughter Monet at bedside creating delay in previously discussed timing of removal of ventilator. We honored her wish to have  bless her father prior to removal from ventilator. After this, she stated she had discussion with a Dr. Rosi Lao who felt we shouldn't be proceeding with withdrawal. She was waiting for a callback from the guardian to see if he spoke with this Dr. Lao. We gave Monet the time to get her closure and make her peace with the situation ( > 1 hour) while she waited for Dr. Castro to call her or the guardian back. Eventually this doctor called the daughter and she was put on speaker phone and attempted to give the example of patient's pet from 25 years ago that patient allowed to be resuscitated multiple times, as a reason to prove that patient would want to live at all costs. I had previously discussed with Mr. Monroy and he gave me verbal permission to discuss with this doctor at Monet's request. I tried to express to this doctor the patients medical condition and that it is grave at this time and no further medical interventions would be therapeutic and in discussions with health care decision maker guardian Van Monroy, it was decided to proceed with the most compassionate care of removing Mr. Aldrich from life support and allow him to be comfortable. Doctor on the phone was very irrational in her thoughts, additionally when I asked her if she had ever clinically evaluated the patient, she stated "no, but I have reviewed his medical records while he was [employer]." I cut off the convers -prior to removal of life support, daughter Monet at bedside creating delay in previously discussed timing of removal of ventilator. We honored her wish to have  bless her father prior to removal from ventilator. After this, she stated she had discussion with a Dr. Rosi Lao who felt we shouldn't be proceeding with withdrawal. She was waiting for a callback from the guardian to see if he spoke with this Dr. Lao. We gave Monet the time to get her closure and make her peace with the situation ( > 1 hour) while she waited for Dr. Castro to call her or the guardian back. Eventually this doctor called the daughter and she was put on speaker phone and attempted to give the example of patient's pet from 25 years ago that patient allowed to be resuscitated multiple times, as a reason to prove that patient would want to live at all costs. I had previously discussed with Mr. Monroy and he gave me verbal permission to discuss with this doctor at Monet's request. I tried to express to this doctor the patients medical condition and that it is grave at this time and no further medical interventions would be therapeutic and in discussions with health care decision maker guardian Van Monroy, it was decided to proceed with the most compassionate care of removing Mr. Aldrich from life support and allow him to be comfortable. Doctor on the phone was very irrational in her thoughts, additionally when I asked her if she had ever clinically evaluated the patient, she stated "no, but I have reviewed his medical records while he was [place]." I cut off the conversation since this doctor and daughter have no legal rights to make decisions. I called guardian one more time to confirm that we are proceeding with ventilator withdrawal, and allowed Monet to speak to him privately as well. We waited for her to return so she can be with her father before we removed him from the ventilator. When she returned we removed him from the ventilator with her present. -prior to removal of life support, daughter Monet at bedside creating delay in previously discussed timing of removal of ventilator. We honored her wish to have  bless her father prior to removal from ventilator. After this, she stated she had discussion with a Dr. Rosi Lao who felt we shouldn't be proceeding with withdrawal. She was waiting for a callback from the guardian to see if he spoke with this Dr. Lao. We gave Monet the time to get her closure and make her peace with the situation ( > 1 hour) while she waited for Dr. Castro to call her or the guardian back. Eventually this doctor called the daughter and she was put on speaker phone and attempted to give the example of patient's pet from 25 years ago that patient allowed to be resuscitated multiple times, as a reason to prove that patient would want to live at all costs. I had previously discussed with Mr. Monroy and he gave me verbal permission to discuss with this doctor at Monet's request. I tried to express to this doctor the patients medical condition and that it is grave at this time and no further medical interventions would be therapeutic and in discussions with health care decision maker guardiankur Van Monroy, it was decided to proceed with the most compassionate care of removing Mr. Aldrich from life support and allow him to be comfortable. Doctor on the phone did not exhibit sound clinical judgement consistent with stands of care, patient had never been under her care, additionally, when I asked her if she had ever clinically evaluated the patient, she stated "no, but I have reviewed his medical records while he was [place]." I cut off the conversation since this doctor and daughter have no legal rights to make decisions. I called guardian one more time to confirm that we are proceeding with ventilator withdrawal, and allowed Monet to speak to him privately as well. -prior to removal of life support, daughter Monet at bedside creating delay in previously discussed timing of removal of ventilator. We honored her wish to have  bless her father prior to removal from ventilator. After this, she stated she had discussion with a Dr. Rosi Lao who felt we shouldn't be proceeding with withdrawal. She was waiting for a callback from the guardian to see if he spoke with this Dr. Lao. We gave Monet the time to get her closure and make her peace with the situation ( > 1 hour) while she waited for Dr. Castro to call her or the guardian back. Eventually this doctor called the daughter and she was put on speaker phone and attempted to give the example of patient's pet from 25 years ago that patient allowed to be resuscitated multiple times, as a reason to prove that patient would want to live at all costs. I had previously discussed with Mr. Monroy and he gave me verbal permission to discuss with this doctor at Monet's request. I tried to express to this doctor the patients medical condition and that it is grave at this time and no further medical interventions would be therapeutic and in discussions with health care decision maker guardian Van Monroy, it was decided to proceed with the most compassionate care of removing Mr. Aldrich from life support and allow him to be comfortable. Patient has never been under the care of this provider. I cut off the conversation since this doctor and daughter have no legal rights to make decisions. I called guardian one more time to confirm that we are proceeding with ventilator withdrawal, and allowed Monet to speak to him privately as well. -prior to removal of life support, daughter Monet at bedside creating delay in previously discussed timing of removal of ventilator. We honored her wish to have  bless her father prior to removal from ventilator. After this, she stated she had discussion with a Dr. Rosi Lao who felt we shouldn't be proceeding with withdrawal. She was waiting for a callback from the guardian to see if he spoke with Dr. Lao. We gave Monet the time to get her closure and make her peace with the situation ( > 1 hour) while she waited for Dr. Castro or the guardian to call her. Eventually, this doctor called the daughter and she was put on speaker phone and she gave the example of patient's pet from 25 years ago that patient allowed to be resuscitated multiple times, as a reason to prove that patient would want to live at all costs. I had previously discussed with Mr. Monroy and he gave me verbal permission to discuss with this doctor at Monet's request. I tried to express to this doctor the patients medical condition and that it is grave at this time and no further medical interventions would be therapeutic and in discussions with health care decision maker guardiankur Monroy, it was decided to proceed with the most compassionate care of removing Mr. Aldrich from life support and allow him to be comfortable. Patient has never been under the care of this provider. I cut off the conversation since this doctor and daughter have no legal rights to make decisions. I called guardian one more time to confirm that we are proceeding with ventilator withdrawal, and allowed Monet to speak to him privately as well. -prior to removal of life support, daughter Monet at bedside creating delay in previously discussed timing of removal of ventilator. We honored her wish to have  bless her father prior to removal from ventilator. After this, she stated she had discussion with a Dr. Rosi Lao who felt we shouldn't be proceeding with withdrawal. She was waiting for a callback from the guardian to see if he spoke with Dr. Lao. We gave Monet the time to get her closure and make her peace with the situation ( > 1 hour) while she waited for Dr. Castro or the guardian to call her. Eventually, this doctor called the daughter and she was put on speaker phone and she gave the example of patient's pet from 25 years ago that patient allowed to be resuscitated multiple times, as a reason to prove that patient would want to live at all costs. I had previously discussed with Mr. Monroy and he gave me verbal permission to discuss with this doctor at Monet's request. I tried to express to this doctor the patients medical condition and that it is grave at this time and no further medical interventions would be therapeutic and in discussions with health care decision maker sena Monroy, it was decided to proceed with the most compassionate care of removing Mr. Aldrich from life support and allow him to be comfortable. Patient has never been under the care of this provider and she had no clinical experience in his care. I cut off the conversation since this doctor and daughter have no legal rights to make decisions. I called guardian one more time to confirm that we are proceeding with ventilator withdrawal, and allowed Monet to speak to him privately as well.

## 2019-10-31 NOTE — GOALS OF CARE CONVERSATION - ADVANCED CARE PLANNING - CONVERSATION DETAILS
Late entry.    SW ,palliative physician DR Bonner, hospitalist Dr Mc and 3 staff RN's, service excellence and respiratory were present for lengthy meeting this morning with dgt at bedside in preparation for elective withdrawal as decided upon between medical staff and guardian Van Mazariegos. Dgt continued to make attempts to appeal guardians decision and contacted other outside physician who had never treated patient. Dgt was present at time of withdrawal and staff provided her support and time to spend with her father to grieve as well as spiritual support with  who gave last rights.     After withdrawal, palliative care team met with dgt periodically to provide additional support in coping with projected loss and encouraged bereavement and utilization of community resources to assist with dgts needs with homelessness.

## 2019-10-31 NOTE — PROGRESS NOTE ADULT - REASON FOR ADMISSION
Sepsis

## 2019-11-12 PROCEDURE — 80069 RENAL FUNCTION PANEL: CPT

## 2019-11-12 PROCEDURE — 74018 RADEX ABDOMEN 1 VIEW: CPT

## 2019-11-12 PROCEDURE — 74176 CT ABD & PELVIS W/O CONTRAST: CPT

## 2019-11-12 PROCEDURE — 84295 ASSAY OF SERUM SODIUM: CPT

## 2019-11-12 PROCEDURE — 87633 RESP VIRUS 12-25 TARGETS: CPT

## 2019-11-12 PROCEDURE — 86923 COMPATIBILITY TEST ELECTRIC: CPT

## 2019-11-12 PROCEDURE — P9016: CPT

## 2019-11-12 PROCEDURE — 94799 UNLISTED PULMONARY SVC/PX: CPT

## 2019-11-12 PROCEDURE — 71250 CT THORAX DX C-: CPT

## 2019-11-12 PROCEDURE — 80048 BASIC METABOLIC PNL TOTAL CA: CPT

## 2019-11-12 PROCEDURE — 80053 COMPREHEN METABOLIC PANEL: CPT

## 2019-11-12 PROCEDURE — 87581 M.PNEUMON DNA AMP PROBE: CPT

## 2019-11-12 PROCEDURE — 87486 CHLMYD PNEUM DNA AMP PROBE: CPT

## 2019-11-12 PROCEDURE — 83605 ASSAY OF LACTIC ACID: CPT

## 2019-11-12 PROCEDURE — 84145 PROCALCITONIN (PCT): CPT

## 2019-11-12 PROCEDURE — 85014 HEMATOCRIT: CPT

## 2019-11-12 PROCEDURE — 82435 ASSAY OF BLOOD CHLORIDE: CPT

## 2019-11-12 PROCEDURE — 76705 ECHO EXAM OF ABDOMEN: CPT

## 2019-11-12 PROCEDURE — 80299 QUANTITATIVE ASSAY DRUG: CPT

## 2019-11-12 PROCEDURE — 85652 RBC SED RATE AUTOMATED: CPT

## 2019-11-12 PROCEDURE — 80162 ASSAY OF DIGOXIN TOTAL: CPT

## 2019-11-12 PROCEDURE — 87798 DETECT AGENT NOS DNA AMP: CPT

## 2019-11-12 PROCEDURE — P9047: CPT

## 2019-11-12 PROCEDURE — 82947 ASSAY GLUCOSE BLOOD QUANT: CPT

## 2019-11-12 PROCEDURE — 85018 HEMOGLOBIN: CPT

## 2019-11-12 PROCEDURE — 71045 X-RAY EXAM CHEST 1 VIEW: CPT

## 2019-11-12 PROCEDURE — 87640 STAPH A DNA AMP PROBE: CPT

## 2019-11-12 PROCEDURE — 96365 THER/PROPH/DIAG IV INF INIT: CPT

## 2019-11-12 PROCEDURE — 84300 ASSAY OF URINE SODIUM: CPT

## 2019-11-12 PROCEDURE — 86140 C-REACTIVE PROTEIN: CPT

## 2019-11-12 PROCEDURE — 93005 ELECTROCARDIOGRAM TRACING: CPT

## 2019-11-12 PROCEDURE — 85610 PROTHROMBIN TIME: CPT

## 2019-11-12 PROCEDURE — 85730 THROMBOPLASTIN TIME PARTIAL: CPT

## 2019-11-12 PROCEDURE — 94760 N-INVAS EAR/PLS OXIMETRY 1: CPT

## 2019-11-12 PROCEDURE — 82272 OCCULT BLD FECES 1-3 TESTS: CPT

## 2019-11-12 PROCEDURE — 82330 ASSAY OF CALCIUM: CPT

## 2019-11-12 PROCEDURE — 87186 SC STD MICRODIL/AGAR DIL: CPT

## 2019-11-12 PROCEDURE — 82962 GLUCOSE BLOOD TEST: CPT

## 2019-11-12 PROCEDURE — 86901 BLOOD TYPING SEROLOGIC RH(D): CPT

## 2019-11-12 PROCEDURE — 87040 BLOOD CULTURE FOR BACTERIA: CPT

## 2019-11-12 PROCEDURE — 36415 COLL VENOUS BLD VENIPUNCTURE: CPT

## 2019-11-12 PROCEDURE — 86900 BLOOD TYPING SEROLOGIC ABO: CPT

## 2019-11-12 PROCEDURE — 81001 URINALYSIS AUTO W/SCOPE: CPT

## 2019-11-12 PROCEDURE — 84100 ASSAY OF PHOSPHORUS: CPT

## 2019-11-12 PROCEDURE — 83735 ASSAY OF MAGNESIUM: CPT

## 2019-11-12 PROCEDURE — 99291 CRITICAL CARE FIRST HOUR: CPT | Mod: 25

## 2019-11-12 PROCEDURE — 82803 BLOOD GASES ANY COMBINATION: CPT

## 2019-11-12 PROCEDURE — 94003 VENT MGMT INPAT SUBQ DAY: CPT

## 2019-11-12 PROCEDURE — 86850 RBC ANTIBODY SCREEN: CPT

## 2019-11-12 PROCEDURE — 85027 COMPLETE CBC AUTOMATED: CPT

## 2019-11-12 PROCEDURE — 36430 TRANSFUSION BLD/BLD COMPNT: CPT

## 2019-11-12 PROCEDURE — 84132 ASSAY OF SERUM POTASSIUM: CPT

## 2019-11-12 PROCEDURE — 87086 URINE CULTURE/COLONY COUNT: CPT

## 2019-11-12 PROCEDURE — 83935 ASSAY OF URINE OSMOLALITY: CPT

## 2019-11-12 PROCEDURE — 80185 ASSAY OF PHENYTOIN TOTAL: CPT

## 2019-11-12 PROCEDURE — 94002 VENT MGMT INPAT INIT DAY: CPT

## 2019-11-12 PROCEDURE — 87070 CULTURE OTHR SPECIMN AEROBIC: CPT

## 2019-12-18 NOTE — ED ADULT NURSE NOTE - RESPIRATORY ASSESSMENT
PT Discharge    Today's date: 2019  Patient name: Yosef Jerome  :   MRN: [id number]  Referring provider: Arabella Wade MD  Dx:   Encounter Diagnosis     ICD-10-CM    1  S/P endoscopic carpal tunnel release [id number]                   Assessment  Assessment details: All formal PT goals have been achieved  Patient to continue with HEP  Barriers to therapy: None  Understanding of Dx/Px/POC: good   Prognosis: good    Goals  Short Term goals - 4 weeks  1  Patient will be independent and compliant with a HEP  MET  2  Patient will report a 50% decrease in pain complaints  MET    Long Term goals - 8 weeks  1  Patient will report elimination of pain complaints  MET  2  Patient will return to all IADLs without restriction  MET  3  Patient will return to all recreational activities without restriction    MET      Plan  Planned therapy interventions: home exercise program  Treatment plan discussed with: patient        Subjective Evaluation    Pain  Current pain ratin  At best pain ratin  At worst pain ratin    Treatments  Previous treatment: physical therapy  Current treatment: physical therapy  Patient Goals  Patient goals for therapy: decreased pain, increased motion, increased strength, independence with ADLs/IADLs and return to sport/leisure activities          Objective     Active Range of Motion     Left Wrist   Normal active range of motion    Right Wrist   Normal active range of motion    Passive Range of Motion     Left Wrist   Normal passive range of motion    Right Wrist   Normal passive range of motion    Strength/Myotome Testing     Left Wrist/Hand   Normal wrist strength    Right Wrist/Hand   Normal wrist strength            Objective: See treatment diary below  Manual  12/6 12/9 12/12 12/18 10/14 10/16  10/24 10/28  10/30 11/4 11/6 11/11 11/13 11/18 11/21 12/4   Ext mob in prone         ALEJANDRO 10' TP 5'  TP x5' ALEJANDRO x5'  ALEJANDRO 5'  ALEJANDRO 5' ALEJANDRO 5' ALEJANDRO 5' ALEJANDRO 5' ALEJANDRO 5' ALEJANDRO 5'     L hip PROM         5' TP x5'  TP x5'  MERCEDEZ 5'  MERCEDEZ 5"                 90/90 sciatic nerve glides         MERCEDEZ x20 TP x20  TP x20  MERCEDEZ x20  MERCEDEZ x20  MERCEDEZ x20 MERCEDEZ x20 MERCEDEZ x20 Gunnar Muscat x20     L LE long axis distraction           TP 60"x5  TP 60"x5  MERCEDEZ 60"x5  MERCEDEZ 60"x5  MERCEDEZ 60"x5 MERCEDEZ 60 x6 MERCEDEZ 60 x6 MERCEDEZ 60s x5 MERCEDEZ 60s x5 MERCEDEZ 60s x5      R hand PROM -  15' MERCEDEZ MERCEDEZ 10'   MERCEDEZ 10'  MERCEDEZ 10'       13'  15'  15' 15' 15' 15' 10' 10' MERCEDEZ 10'   Scar mob 10' MERCEDEZ MERCEDEZ 5' mercedez 5' MERCEDEZ 5'             5'   5' 5' 5' 5' MERCEDEZ         Exercise Diary  12/6 12/9 12/12 12/18 10/14 10/16  10/24  10/28  10/30  11/4 11/6 11/11 11/13 11/18 11/21 12/4   UBE 10' 10' 10' 10'   10'  10' NP  10'  10'   10'   10' 10'     Prone press up         2x`15 np  np                     Slump sliders         20 20  20  20  20  20   20           LTR         5s x20 5"x20  5"x20  5"x20  5"x20  5" x20   5" x20   5s x20 5s x20     Standing hip abd         2x15 2x15  2x15  NP  2x10  2x10   2x10   2x10 2x10      bridge                           3s x15 3s x20                                         Treadmill         12' np  np  10'  10'  10' 10' 10' 10'   10' 10'    R tendon gliding PIP and DIP  x30  x30  x30  x30      x20ea    x20  x20 x20 x20 x20   x30 x30    digiflex  3'  3'  3' red and green  3' Red and green             20   20   3' 2'x2   Pincer  all 5 finger   x20  x20  x20                            3 jaw all fingerq   x20  x20  x20                                                                                                                                                                                                                                                                                                                                 Modalities                                                                                              - - -

## 2020-01-16 NOTE — ED PROVIDER NOTE - NS_BEDUNITTYPES_ED_ALL_ED
Regular rate and rhythm, Heart sounds S1 S2 present, no murmurs, rubs or gallops MONITORED W/ CONTINUOUS PULSE OX

## 2020-03-21 NOTE — ED ADULT NURSE NOTE - PAIN: PRESENCE, MLM
No adenopathy or splenomegaly. No cervical or inguinal lymphadenopathy. non-verbal indicators of pain/discomfort absent

## 2020-03-23 NOTE — DIETITIAN INITIAL EVALUATION ADULT. - OTHER INFO
Pt admitted with seizures. Noted with sacral spine unstageable wound and Stage III to neck near trach site. Palliative care notes reviewed. Per RN- EN initiated this date due to pump being broken yesterday.
No

## 2020-07-21 NOTE — H&P ADULT - PROBLEM/PLAN-7
Last visit: 05/04/2020     Next visit: none    Medication request: glimepiride (AMARYL) 4 MG tablet     Last prescription refill: 1/27/2020 # 30, 5 refills      DISPLAY PLAN FREE TEXT

## 2021-04-30 NOTE — PATIENT PROFILE ADULT - NSPROPASSIVESMOKEEXPOSURE_GEN_A_NUR
Price (Do Not Change): 0.00
Instructions: This plan will send the code FBSD to the PM system.  DO NOT or CHANGE the price.
Detail Level: Simple
No

## 2021-11-12 NOTE — ED ADULT NURSE REASSESSMENT NOTE - NURSING ED PRESSURE ULCER SIZE #1
-abdominal pain likely 2/2 starvation ketosis  -lactate elevation as well  -lipase wnl less concern for pancreatitis  -c/w fluids for starvation ketosis
8/15

## 2021-12-06 NOTE — PROGRESS NOTE ADULT - SUBJECTIVE AND OBJECTIVE BOX
HOSPITALIST PROGRESS NOTE    KING MCKEON  [id number]  87yMale    Patient is a 87y old  Male who presents with a chief complaint of Sepsis (20 Oct 2019 11:45)      SUBJECTIVE:   Chart reviewed since last visit.  Patient seen and examined at bedside for ROSALIA, hypernatremia, respiratory failure,  Nonverbal.      OBJECTIVE:  Vital Signs Last 24 Hrs  T(C): 35.7 (20 Oct 2019 16:18), Max: 36.9 (20 Oct 2019 00:55)  T(F): 96.3 (20 Oct 2019 16:18), Max: 98.4 (20 Oct 2019 00:55)  HR: 71 (20 Oct 2019 16:30) (63 - 76)  BP: 137/68 (20 Oct 2019 16:18) (120/71 - 137/68)   RR: 22 (20 Oct 2019 08:20) (21 - 22)  SpO2: 98% (20 Oct 2019 16:30) (98% - 99%)    PHYSICAL EXAMINATION  General: Obese male, lying on right side  HEENT:   NECK:  Trach[+]  CVS: regular rate and rhythm S1 S2  RESP:  Fair air entry bilaterally, Pleurx left chest  GI:  G-tube, Soft NT  : Condom catheter - patient removed  MS:  increased tone  CNS:  Encephalopathic  INTEG:  Decubitus sacral  PSYCH:  Unable to obtain    MONITOR:  CAPILLARY BLOOD GLUCOSE            I&O's Summary    19 Oct 2019 07:01  -  20 Oct 2019 07:00  --------------------------------------------------------  IN: 0 mL / OUT: 1150 mL / NET: -1150 mL    20 Oct 2019 07:01  -  20 Oct 2019 17:48  --------------------------------------------------------  IN: 1390 mL / OUT: 250 mL / NET: 1140 mL                            7.8    14.24 )-----------( 168      ( 20 Oct 2019 09:43 )             26.2       10-20    150<H>  |  108<H>  |  168.0<H>  ----------------------------<  130<H>  4.9   |  21.0<L>  |  4.27<H>    Ca    9.1      20 Oct 2019 09:43              Culture:    TTE:    RADIOLOGY        MEDICATIONS  (STANDING):  ALBUTerol    0.083%. 2.5 milliGRAM(s) Nebulizer once  amLODIPine   Tablet 5 milliGRAM(s) Oral daily  ascorbic acid 500 milliGRAM(s) Oral daily  buMETAnide Injectable 2 milliGRAM(s) IV Push daily  chlorhexidine 0.12% Liquid 15 milliLiter(s) Oral Mucosa every 12 hours  chlorhexidine 2% Cloths 1 Application(s) Topical <User Schedule>  collagenase Ointment 1 Application(s) Topical two times a day  digoxin     Tablet 0.125 milliGRAM(s) Oral daily  lactobacillus acidophilus 1 Tablet(s) Oral daily  levETIRAcetam  IVPB 1000 milliGRAM(s) IV Intermittent every 12 hours  metoprolol tartrate 25 milliGRAM(s) Oral two times a day  multivitamin 1 Tablet(s) Oral daily  nystatin Cream 1 Application(s) Topical two times a day  pantoprazole  Injectable 40 milliGRAM(s) IV Push every 12 hours  phenytoin   Suspension 400 milliGRAM(s) Enteral Tube every 12 hours  predniSONE   Tablet 30 milliGRAM(s) Oral daily  sodium bicarbonate 650 milliGRAM(s) Oral every 8 hours      MEDICATIONS  (PRN):  acetaminophen    Suspension .. 650 milliGRAM(s) Oral every 6 hours PRN Temp greater or equal to 38.5C (101.3F)  ALBUTerol/ipratropium for Nebulization. 3 milliLiter(s) Nebulizer every 4 hours PRN Bronchospasm positive

## 2022-02-01 NOTE — PROGRESS NOTE ADULT - SUBJECTIVE AND OBJECTIVE BOX
INTERVAL HPI/OVERNIGHT EVENTS:  Patients daughter not in yet--he is still not responding to questions  MEDICATIONS  (STANDING):  ALBUTerol/ipratropium for Nebulization 3 milliLiter(s) Nebulizer every 6 hours  amLODIPine   Tablet 5 milliGRAM(s) Oral daily  AQUAPHOR (petrolatum Ointment) 1 Application(s) Topical daily  ascorbic acid 500 milliGRAM(s) Oral daily  aspirin  chewable 81 milliGRAM(s) Oral daily  buDESOnide   0.25 milliGRAM(s) Respule 0.25 milliGRAM(s) Inhalation two times a day  chlorhexidine 0.12% Liquid 15 milliLiter(s) Oral Mucosa two times a day  chlorhexidine 2% Cloths 1 Application(s) Topical <User Schedule>  collagenase Ointment 1 Application(s) Topical daily  Dakins Solution - 1/4 Strength 1 Application(s) Topical daily  digoxin     Tablet 0.25 milliGRAM(s) Oral daily  enoxaparin Injectable 40 milliGRAM(s) SubCutaneous daily  epoetin giorgio Injectable 12855 Unit(s) SubCutaneous <User Schedule>  ertapenem  IVPB 1000 milliGRAM(s) IV Intermittent every 24 hours  famotidine Injectable 20 milliGRAM(s) IV Push daily  ferrous    sulfate Liquid 300 milliGRAM(s) Oral three times a day with meals  furosemide    Tablet 20 milliGRAM(s) Oral daily  labetalol 100 milliGRAM(s) Oral every 8 hours  lactobacillus acidophilus 1 Tablet(s) Oral two times a day with meals  levETIRAcetam 1000 milliGRAM(s) Oral two times a day  methylPREDNISolone sodium succinate Injectable 40 milliGRAM(s) IV Push daily  multivitamin/minerals 1 Tablet(s) Oral daily  phenytoin   Suspension 400 milliGRAM(s) Oral every 12 hours  potassium chloride    Tablet ER 20 milliEquivalent(s) Oral daily  tamsulosin 0.4 milliGRAM(s) Oral at bedtime    MEDICATIONS  (PRN):  acetaminophen   Tablet .. 650 milliGRAM(s) Oral every 6 hours PRN Temp greater or equal to 38C (100.4F), Mild Pain (1 - 3), Moderate Pain (4 - 6)  artificial  tears Solution 1 Drop(s) Both EYES every 6 hours PRN Dry Eyes  bisacodyl Suppository 10 milliGRAM(s) Rectal daily PRN Constipation  oxyCODONE    IR 5 milliGRAM(s) Oral every 6 hours PRN Severe Pain (7 - 10)  polyethylene glycol 3350 17 Gram(s) Oral daily PRN Constipation      Allergies    clindamycin (Unknown)  Grapes (Rash)  Nuts (Hives; Rash)  PC Pen VK (Unknown)  shellfish (Angioedema; Rash; Hives)  strawberry (Short breath; Rash; Hives)  Xanax (Rash)    Intolerances    Ativan (Unknown)  Haldol (Dystonic RXN)  hydrALAZINE (Unknown)  Zyprexa (Unknown)        Vital Signs Last 24 Hrs  T(C): 36.5 (09 Feb 2019 07:48), Max: 37.4 (08 Feb 2019 23:28)  T(F): 97.7 (09 Feb 2019 07:48), Max: 99.4 (08 Feb 2019 23:28)  HR: 60 (09 Feb 2019 08:14) (55 - 68)  BP: 145/74 (09 Feb 2019 07:48) (145/74 - 151/81)  BP(mean): --  RR: 20 (09 Feb 2019 07:48) (20 - 20)  SpO2: 100% (09 Feb 2019 08:14) (95% - 100%)    PHYSICAL EXAM:      Constitutional: awake unresponsive    Eyes: no erosions    ENMT: no lip erosions    Skin: slightly decreased erythema with no increased extent of eruption--no blisters and negative Nikolsky sign      LABS:                        10.8   5.5   )-----------( 254      ( 07 Feb 2019 14:06 )             36.7     02-07    146<H>  |  107  |  23.0<H>  ----------------------------<  122<H>  4.4   |  25.0  |  0.53    Ca    9.0      07 Feb 2019 14:06  Mg     2.5     02-07 No

## 2022-03-15 NOTE — ADVANCED PRACTICE NURSE CONSULT - RECOMMEDATIONS
Wound care recommendation for left plantar surface of the foot:  1. clean the area with normal saline and pat dry,   2. apply betadine wipe to the area and leave the foot open to air,   3. no secondary cover dressing recommended,   4. wound care recommended daily.   Wound care recommendation for sacrum/coccyx wounds:  1. Recommended surgical reconsult for surgery possibility, unable to pack the wound with small dimension of the wounds.   2. Recommended rule out osteomyelitis,   3. Recommended foam dressing at this time since the dimension of the wound would not allow packing.   Wound care recommendation for left medial knee cap wound:  1. clean the area with normal saline and pat dry,   2. apply 3M no sting barrier film to periwound skin,   3. apply medihoney gel to wound bed,   4. cover the wound with foam dressing,   5. wound care recommended daily.   Wound care recommendation discussed with ALBERTO Manning and Dr. Daugherty. Will continue to follow up with pt upon request.
Walk in

## 2022-03-21 NOTE — INPATIENT CERTIFICATION FOR MEDICARE PATIENTS - THE STATUS OF COMORBIDITIES.
2. The status of comorbities. (See ED/admit documents) Patient/Caregiver provided printed discharge information.

## 2022-04-07 NOTE — ED ADULT NURSE NOTE - NS PRO AD PATIENT TYPE ON CHART
[FreeTextEntry1] : Pt is a 72 year old male who presents in my office today for c/o right shoulder pain. Pt believes his right shoulder pain initiated after raking leaves this past September. \par Pt states he was evaluated by orthopedic surgeon Dr. Son an MRI of the right shoulder was obtained. Pt reports he received course of physical therapy and states no significant improvement. Mr. Wetzel reports receiving cortisone injection to his right shoulder this past January which provided him some relief.\par Patient referred to my office today for evaluation right shoulder pain with limited range of motion and right parascapular pain and tightness. Pain level is 7 out of 10 with activity. Medical Orders for Life-Sustaining Treatment (MOLST)

## 2022-11-16 NOTE — PATIENT PROFILE ADULT. - FUNCTIONAL SCREEN CURRENT LEVEL: BATHING, MLM
Patient presents for CBC and Retacrit injection today.     I am an RN. Does the patient have an active anti-cancer treatment plan? (oral, injection, or IV) No.    Pre-Injection Information: Allergies reviewed as required for injection type., Pt states feeling well, no complaints. and Pt denies signs and symptoms of infection.    Refer to MAR (medication administration record) for type of injection and medication given.  Needle Size: 25 g. 5/8\"  Patient tolerated well: Stable and Follow up appointment scheduled    SHILPI De Guzman is supervising clinician today.  
(4) completely dependent

## 2022-12-06 NOTE — ED ADULT NURSE NOTE - NS ED NURSE PRESS ULCER LOCATION 12
Patient did not answer, left a voicemail requesting a call back.    Called patient to remind him to have labs drawn prior to Dr. Villagomez's appointment.          gluteal

## 2023-01-04 NOTE — H&P ADULT - EXTREMITIES
detailed exam Ear Star Wedge Flap Text: The defect edges were debeveled with a #15 blade scalpel.  Given the location of the defect and the proximity to free margins (helical rim) an ear star wedge flap was deemed most appropriate.  Using a sterile surgical marker, the appropriate flap was drawn incorporating the defect and placing the expected incisions between the helical rim and antihelix where possible.  The area thus outlined was incised through and through with a #15 scalpel blade.

## 2023-01-13 NOTE — PHYSICAL THERAPY INITIAL EVALUATION ADULT - GENERAL OBSERVATIONS, REHAB EVAL
See procedure note  
pt received semi rod position in bed, NAD, agreeable to PT, trach, cardiac monitor, PEG, compression boots, IV line, daughter present

## 2023-01-27 NOTE — ED ADULT TRIAGE NOTE - NSWEIGHTCALCTOOLDRUG_GEN_A_CORE
Quality 402: Tobacco Use And Help With Quitting Among Adolescents: Patient screened for tobacco and never smoked
Detail Level: Detailed
Quality 110: Preventive Care And Screening: Influenza Immunization: Influenza Immunization Administered during Influenza season
 used

## 2023-03-15 NOTE — ED ADULT NURSE NOTE - PSH
Echo essentially unchanged when compared to prior Abdominal hernia    Colostomy in place    Deviated septum    H/O hemorrhoidectomy    S/P percutaneous endoscopic gastrostomy (PEG) tube placement    Status post cardiac surgery  stents x 2

## 2023-05-09 NOTE — ED ADULT NURSE NOTE - PRO INTERPRETER NEED 2
Patient: Sven Clayton Date: 2023   : 1958 Attending: Lucien Ramirez MD   64 year old male        Chief complaint: ESRD, respiratory failure, S/p bilateral native nephrectomies (23) due to large polycystic kidneys    Subjective:  Resting in bed. Daughter bedside. Pt on TPN. On RA. Passing flatus.    Reviewed: Vital signs, labs, imaging studies, medications, notes    Vital Last Value 24 Hour Range   Temperature 99.4 °F (37.4 °C) (23 1500) Temp  Min: 98.1 °F (36.7 °C)  Max: 99.4 °F (37.4 °C)   Pulse 86 (23 1600) Pulse  Min: 83  Max: 100   Respiratory 20 (23 1600) Resp  Min: 19  Max: 28   Non-Invasive  Blood Pressure (!) 157/74 (23 1600) BP  Min: 141/67  Max: 163/80   Arterial   Blood Pressure 121/44 (23 0800) No data recorded   PulseOximetry 97 % (23 1600) SpO2  Min: 95 %  Max: 100 %     Vital Today Admitted   Weight 100.7 kg (222 lb 0.1 oz) (23) Weight: 118.5 kg (261 lb 3.9 oz) (23)   Height N/A Height: 5' 11\" (180.3 cm) (23 1000)   BMI N/A BMI (Calculated): 37.14 (23 1145)     Weight over the past 48 Hours:  Patient Vitals for the past 48 hrs:   Weight   23 1100 102.8 kg (226 lb 10.1 oz)   23 1400 102.8 kg (226 lb 10.1 oz)   23 1830 99.3 kg (218 lb 14.7 oz)   23 100.7 kg (222 lb 0.1 oz)       Intake/Output:    Last Stool Occurrence: 1 (23 1419)    No intake/output data recorded.    I/O last 3 completed shifts:  In: 1839 [IV Piggyback:230]  Out: 2500 [Other:2500]      Intake/Output Summary (Last 24 hours) at 2023 1728  Last data filed at 2023 1400  Gross per 24 hour   Intake 1839 ml   Output 2500 ml   Net -661 ml       Medications/Infusions:  Scheduled:   Current Facility-Administered Medications   Medication Dose Route Frequency Provider Last Rate Last Admin   • metoCLOPramide (REGLAN) injection 5 mg  5 mg Intravenous Q8H Fede Serrano MD   5 mg at 23 1339   • PARENTERAL  NUTRITION - DIETITIAN/PHARMACIST MANAGED   Does not apply See Admin Instructions Facundo Lozano MD       • Fat Emulsion Plant Based (Soy) 20 % injection 250 mL  250 mL Intravenous Q24H Facundo Lozano MD   Completed at 05/09/23 0953   • sodium chloride (PF) 0.9 % injection 10 mL  10 mL Injection 2 times per day Facundo Lozano MD   10 mL at 05/09/23 0823   • sodium chloride (PF) 0.9 % injection 10 mL  10 mL Injection 2 times per day Facundo Lozano MD   10 mL at 05/09/23 0823   • [Held by provider] midodrine (PROAMATINE) tablet 10 mg  10 mg Per NG Tube 3 times per day Facundo Lozano MD   10 mg at 05/06/23 0528   • lidocaine 2% urethral (UROJET) 2 % jelly 5 mL  5 mL Transurethral Once Tim Hidalgo MD       • [Held by provider] B complex-vitamin C-folic acid (NEPHRO-DANNA) tablet 0.8 mg  1 tablet Per NG Tube Daily Nanci Penny NP   0.8 mg at 05/06/23 0800   • [Held by provider] AMIODarone (PACERONE) tablet 200 mg  200 mg Per NG Tube Daily Pan Capone DO   200 mg at 05/06/23 0800   • [Held by provider] metoPROLOL tartrate (LOPRESSOR) tablet 12.5 mg  12.5 mg Per NG Tube 2 times per day Meron Ryder MD   12.5 mg at 05/03/23 0825   • acetic acid 0.25 % in sterile water irrigation solution   Irrigation 2 times per day Paola Malcolm APNP   Given at 05/09/23 1405   • epoetin shyam-epbx (RETACRIT) 12640 UNIT/ML injection 10,000 Units  10,000 Units Subcutaneous Once per day on Mon Wed Fri Nadeen Scott MD   10,000 Units at 05/08/23 1900   • heparin (porcine) injection 7,500 Units  7,500 Units Subcutaneous 3 times per day Fede Serrano MD   7,500 Units at 05/09/23 1339   • sodium chloride (PF) 0.9 % injection 2 mL  2 mL Intracatheter 2 times per day Ubaldo Pacheco MD   2 mL at 05/09/23 0823   • Potassium Standard Replacement Protocol (Levels 3.5 and lower)   Does not apply See Admin Instructions Ubaldo Pacheco MD       • Magnesium Standard Replacement  Protocol   Does not apply See Admin Instructions Ubaldo Pacheco MD       • Phosphorus Standard Replacement Protocol   Does not apply See Admin Instructions Ubaldo Pacheco MD           Continuous Infusions:  Current Facility-Administered Medications   Medication Dose Route Frequency Provider Last Rate Last Admin   • parenteral nutrition adult custom central (minimum volume)   Intravenous Continuous PN Fede Serrano MD       • parenteral nutrition adult custom central (minimum volume)   Intravenous Continuous PN Facundo Lozano MD 58.9 mL/hr at 05/08/23 2119 New Bag at 05/08/23 2119   • dextrose 10 % infusion  1,000 mL Intravenous Continuous PRN Facundo Lozano MD       • sodium chloride 0.9% infusion   Intravenous Continuous PRN Ubaldo Pacheco MD 10 mL/hr at 05/06/23 1634 Rate Verify at 05/06/23 1634       Physical Exam:   General: In NAD  Head: Normocephalic, without obvious abnormality. Sclerae anicteric.   Neck: No JVD , supple.  Chest/Lungs: On ra. Lungs CTA anteriorly and laterally  CVS: S1S2+, No rub, no murmurs  Abdomen: Round, soft, active BS. Binder in place  Skin: Warm, dry, intact.    Extremities: No edema except for 1+ RUE  R UE AVF. Good bruit.    Laboratory Results:    Recent Labs   Lab 05/09/23  0304 05/08/23  0521 05/07/23  0258 05/05/23  1500 05/05/23  0525 05/05/23  0345 05/04/23  2220 05/04/23  1626 05/04/23  0609 05/04/23  0355   SODIUM 140 139 137   < >  --    < >  --   --   --  139   POTASSIUM 3.9 3.6 3.1*   < >  --    < >  --   --   --  3.0*   CHLORIDE 106 106 104   < >  --    < >  --   --   --  107   CO2 29 28 29   < >  --    < >  --   --   --  25   ANIONGAP 9 9 7   < >  --    < >  --   --   --  10   BUN 24* 34* 21*   < >  --    < >  --   --   --  64*   CREATININE 3.14* 4.33* 3.17*   < >  --    < >  --   --   --  5.45*   CALCIUM 7.8* 7.4* 6.8*   < >  --    < >  --   --   --  7.8*   DEBBIE  --  1.14*  --   --   --   --   --   --   --  1.18   GLUCOSE  79 84 85   < >  --    < >  --   --   --  75   APH  --   --   --   --  7.26*  --  7.25*  --  7.33*  --    APCO2  --   --   --   --  52*  --  54*  --  45  --    APO2  --   --   --   --  147*  --  105  --  78*  --    AHCO3  --   --   --   --  23  --  24  --  24  --    ASAT  --   --   --   --  100*  --  98  --  96  --    WBC 7.9 8.2 7.9   < >  --    < >  --   --   --  10.8   HCT 26.7* 25.8* 26.0*  26.0*   < >  --    < >  --    < >  --  24.7*    < > = values in this interval not displayed.     Recent Labs   Lab 05/09/23  0304 05/08/23  0521 05/07/23  1702 05/07/23  0258   HGB 8.3* 8.0*  --  7.9*  8.0*    278  --  289   MG 1.9 2.0  --  2.0   PHOS 1.9* 3.0  --  3.0   ALKPT  --   --   --  59   BILIRUBIN  --   --   --  0.3   AST  --   --   --  15   GPT  --   --   --  6   ALBUMIN  --   --   --  2.0*   LACTA  --   --  0.7  --        Urine Panel  Lab Results   Component Value Date    UKET Negative 12/07/2021    USPG 1.020 12/07/2021    UPROT 100 (A) 12/07/2021    UWBC Small (A) 12/07/2021    URBC Moderate (A) 12/07/2021    UBILI Negative 12/07/2021    UPH 6.0 12/07/2021    UROB 0.2 12/07/2021    UBACTR NONE SEEN 01/10/2014       Diagnosis/Plan:  1. 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  kilos PTA, treatment duration 3.5 hrs, dialyzer Revaclear.  2. Hypertension, well controlled  3. Perirectal abscess : self drained. Completed abx.  4. Respiratory failure : secondary to aspiration and limited lung capacity from large kidneys. Extubated. Stable on NC.   5. Anemia of chronic disease:  continue WILL. Transfusion per critical care team, 1 u PRBC 5/6  6. Secondary hyperparathyroidism ; hold binders while the pt is on continuous TF  7. PCKD :  S/p bilateral native nephrectomies 4/24/23.   8. Nutrition: TPN. Took some po liquids today.      MD Beverley           English

## 2023-07-24 NOTE — PATIENT PROFILE ADULT - FALLEN IN THE PAST
Quality 110: Preventive Care And Screening: Influenza Immunization: Influenza Immunization Administered during Influenza season Quality 130: Documentation Of Current Medications In The Medical Record: Current Medications Documented Detail Level: Detailed Quality 431: Preventive Care And Screening: Unhealthy Alcohol Use - Screening: Patient not identified as an unhealthy alcohol user when screened for unhealthy alcohol use using a systematic screening method Quality 226: Preventive Care And Screening: Tobacco Use: Screening And Cessation Intervention: Patient screened for tobacco use and is an ex/non-smoker no

## 2023-12-27 NOTE — PROGRESS NOTE ADULT - ASSESSMENT
OP Note:  Rt SOC and evac ICH.  Earline.  Uneventful.   cc  No complications.  To ICU   This is a 87y  Male  with dementia, h/o CAD s/p 2 stents, CHF, respiratory failure on chronic vent, tracheostomy for about 1 year, Chronic sacral decubitus with osteomyelitis, previously finished course of Iv Cefepime and vancomycin for osteomyelitis in 12/2018 per daughter, diverting colostomy and chronic Baugh's catheter. previously seen in march 2018 for  MRSA bacteremia, treated with long course of IV antibiotics.   patient was admitted in January to [facility] and found with Streptococcus septicemia;  SPUTUM Cx with Stenotrophomonas, MDR Acinetobacter, Proteus and  Pseudomonas Pneumonia.   He Completed 10 days of Levaquin for pneumonia, Completed Zosyn 7 days for pneumonia, and was on Unasyn 3gm IV q 6hours for septicemia osteomyelitis , due to  end on 2/15/19.    Patient was admitted for evaluation of urticaria  Per ER note,  he started having urticaria on his right upper extremity, Unasyn was continued, but also started on Benadryl. Urticaria persisted,   started getting urticaria on bilateral arms as well as swelling of his lower lip which caused the NH to send her to [facility] for evaluation.    Prior note from ID service reviewed.  Patient had previously tolerated Unasyn and Zosyn in the hospital.  PT PLACED ON INVANZ  BLOOD CX COAG NEG  STAPH PROBABLE CONTAMINANT  RASH PERSISITS WILL CONTINEU PRESENT REGIMEN

## 2024-02-19 NOTE — H&P ADULT - CLICK TO LAUNCH ORM
Detail Level: Simple Note Text (......Xxx Chief Complaint.): This diagnosis correlates with the Render Risk Assessment In Note?: no Other (Free Text): Patient had had blood work done at other provider visit. To send copy to us .

## 2024-08-18 NOTE — PROGRESS NOTE ADULT - ASSESSMENT
VDRF  Hypernatremia  Poor mental status  Prognosis dismal  Unweanable    Plan:  Transfer to LTC when medically stable. Attending Attestation (For Attendings USE Only)...

## 2024-09-27 NOTE — H&P ADULT - PROBLEM SELECTOR PLAN 5
[facility]    Triple Lumen PICC Placement    Date/Time: 9/27/2024 4:49 PM    Performed by: Dank Berger RN  Authorized by: Mirtha Vogt APRN CNP  Indications: vascular access      UNIVERSAL PROTOCOL   Site Marked: Yes  Prior Images Obtained and Reviewed:  Yes  Required items: Required blood products, implants, devices and special equipment available    Patient identity confirmed:  Verbally with patient, arm band, provided demographic data, hospital-assigned identification number and anonymous protocol, patient vented/unresponsive  NA - No sedation, light sedation, or local anesthesia  Confirmation Checklist:  Patient's identity using two indicators, relevant allergies, procedure was appropriate and matched the consent or emergent situation and correct equipment/implants were available  Time out: Immediately prior to the procedure a time out was called (Dank)    Universal Protocol: the Joint Commission Universal Protocol was followed    Preparation: Patient was prepped and draped in usual sterile fashion       ANESTHESIA    Anesthesia:  Local infiltration  Local Anesthetic:  Lidocaine 1% without epinephrine  Anesthetic Total (mL):  5      SEDATION    Patient Sedated: No        Preparation: skin prepped with ChloraPrep  Skin prep agent: skin prep agent completely dried prior to procedure  Sterile barriers: maximum sterile barriers were used: cap, mask, sterile gown, sterile gloves, and large sterile sheet  Hand hygiene: hand hygiene performed prior to central venous catheter insertion  Type of line used: PICC  Catheter type: triple lumen  Lumen type: non-valved and power PICC  Lumen Identification: Gray, Red and White  Catheter size: 5 Fr  Brand: Bard  Lot number: [id number]  Placement method: venipuncture, MST, ultrasound and tip navigation system  Number of attempts: 1  Difficulty threading catheter: no  Successful placement: yes  Orientation: left    Location:  brachial vein (medial) (0.45cm)  Tip Location: SVC  Arm circumference: adults 10 cm  Extremity circumference: 28  Visible catheter length: 3  Total catheter length: 43  Dressing and securement: adhesive securement device, alcohol impregnated caps, blood cleaned with CHG, blood removed, chlorhexidine patch applied, fixation device, gloves changed prior to final dressing, glue, line secured, secured with tape, securement device, site cleansed, statlock and transparent securement dressing  Post procedure assessment: blood return through all ports, free fluid flow and placement verified by 3CG technology  PROCEDURE Describe Procedure: Left brachial medial vein 0.45cm dm.3cm external.Placement verified by Caterina 3CG.PICC okay to use.  Disposal: sharps and needle count correct at the end of procedure, needles and guidewire disposed in sharps container                               Baugh is a potential source of infection vs colonization   Straight catheter PRN likely 2/2 to dehydration & sepsis  s/p 2 boluses   IV NS @ 100 ml/hr likely 2/2 to dehydration & sepsis  s/p 2 boluses in ED now on IV NS @ 100 ml/hr  BP meds held for now

## 2024-10-12 NOTE — PATIENT PROFILE ADULT - NSPROALCOHOLUSE2_GEN_A_NUR
Patient discharging home with dad, AVS reviewed with no questions at this time. Patient instrcuted to follow up per discharge instructions and to return for worsening symptoms. Respirations equal and unlabored, skin PWD.   
never

## 2024-11-19 NOTE — PROGRESS NOTE ADULT - ASSESSMENT
85 y/o gentleman with hx of dementia, prostate cancer, CAD s/p PCI, respiratory failure s/p tracheostomy- vent dependent, PEG, CVA, , colostomy sent in from SNF for hypertensive urgency.  BNP elevated, vascular congestion on CXR. ECG with PVCs, poor R wave progression - no ischemic changes.  In ED given lasix 40 mg IV x 1 and ntp.     HFpEF - near euvolemia  hypertensive urgency - improved  CAD - stable    - may decrease diuresis to 40 mg IV q12  - Strict i/o. Keep K > 4, Mg > 2.  Monitor renal function with ongoing diuresis.  - uptitrate labetalol 200 mg TID  - elevated troponin due to CHF, CK normal - No ongoing ACS  - may repeat TTE  - resume all cardiac meds    Thank you for allowing me to participate in care of your patient.   Will follow
87 y/o gentleman with hx of dementia, prostate cancer, CAD s/p PCI, respiratory failure s/p tracheostomy- vent dependent, PEG, CVA, , colostomy sent in from SNF for hypertensive urgency.  BNP elevated, vascular congestion on CXR. ECG with PVCs, poor R wave progression - no ischemic changes.  In ED given lasix 40 mg IV x 1 and ntp.     HFpEF - euvolemic on exam, BP well controlled  hypertensive urgency - controlled on current regimen  CAD - stable  transient bradycardia/AV block - elevated digoxin levels      - on oral lasix    - continue dig at lowered dose, check level next week  - aspirin, statin, labetalol  - Dispo planning - stable for discharge from a cardiology standpoint.   Thank you for allowing me to participate in care of your patient.   Please call with questions  D/W Dr. Garcia
87 y/o gentleman with hx of dementia, prostate cancer, CAD s/p PCI, respiratory failure s/p tracheostomy- vent dependent, PEG, CVA, , colostomy sent in from SNF for hypertensive urgency.  BNP elevated, vascular congestion on CXR. ECG with PVCs, poor R wave progression - no ischemic changes.  In ED given lasix 40 mg IV x 1 and ntp.     HFpEF - euvolemic on exam, BP well controlled  hypertensive urgency - controlled on current regimen  CAD - stable  transient bradycardia/AV block - elevated digoxin levels      - switching to lasix 60 mg po daily - to start tomorrow  - decrease digoxin to 0.125 mg daily  - aspirin, statin, labetalol  - Dispo planning    Thank you for allowing me to participate in care of your patient.   Please call with questions  D/W Dr. Garcia
87 y/o gentleman with hx of dementia, prostate cancer, CAD s/p PCI, respiratory failure s/p tracheostomy- vent dependent, PEG, CVA, , colostomy sent in from SNF for hypertensive urgency.  BNP elevated, vascular congestion on CXR. ECG with PVCs, poor R wave progression - no ischemic changes.  In ED given lasix 40 mg IV x 1 and ntp.     HFpEF - euvolemic on exam, BP well controlled  hypertensive urgency - controlled on current regimen  CAD - stable  transient bradycardia/AV block    - decreasing 40 mg IV daily for one more day, po tomorrow  - Strict i/o. Keep K > 4, Mg > 2.  Monitor renal function with ongoing diuresis.  - elevated troponin due to CHF, CK normal - No ongoing ACS  - check dig level in setting of AV block.   - aspirin, statin, labetalol  - Dispo planning    Thank you for allowing me to participate in care of your patient.   Will follow  D/W Dr. Garcia
This is a 87 y/o male NH resident w/PMHx of CHF, dementia, prostate Ca, seizures, CAD, GERD anemia, bed bound, nonverbal, chronic tracheostomy with vent dependence, + PEG, chronic pleural effusion, chronic Baugh's, recent MRSA bacteremia in 3/18 completed antibiotics course, was sent from NH for accelerated HTN, and found to be in acute-on-chronic CHF.  He has responded well to diuresis, with a net loss of almost 2L.  Mild hypernatremia noted with diuresis, so daily free water intake was increased.  He is stable for transfer to medical bed with chronic vent.
negative

## 2025-01-22 NOTE — PROGRESS NOTE ADULT - PROBLEM SELECTOR PLAN 5
[[id number]] -At request of guardian Mr. Monroy, and for the sake of doing the right thing from a moral and ethical perspective, we decided to advise daughter of the plans in place to remove patient from ventilator, in order to allow her ability to prepare herself and come to peace with this difficult situation. We discussed with daughter severity of condition and grave prognosis. We discussed that the best next step we have for treating her dad is to allow him to be comfortable and relieve his suffering. She feels that her father is improving and doesn't understand why people who don't know her father is making the decision. We explained that this is a court appointed decision and we have no control over that. We did express our sadness for her during this difficult situation and offered her resources, she refused. We gave the option to be here during her father's removal of life support as well and she stated she will be here. I offered to call the guardian for her to ask him to be here and she said " no." Unfortunately, daughter is unrealistic and is in denial. I then reached out to guardian Mr. Van Monroy to make him aware of our conversation, and he expressed that we should proceed with ventilator withdrawal tomorrow morning and he is ready and willing to be present if that's what the daughter requests. Medications in preparation for withdrawal ordered in sunrise.

## 2025-01-24 NOTE — PATIENT PROFILE ADULT - DO YOU FEEL UNSAFE AT SCHOOL?
Called to confirm Pt's appt 01/31/2025 for Pre-Op   Pt stated she has an appt for 02/19/2025 for follow up    Pt is asking if she should still keep the pre-op appt or will Dr. Wolf do the pre-op appt during the 02/19/2025 follow up visit    Pt requesting call back for clarity please   not applicable
